# Patient Record
Sex: FEMALE | Race: WHITE | NOT HISPANIC OR LATINO | Employment: UNEMPLOYED | URBAN - NONMETROPOLITAN AREA
[De-identification: names, ages, dates, MRNs, and addresses within clinical notes are randomized per-mention and may not be internally consistent; named-entity substitution may affect disease eponyms.]

---

## 2018-03-19 ENCOUNTER — TRANSCRIBE ORDERS (OUTPATIENT)
Dept: ADMINISTRATIVE | Facility: HOSPITAL | Age: 73
End: 2018-03-19

## 2018-03-19 DIAGNOSIS — I50.9 SEVERE CONGESTIVE HEART FAILURE (HCC): ICD-10-CM

## 2018-03-19 DIAGNOSIS — M79.89 LEG SWELLING: Primary | ICD-10-CM

## 2018-03-19 DIAGNOSIS — R06.02 SOB (SHORTNESS OF BREATH): ICD-10-CM

## 2018-03-21 ENCOUNTER — HOSPITAL ENCOUNTER (OUTPATIENT)
Dept: GENERAL RADIOLOGY | Facility: HOSPITAL | Age: 73
Discharge: HOME | End: 2018-03-21

## 2018-03-21 ENCOUNTER — HOSPITAL ENCOUNTER (OUTPATIENT)
Dept: CARDIOLOGY | Facility: HOSPITAL | Age: 73
Discharge: HOME | End: 2018-03-21

## 2018-03-21 ENCOUNTER — TRANSCRIBE ORDERS (OUTPATIENT)
Dept: ADMINISTRATIVE | Facility: HOSPITAL | Age: 73
End: 2018-03-21

## 2018-03-21 ENCOUNTER — HOSPITAL ENCOUNTER (OUTPATIENT)
Dept: CARDIOLOGY | Facility: HOSPITAL | Age: 73
Discharge: HOME | End: 2018-03-21
Admitting: INTERNAL MEDICINE

## 2018-03-21 DIAGNOSIS — M79.89 LEG SWELLING: ICD-10-CM

## 2018-03-21 DIAGNOSIS — R06.02 SOB (SHORTNESS OF BREATH): ICD-10-CM

## 2018-03-21 DIAGNOSIS — R06.02 SHORTNESS OF BREATH: Primary | ICD-10-CM

## 2018-03-21 DIAGNOSIS — I50.9 SEVERE CONGESTIVE HEART FAILURE (HCC): ICD-10-CM

## 2018-03-21 DIAGNOSIS — R06.02 SHORTNESS OF BREATH: ICD-10-CM

## 2018-03-21 PROCEDURE — 93306 TTE W/DOPPLER COMPLETE: CPT

## 2018-03-21 PROCEDURE — 93970 EXTREMITY STUDY: CPT | Performed by: RADIOLOGY

## 2018-03-21 PROCEDURE — 93306 TTE W/DOPPLER COMPLETE: CPT | Performed by: INTERNAL MEDICINE

## 2018-03-21 PROCEDURE — 93970 EXTREMITY STUDY: CPT

## 2018-03-21 PROCEDURE — 71046 X-RAY EXAM CHEST 2 VIEWS: CPT | Performed by: RADIOLOGY

## 2018-03-21 PROCEDURE — 71046 X-RAY EXAM CHEST 2 VIEWS: CPT

## 2018-03-22 LAB
BH CV ECHO MEAS - % IVS THICK: 28.4 %
BH CV ECHO MEAS - % LVPW THICK: 40.7 %
BH CV ECHO MEAS - ACS: 1.9 CM
BH CV ECHO MEAS - AO MAX PG: 6.6 MMHG
BH CV ECHO MEAS - AO MEAN PG: 3.9 MMHG
BH CV ECHO MEAS - AO ROOT AREA (BSA CORRECTED): 1.8
BH CV ECHO MEAS - AO ROOT AREA: 6.6 CM^2
BH CV ECHO MEAS - AO ROOT DIAM: 2.9 CM
BH CV ECHO MEAS - AO V2 MAX: 128.2 CM/SEC
BH CV ECHO MEAS - AO V2 MEAN: 93.8 CM/SEC
BH CV ECHO MEAS - AO V2 VTI: 27.7 CM
BH CV ECHO MEAS - BSA(HAYCOCK): 1.7 M^2
BH CV ECHO MEAS - BSA: 1.6 M^2
BH CV ECHO MEAS - BZI_BMI: 28.7 KILOGRAMS/M^2
BH CV ECHO MEAS - BZI_METRIC_HEIGHT: 152.4 CM
BH CV ECHO MEAS - BZI_METRIC_WEIGHT: 66.7 KG
BH CV ECHO MEAS - CONTRAST EF 4CH: 71.1 ML/M^2
BH CV ECHO MEAS - EDV(CUBED): 109.6 ML
BH CV ECHO MEAS - EDV(MOD-SP4): 38 ML
BH CV ECHO MEAS - EDV(TEICH): 106.8 ML
BH CV ECHO MEAS - EF(CUBED): 79.8 %
BH CV ECHO MEAS - EF(MOD-SP4): 71.1 %
BH CV ECHO MEAS - EF(TEICH): 72.1 %
BH CV ECHO MEAS - ESV(CUBED): 22.2 ML
BH CV ECHO MEAS - ESV(MOD-SP4): 11 ML
BH CV ECHO MEAS - ESV(TEICH): 29.8 ML
BH CV ECHO MEAS - FS: 41.3 %
BH CV ECHO MEAS - IVS/LVPW: 0.95
BH CV ECHO MEAS - IVSD: 0.92 CM
BH CV ECHO MEAS - IVSS: 1.2 CM
BH CV ECHO MEAS - LA DIMENSION: 4.5 CM
BH CV ECHO MEAS - LA/AO: 1.4
BH CV ECHO MEAS - LV DIASTOLIC VOL/BSA (35-75): 23.2 ML/M^2
BH CV ECHO MEAS - LV MASS(C)D: 156.6 GRAMS
BH CV ECHO MEAS - LV MASS(C)DI: 95.6 GRAMS/M^2
BH CV ECHO MEAS - LV MASS(C)S: 109.7 GRAMS
BH CV ECHO MEAS - LV MASS(C)SI: 67 GRAMS/M^2
BH CV ECHO MEAS - LV SYSTOLIC VOL/BSA (12-30): 6.7 ML/M^2
BH CV ECHO MEAS - LVIDD: 4.8 CM
BH CV ECHO MEAS - LVIDS: 2.8 CM
BH CV ECHO MEAS - LVLD AP4: 6.1 CM
BH CV ECHO MEAS - LVLS AP4: 4.8 CM
BH CV ECHO MEAS - LVOT AREA (M): 2.5 CM^2
BH CV ECHO MEAS - LVOT AREA: 2.4 CM^2
BH CV ECHO MEAS - LVOT DIAM: 1.8 CM
BH CV ECHO MEAS - LVPWD: 0.97 CM
BH CV ECHO MEAS - LVPWS: 1.4 CM
BH CV ECHO MEAS - MV A MAX VEL: 96.7 CM/SEC
BH CV ECHO MEAS - MV E MAX VEL: 111.6 CM/SEC
BH CV ECHO MEAS - MV E/A: 1.2
BH CV ECHO MEAS - PA ACC SLOPE: 1682 CM/SEC^2
BH CV ECHO MEAS - PA ACC TIME: 0.08 SEC
BH CV ECHO MEAS - PA PR(ACCEL): 44.1 MMHG
BH CV ECHO MEAS - RAP SYSTOLE: 10 MMHG
BH CV ECHO MEAS - RVDD: 1.4 CM
BH CV ECHO MEAS - RVSP: 42.5 MMHG
BH CV ECHO MEAS - SI(AO): 111.5 ML/M^2
BH CV ECHO MEAS - SI(CUBED): 53.4 ML/M^2
BH CV ECHO MEAS - SI(MOD-SP4): 16.5 ML/M^2
BH CV ECHO MEAS - SI(TEICH): 47 ML/M^2
BH CV ECHO MEAS - SV(AO): 182.6 ML
BH CV ECHO MEAS - SV(CUBED): 87.4 ML
BH CV ECHO MEAS - SV(MOD-SP4): 27 ML
BH CV ECHO MEAS - SV(TEICH): 77 ML
BH CV ECHO MEAS - TR MAX VEL: 285 CM/SEC
MAXIMAL PREDICTED HEART RATE: 147 BPM
STRESS TARGET HR: 125 BPM

## 2018-04-06 ENCOUNTER — TRANSCRIBE ORDERS (OUTPATIENT)
Dept: LAB | Facility: HOSPITAL | Age: 73
End: 2018-04-06

## 2018-04-12 ENCOUNTER — LAB (OUTPATIENT)
Dept: LAB | Facility: HOSPITAL | Age: 73
End: 2018-04-12

## 2018-04-12 ENCOUNTER — TRANSCRIBE ORDERS (OUTPATIENT)
Dept: ADMINISTRATIVE | Facility: HOSPITAL | Age: 73
End: 2018-04-12

## 2018-04-12 DIAGNOSIS — R53.83 TIREDNESS: ICD-10-CM

## 2018-04-12 DIAGNOSIS — E11.9 DIABETES MELLITUS WITHOUT COMPLICATION (HCC): Primary | ICD-10-CM

## 2018-04-12 DIAGNOSIS — E11.9 DIABETES MELLITUS WITHOUT COMPLICATION (HCC): ICD-10-CM

## 2018-04-12 LAB
ALBUMIN SERPL-MCNC: 4.5 G/DL (ref 3.4–4.8)
ALBUMIN UR-MCNC: 87.2 MG/L
ALBUMIN/GLOB SERPL: 1.6 G/DL (ref 1.5–2.5)
ALP SERPL-CCNC: 55 U/L (ref 35–104)
ALT SERPL W P-5'-P-CCNC: 20 U/L (ref 10–36)
ANION GAP SERPL CALCULATED.3IONS-SCNC: 9.3 MMOL/L (ref 3.6–11.2)
AST SERPL-CCNC: 16 U/L (ref 10–30)
BASOPHILS # BLD AUTO: 0.02 10*3/MM3 (ref 0–0.3)
BASOPHILS NFR BLD AUTO: 0.4 % (ref 0–2)
BILIRUB SERPL-MCNC: 0.4 MG/DL (ref 0.2–1.8)
BUN BLD-MCNC: 41 MG/DL (ref 7–21)
BUN/CREAT SERPL: 31.8 (ref 7–25)
CALCIUM SPEC-SCNC: 9.2 MG/DL (ref 7.7–10)
CHLORIDE SERPL-SCNC: 101 MMOL/L (ref 99–112)
CO2 SERPL-SCNC: 28.7 MMOL/L (ref 24.3–31.9)
CREAT BLD-MCNC: 1.29 MG/DL (ref 0.43–1.29)
CREAT UR-MCNC: 96.6 MG/DL
DEPRECATED RDW RBC AUTO: 49.4 FL (ref 37–54)
EOSINOPHIL # BLD AUTO: 0.05 10*3/MM3 (ref 0–0.7)
EOSINOPHIL NFR BLD AUTO: 1 % (ref 0–7)
ERYTHROCYTE [DISTWIDTH] IN BLOOD BY AUTOMATED COUNT: 15.3 % (ref 11.5–14.5)
GFR SERPL CREATININE-BSD FRML MDRD: 41 ML/MIN/1.73
GLOBULIN UR ELPH-MCNC: 2.9 GM/DL
GLUCOSE BLD-MCNC: 161 MG/DL (ref 70–110)
HBA1C MFR BLD: 9.2 % (ref 4.5–5.7)
HCT VFR BLD AUTO: 44.8 % (ref 37–47)
HGB BLD-MCNC: 14.8 G/DL (ref 12–16)
IMM GRANULOCYTES # BLD: 0.01 10*3/MM3 (ref 0–0.03)
IMM GRANULOCYTES NFR BLD: 0.2 % (ref 0–0.5)
LYMPHOCYTES # BLD AUTO: 1.38 10*3/MM3 (ref 1–3)
LYMPHOCYTES NFR BLD AUTO: 26.5 % (ref 16–46)
MCH RBC QN AUTO: 29.7 PG (ref 27–33)
MCHC RBC AUTO-ENTMCNC: 33 G/DL (ref 33–37)
MCV RBC AUTO: 90 FL (ref 80–94)
MONOCYTES # BLD AUTO: 0.45 10*3/MM3 (ref 0.1–0.9)
MONOCYTES NFR BLD AUTO: 8.7 % (ref 0–12)
NEUTROPHILS # BLD AUTO: 3.29 10*3/MM3 (ref 1.4–6.5)
NEUTROPHILS NFR BLD AUTO: 63.2 % (ref 40–75)
OSMOLALITY SERPL CALC.SUM OF ELEC: 291.1 MOSM/KG (ref 273–305)
PLATELET # BLD AUTO: 291 10*3/MM3 (ref 130–400)
PMV BLD AUTO: 10.4 FL (ref 6–10)
POTASSIUM BLD-SCNC: 4.3 MMOL/L (ref 3.5–5.3)
PROT SERPL-MCNC: 7.4 G/DL (ref 6–8)
RBC # BLD AUTO: 4.98 10*6/MM3 (ref 4.2–5.4)
SODIUM BLD-SCNC: 139 MMOL/L (ref 135–153)
TSH SERPL DL<=0.05 MIU/L-ACNC: 2.74 MIU/ML (ref 0.55–4.78)
WBC NRBC COR # BLD: 5.2 10*3/MM3 (ref 4.5–12.5)

## 2018-04-12 PROCEDURE — 82570 ASSAY OF URINE CREATININE: CPT

## 2018-04-12 PROCEDURE — 83036 HEMOGLOBIN GLYCOSYLATED A1C: CPT

## 2018-04-12 PROCEDURE — 80053 COMPREHEN METABOLIC PANEL: CPT

## 2018-04-12 PROCEDURE — 36415 COLL VENOUS BLD VENIPUNCTURE: CPT

## 2018-04-12 PROCEDURE — 82043 UR ALBUMIN QUANTITATIVE: CPT

## 2018-04-12 PROCEDURE — 84443 ASSAY THYROID STIM HORMONE: CPT

## 2018-04-12 PROCEDURE — 85025 COMPLETE CBC W/AUTO DIFF WBC: CPT

## 2019-01-16 ENCOUNTER — APPOINTMENT (OUTPATIENT)
Dept: CT IMAGING | Facility: HOSPITAL | Age: 74
End: 2019-01-16

## 2019-01-16 ENCOUNTER — APPOINTMENT (OUTPATIENT)
Dept: GENERAL RADIOLOGY | Facility: HOSPITAL | Age: 74
End: 2019-01-16

## 2019-01-16 ENCOUNTER — HOSPITAL ENCOUNTER (INPATIENT)
Facility: HOSPITAL | Age: 74
LOS: 8 days | Discharge: HOME HEALTH | End: 2019-01-24
Attending: EMERGENCY MEDICINE | Admitting: HOSPITALIST

## 2019-01-16 DIAGNOSIS — I10 ESSENTIAL HYPERTENSION: ICD-10-CM

## 2019-01-16 DIAGNOSIS — J44.9 COPD WITH HYPOXIA (HCC): ICD-10-CM

## 2019-01-16 DIAGNOSIS — J18.9 PNEUMONIA OF BOTH LUNGS DUE TO INFECTIOUS ORGANISM, UNSPECIFIED PART OF LUNG: Primary | ICD-10-CM

## 2019-01-16 DIAGNOSIS — I48.91 ATRIAL FIBRILLATION WITH RVR (HCC): ICD-10-CM

## 2019-01-16 DIAGNOSIS — R09.02 COPD WITH HYPOXIA (HCC): ICD-10-CM

## 2019-01-16 DIAGNOSIS — J10.00 PNEUMONIA DUE TO INFLUENZA A VIRUS, UNSPECIFIED LATERALITY, UNSPECIFIED PART OF LUNG: ICD-10-CM

## 2019-01-16 LAB
6-ACETYL MORPHINE: NEGATIVE
A-A DO2: 145.1 MMHG (ref 0–300)
A-A DO2: 189.1 MMHG (ref 0–300)
ACETONE BLD QL: NEGATIVE
ALBUMIN SERPL-MCNC: 4.8 G/DL (ref 3.4–4.8)
ALBUMIN/GLOB SERPL: 1.5 G/DL (ref 1.5–2.5)
ALP SERPL-CCNC: 97 U/L (ref 35–104)
ALT SERPL W P-5'-P-CCNC: 13 U/L (ref 10–36)
AMMONIA BLD-SCNC: 46 UMOL/L (ref 11–51)
AMPHET+METHAMPHET UR QL: NEGATIVE
ANION GAP SERPL CALCULATED.3IONS-SCNC: 11.3 MMOL/L (ref 3.6–11.2)
ANION GAP SERPL CALCULATED.3IONS-SCNC: 15.7 MMOL/L (ref 3.6–11.2)
ANION GAP SERPL CALCULATED.3IONS-SCNC: 16.6 MMOL/L (ref 3.6–11.2)
APTT PPP: 23.2 SECONDS (ref 23.8–36.1)
APTT PPP: 25.6 SECONDS (ref 23.8–36.1)
ARTERIAL PATENCY WRIST A: ABNORMAL
ARTERIAL PATENCY WRIST A: POSITIVE
AST SERPL-CCNC: 25 U/L (ref 10–30)
ATMOSPHERIC PRESS: 733 MMHG
ATMOSPHERIC PRESS: 733 MMHG
BACTERIA UR QL AUTO: ABNORMAL /HPF
BARBITURATES UR QL SCN: NEGATIVE
BASE EXCESS BLDA CALC-SCNC: -2.4 MMOL/L
BASE EXCESS BLDA CALC-SCNC: -5.6 MMOL/L
BASOPHILS # BLD AUTO: 0.02 10*3/MM3 (ref 0–0.3)
BASOPHILS # BLD AUTO: 0.03 10*3/MM3 (ref 0–0.3)
BASOPHILS NFR BLD AUTO: 0.1 % (ref 0–2)
BASOPHILS NFR BLD AUTO: 0.3 % (ref 0–2)
BDY SITE: ABNORMAL
BDY SITE: ABNORMAL
BENZODIAZ UR QL SCN: NEGATIVE
BILIRUB SERPL-MCNC: 0.6 MG/DL (ref 0.2–1.8)
BILIRUB UR QL STRIP: NEGATIVE
BNP SERPL-MCNC: 109 PG/ML (ref 0–100)
BODY TEMPERATURE: 98.6 C
BODY TEMPERATURE: 98.6 C
BUN BLD-MCNC: 26 MG/DL (ref 7–21)
BUN BLD-MCNC: 29 MG/DL (ref 7–21)
BUN BLD-MCNC: 32 MG/DL (ref 7–21)
BUN/CREAT SERPL: 22.1 (ref 7–25)
BUN/CREAT SERPL: 23.4 (ref 7–25)
BUN/CREAT SERPL: 24.2 (ref 7–25)
BUPRENORPHINE SERPL-MCNC: NEGATIVE NG/ML
CALCIUM SPEC-SCNC: 8.4 MG/DL (ref 7.7–10)
CALCIUM SPEC-SCNC: 8.5 MG/DL (ref 7.7–10)
CALCIUM SPEC-SCNC: 9.6 MG/DL (ref 7.7–10)
CANNABINOIDS SERPL QL: NEGATIVE
CHLORIDE SERPL-SCNC: 100 MMOL/L (ref 99–112)
CHLORIDE SERPL-SCNC: 106 MMOL/L (ref 99–112)
CHLORIDE SERPL-SCNC: 94 MMOL/L (ref 99–112)
CLARITY UR: CLEAR
CO2 SERPL-SCNC: 17.3 MMOL/L (ref 24.3–31.9)
CO2 SERPL-SCNC: 19.4 MMOL/L (ref 24.3–31.9)
CO2 SERPL-SCNC: 20.7 MMOL/L (ref 24.3–31.9)
COCAINE UR QL: NEGATIVE
COHGB MFR BLD: 1.5 % (ref 0–5)
COHGB MFR BLD: 2.9 % (ref 0–5)
COLOR UR: YELLOW
CREAT BLD-MCNC: 1.11 MG/DL (ref 0.43–1.29)
CREAT BLD-MCNC: 1.2 MG/DL (ref 0.43–1.29)
CREAT BLD-MCNC: 1.45 MG/DL (ref 0.43–1.29)
CRP SERPL-MCNC: 1.69 MG/DL (ref 0–0.99)
D-LACTATE SERPL-SCNC: 2.8 MMOL/L (ref 0.5–2)
D-LACTATE SERPL-SCNC: 5.9 MMOL/L (ref 0.5–2)
DEPRECATED RDW RBC AUTO: 41.1 FL (ref 37–54)
DEPRECATED RDW RBC AUTO: 41.9 FL (ref 37–54)
EOSINOPHIL # BLD AUTO: 0.01 10*3/MM3 (ref 0–0.7)
EOSINOPHIL # BLD AUTO: 0.08 10*3/MM3 (ref 0–0.7)
EOSINOPHIL NFR BLD AUTO: 0.1 % (ref 0–7)
EOSINOPHIL NFR BLD AUTO: 0.8 % (ref 0–7)
ERYTHROCYTE [DISTWIDTH] IN BLOOD BY AUTOMATED COUNT: 12.9 % (ref 11.5–14.5)
ERYTHROCYTE [DISTWIDTH] IN BLOOD BY AUTOMATED COUNT: 13 % (ref 11.5–14.5)
ETHANOL BLD-MCNC: <10 MG/DL
ETHANOL UR QL: <0.01 %
FLUAV AG NPH QL: NEGATIVE
FLUBV AG NPH QL IA: NEGATIVE
GFR SERPL CREATININE-BSD FRML MDRD: 35 ML/MIN/1.73
GFR SERPL CREATININE-BSD FRML MDRD: 44 ML/MIN/1.73
GFR SERPL CREATININE-BSD FRML MDRD: 48 ML/MIN/1.73
GLOBULIN UR ELPH-MCNC: 3.1 GM/DL
GLUCOSE BLD-MCNC: 209 MG/DL (ref 70–110)
GLUCOSE BLD-MCNC: 461 MG/DL (ref 70–110)
GLUCOSE BLD-MCNC: 474 MG/DL (ref 70–110)
GLUCOSE BLD-MCNC: 485 MG/DL (ref 70–110)
GLUCOSE BLDC GLUCOMTR-MCNC: 134 MG/DL (ref 70–130)
GLUCOSE BLDC GLUCOMTR-MCNC: 245 MG/DL (ref 70–130)
GLUCOSE BLDC GLUCOMTR-MCNC: 255 MG/DL (ref 70–130)
GLUCOSE BLDC GLUCOMTR-MCNC: 459 MG/DL (ref 70–130)
GLUCOSE BLDC GLUCOMTR-MCNC: 489 MG/DL (ref 70–130)
GLUCOSE BLDC GLUCOMTR-MCNC: 66 MG/DL (ref 70–130)
GLUCOSE BLDC GLUCOMTR-MCNC: 84 MG/DL (ref 70–130)
GLUCOSE UR STRIP-MCNC: ABNORMAL MG/DL
HBA1C MFR BLD: 11.5 % (ref 4.5–5.7)
HCO3 BLDA-SCNC: 20.1 MMOL/L (ref 22–26)
HCO3 BLDA-SCNC: 22.3 MMOL/L (ref 22–26)
HCT VFR BLD AUTO: 41.3 % (ref 37–47)
HCT VFR BLD AUTO: 45.1 % (ref 37–47)
HCT VFR BLD CALC: 39 % (ref 37–47)
HCT VFR BLD CALC: 41 % (ref 37–47)
HGB BLD-MCNC: 13.6 G/DL (ref 12–16)
HGB BLD-MCNC: 15.1 G/DL (ref 12–16)
HGB BLDA-MCNC: 13.1 G/DL (ref 12–16)
HGB BLDA-MCNC: 13.8 G/DL (ref 12–16)
HGB UR QL STRIP.AUTO: ABNORMAL
HOLD SPECIMEN: NORMAL
HOROWITZ INDEX BLD+IHG-RTO: 45 %
HOROWITZ INDEX BLD+IHG-RTO: 50 %
HYALINE CASTS UR QL AUTO: ABNORMAL /LPF
IMM GRANULOCYTES # BLD AUTO: 0.03 10*3/MM3 (ref 0–0.03)
IMM GRANULOCYTES # BLD AUTO: 0.04 10*3/MM3 (ref 0–0.03)
IMM GRANULOCYTES NFR BLD AUTO: 0.3 % (ref 0–0.5)
IMM GRANULOCYTES NFR BLD AUTO: 0.3 % (ref 0–0.5)
INR PPP: 0.92 (ref 0.9–1.1)
INR PPP: 0.93 (ref 0.9–1.1)
KETONES UR QL STRIP: ABNORMAL
L PNEUMO1 AG UR QL IA: NEGATIVE
LEUKOCYTE ESTERASE UR QL STRIP.AUTO: ABNORMAL
LYMPHOCYTES # BLD AUTO: 0.89 10*3/MM3 (ref 1–3)
LYMPHOCYTES # BLD AUTO: 3.21 10*3/MM3 (ref 1–3)
LYMPHOCYTES NFR BLD AUTO: 32.1 % (ref 16–46)
LYMPHOCYTES NFR BLD AUTO: 6.6 % (ref 16–46)
M PNEUMO IGM SER QL: NEGATIVE
MAGNESIUM SERPL-MCNC: 2.1 MG/DL (ref 1.7–2.6)
MCH RBC QN AUTO: 29.7 PG (ref 27–33)
MCH RBC QN AUTO: 29.7 PG (ref 27–33)
MCHC RBC AUTO-ENTMCNC: 32.9 G/DL (ref 33–37)
MCHC RBC AUTO-ENTMCNC: 33.5 G/DL (ref 33–37)
MCV RBC AUTO: 88.8 FL (ref 80–94)
MCV RBC AUTO: 90.2 FL (ref 80–94)
METHADONE UR QL SCN: NEGATIVE
METHGB BLD QL: 0.3 % (ref 0–3)
METHGB BLD QL: 0.3 % (ref 0–3)
MODALITY: ABNORMAL
MODALITY: ABNORMAL
MONOCYTES # BLD AUTO: 0.92 10*3/MM3 (ref 0.1–0.9)
MONOCYTES # BLD AUTO: 0.92 10*3/MM3 (ref 0.1–0.9)
MONOCYTES NFR BLD AUTO: 6.8 % (ref 0–12)
MONOCYTES NFR BLD AUTO: 9.2 % (ref 0–12)
NEUTROPHILS # BLD AUTO: 11.67 10*3/MM3 (ref 1.4–6.5)
NEUTROPHILS # BLD AUTO: 5.72 10*3/MM3 (ref 1.4–6.5)
NEUTROPHILS NFR BLD AUTO: 57.3 % (ref 40–75)
NEUTROPHILS NFR BLD AUTO: 86.1 % (ref 40–75)
NITRITE UR QL STRIP: NEGATIVE
OPIATES UR QL: NEGATIVE
OSMOLALITY SERPL CALC.SUM OF ELEC: 286.6 MOSM/KG (ref 273–305)
OSMOLALITY SERPL CALC.SUM OF ELEC: 289.2 MOSM/KG (ref 273–305)
OSMOLALITY SERPL CALC.SUM OF ELEC: 292.3 MOSM/KG (ref 273–305)
OXYCODONE UR QL SCN: NEGATIVE
OXYHGB MFR BLDV: 94.3 % (ref 85–100)
OXYHGB MFR BLDV: 96.4 % (ref 85–100)
PCO2 BLDA: 38.1 MM HG (ref 35–45)
PCO2 BLDA: 40 MM HG (ref 35–45)
PCP UR QL SCN: NEGATIVE
PEEP RESPIRATORY: 5 CM[H2O]
PEEP RESPIRATORY: 5 CM[H2O]
PH BLDA: 7.32 PH UNITS (ref 7.35–7.45)
PH BLDA: 7.38 PH UNITS (ref 7.35–7.45)
PH UR STRIP.AUTO: 6.5 [PH] (ref 5–8)
PHOSPHATE SERPL-MCNC: 4.1 MG/DL (ref 2.7–4.5)
PLATELET # BLD AUTO: 236 10*3/MM3 (ref 130–400)
PLATELET # BLD AUTO: 277 10*3/MM3 (ref 130–400)
PMV BLD AUTO: 11.3 FL (ref 6–10)
PMV BLD AUTO: 11.5 FL (ref 6–10)
PO2 BLDA: 108.9 MM HG (ref 80–100)
PO2 BLDA: 120.3 MM HG (ref 80–100)
POTASSIUM BLD-SCNC: 4.1 MMOL/L (ref 3.5–5.3)
POTASSIUM BLD-SCNC: 4.3 MMOL/L (ref 3.5–5.3)
POTASSIUM BLD-SCNC: 4.5 MMOL/L (ref 3.5–5.3)
PROT SERPL-MCNC: 7.9 G/DL (ref 6–8)
PROT UR QL STRIP: ABNORMAL
PROTHROMBIN TIME: 12.5 SECONDS (ref 11–15.4)
PROTHROMBIN TIME: 12.7 SECONDS (ref 11–15.4)
RBC # BLD AUTO: 4.58 10*6/MM3 (ref 4.2–5.4)
RBC # BLD AUTO: 5.08 10*6/MM3 (ref 4.2–5.4)
RBC # UR: ABNORMAL /HPF
REF LAB TEST METHOD: ABNORMAL
SAO2 % BLDCOA: 97.4 % (ref 90–100)
SAO2 % BLDCOA: 98.2 % (ref 90–100)
SET MECH RESP RATE: 14
SET MECH RESP RATE: 14
SODIUM BLD-SCNC: 130 MMOL/L (ref 135–153)
SODIUM BLD-SCNC: 133 MMOL/L (ref 135–153)
SODIUM BLD-SCNC: 138 MMOL/L (ref 135–153)
SP GR UR STRIP: 1.02 (ref 1–1.03)
SQUAMOUS #/AREA URNS HPF: ABNORMAL /HPF
TROPONIN I SERPL-MCNC: 0.03 NG/ML
TROPONIN I SERPL-MCNC: 0.04 NG/ML
TROPONIN I SERPL-MCNC: 0.08 NG/ML
TSH SERPL DL<=0.05 MIU/L-ACNC: 4.09 MIU/ML (ref 0.55–4.78)
UROBILINOGEN UR QL STRIP: ABNORMAL
VENTILATOR MODE: ABNORMAL
VENTILATOR MODE: ABNORMAL
VT ON VENT VENT: 400 ML
VT ON VENT VENT: 550 ML
WBC NRBC COR # BLD: 13.55 10*3/MM3 (ref 4.5–12.5)
WBC NRBC COR # BLD: 9.99 10*3/MM3 (ref 4.5–12.5)
WBC UR QL AUTO: ABNORMAL /HPF
WHOLE BLOOD HOLD SPECIMEN: NORMAL
WHOLE BLOOD HOLD SPECIMEN: NORMAL

## 2019-01-16 PROCEDURE — 83036 HEMOGLOBIN GLYCOSYLATED A1C: CPT | Performed by: HOSPITALIST

## 2019-01-16 PROCEDURE — 25010000002 CEFTRIAXONE: Performed by: EMERGENCY MEDICINE

## 2019-01-16 PROCEDURE — 94799 UNLISTED PULMONARY SVC/PX: CPT

## 2019-01-16 PROCEDURE — 93005 ELECTROCARDIOGRAM TRACING: CPT | Performed by: NURSE PRACTITIONER

## 2019-01-16 PROCEDURE — 84146 ASSAY OF PROLACTIN: CPT | Performed by: NURSE PRACTITIONER

## 2019-01-16 PROCEDURE — 87040 BLOOD CULTURE FOR BACTERIA: CPT | Performed by: EMERGENCY MEDICINE

## 2019-01-16 PROCEDURE — 93010 ELECTROCARDIOGRAM REPORT: CPT | Performed by: INTERNAL MEDICINE

## 2019-01-16 PROCEDURE — 82962 GLUCOSE BLOOD TEST: CPT

## 2019-01-16 PROCEDURE — 80307 DRUG TEST PRSMV CHEM ANLYZR: CPT | Performed by: EMERGENCY MEDICINE

## 2019-01-16 PROCEDURE — 82805 BLOOD GASES W/O2 SATURATION: CPT | Performed by: NURSE PRACTITIONER

## 2019-01-16 PROCEDURE — 5A1945Z RESPIRATORY VENTILATION, 24-96 CONSECUTIVE HOURS: ICD-10-PCS | Performed by: EMERGENCY MEDICINE

## 2019-01-16 PROCEDURE — 25010000002 AZITHROMYCIN: Performed by: EMERGENCY MEDICINE

## 2019-01-16 PROCEDURE — 25010000002 SUCCINYLCHOLINE PER 20 MG: Performed by: EMERGENCY MEDICINE

## 2019-01-16 PROCEDURE — 87086 URINE CULTURE/COLONY COUNT: CPT | Performed by: NURSE PRACTITIONER

## 2019-01-16 PROCEDURE — 82140 ASSAY OF AMMONIA: CPT | Performed by: NURSE PRACTITIONER

## 2019-01-16 PROCEDURE — 84443 ASSAY THYROID STIM HORMONE: CPT | Performed by: NURSE PRACTITIONER

## 2019-01-16 PROCEDURE — 84484 ASSAY OF TROPONIN QUANT: CPT | Performed by: EMERGENCY MEDICINE

## 2019-01-16 PROCEDURE — 82947 ASSAY GLUCOSE BLOOD QUANT: CPT | Performed by: NURSE PRACTITIONER

## 2019-01-16 PROCEDURE — 94640 AIRWAY INHALATION TREATMENT: CPT

## 2019-01-16 PROCEDURE — 99291 CRITICAL CARE FIRST HOUR: CPT | Performed by: HOSPITALIST

## 2019-01-16 PROCEDURE — 85730 THROMBOPLASTIN TIME PARTIAL: CPT | Performed by: NURSE PRACTITIONER

## 2019-01-16 PROCEDURE — 70450 CT HEAD/BRAIN W/O DYE: CPT | Performed by: RADIOLOGY

## 2019-01-16 PROCEDURE — 81001 URINALYSIS AUTO W/SCOPE: CPT | Performed by: EMERGENCY MEDICINE

## 2019-01-16 PROCEDURE — 25010000002 ENOXAPARIN PER 10 MG: Performed by: HOSPITALIST

## 2019-01-16 PROCEDURE — 83050 HGB METHEMOGLOBIN QUAN: CPT | Performed by: NURSE PRACTITIONER

## 2019-01-16 PROCEDURE — 85610 PROTHROMBIN TIME: CPT | Performed by: EMERGENCY MEDICINE

## 2019-01-16 PROCEDURE — 80053 COMPREHEN METABOLIC PANEL: CPT | Performed by: EMERGENCY MEDICINE

## 2019-01-16 PROCEDURE — 85025 COMPLETE CBC W/AUTO DIFF WBC: CPT | Performed by: EMERGENCY MEDICINE

## 2019-01-16 PROCEDURE — 25010000002 NALOXONE PER 1 MG: Performed by: EMERGENCY MEDICINE

## 2019-01-16 PROCEDURE — 85730 THROMBOPLASTIN TIME PARTIAL: CPT | Performed by: EMERGENCY MEDICINE

## 2019-01-16 PROCEDURE — 82009 KETONE BODYS QUAL: CPT | Performed by: NURSE PRACTITIONER

## 2019-01-16 PROCEDURE — 63710000001 INSULIN REGULAR HUMAN PER 5 UNITS: Performed by: EMERGENCY MEDICINE

## 2019-01-16 PROCEDURE — 71045 X-RAY EXAM CHEST 1 VIEW: CPT

## 2019-01-16 PROCEDURE — 0BH17EZ INSERTION OF ENDOTRACHEAL AIRWAY INTO TRACHEA, VIA NATURAL OR ARTIFICIAL OPENING: ICD-10-PCS | Performed by: EMERGENCY MEDICINE

## 2019-01-16 PROCEDURE — 87185 SC STD ENZYME DETCJ PER NZM: CPT | Performed by: NURSE PRACTITIONER

## 2019-01-16 PROCEDURE — 85610 PROTHROMBIN TIME: CPT | Performed by: NURSE PRACTITIONER

## 2019-01-16 PROCEDURE — 84484 ASSAY OF TROPONIN QUANT: CPT | Performed by: NURSE PRACTITIONER

## 2019-01-16 PROCEDURE — 83735 ASSAY OF MAGNESIUM: CPT | Performed by: EMERGENCY MEDICINE

## 2019-01-16 PROCEDURE — 94002 VENT MGMT INPAT INIT DAY: CPT

## 2019-01-16 PROCEDURE — 99285 EMERGENCY DEPT VISIT HI MDM: CPT

## 2019-01-16 PROCEDURE — 82375 ASSAY CARBOXYHB QUANT: CPT | Performed by: NURSE PRACTITIONER

## 2019-01-16 PROCEDURE — 87186 SC STD MICRODIL/AGAR DIL: CPT | Performed by: NURSE PRACTITIONER

## 2019-01-16 PROCEDURE — 83605 ASSAY OF LACTIC ACID: CPT | Performed by: EMERGENCY MEDICINE

## 2019-01-16 PROCEDURE — 25010000002 PROPOFOL 1000 MG/ML EMULSION: Performed by: EMERGENCY MEDICINE

## 2019-01-16 PROCEDURE — 25010000002 PROPOFOL 10 MG/ML EMULSION

## 2019-01-16 PROCEDURE — 83880 ASSAY OF NATRIURETIC PEPTIDE: CPT | Performed by: EMERGENCY MEDICINE

## 2019-01-16 PROCEDURE — 87205 SMEAR GRAM STAIN: CPT | Performed by: NURSE PRACTITIONER

## 2019-01-16 PROCEDURE — 93005 ELECTROCARDIOGRAM TRACING: CPT | Performed by: EMERGENCY MEDICINE

## 2019-01-16 PROCEDURE — 86140 C-REACTIVE PROTEIN: CPT | Performed by: NURSE PRACTITIONER

## 2019-01-16 PROCEDURE — 71250 CT THORAX DX C-: CPT

## 2019-01-16 PROCEDURE — 71250 CT THORAX DX C-: CPT | Performed by: RADIOLOGY

## 2019-01-16 PROCEDURE — 85025 COMPLETE CBC W/AUTO DIFF WBC: CPT | Performed by: NURSE PRACTITIONER

## 2019-01-16 PROCEDURE — 87077 CULTURE AEROBIC IDENTIFY: CPT | Performed by: NURSE PRACTITIONER

## 2019-01-16 PROCEDURE — 87804 INFLUENZA ASSAY W/OPTIC: CPT | Performed by: NURSE PRACTITIONER

## 2019-01-16 PROCEDURE — 86738 MYCOPLASMA ANTIBODY: CPT | Performed by: NURSE PRACTITIONER

## 2019-01-16 PROCEDURE — 36600 WITHDRAWAL OF ARTERIAL BLOOD: CPT | Performed by: NURSE PRACTITIONER

## 2019-01-16 PROCEDURE — 87899 AGENT NOS ASSAY W/OPTIC: CPT | Performed by: NURSE PRACTITIONER

## 2019-01-16 PROCEDURE — 87070 CULTURE OTHR SPECIMN AEROBIC: CPT | Performed by: NURSE PRACTITIONER

## 2019-01-16 PROCEDURE — 71045 X-RAY EXAM CHEST 1 VIEW: CPT | Performed by: RADIOLOGY

## 2019-01-16 PROCEDURE — 84100 ASSAY OF PHOSPHORUS: CPT | Performed by: HOSPITALIST

## 2019-01-16 PROCEDURE — 70450 CT HEAD/BRAIN W/O DYE: CPT

## 2019-01-16 RX ORDER — SODIUM CHLORIDE 0.9 % (FLUSH) 0.9 %
3 SYRINGE (ML) INJECTION EVERY 12 HOURS SCHEDULED
Status: DISCONTINUED | OUTPATIENT
Start: 2019-01-16 | End: 2019-01-24 | Stop reason: HOSPADM

## 2019-01-16 RX ORDER — POTASSIUM CHLORIDE 750 MG/1
10 CAPSULE, EXTENDED RELEASE ORAL DAILY
Status: CANCELLED | OUTPATIENT
Start: 2019-01-17

## 2019-01-16 RX ORDER — POTASSIUM CHLORIDE 600 MG/1
8 TABLET, FILM COATED, EXTENDED RELEASE ORAL DAILY
COMMUNITY
End: 2019-01-24 | Stop reason: HOSPADM

## 2019-01-16 RX ORDER — DEXTROSE MONOHYDRATE 25 G/50ML
25 INJECTION, SOLUTION INTRAVENOUS
Status: CANCELLED | OUTPATIENT
Start: 2019-01-16

## 2019-01-16 RX ORDER — IPRATROPIUM BROMIDE AND ALBUTEROL SULFATE 2.5; .5 MG/3ML; MG/3ML
3 SOLUTION RESPIRATORY (INHALATION)
Status: DISCONTINUED | OUTPATIENT
Start: 2019-01-16 | End: 2019-01-22

## 2019-01-16 RX ORDER — INDAPAMIDE 2.5 MG/1
5 TABLET, FILM COATED ORAL EVERY MORNING
Status: CANCELLED | OUTPATIENT
Start: 2019-01-17

## 2019-01-16 RX ORDER — ETOMIDATE 2 MG/ML
15 INJECTION INTRAVENOUS ONCE
Status: COMPLETED | OUTPATIENT
Start: 2019-01-16 | End: 2019-01-16

## 2019-01-16 RX ORDER — FUROSEMIDE 20 MG/1
20 TABLET ORAL 2 TIMES DAILY
COMMUNITY
End: 2019-01-16

## 2019-01-16 RX ORDER — PIOGLITAZONEHYDROCHLORIDE 15 MG/1
45 TABLET ORAL DAILY
Status: CANCELLED | OUTPATIENT
Start: 2019-01-17

## 2019-01-16 RX ORDER — NALOXONE HYDROCHLORIDE 1 MG/ML
INJECTION INTRAMUSCULAR; INTRAVENOUS; SUBCUTANEOUS
Status: COMPLETED
Start: 2019-01-16 | End: 2019-01-16

## 2019-01-16 RX ORDER — SODIUM CHLORIDE 9 MG/ML
100 INJECTION, SOLUTION INTRAVENOUS CONTINUOUS
Status: DISCONTINUED | OUTPATIENT
Start: 2019-01-16 | End: 2019-01-17

## 2019-01-16 RX ORDER — SODIUM CHLORIDE 0.9 % (FLUSH) 0.9 %
10 SYRINGE (ML) INJECTION AS NEEDED
Status: DISCONTINUED | OUTPATIENT
Start: 2019-01-16 | End: 2019-01-24 | Stop reason: HOSPADM

## 2019-01-16 RX ORDER — CHLORHEXIDINE GLUCONATE 0.12 MG/ML
15 RINSE ORAL EVERY 12 HOURS SCHEDULED
Status: DISCONTINUED | OUTPATIENT
Start: 2019-01-16 | End: 2019-01-19

## 2019-01-16 RX ORDER — ACETAMINOPHEN 325 MG/1
650 TABLET ORAL EVERY 6 HOURS PRN
Status: DISCONTINUED | OUTPATIENT
Start: 2019-01-16 | End: 2019-01-24 | Stop reason: HOSPADM

## 2019-01-16 RX ORDER — RAMIPRIL 10 MG/1
10 CAPSULE ORAL DAILY
Status: CANCELLED | OUTPATIENT
Start: 2019-01-17

## 2019-01-16 RX ORDER — NALOXONE HCL 0.4 MG/ML
1 VIAL (ML) INJECTION ONCE
Status: COMPLETED | OUTPATIENT
Start: 2019-01-16 | End: 2019-01-16

## 2019-01-16 RX ORDER — PROPOFOL 10 MG/ML
VIAL (ML) INTRAVENOUS
Status: COMPLETED
Start: 2019-01-16 | End: 2019-01-16

## 2019-01-16 RX ORDER — LORAZEPAM 2 MG/ML
1 INJECTION INTRAMUSCULAR EVERY 4 HOURS PRN
Status: DISCONTINUED | OUTPATIENT
Start: 2019-01-16 | End: 2019-01-19

## 2019-01-16 RX ORDER — NIFEDIPINE 90 MG/1
90 TABLET, EXTENDED RELEASE ORAL DAILY
COMMUNITY
End: 2019-01-24 | Stop reason: HOSPADM

## 2019-01-16 RX ORDER — PANTOPRAZOLE SODIUM 40 MG/10ML
40 INJECTION, POWDER, LYOPHILIZED, FOR SOLUTION INTRAVENOUS
Status: DISCONTINUED | OUTPATIENT
Start: 2019-01-16 | End: 2019-01-19

## 2019-01-16 RX ORDER — NIFEDIPINE 90 MG/1
90 TABLET, FILM COATED, EXTENDED RELEASE ORAL DAILY
Status: CANCELLED | OUTPATIENT
Start: 2019-01-17

## 2019-01-16 RX ORDER — MULTIPLE VITAMINS W/ MINERALS TAB 9MG-400MCG
1 TAB ORAL DAILY
COMMUNITY
End: 2019-01-16

## 2019-01-16 RX ORDER — RAMIPRIL 10 MG/1
10 CAPSULE ORAL DAILY
COMMUNITY
End: 2019-01-24 | Stop reason: HOSPADM

## 2019-01-16 RX ORDER — L.ACID,PARA/B.BIFIDUM/S.THERM 8B CELL
1 CAPSULE ORAL DAILY
Status: DISCONTINUED | OUTPATIENT
Start: 2019-01-16 | End: 2019-01-24 | Stop reason: HOSPADM

## 2019-01-16 RX ORDER — INDAPAMIDE 2.5 MG/1
5 TABLET, FILM COATED ORAL EVERY MORNING
COMMUNITY
End: 2019-01-24 | Stop reason: HOSPADM

## 2019-01-16 RX ORDER — HEPARIN SODIUM 5000 [USP'U]/ML
60 INJECTION, SOLUTION INTRAVENOUS; SUBCUTANEOUS ONCE
Status: DISCONTINUED | OUTPATIENT
Start: 2019-01-16 | End: 2019-01-16

## 2019-01-16 RX ORDER — HEPARIN SODIUM 10000 [USP'U]/100ML
12 INJECTION, SOLUTION INTRAVENOUS
Status: DISCONTINUED | OUTPATIENT
Start: 2019-01-16 | End: 2019-01-16

## 2019-01-16 RX ORDER — DEXTROSE MONOHYDRATE 25 G/50ML
25-50 INJECTION, SOLUTION INTRAVENOUS
Status: DISCONTINUED | OUTPATIENT
Start: 2019-01-16 | End: 2019-01-24 | Stop reason: HOSPADM

## 2019-01-16 RX ORDER — SUCCINYLCHOLINE CHLORIDE 20 MG/ML
100 INJECTION INTRAMUSCULAR; INTRAVENOUS ONCE
Status: COMPLETED | OUTPATIENT
Start: 2019-01-16 | End: 2019-01-16

## 2019-01-16 RX ORDER — MONTELUKAST SODIUM 10 MG/1
10 TABLET ORAL NIGHTLY
Status: ON HOLD | COMMUNITY
End: 2019-10-16 | Stop reason: SDUPTHER

## 2019-01-16 RX ORDER — NICOTINE POLACRILEX 4 MG
15 LOZENGE BUCCAL
Status: CANCELLED | OUTPATIENT
Start: 2019-01-16

## 2019-01-16 RX ORDER — SODIUM CHLORIDE 0.9 % (FLUSH) 0.9 %
3-10 SYRINGE (ML) INJECTION AS NEEDED
Status: DISCONTINUED | OUTPATIENT
Start: 2019-01-16 | End: 2019-01-24 | Stop reason: HOSPADM

## 2019-01-16 RX ORDER — SODIUM CHLORIDE 9 MG/ML
INJECTION, SOLUTION INTRAVENOUS
Status: COMPLETED
Start: 2019-01-16 | End: 2019-01-16

## 2019-01-16 RX ORDER — INDAPAMIDE 2.5 MG/1
2.5 TABLET, FILM COATED ORAL EVERY MORNING
COMMUNITY
End: 2019-01-16

## 2019-01-16 RX ORDER — HEPARIN SODIUM 5000 [USP'U]/ML
30 INJECTION, SOLUTION INTRAVENOUS; SUBCUTANEOUS AS NEEDED
Status: DISCONTINUED | OUTPATIENT
Start: 2019-01-16 | End: 2019-01-16

## 2019-01-16 RX ORDER — DILTIAZEM HYDROCHLORIDE 5 MG/ML
15 INJECTION INTRAVENOUS ONCE
Status: COMPLETED | OUTPATIENT
Start: 2019-01-16 | End: 2019-01-16

## 2019-01-16 RX ORDER — PIOGLITAZONEHYDROCHLORIDE 45 MG/1
45 TABLET ORAL DAILY
COMMUNITY
End: 2019-01-24 | Stop reason: HOSPADM

## 2019-01-16 RX ORDER — HEPARIN SODIUM 5000 [USP'U]/ML
60 INJECTION, SOLUTION INTRAVENOUS; SUBCUTANEOUS AS NEEDED
Status: DISCONTINUED | OUTPATIENT
Start: 2019-01-16 | End: 2019-01-16

## 2019-01-16 RX ORDER — METOPROLOL TARTRATE 5 MG/5ML
5 INJECTION INTRAVENOUS ONCE
Status: COMPLETED | OUTPATIENT
Start: 2019-01-16 | End: 2019-01-16

## 2019-01-16 RX ORDER — MONTELUKAST SODIUM 10 MG/1
10 TABLET ORAL NIGHTLY
Status: CANCELLED | OUTPATIENT
Start: 2019-01-16

## 2019-01-16 RX ADMIN — ENOXAPARIN SODIUM 50 MG: 60 INJECTION SUBCUTANEOUS at 21:32

## 2019-01-16 RX ADMIN — SODIUM CHLORIDE 1000 ML/HR: 9 INJECTION, SOLUTION INTRAVENOUS at 14:30

## 2019-01-16 RX ADMIN — AZITHROMYCIN MONOHYDRATE 500 MG: 500 INJECTION, POWDER, LYOPHILIZED, FOR SOLUTION INTRAVENOUS at 16:19

## 2019-01-16 RX ADMIN — SODIUM CHLORIDE 100 ML/HR: 9 INJECTION, SOLUTION INTRAVENOUS at 18:17

## 2019-01-16 RX ADMIN — HUMAN INSULIN 8 UNITS: 100 INJECTION, SOLUTION SUBCUTANEOUS at 17:25

## 2019-01-16 RX ADMIN — Medication 1 CAPSULE: at 21:32

## 2019-01-16 RX ADMIN — CEFTRIAXONE 1 G: 1 INJECTION, POWDER, FOR SOLUTION INTRAMUSCULAR; INTRAVENOUS at 16:54

## 2019-01-16 RX ADMIN — CHLORHEXIDINE GLUCONATE 15 ML: 1.2 RINSE ORAL at 20:26

## 2019-01-16 RX ADMIN — DEXTROSE MONOHYDRATE 25 ML: 25 INJECTION, SOLUTION INTRAVENOUS at 23:36

## 2019-01-16 RX ADMIN — IPRATROPIUM BROMIDE AND ALBUTEROL SULFATE 3 ML: .5; 3 SOLUTION RESPIRATORY (INHALATION) at 19:12

## 2019-01-16 RX ADMIN — PROPOFOL 5 MCG/KG/MIN: 10 INJECTION, EMULSION INTRAVENOUS at 15:48

## 2019-01-16 RX ADMIN — PROPOFOL 50 MCG/KG/MIN: 10 INJECTION, EMULSION INTRAVENOUS at 20:22

## 2019-01-16 RX ADMIN — SUCCINYLCHOLINE CHLORIDE 100 MG: 20 INJECTION, SOLUTION INTRAMUSCULAR; INTRAVENOUS at 15:38

## 2019-01-16 RX ADMIN — NALOXONE HYDROCHLORIDE 1 MG: 1 INJECTION PARENTERAL at 14:28

## 2019-01-16 RX ADMIN — NALOXONE HYDROCHLORIDE 1 MG: 0.4 INJECTION, SOLUTION INTRAMUSCULAR; INTRAVENOUS; SUBCUTANEOUS at 15:34

## 2019-01-16 RX ADMIN — SODIUM CHLORIDE, PRESERVATIVE FREE 3 ML: 5 INJECTION INTRAVENOUS at 20:27

## 2019-01-16 RX ADMIN — SODIUM CHLORIDE 1000 ML: 900 INJECTION INTRAVENOUS at 16:53

## 2019-01-16 RX ADMIN — PANTOPRAZOLE SODIUM 40 MG: 40 INJECTION, POWDER, FOR SOLUTION INTRAVENOUS at 20:29

## 2019-01-16 RX ADMIN — DILTIAZEM HYDROCHLORIDE 15 MG: 5 INJECTION INTRAVENOUS at 15:34

## 2019-01-16 RX ADMIN — ETOMIDATE 15 MG: 2 INJECTION, SOLUTION INTRAVENOUS at 15:37

## 2019-01-16 RX ADMIN — METOPROLOL TARTRATE 5 MG: 5 INJECTION, SOLUTION INTRAVENOUS at 16:50

## 2019-01-16 RX ADMIN — SODIUM CHLORIDE 2 UNITS/HR: 9 INJECTION, SOLUTION INTRAVENOUS at 20:26

## 2019-01-16 RX ADMIN — Medication: at 23:51

## 2019-01-16 NOTE — ED PROVIDER NOTES
Subjective   Patient presents to ER after passing out in car on the way home from doctors office.        Syncope   Episode history:  Single  Progression:  Unchanged  Chronicity:  Recurrent  Context: blood draw and sight of blood    Witnessed: yes    Worsened by:  Nothing  Ineffective treatments:  None tried  Associated symptoms: anxiety and shortness of breath        Review of Systems   Constitutional: Positive for activity change and fatigue.   HENT: Negative.    Eyes: Negative.    Respiratory: Positive for shortness of breath.    Cardiovascular: Positive for syncope.   Gastrointestinal: Negative.    Endocrine: Negative.    Genitourinary: Negative.    Musculoskeletal: Negative.    Allergic/Immunologic: Negative.    Hematological: Negative.    Psychiatric/Behavioral:        Obtunded       Past Medical History:   Diagnosis Date   • Diabetes mellitus (CMS/HCC)    • Hypertension        No Known Allergies    History reviewed. No pertinent surgical history.    History reviewed. No pertinent family history.    Social History     Socioeconomic History   • Marital status:      Spouse name: Not on file   • Number of children: Not on file   • Years of education: Not on file   • Highest education level: Not on file   Tobacco Use   • Smoking status: Never Smoker   Substance and Sexual Activity   • Alcohol use: No     Frequency: Never   • Drug use: No           Objective   Physical Exam   Constitutional: She appears well-developed.   HENT:   Head: Normocephalic and atraumatic.   Eyes:   Pinpoint pupils   Neck: Neck supple.   Cardiovascular:   Atrial fibrillation   Pulmonary/Chest: She has wheezes. She has rales.   Abdominal: Soft.   Musculoskeletal: Normal range of motion.   Neurological:   Obtunded, only responds to painful stimuli   Nursing note and vitals reviewed.      Intubation  Date/Time: 1/16/2019 5:43 PM  Performed by: Bandar Godoy MD  Authorized by: Bandar Godoy MD     Consent:     Consent obtained:   Emergent situation    Risks discussed:  Aspiration, hypoxia, death and bleeding    Alternatives discussed:  No treatment and delayed treatment  Pre-procedure details:     Patient status:  Unresponsive    Mallampati score:  III    Pretreatment meds: etomidate.    Paralytics:  Succinylcholine  Procedure details:     Preoxygenation:  Bag valve mask    CPR in progress: no      Intubation method:  Oral    Laryngoscope blade:  Reyes 3    Tube size (mm):  7.5    Number of attempts:  2    Cricoid pressure: yes      Tube visualized through cords: yes    Placement assessment:     ETT to lip:  21    Tube secured with:  ETT hannah    Breath sounds:  Equal    Placement verification: CXR verification and ETCO2 detector                 ED Course  ED Course as of Jan 16 1747 Wed Jan 16, 2019   1633 EKG 14:24 atrial fi  [MELLO]      ED Course User Index  [MELLO] Bandar Godoy MD                  [facility]  Number of Diagnoses or Management Options     Amount and/or Complexity of Data Reviewed  Clinical lab tests: ordered and reviewed  Tests in the radiology section of CPT®: ordered and reviewed  Tests in the medicine section of CPT®: ordered and reviewed  Decide to obtain previous medical records or to obtain history from someone other than the patient: yes    Risk of Complications, Morbidity, and/or Mortality  Presenting problems: high  Diagnostic procedures: high  Management options: high    Critical Care  Total time providing critical care:  minutes    Patient Progress  Patient progress: improved        Final diagnoses:   Pneumonia of both lungs due to infectious organism, unspecified part of lung   Atrial fibrillation with RVR (CMS/HCC)   Essential hypertension            Bandar Godoy MD  01/16/19 2063

## 2019-01-16 NOTE — H&P
[facility] Medicine Services  CCU History & Physical          Patient Identification:  Name:  Ashley Geronimo  Age:  73 y.o.  Sex:  female  :  1945  MRN:  [id number]   Visit Number:  [id number]  Primary Care Physician:  Silvano Parker MD    I have seen the patient in conjunction with ANDREEA Michel and I agree with the following statements:     Subjective     Chief complaint: unresponsive    History of presenting illness:  Mrs. Geronimo is a 73 year old female who presented to [facility] ED on 19. A friend of the family is who brought her in. No other family available and he doesn't know any numbers of her immediate family as her cell phone is dead. He states he took her for labs today, after the labs they stopped at a gas station and got a snack and was headed home, he states she was on the phone, she touched his shoulder and he thought she was laughing and he says she wasn't awake. He was worried so he brought her to the ER. The ED staff reports that she was unresponsive on arrival, minimal response to painful stimuli at which time she was intubated with succ's and etomidate.     Her work up in the ED showed a troponin of 0.044, BNP of 109, glucose of 485, sodium 130, potassium 4.5, HCO 19.4, Creatinine 1.45, BUN 32, lactic acid of 5.9, magnesium 2.1, INR 0.92, WBC 9.99, H/H 15.1/45.1, urine showed trace ketones, trace leuk esterase, 100 mg of protein, 21-50 WBC and 2 bacteria, negative UDS and ETOH. Blood cultures done in the ED. CT of the head was not concerning for acute issues. She does meet septic shock criteria on admission with a lactic acid of 5.9 and known UTI. We will admit her to CCU for further monitoring and treatment. Her only known medical history at this time is HTN and DM type 2, insulin dependent. We will place her as a full code as no family is here and the adult male who brought her in is not a blood relative.    Dr. Chavez:  Patient seen and examined  with ALDO Sanders at the bedside and family member including granddaughter.  Patient lives independently with 2 grandchildren after her daughter .  As per the another granddaughter, patient has been having worsening cough dry along with dizziness and nausea for last 2 weeks.  The patient was complaining of chills and feeling feverish.  There is no history of vomiting diarrhea abdominal pain.  As per the granddaughter, patient was noted to have urinary incontinence for last couple days.  History is limited since octane from the family member.  Patient is currently sedated, intubated and mechanically ventilated.  Patient does have history of breast cancer status post left mastectomy and lymph node removal in , DMT2, CKD stage 3, hypertension, tobacco abuse.  ---------------------------------------------------------------------------------------------------------------------   Review of Systems   Reason unable to perform ROS: unable to obtain due to intuabted and sedate.      ---------------------------------------------------------------------------------------------------------------------   Past Medical History:   Diagnosis Date   • Diabetes mellitus (CMS/HCC)    • Hypertension     H/O Breast cancer, in remission  CKD Stage 3  Past surgical history:  Left mastectomy  Shoulder replacement    Family History:  Unobtainable since intubated    Social History:  Tobacco abuse  ---------------------------------------------------------------------------------------------------------------------   Allergies:  Patient has no known allergies.  ---------------------------------------------------------------------------------------------------------------------     Medications below are reported home medications pulling from within the system; at this time, these medications have not been reconciled unless otherwise specified and are in the verification process for further verifcation as current home medications.    Prior  to Admission Medications     Prescriptions Last Dose Informant Patient Reported? Taking?    furosemide (LASIX) 20 MG tablet   Yes Yes    Take 20 mg by mouth 2 (Two) Times a Day.    indapamide (LOZOL) 2.5 MG tablet   Yes Yes    Take 2.5 mg by mouth Every Morning.    insulin NPH-insulin regular (humuLIN 70/30,novoLIN 70/30) (70-30) 100 UNIT/ML injection   Yes Yes    Inject  under the skin into the appropriate area as directed 2 (Two) Times a Day With Meals.    metFORMIN (GLUCOPHAGE) 500 MG tablet   Yes Yes    Take 500 mg by mouth 2 (Two) Times a Day With Meals.    montelukast (SINGULAIR) 10 MG tablet   Yes Yes    Take 10 mg by mouth Every Night.    Multiple Vitamins-Minerals (MULTIVITAMIN WITH MINERALS) tablet tablet   Yes Yes    Take 1 tablet by mouth Daily.    NIFEdipine XL (PROCARDIA XL) 90 MG 24 hr tablet   Yes Yes    Take 90 mg by mouth Daily.    pioglitazone (ACTOS) 45 MG tablet   Yes Yes    Take 45 mg by mouth Daily.    ramipril (ALTACE) 10 MG capsule   Yes Yes    Take 10 mg by mouth Daily.        Hospital Scheduled Meds:    [START ON 1/17/2019] ceftriaxone 1 g Intravenous Q24H   chlorhexidine 15 mL Mouth/Throat Q12H   [START ON 1/17/2019] doxycycline 100 mg Intravenous Q12H   enoxaparin 1 mg/kg Subcutaneous Q12H   ipratropium-albuterol 3 mL Nebulization Q6H - RT   lactobacillus acidophilus 1 capsule Oral Daily   pantoprazole 40 mg Intravenous Q AM   sodium chloride 3 mL Intravenous Q12H       insulin regular infusion 1 unit/mL 1-20 Units/hr Last Rate: 2 Units/hr (01/16/19 2026)   propofol 5-50 mcg/kg/min Last Rate: 50 mcg/kg/min (01/16/19 2022)   Sodium chloride 100 mL/hr Last Rate: 100 mL/hr (01/16/19 1817)     ---------------------------------------------------------------------------------------------------------------------   Objective       Vital Signs:  Temp:  [97.2 °F (36.2 °C)-100.4 °F (38 °C)] 100.4 °F (38 °C)  Heart Rate:  [] 63  Resp:  [14-26] 14  BP: ()/() 43/34  FiO2 (%):  [45  %-50 %] 45 %      01/16/19  1423 01/16/19  1800   Weight: 67.1 kg (148 lb) 54.1 kg (119 lb 3.2 oz)     Body mass index is 23.28 kg/m².  ---------------------------------------------------------------------------------------------------------------------       Physical Exam  Constitutional:  Elderly female lying on intubated and sedated on propofol   HENT:  Head: Normocephalic and atraumatic.  Mouth:  Moist mucous membranes.  ET +, OGT+  Eyes:   Pupils are equal, round, and reactive to light.  When I open her eyelids to evaluate her pupils both eyes noted to have horizontal nystagmus slowly.   Neck:  Neck supple.  No JVD present.  trachea midline.  Cardiovascular:  Normal rate, regular rhythm.  with no murmur.  Pulmonary/Chest:  On vent, breath sounds bilaterally, good air movement, few expiratory wheeze + b/l, some fine crackles in the bases   Abdominal:  Soft.  Bowel sounds are present.  No distension. No organomegaly.  Musculoskeletal:  1+ edema in bilateral lower ext.  No red or swollen joints anywhere.    Neurological:  Intubated and sedated on propofol, she does move without purpose to painful stimuli.    Skin:  Skin is warm and dry.  + erythematous rash noted in the both lower extremities, R>L.No discharge seen.  No pallor.   Psychiatric: unable to assess due to intubation.   Peripheral vascular:  1+ edema bilaterally and pulses on all 4 extremities.  ---------------------------------------------------------------------------------------------------------------------  EKG:         ---------------------------------------------------------------------------------------------------------------------   Results from last 7 days   Lab Units 01/16/19  1848 01/16/19  1446 01/16/19  1436   CRP mg/dL  --   --  1.69*   LACTATE mmol/L 2.8* 5.9*  --    WBC 10*3/mm3 13.55*  --  9.99   HEMOGLOBIN g/dL 13.6  --  15.1   HEMATOCRIT % 41.3  --  45.1   MCV fL 90.2  --  88.8   MCHC g/dL 32.9*  --  33.5   PLATELETS 10*3/mm3 236  --   277   INR  0.93  --  0.92     Results from last 7 days   Lab Units 01/16/19  1710   PH, ARTERIAL pH units 7.318*   PO2 ART mm Hg 108.9*   PCO2, ARTERIAL mm Hg 40.0   HCO3 ART mmol/L 20.1*     Results from last 7 days   Lab Units 01/16/19  1711 01/16/19  1436   SODIUM mmol/L 133* 130*   POTASSIUM mmol/L 4.3 4.5   MAGNESIUM mg/dL  --  2.1   CHLORIDE mmol/L 100 94*   CO2 mmol/L 17.3* 19.4*   BUN mg/dL 29* 32*   CREATININE mg/dL 1.20 1.45*   EGFR IF NONAFRICN AM mL/min/1.73 44* 35*   CALCIUM mg/dL 8.5 9.6   GLUCOSE mg/dL 461*  474* 485*   ALBUMIN g/dL  --  4.80   BILIRUBIN mg/dL  --  0.6   ALK PHOS U/L  --  97   AST (SGOT) U/L  --  25   ALT (SGPT) U/L  --  13   Estimated Creatinine Clearance: 35.1 mL/min (by C-G formula based on SCr of 1.2 mg/dL).  Ammonia   Date Value Ref Range Status   01/16/2019 46 11 - 51 umol/L Final     Results from last 7 days   Lab Units 01/16/19  1711 01/16/19  1436   TROPONIN I ng/mL 0.034 0.044*         Lab Results   Component Value Date    HGBA1C 9.20 (H) 04/12/2018     Lab Results   Component Value Date    TSH 4.095 01/16/2019     No results found for: PREGTESTUR, PREGSERUM, HCG, HCGQUANT  Pain Management Panel     Pain Management Panel Latest Ref Rng & Units 1/16/2019 4/12/2018    CREATININE UR mg/dL - 96.6    AMPHETAMINES SCREEN, URINE Negative Negative -    BARBITURATES SCREEN Negative Negative -    BENZODIAZEPINE SCREEN, URINE Negative Negative -    BUPRENORPHINE Negative Negative -    COCAINE SCREEN, URINE Negative Negative -    METHADONE SCREEN, URINE Negative Negative -                        ---------------------------------------------------------------------------------------------------------------------  Imaging Results (last 7 days)     Procedure Component Value Units Date/Time    CT Chest Without Contrast [[id number]] Updated:  01/16/19 1837    XR Chest 1 View [[id number]] Collected:  01/16/19 1616     Updated:  01/16/19 1618    Narrative:       XR CHEST 1 VW-     CLINICAL  INDICATION: syncope          COMPARISON: 3/21/2018      TECHNIQUE: Single frontal view of the chest.     FINDINGS:     Endotracheal tube overlies tracheal air column above the issa  Nasogastric tube is in the stomach  Mild fullness in the laura bilaterally.  There is no evidence of an acute osseous abnormality.   There are no suspicious-appearing parenchymal soft tissue nodules.            Impression:       Line placement as above  Mild fullness in the laura bilaterally         This report was finalized on 1/16/2019 4:16 PM by Dr. Garry Benton MD.       CT Head Without Contrast Stroke Protocol [[id number]] Collected:  01/16/19 1435     Updated:  01/16/19 1438    Narrative:       CT HEAD WO CONTRAST STROKE PROTOCOL-     CLINICAL INDICATION: Syncope/fainting          COMPARISON: None available      TECHNIQUE: Axial images of the brain were obtained with out intravenous  contrast.  Reformatted images were created in the sagittal and coronal  planes.     DOSE:         Radiation dose reduction techniques were utilized per ALARA protocol.  Automated exposure control was initiated through either or CareDose or  DoseRigNext Big Sound software packages by  protocol.           FINDINGS:   Today's study shows no mass, hemorrhage, or midline shift.   There is atrophy and ventriculomegaly. Cannot exclude NPH.  There is no evidence of acute ischemia.  I do not see epidural or subdural hematoma.  The gray-white differentiation is appropriate.   The bone window setting images show no destructive calvarial lesion or  acute calvarial fracture.   The posterior fossa is unremarkable.             Impression:       No evidence of acute ischemic event  Mild ventriculomegaly     The results were relayed to the emergency department at 2:37 PM     This report was finalized on 1/16/2019 2:36 PM by Dr. Garry Benton MD.             Cultures: blood cultures done in the ED       I have personally reviewed the radiology images and read the final  radiology report.  ---------------------------------------------------------------------------------------------------------------------  Assessment / Plan       Assessment and Plan:  Acute metabolic encephalopathy, questionable seizure activity   Sepsis r/t CAP and UTI  And b/l lower extremities cellulitis (lactic acid 5.9 on admission, tachycardia, AMS)   Lactic Acidosis   High Anion Gap Acute Metabolic Compensated Respiratory Acidosis   Acute Respiratory failure, S/P Intubation and mechanical ventilation   Possible RAMESH on CKD stage III  New Onset A-Fib with RVR  DM type 2, non insulin dependent with hyperglycemia   Pseudohyponatremia   Indeterminate troponin   Essential HTN  Probable COPD  Tobacco abuse    AMS with questionable seizure activity: seizure precautions ordered, EEG ordered for tomorrow. CT of the head negative. Neuro checks, monitor closley. Ammonia level is 46.  UDS and ETOH negative. Prolactin level ordered and pending. Continue propofol for sedation. Ativan as needed for seizures.    No abg was completed prior to intubation, patient was intubated due to her altered mental status, Vent protocol in place, repeat ABG at 2000 and call to provider. ABG ordered for the am.     Septic Shock r/t UTI and possible CAP and B/L cellulitis: rocephin and Zithromax started in the ED, Chest xray does not appreciate any pneumonia, CT of the chest ordered for further evaluation. Will continue rocephin 1g daily and doxycycline for now for pneumonia and UTI and cellulitis coverage. Atypicals ordered. Respiratory Culture ordered. F/U BCX. Urine cx ordered. Maintain MAP @ 65. Influenza ordered and pending.  Patient received 2 L of IV fluid boluses in ER.  Start on DuoNeb.    Acute Respiratory failure, S/P Intubation and mechanical ventilation for the airway protection.  Start on DuoNeb.  Currently minimal FiO2 requirement.  We will attempt SPT trial in a.m.    Lactic acidosis: initial lactic of 5.9, repeat ordered with  reflex.  Improving.  Continue with IVF.     New onset A-Fib with RVR: rate is controlled on my exam, CHADS2-VASc score is 2, moderate risk at this time based on her history available at this time.  Was started on Lovenox 40 dose twice a day.  Discontinue heparin gtt. Repeat EKG with sinus rhythm. TSH is 4.095.  Get 2-D echo.    Indeterminate troponin, Essential HTN: monitor blood pressure closely, will resume home meds if appropriate when available by pharmacy,may require PRN hydralazine. Initial troponin is 0.044, set to trend monitor on telemetry.     Possible RAMESH on CKD stage III: baseline unknown, last available creatinine is 1.29-1.30 today she is 1.45, will give NS @ 100ml/hr and repeat in the AM. Avoid nephrotoxic agents.     DM type 2, Non insulin dependent with mild DKA : A1c ordered, glucoses 400 range, insulin gtt ordered. Acetone is negative, anion gap is 16.6. Repeat BMP ordered for 2000 today.  Continue to monitor BMP every 4 hours.  Monitor electrolytes.    Pseudohyponatremia: sodium level is 130, likely related to hyperglycemia, corrected sodium is 139.       *DVT prophylaxis: full dose lovenox for A-Fib   *GI prophylaxis: Protonix 40mg IV daily   *Activity: turn q2hr  *Diet: Nutrition consulted for tube feedings recommendations, nothing by mouth  *Precautions: seizures   *Tubes/Lines/Drains: intubated 1/16/19, Singh 1/16/19, OG 1/16   *Code status: Full Code    ET tube and OG tube placement confirmed by X-Ray on 1/16/19 @ 1616    Total critical care time 60 minutes.    Patient is high risk for the following reasons: AMS, questionable seizure activity   Sepsis r/t CAP and UTI  And b/l lower extremities cellulitis (lactic acid 5.9 on admission, tachycardia, AMS)   Lactic Acidosis   High Anion Gap Acute Metabolic Compensated Respiratory Acidosis   Acute Respiratory failure, S/P Intubation and mechanical ventilation    Management plan discussed in detail with the granddaughter and ALDO Sanders at the  bedside.  All questions were answered.  They were agreeable.    Natty Chavez MD  01/16/19  8:30 PM  ---------------------------------------------------------------------------------------------------------------------  Patient seen and examined independently with family and RN at the bedside.  I agree with the assessment and plan by NATE Garcia.  The note has been edited to reflect my findings.      Natty Chavez M.D[facility]

## 2019-01-16 NOTE — ED NOTES
Patient remains unresponsive. ETT secured 23 at left lip, OG secure 55 at left lip. Respirations even and unlabored, equal rise and fall of chest. Skin PWD. Will continue to monitor and follow plan of care.      Dang Orozco RN  01/16/19 0698

## 2019-01-16 NOTE — PLAN OF CARE
Problem: Patient Care Overview  Goal: Plan of Care Review  Outcome: Ongoing (interventions implemented as appropriate)    Goal: Individualization and Mutuality  Outcome: Ongoing (interventions implemented as appropriate)    Goal: Discharge Needs Assessment  Outcome: Ongoing (interventions implemented as appropriate)      Problem: Skin Injury Risk (Adult)  Goal: Identify Related Risk Factors and Signs and Symptoms  Outcome: Ongoing (interventions implemented as appropriate)    Goal: Skin Health and Integrity  Outcome: Ongoing (interventions implemented as appropriate)      Problem: Pneumonia (Adult)  Goal: Signs and Symptoms of Listed Potential Problems Will be Absent, Minimized or Managed (Pneumonia)  Outcome: Ongoing (interventions implemented as appropriate)      Problem: Ventilation, Mechanical Invasive (Adult)  Goal: Signs and Symptoms of Listed Potential Problems Will be Absent, Minimized or Managed (Ventilation, Mechanical Invasive)  Outcome: Ongoing (interventions implemented as appropriate)      Problem: Diabetes, Type 2 (Adult)  Goal: Signs and Symptoms of Listed Potential Problems Will be Absent, Minimized or Managed (Diabetes, Type 2)  Outcome: Ongoing (interventions implemented as appropriate)

## 2019-01-16 NOTE — ED NOTES
Pt taken to ct on zolls/02 2 lnc  with a nurse and a liset Shetty, Elda TOLENTINO RN  01/16/19 1574

## 2019-01-16 NOTE — ED NOTES
Patient remains unresponsive, patient has light gag reflex. Patient also grunting with respirations. Patient eye movement side to side.  Dr. Godoy to bedside. Patient cleaned, brief placed, changed bed linens. Singh cath placed. New V/O by Dr. Godyo. Will continue to monitor and follow plan of care.                  Dang Orozco, ALDO  01/16/19 1510

## 2019-01-16 NOTE — ED NOTES
Patient belongings placed in a bad. Patient dentures placed in a patient cup, and patients earrings and necklaces placed in a belongings bag along with patients purse.      Dang Orozco RN  01/16/19 7947

## 2019-01-17 ENCOUNTER — APPOINTMENT (OUTPATIENT)
Dept: GENERAL RADIOLOGY | Facility: HOSPITAL | Age: 74
End: 2019-01-17

## 2019-01-17 ENCOUNTER — APPOINTMENT (OUTPATIENT)
Dept: CARDIOLOGY | Facility: HOSPITAL | Age: 74
End: 2019-01-17
Attending: HOSPITALIST

## 2019-01-17 ENCOUNTER — APPOINTMENT (OUTPATIENT)
Dept: INFUSION THERAPY | Facility: HOSPITAL | Age: 74
End: 2019-01-17

## 2019-01-17 LAB
A-A DO2: 73.1 MMHG (ref 0–300)
ALBUMIN SERPL-MCNC: 3.6 G/DL (ref 3.4–4.8)
ALBUMIN/GLOB SERPL: 1.6 G/DL (ref 1.5–2.5)
ALP SERPL-CCNC: 68 U/L (ref 35–104)
ALT SERPL W P-5'-P-CCNC: 12 U/L (ref 10–36)
ANION GAP SERPL CALCULATED.3IONS-SCNC: 11.7 MMOL/L (ref 3.6–11.2)
ANION GAP SERPL CALCULATED.3IONS-SCNC: 5.6 MMOL/L (ref 3.6–11.2)
ANION GAP SERPL CALCULATED.3IONS-SCNC: 7.2 MMOL/L (ref 3.6–11.2)
ANION GAP SERPL CALCULATED.3IONS-SCNC: 8.8 MMOL/L (ref 3.6–11.2)
ANION GAP SERPL CALCULATED.3IONS-SCNC: 9 MMOL/L (ref 3.6–11.2)
APAP SERPL-MCNC: <10 MCG/ML (ref 0–200)
ARTERIAL PATENCY WRIST A: POSITIVE
AST SERPL-CCNC: 22 U/L (ref 10–30)
ATMOSPHERIC PRESS: 733 MMHG
BASE EXCESS BLDA CALC-SCNC: -4 MMOL/L
BASOPHILS # BLD AUTO: 0.01 10*3/MM3 (ref 0–0.3)
BASOPHILS NFR BLD AUTO: 0.1 % (ref 0–2)
BDY SITE: ABNORMAL
BH CV ECHO MEAS - ACS: 1.4 CM
BH CV ECHO MEAS - AO MAX PG (FULL): 3.3 MMHG
BH CV ECHO MEAS - AO MAX PG: 7.8 MMHG
BH CV ECHO MEAS - AO MEAN PG (FULL): 1.3 MMHG
BH CV ECHO MEAS - AO MEAN PG: 4.2 MMHG
BH CV ECHO MEAS - AO ROOT AREA (BSA CORRECTED): 1.9
BH CV ECHO MEAS - AO ROOT AREA: 7.2 CM^2
BH CV ECHO MEAS - AO ROOT DIAM: 3 CM
BH CV ECHO MEAS - AO V2 MAX: 140 CM/SEC
BH CV ECHO MEAS - AO V2 MEAN: 97.9 CM/SEC
BH CV ECHO MEAS - AO V2 VTI: 25.5 CM
BH CV ECHO MEAS - BSA(HAYCOCK): 1.6 M^2
BH CV ECHO MEAS - BSA: 1.6 M^2
BH CV ECHO MEAS - BZI_BMI: 26 KILOGRAMS/M^2
BH CV ECHO MEAS - BZI_METRIC_HEIGHT: 152 CM
BH CV ECHO MEAS - BZI_METRIC_WEIGHT: 60 KG
BH CV ECHO MEAS - EDV(CUBED): 100.1 ML
BH CV ECHO MEAS - EDV(MOD-SP2): 43 ML
BH CV ECHO MEAS - EDV(MOD-SP4): 36.7 ML
BH CV ECHO MEAS - EDV(TEICH): 99.5 ML
BH CV ECHO MEAS - EF(CUBED): 78.1 %
BH CV ECHO MEAS - EF(TEICH): 70.3 %
BH CV ECHO MEAS - ESV(CUBED): 22 ML
BH CV ECHO MEAS - ESV(TEICH): 29.6 ML
BH CV ECHO MEAS - FS: 39.7 %
BH CV ECHO MEAS - IVS/LVPW: 1
BH CV ECHO MEAS - IVSD: 0.87 CM
BH CV ECHO MEAS - LA DIMENSION: 3.9 CM
BH CV ECHO MEAS - LA/AO: 1.3
BH CV ECHO MEAS - LV DIASTOLIC VOL/BSA (35-75): 23.5 ML/M^2
BH CV ECHO MEAS - LV MASS(C)D: 132.7 GRAMS
BH CV ECHO MEAS - LV MASS(C)DI: 84.9 GRAMS/M^2
BH CV ECHO MEAS - LV MAX PG: 4.5 MMHG
BH CV ECHO MEAS - LV MEAN PG: 2.9 MMHG
BH CV ECHO MEAS - LV V1 MAX: 106 CM/SEC
BH CV ECHO MEAS - LV V1 MEAN: 82.3 CM/SEC
BH CV ECHO MEAS - LV V1 VTI: 18.9 CM
BH CV ECHO MEAS - LVIDD: 4.6 CM
BH CV ECHO MEAS - LVIDS: 2.8 CM
BH CV ECHO MEAS - LVPWD: 0.86 CM
BH CV ECHO MEAS - MV A MAX VEL: 100.8 CM/SEC
BH CV ECHO MEAS - MV E MAX VEL: 87.3 CM/SEC
BH CV ECHO MEAS - MV E/A: 0.87
BH CV ECHO MEAS - PA ACC TIME: 0.09 SEC
BH CV ECHO MEAS - PA PR(ACCEL): 38.5 MMHG
BH CV ECHO MEAS - RAP SYSTOLE: 10 MMHG
BH CV ECHO MEAS - RVDD: 2.7 CM
BH CV ECHO MEAS - RVSP: 31.5 MMHG
BH CV ECHO MEAS - SI(AO): 118.4 ML/M^2
BH CV ECHO MEAS - SI(CUBED): 50 ML/M^2
BH CV ECHO MEAS - SI(TEICH): 44.7 ML/M^2
BH CV ECHO MEAS - SV(AO): 185 ML
BH CV ECHO MEAS - SV(CUBED): 78.1 ML
BH CV ECHO MEAS - SV(TEICH): 69.9 ML
BH CV ECHO MEAS - TR MAX VEL: 231.6 CM/SEC
BILIRUB SERPL-MCNC: 0.3 MG/DL (ref 0.2–1.8)
BODY TEMPERATURE: 98.6 C
BUN BLD-MCNC: 21 MG/DL (ref 7–21)
BUN BLD-MCNC: 26 MG/DL (ref 7–21)
BUN BLD-MCNC: 28 MG/DL (ref 7–21)
BUN BLD-MCNC: 30 MG/DL (ref 7–21)
BUN BLD-MCNC: 32 MG/DL (ref 7–21)
BUN/CREAT SERPL: 19.1 (ref 7–25)
BUN/CREAT SERPL: 20.4 (ref 7–25)
BUN/CREAT SERPL: 22.1 (ref 7–25)
BUN/CREAT SERPL: 23.9 (ref 7–25)
BUN/CREAT SERPL: 25.7 (ref 7–25)
CALCIUM SPEC-SCNC: 7.5 MG/DL (ref 7.7–10)
CALCIUM SPEC-SCNC: 7.8 MG/DL (ref 7.7–10)
CALCIUM SPEC-SCNC: 8.1 MG/DL (ref 7.7–10)
CALCIUM SPEC-SCNC: 8.1 MG/DL (ref 7.7–10)
CALCIUM SPEC-SCNC: 8.2 MG/DL (ref 7.7–10)
CHLORIDE SERPL-SCNC: 102 MMOL/L (ref 99–112)
CHLORIDE SERPL-SCNC: 109 MMOL/L (ref 99–112)
CHLORIDE SERPL-SCNC: 110 MMOL/L (ref 99–112)
CHLORIDE SERPL-SCNC: 111 MMOL/L (ref 99–112)
CHLORIDE SERPL-SCNC: 96 MMOL/L (ref 99–112)
CO2 SERPL-SCNC: 18.3 MMOL/L (ref 24.3–31.9)
CO2 SERPL-SCNC: 21.8 MMOL/L (ref 24.3–31.9)
CO2 SERPL-SCNC: 22.4 MMOL/L (ref 24.3–31.9)
CO2 SERPL-SCNC: 24 MMOL/L (ref 24.3–31.9)
CO2 SERPL-SCNC: 24.2 MMOL/L (ref 24.3–31.9)
COHGB MFR BLD: 0.8 % (ref 0–5)
CREAT BLD-MCNC: 1.01 MG/DL (ref 0.43–1.29)
CREAT BLD-MCNC: 1.1 MG/DL (ref 0.43–1.29)
CREAT BLD-MCNC: 1.34 MG/DL (ref 0.43–1.29)
CREAT BLD-MCNC: 1.36 MG/DL (ref 0.43–1.29)
CREAT BLD-MCNC: 1.37 MG/DL (ref 0.43–1.29)
CRP SERPL-MCNC: 4.03 MG/DL (ref 0–0.99)
D-LACTATE SERPL-SCNC: 1.6 MMOL/L (ref 0.5–2)
D-LACTATE SERPL-SCNC: 1.9 MMOL/L (ref 0.5–2)
DEPRECATED RDW RBC AUTO: 40.8 FL (ref 37–54)
EOSINOPHIL # BLD AUTO: 0.02 10*3/MM3 (ref 0–0.7)
EOSINOPHIL NFR BLD AUTO: 0.2 % (ref 0–7)
ERYTHROCYTE [DISTWIDTH] IN BLOOD BY AUTOMATED COUNT: 12.9 % (ref 11.5–14.5)
GFR SERPL CREATININE-BSD FRML MDRD: 38 ML/MIN/1.73
GFR SERPL CREATININE-BSD FRML MDRD: 38 ML/MIN/1.73
GFR SERPL CREATININE-BSD FRML MDRD: 39 ML/MIN/1.73
GFR SERPL CREATININE-BSD FRML MDRD: 49 ML/MIN/1.73
GFR SERPL CREATININE-BSD FRML MDRD: 54 ML/MIN/1.73
GLOBULIN UR ELPH-MCNC: 2.2 GM/DL
GLUCOSE BLD-MCNC: 125 MG/DL (ref 70–110)
GLUCOSE BLD-MCNC: 129 MG/DL (ref 70–110)
GLUCOSE BLD-MCNC: 152 MG/DL (ref 70–110)
GLUCOSE BLD-MCNC: 158 MG/DL (ref 70–110)
GLUCOSE BLD-MCNC: 161 MG/DL (ref 70–110)
GLUCOSE BLDC GLUCOMTR-MCNC: 120 MG/DL (ref 70–130)
GLUCOSE BLDC GLUCOMTR-MCNC: 136 MG/DL (ref 70–130)
GLUCOSE BLDC GLUCOMTR-MCNC: 163 MG/DL (ref 70–130)
GLUCOSE BLDC GLUCOMTR-MCNC: 180 MG/DL (ref 70–130)
GLUCOSE BLDC GLUCOMTR-MCNC: 194 MG/DL (ref 70–130)
GLUCOSE BLDC GLUCOMTR-MCNC: 233 MG/DL (ref 70–130)
HCO3 BLDA-SCNC: 20.8 MMOL/L (ref 22–26)
HCT VFR BLD AUTO: 36.5 % (ref 37–47)
HCT VFR BLD CALC: 37 % (ref 37–47)
HGB BLD-MCNC: 12.8 G/DL (ref 12–16)
HGB BLDA-MCNC: 12.5 G/DL (ref 12–16)
HOROWITZ INDEX BLD+IHG-RTO: 30 %
IMM GRANULOCYTES # BLD AUTO: 0.03 10*3/MM3 (ref 0–0.03)
IMM GRANULOCYTES NFR BLD AUTO: 0.3 % (ref 0–0.5)
LV EF 2D ECHO EST: 66 %
LYMPHOCYTES # BLD AUTO: 0.81 10*3/MM3 (ref 1–3)
LYMPHOCYTES NFR BLD AUTO: 7.9 % (ref 16–46)
MAGNESIUM SERPL-MCNC: 1.6 MG/DL (ref 1.7–2.6)
MAXIMAL PREDICTED HEART RATE: 146 BPM
MCH RBC QN AUTO: 30.3 PG (ref 27–33)
MCHC RBC AUTO-ENTMCNC: 35.1 G/DL (ref 33–37)
MCV RBC AUTO: 86.3 FL (ref 80–94)
METHGB BLD QL: 0.4 % (ref 0–3)
MODALITY: ABNORMAL
MONOCYTES # BLD AUTO: 1.39 10*3/MM3 (ref 0.1–0.9)
MONOCYTES NFR BLD AUTO: 13.6 % (ref 0–12)
NEUTROPHILS # BLD AUTO: 7.99 10*3/MM3 (ref 1.4–6.5)
NEUTROPHILS NFR BLD AUTO: 77.9 % (ref 40–75)
OSMOLALITY SERPL CALC.SUM OF ELEC: 259.8 MOSM/KG (ref 273–305)
OSMOLALITY SERPL CALC.SUM OF ELEC: 276.3 MOSM/KG (ref 273–305)
OSMOLALITY SERPL CALC.SUM OF ELEC: 284.3 MOSM/KG (ref 273–305)
OSMOLALITY SERPL CALC.SUM OF ELEC: 287.8 MOSM/KG (ref 273–305)
OSMOLALITY SERPL CALC.SUM OF ELEC: 292.6 MOSM/KG (ref 273–305)
OXYHGB MFR BLDV: 95.1 % (ref 85–100)
PCO2 BLDA: 37.3 MM HG (ref 35–45)
PEEP RESPIRATORY: 5 CM[H2O]
PH BLDA: 7.37 PH UNITS (ref 7.35–7.45)
PHOSPHATE SERPL-MCNC: 2.9 MG/DL (ref 2.7–4.5)
PLATELET # BLD AUTO: 227 10*3/MM3 (ref 130–400)
PMV BLD AUTO: 11.8 FL (ref 6–10)
PO2 BLDA: 88.9 MM HG (ref 80–100)
POTASSIUM BLD-SCNC: 3.9 MMOL/L (ref 3.5–5.3)
POTASSIUM BLD-SCNC: 3.9 MMOL/L (ref 3.5–5.3)
POTASSIUM BLD-SCNC: 4 MMOL/L (ref 3.5–5.3)
POTASSIUM BLD-SCNC: 4.4 MMOL/L (ref 3.5–5.3)
POTASSIUM BLD-SCNC: 4.5 MMOL/L (ref 3.5–5.3)
PROT SERPL-MCNC: 5.8 G/DL (ref 6–8)
RBC # BLD AUTO: 4.23 10*6/MM3 (ref 4.2–5.4)
SALICYLATES SERPL-MCNC: <1 MG/DL (ref 0–30)
SAO2 % BLDCOA: 96.3 % (ref 90–100)
SET MECH RESP RATE: 14
SODIUM BLD-SCNC: 126 MMOL/L (ref 135–153)
SODIUM BLD-SCNC: 135 MMOL/L (ref 135–153)
SODIUM BLD-SCNC: 138 MMOL/L (ref 135–153)
SODIUM BLD-SCNC: 140 MMOL/L (ref 135–153)
SODIUM BLD-SCNC: 142 MMOL/L (ref 135–153)
STRESS TARGET HR: 124 BPM
TROPONIN I SERPL-MCNC: 0.17 NG/ML
TROPONIN I SERPL-MCNC: 0.18 NG/ML
VENTILATOR MODE: ABNORMAL
VT ON VENT VENT: 400 ML
WBC NRBC COR # BLD: 10.25 10*3/MM3 (ref 4.5–12.5)

## 2019-01-17 PROCEDURE — 99232 SBSQ HOSP IP/OBS MODERATE 35: CPT | Performed by: NURSE PRACTITIONER

## 2019-01-17 PROCEDURE — 83605 ASSAY OF LACTIC ACID: CPT | Performed by: HOSPITALIST

## 2019-01-17 PROCEDURE — 71045 X-RAY EXAM CHEST 1 VIEW: CPT | Performed by: RADIOLOGY

## 2019-01-17 PROCEDURE — 86140 C-REACTIVE PROTEIN: CPT | Performed by: HOSPITALIST

## 2019-01-17 PROCEDURE — 99291 CRITICAL CARE FIRST HOUR: CPT | Performed by: INTERNAL MEDICINE

## 2019-01-17 PROCEDURE — 84484 ASSAY OF TROPONIN QUANT: CPT | Performed by: HOSPITALIST

## 2019-01-17 PROCEDURE — 94799 UNLISTED PULMONARY SVC/PX: CPT

## 2019-01-17 PROCEDURE — 83735 ASSAY OF MAGNESIUM: CPT | Performed by: NURSE PRACTITIONER

## 2019-01-17 PROCEDURE — 25010000002 PROPOFOL 1000 MG/ML EMULSION: Performed by: EMERGENCY MEDICINE

## 2019-01-17 PROCEDURE — 02HV33Z INSERTION OF INFUSION DEVICE INTO SUPERIOR VENA CAVA, PERCUTANEOUS APPROACH: ICD-10-PCS | Performed by: INTERNAL MEDICINE

## 2019-01-17 PROCEDURE — 83050 HGB METHEMOGLOBIN QUAN: CPT | Performed by: NURSE PRACTITIONER

## 2019-01-17 PROCEDURE — 25010000002 PROPOFOL 1000 MG/ML EMULSION: Performed by: HOSPITALIST

## 2019-01-17 PROCEDURE — 84100 ASSAY OF PHOSPHORUS: CPT | Performed by: NURSE PRACTITIONER

## 2019-01-17 PROCEDURE — 82962 GLUCOSE BLOOD TEST: CPT

## 2019-01-17 PROCEDURE — 82375 ASSAY CARBOXYHB QUANT: CPT | Performed by: NURSE PRACTITIONER

## 2019-01-17 PROCEDURE — 25010000002 ENOXAPARIN PER 10 MG: Performed by: HOSPITALIST

## 2019-01-17 PROCEDURE — 93306 TTE W/DOPPLER COMPLETE: CPT

## 2019-01-17 PROCEDURE — 82805 BLOOD GASES W/O2 SATURATION: CPT | Performed by: NURSE PRACTITIONER

## 2019-01-17 PROCEDURE — 94003 VENT MGMT INPAT SUBQ DAY: CPT

## 2019-01-17 PROCEDURE — 71045 X-RAY EXAM CHEST 1 VIEW: CPT

## 2019-01-17 PROCEDURE — 80307 DRUG TEST PRSMV CHEM ANLYZR: CPT | Performed by: HOSPITALIST

## 2019-01-17 PROCEDURE — 36600 WITHDRAWAL OF ARTERIAL BLOOD: CPT | Performed by: NURSE PRACTITIONER

## 2019-01-17 PROCEDURE — 80053 COMPREHEN METABOLIC PANEL: CPT | Performed by: NURSE PRACTITIONER

## 2019-01-17 PROCEDURE — 85025 COMPLETE CBC W/AUTO DIFF WBC: CPT | Performed by: NURSE PRACTITIONER

## 2019-01-17 PROCEDURE — 63710000001 INSULIN ASPART PER 5 UNITS: Performed by: INTERNAL MEDICINE

## 2019-01-17 PROCEDURE — 25010000002 VANCOMYCIN 5 G RECONSTITUTED SOLUTION 5,000 MG VIAL: Performed by: INTERNAL MEDICINE

## 2019-01-17 PROCEDURE — 93306 TTE W/DOPPLER COMPLETE: CPT | Performed by: INTERNAL MEDICINE

## 2019-01-17 PROCEDURE — 84484 ASSAY OF TROPONIN QUANT: CPT | Performed by: NURSE PRACTITIONER

## 2019-01-17 PROCEDURE — 25010000002 CEFEPIME 2 G/NS 100 ML SOLUTION: Performed by: INTERNAL MEDICINE

## 2019-01-17 PROCEDURE — 95816 EEG AWAKE AND DROWSY: CPT

## 2019-01-17 PROCEDURE — 99233 SBSQ HOSP IP/OBS HIGH 50: CPT | Performed by: HOSPITALIST

## 2019-01-17 RX ORDER — ASPIRIN 81 MG/1
81 TABLET ORAL DAILY
Status: DISCONTINUED | OUTPATIENT
Start: 2019-01-18 | End: 2019-01-24 | Stop reason: HOSPADM

## 2019-01-17 RX ORDER — SODIUM CHLORIDE 450 MG/100ML
50 INJECTION, SOLUTION INTRAVENOUS CONTINUOUS
Status: DISCONTINUED | OUTPATIENT
Start: 2019-01-17 | End: 2019-01-19

## 2019-01-17 RX ORDER — IPRATROPIUM BROMIDE AND ALBUTEROL SULFATE 2.5; .5 MG/3ML; MG/3ML
3 SOLUTION RESPIRATORY (INHALATION)
Status: DISCONTINUED | OUTPATIENT
Start: 2019-01-17 | End: 2019-01-17

## 2019-01-17 RX ORDER — NICOTINE POLACRILEX 4 MG
15 LOZENGE BUCCAL
Status: DISCONTINUED | OUTPATIENT
Start: 2019-01-17 | End: 2019-01-20

## 2019-01-17 RX ORDER — SODIUM CHLORIDE 0.9 % (FLUSH) 0.9 %
10 SYRINGE (ML) INJECTION EVERY 12 HOURS SCHEDULED
Status: DISCONTINUED | OUTPATIENT
Start: 2019-01-17 | End: 2019-01-24 | Stop reason: HOSPADM

## 2019-01-17 RX ORDER — BUDESONIDE 0.5 MG/2ML
0.5 INHALANT ORAL
Status: DISCONTINUED | OUTPATIENT
Start: 2019-01-17 | End: 2019-01-19

## 2019-01-17 RX ORDER — SODIUM CHLORIDE 0.9 % (FLUSH) 0.9 %
20 SYRINGE (ML) INJECTION AS NEEDED
Status: DISCONTINUED | OUTPATIENT
Start: 2019-01-17 | End: 2019-01-24 | Stop reason: HOSPADM

## 2019-01-17 RX ORDER — MAGNESIUM SULFATE HEPTAHYDRATE 40 MG/ML
4 INJECTION, SOLUTION INTRAVENOUS ONCE
Status: COMPLETED | OUTPATIENT
Start: 2019-01-17 | End: 2019-01-17

## 2019-01-17 RX ORDER — ATORVASTATIN CALCIUM 40 MG/1
40 TABLET, FILM COATED ORAL NIGHTLY
Status: DISCONTINUED | OUTPATIENT
Start: 2019-01-17 | End: 2019-01-24 | Stop reason: HOSPADM

## 2019-01-17 RX ORDER — DEXTROSE MONOHYDRATE 25 G/50ML
25 INJECTION, SOLUTION INTRAVENOUS
Status: DISCONTINUED | OUTPATIENT
Start: 2019-01-17 | End: 2019-01-20

## 2019-01-17 RX ORDER — SODIUM CHLORIDE 0.9 % (FLUSH) 0.9 %
10 SYRINGE (ML) INJECTION AS NEEDED
Status: DISCONTINUED | OUTPATIENT
Start: 2019-01-17 | End: 2019-01-24 | Stop reason: HOSPADM

## 2019-01-17 RX ADMIN — SODIUM CHLORIDE 100 ML/HR: 4.5 INJECTION, SOLUTION INTRAVENOUS at 19:22

## 2019-01-17 RX ADMIN — PANTOPRAZOLE SODIUM 40 MG: 40 INJECTION, POWDER, FOR SOLUTION INTRAVENOUS at 05:31

## 2019-01-17 RX ADMIN — DOXYCYCLINE 100 MG: 100 INJECTION, POWDER, LYOPHILIZED, FOR SOLUTION INTRAVENOUS at 20:48

## 2019-01-17 RX ADMIN — ENOXAPARIN SODIUM 50 MG: 60 INJECTION SUBCUTANEOUS at 20:47

## 2019-01-17 RX ADMIN — MAGNESIUM SULFATE HEPTAHYDRATE 4 G: 40 INJECTION, SOLUTION INTRAVENOUS at 11:36

## 2019-01-17 RX ADMIN — SODIUM CHLORIDE, PRESERVATIVE FREE 3 ML: 5 INJECTION INTRAVENOUS at 20:49

## 2019-01-17 RX ADMIN — INSULIN ASPART 2 UNITS: 100 INJECTION, SOLUTION INTRAVENOUS; SUBCUTANEOUS at 16:43

## 2019-01-17 RX ADMIN — INSULIN ASPART 2 UNITS: 100 INJECTION, SOLUTION INTRAVENOUS; SUBCUTANEOUS at 05:59

## 2019-01-17 RX ADMIN — VANCOMYCIN HYDROCHLORIDE 1000 MG: 5 INJECTION, POWDER, LYOPHILIZED, FOR SOLUTION INTRAVENOUS at 16:39

## 2019-01-17 RX ADMIN — ATORVASTATIN CALCIUM 40 MG: 40 TABLET, FILM COATED ORAL at 20:48

## 2019-01-17 RX ADMIN — IPRATROPIUM BROMIDE AND ALBUTEROL SULFATE 3 ML: .5; 3 SOLUTION RESPIRATORY (INHALATION) at 00:25

## 2019-01-17 RX ADMIN — IPRATROPIUM BROMIDE AND ALBUTEROL SULFATE 3 ML: .5; 3 SOLUTION RESPIRATORY (INHALATION) at 06:56

## 2019-01-17 RX ADMIN — IPRATROPIUM BROMIDE AND ALBUTEROL SULFATE 3 ML: .5; 3 SOLUTION RESPIRATORY (INHALATION) at 12:57

## 2019-01-17 RX ADMIN — DOXYCYCLINE 100 MG: 100 INJECTION, POWDER, LYOPHILIZED, FOR SOLUTION INTRAVENOUS at 09:04

## 2019-01-17 RX ADMIN — PROPOFOL 25 MCG/KG/MIN: 10 INJECTION, EMULSION INTRAVENOUS at 06:37

## 2019-01-17 RX ADMIN — IPRATROPIUM BROMIDE AND ALBUTEROL SULFATE 3 ML: .5; 3 SOLUTION RESPIRATORY (INHALATION) at 19:09

## 2019-01-17 RX ADMIN — SODIUM CHLORIDE, PRESERVATIVE FREE 3 ML: 5 INJECTION INTRAVENOUS at 09:05

## 2019-01-17 RX ADMIN — CHLORHEXIDINE GLUCONATE 15 ML: 1.2 RINSE ORAL at 09:04

## 2019-01-17 RX ADMIN — SODIUM CHLORIDE 100 ML/HR: 9 INJECTION, SOLUTION INTRAVENOUS at 04:35

## 2019-01-17 RX ADMIN — CHLORHEXIDINE GLUCONATE 15 ML: 1.2 RINSE ORAL at 20:47

## 2019-01-17 RX ADMIN — PROPOFOL 50 MCG/KG/MIN: 10 INJECTION, EMULSION INTRAVENOUS at 01:42

## 2019-01-17 RX ADMIN — CEFEPIME 2 G: 2 INJECTION, POWDER, FOR SOLUTION INTRAVENOUS at 11:37

## 2019-01-17 RX ADMIN — PROPOFOL 50 MCG/KG/MIN: 10 INJECTION, EMULSION INTRAVENOUS at 18:53

## 2019-01-17 RX ADMIN — Medication: at 20:45

## 2019-01-17 RX ADMIN — INSULIN ASPART 3 UNITS: 100 INJECTION, SOLUTION INTRAVENOUS; SUBCUTANEOUS at 20:48

## 2019-01-17 RX ADMIN — BUDESONIDE 0.5 MG: 0.5 SUSPENSION RESPIRATORY (INHALATION) at 19:09

## 2019-01-17 RX ADMIN — INSULIN ASPART 2 UNITS: 100 INJECTION, SOLUTION INTRAVENOUS; SUBCUTANEOUS at 11:39

## 2019-01-17 RX ADMIN — SODIUM CHLORIDE, POTASSIUM CHLORIDE, SODIUM LACTATE AND CALCIUM CHLORIDE 1000 ML: 600; 310; 30; 20 INJECTION, SOLUTION INTRAVENOUS at 15:45

## 2019-01-17 RX ADMIN — ENOXAPARIN SODIUM 50 MG: 60 INJECTION SUBCUTANEOUS at 09:04

## 2019-01-17 NOTE — PLAN OF CARE
Problem: Patient Care Overview  Goal: Plan of Care Review  Outcome: Ongoing (interventions implemented as appropriate)   01/16/19 2308   Coping/Psychosocial   Plan of Care Reviewed With patient;grandchild(lyly)   Plan of Care Review   Progress no change       Problem: Skin Injury Risk (Adult)  Goal: Identify Related Risk Factors and Signs and Symptoms  Outcome: Ongoing (interventions implemented as appropriate)   01/16/19 2308   Skin Injury Risk (Adult)   Related Risk Factors (Skin Injury Risk) advanced age;critical care admission;mechanical forces       Problem: Ventilation, Mechanical Invasive (Adult)  Goal: Signs and Symptoms of Listed Potential Problems Will be Absent, Minimized or Managed (Ventilation, Mechanical Invasive)  Outcome: Ongoing (interventions implemented as appropriate)   01/16/19 2308   Goal/Outcome Evaluation   Problems Assessed (Mechanical Ventilation, Invasive) all   Problems Present (Mech Vent, Invasive) situational response

## 2019-01-17 NOTE — NURSING NOTE
Central line placed in the superior atriocaval juction. Central line placement confirmed with . Ok to use

## 2019-01-17 NOTE — CONSULTS
Inpatient Cardiology Consult  Consult performed by: Alisha Jc APRN  Consult ordered by: Natty Chavez MD        Date of Admit: 1/16/2019  Date of Consult: 01/17/19  No ref. provider found  Ashley Geronimo  1945    Consulting Physician: Marcos Lindsay MD    Cardiology consultation    Reason for consultation:  NSTEMI    Assessment:  1. NSTEMI, likely to be Type II, due to demand ischemia.  2. Normal LV systolic function by echo  3. Episode of unconsciousness without evidence of arrhythmia, although goldy episode can not be excluded.       Recommendations:  1. Ischemic evaluation when patient is extubated  2. Continue monitoring for bradycardia  3. Antibiotics per pulmonary for pulmonary consolidation  4. Thank you for the consult, we will follow      History of Present Illness    Subjective     Chief Complaint   Patient presents with   • Loss of Consciousness       Ashley Geronimo is a 73 y.o. female with past medical history significant for hypertension, non-insulin requiring type 2 diabetes, chronic kidney disease stage III, breast cancer status post left mastectomy and lymph node removal in 2008, and tobacco use.  She was found by a family friend to be unresponsive and brought to the ED on 1/16/2019.    Pt is sedated and intubated.  There has not been any arrhythmias or bradycardia.      Cardiac risk factors:diabetes mellitus, hypertension and age    Last Echo: 1/17/2019  Interpretation Summary     · Estimated EF = 66%.  · Left ventricular systolic function is normal.  · No significant valvular disease  · No change since 3/21/18           Past Medical History:   Diagnosis Date   • Diabetes mellitus (CMS/HCC)    • Hypertension      History reviewed. No pertinent surgical history.  History reviewed. No pertinent family history.  Social History     Tobacco Use   • Smoking status: Never Smoker   Substance Use Topics   • Alcohol use: No     Frequency: Never   • Drug use: No     Medications Prior to  Admission   Medication Sig Dispense Refill Last Dose   • indapamide (LOZOL) 2.5 MG tablet Take 5 mg by mouth Every Morning.   Unknown at Unknown time   • metFORMIN (GLUCOPHAGE) 500 MG tablet Take 1,000 mg by mouth 2 (Two) Times a Day With Meals.   Unknown at Unknown time   • montelukast (SINGULAIR) 10 MG tablet Take 10 mg by mouth Every Night.   Unknown at Unknown time   • NIFEdipine XL (PROCARDIA XL) 90 MG 24 hr tablet Take 90 mg by mouth Daily.   Unknown at Unknown time   • pioglitazone (ACTOS) 45 MG tablet Take 45 mg by mouth Daily.   Unknown at Unknown time   • potassium chloride (KLOR-CON) 8 MEQ CR tablet Take 8 mEq by mouth Daily.   Unknown at Unknown time   • ramipril (ALTACE) 10 MG capsule Take 10 mg by mouth Daily.   Unknown at Unknown time     Allergies:  Patient has no known allergies.    Review of Systems   Unable to perform ROS: Patient unresponsive         Objective      Vital Signs  Temp:  [97.2 °F (36.2 °C)-100.9 °F (38.3 °C)] 99.4 °F (37.4 °C)  Heart Rate:  [] 55  Resp:  [14-26] 14  BP: ()/() 127/46  FiO2 (%):  [30 %-50 %] 30 %  Body mass index is 25.78 kg/m².    Intake/Output Summary (Last 24 hours) at 1/17/2019 1232  Last data filed at 1/17/2019 1136  Gross per 24 hour   Intake 6496.72 ml   Output 1950 ml   Net 4546.72 ml       Physical Exam   Constitutional: She appears well-developed and well-nourished. She is sedated and intubated.   HENT:   Head: Normocephalic and atraumatic.   Eyes: Pupils are equal, round, and reactive to light.   Neck: No JVD present.   Cardiovascular: Normal rate, regular rhythm and intact distal pulses. Exam reveals no gallop and no friction rub.   No murmur heard.  Pulmonary/Chest: Effort normal. She is intubated. No respiratory distress. She has no wheezes. She has rhonchi. She has no rales.   Intubated and sedated   Abdominal: Soft. She exhibits no mass. There is no tenderness. No hernia.   Skin: Skin is warm and dry.   Vitals reviewed.      Results  Review:   I reviewed the patient's new clinical results.  Results from last 7 days   Lab Units 01/17/19  0853 01/17/19  0406 01/16/19 2053 01/16/19 1711 01/16/19  1436   TROPONIN I ng/mL 0.173* 0.176* 0.076* 0.034 0.044*     Results from last 7 days   Lab Units 01/17/19  0028 01/16/19  1848 01/16/19  1436   WBC 10*3/mm3 10.25 13.55* 9.99   HEMOGLOBIN g/dL 12.8 13.6 15.1   PLATELETS 10*3/mm3 227 236 277     Results from last 7 days   Lab Units 01/17/19  0853 01/17/19  0406 01/17/19 0028 01/16/19 2053 01/16/19 1711 01/16/19  1436   SODIUM mmol/L 140 126* 135 138 133* 130*   POTASSIUM mmol/L 4.0 4.5 4.4 4.1 4.3 4.5   CHLORIDE mmol/L 111 96* 102 106 100 94*   CO2 mmol/L 21.8* 18.3* 24.0* 20.7* 17.3* 19.4*   BUN mg/dL 28* 21 26* 26* 29* 32*   CREATININE mg/dL 1.37* 1.10 1.01 1.11 1.20 1.45*   CALCIUM mg/dL 7.8 7.5* 8.1 8.4 8.5 9.6   GLUCOSE mg/dL 152* 161* 125* 209* 461*  474* 485*   ALT (SGPT) U/L  --   --  12  --   --  13   AST (SGOT) U/L  --   --  22  --   --  25     Lab Results   Component Value Date    INR 0.93 01/16/2019    INR 0.92 01/16/2019     Lab Results   Component Value Date    MG 1.6 (L) 01/17/2019    MG 2.1 01/16/2019     Lab Results   Component Value Date    TSH 4.095 01/16/2019      Lab Results   Component Value Date    .0 (H) 01/16/2019        EKG:         Imaging Results (last 72 hours)     Procedure Component Value Units Date/Time    CT Chest Without Contrast [[id number]] Collected:  01/17/19 0839     Updated:  01/17/19 1116    Narrative:       CT CHEST WITHOUT CONTRAST-      CLINICAL INDICATION: Pneumonia, COPD; J18.9-Pneumonia, unspecified  organism; I48.91-Unspecified atrial fibrillation; I10-Essential  (primary) hypertension.          DOSE: 380.320856 mGy.cm  COMPARISON: None available.     Radiation dose reduction techniques were utilized per ALARA protocol.  Automated exposure control was initiated through either or [employer] or  DoseRight software packages by  protocol.         PROCEDURE: Axial images were acquired from the thoracic inlet through  the upper abdomen without any IV contrast. Reformatted images were  created.     FINDINGS: Today's study shows low-attenuation nodule in the left lobe of  the thyroid.     Pleural-based consolidation IN the left upper lobe anteriorly on image  37 of the axial series. This may represent scarring, pneumonia, or less  likely neoplasm.     Coronary artery calcifications are present.     No pericardial or pleural effusions.     Left shoulder hemiarthroplasty with a resulting streak artifact.       Impression:       1. Pleural-based consolidation in the left upper lobe probably scarring  with other possibilities as mentioned above.  2. Coronary artery calcifications.      This report was finalized on 1/17/2019 11:14 AM by Dr. Garry Benton MD.       XR Chest 1 View [[id number]] Collected:  01/16/19 1616     Updated:  01/16/19 1618    Narrative:       XR CHEST 1 VW-     CLINICAL INDICATION: syncope          COMPARISON: 3/21/2018      TECHNIQUE: Single frontal view of the chest.     FINDINGS:     Endotracheal tube overlies tracheal air column above the issa  Nasogastric tube is in the stomach  Mild fullness in the laura bilaterally.  There is no evidence of an acute osseous abnormality.   There are no suspicious-appearing parenchymal soft tissue nodules.            Impression:       Line placement as above  Mild fullness in the laura bilaterally         This report was finalized on 1/16/2019 4:16 PM by Dr. Garry Benton MD.       CT Head Without Contrast Stroke Protocol [[id number]] Collected:  01/16/19 1435     Updated:  01/16/19 1438    Narrative:       CT HEAD WO CONTRAST STROKE PROTOCOL-     CLINICAL INDICATION: Syncope/fainting          COMPARISON: None available      TECHNIQUE: Axial images of the brain were obtained with out intravenous  contrast.  Reformatted images were created in the sagittal and coronal  planes.     DOSE:         Radiation  dose reduction techniques were utilized per ALARA protocol.  Automated exposure control was initiated through either or [employer] or  DoseRight software packages by  protocol.           FINDINGS:   Today's study shows no mass, hemorrhage, or midline shift.   There is atrophy and ventriculomegaly. Cannot exclude NPH.  There is no evidence of acute ischemia.  I do not see epidural or subdural hematoma.  The gray-white differentiation is appropriate.   The bone window setting images show no destructive calvarial lesion or  acute calvarial fracture.   The posterior fossa is unremarkable.             Impression:       No evidence of acute ischemic event  Mild ventriculomegaly     The results were relayed to the emergency department at 2:37 PM     This report was finalized on 1/16/2019 2:36 PM by Dr. Garry Benton MD.                Thank you very much for asking us to be involved in this patient's care.  We will follow along with you.    Alisha Jc, APRN   01/17/19  12:32 PM

## 2019-01-17 NOTE — PROGRESS NOTES
[facility] Physician - Brief Progress Note  PERMANENT  01/16/2019 19:57    [facility]  [facility] - U - 10 - [place], [place] ([facility])    NIRAJ JHAVERIN S.    Date of Service 01/16/2019 19:57    HPI/Events of Note AICU Provider Assessment Note:  73 yr woman with hx of HTN, DM brought in by friend for unresponsiveness, in ER head CT negative, initial lactate 5, given 2L fluid bolus; in ER required intubation for airway protection; in ER noted to   have afib/; now in ICU /90, P 69 sinus, RR 22, 96%, sedated on propofol , on vent setting /14/5/50; cr 1.2, bicarb 17, glu 461, hgb 13, WBC 13K, [id number], INR 0.93; UA: 30-50 WBC; chest xray: bilateral hilar fullness; ABG 7.31/40/108;   influenza screen: negative; repeat lactate 2.8    Assessment and Plan:  (1)mental status change: unclear, once propofol d/lissett, if still present, obtain MRI  (2) airway protection/on vent support  (3) DM: mild DKA, insulin drip per protocol  (4)paroxysmal afib: to start IV heparin  (5)UTI: cultures pending, on ceftriaxone    ___x__   Video Assessment performed  __x___   Most recent labs reviewed  __x___   Vital Signs reviewed  __x___   Best Practices addressed:                 VTE prophylaxis:IV heparin                 SUP (when indicated):protonix                 Glycemic control:insulin drip                      Please notify bedside physician when present or [facility] if glc > 180 X 2                 Sepsis guidelines:lactate, fluid bolus, cultures, antibiotics                 Lung protective strategy: <8cc/kg                 Targeted Temperature Management:N/A    __x___     Spoke with bedside RN  _____     Orders written      Contact [facility] for any needs if bedside physician is not present.      Interventions Major-Change in mental status - evaluation and management, Respiratory failure - evaluation and management, Sepsis - evaluation and management, Other: UTI        Electronically Signed  by: Emely Kelley) on 01/16/2019 20:07

## 2019-01-17 NOTE — PLAN OF CARE
Problem: Fall Risk (Adult)  Goal: Identify Related Risk Factors and Signs and Symptoms  Outcome: Ongoing (interventions implemented as appropriate)    Goal: Absence of Fall  Outcome: Ongoing (interventions implemented as appropriate)      Problem: Patient Care Overview  Goal: Plan of Care Review  Outcome: Ongoing (interventions implemented as appropriate)    Goal: Discharge Needs Assessment  Outcome: Ongoing (interventions implemented as appropriate)      Problem: Skin Injury Risk (Adult)  Goal: Identify Related Risk Factors and Signs and Symptoms  Outcome: Ongoing (interventions implemented as appropriate)      Problem: Pneumonia (Adult)  Goal: Signs and Symptoms of Listed Potential Problems Will be Absent, Minimized or Managed (Pneumonia)  Outcome: Ongoing (interventions implemented as appropriate)      Problem: Ventilation, Mechanical Invasive (Adult)  Goal: Signs and Symptoms of Listed Potential Problems Will be Absent, Minimized or Managed (Ventilation, Mechanical Invasive)  Outcome: Outcome(s) achieved Date Met: 01/17/19      Problem: Diabetes, Type 2 (Adult)  Goal: Signs and Symptoms of Listed Potential Problems Will be Absent, Minimized or Managed (Diabetes, Type 2)  Outcome: Ongoing (interventions implemented as appropriate)

## 2019-01-17 NOTE — PROGRESS NOTES
Nutrition Services    Patient Name:  Ashley Geronimo  YOB: 1945  MRN: [id number]  Admit Date:  1/16/2019    Recommend Nutren 1.5 at a goal rate of 30 ml/hr. No flush until IV fluids are D/C'd. Thank You.     Electronically signed by:  Beti Guzman RD  01/17/19 11:43 AM

## 2019-01-17 NOTE — PROGRESS NOTES
Kinetics :   Vancomycin  Day 1    The patient has been evaluated for vancomycin therapy for sepsis.  Will load with vancomycin 1gm x 1 and follow with 500mg q24hrs in anticipation of therapeutic levels and monitor with you.

## 2019-01-17 NOTE — PROGRESS NOTES
Discharge Planning Assessment   [facility]     Patient Name: Ashley Geronimo  MRN: [id number]  Today's Date: 1/17/2019    Admit Date: 1/16/2019    Discharge Needs Assessment     Row Name 01/17/19 1130       Living Environment    Lives With  child(lyly), adult    Current Living Arrangements  home/apartment/condo    Primary Care Provided by  self    Family Caregiver if Needed  child(lyly), adult    Quality of Family Relationships  helpful;involved;supportive    Able to Return to Prior Arrangements  yes       Resource/Environmental Concerns    Resource/Environmental Concerns  none       Transition Planning    Patient/Family Anticipates Transition to  home with family    Transportation Anticipated  car, drives self       Discharge Needs Assessment    Equipment Currently Used at Home  none    Equipment Needed After Discharge  none        Discharge Plan     Row Name 01/17/19 1131       Plan    Plan  SS spoke with pt's son Juan Geronimo on this date. Pt lives at home with her two grandchilden. Pt's family plans for pt to return home at discharge. Pt has a living will. Copy is not on file. Pt does not have DME or HH. Pt utilizes [facility] and PCP is Silvano Parker. SS will follow and assist as needed.     Patient/Family in Agreement with Plan  yes        Expected Discharge Date and Time     Expected Discharge Date Expected Discharge Time    Jan 21, 2019         Demographic Summary     Row Name 01/17/19 1130       General Information    Admission Type  inpatient    Reason for Consult  discharge planning    Preferred Language  English     Used During This Interaction  no            Cecelia Alvarez

## 2019-01-17 NOTE — PROGRESS NOTES
[facility] HOSPITALIST PROGRESS NOTE     Patient Identification:  Name:  Ashley Geronimo  Age:  73 y.o.  Sex:  female  :  1945  MRN:  [id number]  Visit Number:  [id number]  ROOM: [address]     Primary Care Provider:  Silvano Parker MD    Length of stay:  1    Subjective        Chief Compliant follow-up for AMS/sepsis    Patient seen and examined this morning with ALOD Tilley at the bedside.  No family at bedside.  Currently patient is intubated and mechanically ventilated and sedated. Sedation vacation done this morning but patient did not wake up.  FiO2 stable at 30%.  Tolerating tube feeding.  Noted to have an episode of hypotension in the afternoon and IV fluid bolus given.  Afebrile overnight but noted to have temp of 100.9 this afternoon.      Objective     Current Hospital Meds:  budesonide 0.5 mg Nebulization BID - RT   [START ON 2019] cefepime 2 g Intravenous Q24H   chlorhexidine 15 mL Mouth/Throat Q12H   doxycycline 100 mg Intravenous Q12H   enoxaparin 1 mg/kg Subcutaneous Q12H   insulin aspart 0-7 Units Subcutaneous 4x Daily AC & at Bedtime   ipratropium-albuterol 3 mL Nebulization Q6H - RT   lactobacillus acidophilus 1 capsule Oral Daily   pantoprazole 40 mg Intravenous Q AM   sodium chloride 3 mL Intravenous Q12H   [START ON 2019] vancomycin 500 mg Intravenous Q24H     fentaNYL (SUBLIMAZE) PCA 1500 mcg/30 mL syringe     Pharmacy to dose vancomycin     propofol 5-50 mcg/kg/min Last Rate: 50 mcg/kg/min (19 7414)   sodium chloride 100 mL/hr Last Rate: 100 mL/hr (19 1225)     ----------------------------------------------------------------------------------------------------------------------  Vital Signs:  Temp:  [99 °F (37.2 °C)-100.9 °F (38.3 °C)] 99.4 °F (37.4 °C)  Heart Rate:  [52-88] 88  Resp:  [14-22] 22  BP: ()/() 144/115  FiO2 (%):  [30 %-45 %] 30 %  SpO2:  [94 %-100 %] 94 %  on   ;   Device (Oxygen Therapy): ventilator  Body mass index is  25.78 kg/m².    Wt Readings from Last 3 Encounters:   01/17/19 59.9 kg (132 lb)   08/07/13 66.7 kg (147 lb 0 oz)   08/05/13 66.7 kg (147 lb 0 oz)       Intake/Output Summary (Last 24 hours) at 1/17/2019 1858  Last data filed at 1/17/2019 1639  Gross per 24 hour   Intake 2446.72 ml   Output 450 ml   Net 1996.72 ml     NPO Diet  ----------------------------------------------------------------------------------------------------------------------  Physical exam:  Constitutional:  Elderly female lying on intubated and sedated.  HENT:  Head: Normocephalic and atraumatic.  Mouth:  Moist mucous membranes.  ET +, OGT+  Eyes:   Pupils are equal, round, and reactive to light.  no nystagmus noted.   Neck:  Neck supple.  No JVD present.  trachea midline.  Cardiovascular:  Normal rate, regular rhythm.  with no murmur.  Pulmonary/Chest:  On vent, coarse breath sounds bilaterally, good air movement, no wheeze.   Abdominal:  Soft.  Bowel sounds are present.  No distension. No organomegaly.  Musculoskeletal:  1+ edema in bilateral lower ext.  No red or swollen joints anywhere.    Neurological:  Intubated and sedated    Skin:  Skin is warm and dry. + erythematous rash noted in the both lower extremities, R>L.No discharge seen.  No pallor.   Peripheral vascular:  1+ edema bilaterally and pulses on all 4 extremities.    ----------------------------------------------------------------------------------------------------------------------  ----------------------------------------------------------------------------------------------------------------------Results from last 7 days   Lab Units 01/17/19  1405 01/17/19  0853 01/17/19  0028 01/16/19  1848  01/16/19  1436   CRP mg/dL 4.03*  --   --   --   --  1.69*   LACTATE mmol/L 1.9 1.6  --  2.8*   < >  --    WBC 10*3/mm3  --   --  10.25 13.55*  --  9.99   HEMOGLOBIN g/dL  --   --  12.8 13.6  --  15.1   HEMATOCRIT %  --   --  36.5* 41.3  --  45.1   MCV fL  --   --  86.3 90.2  --  88.8    MCHC g/dL  --   --  35.1 32.9*  --  33.5   PLATELETS 10*3/mm3  --   --  227 236  --  277   INR   --   --   --  0.93  --  0.92    < > = values in this interval not displayed.     Results from last 7 days   Lab Units 01/17/19  1540 01/17/19  1213 01/17/19  0853  01/17/19  0028  01/16/19  1711 01/16/19  1436   SODIUM mmol/L 138 142 140   < > 135   < > 133* 130*   POTASSIUM mmol/L 3.9 3.9 4.0   < > 4.4   < > 4.3 4.5   MAGNESIUM mg/dL  --   --   --   --  1.6*  --   --  2.1   CHLORIDE mmol/L 110 109 111   < > 102   < > 100 94*   CO2 mmol/L 22.4* 24.2* 21.8*   < > 24.0*   < > 17.3* 19.4*   BUN mg/dL 32* 30* 28*   < > 26*   < > 29* 32*   CREATININE mg/dL 1.34* 1.36* 1.37*   < > 1.01   < > 1.20 1.45*   PHOSPHORUS mg/dL  --   --   --   --  2.9  --  4.1  --    EGFR IF NONAFRICN AM mL/min/1.73 39* 38* 38*   < > 54*   < > 44* 35*   CALCIUM mg/dL 8.1 8.2 7.8   < > 8.1   < > 8.5 9.6   GLUCOSE mg/dL 129* 158* 152*   < > 125*   < > 461*  474* 485*   ALBUMIN g/dL  --   --   --   --  3.60  --   --  4.80   BILIRUBIN mg/dL  --   --   --   --  0.3  --   --  0.6   ALK PHOS U/L  --   --   --   --  68  --   --  97   AST (SGOT) U/L  --   --   --   --  22  --   --  25   ALT (SGPT) U/L  --   --   --   --  12  --   --  13    < > = values in this interval not displayed.   Estimated Creatinine Clearance: 29.8 mL/min (A) (by C-G formula based on SCr of 1.34 mg/dL (H)).  Results from last 7 days   Lab Units 01/17/19  0853 01/17/19  0406 01/16/19  2053   TROPONIN I ng/mL 0.173* 0.176* 0.076*     Hemoglobin A1C   Date/Time Value Ref Range Status   01/16/2019 1848 11.50 (H) 4.50 - 5.70 % Final     Glucose   Date/Time Value Ref Range Status   01/17/2019 1642 163 (H) 70 - 130 mg/dL Final   01/17/2019 1139 180 (H) 70 - 130 mg/dL Final   01/17/2019 0556 194 (H) 70 - 130 mg/dL Final   01/17/2019 0101 136 (H) 70 - 130 mg/dL Final   01/16/2019 2356 120 70 - 130 mg/dL Final   01/16/2019 2328 66 (L) 70 - 130 mg/dL Final   01/16/2019 2237 84 70 - 130 mg/dL  Final   01/16/2019 2128 134 (H) 70 - 130 mg/dL Final     Ammonia   Date Value Ref Range Status   01/16/2019 46 11 - 51 umol/L Final       Results from last 7 days   Lab Units 01/16/19  1604   NITRITE UA  Negative   WBC UA /HPF 31-50*   BACTERIA UA /HPF 2+*   SQUAM EPITHEL UA /HPF 0-2     Blood Culture   Date Value Ref Range Status   01/16/2019 No growth at 24 hours  Preliminary   01/16/2019 No growth at 24 hours  Preliminary          I have personally looked at the labs and they are summarized above.  ----------------------------------------------------------------------------------------------------------------------  Imaging Results (last 24 hours)     Procedure Component Value Units Date/Time    XR Chest 1 View [[id number]] Updated:  01/17/19 1732    CT Chest Without Contrast [[id number]] Collected:  01/17/19 0839     Updated:  01/17/19 1116    Narrative:       CT CHEST WITHOUT CONTRAST-      CLINICAL INDICATION: Pneumonia, COPD; J18.9-Pneumonia, unspecified  organism; I48.91-Unspecified atrial fibrillation; I10-Essential  (primary) hypertension.          DOSE: 380.301862 mGy.cm  COMPARISON: None available.     Radiation dose reduction techniques were utilized per ALARA protocol.  Automated exposure control was initiated through either or [employer] or  [employer] software packages by  protocol.        PROCEDURE: Axial images were acquired from the thoracic inlet through  the upper abdomen without any IV contrast. Reformatted images were  created.     FINDINGS: Today's study shows low-attenuation nodule in the left lobe of  the thyroid.     Pleural-based consolidation IN the left upper lobe anteriorly on image  37 of the axial series. This may represent scarring, pneumonia, or less  likely neoplasm.     Coronary artery calcifications are present.     No pericardial or pleural effusions.     Left shoulder hemiarthroplasty with a resulting streak artifact.       Impression:       1. Pleural-based  consolidation in the left upper lobe probably scarring  with other possibilities as mentioned above.  2. Coronary artery calcifications.      This report was finalized on 1/17/2019 11:14 AM by Dr. Garry Benton MD.           I have personally reviewed the radiology images and read the final radiology report.    Assessment & Plan      Assessment:  Sepsis r/t CAP and UTI  And b/l lower extremities cellulitis (lactic acid 5.9 on admission, tachycardia, AMS)   Lactic Acidosis   High Anion Gap Acute Metabolic Compensated Respiratory Acidosis   Acute Respiratory failure, S/P Intubation and mechanical ventilation   Acute metabolic encephalopathy  Possible RAMESH on CKD stage III  NSTEMI  New Onset A-Fib with RVR  Hypomagnesemia  DM type 2, non insulin dependent with hyperglycemia   Pseudohyponatremia   Indeterminate troponin   Essential HTN  Probable COPD  Tobacco abuse    Plan:  Septic secondary to UTI and possible CAP and B/L lower extremities cellulitis: IV abx changed to cefepime and vancomycin by Pulmonology.Continue with doxycycline. rocephin dced. Chest xray does not appreciate any pneumonia, CT of the chest showed Pleural-based consolidation IN the left upper lobe anteriorly, This may represent scarring, pneumonia, or less likely neoplasm.  Mycoplasma antibody, Legionella antigen and flu negative.  Follow-up Respiratory Culture. F/U BCX. And Urine cx.  continue with IV fluids.  CRP worsening and lactate normal.    Acute metabolic encephalopathy: EEG with Moderate generalized slow. No epileptiform activity or electrographic. seizure precautions ordered. CT of the head negative. Neuro checks, monitor closley. Ammonia level is 46.  UDS and ETOH negative. Prolactin level ordered and pending. Ativan as needed for seizures.      Acute Respiratory failure, S/P Intubation and mechanical ventilation for the airway protection. on DuoNeb.  Currently minimal FiO2 requirement.  Per pulmonology and critical care.     Lactic  acidosis: Resolved.      New onset A-Fib with RVR: converted to SR. CHADS2-VASc score is 4, moderate-high risk. Continue with Lovenox 50 mg twice a day. Repeat EKG with sinus rhythm. TSH is 4.095. 2-D echo with EF of 65%.     NSTEMI: On Lovenox subcutaneous twice a day full dose.  Start on aspirin and Lipitor and check lipid profile in a.m.  Cardiology consulted.  Plan to do ischemic evaluation once stable.  Patient will not tolerate currently beta blocker because of sepsis and labile blood pressure.    Replace magnesium and monitor.    Essential HTN: monitor blood pressure closely, will resume home meds once stable.     RAMESH on CKD stage III: will do 1/2 NS @ 100ml/hr and repeat in the AM. Avoid nephrotoxic agents.      DM type 2, Non insulin dependent with mild DKA : A1c 11.5,  hyperglycemia resolved.  On subcutaneous insulin.  Acetone is negative. Monitor electrolytes.  Metabolic acidosis improving.     Pseudohyponatremia:  resolved.      *DVT prophylaxis: full dose lovenox for A-Fib   *GI prophylaxis: Protonix 40mg IV daily   *Activity: turn q2hr  *Diet: Nutrition consulted for tube feedings recommendations, started on TF.         Patient is high risk for the following reasons: AMS, Sepsis r/t CAP and UTI  And b/l lower extremities cellulitis, Lactic Acidosis, High Anion Gap Acute Metabolic Compensated Respiratory Acidosis   Acute Respiratory failure, S/P Intubation and mechanical ventilation     Management plan discussed in detail with the RN Treva at the bedside.  All questions were answered.      Natty Chavez MD  01/17/19  6:58 PM

## 2019-01-17 NOTE — CONSULTS
Referring Provider: ANDREEA Michel  Reason for Consultation: ventilator management, acute hypoxic respiratory failure, severe sepsis      Chief complaint  Shortness of breath          History of present illness:        Ms. Geronimo is a 73 year old female with history of diabetes, breast cancer with status post left mastectomy and lymph node removal, chronic kidney disease stage III, tobacco abuse, hypertension who presented to the ED On 01/16/2019. History was limited as no family members were available or present by phone. She was brought to the ED by a friend, who had taken the patient to have labs drawn. On their way home after stopping at a gas station, he reported that Grazyna was  Non-responsive and brought her to the ED.  They were unable to contact any family members as the patient's phone was dead. CT of the head was negative for any acute findings.  This patient was nonresponsive and minimal gag reflex noted, she is intubated for airway protection.  She was admitted to CCU for further care.  Upon later contacting the family, the patient had reportedly been having worsening cough, dizziness, and nausea for the last 2 weeks.  She recently reported chills and feeling feverish. The patient was also reported to have urinary incontinence the last few days.    Today, she was intubated and sedated with propofol. On ACVC mode with settings of 14, 400, 30%, 5.  Saturation 97%.  Mean arterial pressure at 82, and not requiring vasopressors.   Fever at this morning of 100.9.  No family members at bedside.        Review Of Systems:    Unable to obtain review of systems due to intubation and sedation.       History  Past Medical History:   Diagnosis Date   • Diabetes mellitus (CMS/HCC)    • Hypertension    , History reviewed. No pertinent surgical history., History reviewed. No pertinent family history., Social History     Tobacco Use   • Smoking status: Never Smoker   Substance Use Topics   • Alcohol use: No      Frequency: Never   • Drug use: No   , Medications Prior to Admission   Medication Sig Dispense Refill Last Dose   • indapamide (LOZOL) 2.5 MG tablet Take 5 mg by mouth Every Morning.   Unknown at Unknown time   • metFORMIN (GLUCOPHAGE) 500 MG tablet Take 1,000 mg by mouth 2 (Two) Times a Day With Meals.   Unknown at Unknown time   • montelukast (SINGULAIR) 10 MG tablet Take 10 mg by mouth Every Night.   Unknown at Unknown time   • NIFEdipine XL (PROCARDIA XL) 90 MG 24 hr tablet Take 90 mg by mouth Daily.   Unknown at Unknown time   • pioglitazone (ACTOS) 45 MG tablet Take 45 mg by mouth Daily.   Unknown at Unknown time   • potassium chloride (KLOR-CON) 8 MEQ CR tablet Take 8 mEq by mouth Daily.   Unknown at Unknown time   • ramipril (ALTACE) 10 MG capsule Take 10 mg by mouth Daily.   Unknown at Unknown time   , Scheduled Meds:    ceftriaxone 1 g Intravenous Q24H   chlorhexidine 15 mL Mouth/Throat Q12H   doxycycline 100 mg Intravenous Q12H   enoxaparin 1 mg/kg Subcutaneous Q12H   insulin aspart 0-7 Units Subcutaneous 4x Daily AC & at Bedtime   ipratropium-albuterol 3 mL Nebulization Q6H - RT   lactobacillus acidophilus 1 capsule Oral Daily   magnesium sulfate 4 g Intravenous Once   pantoprazole 40 mg Intravenous Q AM   sodium chloride 3 mL Intravenous Q12H   , Continuous Infusions:    fentaNYL (SUBLIMAZE) PCA 1500 mcg/30 mL syringe     propofol 5-50 mcg/kg/min Last Rate: 25 mcg/kg/min (01/17/19 0674)   sodium chloride 100 mL/hr Last Rate: 100 mL/hr (01/17/19 0096)    and Allergies:  Patient has no known allergies.    Objective     Vital Signs   Temp:  [97.2 °F (36.2 °C)-100.9 °F (38.3 °C)] 100.9 °F (38.3 °C)  Heart Rate:  [] 77  Resp:  [14-26] 14  BP: ()/() 110/47  FiO2 (%):  [30 %-50 %] 30 %    Physical Exam:               GENERAL APPEARANCE: Intubated and sedated.  Appears to be resting comfortably in bed.     HEAD: normocephalic. Atraumatic.     EYES: PERRLA. No scleral icterus.    THROAT: ET  tube in place. Oral cavity and pharynx normal. No inflammation, swelling, exudate, or lesions.     NECK: Neck supple. No thyromegaly     CARDIAC: Normal S1 and S2. No S3, S4 or murmurs. Rhythm is regular. There is no peripheral edema, cyanosis or pallor. Extremities are warm and well perfused. Capillary refill is less than 2 seconds.     RESPIRATORY: Bilateral air entry positive. Diminished breath sounds with faint bibasilar crackles noted at the lung bases. No wheezing or rhonchi upon auscultation.     GI: Positive bowel sounds. Soft, nondistended, nontender.     MUSCULOSKELETAL: No significant deformity or joint abnormality. No edema. Peripheral pulses intact.    NEUROLOGICAL: Unable to assess due to sedation status.     PSYCHIATRIC: Unable to assess due to sedation status.                       Results Review:    LABS:    Lab Results   Component Value Date    GLUCOSE 161 (H) 01/17/2019    BUN 21 01/17/2019    CREATININE 1.10 01/17/2019    EGFRIFNONA 49 (L) 01/17/2019    BCR 19.1 01/17/2019    CO2 18.3 (L) 01/17/2019    CALCIUM 7.5 (L) 01/17/2019    ALBUMIN 3.60 01/17/2019    AST 22 01/17/2019    ALT 12 01/17/2019    WBC 10.25 01/17/2019    HGB 12.8 01/17/2019    HCT 36.5 (L) 01/17/2019    MCV 86.3 01/17/2019     01/17/2019     (L) 01/17/2019    K 4.5 01/17/2019    CL 96 (L) 01/17/2019    ANIONGAP 11.7 (H) 01/17/2019       Lab Results   Component Value Date    INR 0.93 01/16/2019    INR 0.92 01/16/2019    PROTIME 12.7 01/16/2019    PROTIME 12.5 01/16/2019       Results from last 7 days   Lab Units 01/16/19  1848 01/16/19  1436   INR  0.93 0.92   APTT seconds 23.2* 25.6                     I reviewed the patient's new clinical results.  I reviewed the patient's new imaging results and agree with the interpretation.      Assessment/Plan        Active Problems:    Pneumonia      Assessment:  - Acute hypoxic respiratory failure, requiring mechanical ventilation  - Ventilator dependence  - Severe sepsis  due to UTI  - Mild pulmonary hypertension  - History of Atrial fibrillation      Central nervous system:  Intubated and sedated.   Preparing for EEG during assessment.   Plan   - Sedation: propofol gtt   - Awaiting sedation holiday with recent intubation   - Avoid nighttime disturbances and light exposure during night to maintain normal circadian rhythms to avoid hypoactive or hyperactive delirium  - EEG showed no epileptiform activity or electrographic seizures.       Cardiac/aorta:  Maintaining MAP of greater than 65 without pressors.  Lactate: 1.9   Echo: EF of 66%, no significant valvular disease, LV normal systolic function.  Troponin trending.  Slightly downward to 0.173.  Plan:  - Maintain mean arterial pressure greater than 65 mmHg.      Respiratory:  On ACVC mode with settings of 14, 400, 30%, 5.  Secretions: Minimal   Ventilator graphics: reviewed.  Patient is synchronous with the mechanical ventilator.  There is no auto PEEP or leak noted.  Previous ABG showed showed normal values with decreased bicarbonate of 20.8.  Serum bicarbonate increased to 24.2.  Chest x-ray: reviewed.  Shows hilar congestion  ET tube location: Within 5 cm from the issa.  CT of the chest yesterday showed pleural-based consolidation in the left upper lobe, possible scarring, pneumonia, or less likely neoplasm.  coronary artery calcifications.  Low-attenuation nodule in the left lobe of thyroid.  Plan   - Keep tidal volume equal to or less than 6 mL per KG ideal body weight.  - Mechanical ventilator settings were changed to keep plateau pressure equal to or less than 30 cm H2O.  - Keep high PEEP.  - Titrate FiO2 and PEEP as per ARDS net protocol  - Keep SaO2 greater than 90%.  - Multiple ABGs were reviewed over the shift.  Ventilator settings were changed according to ABG result.  - Ordered cefepime, vancomycin, DuoNeb, Pulmicort.  Discontinue ceftriaxone.      Gastrointestinal/Liver:  Route of feeding: OG tube.   Plan   -  Continue with PPI for ulcer prophylaxis.  - Continue with lactobacillus   - Nutrition team consulted.      Genitorenal:  Creatinine stable at 1.36. Sodium and potassium are within normal limits.   Input/Output was net +3.75 L over the past 24 hours, with 1.95 L output.   Plan   - Keep urine output near 0.5 ML per KG per hour.  - avoid nephrotoxic agent.  - Replete serum electrolytes as per ICU electrolyte replacement protocol.      Endocrine:   Glucose remains below 180.  Calcium is within normal limits.  Plan   - Keep serum glucose level less than 180 mg/dL while in ICU.  Recommend starting insulin drip if 2 consecutive serum glucose levels are greater than 200 mg/dL.   - Replete serum calcium and phosphorous as per ICU electrolyte replacement protocol.      Infectious disease:  Leukocytosis resolved to 10.5. Absolute neutrophils trended downward.   Influenza antigens were negative.  Mycoplasma IgM was negative.  Respiratory culture is pending but currently shows no growth.  Legionella antigen was negative.  Urine culture is pending  Previous urinalysis was significant for UTI, with 2+ bacteria.   Plan   - Ordered cefepime, vancomycin.  Discontinued ceftriaxone.  - Continue doxycyline.   - Pharmacy consult for monitoring antibiotic vancomycin serum levels.  - Follow pending cultures and sensitivities      Heme/Onc:  Hemoglobin stable at 12.8.  Platelets are stable at 227.  Plan   - Goal hemoglobin is 7 g/dL unless active cardiac ischemia.  - Continue with Lovenox for DVT prophylaxis.  - Hold anticoagulation if platelet count is less than 50,000.      Musculoskeletal:  Plan   - Turn the patient every 2 hours to prevent pressure ulcers.      Activity:   - Aggressive PT/OT while in hospital to avoid critical care neuropathy/myopathy.       Lines:   - Peripheral IV      Quality measure:  1. Elevation of the head of the bed to 30-45 degrees- yes  2. Daily sedation vacation and daily assessment of readiness to  extubate-no  3. Peptic ulcer disease prophylaxis- yes  4. Deep venous thrombosis prophylaxis- yes  5. Indwelling urinary catheter- required for accurate urine output.      Code status: Full code       Contacts listed as Juan Geronimo (son) and Katelyn Castaneda (grandchild).       Findings and my recommendations were discussed with nursing staff and consulting provider    Kaley Cutler PA-C  01/17/19  9:15 AM      Scribed for Dr. Noonan by Kaley Cutler PA-C    The clinical plan was discussed extensively with the nurse, and respiratory therapist.     Critical Care time spent in direct patient care: 37 minutes (excluding procedure time) including high complexity decision making to assess, and support vital organ system failure in this individual who has impairment of one or more vital organ systems such that there is a high probability of imminent or life threatening deterioration in the patient’s condition.     Patient is critically ill and is at higher risk for further mortality/morbidity. Continue ICU care.        I, Quang Noonan M.D. attest that the above note accurately reflects the work and decisions made by me. Patient was seen and evaluated by me, including history of present illness, physical exam, assessment, and treatment plan.  The above note was reviewed and edited by me.        Quang Noonan M.D  Pulmonary and Critical Care Medicine

## 2019-01-18 ENCOUNTER — HOSPITAL ENCOUNTER (OUTPATIENT)
Dept: MAMMOGRAPHY | Facility: HOSPITAL | Age: 74
End: 2019-01-18

## 2019-01-18 ENCOUNTER — APPOINTMENT (OUTPATIENT)
Dept: BONE DENSITY | Facility: HOSPITAL | Age: 74
End: 2019-01-18

## 2019-01-18 LAB
A-A DO2: 71.8 MMHG (ref 0–300)
A-A DO2: 73.4 MMHG (ref 0–300)
ANION GAP SERPL CALCULATED.3IONS-SCNC: 10.4 MMOL/L (ref 3.6–11.2)
ARTERIAL PATENCY WRIST A: ABNORMAL
ARTERIAL PATENCY WRIST A: POSITIVE
ATMOSPHERIC PRESS: 729 MMHG
ATMOSPHERIC PRESS: 731 MMHG
BASE EXCESS BLDA CALC-SCNC: -4.8 MMOL/L
BASE EXCESS BLDA CALC-SCNC: -6.1 MMOL/L
BASOPHILS # BLD AUTO: 0.02 10*3/MM3 (ref 0–0.3)
BASOPHILS NFR BLD AUTO: 0.3 % (ref 0–2)
BDY SITE: ABNORMAL
BDY SITE: ABNORMAL
BODY TEMPERATURE: 98.5 C
BODY TEMPERATURE: 98.6 C
BUN BLD-MCNC: 37 MG/DL (ref 7–21)
BUN/CREAT SERPL: 28.7 (ref 7–25)
CALCIUM SPEC-SCNC: 7.9 MG/DL (ref 7.7–10)
CHLORIDE SERPL-SCNC: 111 MMOL/L (ref 99–112)
CHOLEST SERPL-MCNC: 116 MG/DL (ref 0–200)
CO2 SERPL-SCNC: 19.6 MMOL/L (ref 24.3–31.9)
COHGB MFR BLD: 1.1 % (ref 0–5)
COHGB MFR BLD: 1.5 % (ref 0–5)
CREAT BLD-MCNC: 1.29 MG/DL (ref 0.43–1.29)
CRP SERPL-MCNC: 5.58 MG/DL (ref 0–0.99)
DEPRECATED RDW RBC AUTO: 43.3 FL (ref 37–54)
EOSINOPHIL # BLD AUTO: 0.03 10*3/MM3 (ref 0–0.7)
EOSINOPHIL NFR BLD AUTO: 0.4 % (ref 0–7)
ERYTHROCYTE [DISTWIDTH] IN BLOOD BY AUTOMATED COUNT: 13.2 % (ref 11.5–14.5)
GFR SERPL CREATININE-BSD FRML MDRD: 40 ML/MIN/1.73
GLUCOSE BLD-MCNC: 138 MG/DL (ref 70–110)
GLUCOSE BLDC GLUCOMTR-MCNC: 163 MG/DL (ref 70–130)
GLUCOSE BLDC GLUCOMTR-MCNC: 216 MG/DL (ref 70–130)
GLUCOSE BLDC GLUCOMTR-MCNC: 242 MG/DL (ref 70–130)
GLUCOSE BLDC GLUCOMTR-MCNC: 250 MG/DL (ref 70–130)
HCO3 BLDA-SCNC: 19.6 MMOL/L (ref 22–26)
HCO3 BLDA-SCNC: 21.3 MMOL/L (ref 22–26)
HCT VFR BLD AUTO: 36.2 % (ref 37–47)
HCT VFR BLD CALC: 39 % (ref 37–47)
HCT VFR BLD CALC: 46 % (ref 37–47)
HDLC SERPL-MCNC: 43 MG/DL (ref 60–100)
HGB BLD-MCNC: 11.8 G/DL (ref 12–16)
HGB BLDA-MCNC: 13.1 G/DL (ref 12–16)
HGB BLDA-MCNC: 15.5 G/DL (ref 12–16)
HOROWITZ INDEX BLD+IHG-RTO: 30 %
HOROWITZ INDEX BLD+IHG-RTO: 30 %
IMM GRANULOCYTES # BLD AUTO: 0.02 10*3/MM3 (ref 0–0.03)
IMM GRANULOCYTES NFR BLD AUTO: 0.3 % (ref 0–0.5)
LDLC SERPL CALC-MCNC: 53 MG/DL (ref 0–100)
LDLC/HDLC SERPL: 1.23 {RATIO}
LYMPHOCYTES # BLD AUTO: 1.36 10*3/MM3 (ref 1–3)
LYMPHOCYTES NFR BLD AUTO: 19.2 % (ref 16–46)
MAGNESIUM SERPL-MCNC: 2.6 MG/DL (ref 1.7–2.6)
MCH RBC QN AUTO: 29.6 PG (ref 27–33)
MCHC RBC AUTO-ENTMCNC: 32.6 G/DL (ref 33–37)
MCV RBC AUTO: 91 FL (ref 80–94)
METHGB BLD QL: 0.2 % (ref 0–3)
METHGB BLD QL: 0.4 % (ref 0–3)
MODALITY: ABNORMAL
MODALITY: ABNORMAL
MONOCYTES # BLD AUTO: 1.07 10*3/MM3 (ref 0.1–0.9)
MONOCYTES NFR BLD AUTO: 15.1 % (ref 0–12)
NEUTROPHILS # BLD AUTO: 4.58 10*3/MM3 (ref 1.4–6.5)
NEUTROPHILS NFR BLD AUTO: 64.7 % (ref 40–75)
OSMOLALITY SERPL CALC.SUM OF ELEC: 292.1 MOSM/KG (ref 273–305)
OXYHGB MFR BLDV: 94 % (ref 85–100)
OXYHGB MFR BLDV: 94.2 % (ref 85–100)
PCO2 BLDA: 39.6 MM HG (ref 35–45)
PCO2 BLDA: 43.2 MM HG (ref 35–45)
PEEP RESPIRATORY: 5 CM[H2O]
PEEP RESPIRATORY: 5 CM[H2O]
PH BLDA: 7.31 PH UNITS (ref 7.35–7.45)
PH BLDA: 7.31 PH UNITS (ref 7.35–7.45)
PLATELET # BLD AUTO: 185 10*3/MM3 (ref 130–400)
PMV BLD AUTO: 11.9 FL (ref 6–10)
PO2 BLDA: 82 MM HG (ref 80–100)
PO2 BLDA: 86.1 MM HG (ref 80–100)
POTASSIUM BLD-SCNC: 3.9 MMOL/L (ref 3.5–5.3)
PROLACTIN SERPL-MCNC: 59.9 NG/ML (ref 4.8–23.3)
PSV: 10 CMH2O
RBC # BLD AUTO: 3.98 10*6/MM3 (ref 4.2–5.4)
SAO2 % BLDCOA: 95.4 % (ref 90–100)
SAO2 % BLDCOA: 95.8 % (ref 90–100)
SET MECH RESP RATE: 14
SODIUM BLD-SCNC: 141 MMOL/L (ref 135–153)
TRIGL SERPL-MCNC: 101 MG/DL (ref 0–150)
VENTILATOR MODE: ABNORMAL
VENTILATOR MODE: ABNORMAL
VLDLC SERPL-MCNC: 20.2 MG/DL
VT ON VENT VENT: 400 ML
WBC NRBC COR # BLD: 7.08 10*3/MM3 (ref 4.5–12.5)

## 2019-01-18 PROCEDURE — 83735 ASSAY OF MAGNESIUM: CPT | Performed by: HOSPITALIST

## 2019-01-18 PROCEDURE — 25010000002 ENOXAPARIN PER 10 MG: Performed by: HOSPITALIST

## 2019-01-18 PROCEDURE — 94799 UNLISTED PULMONARY SVC/PX: CPT

## 2019-01-18 PROCEDURE — 80048 BASIC METABOLIC PNL TOTAL CA: CPT | Performed by: HOSPITALIST

## 2019-01-18 PROCEDURE — 36600 WITHDRAWAL OF ARTERIAL BLOOD: CPT | Performed by: INTERNAL MEDICINE

## 2019-01-18 PROCEDURE — 94003 VENT MGMT INPAT SUBQ DAY: CPT

## 2019-01-18 PROCEDURE — 99232 SBSQ HOSP IP/OBS MODERATE 35: CPT | Performed by: NURSE PRACTITIONER

## 2019-01-18 PROCEDURE — 80061 LIPID PANEL: CPT | Performed by: HOSPITALIST

## 2019-01-18 PROCEDURE — 25010000002 PROPOFOL 1000 MG/ML EMULSION: Performed by: HOSPITALIST

## 2019-01-18 PROCEDURE — 82962 GLUCOSE BLOOD TEST: CPT

## 2019-01-18 PROCEDURE — 25010000002 HYDRALAZINE PER 20 MG

## 2019-01-18 PROCEDURE — 99233 SBSQ HOSP IP/OBS HIGH 50: CPT | Performed by: HOSPITALIST

## 2019-01-18 PROCEDURE — 82805 BLOOD GASES W/O2 SATURATION: CPT | Performed by: INTERNAL MEDICINE

## 2019-01-18 PROCEDURE — 83050 HGB METHEMOGLOBIN QUAN: CPT | Performed by: INTERNAL MEDICINE

## 2019-01-18 PROCEDURE — 82375 ASSAY CARBOXYHB QUANT: CPT | Performed by: INTERNAL MEDICINE

## 2019-01-18 PROCEDURE — 99291 CRITICAL CARE FIRST HOUR: CPT | Performed by: INTERNAL MEDICINE

## 2019-01-18 PROCEDURE — 25010000002 VANCOMYCIN 5 G RECONSTITUTED SOLUTION 5,000 MG VIAL: Performed by: INTERNAL MEDICINE

## 2019-01-18 PROCEDURE — 86140 C-REACTIVE PROTEIN: CPT | Performed by: HOSPITALIST

## 2019-01-18 PROCEDURE — 63710000001 INSULIN ASPART PER 5 UNITS: Performed by: INTERNAL MEDICINE

## 2019-01-18 PROCEDURE — 85025 COMPLETE CBC W/AUTO DIFF WBC: CPT | Performed by: HOSPITALIST

## 2019-01-18 PROCEDURE — 25010000002 CEFEPIME 2 G/NS 100 ML SOLUTION: Performed by: INTERNAL MEDICINE

## 2019-01-18 RX ORDER — HYDRALAZINE HYDROCHLORIDE 20 MG/ML
INJECTION INTRAMUSCULAR; INTRAVENOUS
Status: COMPLETED
Start: 2019-01-18 | End: 2019-01-18

## 2019-01-18 RX ORDER — HYDRALAZINE HYDROCHLORIDE 20 MG/ML
10 INJECTION INTRAMUSCULAR; INTRAVENOUS EVERY 4 HOURS PRN
Status: DISCONTINUED | OUTPATIENT
Start: 2019-01-18 | End: 2019-01-24 | Stop reason: HOSPADM

## 2019-01-18 RX ADMIN — SODIUM CHLORIDE, PRESERVATIVE FREE 3 ML: 5 INJECTION INTRAVENOUS at 08:42

## 2019-01-18 RX ADMIN — PANTOPRAZOLE SODIUM 40 MG: 40 INJECTION, POWDER, FOR SOLUTION INTRAVENOUS at 05:40

## 2019-01-18 RX ADMIN — SODIUM CHLORIDE, PRESERVATIVE FREE 10 ML: 5 INJECTION INTRAVENOUS at 20:44

## 2019-01-18 RX ADMIN — SODIUM CHLORIDE, PRESERVATIVE FREE 10 ML: 5 INJECTION INTRAVENOUS at 08:39

## 2019-01-18 RX ADMIN — DOXYCYCLINE 100 MG: 100 INJECTION, POWDER, LYOPHILIZED, FOR SOLUTION INTRAVENOUS at 08:42

## 2019-01-18 RX ADMIN — DOXYCYCLINE 100 MG: 100 INJECTION, POWDER, LYOPHILIZED, FOR SOLUTION INTRAVENOUS at 20:43

## 2019-01-18 RX ADMIN — IPRATROPIUM BROMIDE AND ALBUTEROL SULFATE 3 ML: .5; 3 SOLUTION RESPIRATORY (INHALATION) at 06:39

## 2019-01-18 RX ADMIN — CHLORHEXIDINE GLUCONATE 15 ML: 1.2 RINSE ORAL at 08:37

## 2019-01-18 RX ADMIN — HYDRALAZINE HYDROCHLORIDE 10 MG: 20 INJECTION INTRAMUSCULAR; INTRAVENOUS at 16:48

## 2019-01-18 RX ADMIN — ENOXAPARIN SODIUM 50 MG: 60 INJECTION SUBCUTANEOUS at 20:43

## 2019-01-18 RX ADMIN — Medication 1 CAPSULE: at 08:37

## 2019-01-18 RX ADMIN — IPRATROPIUM BROMIDE AND ALBUTEROL SULFATE 3 ML: .5; 3 SOLUTION RESPIRATORY (INHALATION) at 19:12

## 2019-01-18 RX ADMIN — VANCOMYCIN HYDROCHLORIDE 500 MG: 5 INJECTION, POWDER, LYOPHILIZED, FOR SOLUTION INTRAVENOUS at 13:28

## 2019-01-18 RX ADMIN — INSULIN ASPART 2 UNITS: 100 INJECTION, SOLUTION INTRAVENOUS; SUBCUTANEOUS at 06:10

## 2019-01-18 RX ADMIN — SODIUM CHLORIDE 100 ML/HR: 4.5 INJECTION, SOLUTION INTRAVENOUS at 05:40

## 2019-01-18 RX ADMIN — INSULIN ASPART 3 UNITS: 100 INJECTION, SOLUTION INTRAVENOUS; SUBCUTANEOUS at 10:48

## 2019-01-18 RX ADMIN — PROPOFOL 20 MCG/KG/MIN: 10 INJECTION, EMULSION INTRAVENOUS at 05:40

## 2019-01-18 RX ADMIN — ENOXAPARIN SODIUM 50 MG: 60 INJECTION SUBCUTANEOUS at 08:37

## 2019-01-18 RX ADMIN — SODIUM CHLORIDE, PRESERVATIVE FREE 3 ML: 5 INJECTION INTRAVENOUS at 20:44

## 2019-01-18 RX ADMIN — IPRATROPIUM BROMIDE AND ALBUTEROL SULFATE 3 ML: .5; 3 SOLUTION RESPIRATORY (INHALATION) at 13:50

## 2019-01-18 RX ADMIN — IPRATROPIUM BROMIDE AND ALBUTEROL SULFATE 3 ML: .5; 3 SOLUTION RESPIRATORY (INHALATION) at 00:33

## 2019-01-18 RX ADMIN — INSULIN ASPART 3 UNITS: 100 INJECTION, SOLUTION INTRAVENOUS; SUBCUTANEOUS at 20:43

## 2019-01-18 RX ADMIN — ASPIRIN 81 MG: 81 TABLET ORAL at 08:37

## 2019-01-18 RX ADMIN — SODIUM CHLORIDE, PRESERVATIVE FREE 10 ML: 5 INJECTION INTRAVENOUS at 08:40

## 2019-01-18 RX ADMIN — INSULIN ASPART 4 UNITS: 100 INJECTION, SOLUTION INTRAVENOUS; SUBCUTANEOUS at 16:30

## 2019-01-18 RX ADMIN — BUDESONIDE 0.5 MG: 0.5 SUSPENSION RESPIRATORY (INHALATION) at 19:13

## 2019-01-18 RX ADMIN — BUDESONIDE 0.5 MG: 0.5 SUSPENSION RESPIRATORY (INHALATION) at 06:40

## 2019-01-18 RX ADMIN — CEFEPIME 2 G: 2 INJECTION, POWDER, FOR SOLUTION INTRAVENOUS at 10:47

## 2019-01-18 RX ADMIN — SODIUM CHLORIDE 50 ML/HR: 4.5 INJECTION, SOLUTION INTRAVENOUS at 17:37

## 2019-01-18 NOTE — PROGRESS NOTES
Discharge Planning Assessment   [facility]     Patient Name: Ashley Geronimo  MRN: [id number]  Today's Date: 1/18/2019    Admit Date: 1/16/2019    Discharge Plan     Row Name 01/18/19 1343       Plan    Plan  Pt remains intubated. Pt lives at home with her two grandchildren and family plan for her to return at discharge. Pt does not currently utilize home health services or DME. SS will continue to follow and assist as needed with discharge planning.      Kristen Villagarn

## 2019-01-18 NOTE — PLAN OF CARE
Problem: Fall Risk (Adult)  Goal: Identify Related Risk Factors and Signs and Symptoms  Outcome: Ongoing (interventions implemented as appropriate)   01/17/19 0818   Fall Risk (Adult)   Related Risk Factors (Fall Risk) age-related changes;confusion/agitation;environment unfamiliar   Signs and Symptoms (Fall Risk) presence of risk factors     Goal: Absence of Fall  Outcome: Ongoing (interventions implemented as appropriate)   01/17/19 0818   Fall Risk (Adult)   Absence of Fall making progress toward outcome       Problem: Patient Care Overview  Goal: Plan of Care Review  Outcome: Ongoing (interventions implemented as appropriate)   01/18/19 0200 01/18/19 1320   Coping/Psychosocial   Plan of Care Reviewed With patient --    Plan of Care Review   Progress --  improving   OTHER   Outcome Summary --  sedation vacation completed; tolerating SBT; possibel extubation today        Problem: Skin Injury Risk (Adult)  Goal: Identify Related Risk Factors and Signs and Symptoms  Outcome: Ongoing (interventions implemented as appropriate)   01/17/19 2106   Skin Injury Risk (Adult)   Related Risk Factors (Skin Injury Risk) advanced age;critical care admission;mechanical forces       Problem: Pneumonia (Adult)  Goal: Signs and Symptoms of Listed Potential Problems Will be Absent, Minimized or Managed (Pneumonia)  Outcome: Ongoing (interventions implemented as appropriate)   01/17/19 2106   Goal/Outcome Evaluation   Problems Assessed (Pneumonia) all   Problems Present (Pneumonia) none       Problem: Ventilation, Mechanical Invasive (Adult)  Goal: Signs and Symptoms of Listed Potential Problems Will be Absent, Minimized or Managed (Ventilation, Mechanical Invasive)  Outcome: Ongoing (interventions implemented as appropriate)   01/17/19 0818   Goal/Outcome Evaluation   Problems Assessed (Mechanical Ventilation, Invasive) all   Problems Present (Mech Vent, Invasive) immobility;situational response       Problem: Diabetes, Type 2  (Adult)  Goal: Signs and Symptoms of Listed Potential Problems Will be Absent, Minimized or Managed (Diabetes, Type 2)  Outcome: Ongoing (interventions implemented as appropriate)   01/17/19 9944   Goal/Outcome Evaluation   Problems Assessed (Type 2 Diabetes) all   Problems Present (Type 2 Diabetes) hyperglycemia;situational response

## 2019-01-18 NOTE — PLAN OF CARE
Problem: Patient Care Overview  Goal: Plan of Care Review  Outcome: Ongoing (interventions implemented as appropriate)   01/17/19 2106   Coping/Psychosocial   Plan of Care Reviewed With patient   Plan of Care Review   Progress no change       Problem: Skin Injury Risk (Adult)  Goal: Identify Related Risk Factors and Signs and Symptoms  Outcome: Ongoing (interventions implemented as appropriate)   01/17/19 2106   Skin Injury Risk (Adult)   Related Risk Factors (Skin Injury Risk) advanced age;critical care admission;mechanical forces       Problem: Pneumonia (Adult)  Goal: Signs and Symptoms of Listed Potential Problems Will be Absent, Minimized or Managed (Pneumonia)   01/17/19 2106   Goal/Outcome Evaluation   Problems Assessed (Pneumonia) all   Problems Present (Pneumonia) none

## 2019-01-18 NOTE — PROGRESS NOTES
LOS: 2 days     Chief Complaint:  Pulmonology is following for ventilator management, acute hypoxic respiratory failure, severe sepsis    Subjective     Interval History:     Mrs. Geronimo remained intubated and sedated.  Appears to be resting comfortably.  On ACVC with settings of 14, 400, 30%, 5.  Saturation 90%.  Mean arterial pressure 76 without pressors.  Family members present at bedside.  Fever noted yesterday morning of 100.9.    History taken from: chart family RN    Review of Systems:     Unable to obtain review of systems due to intubation and sedation.                     Objective     Vital Signs  Temp:  [99 °F (37.2 °C)-100.2 °F (37.9 °C)] 99.1 °F (37.3 °C)  Heart Rate:  [50-88] 88  Resp:  [14-22] 14  BP: ()/() 119/44  FiO2 (%):  [30 %] 30 %  Body mass index is 25.19 kg/m².    Intake/Output Summary (Last 24 hours) at 1/18/2019 1624  Last data filed at 1/18/2019 1556  Gross per 24 hour   Intake 3586.66 ml   Output 900 ml   Net 2686.66 ml     I/O this shift:  In: 1266.1 [I.V.:803.1; Other:60; NG/GT:103; IV Piggyback:300]  Out: 450 [Urine:450]    Physical Exam:  GENERAL APPEARANCE: Intubated and sedated.  Appears to be resting comfortably in bed.     HEAD: normocephalic. Atraumatic.     EYES: PERRLA. No scleral icterus.    THROAT: ET tube in place. Oral cavity and pharynx normal. No inflammation, swelling, exudate, or lesions.     NECK: Neck supple. No thyromegaly     CARDIAC: Normal S1 and S2. No S3, S4 or murmurs. Rhythm is regular. There is no peripheral edema, cyanosis or pallor. Extremities are warm and well perfused. Capillary refill is less than 2 seconds.    RESPIRATORY: Bilateral air entry positive. Diminished breath sounds at the lung bases. No wheezing, crackles, or rhonchi upon auscultation.     GI: Positive bowel sounds. Soft, nondistended, nontender.     MUSCULOSKELETAL: No significant deformity or joint abnormality. No edema. Peripheral pulses intact.    NEUROLOGICAL: Unable to  assess due to sedation status.     PSYCHIATRIC: Unable to assess due to sedation status.                             Results Review:                I reviewed the patient's new clinical results.  I reviewed the patient's new imaging results and agree with the interpretation.  Results from last 7 days   Lab Units 01/18/19  0317 01/17/19  0028 01/16/19  1848   WBC 10*3/mm3 7.08 10.25 13.55*   HEMOGLOBIN g/dL 11.8* 12.8 13.6   PLATELETS 10*3/mm3 185 227 236     Results from last 7 days   Lab Units 01/18/19  0317 01/17/19  1540 01/17/19  1213  01/17/19  0028  01/16/19  1436   SODIUM mmol/L 141 138 142   < > 135   < > 130*   POTASSIUM mmol/L 3.9 3.9 3.9   < > 4.4   < > 4.5   CHLORIDE mmol/L 111 110 109   < > 102   < > 94*   CO2 mmol/L 19.6* 22.4* 24.2*   < > 24.0*   < > 19.4*   BUN mg/dL 37* 32* 30*   < > 26*   < > 32*   CREATININE mg/dL 1.29 1.34* 1.36*   < > 1.01   < > 1.45*   CALCIUM mg/dL 7.9 8.1 8.2   < > 8.1   < > 9.6   GLUCOSE mg/dL 138* 129* 158*   < > 125*   < > 485*   MAGNESIUM mg/dL 2.6  --   --   --  1.6*  --  2.1    < > = values in this interval not displayed.     Lab Results   Component Value Date    INR 0.93 01/16/2019    INR 0.92 01/16/2019    PROTIME 12.7 01/16/2019    PROTIME 12.5 01/16/2019     Results from last 7 days   Lab Units 01/17/19  0028 01/16/19  1436   ALK PHOS U/L 68 97   BILIRUBIN mg/dL 0.3 0.6   ALT (SGPT) U/L 12 13   AST (SGOT) U/L 22 25     Results from last 7 days   Lab Units 01/18/19  1320   PH, ARTERIAL pH units 7.310*   PO2 ART mm Hg 82.0   PCO2, ARTERIAL mm Hg 43.2   HCO3 ART mmol/L 21.3*     Imaging Results (last 24 hours)     Procedure Component Value Units Date/Time    XR Chest 1 View [[id number]] Collected:  01/18/19 0846     Updated:  01/18/19 1030    Narrative:       XR CHEST 1 VW-     HISTORY:   Central line placement; J18.9-Pneumonia, unspecified  organism; I48.91-Unspecified atrial fibrillation; I10-Essential  (primary) hypertension          COMPARISON: 01/16/2019.       TECHNIQUE: Single frontal view of the chest.     FINDINGS:     Left internal jugular central line in the superior vena cava. No  pneumothorax.  The cardiac silhouette is normal. The pulmonary vasculature is  unremarkable.  There is no evidence of an acute osseous abnormality.   There are no suspicious-appearing parenchymal soft tissue nodules.            Impression:       Central line tip in the superior vena cava. No pneumothorax.         This report was finalized on 1/18/2019 10:28 AM by Dr. Garry Benton MD.                Medication Review:   Scheduled Medications:    aspirin 81 mg Oral Daily   atorvastatin 40 mg Oral Nightly   budesonide 0.5 mg Nebulization BID - RT   cefepime 2 g Intravenous Q24H   chlorhexidine 15 mL Mouth/Throat Q12H   doxycycline 100 mg Intravenous Q12H   enoxaparin 1 mg/kg Subcutaneous Q12H   insulin aspart 0-7 Units Subcutaneous 4x Daily AC & at Bedtime   ipratropium-albuterol 3 mL Nebulization Q6H - RT   lactobacillus acidophilus 1 capsule Oral Daily   pantoprazole 40 mg Intravenous Q AM   sodium chloride 10 mL Intravenous Q12H   sodium chloride 10 mL Intravenous Q12H   sodium chloride 10 mL Intravenous Q12H   sodium chloride 3 mL Intravenous Q12H   vancomycin 500 mg Intravenous Q24H     Continuous infusions:    fentaNYL (SUBLIMAZE) PCA 1500 mcg/30 mL syringe  Last Rate: Stopped (01/18/19 0934)   Pharmacy to dose vancomycin     propofol 5-50 mcg/kg/min Last Rate: Stopped (01/18/19 1132)   sodium chloride 50 mL/hr Last Rate: 50 mL/hr (01/18/19 1048)       Assessment/Plan     Patient Active Problem List   Diagnosis Code   • Pneumonia J18.9     Assessment:  - Acute hypoxic respiratory failure, requiring mechanical ventilation  - Ventilator dependence  - Severe sepsis due to UTI  - Mild pulmonary hypertension  - History of Atrial fibrillation      Central nervous system:  Intubated and sedated.   EEG showed no epileptiform activity or electrographic seizures.  Plan   - Pain management:  fentanyl drip.  We will watch for Biot's type of breathing pattern while on high dose of opioids.  - Sedation: propofol gtt   - Started sedation holiday, with spontaneous breathing trial to follow up on awakening.  - Avoid nighttime disturbances and light exposure during night to maintain normal circadian rhythms to avoid hypoactive or hyperactive delirium      Cardiac/aorta:  Maintaining MAP of greater than 65 without pressors.  Lactate: 1.9 yesterday  Troponin trended downward to 0.173  Echo: EF of 66%, no significant valvular disease, LV normal systolic function  Plan:  - Maintain mean arterial pressure greater than 65 mmHg  - Continue aspirin, Lipitor      Respiratory:  On ACVC mode with settings of 14, 400, 30%, 5.  Ventilator graphics: reviewed.  Patient is synchronous with the mechanical ventilator.  There is no auto PEEP or leak noted.  Previous ABG showed metabolic acidosis of 7.31, 43.2, 82.0, 21.3.  Serum bicarbonate decreased at 19.6.  Plan   - Keep SaO2 greater than 90%.  - Multiple ABGs were reviewed over the shift.  Ventilator settings were changed according to ABG result.  - Started sedation vacation, with spontaneous breathing trial to follow up on awakening.  - Continue Pulmicort, cefepime, doxycycline, DuoNeb, vancomycin.      Gastrointestinal/Liver:  Route of feeding: OG tube  Plan   - Continue with PPI for ulcer prophylaxis  - Nutrition team on board.  - Advance tube feeds as tolerated to reach the goal,until extubation       Genitorenal:  Creatinine improved to 1.29.  Sodium and potassium are within normal limits.   Input/Output was net +2.66 L over the past 24 hours, with 816 mL output.   Plan   - Keep urine output near 0.5 ML per KG per hour.  - avoid nephrotoxic agent.  - Replete serum electrolytes as per ICU electrolyte replacement protocol.      Endocrine:   Glucose below 180 this morning. Phosphorus, calcium and magnesium are within normal limits.  Plan   - Keep serum glucose level less  than 180 mg/dL while in ICU.  Recommend starting insulin drip if 2 consecutive serum glucose levels are greater than 200 mg/dL.   - Replete serum calcium and phosphorous as per ICU electrolyte replacement protocol.  - Continue with insulin injections for glucose control.      Infectious disease:  WBC trending downward.  Absolute trended downward.  CRP trended upward to 5.58.  Respiratory culture showed scant growth of gram-negative bacilli.  Urine culture showed gram-negative bacilli consistent with Escherichia/Citrobacter  The cultures are pending but currently negative for growth.  Legionella and influenza antigen was negative.  Plan   - Continue cefepime, doxycycline, vancomycin.  - Pharmacy consult for monitoring antibiotic vancomycin serum levels.  - Follow pending cultures and sensitivities      Heme/Onc:  Hemoglobin decreased to 11.8.  Platelets are stable but decreased to 185.  Plan   - Goal hemoglobin is 7 g/dL unless active cardiac ischemia.  - Continue with Lovenox and SCDs for DVT prophylaxis.  - Hold anticoagulation if platelet count is less than 50,000.      Musculoskeletal:  Plan   - Turn the patient every 2 hours to prevent pressure ulcers.      Activity:   - Aggressive PT/OT while in hospital to avoid critical care neuropathy/myopathy.       Lines:   - Peripheral IVs  - Central line placed on 01/17/2019      Quality measure:  1. Elevation of the head of the bed to 30-45 degrees- yes  2. Daily sedation vacation and daily assessment of readiness to extubate-yes  3. Peptic ulcer disease prophylaxis- yes  4. Deep venous thrombosis prophylaxis- yes  5. Indwelling urinary catheter- required for accurate urine output.  6. Central venous access- central line required for venous access.      Code status: Full code       Contacts listed as Juan Geronimo (son), and Katelyn Castaneda (granddaughter).           Kaley Cutler PA-C  01/18/19  4:24 PM      Scribed for Dr. Noonan by Kaley Cutler PA-C  The  clinical plan was discussed extensively with the nurse, and respiratory therapist.     Critical Care time spent in direct patient care: 34 minutes (excluding procedure time) including high complexity decision making to assess, and support vital organ system failure in this individual who has impairment of one or more vital organ systems such that there is a high probability of imminent or life threatening deterioration in the patient’s condition.     Patient is critically ill and is at higher risk for further mortality/morbidity. Continue ICU care.        I, Quang Noonan M.D. attest that the above note accurately reflects the work and decisions made by me. Patient was seen and evaluated by me, including history of present illness, physical exam, assessment, and treatment plan.  The above note was reviewed and edited by me.        Quang Noonan M.D  Pulmonary and Critical Care Medicine

## 2019-01-18 NOTE — PROGRESS NOTES
[facility] HOSPITALIST PROGRESS NOTE     Patient Identification:  Name:  Ashley Geronimo  Age:  73 y.o.  Sex:  female  :  1945  MRN:  [id number]  Visit Number:  [id number]  ROOM: [address]     Primary Care Provider:  Silvano Parker MD    Length of stay:  2    Subjective        Chief Compliant follow-up for AMS/sepsis    Patient seen and examined this morning with ALDO Mederos at the bedside.  Grand daughter at bedside.  Currently patient is intubated and mechanically ventilated and on sedation vacation. Extubated later in the afternoon to 2LNC.  Temp of 100.2 last night but afebrile after that. no events overnight.    Objective     Current Hospital Meds:    aspirin 81 mg Oral Daily   atorvastatin 40 mg Oral Nightly   budesonide 0.5 mg Nebulization BID - RT   cefepime 2 g Intravenous Q24H   chlorhexidine 15 mL Mouth/Throat Q12H   doxycycline 100 mg Intravenous Q12H   enoxaparin 1 mg/kg Subcutaneous Q12H   insulin aspart 0-7 Units Subcutaneous 4x Daily AC & at Bedtime   ipratropium-albuterol 3 mL Nebulization Q6H - RT   lactobacillus acidophilus 1 capsule Oral Daily   pantoprazole 40 mg Intravenous Q AM   sodium chloride 10 mL Intravenous Q12H   sodium chloride 10 mL Intravenous Q12H   sodium chloride 10 mL Intravenous Q12H   sodium chloride 3 mL Intravenous Q12H   vancomycin 500 mg Intravenous Q24H       fentaNYL (SUBLIMAZE) PCA 1500 mcg/30 mL syringe  Last Rate: Stopped (19 0957)   Pharmacy to dose vancomycin     propofol 5-50 mcg/kg/min Last Rate: Stopped (19 1132)   sodium chloride 50 mL/hr Last Rate: 50 mL/hr (19 1737)     ----------------------------------------------------------------------------------------------------------------------  Vital Signs:  Temp:  [98.7 °F (37.1 °C)-100.2 °F (37.9 °C)] 98.9 °F (37.2 °C)  Heart Rate:  [48-90] 90  Resp:  [14-21] 16  BP: ()/(41-81) 157/51  FiO2 (%):  [30 %] 30 %  SpO2:  [96 %-100 %] 98 %  on  Flow (L/min):  [2] 2;    Device (Oxygen Therapy): nasal cannula  Body mass index is 25.19 kg/m².    Wt Readings from Last 3 Encounters:   01/18/19 58.5 kg (129 lb)   08/07/13 66.7 kg (147 lb 0 oz)   08/05/13 66.7 kg (147 lb 0 oz)       Intake/Output Summary (Last 24 hours) at 1/18/2019 1822  Last data filed at 1/18/2019 1556  Gross per 24 hour   Intake 3336.66 ml   Output 900 ml   Net 2436.66 ml     NPO Diet  ----------------------------------------------------------------------------------------------------------------------  Physical exam:  Constitutional:  Elderly female lying on intubated and sedated.  HENT:  Head: Normocephalic and atraumatic.  Mouth:  Moist mucous membranes.  ET +, OGT+  Eyes:   Pupils are equal, round, and reactive to light.  no nystagmus noted.   Neck:  Neck supple.  No JVD present.  trachea midline.  Cardiovascular:  Normal rate, regular rhythm.  with no murmur.  Pulmonary/Chest:  On vent, coarse breath sounds bilaterally, good air movement, no wheeze.   Abdominal:  Soft.  Bowel sounds are present.  No distension. No organomegaly.  Musculoskeletal:  1+ edema in bilateral lower ext.  No red or swollen joints anywhere.    Neurological: opening eyes but not following the commands. Intubated   Skin:  Skin is warm and dry. + erythematous rash noted in the both lower extremities, improving.No discharge seen.  No pallor.   Peripheral vascular:  1+ edema bilaterally and pulses on all 4 extremities.    ----------------------------------------------------------------------------------------------------------------------  ----------------------------------------------------------------------------------------------------------------------  Results from last 7 days   Lab Units 01/18/19  0317 01/17/19  1405 01/17/19  0853 01/17/19  0028 01/16/19  1848  01/16/19  1436   CRP mg/dL 5.58* 4.03*  --   --   --   --  1.69*   LACTATE mmol/L  --  1.9 1.6  --  2.8*   < >  --    WBC 10*3/mm3 7.08  --   --  10.25 13.55*  --  9.99    HEMOGLOBIN g/dL 11.8*  --   --  12.8 13.6  --  15.1   HEMATOCRIT % 36.2*  --   --  36.5* 41.3  --  45.1   MCV fL 91.0  --   --  86.3 90.2  --  88.8   MCHC g/dL 32.6*  --   --  35.1 32.9*  --  33.5   PLATELETS 10*3/mm3 185  --   --  227 236  --  277   INR   --   --   --   --  0.93  --  0.92    < > = values in this interval not displayed.     Results from last 7 days   Lab Units 01/18/19  0317 01/17/19  1540 01/17/19  1213  01/17/19  0028  01/16/19  1711 01/16/19  1436   SODIUM mmol/L 141 138 142   < > 135   < > 133* 130*   POTASSIUM mmol/L 3.9 3.9 3.9   < > 4.4   < > 4.3 4.5   MAGNESIUM mg/dL 2.6  --   --   --  1.6*  --   --  2.1   CHLORIDE mmol/L 111 110 109   < > 102   < > 100 94*   CO2 mmol/L 19.6* 22.4* 24.2*   < > 24.0*   < > 17.3* 19.4*   BUN mg/dL 37* 32* 30*   < > 26*   < > 29* 32*   CREATININE mg/dL 1.29 1.34* 1.36*   < > 1.01   < > 1.20 1.45*   PHOSPHORUS mg/dL  --   --   --   --  2.9  --  4.1  --    EGFR IF NONAFRICN AM mL/min/1.73 40* 39* 38*   < > 54*   < > 44* 35*   CALCIUM mg/dL 7.9 8.1 8.2   < > 8.1   < > 8.5 9.6   GLUCOSE mg/dL 138* 129* 158*   < > 125*   < > 461*  474* 485*   ALBUMIN g/dL  --   --   --   --  3.60  --   --  4.80   BILIRUBIN mg/dL  --   --   --   --  0.3  --   --  0.6   ALK PHOS U/L  --   --   --   --  68  --   --  97   AST (SGOT) U/L  --   --   --   --  22  --   --  25   ALT (SGPT) U/L  --   --   --   --  12  --   --  13    < > = values in this interval not displayed.   Estimated Creatinine Clearance: 30.6 mL/min (by C-G formula based on SCr of 1.29 mg/dL).  Results from last 7 days   Lab Units 01/17/19  0853 01/17/19  0406 01/16/19 2053   TROPONIN I ng/mL 0.173* 0.176* 0.076*     Hemoglobin A1C   Date/Time Value Ref Range Status   01/16/2019 1848 11.50 (H) 4.50 - 5.70 % Final     Glucose   Date/Time Value Ref Range Status   01/18/2019 1622 250 (H) 70 - 130 mg/dL Final   01/18/2019 1044 242 (H) 70 - 130 mg/dL Final   01/18/2019 0536 163 (H) 70 - 130 mg/dL Final   01/17/2019 1944  233 (H) 70 - 130 mg/dL Final   01/17/2019 1642 163 (H) 70 - 130 mg/dL Final   01/17/2019 1139 180 (H) 70 - 130 mg/dL Final   01/17/2019 0556 194 (H) 70 - 130 mg/dL Final   01/17/2019 0101 136 (H) 70 - 130 mg/dL Final     Ammonia   Date Value Ref Range Status   01/16/2019 46 11 - 51 umol/L Final     Results from last 7 days   Lab Units 01/18/19  0317   CHOLESTEROL mg/dL 116   TRIGLYCERIDES mg/dL 101   HDL CHOL mg/dL 43*   LDL CHOL mg/dL 53     Results from last 7 days   Lab Units 01/16/19  1604   NITRITE UA  Negative   WBC UA /HPF 31-50*   BACTERIA UA /HPF 2+*   SQUAM EPITHEL UA /HPF 0-2   URINECX  >100,000 CFU/mL Gram Negative Bacilli, Morphology consistent with Escherichia / Citrobacter*     Blood Culture   Date Value Ref Range Status   01/16/2019 No growth at 24 hours  Preliminary   01/16/2019 No growth at 24 hours  Preliminary          I have personally looked at the labs and they are summarized above.  ----------------------------------------------------------------------------------------------------------------------  Imaging Results (last 24 hours)     Procedure Component Value Units Date/Time    XR Chest 1 View [[id number]] Collected:  01/18/19 0846     Updated:  01/18/19 1030    Narrative:       XR CHEST 1 VW-     HISTORY:   Central line placement; J18.9-Pneumonia, unspecified  organism; I48.91-Unspecified atrial fibrillation; I10-Essential  (primary) hypertension          COMPARISON: 01/16/2019.      TECHNIQUE: Single frontal view of the chest.     FINDINGS:     Left internal jugular central line in the superior vena cava. No  pneumothorax.  The cardiac silhouette is normal. The pulmonary vasculature is  unremarkable.  There is no evidence of an acute osseous abnormality.   There are no suspicious-appearing parenchymal soft tissue nodules.            Impression:       Central line tip in the superior vena cava. No pneumothorax.         This report was finalized on 1/18/2019 10:28 AM by Dr. Garyr Benton,  MD. KOCH have personally reviewed the radiology images and read the final radiology report.    Assessment & Plan      Assessment:  Sepsis r/t CAP and UTI  And b/l lower extremities cellulitis   Lactic Acidosis   High Anion Gap Acute Metabolic Compensated Respiratory Acidosis   Acute Respiratory failure, S/P Intubation and mechanical ventilation   Acute metabolic encephalopathy  Possible RAMESH on CKD stage III  NSTEMI  New Onset A-Fib with RVR  Hypomagnesemia  DM type 2, non insulin dependent with hyperglycemia   Pseudohyponatremia   Indeterminate troponin   Essential HTN  Probable COPD  Tobacco abuse    Plan:  Septic secondary to UTI and possible CAP and B/L lower extremities cellulitis:on cefepime and vancomycin and doxycycline. Chest xray does not appreciate any pneumonia, CT of the chest showed Pleural-based consolidation IN the left upper lobe anteriorly, This may represent scarring, pneumonia, or less likely neoplasm.  Mycoplasma antibody, Legionella antigen and flu negative.   Respiratory Culture with gram negative bacilli. F/U BCX. And Urine cx with gram negative bacilli.  continue with IV fluids.  CRP worsening and lactate normal.    Acute metabolic encephalopathy: EEG with Moderate generalized slow. No epileptiform activity or electrographic. seizure precautions ordered. CT of the head negative. Neuro checks, monitor closley. Ammonia level is 46.  UDS and ETOH negative. Prolactin level is elevated. Ativan as needed for seizures. Will discuss with on call Neurology [facility] in am.      Acute Respiratory failure, extubated. On 2L NC. on DuoNeb.       Lactic acidosis: Resolved.      New onset A-Fib with RVR: converted to SR. CHADS2-VASc score is 4, moderate-high risk. Continue with Lovenox 50 mg twice a day. Repeat EKG with sinus rhythm. TSH is 4.095. 2-D echo with EF of 65%.     NSTEMI: On Lovenox subcutaneous twice a day full dose.  on aspirin and Lipitor.  Cardiology on board.  Plan to do ischemic  evaluation once stable. Will start on lopressor since BP is high.    Replace magnesium and monitor.    Essential HTN: monitor blood pressure closely, will resume home meds once stable.     RAMESH on CKD stage III: will do 1/2 NS @ 100ml/hr and repeat in the AM. Avoid nephrotoxic agents.      DM type 2, Non insulin dependent with mild DKA : A1c 11.5,  hyperglycemia resolved.  On subcutaneous insulin.  Acetone is negative. Monitor electrolytes.  Metabolic acidosis improving.     Pseudohyponatremia:  resolved.      *DVT prophylaxis: full dose lovenox for A-Fib   *GI prophylaxis: Protonix 40mg IV daily   *Activity: turn q2hr  *Diet: NPO, SLP to evaluate in am        Patient is high risk for the following reasons: AMS, Sepsis r/t CAP and UTI  And b/l lower extremities cellulitis, Lactic Acidosis, High Anion Gap Acute Metabolic Compensated Respiratory Acidosis   Acute Respiratory failure, S/P Intubation and mechanical ventilation     Management plan discussed in detail with the RN genesis and the grand-daughter at the bedside.  All questions were answered.      Natty Chavez MD  01/18/19  6:22 PM

## 2019-01-19 ENCOUNTER — ANCILLARY PROCEDURE (OUTPATIENT)
Dept: SPEECH THERAPY | Facility: HOSPITAL | Age: 74
End: 2019-01-19
Attending: HOSPITALIST

## 2019-01-19 LAB
ANION GAP SERPL CALCULATED.3IONS-SCNC: 13 MMOL/L (ref 3.6–11.2)
BACTERIA SPEC AEROBE CULT: ABNORMAL
BASOPHILS # BLD AUTO: 0.01 10*3/MM3 (ref 0–0.3)
BASOPHILS NFR BLD AUTO: 0.1 % (ref 0–2)
BUN BLD-MCNC: 36 MG/DL (ref 7–21)
BUN/CREAT SERPL: 34.3 (ref 7–25)
CALCIUM SPEC-SCNC: 8.3 MG/DL (ref 7.7–10)
CHLORIDE SERPL-SCNC: 105 MMOL/L (ref 99–112)
CO2 SERPL-SCNC: 20 MMOL/L (ref 24.3–31.9)
CREAT BLD-MCNC: 1.05 MG/DL (ref 0.43–1.29)
CRP SERPL-MCNC: 8.68 MG/DL (ref 0–0.99)
DEPRECATED RDW RBC AUTO: 43.5 FL (ref 37–54)
EOSINOPHIL # BLD AUTO: 0.01 10*3/MM3 (ref 0–0.7)
EOSINOPHIL NFR BLD AUTO: 0.1 % (ref 0–7)
ERYTHROCYTE [DISTWIDTH] IN BLOOD BY AUTOMATED COUNT: 13.2 % (ref 11.5–14.5)
GFR SERPL CREATININE-BSD FRML MDRD: 51 ML/MIN/1.73
GLUCOSE BLD-MCNC: 190 MG/DL (ref 70–110)
GLUCOSE BLDC GLUCOMTR-MCNC: 229 MG/DL (ref 70–130)
GLUCOSE BLDC GLUCOMTR-MCNC: 275 MG/DL (ref 70–130)
GLUCOSE BLDC GLUCOMTR-MCNC: 349 MG/DL (ref 70–130)
GLUCOSE BLDC GLUCOMTR-MCNC: 357 MG/DL (ref 70–130)
HCT VFR BLD AUTO: 37.5 % (ref 37–47)
HGB BLD-MCNC: 12 G/DL (ref 12–16)
IMM GRANULOCYTES # BLD AUTO: 0.02 10*3/MM3 (ref 0–0.03)
IMM GRANULOCYTES NFR BLD AUTO: 0.3 % (ref 0–0.5)
LYMPHOCYTES # BLD AUTO: 0.67 10*3/MM3 (ref 1–3)
LYMPHOCYTES NFR BLD AUTO: 8.5 % (ref 16–46)
MCH RBC QN AUTO: 29.5 PG (ref 27–33)
MCHC RBC AUTO-ENTMCNC: 32 G/DL (ref 33–37)
MCV RBC AUTO: 92.1 FL (ref 80–94)
MONOCYTES # BLD AUTO: 0.93 10*3/MM3 (ref 0.1–0.9)
MONOCYTES NFR BLD AUTO: 11.8 % (ref 0–12)
NEUTROPHILS # BLD AUTO: 6.24 10*3/MM3 (ref 1.4–6.5)
NEUTROPHILS NFR BLD AUTO: 79.2 % (ref 40–75)
OSMOLALITY SERPL CALC.SUM OF ELEC: 289.1 MOSM/KG (ref 273–305)
PLATELET # BLD AUTO: 182 10*3/MM3 (ref 130–400)
PMV BLD AUTO: 12.5 FL (ref 6–10)
POTASSIUM BLD-SCNC: 3.6 MMOL/L (ref 3.5–5.3)
RBC # BLD AUTO: 4.07 10*6/MM3 (ref 4.2–5.4)
SODIUM BLD-SCNC: 138 MMOL/L (ref 135–153)
WBC NRBC COR # BLD: 7.88 10*3/MM3 (ref 4.5–12.5)

## 2019-01-19 PROCEDURE — 86140 C-REACTIVE PROTEIN: CPT | Performed by: HOSPITALIST

## 2019-01-19 PROCEDURE — 82962 GLUCOSE BLOOD TEST: CPT

## 2019-01-19 PROCEDURE — 99232 SBSQ HOSP IP/OBS MODERATE 35: CPT | Performed by: INTERNAL MEDICINE

## 2019-01-19 PROCEDURE — 94799 UNLISTED PULMONARY SVC/PX: CPT

## 2019-01-19 PROCEDURE — 85025 COMPLETE CBC W/AUTO DIFF WBC: CPT | Performed by: INTERNAL MEDICINE

## 2019-01-19 PROCEDURE — 92612 ENDOSCOPY SWALLOW (FEES) VID: CPT | Performed by: SPEECH-LANGUAGE PATHOLOGIST

## 2019-01-19 PROCEDURE — 25010000002 VANCOMYCIN 5 G RECONSTITUTED SOLUTION 5,000 MG VIAL: Performed by: INTERNAL MEDICINE

## 2019-01-19 PROCEDURE — 25010000002 CEFEPIME 2 G/NS 100 ML SOLUTION: Performed by: INTERNAL MEDICINE

## 2019-01-19 PROCEDURE — 25010000002 CEFEPIME 2 G/NS 100 ML SOLUTION: Performed by: HOSPITALIST

## 2019-01-19 PROCEDURE — 99233 SBSQ HOSP IP/OBS HIGH 50: CPT | Performed by: HOSPITALIST

## 2019-01-19 PROCEDURE — 63710000001 INSULIN ASPART PER 5 UNITS: Performed by: INTERNAL MEDICINE

## 2019-01-19 PROCEDURE — 25010000002 ENOXAPARIN PER 10 MG: Performed by: HOSPITALIST

## 2019-01-19 PROCEDURE — 80048 BASIC METABOLIC PNL TOTAL CA: CPT | Performed by: HOSPITALIST

## 2019-01-19 RX ORDER — METRONIDAZOLE 250 MG/1
500 TABLET ORAL EVERY 8 HOURS SCHEDULED
Status: DISCONTINUED | OUTPATIENT
Start: 2019-01-19 | End: 2019-01-21

## 2019-01-19 RX ORDER — BUDESONIDE AND FORMOTEROL FUMARATE DIHYDRATE 160; 4.5 UG/1; UG/1
2 AEROSOL RESPIRATORY (INHALATION)
Status: DISCONTINUED | OUTPATIENT
Start: 2019-01-19 | End: 2019-01-24 | Stop reason: HOSPADM

## 2019-01-19 RX ORDER — PANTOPRAZOLE SODIUM 40 MG/1
40 TABLET, DELAYED RELEASE ORAL
Status: DISCONTINUED | OUTPATIENT
Start: 2019-01-20 | End: 2019-01-24 | Stop reason: HOSPADM

## 2019-01-19 RX ADMIN — SODIUM CHLORIDE, PRESERVATIVE FREE 3 ML: 5 INJECTION INTRAVENOUS at 10:23

## 2019-01-19 RX ADMIN — SODIUM CHLORIDE, PRESERVATIVE FREE 10 ML: 5 INJECTION INTRAVENOUS at 10:25

## 2019-01-19 RX ADMIN — VANCOMYCIN HYDROCHLORIDE 500 MG: 5 INJECTION, POWDER, LYOPHILIZED, FOR SOLUTION INTRAVENOUS at 11:45

## 2019-01-19 RX ADMIN — Medication 1 CAPSULE: at 10:23

## 2019-01-19 RX ADMIN — ENOXAPARIN SODIUM 50 MG: 60 INJECTION SUBCUTANEOUS at 20:52

## 2019-01-19 RX ADMIN — INSULIN ASPART 6 UNITS: 100 INJECTION, SOLUTION INTRAVENOUS; SUBCUTANEOUS at 20:53

## 2019-01-19 RX ADMIN — ACETAMINOPHEN 650 MG: 325 TABLET ORAL at 20:52

## 2019-01-19 RX ADMIN — IPRATROPIUM BROMIDE AND ALBUTEROL SULFATE 3 ML: .5; 3 SOLUTION RESPIRATORY (INHALATION) at 12:53

## 2019-01-19 RX ADMIN — ENOXAPARIN SODIUM 50 MG: 60 INJECTION SUBCUTANEOUS at 10:24

## 2019-01-19 RX ADMIN — BUDESONIDE 0.5 MG: 0.5 SUSPENSION RESPIRATORY (INHALATION) at 07:17

## 2019-01-19 RX ADMIN — SODIUM CHLORIDE 50 ML/HR: 4.5 INJECTION, SOLUTION INTRAVENOUS at 16:25

## 2019-01-19 RX ADMIN — METOPROLOL TARTRATE 25 MG: 50 TABLET, FILM COATED ORAL at 20:53

## 2019-01-19 RX ADMIN — IPRATROPIUM BROMIDE AND ALBUTEROL SULFATE 3 ML: .5; 3 SOLUTION RESPIRATORY (INHALATION) at 07:16

## 2019-01-19 RX ADMIN — ASPIRIN 81 MG: 81 TABLET ORAL at 10:23

## 2019-01-19 RX ADMIN — CEFEPIME 2 G: 2 INJECTION, POWDER, FOR SOLUTION INTRAVENOUS at 11:45

## 2019-01-19 RX ADMIN — IPRATROPIUM BROMIDE AND ALBUTEROL SULFATE 3 ML: .5; 3 SOLUTION RESPIRATORY (INHALATION) at 19:19

## 2019-01-19 RX ADMIN — INSULIN ASPART 4 UNITS: 100 INJECTION, SOLUTION INTRAVENOUS; SUBCUTANEOUS at 11:45

## 2019-01-19 RX ADMIN — CEFEPIME 2 G: 2 INJECTION, POWDER, FOR SOLUTION INTRAVENOUS at 23:48

## 2019-01-19 RX ADMIN — ATORVASTATIN CALCIUM 40 MG: 40 TABLET, FILM COATED ORAL at 20:52

## 2019-01-19 RX ADMIN — SODIUM CHLORIDE, PRESERVATIVE FREE 3 ML: 5 INJECTION INTRAVENOUS at 20:53

## 2019-01-19 RX ADMIN — METRONIDAZOLE 500 MG: 250 TABLET ORAL at 20:52

## 2019-01-19 RX ADMIN — SODIUM CHLORIDE, PRESERVATIVE FREE 10 ML: 5 INJECTION INTRAVENOUS at 10:26

## 2019-01-19 RX ADMIN — CHLORHEXIDINE GLUCONATE 15 ML: 1.2 RINSE ORAL at 10:25

## 2019-01-19 RX ADMIN — PANTOPRAZOLE SODIUM 40 MG: 40 INJECTION, POWDER, FOR SOLUTION INTRAVENOUS at 05:05

## 2019-01-19 RX ADMIN — METOPROLOL TARTRATE 12.5 MG: 25 TABLET, FILM COATED ORAL at 10:22

## 2019-01-19 RX ADMIN — IPRATROPIUM BROMIDE AND ALBUTEROL SULFATE 3 ML: .5; 3 SOLUTION RESPIRATORY (INHALATION) at 00:42

## 2019-01-19 RX ADMIN — INSULIN ASPART 5 UNITS: 100 INJECTION, SOLUTION INTRAVENOUS; SUBCUTANEOUS at 16:25

## 2019-01-19 NOTE — PLAN OF CARE
Problem: Fall Risk (Adult)  Goal: Identify Related Risk Factors and Signs and Symptoms  Outcome: Ongoing (interventions implemented as appropriate)    Goal: Absence of Fall  Outcome: Ongoing (interventions implemented as appropriate)      Problem: Patient Care Overview  Goal: Plan of Care Review  Outcome: Ongoing (interventions implemented as appropriate)      Problem: Skin Injury Risk (Adult)  Goal: Identify Related Risk Factors and Signs and Symptoms  Outcome: Ongoing (interventions implemented as appropriate)      Problem: Pneumonia (Adult)  Goal: Signs and Symptoms of Listed Potential Problems Will be Absent, Minimized or Managed (Pneumonia)  Outcome: Ongoing (interventions implemented as appropriate)      Problem: Diabetes, Type 2 (Adult)  Goal: Signs and Symptoms of Listed Potential Problems Will be Absent, Minimized or Managed (Diabetes, Type 2)  Outcome: Ongoing (interventions implemented as appropriate)

## 2019-01-19 NOTE — MBS/VFSS/FEES
Acute Care - Speech Language Pathology   Swallow Initial Evaluation [facility]   FLEXIBLE ENDOSCOPIC EVALUATION OF SWALLOWING     Patient Name: Ashley Geronimo  : 1945  MRN: [id number]  Today's Date: 2019      Admit Date: 2019   Ashley Geronimo is seen on CC08/1C to participate in an instrumental FEES to assess safety/efficacy of swallowing fnx, determine safest/least restrictive diet tolerance. Treva CARLSON is present to assist across this evaluation. Dr. Chavez is also present across this evaluation.      Social History     Socioeconomic History   • Marital status:      Spouse name: Not on file   • Number of children: Not on file   • Years of education: Not on file   • Highest education level: Not on file   Social Needs   • Financial resource strain: Not on file   • Food insecurity - worry: Not on file   • Food insecurity - inability: Not on file   • Transportation needs - medical: Not on file   • Transportation needs - non-medical: Not on file   Occupational History   • Not on file   Tobacco Use   • Smoking status: Never Smoker   Substance and Sexual Activity   • Alcohol use: No     Frequency: Never   • Drug use: No   • Sexual activity: Not on file   Other Topics Concern   • Not on file   Social History Narrative   • Not on file        Chest CT on 19 revealed pleural based consolidation in L upper lobe, per radiology report.     Diet Orders (active) (From admission, onward)    Start     Ordered    19 1800  Dietary Nutrition Supplements Magic Cup  Daily With Lunch & Dinner      19 1216    19 1035  Diet Pureed; Nectar / Syrup Thick; Consistent Carbohydrate  Diet Effective Now     Comments:  Meds whole in puree  Fully a/a for all po intake  1:1 assist w/ all po intake    19 1035          Currently observed on O2 via NC tolerating well w/o complications.     Risks and benefits of this procedure are explained w/ pt agreeing to participate.    Chart review, d/w RN,  Treva reveal no contraindications for this procedure, proceed per protocol    Pt is positioned upright and centered in bed to accept presentations of puree, honey thick, nectar thick, and thin liquids via cup, and straw. Pt is unable to self feed 2/2 ams. Pt declines to accept solid cracker despite max cues and prompts from SLP.    Facial/oral structures are symmetrical upon observation. Oral mucosa are moist, pink and clean. Secretions are clear, thin, and well controlled. OROM/JULIA is severely delayed/weak to imitate oral postures. Gag is not assessed. Volitional cough is intact, mildly congestive in quality, non-productive. Vocal quality is adequate in intensity, clear in quality w/ intelligible speech.     Endoscope is entered via the R nare w/o complications. Nasal mucosa are moist, pink, and clean. Secretions are clear, thin, controlled. Endoscope passes easily into the hypopharynx.     Upon entry into the hypopharynx, epiglottic resting posture is midline. Pharyngeal and laryngeal mucosa/structures are moist, pink, and clean. No pooling. Moderate diffuse edema of laryngeal structures. No significant erythema. Cued sustained breath hold and vowel prolongation reveal bilateral VF adduction. No other obvious mucosal abnormalities.     Upon po presentations, adequate bolus anticipation w/ good labial seal for bolus clearance. Bolus formation, manipulation, and mastication are moderate-severely weak w/ increased oral prep time w/ all consistencies. Transit is delayed w/o significant oral residue. Tongue base retraction and velopharyngeal seal are incomplete w/ thin liquids via cup. Saad silent aspiration occurs before the swallow w/ thin liquids via cup. Cued cough effective to diminish aspirated material. Further presentations of thin liquids deferred per observed dysphagia. No laryngeal penetration or aspiration evidenced before the swallow.     Pharyngeal swallow is delayed w/ milldy weak hyolaryngeal elevation  and delayed epilgottic inversion. Pharyngeal contraction is mildly weak w/ diffuse pharyngea residue. Re-evaluation of nectar thick liquids reveals trace penetration during the swallow, however clears w/o significant residue. No other laryngeal penetration or aspiration evidenced during or after the swallow.     Endoscope is removed w/o complications. Bed is returned to lowest floor position w/ call bell within reach. RNTreva is aware of pt status.         Visit Dx:     ICD-10-CM ICD-9-CM   1. Pneumonia of both lungs due to infectious organism, unspecified part of lung J18.9 483.8   2. Atrial fibrillation with RVR (CMS/[facility]) I48.91 427.31   3. Essential hypertension I10 401.9     Patient Active Problem List   Diagnosis   • Pneumonia     Past Medical History:   Diagnosis Date   • Diabetes mellitus (CMS/[facility])    • Hypertension      History reviewed. No pertinent surgical history.      EDUCATION  The patient has been educated in the following areas:   Dysphagia (Swallowing Impairment) Oral Care/Hydration Modified Diet Instruction.    SLP Recommendation and Plan    Impression: Per these evaluation results, Ms. Geronimo presents w/ a moderate-severe oral phase and mild-moderately impaired pharyngeal dysphagia.  Moderate diffuse edema of laryngeal structures. Bolus formation, manipulation, and mastication are moderate-severely weak w/ increased oral prep time w/ all consistencies. Saad silent aspiration occurs before the swallow w/ thin liquids via cup. Cued cough effective to diminish aspirated material. Further presentations of thin liquids deferred per observed dysphagia. However, pt's observed dysphagia felt to be likely combination of laryngeal edema as well as AMS. Pt is felt to most benefit from initiation of modified po diet of puree consistencies w/ NECTAR thick liquids only. Meds whole in puree. Crushed PRN. Upright and centered for po intake. 1:1 assist w/ all po intake.     Recommmendations:  1. Puree  consistency w/ NECTAR thick liquids only  2. Meds whole in puree. Crushed PRN  3. Upright and centered for all po intake.  4. 1:1 assist w/ all po intake  5. Universal aspiration precautions  6. JOEL precautions  7. Fully a/a for all po intake  8. No ice cream, jello, milk shakes. NO thin water or ice chips     SLP to f/u for re-evaluation.     D/w pt results and recommendations w/ verbal agreement.     D/w RNTreva, results and recommendations w/ verbal agreement.     D/w Dr. Chavez results and recommendations w/ verbal agreement.    Thank you for allowing me to participate in the care of your patient-  Estrellita Adler M.S., CCC-SLP  Plan of Care Reviewed With: patient  Plan of Care Review  Plan of Care Reviewed With: patient  Progress: improving  Outcome Summary: FEES completed this am. Saad silent aspiration occurs before the swallow w/ thin liquids. initiate modified po diet of puree consistency w/ NECTAR thick liquids. Moderate diffuse edema and AMS felt to be contribution to dysphagia. Will re-evaluate pending pt status.           Time Calculation:   Time Calculation- SLP     Row Name 01/19/19 1419             Time Calculation- SLP    SLP - Next Appointment  01/21/19  -SHARON        User Key  (r) = Recorded By, (t) = Taken By, (c) = Cosigned By    Initials Name Provider Type    Estrellita Jones MS CCC-SLP Speech Therapist          Therapy Charges for Today     Code Description Service Date Service Provider Modifiers Qty    [id number] HC ST FIBEROPTIC ENDO EVAL SWALL 8 1/19/2019 Estrellita Adler MS CCC-SLP GN 1               Estrellita Adler MS CCC-SLP  1/19/2019

## 2019-01-19 NOTE — PROGRESS NOTES
[facility] HOSPITALIST PROGRESS NOTE     Patient Identification:  Name:  Ashley Geronimo  Age:  73 y.o.  Sex:  female  :  1945  MRN:  [id number]  Visit Number:  [id number]  ROOM: [address] [place] [address]     Primary Care Provider:  Silvano Parker MD    Length of stay:  3    Subjective        Chief Compliant follow-up for AMS/sepsis    Patient seen and examined this morning with ALDO Tilley at the bedside.  No family at the bedside.        Patient was undergoing Video swallow study which she completed, noted to have aspiration and started on dysphagia diet.  Patient is alert awake and oriented to self, place limited to person and limited to time.  Answering basic questions appropriately.  Denies any chest pain shortness of breath or cough.  Denies any nausea vomiting abdominal pain.  Afebrile and no events overnight. Fio2 at 2L NC.     Objective     Current Hospital Meds:    aspirin 81 mg Oral Daily   atorvastatin 40 mg Oral Nightly   budesonide-formoterol 2 puff Inhalation BID - RT   cefepime 2 g Intravenous Q24H   chlorhexidine 15 mL Mouth/Throat Q12H   enoxaparin 1 mg/kg Subcutaneous Q12H   insulin aspart 0-7 Units Subcutaneous 4x Daily AC & at Bedtime   ipratropium-albuterol 3 mL Nebulization Q6H - RT   lactobacillus acidophilus 1 capsule Oral Daily   metoprolol tartrate 25 mg Oral Q12H   [START ON 2019] pantoprazole 40 mg Oral QAM AC   sodium chloride 10 mL Intravenous Q12H   sodium chloride 10 mL Intravenous Q12H   sodium chloride 10 mL Intravenous Q12H   sodium chloride 3 mL Intravenous Q12H       Pharmacy to dose vancomycin      ----------------------------------------------------------------------------------------------------------------------  Vital Signs:  Temp:  [97.4 °F (36.3 °C)-98.4 °F (36.9 °C)] 97.9 °F (36.6 °C)  Heart Rate:  [] 74  Resp:  [12-18] 18  BP: (116-166)/(41-66) 151/66  SpO2:  [94 %-100 %] 94 %  on  Flow (L/min):  [2] 2;   Device (Oxygen Therapy): nasal cannula  Body  mass index is 25.39 kg/m².    Wt Readings from Last 3 Encounters:   01/19/19 59 kg (130 lb)   08/07/13 66.7 kg (147 lb 0 oz)   08/05/13 66.7 kg (147 lb 0 oz)       Intake/Output Summary (Last 24 hours) at 1/19/2019 1132  Last data filed at 1/19/2019 1600  Gross per 24 hour   Intake 965 ml   Output 1200 ml   Net -235 ml     Diet Pureed; Nectar / Syrup Thick; Consistent Carbohydrate  ----------------------------------------------------------------------------------------------------------------------  Physical exam:  Constitutional:  comfortable, not in distress.   HENT:  Head: Normocephalic and atraumatic.  Mouth:  Moist mucous membranes.   Eyes:   Pupils are equal, round, and reactive to light.  no nystagmus noted.   Neck:  Neck supple.  No JVD present.  trachea midline.  Cardiovascular:  Normal rate, regular rhythm.  with no murmur.  Pulmonary/Chest:  No wheeze, coarse breath sounds in the bases.   Abdominal:  Soft.  Bowel sounds are present.  No distension. No organomegaly.  Musculoskeletal:  trace edema in bilateral lower ext.  No red or swollen joints anywhere.    Neurological:  Patient is alert awake and oriented to self, place limited to person and limited to time.  Answering basic questions appropriately. NFND.  Skin:  Skin is warm and dry. + erythematous rash noted in the both lower extremities, improving.No discharge seen.  No pallor.   Peripheral vascular:  trace edema bilaterally and pulses on all 4 extremities.    ----------------------------------------------------------------------------------------------------------------------  ----------------------------------------------------------------------------------------------------------------------  Results from last 7 days   Lab Units 01/19/19  0339 01/19/19  0315 01/18/19  0317 01/17/19  1405 01/17/19  0853 01/17/19  0028 01/16/19  1848  01/16/19  1436   CRP mg/dL  --  8.68* 5.58* 4.03*  --   --   --   --  1.69*   LACTATE mmol/L  --   --   --  1.9 1.6   --  2.8*   < >  --    WBC 10*3/mm3 7.88  --  7.08  --   --  10.25 13.55*  --  9.99   HEMOGLOBIN g/dL 12.0  --  11.8*  --   --  12.8 13.6  --  15.1   HEMATOCRIT % 37.5  --  36.2*  --   --  36.5* 41.3  --  45.1   MCV fL 92.1  --  91.0  --   --  86.3 90.2  --  88.8   MCHC g/dL 32.0*  --  32.6*  --   --  35.1 32.9*  --  33.5   PLATELETS 10*3/mm3 182  --  185  --   --  227 236  --  277   INR   --   --   --   --   --   --  0.93  --  0.92    < > = values in this interval not displayed.     Results from last 7 days   Lab Units 01/19/19  0315 01/18/19  0317 01/17/19  1540  01/17/19  0028  01/16/19  1711 01/16/19  1436   SODIUM mmol/L 138 141 138   < > 135   < > 133* 130*   POTASSIUM mmol/L 3.6 3.9 3.9   < > 4.4   < > 4.3 4.5   MAGNESIUM mg/dL  --  2.6  --   --  1.6*  --   --  2.1   CHLORIDE mmol/L 105 111 110   < > 102   < > 100 94*   CO2 mmol/L 20.0* 19.6* 22.4*   < > 24.0*   < > 17.3* 19.4*   BUN mg/dL 36* 37* 32*   < > 26*   < > 29* 32*   CREATININE mg/dL 1.05 1.29 1.34*   < > 1.01   < > 1.20 1.45*   PHOSPHORUS mg/dL  --   --   --   --  2.9  --  4.1  --    EGFR IF NONAFRICN AM mL/min/1.73 51* 40* 39*   < > 54*   < > 44* 35*   CALCIUM mg/dL 8.3 7.9 8.1   < > 8.1   < > 8.5 9.6   GLUCOSE mg/dL 190* 138* 129*   < > 125*   < > 461*  474* 485*   ALBUMIN g/dL  --   --   --   --  3.60  --   --  4.80   BILIRUBIN mg/dL  --   --   --   --  0.3  --   --  0.6   ALK PHOS U/L  --   --   --   --  68  --   --  97   AST (SGOT) U/L  --   --   --   --  22  --   --  25   ALT (SGPT) U/L  --   --   --   --  12  --   --  13    < > = values in this interval not displayed.   Estimated Creatinine Clearance: 37.8 mL/min (by C-G formula based on SCr of 1.05 mg/dL).  Results from last 7 days   Lab Units 01/17/19  0853 01/17/19  0406 01/16/19 2053   TROPONIN I ng/mL 0.173* 0.176* 0.076*     Hemoglobin A1C   Date/Time Value Ref Range Status   01/16/2019 1848 11.50 (H) 4.50 - 5.70 % Final     Glucose   Date/Time Value Ref Range Status   01/19/2019 1615  349 (H) 70 - 130 mg/dL Final   01/19/2019 1144 275 (H) 70 - 130 mg/dL Final   01/19/2019 0647 229 (H) 70 - 130 mg/dL Final   01/18/2019 1900 216 (H) 70 - 130 mg/dL Final   01/18/2019 1622 250 (H) 70 - 130 mg/dL Final   01/18/2019 1044 242 (H) 70 - 130 mg/dL Final   01/18/2019 0536 163 (H) 70 - 130 mg/dL Final   01/17/2019 1944 233 (H) 70 - 130 mg/dL Final     No results found for: AMMONIA  Results from last 7 days   Lab Units 01/18/19  0317   CHOLESTEROL mg/dL 116   TRIGLYCERIDES mg/dL 101   HDL CHOL mg/dL 43*   LDL CHOL mg/dL 53     Results from last 7 days   Lab Units 01/16/19  1604   NITRITE UA  Negative   WBC UA /HPF 31-50*   BACTERIA UA /HPF 2+*   SQUAM EPITHEL UA /HPF 0-2   URINECX  >100,000 CFU/mL Escherichia coli*     Blood Culture   Date Value Ref Range Status   01/16/2019 No growth at 24 hours  Preliminary   01/16/2019 No growth at 24 hours  Preliminary          I have personally looked at the labs and they are summarized above.  ----------------------------------------------------------------------------------------------------------------------  Imaging Results (last 24 hours)     Procedure Component Value Units Date/Time    Fiberoptic Endo (fees) [[id number]] Resulted:  01/19/19 1036     Updated:  01/19/19 1036    Narrative:       This procedure was auto-finalized with no dictation required.        I have personally reviewed the radiology images and read the final radiology report.    Assessment & Plan      Assessment:  Sepsis   E.coli and pseudomonas CAP    E.coli UTI   B/L lower extremities cellulitis  Lactic Acidosis   High Anion Gap Acute Metabolic Compensated Respiratory Acidosis   Acute Respiratory failure, S/P Intubation and mechanical ventilation   Acute metabolic encephalopathy  Possible RAMESH on CKD stage III  NSTEMI  New Onset A-Fib with RVR  Hypomagnesemia  DM type 2, non insulin dependent with hyperglycemia   Pseudohyponatremia   Indeterminate troponin   Essential HTN  Probable COPD  Tobacco  abuse    Plan:  Septic secondary to UTI and CAP and B/L lower extremities cellulitis:on cefepime, increase dose per renal function. Add flagyl for the anaerobic coverage and lactobacillus. DC vancomycin and doxycycline. Chest xray does not appreciate any pneumonia, CT of the chest showed Pleural-based consolidation IN the left upper lobe anteriorly, This may represent scarring, pneumonia, or less likely neoplasm.  Mycoplasma antibody, Legionella antigen and flu negative.   Respiratory Culture with Klebseilla and pseudomonas. BCX no growth. And Urine cx with E.coli. DC IV fluids.  CRP trending up and lactate normal. Monitor in am.    Acute metabolic encephalopathy: EEG with Moderate generalized slow. No epileptiform activity or electrographic. seizure precautions ordered. CT of the head negative. Neuro checks, monitor closley. Ammonia level is 46.  UDS and ETOH negative. Prolactin level is elevated. Ativan as needed for seizures. Will repeat prolactin level and decide accordingly.      Acute Respiratory failure, extubated. On 2L NC. on DuoNeb and symbicort.       Lactic acidosis: Resolved.      New onset A-Fib with RVR: converted to SR. CHADS2-VASc score is 4, moderate-high risk. Continue with Lovenox 50 mg twice a day. Repeat EKG with sinus rhythm. TSH is 4.095. 2-D echo with EF of 65%.     NSTEMI: On Lovenox subcutaneous twice a day full dose.  on aspirin and Lipitor.  Cardiology on board.  Plan to do stress test on monday. lopressor dose increased.    Replace magnesium and monitor.    Essential HTN: monitor blood pressure closely, will resume home meds once stable.     RAMESH on CKD stage III: will do 1/2 NS @ 100ml/hr and repeat in the AM. Avoid nephrotoxic agents.      DM type 2, Non insulin dependent with mild DKA : A1c 11.5,  hyperglycemia resolved.  On subcutaneous insulin.  Acetone is negative. Monitor electrolytes.  Metabolic acidosis improving.     Pseudohyponatremia:  resolved.      *DVT prophylaxis: full  dose lovenox for A-Fib   *GI prophylaxis: Protonix 40mg po  *Activity: as tolerated. PT/OT consult  *Diet: dysphagia diet  Transfer to telemetry        Patient is high risk for the following reasons: AMS, Sepsis r/t CAP and UTI  And b/l lower extremities cellulitis, Lactic Acidosis, High Anion Gap Acute Metabolic Compensated Respiratory Acidosis   Acute Respiratory failure, S/P Intubation and mechanical ventilation     Management plan discussed in detail with the patient and RN kelvin at the bedside.  All questions were answered.      Natty Chavez MD  01/19/19  5:52 PM

## 2019-01-19 NOTE — PLAN OF CARE
Problem: Patient Care Overview  Goal: Plan of Care Review  Outcome: Ongoing (interventions implemented as appropriate)   01/19/19 9807   Coping/Psychosocial   Plan of Care Reviewed With patient   Plan of Care Review   Progress improving   OTHER   Outcome Summary FEES completed this am. Saad silent aspiration occurs before the swallow w/ thin liquids. initiate modified po diet of puree consistency w/ NECTAR thick liquids. Moderate diffuse edema and AMS felt to be contribution to dysphagia. Will re-evaluate pending pt status.

## 2019-01-19 NOTE — PLAN OF CARE
Problem: Ventilation, Mechanical Invasive (Adult)  Goal: Signs and Symptoms of Listed Potential Problems Will be Absent, Minimized or Managed (Ventilation, Mechanical Invasive)  Outcome: Outcome(s) achieved Date Met: 01/19/19

## 2019-01-19 NOTE — PROGRESS NOTES
Patient Identification:  Name:  Ashley Geronimo  Age:  73 y.o.  Sex:  female  :  1945  MRN:  [id number]  Visit Number:  [id number]        Subjective:    Pt seen and examined.   Extubated yesterday  She denies chest pain  No SOB, no palpitations.   Vitals stable.   ----------------------------------------------------------------------------------------------------------------------  Current Hospital Meds:    aspirin 81 mg Oral Daily   atorvastatin 40 mg Oral Nightly   budesonide 0.5 mg Nebulization BID - RT   cefepime 2 g Intravenous Q24H   chlorhexidine 15 mL Mouth/Throat Q12H   enoxaparin 1 mg/kg Subcutaneous Q12H   insulin aspart 0-7 Units Subcutaneous 4x Daily AC & at Bedtime   ipratropium-albuterol 3 mL Nebulization Q6H - RT   lactobacillus acidophilus 1 capsule Oral Daily   metoprolol tartrate 12.5 mg Oral Q12H   pantoprazole 40 mg Intravenous Q AM   sodium chloride 10 mL Intravenous Q12H   sodium chloride 10 mL Intravenous Q12H   sodium chloride 10 mL Intravenous Q12H   sodium chloride 3 mL Intravenous Q12H   vancomycin 500 mg Intravenous Q24H       Pharmacy to dose vancomycin     sodium chloride 50 mL/hr Last Rate: 50 mL/hr (19 1737)     ----------------------------------------------------------------------------------------------------------------------  Vital Signs:  Temp:  [97.9 °F (36.6 °C)-98.9 °F (37.2 °C)] 97.9 °F (36.6 °C)  Heart Rate:  [] 84  Resp:  [12-21] 12  BP: (116-206)/(41-81) 159/56      19  0400 19  0400 19  0510   Weight: 59.9 kg (132 lb) 58.5 kg (129 lb) 59 kg (130 lb)     Body mass index is 25.39 kg/m².    Intake/Output Summary (Last 24 hours) at 2019 1241  Last data filed at 2019 1145  Gross per 24 hour   Intake 2031.13 ml   Output 1200 ml   Net 831.13 ml     Diet Pureed; Nectar / Syrup Thick; Consistent  Carbohydrate  ----------------------------------------------------------------------------------------------------------------------  Physical exam:  Constitutional:    HENT:  Head:  Normocephalic and atraumatic.    Eyes:  Conjunctivae and EOM are normal.  Pupils are equal, round, and reactive to light.  No scleral icterus.    Neck:  Neck supple.  No JVD present.    Cardiovascular: Normal rate, regular rhythm, S1 S2+, NO S3 / S4  Pulmonary/Chest:  Vesicular breath sounds B/L  Abdominal:  Soft.  Bowel sounds are normal.  No distension and no tenderness.      Neurological:  Alert and oriented to person, place, and time. No focal defecits  Skin:  Skin is warm and dry. No rash noted. No pallor.   Musculoskeletal:  No edema, no tenderness, and no deformity.  No red or swollen joints anywhere.   Peripheral vascular:  2+ Pulses B/L DP  ----------------------------------------------------------------------------------------------------------------------    ----------------------------------------------------------------------------------------------------------------------  Results from last 7 days   Lab Units 01/17/19  0853 01/17/19  0406 01/16/19  2053   TROPONIN I ng/mL 0.173* 0.176* 0.076*     Results from last 7 days   Lab Units 01/19/19  0339 01/19/19  0315 01/18/19  0317 01/17/19  1405 01/17/19  0853 01/17/19  0028 01/16/19  1848  01/16/19  1436   CRP mg/dL  --  8.68* 5.58* 4.03*  --   --   --   --  1.69*   LACTATE mmol/L  --   --   --  1.9 1.6  --  2.8*   < >  --    WBC 10*3/mm3 7.88  --  7.08  --   --  10.25 13.55*  --  9.99   HEMOGLOBIN g/dL 12.0  --  11.8*  --   --  12.8 13.6  --  15.1   HEMATOCRIT % 37.5  --  36.2*  --   --  36.5* 41.3  --  45.1   MCV fL 92.1  --  91.0  --   --  86.3 90.2  --  88.8   MCHC g/dL 32.0*  --  32.6*  --   --  35.1 32.9*  --  33.5   PLATELETS 10*3/mm3 182  --  185  --   --  227 236  --  277   INR   --   --   --   --   --   --  0.93  --  0.92    < > = values in this interval not  displayed.     Results from last 7 days   Lab Units 01/18/19  1320   PH, ARTERIAL pH units 7.310*   PO2 ART mm Hg 82.0   PCO2, ARTERIAL mm Hg 43.2   HCO3 ART mmol/L 21.3*     Results from last 7 days   Lab Units 01/19/19  0315 01/18/19  0317 01/17/19  1540  01/17/19  0028  01/16/19  1436   SODIUM mmol/L 138 141 138   < > 135   < > 130*   POTASSIUM mmol/L 3.6 3.9 3.9   < > 4.4   < > 4.5   MAGNESIUM mg/dL  --  2.6  --   --  1.6*  --  2.1   CHLORIDE mmol/L 105 111 110   < > 102   < > 94*   CO2 mmol/L 20.0* 19.6* 22.4*   < > 24.0*   < > 19.4*   BUN mg/dL 36* 37* 32*   < > 26*   < > 32*   CREATININE mg/dL 1.05 1.29 1.34*   < > 1.01   < > 1.45*   EGFR IF NONAFRICN AM mL/min/1.73 51* 40* 39*   < > 54*   < > 35*   CALCIUM mg/dL 8.3 7.9 8.1   < > 8.1   < > 9.6   GLUCOSE mg/dL 190* 138* 129*   < > 125*   < > 485*   ALBUMIN g/dL  --   --   --   --  3.60  --  4.80   BILIRUBIN mg/dL  --   --   --   --  0.3  --  0.6   ALK PHOS U/L  --   --   --   --  68  --  97   AST (SGOT) U/L  --   --   --   --  22  --  25   ALT (SGPT) U/L  --   --   --   --  12  --  13    < > = values in this interval not displayed.   Estimated Creatinine Clearance: 37.8 mL/min (by C-G formula based on SCr of 1.05 mg/dL).    Ammonia   Date Value Ref Range Status   01/16/2019 46 11 - 51 umol/L Final     Results from last 7 days   Lab Units 01/18/19 0317   CHOLESTEROL mg/dL 116   TRIGLYCERIDES mg/dL 101   HDL CHOL mg/dL 43*   LDL CHOL mg/dL 53     Blood Culture   Date Value Ref Range Status   01/16/2019 No growth at 2 days  Preliminary   01/16/2019 No growth at 2 days  Preliminary     Urine Culture   Date Value Ref Range Status   01/16/2019 >100,000 CFU/mL Escherichia coli (A)  Final             I have personally looked at the labs and they are summarized above.    CHADS-VASc Risk Assessment            4       Total Score        1 Hypertension    1 DM    1 Age 65-74    1 Sex: Female            Assessment:  Sepsis r/t CAP and UTI  And b/l lower extremities  cellulitis   Acute Respiratory failure, S/P Extubation  Acute metabolic encephalopathy, improving  RAMESH on CKD stage III  NSTEMI, Likely type II from stress and Hypoxemia  New Onset A-Fib with RVR on presentation, converted back to NSR and has been in sinus.   DM type 2, non insulin dependent with hyperglycemia   Essential HTN  COPD  Tobacco abuse        Plan:  In regards to her NSTEMI, her troponin were only marginally elevated in the setting of hypoxic resp failure, will get a cardiolite stress test when she her clinical condition improves, likely on Monday.   In regards to Afib, she does have high chadsvasc score of 4. Will switch her OAC after stress test if she doesn't need cardiac cath.  Increase Lopressor to 25 bid   Cont with Asa and Statins.         Serafin MD [place], [facility]  Interventional Cardiology        01/19/19  12:41 PM

## 2019-01-19 NOTE — SIGNIFICANT NOTE
Consult received. Chart reviewed. Ms. Geronimo has significant h/o diabetes, HTN, breast cancer, and CKD stage 3. Pt was intubated on 1/16/19 w/ extubation yesterday at 1342. Per chart review, hospital extubation protocol, Ms. Geronimo is felt to most benefit from instrumental FEES to effectively assess swallowing fnx, determine candidacy for po intake. D/w pt risks and benefits of instrumental FEES w/ pt verbalizing agreement to participate. D/w RN, Treva w/ no contraindications for instrumental FEES. Plan for FEES this am w/ further recs pending.     Thank you-  Estrellita Adler M.S., CCC-SLP

## 2019-01-19 NOTE — PROGRESS NOTES
Chief Complaint:  Shortness of breath    Subjective     Interval History:   She voices no major concern.  She is currently on 2 L nasal cannula saturating 94%.  No fevers overnight hemodynamically stable.      Review of Systems:   Review of Systems - History obtained from chart review and the patient  General ROS: negative for - chills, fatigue, fever, malaise, night sweats or sleep disturbance  Psychological ROS: negative for - anxiety, behavioral disorder, depression or memory difficulties  Ophthalmic ROS: negative for - blurry vision, decreased vision, double vision or dry eyes  ENT ROS: negative for - epistaxis, hearing change or sneezing  Allergy and Immunology ROS: negative for - hives, itchy/watery eyes or nasal congestion  Hematological and Lymphatic ROS: negative for - bleeding problems, blood clots, blood transfusions, jaundice or night sweats  Endocrine ROS: negative for - malaise/lethargy, mood swings, polydipsia/polyuria or temperature intolerance  Respiratory ROS: Negative for - cough, shortness of breath and wheezing  negative for - orthopnea, pleuritic pain or sputum changes  Cardiovascular ROS: no chest pain or dyspnea on exertion  Gastrointestinal ROS: no abdominal pain, change in bowel habits, or black or bloody stools  Musculoskeletal ROS: negative for - joint pain, joint stiffness or joint swelling  Neurological ROS: no TIA or stroke symptoms  Dermatological ROS: negative for acne, dry skin and eczema      Vital Signs  Temp:  [97.4 °F (36.3 °C)-98.4 °F (36.9 °C)] 97.9 °F (36.6 °C)  Heart Rate:  [] 74  Resp:  [12-18] 18  BP: (116-166)/(41-66) 151/66    Intake/Output Summary (Last 24 hours) at 1/19/2019 1707  Last data filed at 1/19/2019 1600  Gross per 24 hour   Intake 965 ml   Output 1200 ml   Net -235 ml       Physical Exam:  Constitutional:  oriented to person, place, and time. appears well-developed and well-nourished. No distress.   HENT:   Head: Normocephalic and atraumatic.   Right  Ear: External ear normal.   Left Ear: External ear normal.   Nose: Nose normal.   Eyes: Pupils are equal, round, and reactive to light. Conjunctivae and EOM are normal. Right eye exhibits no discharge. Left eye exhibits no discharge. No scleral icterus.   Neck: Normal range of motion. Neck supple. No thyromegaly present.   Cardiovascular: Normal rate, regular rhythm, normal heart sounds and intact distal pulses.  Exam reveals no friction rub.    No murmur heard.  Pulmonary/Chest: No stridor.   Bilateral air entry equal.  Decreased breath sounds in left and right posterior lower lung zones.  No rhonchi heard.  Wheezing absent.  No crackles appreciated.    Abdominal: Soft. Bowel sounds are normal.exhibits no distension. There is no tenderness.   Musculoskeletal: Normal range of motion. exhibits no deformity.   No lower extremity edema bilateral.   Neurological: alert and oriented to person, place, and time. No cranial nerve deficit. Coordination normal.   Skin: Skin is warm and dry. Capillary refill takes less than 2 seconds. not diaphoretic. No erythema.   Psychiatric:   normal mood and affect.   behavior is normal.        Results Review:   I reviewed the patient's new clinical results.  I reviewed the patient's new imaging results and agree with the interpretation.  I reviewed the patient's other test results and agree with the interpretation  Results from last 7 days   Lab Units 01/19/19  0339 01/18/19  0317 01/17/19  0028   WBC 10*3/mm3 7.88 7.08 10.25   HEMOGLOBIN g/dL 12.0 11.8* 12.8   PLATELETS 10*3/mm3 182 185 227     Results from last 7 days   Lab Units 01/19/19  0315 01/18/19  0317 01/17/19  1540  01/17/19  0028  01/16/19  1436   SODIUM mmol/L 138 141 138   < > 135   < > 130*   POTASSIUM mmol/L 3.6 3.9 3.9   < > 4.4   < > 4.5   CHLORIDE mmol/L 105 111 110   < > 102   < > 94*   CO2 mmol/L 20.0* 19.6* 22.4*   < > 24.0*   < > 19.4*   BUN mg/dL 36* 37* 32*   < > 26*   < > 32*   CREATININE mg/dL 1.05 1.29 1.34*    < > 1.01   < > 1.45*   CALCIUM mg/dL 8.3 7.9 8.1   < > 8.1   < > 9.6   GLUCOSE mg/dL 190* 138* 129*   < > 125*   < > 485*   MAGNESIUM mg/dL  --  2.6  --   --  1.6*  --  2.1    < > = values in this interval not displayed.     Lab Results   Component Value Date    INR 0.93 01/16/2019    INR 0.92 01/16/2019    PROTIME 12.7 01/16/2019    PROTIME 12.5 01/16/2019     Results from last 7 days   Lab Units 01/17/19  0028 01/16/19  1436   ALK PHOS U/L 68 97   BILIRUBIN mg/dL 0.3 0.6   ALT (SGPT) U/L 12 13   AST (SGOT) U/L 22 25     Results from last 7 days   Lab Units 01/18/19  1320   PH, ARTERIAL pH units 7.310*   PO2 ART mm Hg 82.0   PCO2, ARTERIAL mm Hg 43.2   HCO3 ART mmol/L 21.3*     Imaging Results (last 24 hours)     Procedure Component Value Units Date/Time    Fiberoptic Endo (fees) [[id number]] Resulted:  01/19/19 1036     Updated:  01/19/19 1036    Narrative:       This procedure was auto-finalized with no dictation required.                 aspirin 81 mg Oral Daily   atorvastatin 40 mg Oral Nightly   budesonide 0.5 mg Nebulization BID - RT   cefepime 2 g Intravenous Q24H   chlorhexidine 15 mL Mouth/Throat Q12H   enoxaparin 1 mg/kg Subcutaneous Q12H   insulin aspart 0-7 Units Subcutaneous 4x Daily AC & at Bedtime   ipratropium-albuterol 3 mL Nebulization Q6H - RT   lactobacillus acidophilus 1 capsule Oral Daily   metoprolol tartrate 25 mg Oral Q12H   pantoprazole 40 mg Intravenous Q AM   sodium chloride 10 mL Intravenous Q12H   sodium chloride 10 mL Intravenous Q12H   sodium chloride 10 mL Intravenous Q12H   sodium chloride 3 mL Intravenous Q12H   vancomycin 500 mg Intravenous Q24H       Pharmacy to dose vancomycin     sodium chloride 50 mL/hr Last Rate: 50 mL/hr (01/19/19 1625)       I reviewed the medications.    Assessment/Plan   I have reviewed the labs.      Assessment:  COPD, centrilobular emphysema type  Mild pulmonary hypertension  Physical deconditioning  Bilateral basal atelectasis      Plan:  - Titrate  FiO2 to keep SPO2 around 90%  - Started on Symbicort 2 puff twice daily  - Discontinue Pulmicort  - Continue on dual nebs  - Currently on Lovenox twice a day for A. fib management  - Aggressive PTOT while in hospital  - Incentive spirometer to avoid basal atelectasis  - Antibiotics as per infectious diseases  - Recommend obtaining walk oximetry before discharge  - Recommend starting the patient on Spiriva 1 puff daily on discharge    Quang Noonan MD  01/19/19  5:07 PM

## 2019-01-19 NOTE — PLAN OF CARE
Problem: Fall Risk (Adult)  Goal: Identify Related Risk Factors and Signs and Symptoms  Outcome: Ongoing (interventions implemented as appropriate)      Problem: Patient Care Overview  Goal: Plan of Care Review  Outcome: Ongoing (interventions implemented as appropriate)      Problem: Skin Injury Risk (Adult)  Goal: Identify Related Risk Factors and Signs and Symptoms  Outcome: Ongoing (interventions implemented as appropriate)      Problem: Pneumonia (Adult)  Goal: Signs and Symptoms of Listed Potential Problems Will be Absent, Minimized or Managed (Pneumonia)  Outcome: Ongoing (interventions implemented as appropriate)      Problem: Ventilation, Mechanical Invasive (Adult)  Goal: Signs and Symptoms of Listed Potential Problems Will be Absent, Minimized or Managed (Ventilation, Mechanical Invasive)  Outcome: Ongoing (interventions implemented as appropriate)

## 2019-01-20 LAB
ANION GAP SERPL CALCULATED.3IONS-SCNC: 11.2 MMOL/L (ref 3.6–11.2)
B-LACTAMASE USUAL SUSC ISLT: POSITIVE
BACTERIA SPEC RESP CULT: ABNORMAL
BUN BLD-MCNC: 38 MG/DL (ref 7–21)
BUN/CREAT SERPL: 36.9 (ref 7–25)
CALCIUM SPEC-SCNC: 8.1 MG/DL (ref 7.7–10)
CHLORIDE SERPL-SCNC: 107 MMOL/L (ref 99–112)
CO2 SERPL-SCNC: 21.8 MMOL/L (ref 24.3–31.9)
CREAT BLD-MCNC: 1.03 MG/DL (ref 0.43–1.29)
CRP SERPL-MCNC: 7.15 MG/DL (ref 0–0.99)
GFR SERPL CREATININE-BSD FRML MDRD: 52 ML/MIN/1.73
GLUCOSE BLD-MCNC: 260 MG/DL (ref 70–110)
GLUCOSE BLDC GLUCOMTR-MCNC: 255 MG/DL (ref 70–130)
GLUCOSE BLDC GLUCOMTR-MCNC: 272 MG/DL (ref 70–130)
GLUCOSE BLDC GLUCOMTR-MCNC: 281 MG/DL (ref 70–130)
GLUCOSE BLDC GLUCOMTR-MCNC: 285 MG/DL (ref 70–130)
GRAM STN SPEC: ABNORMAL
MAGNESIUM SERPL-MCNC: 1.8 MG/DL (ref 1.7–2.6)
OSMOLALITY SERPL CALC.SUM OF ELEC: 297.4 MOSM/KG (ref 273–305)
POTASSIUM BLD-SCNC: 3.7 MMOL/L (ref 3.5–5.3)
SODIUM BLD-SCNC: 140 MMOL/L (ref 135–153)

## 2019-01-20 PROCEDURE — 25010000002 CEFEPIME 2 G/NS 100 ML SOLUTION: Performed by: HOSPITALIST

## 2019-01-20 PROCEDURE — 63710000001 INSULIN ASPART PER 5 UNITS: Performed by: HOSPITALIST

## 2019-01-20 PROCEDURE — 84146 ASSAY OF PROLACTIN: CPT | Performed by: HOSPITALIST

## 2019-01-20 PROCEDURE — 94799 UNLISTED PULMONARY SVC/PX: CPT

## 2019-01-20 PROCEDURE — 25010000002 ENOXAPARIN PER 10 MG: Performed by: HOSPITALIST

## 2019-01-20 PROCEDURE — 63710000001 INSULIN ASPART PER 5 UNITS: Performed by: INTERNAL MEDICINE

## 2019-01-20 PROCEDURE — 99233 SBSQ HOSP IP/OBS HIGH 50: CPT | Performed by: HOSPITALIST

## 2019-01-20 PROCEDURE — 25010000002 MAGNESIUM SULFATE 2 GM/50ML SOLUTION: Performed by: HOSPITALIST

## 2019-01-20 PROCEDURE — 86140 C-REACTIVE PROTEIN: CPT | Performed by: HOSPITALIST

## 2019-01-20 PROCEDURE — 63710000001 INSULIN DETEMIR PER 5 UNITS: Performed by: HOSPITALIST

## 2019-01-20 PROCEDURE — 80048 BASIC METABOLIC PNL TOTAL CA: CPT | Performed by: HOSPITALIST

## 2019-01-20 PROCEDURE — 83735 ASSAY OF MAGNESIUM: CPT | Performed by: HOSPITALIST

## 2019-01-20 PROCEDURE — 99232 SBSQ HOSP IP/OBS MODERATE 35: CPT | Performed by: INTERNAL MEDICINE

## 2019-01-20 PROCEDURE — 82962 GLUCOSE BLOOD TEST: CPT

## 2019-01-20 RX ORDER — NIFEDIPINE 30 MG/1
60 TABLET, FILM COATED, EXTENDED RELEASE ORAL
Status: DISCONTINUED | OUTPATIENT
Start: 2019-01-20 | End: 2019-01-24 | Stop reason: HOSPADM

## 2019-01-20 RX ORDER — DEXTROSE MONOHYDRATE 25 G/50ML
25 INJECTION, SOLUTION INTRAVENOUS
Status: DISCONTINUED | OUTPATIENT
Start: 2019-01-20 | End: 2019-01-22

## 2019-01-20 RX ORDER — MAGNESIUM SULFATE HEPTAHYDRATE 40 MG/ML
2 INJECTION, SOLUTION INTRAVENOUS ONCE
Status: COMPLETED | OUTPATIENT
Start: 2019-01-20 | End: 2019-01-20

## 2019-01-20 RX ORDER — SODIUM BICARBONATE 650 MG/1
650 TABLET ORAL 3 TIMES DAILY
Status: COMPLETED | OUTPATIENT
Start: 2019-01-20 | End: 2019-01-21

## 2019-01-20 RX ORDER — AMOXICILLIN AND CLAVULANATE POTASSIUM 875; 125 MG/1; MG/1
1 TABLET, FILM COATED ORAL EVERY 12 HOURS SCHEDULED
Status: DISCONTINUED | OUTPATIENT
Start: 2019-01-20 | End: 2019-01-21

## 2019-01-20 RX ORDER — NICOTINE POLACRILEX 4 MG
15 LOZENGE BUCCAL
Status: DISCONTINUED | OUTPATIENT
Start: 2019-01-20 | End: 2019-01-22

## 2019-01-20 RX ADMIN — METOPROLOL TARTRATE 25 MG: 50 TABLET, FILM COATED ORAL at 21:29

## 2019-01-20 RX ADMIN — MAGNESIUM SULFATE IN WATER 2 G: 40 INJECTION, SOLUTION INTRAVENOUS at 16:35

## 2019-01-20 RX ADMIN — SODIUM CHLORIDE, PRESERVATIVE FREE 3 ML: 5 INJECTION INTRAVENOUS at 08:08

## 2019-01-20 RX ADMIN — METOPROLOL TARTRATE 25 MG: 50 TABLET, FILM COATED ORAL at 08:06

## 2019-01-20 RX ADMIN — CEFEPIME 2 G: 2 INJECTION, POWDER, FOR SOLUTION INTRAVENOUS at 11:26

## 2019-01-20 RX ADMIN — ASPIRIN 81 MG: 81 TABLET ORAL at 08:06

## 2019-01-20 RX ADMIN — SODIUM CHLORIDE, PRESERVATIVE FREE 10 ML: 5 INJECTION INTRAVENOUS at 08:08

## 2019-01-20 RX ADMIN — BUDESONIDE AND FORMOTEROL FUMARATE DIHYDRATE 2 PUFF: 160; 4.5 AEROSOL RESPIRATORY (INHALATION) at 06:16

## 2019-01-20 RX ADMIN — SODIUM BICARBONATE TAB 650 MG 650 MG: 650 TAB at 21:29

## 2019-01-20 RX ADMIN — SODIUM CHLORIDE, PRESERVATIVE FREE 10 ML: 5 INJECTION INTRAVENOUS at 08:07

## 2019-01-20 RX ADMIN — PANTOPRAZOLE SODIUM 40 MG: 40 TABLET, DELAYED RELEASE ORAL at 05:11

## 2019-01-20 RX ADMIN — AMOXICILLIN AND CLAVULANATE POTASSIUM 1 TABLET: 875; 125 TABLET, FILM COATED ORAL at 21:29

## 2019-01-20 RX ADMIN — NIFEDIPINE 60 MG: 30 TABLET, EXTENDED RELEASE ORAL at 11:26

## 2019-01-20 RX ADMIN — INSULIN ASPART 6 UNITS: 100 INJECTION, SOLUTION INTRAVENOUS; SUBCUTANEOUS at 16:35

## 2019-01-20 RX ADMIN — IPRATROPIUM BROMIDE AND ALBUTEROL SULFATE 3 ML: .5; 3 SOLUTION RESPIRATORY (INHALATION) at 06:16

## 2019-01-20 RX ADMIN — INSULIN ASPART 6 UNITS: 100 INJECTION, SOLUTION INTRAVENOUS; SUBCUTANEOUS at 21:30

## 2019-01-20 RX ADMIN — ENOXAPARIN SODIUM 50 MG: 60 INJECTION SUBCUTANEOUS at 21:30

## 2019-01-20 RX ADMIN — SODIUM BICARBONATE TAB 650 MG 650 MG: 650 TAB at 11:26

## 2019-01-20 RX ADMIN — METRONIDAZOLE 500 MG: 250 TABLET ORAL at 21:29

## 2019-01-20 RX ADMIN — INSULIN ASPART 4 UNITS: 100 INJECTION, SOLUTION INTRAVENOUS; SUBCUTANEOUS at 08:07

## 2019-01-20 RX ADMIN — INSULIN ASPART 6 UNITS: 100 INJECTION, SOLUTION INTRAVENOUS; SUBCUTANEOUS at 11:31

## 2019-01-20 RX ADMIN — IPRATROPIUM BROMIDE AND ALBUTEROL SULFATE 3 ML: .5; 3 SOLUTION RESPIRATORY (INHALATION) at 00:24

## 2019-01-20 RX ADMIN — SODIUM CHLORIDE, PRESERVATIVE FREE 10 ML: 5 INJECTION INTRAVENOUS at 21:29

## 2019-01-20 RX ADMIN — ENOXAPARIN SODIUM 50 MG: 60 INJECTION SUBCUTANEOUS at 08:07

## 2019-01-20 RX ADMIN — BUDESONIDE AND FORMOTEROL FUMARATE DIHYDRATE 2 PUFF: 160; 4.5 AEROSOL RESPIRATORY (INHALATION) at 19:14

## 2019-01-20 RX ADMIN — ATORVASTATIN CALCIUM 40 MG: 40 TABLET, FILM COATED ORAL at 21:29

## 2019-01-20 RX ADMIN — IPRATROPIUM BROMIDE AND ALBUTEROL SULFATE 3 ML: .5; 3 SOLUTION RESPIRATORY (INHALATION) at 12:53

## 2019-01-20 RX ADMIN — Medication 1 CAPSULE: at 08:06

## 2019-01-20 RX ADMIN — METRONIDAZOLE 500 MG: 250 TABLET ORAL at 14:29

## 2019-01-20 RX ADMIN — METRONIDAZOLE 500 MG: 250 TABLET ORAL at 05:11

## 2019-01-20 RX ADMIN — IPRATROPIUM BROMIDE AND ALBUTEROL SULFATE 3 ML: .5; 3 SOLUTION RESPIRATORY (INHALATION) at 19:14

## 2019-01-20 RX ADMIN — INSULIN DETEMIR 10 UNITS: 100 INJECTION, SOLUTION SUBCUTANEOUS at 21:30

## 2019-01-20 RX ADMIN — SODIUM BICARBONATE TAB 650 MG 650 MG: 650 TAB at 16:35

## 2019-01-20 NOTE — PLAN OF CARE
Problem: Fall Risk (Adult)  Goal: Identify Related Risk Factors and Signs and Symptoms  Outcome: Ongoing (interventions implemented as appropriate)    Goal: Absence of Fall  Outcome: Ongoing (interventions implemented as appropriate)      Problem: Patient Care Overview  Goal: Plan of Care Review  Outcome: Ongoing (interventions implemented as appropriate)    Goal: Individualization and Mutuality  Outcome: Ongoing (interventions implemented as appropriate)    Goal: Discharge Needs Assessment  Outcome: Ongoing (interventions implemented as appropriate)    Goal: Interprofessional Rounds/Family Conf  Outcome: Ongoing (interventions implemented as appropriate)      Problem: Skin Injury Risk (Adult)  Goal: Identify Related Risk Factors and Signs and Symptoms  Outcome: Ongoing (interventions implemented as appropriate)    Goal: Skin Health and Integrity  Outcome: Ongoing (interventions implemented as appropriate)      Problem: Pneumonia (Adult)  Goal: Signs and Symptoms of Listed Potential Problems Will be Absent, Minimized or Managed (Pneumonia)  Outcome: Ongoing (interventions implemented as appropriate)      Problem: Diabetes, Type 2 (Adult)  Goal: Signs and Symptoms of Listed Potential Problems Will be Absent, Minimized or Managed (Diabetes, Type 2)  Outcome: Ongoing (interventions implemented as appropriate)

## 2019-01-20 NOTE — PROGRESS NOTES
Patient Identification:  Name:  Ashley Geronimo  Age:  73 y.o.  Sex:  female  :  1945  MRN:  [id number]  Visit Number:  [id number]        Subjective:    Patient seen and examined today.  She denies any chest pain.  She still appears to be pleasantly confused.  She was very unhappy that she is not getting regular diet and just want to go home.  ----------------------------------------------------------------------------------------------------------------------  Current Hospital Meds:    amoxicillin-clavulanate 1 tablet Oral Q12H   aspirin 81 mg Oral Daily   atorvastatin 40 mg Oral Nightly   budesonide-formoterol 2 puff Inhalation BID - RT   cefepime 2 g Intravenous Q12H   enoxaparin 1 mg/kg Subcutaneous Q12H   insulin aspart 0-9 Units Subcutaneous 4x Daily AC & at Bedtime   insulin detemir 10 Units Subcutaneous Nightly   ipratropium-albuterol 3 mL Nebulization Q6H - RT   lactobacillus acidophilus 1 capsule Oral Daily   magnesium sulfate 2 g Intravenous Once   metoprolol tartrate 25 mg Oral Q12H   metroNIDAZOLE 500 mg Oral Q8H   NIFEdipine CC 60 mg Oral Q24H   pantoprazole 40 mg Oral QAM AC   sodium bicarbonate 650 mg Oral TID   sodium chloride 10 mL Intravenous Q12H   sodium chloride 10 mL Intravenous Q12H   sodium chloride 10 mL Intravenous Q12H   sodium chloride 3 mL Intravenous Q12H        ----------------------------------------------------------------------------------------------------------------------  Vital Signs:  Temp:  [97.4 °F (36.3 °C)-98.4 °F (36.9 °C)] 98 °F (36.7 °C)  Heart Rate:  [55-84] 80  Resp:  [18-19] 18  BP: (127-163)/(51-68) 129/51      19  0400 19  0510 19  0336   Weight: 58.5 kg (129 lb) 59 kg (130 lb) 59.5 kg (131 lb 1.6 oz)     Body mass index is 25.6 kg/m².    Intake/Output Summary (Last 24 hours) at 2019 1704  Last data filed at 2019 1303  Gross per 24 hour   Intake 340 ml   Output 500 ml   Net -160 ml     Diet Pureed; Nectar / Syrup Thick;  Consistent Carbohydrate  ----------------------------------------------------------------------------------------------------------------------  Physical exam:  Constitutional:  Awake and appeared comfortable  HENT:  Head:  Normocephalic and atraumatic.    Eyes:  Conjunctivae and EOM are normal.  Pupils are equal, round, and reactive to light.  No scleral icterus.    Neck:  Neck supple.  No JVD present.    Cardiovascular: Normal rate, regular rhythm, S1 S2+, NO S3 / S4  Pulmonary/Chest:  Vesicular breath sounds B/L  Abdominal:  Soft.  Bowel sounds are normal.  No distension and no tenderness.      Neurological:  No focal defecits  Skin:  Skin is warm and dry. No rash noted. No pallor.   Musculoskeletal:  No edema, no tenderness, and no deformity.  No red or swollen joints anywhere.   Peripheral vascular:  2+ Pulses B/L DP  ----------------------------------------------------------------------------------------------------------------------    ----------------------------------------------------------------------------------------------------------------------  Results from last 7 days   Lab Units 01/17/19  0853 01/17/19  0406 01/16/19  2053   TROPONIN I ng/mL 0.173* 0.176* 0.076*     Results from last 7 days   Lab Units 01/20/19  0525 01/19/19  0339 01/19/19  0315 01/18/19  0317 01/17/19  1405 01/17/19  0853 01/17/19  0028 01/16/19  1848  01/16/19  1436   CRP mg/dL 7.15*  --  8.68* 5.58* 4.03*  --   --   --   --  1.69*   LACTATE mmol/L  --   --   --   --  1.9 1.6  --  2.8*   < >  --    WBC 10*3/mm3  --  7.88  --  7.08  --   --  10.25 13.55*  --  9.99   HEMOGLOBIN g/dL  --  12.0  --  11.8*  --   --  12.8 13.6  --  15.1   HEMATOCRIT %  --  37.5  --  36.2*  --   --  36.5* 41.3  --  45.1   MCV fL  --  92.1  --  91.0  --   --  86.3 90.2  --  88.8   MCHC g/dL  --  32.0*  --  32.6*  --   --  35.1 32.9*  --  33.5   PLATELETS 10*3/mm3  --  182  --  185  --   --  227 236  --  277   INR   --   --   --   --   --   --   --  0.93   --  0.92    < > = values in this interval not displayed.     Results from last 7 days   Lab Units 01/18/19  1320   PH, ARTERIAL pH units 7.310*   PO2 ART mm Hg 82.0   PCO2, ARTERIAL mm Hg 43.2   HCO3 ART mmol/L 21.3*     Results from last 7 days   Lab Units 01/20/19  0525 01/19/19  0315 01/18/19  0317  01/17/19  0028  01/16/19  1436   SODIUM mmol/L 140 138 141   < > 135   < > 130*   POTASSIUM mmol/L 3.7 3.6 3.9   < > 4.4   < > 4.5   MAGNESIUM mg/dL 1.8  --  2.6  --  1.6*  --  2.1   CHLORIDE mmol/L 107 105 111   < > 102   < > 94*   CO2 mmol/L 21.8* 20.0* 19.6*   < > 24.0*   < > 19.4*   BUN mg/dL 38* 36* 37*   < > 26*   < > 32*   CREATININE mg/dL 1.03 1.05 1.29   < > 1.01   < > 1.45*   EGFR IF NONAFRICN AM mL/min/1.73 52* 51* 40*   < > 54*   < > 35*   CALCIUM mg/dL 8.1 8.3 7.9   < > 8.1   < > 9.6   GLUCOSE mg/dL 260* 190* 138*   < > 125*   < > 485*   ALBUMIN g/dL  --   --   --   --  3.60  --  4.80   BILIRUBIN mg/dL  --   --   --   --  0.3  --  0.6   ALK PHOS U/L  --   --   --   --  68  --  97   AST (SGOT) U/L  --   --   --   --  22  --  25   ALT (SGPT) U/L  --   --   --   --  12  --  13    < > = values in this interval not displayed.   Estimated Creatinine Clearance: 38.7 mL/min (by C-G formula based on SCr of 1.03 mg/dL).    No results found for: AMMONIA  Results from last 7 days   Lab Units 01/18/19  0317   CHOLESTEROL mg/dL 116   TRIGLYCERIDES mg/dL 101   HDL CHOL mg/dL 43*   LDL CHOL mg/dL 53     Blood Culture   Date Value Ref Range Status   01/16/2019 No growth at 4 days  Preliminary   01/16/2019 No growth at 4 days  Preliminary     Urine Culture   Date Value Ref Range Status   01/16/2019 >100,000 CFU/mL Escherichia coli (A)  Final             I have personally looked at the labs and they are summarized above.  ----------------------------------------------------------------------------------------------------------------------  Imaging Results (last 24 hours)     ** No results found for the last 24 hours. **         ----------------------------------------------------------------------------------------------------------------------    Assessment and Plan:    Mild troponin elevation, no EKG changes, no clinical symptoms of chest pain, echo showed normal LVEF with no wall motion abnormalities.   She can be discharged home much she can follow-up with me as an outpatient and will schedule an outpatient stress test for ischemia evaluation.  Regarding her paroxysmal A. fib, she has maintained a sinus rhythm since that one episode, would continue with the current the AV fabian blocking agents including metoprolol and nifedipine, she needs a oral anticoagulation, would recommend eliquis 5 twice a day upon discharge.  Will sign off please call me for any questions.        Serafin Reyez MD, [facility]  Interventional Cardiology        01/20/19  5:04 PM

## 2019-01-20 NOTE — PLAN OF CARE
Problem: Fall Risk (Adult)  Goal: Identify Related Risk Factors and Signs and Symptoms  Outcome: Ongoing (interventions implemented as appropriate)    Goal: Absence of Fall  Outcome: Ongoing (interventions implemented as appropriate)      Problem: Patient Care Overview  Goal: Plan of Care Review  Outcome: Ongoing (interventions implemented as appropriate)    Goal: Individualization and Mutuality  Outcome: Ongoing (interventions implemented as appropriate)    Goal: Discharge Needs Assessment  Outcome: Ongoing (interventions implemented as appropriate)      Problem: Skin Injury Risk (Adult)  Goal: Identify Related Risk Factors and Signs and Symptoms  Outcome: Ongoing (interventions implemented as appropriate)

## 2019-01-20 NOTE — PROGRESS NOTES
[facility] HOSPITALIST PROGRESS NOTE     Patient Identification:  Name:  Ashley Geronimo  Age:  73 y.o.  Sex:  female  :  1945  MRN:  [id number]  Visit Number:  [id number]  ROOM: [address] [place] [address]     Primary Care Provider:  Silvano Parker MD    Length of stay:  4    Subjective        Chief Compliant follow-up for AMS/sepsis    Patient seen and examined this morning no family at the bedside. Patient is more alert awake and oriented to self, place, person and limited to time.  Her speech is slow but appropriate and comprehensible.  States that she is still not back to her baseline.  Answering basic questions appropriately and following.  Denies any chest pain shortness of breath or cough.  Denies any nausea vomiting abdominal pain.  Would like to get out of bed. Afebrile and no events overnight. Fio2 at 2L NC.     Objective     Current Hospital Meds:    aspirin 81 mg Oral Daily   atorvastatin 40 mg Oral Nightly   budesonide-formoterol 2 puff Inhalation BID - RT   cefepime 2 g Intravenous Q12H   enoxaparin 1 mg/kg Subcutaneous Q12H   insulin aspart 0-9 Units Subcutaneous 4x Daily AC & at Bedtime   insulin detemir 10 Units Subcutaneous Nightly   ipratropium-albuterol 3 mL Nebulization Q6H - RT   lactobacillus acidophilus 1 capsule Oral Daily   metoprolol tartrate 25 mg Oral Q12H   metroNIDAZOLE 500 mg Oral Q8H   NIFEdipine CC 60 mg Oral Q24H   pantoprazole 40 mg Oral QAM AC   sodium bicarbonate 650 mg Oral TID   sodium chloride 10 mL Intravenous Q12H   sodium chloride 10 mL Intravenous Q12H   sodium chloride 10 mL Intravenous Q12H   sodium chloride 3 mL Intravenous Q12H        ----------------------------------------------------------------------------------------------------------------------  Vital Signs:  Temp:  [97.4 °F (36.3 °C)-98.4 °F (36.9 °C)] 98 °F (36.7 °C)  Heart Rate:  [55-84] 73  Resp:  [18-19] 18  BP: (127-163)/(47-68) 127/56  SpO2:  [91 %-99 %] 91 %  on  Flow (L/min):  [2] 2;   Device  (Oxygen Therapy): nasal cannula  Body mass index is 25.6 kg/m².    Wt Readings from Last 3 Encounters:   01/20/19 59.5 kg (131 lb 1.6 oz)   08/07/13 66.7 kg (147 lb 0 oz)   08/05/13 66.7 kg (147 lb 0 oz)       Intake/Output Summary (Last 24 hours) at 1/20/2019 1453  Last data filed at 1/20/2019 0900  Gross per 24 hour   Intake 340 ml   Output 950 ml   Net -610 ml     Diet Pureed; Nectar / Syrup Thick; Consistent Carbohydrate  NPO Diet  ----------------------------------------------------------------------------------------------------------------------  Physical exam:  Constitutional:  comfortable, not in distress.   HENT:  Head: Normocephalic and atraumatic.  Mouth:  Moist mucous membranes.   Eyes:   Pupils are equal, round, and reactive to light.  no nystagmus noted.   Neck:  Neck supple.  No JVD present.  trachea midline.  Cardiovascular:  Normal rate, regular rhythm.  with no murmur.  Pulmonary/Chest:  No wheeze, coarse breath sounds in the bases.   Abdominal:  Soft.  Bowel sounds are present.  No distension. No organomegaly.  Musculoskeletal:  trace edema in bilateral lower ext.  No red or swollen joints anywhere.    Neurological:  Patient is more alert awake and oriented to self, place, person and limited to time.  Answering basic questions appropriately.  Her speech is slow but appropriate and comprehensible. NFND.  Skin:  Skin is warm and dry. + erythematous rash noted in the both lower extremities, improving.No discharge seen.  No pallor.   Peripheral vascular:  trace edema bilaterally and pulses on all 4 extremities.    ----------------------------------------------------------------------------------------------------------------------  ----------------------------------------------------------------------------------------------------------------------  Results from last 7 days   Lab Units 01/20/19  0525 01/19/19  0339 01/19/19  0315 01/18/19  0317 01/17/19  1405 01/17/19  0853 01/17/19  0028  01/16/19  1848  01/16/19  1436   CRP mg/dL 7.15*  --  8.68* 5.58* 4.03*  --   --   --   --  1.69*   LACTATE mmol/L  --   --   --   --  1.9 1.6  --  2.8*   < >  --    WBC 10*3/mm3  --  7.88  --  7.08  --   --  10.25 13.55*  --  9.99   HEMOGLOBIN g/dL  --  12.0  --  11.8*  --   --  12.8 13.6  --  15.1   HEMATOCRIT %  --  37.5  --  36.2*  --   --  36.5* 41.3  --  45.1   MCV fL  --  92.1  --  91.0  --   --  86.3 90.2  --  88.8   MCHC g/dL  --  32.0*  --  32.6*  --   --  35.1 32.9*  --  33.5   PLATELETS 10*3/mm3  --  182  --  185  --   --  227 236  --  277   INR   --   --   --   --   --   --   --  0.93  --  0.92    < > = values in this interval not displayed.     Results from last 7 days   Lab Units 01/20/19  0525 01/19/19  0315 01/18/19  0317  01/17/19  0028  01/16/19  1711 01/16/19  1436   SODIUM mmol/L 140 138 141   < > 135   < > 133* 130*   POTASSIUM mmol/L 3.7 3.6 3.9   < > 4.4   < > 4.3 4.5   MAGNESIUM mg/dL 1.8  --  2.6  --  1.6*  --   --  2.1   CHLORIDE mmol/L 107 105 111   < > 102   < > 100 94*   CO2 mmol/L 21.8* 20.0* 19.6*   < > 24.0*   < > 17.3* 19.4*   BUN mg/dL 38* 36* 37*   < > 26*   < > 29* 32*   CREATININE mg/dL 1.03 1.05 1.29   < > 1.01   < > 1.20 1.45*   PHOSPHORUS mg/dL  --   --   --   --  2.9  --  4.1  --    EGFR IF NONAFRICN AM mL/min/1.73 52* 51* 40*   < > 54*   < > 44* 35*   CALCIUM mg/dL 8.1 8.3 7.9   < > 8.1   < > 8.5 9.6   GLUCOSE mg/dL 260* 190* 138*   < > 125*   < > 461*  474* 485*   ALBUMIN g/dL  --   --   --   --  3.60  --   --  4.80   BILIRUBIN mg/dL  --   --   --   --  0.3  --   --  0.6   ALK PHOS U/L  --   --   --   --  68  --   --  97   AST (SGOT) U/L  --   --   --   --  22  --   --  25   ALT (SGPT) U/L  --   --   --   --  12  --   --  13    < > = values in this interval not displayed.   Estimated Creatinine Clearance: 38.7 mL/min (by C-G formula based on SCr of 1.03 mg/dL).  Results from last 7 days   Lab Units 01/17/19  0853 01/17/19  0406 01/16/19  4048   TROPONIN I ng/mL 0.173*  0.176* 0.076*     Glucose   Date/Time Value Ref Range Status   01/20/2019 1126 281 (H) 70 - 130 mg/dL Final   01/20/2019 0732 285 (H) 70 - 130 mg/dL Final   01/19/2019 1943 357 (H) 70 - 130 mg/dL Final   01/19/2019 1615 349 (H) 70 - 130 mg/dL Final   01/19/2019 1144 275 (H) 70 - 130 mg/dL Final   01/19/2019 0647 229 (H) 70 - 130 mg/dL Final   01/18/2019 1900 216 (H) 70 - 130 mg/dL Final   01/18/2019 1622 250 (H) 70 - 130 mg/dL Final     No results found for: AMMONIA  Results from last 7 days   Lab Units 01/18/19  0317   CHOLESTEROL mg/dL 116   TRIGLYCERIDES mg/dL 101   HDL CHOL mg/dL 43*   LDL CHOL mg/dL 53     Results from last 7 days   Lab Units 01/16/19  1604   NITRITE UA  Negative   WBC UA /HPF 31-50*   BACTERIA UA /HPF 2+*   SQUAM EPITHEL UA /HPF 0-2   URINECX  >100,000 CFU/mL Escherichia coli*     Blood Culture   Date Value Ref Range Status   01/16/2019 No growth at 24 hours  Preliminary   01/16/2019 No growth at 24 hours  Preliminary          I have personally looked at the labs and they are summarized above.  ----------------------------------------------------------------------------------------------------------------------  Imaging Results (last 24 hours)     ** No results found for the last 24 hours. **        I have personally reviewed the radiology images and read the final radiology report.    Assessment & Plan      Assessment:  Sepsis   [place], pseudomonas and Hinfluezae CAP    E.coli UTI   B/L lower extremities cellulitis  Lactic Acidosis   High Anion Gap Acute Metabolic Compensated Respiratory Acidosis   Acute Respiratory failure, S/P Intubation and mechanical ventilation   Acute metabolic encephalopathy  Possible RAMESH on CKD stage III  NSTEMI  New Onset A-Fib with RVR  Hypomagnesemia  DM type 2, non insulin dependent with hyperglycemia   Pseudohyponatremia   Indeterminate troponin   Essential HTN  Probable COPD  Tobacco abuse    Plan:  Septic secondary to UTI and CAP and B/L lower extremities  cellulitis:on cefepime and flagyl for the anaerobic coverage and lactobacillus. CT of the chest showed Pleural-based consolidation IN the left upper lobe anteriorly, This may represent scarring, pneumonia, or less likely neoplasm.  Mycoplasma antibody, Legionella antigen and flu negative.   Respiratory Culture final with Klebseilla and pseudomonas and H. Influenzae type 2. Start on Augmentin and consult ID since polymicrobial pneumonia. BCX no growth. And Urine cx with E.coli. CRP trending down and lactate normal. Monitor in am.    Acute metabolic encephalopathy: EEG with Moderate generalized slow. No epileptiform activity or electrographic. seizure precautions ordered. CT of the head negative. Neuro checks, monitor closley. Ammonia level is 46.  UDS and ETOH negative. Prolactin level is elevated. Ativan as needed for seizures. F/U repeat prolactin level and decide accordingly.       Acute Respiratory failure, extubated. On 2L NC. on DuoNeb and symbicort.       Lactic acidosis: Resolved.      New onset A-Fib with RVR: converted to SR. CHADS2-VASc score is 4, moderate-high risk. Continue with Lovenox 50 mg twice a day. Repeat EKG with sinus rhythm. TSH is 4.095. 2-D echo with EF of 65%.     NSTEMI: On Lovenox subcutaneous twice a day full dose.  on aspirin and Lipitor and BB.  Cardiology on board. Per cardiology, Dr. Ventura, patient can be seen as an OP and have the ischemic evalution done once more stable. Oral anticoagulation per cardiology.     Replace magnesium and monitor.    Essential HTN: on lopressor and resume nifedipe. monitor blood pressure closely.     RAMESH on CKD stage III: resolved. Avoid nephrotoxic agents.   Metabolic acidosis. Start on the po bicarbonate.      DM type 2, Non insulin dependent with mild DKA : A1c 11.5,  hyperglycemia. On moderate dose SS insulin. Start levemir.      Pseudohyponatremia:  resolved.   DC hernandez  Breast US to be done as an OP for evaluation of the left breast since h/o L  breast cancer.     *DVT prophylaxis: full dose lovenox for A-Fib   *GI prophylaxis: Protonix 40mg po  *Activity: as tolerated. PT/OT consult  *Diet: dysphagia diet     Patient is high risk for the following reasons: AMS, Sepsis r/t CAP and UTI  And b/l lower extremities cellulitis, Lactic Acidosis, High Anion Gap Acute Metabolic Compensated Respiratory Acidosis   Acute Respiratory failure, S/P Intubation and mechanical ventilation     Management plan discussed in detail with the patient and RN kelvin at the bedside.  All questions were answered.      Natty Chavez MD  01/20/19  2:53 PM

## 2019-01-21 ENCOUNTER — APPOINTMENT (OUTPATIENT)
Dept: GENERAL RADIOLOGY | Facility: HOSPITAL | Age: 74
End: 2019-01-21

## 2019-01-21 PROBLEM — J15.1 PSEUDOMONAS PNEUMONIA (HCC): Status: ACTIVE | Noted: 2019-01-21

## 2019-01-21 PROBLEM — R65.21 SEPTIC SHOCK (HCC): Status: ACTIVE | Noted: 2019-01-21

## 2019-01-21 PROBLEM — A41.9 SEPTIC SHOCK (HCC): Status: ACTIVE | Noted: 2019-01-21

## 2019-01-21 LAB
ANION GAP SERPL CALCULATED.3IONS-SCNC: 6.5 MMOL/L (ref 3.6–11.2)
BACTERIA SPEC AEROBE CULT: NORMAL
BACTERIA SPEC AEROBE CULT: NORMAL
BASOPHILS # BLD AUTO: 0.02 10*3/MM3 (ref 0–0.3)
BASOPHILS NFR BLD AUTO: 0.4 % (ref 0–2)
BUN BLD-MCNC: 29 MG/DL (ref 7–21)
BUN/CREAT SERPL: 35.8 (ref 7–25)
CALCIUM SPEC-SCNC: 8.3 MG/DL (ref 7.7–10)
CHLORIDE SERPL-SCNC: 108 MMOL/L (ref 99–112)
CO2 SERPL-SCNC: 26.5 MMOL/L (ref 24.3–31.9)
CREAT BLD-MCNC: 0.81 MG/DL (ref 0.43–1.29)
CRP SERPL-MCNC: 4.64 MG/DL (ref 0–0.99)
DEPRECATED RDW RBC AUTO: 41.7 FL (ref 37–54)
EOSINOPHIL # BLD AUTO: 0.14 10*3/MM3 (ref 0–0.7)
EOSINOPHIL NFR BLD AUTO: 2.5 % (ref 0–7)
ERYTHROCYTE [DISTWIDTH] IN BLOOD BY AUTOMATED COUNT: 13 % (ref 11.5–14.5)
GFR SERPL CREATININE-BSD FRML MDRD: 69 ML/MIN/1.73
GLUCOSE BLD-MCNC: 129 MG/DL (ref 70–110)
GLUCOSE BLDC GLUCOMTR-MCNC: 180 MG/DL (ref 70–130)
GLUCOSE BLDC GLUCOMTR-MCNC: 420 MG/DL (ref 70–130)
GLUCOSE BLDC GLUCOMTR-MCNC: 480 MG/DL (ref 70–130)
GLUCOSE BLDC GLUCOMTR-MCNC: 98 MG/DL (ref 70–130)
HCT VFR BLD AUTO: 38.6 % (ref 37–47)
HGB BLD-MCNC: 12.7 G/DL (ref 12–16)
IMM GRANULOCYTES # BLD AUTO: 0.03 10*3/MM3 (ref 0–0.03)
IMM GRANULOCYTES NFR BLD AUTO: 0.5 % (ref 0–0.5)
LYMPHOCYTES # BLD AUTO: 1.01 10*3/MM3 (ref 1–3)
LYMPHOCYTES NFR BLD AUTO: 18 % (ref 16–46)
MCH RBC QN AUTO: 29.3 PG (ref 27–33)
MCHC RBC AUTO-ENTMCNC: 32.9 G/DL (ref 33–37)
MCV RBC AUTO: 89.1 FL (ref 80–94)
MONOCYTES # BLD AUTO: 0.74 10*3/MM3 (ref 0.1–0.9)
MONOCYTES NFR BLD AUTO: 13.2 % (ref 0–12)
NEUTROPHILS # BLD AUTO: 3.68 10*3/MM3 (ref 1.4–6.5)
NEUTROPHILS NFR BLD AUTO: 65.4 % (ref 40–75)
OSMOLALITY SERPL CALC.SUM OF ELEC: 288.8 MOSM/KG (ref 273–305)
PLATELET # BLD AUTO: 219 10*3/MM3 (ref 130–400)
PMV BLD AUTO: 11.8 FL (ref 6–10)
POTASSIUM BLD-SCNC: 3.4 MMOL/L (ref 3.5–5.3)
PROLACTIN SERPL-MCNC: 16.7 NG/ML (ref 4.8–23.3)
RBC # BLD AUTO: 4.33 10*6/MM3 (ref 4.2–5.4)
SODIUM BLD-SCNC: 141 MMOL/L (ref 135–153)
WBC NRBC COR # BLD: 5.62 10*3/MM3 (ref 4.5–12.5)

## 2019-01-21 PROCEDURE — 94799 UNLISTED PULMONARY SVC/PX: CPT

## 2019-01-21 PROCEDURE — 85025 COMPLETE CBC W/AUTO DIFF WBC: CPT | Performed by: HOSPITALIST

## 2019-01-21 PROCEDURE — 74230 X-RAY XM SWLNG FUNCJ C+: CPT | Performed by: RADIOLOGY

## 2019-01-21 PROCEDURE — 82962 GLUCOSE BLOOD TEST: CPT

## 2019-01-21 PROCEDURE — 80048 BASIC METABOLIC PNL TOTAL CA: CPT | Performed by: HOSPITALIST

## 2019-01-21 PROCEDURE — 97162 PT EVAL MOD COMPLEX 30 MIN: CPT

## 2019-01-21 PROCEDURE — 63710000001 INSULIN ASPART PER 5 UNITS: Performed by: HOSPITALIST

## 2019-01-21 PROCEDURE — 94640 AIRWAY INHALATION TREATMENT: CPT

## 2019-01-21 PROCEDURE — 92526 ORAL FUNCTION THERAPY: CPT

## 2019-01-21 PROCEDURE — 86140 C-REACTIVE PROTEIN: CPT | Performed by: HOSPITALIST

## 2019-01-21 PROCEDURE — 25010000002 CEFEPIME 2 G/NS 100 ML SOLUTION: Performed by: HOSPITALIST

## 2019-01-21 PROCEDURE — 63710000001 INSULIN DETEMIR PER 5 UNITS: Performed by: HOSPITALIST

## 2019-01-21 PROCEDURE — 92611 MOTION FLUOROSCOPY/SWALLOW: CPT

## 2019-01-21 PROCEDURE — 99232 SBSQ HOSP IP/OBS MODERATE 35: CPT | Performed by: INTERNAL MEDICINE

## 2019-01-21 PROCEDURE — 97167 OT EVAL HIGH COMPLEX 60 MIN: CPT

## 2019-01-21 PROCEDURE — 74230 X-RAY XM SWLNG FUNCJ C+: CPT

## 2019-01-21 PROCEDURE — 97530 THERAPEUTIC ACTIVITIES: CPT

## 2019-01-21 PROCEDURE — 25010000002 ENOXAPARIN PER 10 MG: Performed by: HOSPITALIST

## 2019-01-21 RX ORDER — WARFARIN SODIUM 5 MG/1
5 TABLET ORAL
Status: COMPLETED | OUTPATIENT
Start: 2019-01-21 | End: 2019-01-21

## 2019-01-21 RX ADMIN — SODIUM BICARBONATE TAB 650 MG 650 MG: 650 TAB at 15:23

## 2019-01-21 RX ADMIN — INSULIN DETEMIR 10 UNITS: 100 INJECTION, SOLUTION SUBCUTANEOUS at 20:29

## 2019-01-21 RX ADMIN — Medication 1 CAPSULE: at 08:49

## 2019-01-21 RX ADMIN — SODIUM CHLORIDE, PRESERVATIVE FREE 10 ML: 5 INJECTION INTRAVENOUS at 08:47

## 2019-01-21 RX ADMIN — SODIUM CHLORIDE, PRESERVATIVE FREE 10 ML: 5 INJECTION INTRAVENOUS at 20:28

## 2019-01-21 RX ADMIN — PANTOPRAZOLE SODIUM 40 MG: 40 TABLET, DELAYED RELEASE ORAL at 08:49

## 2019-01-21 RX ADMIN — INSULIN ASPART 2 UNITS: 100 INJECTION, SOLUTION INTRAVENOUS; SUBCUTANEOUS at 11:33

## 2019-01-21 RX ADMIN — SODIUM BICARBONATE TAB 650 MG 650 MG: 650 TAB at 08:49

## 2019-01-21 RX ADMIN — IPRATROPIUM BROMIDE AND ALBUTEROL SULFATE 3 ML: .5; 3 SOLUTION RESPIRATORY (INHALATION) at 06:57

## 2019-01-21 RX ADMIN — PANTOPRAZOLE SODIUM 40 MG: 40 TABLET, DELAYED RELEASE ORAL at 05:38

## 2019-01-21 RX ADMIN — BUDESONIDE AND FORMOTEROL FUMARATE DIHYDRATE 2 PUFF: 160; 4.5 AEROSOL RESPIRATORY (INHALATION) at 06:57

## 2019-01-21 RX ADMIN — IPRATROPIUM BROMIDE AND ALBUTEROL SULFATE 3 ML: .5; 3 SOLUTION RESPIRATORY (INHALATION) at 13:23

## 2019-01-21 RX ADMIN — AMOXICILLIN AND CLAVULANATE POTASSIUM 1 TABLET: 875; 125 TABLET, FILM COATED ORAL at 08:49

## 2019-01-21 RX ADMIN — BUDESONIDE AND FORMOTEROL FUMARATE DIHYDRATE 2 PUFF: 160; 4.5 AEROSOL RESPIRATORY (INHALATION) at 18:34

## 2019-01-21 RX ADMIN — IPRATROPIUM BROMIDE AND ALBUTEROL SULFATE 3 ML: .5; 3 SOLUTION RESPIRATORY (INHALATION) at 18:34

## 2019-01-21 RX ADMIN — METOPROLOL TARTRATE 25 MG: 50 TABLET, FILM COATED ORAL at 20:27

## 2019-01-21 RX ADMIN — SODIUM CHLORIDE, PRESERVATIVE FREE 3 ML: 5 INJECTION INTRAVENOUS at 08:47

## 2019-01-21 RX ADMIN — WARFARIN SODIUM 5 MG: 5 TABLET ORAL at 15:23

## 2019-01-21 RX ADMIN — SODIUM CHLORIDE, PRESERVATIVE FREE 3 ML: 5 INJECTION INTRAVENOUS at 20:28

## 2019-01-21 RX ADMIN — METOPROLOL TARTRATE 25 MG: 50 TABLET, FILM COATED ORAL at 08:49

## 2019-01-21 RX ADMIN — METRONIDAZOLE 500 MG: 250 TABLET ORAL at 05:38

## 2019-01-21 RX ADMIN — INSULIN ASPART 9 UNITS: 100 INJECTION, SOLUTION INTRAVENOUS; SUBCUTANEOUS at 16:31

## 2019-01-21 RX ADMIN — ENOXAPARIN SODIUM 50 MG: 60 INJECTION SUBCUTANEOUS at 08:48

## 2019-01-21 RX ADMIN — INSULIN ASPART 9 UNITS: 100 INJECTION, SOLUTION INTRAVENOUS; SUBCUTANEOUS at 20:27

## 2019-01-21 RX ADMIN — ENOXAPARIN SODIUM 60 MG: 60 INJECTION SUBCUTANEOUS at 20:27

## 2019-01-21 RX ADMIN — ATORVASTATIN CALCIUM 40 MG: 40 TABLET, FILM COATED ORAL at 20:27

## 2019-01-21 RX ADMIN — SODIUM BICARBONATE TAB 650 MG 650 MG: 650 TAB at 20:27

## 2019-01-21 RX ADMIN — CEFEPIME 2 G: 2 INJECTION, POWDER, FOR SOLUTION INTRAVENOUS at 11:32

## 2019-01-21 RX ADMIN — NIFEDIPINE 60 MG: 30 TABLET, EXTENDED RELEASE ORAL at 08:49

## 2019-01-21 RX ADMIN — ASPIRIN 81 MG: 81 TABLET ORAL at 08:49

## 2019-01-21 RX ADMIN — METRONIDAZOLE 500 MG: 250 TABLET ORAL at 11:33

## 2019-01-21 RX ADMIN — IPRATROPIUM BROMIDE AND ALBUTEROL SULFATE 3 ML: .5; 3 SOLUTION RESPIRATORY (INHALATION) at 00:14

## 2019-01-21 NOTE — PROGRESS NOTES
Acute Care - Speech Language Pathology   Swallow Re-Assessment [facility]     Patient Name: Ashley Geronimo  : 1945  MRN: [id number]  Today's Date: 2019             Admit Date: 2019    Visit Dx:     ICD-10-CM ICD-9-CM   1. Pneumonia of both lungs due to infectious organism, unspecified part of lung J18.9 483.8   2. Atrial fibrillation with RVR (CMS/Formerly [facility]) I48.91 427.31   3. Essential hypertension I10 401.9     Patient Active Problem List   Diagnosis   • Pneumonia     Past Medical History:   Diagnosis Date   • Diabetes mellitus (CMS/Formerly [facility])    • Hypertension      History reviewed. No pertinent surgical history.     Ms. Geronimo is seen at bedside this am on 3S for diet tolerance/reassessment. She is s/p FEES 19 w/ silent aspiration of thin liquids, recommendation for puree consistency and nectar thick liquids w/ reassessment pending improvement in status.     Ms. Geronimo is resting upon SLP entry, awakens to verbal/tactile stimuli. She is oriented to person, place and time, follows simple commands and participates in simple conversational exchanges. She accepts trials of thin water via cup w/o overt s/s aspiration, no obvious s/s silent aspiration. Per this status, she is felt to most benefit from objective MBS to re-evaluate swallowing fnx, determine candidacy for diet upgrade.     D/w pt risks and benefits of instrumental MBS w/ verbal agreement to participate.     EDUCATION  The patient has been educated in the following areas:   Dysphagia (Swallowing Impairment) Modified Diet Instruction.    SLP Recommendation and Plan  MBS w/ further recs pending.     D/w RNGeovanny, pt status w/ verbal agreement, clearance for pt to transfer from floor via vess chair to participate in MBS today.     Thank you-  Rossi Oleary M.A., CCC-SLP     Time Calculation:     Therapy Charges for Today     Code Description Service Date Service Provider Modifiers Qty    [id number]  ST TREATMENT SWALLOW 1 2019  Mamta, Rossi Armando MA,CCC-SLP GN 1          Rossi Oleary MA,CCC-SLP  1/21/2019

## 2019-01-21 NOTE — CONSULTS
INFECTIOUS DISEASE CONSULTATION REPORT        Patient Identification:  Name:  Ashley Geronimo  Age:  73 y.o.  Sex:  female  :  1945  MRN:  [id number]   Visit Number:  [id number]  Primary Care Physician:  iSlvano Parker MD         Subjective       Subjective     History of present illness:      Thank you Dr. Mas for allowing us to participate in the care of your patient.  As you well know, Ms. Ashley Geronimo is a 73 y.o. female with past medical history significant for breast cancer, CKD stage 3, who presented to [facility] on 2019 for unresponsiveness. Lactic acid 5.9 on admission, now normalized. Mycoplasma negative. Legionella negative. WBC normal. CRP improving at 4.64. Urine culture finalized as banuelos susceptible E. Coli. Sputum culture finalized as Klebsiella, Pseudomonas, and  Haemophilus influenzae. Influenza negative. CT of chest reveals LIZETT pneumonia.Patient was intubated initially on admission, now is currently on nasal cannula with no apparent distress.      Infectious Disease consultation was requested for antimicrobial management.      ---------------------------------------------------------------------------------------------------------------------     Review Of Systems:    Unable to obtain. Confused.     ---------------------------------------------------------------------------------------------------------------------     Past Medical History    Past Medical History:   Diagnosis Date   • Diabetes mellitus (CMS/HCC)    • Hypertension        Past Surgical History    History reviewed. No pertinent surgical history.    Family History    History reviewed. No pertinent family history.      Social History    Social History     Tobacco Use   • Smoking status: Never Smoker   Substance Use Topics   • Alcohol use: No     Frequency: Never   • Drug use: No       Allergies    Patient has no known  allergies.  ---------------------------------------------------------------------------------------------------------------------     Home Medications:    Prior to Admission Medications     Prescriptions Last Dose Informant Patient Reported? Taking?    indapamide (LOZOL) 2.5 MG tablet Unknown Pharmacy Yes No    Take 5 mg by mouth Every Morning.    metFORMIN (GLUCOPHAGE) 500 MG tablet Unknown Pharmacy Yes No    Take 1,000 mg by mouth 2 (Two) Times a Day With Meals.    montelukast (SINGULAIR) 10 MG tablet Unknown Pharmacy Yes No    Take 10 mg by mouth Every Night.    NIFEdipine XL (PROCARDIA XL) 90 MG 24 hr tablet Unknown Pharmacy Yes No    Take 90 mg by mouth Daily.    pioglitazone (ACTOS) 45 MG tablet Unknown Pharmacy Yes No    Take 45 mg by mouth Daily.    potassium chloride (KLOR-CON) 8 MEQ CR tablet Unknown Pharmacy Yes No    Take 8 mEq by mouth Daily.    ramipril (ALTACE) 10 MG capsule Unknown Pharmacy Yes No    Take 10 mg by mouth Daily.        ---------------------------------------------------------------------------------------------------------------------    Objective       Objective     Hospital Scheduled Meds:    aspirin 81 mg Oral Daily   atorvastatin 40 mg Oral Nightly   budesonide-formoterol 2 puff Inhalation BID - RT   cefepime 2 g Intravenous Q12H   enoxaparin 1 mg/kg Subcutaneous Q12H   insulin aspart 0-9 Units Subcutaneous 4x Daily AC & at Bedtime   insulin detemir 10 Units Subcutaneous Nightly   ipratropium-albuterol 3 mL Nebulization Q6H - RT   lactobacillus acidophilus 1 capsule Oral Daily   metoprolol tartrate 25 mg Oral Q12H   NIFEdipine CC 60 mg Oral Q24H   pantoprazole 40 mg Oral QAM AC   sodium bicarbonate 650 mg Oral TID   sodium chloride 10 mL Intravenous Q12H   sodium chloride 10 mL Intravenous Q12H   sodium chloride 10 mL Intravenous Q12H   sodium chloride 3 mL Intravenous Q12H       Pharmacy to dose warfarin       ---------------------------------------------------------------------------------------------------------------------   Vital Signs:  Temp:  [98.1 °F (36.7 °C)-98.3 °F (36.8 °C)] 98.1 °F (36.7 °C)  Heart Rate:  [64-83] 70  Resp:  [18] 18  BP: (118-152)/(46-62) 118/51  Mean Arterial Pressure (Non-Invasive) for the past 24 hrs (Last 3 readings):   Noninvasive MAP (mmHg)   01/21/19 1439 80   01/21/19 0900 77   01/21/19 0554 94     SpO2 Percentage    01/21/19 0900 01/21/19 1323 01/21/19 1439   SpO2: 95% 95% 96%     SpO2:  [91 %-98 %] 96 %  on  Flow (L/min):  [2-3] 2;   Device (Oxygen Therapy): nasal cannula    Body mass index is 26.6 kg/m².  Wt Readings from Last 3 Encounters:   01/21/19 61.8 kg (136 lb 3 oz)   08/07/13 66.7 kg (147 lb 0 oz)   08/05/13 66.7 kg (147 lb 0 oz)     ---------------------------------------------------------------------------------------------------------------------     Physical Exam:    Constitutional:  Well-developed and well-nourished.  No respiratory distress.      HENT:  Head: Normocephalic and atraumatic.  Mouth:  Moist mucous membranes.    Eyes:  Conjunctivae and EOM are normal.  No scleral icterus.  Neck:  Neck supple.  No JVD present.    Cardiovascular:  Normal rate, regular rhythm and normal heart sounds with no murmur. No edema.  Pulmonary/Chest:  No respiratory distress, bilateral scattered rhonchi.  Abdominal:  Soft.  Bowel sounds are normal.  No distension and no tenderness.   Musculoskeletal:  No edema, no tenderness, and no deformity.  No swelling or redness of joints.  Neurological:  Alert and oriented to person.  No facial droop.  No slurred speech.   Skin:  Skin is warm and dry.  No rash noted.  No pallor.   Psychiatric:  Normal mood and affect.  Behavior is normal.    ---------------------------------------------------------------------------------------------------------------------    Results from last 7 days   Lab Units 01/17/19  0853 01/17/19  0406 01/16/19 2053    TROPONIN I ng/mL 0.173* 0.176* 0.076*       Results from last 7 days   Lab Units 01/18/19  0317   CHOLESTEROL mg/dL 116   TRIGLYCERIDES mg/dL 101   HDL CHOL mg/dL 43*   LDL CHOL mg/dL 53     Results from last 7 days   Lab Units 01/18/19  1320   PH, ARTERIAL pH units 7.310*   PO2 ART mm Hg 82.0   PCO2, ARTERIAL mm Hg 43.2   HCO3 ART mmol/L 21.3*     Results from last 7 days   Lab Units 01/21/19 0441 01/20/19 0525 01/19/19  0339 01/19/19 0315 01/18/19  0317 01/17/19  1405 01/17/19  0853  01/16/19  1848  01/16/19  1436   CRP mg/dL 4.64* 7.15*  --  8.68* 5.58* 4.03*  --   --   --   --  1.69*   LACTATE mmol/L  --   --   --   --   --  1.9 1.6  --  2.8*   < >  --    WBC 10*3/mm3 5.62  --  7.88  --  7.08  --   --    < > 13.55*  --  9.99   HEMOGLOBIN g/dL 12.7  --  12.0  --  11.8*  --   --    < > 13.6  --  15.1   HEMATOCRIT % 38.6  --  37.5  --  36.2*  --   --    < > 41.3  --  45.1   MCV fL 89.1  --  92.1  --  91.0  --   --    < > 90.2  --  88.8   MCHC g/dL 32.9*  --  32.0*  --  32.6*  --   --    < > 32.9*  --  33.5   PLATELETS 10*3/mm3 219  --  182  --  185  --   --    < > 236  --  277   INR   --   --   --   --   --   --   --   --  0.93  --  0.92    < > = values in this interval not displayed.     Results from last 7 days   Lab Units 01/21/19 0441 01/20/19  0525 01/19/19  0315 01/18/19 0317 01/17/19  0028  01/16/19  1436   SODIUM mmol/L 141 140 138 141   < > 135   < > 130*   POTASSIUM mmol/L 3.4* 3.7 3.6 3.9   < > 4.4   < > 4.5   MAGNESIUM mg/dL  --  1.8  --  2.6  --  1.6*  --  2.1   CHLORIDE mmol/L 108 107 105 111   < > 102   < > 94*   CO2 mmol/L 26.5 21.8* 20.0* 19.6*   < > 24.0*   < > 19.4*   BUN mg/dL 29* 38* 36* 37*   < > 26*   < > 32*   CREATININE mg/dL 0.81 1.03 1.05 1.29   < > 1.01   < > 1.45*   EGFR IF NONAFRICN AM mL/min/1.73 69 52* 51* 40*   < > 54*   < > 35*   CALCIUM mg/dL 8.3 8.1 8.3 7.9   < > 8.1   < > 9.6   GLUCOSE mg/dL 129* 260* 190* 138*   < > 125*   < > 485*   ALBUMIN g/dL  --   --   --   --   --   3.60  --  4.80   BILIRUBIN mg/dL  --   --   --   --   --  0.3  --  0.6   ALK PHOS U/L  --   --   --   --   --  68  --  97   AST (SGOT) U/L  --   --   --   --   --  22  --  25   ALT (SGPT) U/L  --   --   --   --   --  12  --  13    < > = values in this interval not displayed.   Estimated Creatinine Clearance: 50 mL/min (by C-G formula based on SCr of 0.81 mg/dL).  No results found for: AMMONIA    Glucose   Date/Time Value Ref Range Status   01/21/2019 1122 180 (H) 70 - 130 mg/dL Final   01/21/2019 0723 98 70 - 130 mg/dL Final   01/20/2019 2107 255 (H) 70 - 130 mg/dL Final   01/20/2019 1633 272 (H) 70 - 130 mg/dL Final   01/20/2019 1126 281 (H) 70 - 130 mg/dL Final   01/20/2019 0732 285 (H) 70 - 130 mg/dL Final   01/19/2019 1943 357 (H) 70 - 130 mg/dL Final   01/19/2019 1615 349 (H) 70 - 130 mg/dL Final     Lab Results   Component Value Date    HGBA1C 11.50 (H) 01/16/2019     Lab Results   Component Value Date    TSH 4.095 01/16/2019       Blood Culture   Date Value Ref Range Status   01/16/2019 No growth at 4 days  Preliminary   01/16/2019 No growth at 4 days  Preliminary     Urine Culture   Date Value Ref Range Status   01/16/2019 >100,000 CFU/mL Escherichia coli (A)  Final           Respiratory Culture   Date Value Ref Range Status   01/16/2019 Scant growth (1+) Klebsiella pneumoniae (A)  Final   01/16/2019 Scant growth (1+) Pseudomonas aeruginosa (A)  Final   01/16/2019 (A)  Final    Light growth (2+) Haemophilus influenzae Biotype II     Comment:       Resistance to ampicillin by production of beta lactamase may be an issue but resistance to cephalosporins (ceftriaxone and cefotaxime) is rare.  Macrolides (erythromycin) and fluoroquinolones (ciprofloxacin) are effective.  Therefore, routine susceptibility testing of clinical isolates as a guide to therapy is not necessary.     Pain Management Panel     Pain Management Panel Latest Ref Rng & Units 1/16/2019 4/12/2018    CREATININE UR mg/dL - 96.6    AMPHETAMINES  SCREEN, URINE Negative Negative -    BARBITURATES SCREEN Negative Negative -    BENZODIAZEPINE SCREEN, URINE Negative Negative -    BUPRENORPHINE Negative Negative -    COCAINE SCREEN, URINE Negative Negative -    METHADONE SCREEN, URINE Negative Negative -        I have personally reviewed the above laboratory results.   ---------------------------------------------------------------------------------------------------------------------  Imaging Results (last 7 days)     Procedure Component Value Units Date/Time    FL Video Swallow [[id number]] Collected:  01/21/19 1038     Updated:  01/21/19 1053    Narrative:       FL VIDEO SWALLOW-     HISTORY: Aspiration; J18.9-Pneumonia, unspecified organism;  I48.91-Unspecified atrial fibrillation; I10-Essential (primary)  hypertension          TECHNIQUE:   Speech therapy service was present. The patient was examined in the  sitting lateral position and was given several consistencies of barium.     FINDINGS: There was no penetration or aspiration during the study.  Patient protected her airway adequately.        FLUOROSCOPY TIME: 0.9 minutes.     Images: Cine loop was acquired       Impression:       No evidence of aspiration.     For additional information please see the report provided by the speech  therapy service     This report was finalized on 1/21/2019 10:51 AM by Dr. Garry Benton MD.       Fiberoptic Endo (fees) [[id number]] Resulted:  01/19/19 1036     Updated:  01/19/19 1036    Narrative:       This procedure was auto-finalized with no dictation required.    XR Chest 1 View [[id number]] Collected:  01/18/19 0846     Updated:  01/18/19 1030    Narrative:       XR CHEST 1 VW-     HISTORY:   Central line placement; J18.9-Pneumonia, unspecified  organism; I48.91-Unspecified atrial fibrillation; I10-Essential  (primary) hypertension          COMPARISON: 01/16/2019.      TECHNIQUE: Single frontal view of the chest.     FINDINGS:     Left internal jugular central line in  the superior vena cava. No  pneumothorax.  The cardiac silhouette is normal. The pulmonary vasculature is  unremarkable.  There is no evidence of an acute osseous abnormality.   There are no suspicious-appearing parenchymal soft tissue nodules.            Impression:       Central line tip in the superior vena cava. No pneumothorax.         This report was finalized on 1/18/2019 10:28 AM by Dr. Garry Benton MD.       CT Chest Without Contrast [[id number]] Collected:  01/17/19 0839     Updated:  01/17/19 1116    Narrative:       CT CHEST WITHOUT CONTRAST-      CLINICAL INDICATION: Pneumonia, COPD; J18.9-Pneumonia, unspecified  organism; I48.91-Unspecified atrial fibrillation; I10-Essential  (primary) hypertension.          DOSE: 380.383390 mGy.cm  COMPARISON: None available.     Radiation dose reduction techniques were utilized per ALARA protocol.  Automated exposure control was initiated through either or Rattle or  DoseRight software packages by  protocol.        PROCEDURE: Axial images were acquired from the thoracic inlet through  the upper abdomen without any IV contrast. Reformatted images were  created.     FINDINGS: Today's study shows low-attenuation nodule in the left lobe of  the thyroid.     Pleural-based consolidation IN the left upper lobe anteriorly on image  37 of the axial series. This may represent scarring, pneumonia, or less  likely neoplasm.     Coronary artery calcifications are present.     No pericardial or pleural effusions.     Left shoulder hemiarthroplasty with a resulting streak artifact.       Impression:       1. Pleural-based consolidation in the left upper lobe probably scarring  with other possibilities as mentioned above.  2. Coronary artery calcifications.      This report was finalized on 1/17/2019 11:14 AM by Dr. Garry Benton MD.       XR Chest 1 View [[id number]] Collected:  01/16/19 1616     Updated:  01/16/19 1618    Narrative:       XR CHEST 1 VW-     CLINICAL  INDICATION: syncope          COMPARISON: 3/21/2018      TECHNIQUE: Single frontal view of the chest.     FINDINGS:     Endotracheal tube overlies tracheal air column above the issa  Nasogastric tube is in the stomach  Mild fullness in the laura bilaterally.  There is no evidence of an acute osseous abnormality.   There are no suspicious-appearing parenchymal soft tissue nodules.            Impression:       Line placement as above  Mild fullness in the laura bilaterally         This report was finalized on 1/16/2019 4:16 PM by Dr. Garry Benton MD.       CT Head Without Contrast Stroke Protocol [[id number]] Collected:  01/16/19 1435     Updated:  01/16/19 1438    Narrative:       CT HEAD WO CONTRAST STROKE PROTOCOL-     CLINICAL INDICATION: Syncope/fainting          COMPARISON: None available      TECHNIQUE: Axial images of the brain were obtained with out intravenous  contrast.  Reformatted images were created in the sagittal and coronal  planes.     DOSE:         Radiation dose reduction techniques were utilized per ALARA protocol.  Automated exposure control was initiated through either or CareDose or  DoseRight software packages by  protocol.           FINDINGS:   Today's study shows no mass, hemorrhage, or midline shift.   There is atrophy and ventriculomegaly. Cannot exclude NPH.  There is no evidence of acute ischemia.  I do not see epidural or subdural hematoma.  The gray-white differentiation is appropriate.   The bone window setting images show no destructive calvarial lesion or  acute calvarial fracture.   The posterior fossa is unremarkable.             Impression:       No evidence of acute ischemic event  Mild ventriculomegaly     The results were relayed to the emergency department at 2:37 PM     This report was finalized on 1/16/2019 2:36 PM by Dr. Garry Benton MD.           I have personally reviewed the above radiology results.    ---------------------------------------------------------------------------------------------------------------------      Assessment & Plan        Assessment/Plan       ASSESSMENT:    1. Septic shock, with lactic acid greater than 4 on admission  2. Pseudomonas Pneumonia    PLAN:    Patient presents with unresponsiveness. Lactic acid 5.9 on admission, now normalized. Mycoplasma negative. Legionella negative. WBC normal. CRP improving at 4.64. Urine culture finalized as banuelos susceptible E. Coli. Sputum culture finalized as Klebsiella, Pseudomonas, and  Haemophilus influenzae. Influenza negative. CT of chest reveals LIZETT pneumonia.Patient was intubated initially on admission, now is currently on nasal cannula with no apparent distress.    In the setting of Pseudomonas pneumonia, agree with primary to discontinue Augmentin. We recommend to discontinue Flagyl as well, as patient has passed swallowing evaluation with no evidence of aspiration. In the setting of significant clinical and laboratory improvement we recommend to continue Cefepime x2 more days, has received 5 days of Cefepime thus far. Will continue to monitor closely and adjust antibiotic therapy as needed. CRP in AM.    Current Antimicrobials:  Cefepime 2gm IV Q12H      Code Status:   Code Status and Medical Interventions:   Ordered at: 01/16/19 1648     Code Status:    CPR     Medical Interventions (Level of Support Prior to Arrest):    Full           Mallory Gaytan, APRN  01/21/19  3:26 PM

## 2019-01-21 NOTE — THERAPY EVALUATION
Acute Care - Physical Therapy Initial Evaluation   [facility]     Patient Name: Ashley Geronimo  : 1945  MRN: [id number]  Today's Date: 2019   Onset of Illness/Injury or Date of Surgery: 19  Date of Referral to PT: 19  Referring Physician: Dr. Chavez      Admit Date: 2019    Visit Dx:     ICD-10-CM ICD-9-CM   1. Pneumonia of both lungs due to infectious organism, unspecified part of lung J18.9 483.8   2. Atrial fibrillation with RVR (CMS/[facility]) I48.91 427.31   3. Essential hypertension I10 401.9     Patient Active Problem List   Diagnosis   • Pneumonia     Past Medical History:   Diagnosis Date   • Diabetes mellitus (CMS/[facility])    • Hypertension      History reviewed. No pertinent surgical history.     PT ASSESSMENT (last 12 hours)      Physical Therapy Evaluation     Row Name 19 1408          PT Evaluation Time/Intention    Document Type  evaluation;therapy note (daily note)  -LB     Mode of Treatment  individual therapy;physical therapy  -LB     Comment  She was woozy after sitting up and standing. She was not able to walk today due to these symptoms. She stood at EOB with walker and assist to help her get used to upright activity.  -LB     Row Name 19 1408          General Information    Patient Profile Reviewed?  yes  -LB     Onset of Illness/Injury or Date of Surgery  19  -LB     Referring Physician  Dr. Chavez  -LB     Patient/Family Observations  cooperative but confused, no visitors, has IV, O2, seizure padding on rails, positioned with pillows.  -LB     Existing Precautions/Restrictions  fall;seizures  -LB     Risks Reviewed  patient:;dizziness;increased discomfort;change in vital signs  -LB     Benefits Reviewed  patient:;improve function;increase independence;increase strength  -LB     Row Name 19 1408          Cognitive Assessment/Interventions    Additional Documentation  Cognitive Assessment/Intervention (Group)  -LB     Row Name 19 1408           Cognitive Assessment/Intervention- PT/OT    Affect/Mental Status (Cognitive)  confused  -LB     Follows Commands (Cognition)  physical/tactile prompts required;repetition of directions required;verbal cues/prompting required  -LB     Personal Safety Interventions  gait belt;nonskid shoes/slippers when out of bed;supervised activity  -     Row Name 01/21/19 1408          Safety Issues, Functional Mobility    Impairments Affecting Function (Mobility)  balance;cognition;endurance/activity tolerance;strength  -LB     Comment, Safety Issues/Impairments (Mobility)  fall risk, O2, seizure precautions  -     Row Name 01/21/19 1408          Bed Mobility Assessment/Treatment    Bed Mobility Assessment/Treatment  supine-sit;sit-supine;supine-sit-supine  -LB     Supine-Sit-Supine [place] (Bed Mobility)  minimum assist (75% patient effort);moderate assist (50% patient effort);verbal cues;nonverbal cues (demo/gesture)  -     Assistive Device (Bed Mobility)  bed rails;draw sheet  -     Row Name 01/21/19 1408          Transfer Assessment/Treatment    Transfer Assessment/Treatment  bed-chair transfer;chair-bed transfer;sit-stand transfer;stand-sit transfer;stand pivot/stand step transfer  -     Sit-Stand [place] (Transfers)  minimum assist (75% patient effort);2 person assist;verbal cues;nonverbal cues (demo/gesture)  -     Stand-Sit [place] (Transfers)  minimum assist (75% patient effort);2 person assist;verbal cues;nonverbal cues (demo/gesture)  -     Row Name 01/21/19 1408          Sit-Stand Transfer    Assistive Device (Sit-Stand Transfers)  walker, front-wheeled  -     Row Name 01/21/19 1408          Stand-Sit Transfer    Assistive Device (Stand-Sit Transfers)  walker, front-wheeled  -     Row Name 01/21/19 1408          Gait/Stairs Assessment/Training    Comment (Gait/Stairs)  unable to walk today due to dizziness  -     Row Name 01/21/19 1408          Pain Assessment    Additional  Documentation  Pain Scale: FACES Pre/Post-Treatment (Group)  -LB     Row Name 01/21/19 1408          Pain Scale: FACES Pre/Post-Treatment    Pain: FACES Scale, Pretreatment  0-->no hurt  -LB     Pain: FACES Scale, Post-Treatment  0-->no hurt  -LB     Row Name 01/21/19 1408          Plan of Care Review    Plan of Care Reviewed With  patient  -LB     Row Name 01/21/19 1408          Physical Therapy Clinical Impression    Date of Referral to PT  01/19/19  -LB     PT Diagnosis (PT Clinical Impression)  pneumonia  -LB     Criteria for Skilled Interventions Met (PT Clinical Impression)  yes  -LB     Rehab Potential (PT Clinical Summary)  fair, will monitor progress closely  -LB     Predicted Duration of Therapy (PT)  until d/c or goals met  -LB     Care Plan Review (PT)  evaluation/treatment results reviewed  -LB     Row Name 01/21/19 1408          Vital Signs    Intra Systolic BP Rehab  122  -LB     Intra Treatment Diastolic BP  44  -LB     Row Name 01/21/19 1408          Physical Therapy Goals    Bed Mobility Goal Selection (PT)  bed mobility, PT goal 1  -LB     Transfer Goal Selection (PT)  transfer, PT goal 1  -LB     Gait Training Goal Selection (PT)  gait training, PT goal 1  -LB     Row Name 01/21/19 1408          Bed Mobility Goal 1 (PT)    Activity/Assistive Device (Bed Mobility Goal 1, PT)  sit to supine/supine to sit  -LB     [place] Level/Cues Needed (Bed Mobility Goal 1, PT)  standby assist  -LB     Time Frame (Bed Mobility Goal 1, PT)  by discharge  -LB     Row Name 01/21/19 1408          Transfer Goal 1 (PT)    Activity/Assistive Device (Transfer Goal 1, PT)  sit-to-stand/stand-to-sit;bed-to-chair/chair-to-bed  -LB     [place] Level/Cues Needed (Transfer Goal 1, PT)  standby assist  -LB     Time Frame (Transfer Goal 1, PT)  by discharge  -LB     Row Name 01/21/19 1408          Gait Training Goal 1 (PT)    Activity/Assistive Device (Gait Training Goal 1, PT)  walker, rolling  -LB     [place]  Level (Gait Training Goal 1, PT)  standby assist  -LB     Distance (Gait Goal 1, PT)  300'  -LB     Time Frame (Gait Training Goal 1, PT)  by discharge  -LB     Row Name 01/21/19 1406          Positioning and Restraints    Pre-Treatment Position  in bed  -LB     In Bed  call light within reach;encouraged to call for assist;exit alarm on rails up with seizure padding, wedge under hip/back  -LB       User Key  (r) = Recorded By, (t) = Taken By, (c) = Cosigned By    Initials Name Provider Type    LB Anjelica Bertrand, PT Physical Therapist        Physical Therapy Education     Title: PT OT SLP Therapies (In Progress)     Topic: Physical Therapy (In Progress)     Point: Mobility training (In Progress)     Learning Progress Summary           Patient Acceptance, E, NR by LB at 1/21/2019  2:24 PM                   Point: Body mechanics (In Progress)     Learning Progress Summary           Patient Acceptance, E, NR by LB at 1/21/2019  2:24 PM                   Point: Precautions (In Progress)     Learning Progress Summary           Patient Acceptance, E, NR by LB at 1/21/2019  2:24 PM                               User Key     Initials Effective Dates Name Provider Type Discipline     03/14/16 -  Anjelica Bertrand, PT Physical Therapist PT              PT Recommendation and Plan  Anticipated Discharge Disposition (PT): home with assist  Planned Therapy Interventions (PT Eval): balance training, bed mobility training, gait training, patient/family education, strengthening, transfer training  Therapy Frequency (PT Clinical Impression): 3 times/wk  Outcome Summary/Treatment Plan (PT)  Anticipated Discharge Disposition (PT): home with assist  Plan of Care Reviewed With: patient  Outcome Measures     Row Name 01/21/19 1424 01/21/19 1214          How much help from another person do you currently need...    Turning from your back to your side while in flat bed without using bedrails?  3  -LB  --     Moving from lying on  back to sitting on the side of a flat bed without bedrails?  3  -LB  --     Moving to and from a bed to a chair (including a wheelchair)?  3  -LB  --     Standing up from a chair using your arms (e.g., wheelchair, bedside chair)?  3  -LB  --     Climbing 3-5 steps with a railing?  2  -LB  --     To walk in hospital room?  2  -LB  --     AM-PAC 6 Clicks Score  16  -LB  --        How much help from another is currently needed...    Putting on and taking off regular lower body clothing?  --  2  -KH     Bathing (including washing, rinsing, and drying)  --  2  -KH     Toileting (which includes using toilet bed pan or urinal)  --  2  -KH     Putting on and taking off regular upper body clothing  --  3  -KH     Taking care of personal grooming (such as brushing teeth)  --  3  -KH     Eating meals  --  3  -KH     Score  --  15  -KH        Functional Assessment    Outcome Measure Options  AM-PAC 6 Clicks Basic Mobility (PT)  -LB  AM-PAC 6 Clicks Daily Activity (OT)  -       User Key  (r) = Recorded By, (t) = Taken By, (c) = Cosigned By    Initials Name Provider Type    Anjelica Gonsalez, PT Physical Therapist    Rocío Roth, OT Occupational Therapist         Time Calculation:   PT Charges     Row Name 01/21/19 1424             Time Calculation    PT Received On  01/21/19  -LB      PT Goal Re-Cert Due Date  02/04/19  -         Time Calculation- PT    Total Timed Code Minutes- PT  30 minute(s)  -        User Key  (r) = Recorded By, (t) = Taken By, (c) = Cosigned By    Initials Name Provider Type    Anjelica Gonsalez, PT Physical Therapist        Therapy Suggested Charges     Code   Minutes Charges    None           Therapy Charges for Today     Code Description Service Date Service Provider Modifiers Qty    52152184084 HC PT MOBILITY CURRENT 1/21/2019 Anjelica Bertrand, PT  1    92178965524 HC PT MOBILITY PROJECTED 1/21/2019 Anjelica Bertrand, PT  1    47093339963 HC PT EVAL MOD  COMPLEXITY 4 1/21/2019 Anjelica Bertrand, PT GP 1    31784265918 HC PT THERAPEUTIC ACT EA 15 MIN 1/21/2019 Anjelica Bertrand, PT GP 1    43030718607 HC PT THER SUPP EA 15 MIN 1/21/2019 Anjelica Bertrand, PT GP 2          PT G-Codes  Outcome Measure Options: AM-PAC 6 Clicks Basic Mobility (PT)  AM-PAC 6 Clicks Score: 16  Score: 15  Functional Limitation: Mobility: Walking and moving around  Mobility: Walking and Moving Around Current Status (): At least 40 percent but less than 60 percent impaired, limited or restricted  Mobility: Walking and Moving Around Goal Status (): At least 1 percent but less than 20 percent impaired, limited or restricted      Anjelica Bertrand, PT  1/21/2019

## 2019-01-21 NOTE — THERAPY EVALUATION
Acute Care - Occupational Therapy Initial Evaluation   [facility]     Patient Name: Ashley Geronimo  : 1945  MRN: [id number]  Today's Date: 2019  Onset of Illness/Injury or Date of Surgery: 19(Admit Date)     Referring Physician: Dr. Chavez    Admit Date: 2019       ICD-10-CM ICD-9-CM   1. Pneumonia of both lungs due to infectious organism, unspecified part of lung J18.9 483.8   2. Atrial fibrillation with RVR (CMS/[facility]) I48.91 427.31   3. Essential hypertension I10 401.9     Patient Active Problem List   Diagnosis   • Pneumonia     Past Medical History:   Diagnosis Date   • Diabetes mellitus (CMS/[facility])    • Hypertension      History reviewed. No pertinent surgical history.       OT ASSESSMENT FLOWSHEET (last 72 hours)      Occupational Therapy Evaluation     Row Name 19 1108                   OT Evaluation Time/Intention    Subjective Information  no complaints  -        Document Type  evaluation  -        Mode of Treatment  occupational therapy;individual therapy  -        Patient Effort  adequate  -        Comment  Nursing and pt agreeable to OT eval  -           General Information    Patient Profile Reviewed?  yes  -KH        Onset of Illness/Injury or Date of Surgery  19 Admit Date  -        Referring Physician  Dr. Chavez  -        Patient Observations  alert;cooperative;agree to therapy  -        General Observations of Patient  Pt supine and agreeable to OT eval  -        Prior Level of Function  independent:;ADL's per pt report  -        Equipment Currently Used at Home  none per pt report  -        Existing Precautions/Restrictions  seizures Reflux, Aspiration  -        Equipment Ordered for Patient  -- TBD  -        Risks Reviewed  patient:;LOB;nausea/vomiting;dizziness;increased discomfort;change in vital signs;increased drainage;lines disloged  -        Benefits Reviewed  patient:;improve function;increase strength;increase  independence;increase balance;decrease pain;decrease risk of DVT;improve skin integrity;increase knowledge  -           Relationship/Environment    Primary Source of Support/Comfort  child(lyly)  -        Lives With  child(lyly), adult  -        Name(s) of Who Lives With Patient  Lives w/ 2 grandchildren  -           Resource/Environmental Concerns    Current Living Arrangements  home/apartment/condo  -           Home Main Entrance    Number of Stairs, Main Entrance  four  -        Stair Railings, Main Entrance  railings safe and in good condition;railings on both sides of stairs  -        Stairs Comment, Main Entrance  per pt report  -           Cognitive Assessment/Intervention- PT/OT    Orientation Status (Cognition)  oriented to;person  -        Follows Commands (Cognition)  follows one step commands;verbal cues/prompting required;repetition of directions required;physical/tactile prompts required  -           ADL Assessment/Intervention    BADL Assessment/Intervention  bathing;upper body dressing;lower body dressing;grooming;feeding;toileting  -           Bathing Assessment/Intervention    Bathing [place] Level  bathing skills  -        Comment (Bathing)  Mod A  -           Upper Body Dressing Assessment/Training    Upper Body Dressing [place] Level  upper body dressing skills  -        Comment (Upper Body Dressing)  Set Up  -           Lower Body Dressing Assessment/Training    Lower Body Dressing [place] Level  lower body dressing skills  -        Comment (Lower Body Dressing)  Mod A  -           Grooming Assessment/Training    [place] Level (Grooming)  grooming skills  -        Comment (Grooming)  Min A  -           Self-Feeding Assessment/Training    [place] Level (Feeding)  feeding skills  -        Comment (Feeding)  Min A  -           Toileting Assessment/Training    [place] Level (Toileting)  toileting skills  -        Comment  (Toileting)  Max A  -KH           General ROM    GENERAL ROM COMMENTS  BUE WFL  -KH           MMT (Manual Muscle Testing)    General MMT Comments  BUE 3-/5  -KH           Positioning and Restraints    Pre-Treatment Position  in bed  -        Post Treatment Position  bed  -KH        In Bed  notified nsg;supine;call light within reach;encouraged to call for assist;side rails up x3  -           Plan of Care Review    Plan of Care Reviewed With  patient  -           Clinical Impression (OT)    OT Diagnosis  Debility  -        Criteria for Skilled Therapeutic Interventions Met (OT Eval)  yes;treatment indicated  -        Therapy Frequency (OT Eval)  3 times/wk 3-5 x per week  -        Anticipated Equipment Needs at Discharge (OT)  -- TBD  -           Planned OT Interventions    Planned Therapy Interventions (OT Eval)  activity tolerance training;adaptive equipment training;BADL retraining;functional balance retraining;occupation/activity based interventions;patient/caregiver education/training;ROM/therapeutic exercise;strengthening exercise;transfer/mobility retraining  -           OT Goals    Grooming Goal Selection (OT)  grooming, OT goal 1  -        Strength Goal Selection (OT)  strength, OT goal 1  -        Additional Documentation  Strength Goal Selection (OT) (Row);Grooming Goal Selection (OT) (Row)  -           Grooming Goal 1 (OT)    Activity/Device (Grooming Goal 1, OT)  grooming skills, all  -        [place] (Grooming Goal 1, OT)  supervision required;set-up required  -        Time Frame (Grooming Goal 1, OT)  by discharge  -           Strength Goal 1 (OT)    Strength Goal 1 (OT)  Pt will increase BUE strength x 1 to increase ability to safely participate in self care tasks and functional transfers w/ increased independence.   -        Time Frame (Strength Goal 1, OT)  by discharge  -           Living Environment    Home Accessibility  stairs to enter home;tub/shower is not  walk in  -ORQUIDEA          User Key  (r) = Recorded By, (t) = Taken By, (c) = Cosigned By    Initials Name Effective Dates    Rocío Roth, OT 04/17/18 -                OT Recommendation and Plan  Outcome Summary/Treatment Plan (OT)  Anticipated Equipment Needs at Discharge (OT): (TBD)  Planned Therapy Interventions (OT Eval): activity tolerance training, adaptive equipment training, BADL retraining, functional balance retraining, occupation/activity based interventions, patient/caregiver education/training, ROM/therapeutic exercise, strengthening exercise, transfer/mobility retraining  Therapy Frequency (OT Eval): 3 times/wk(3-5 x per week)  Plan of Care Review  Plan of Care Reviewed With: patient  Plan of Care Reviewed With: patient    Outcome Measures     Row Name 01/21/19 1214             How much help from another is currently needed...    Putting on and taking off regular lower body clothing?  2  -KH      Bathing (including washing, rinsing, and drying)  2  -KH      Toileting (which includes using toilet bed pan or urinal)  2  -KH      Putting on and taking off regular upper body clothing  3  -KH      Taking care of personal grooming (such as brushing teeth)  3  -KH      Eating meals  3  -KH      Score  15  -KH         Functional Assessment    Outcome Measure Options  AM-PAC 6 Clicks Daily Activity (OT)  -ORQUIDEA        User Key  (r) = Recorded By, (t) = Taken By, (c) = Cosigned By    Initials Name Provider Type    Rocío Roth, OT Occupational Therapist          Time Calculation:   Time Calculation- OT     Row Name 01/21/19 1214             Time Calculation- OT    Total Timed Code Minutes- OT  25 minute(s)  -ORQUIDEA        User Key  (r) = Recorded By, (t) = Taken By, (c) = Cosigned By    Initials Name Provider Type    Rocío Roth, OT Occupational Therapist        Therapy Suggested Charges     Code   Minutes Charges    None           Therapy Charges for Today     Code  Description Service Date Service Provider Modifiers Qty    80329761203 HC OT SELFCARE CURRENT 1/21/2019 Rocío Guzman, OT  1    98639958947 HC OT SELFCARE PROJECTED 1/21/2019 Rocío Guzman, OT  1    [id number]  OT EVAL HIGH COMPLEXITY 2 1/21/2019 Rocío Guzman, LESLEY GO 1          OT G-codes  OT Professional Judgement Used?: Yes  OT Functional Scales Options: AM-PAC 6 Clicks Daily Activity (OT)  Functional Assessment Tool Used: FIM  Functional Limitation: Self care  Self Care Current Status (): At least 40 percent but less than 60 percent impaired, limited or restricted  Self Care Goal Status (): At least 20 percent but less than 40 percent impaired, limited or restricted    Rocío Guzman OT  1/21/2019

## 2019-01-21 NOTE — PROGRESS NOTES
Discharge Planning Assessment   [facility]     Patient Name: Ashley Geronimo  MRN: [id number]  Today's Date: 1/21/2019    Admit Date: 1/16/2019      Discharge Plan     Row Name 01/21/19 1509       Plan    Plan  Pt admitted on 1/16/19.  Pt lives at home with family and plans to return home at discharge.  Pt currently does not utilize home health or DME.  SS will follow and assist with discharge needs.         Milka Reyes

## 2019-01-21 NOTE — MBS/VFSS/FEES
Acute Care - Speech Language Pathology   Swallow Re-Evaluation  [facility]   MODIFIED BARIUM SWALLOW STUDY     Patient Name: Ashley Geronimo  : 1945  MRN: [id number]  Today's Date: 2019             Admit Date: 2019    Visit Dx:     ICD-10-CM ICD-9-CM   1. Pneumonia of both lungs due to infectious organism, unspecified part of lung J18.9 483.8   2. Atrial fibrillation with RVR (CMS/[facility]) I48.91 427.31   3. Essential hypertension I10 401.9     Patient Active Problem List   Diagnosis   • Pneumonia     Past Medical History:   Diagnosis Date   • Diabetes mellitus (CMS/[facility])    • Hypertension      History reviewed. No pertinent surgical history.     Ashley Geronimo  presents to the radiology suite this am from 3S 3302/1S to participate in an instrumental MBS to evaluate safety/efficacy of swallowing fnx, determine safest/least restrictive diet, candidacy for diet upgrade. She is s/p FEES 19 w/ recommendation for puree consistency, nectar thick liquids. She appears overall improved in ams and medical status today. She is eager to participate in this evaluation.     Social History     Socioeconomic History   • Marital status:      Spouse name: Not on file   • Number of children: Not on file   • Years of education: Not on file   • Highest education level: Not on file   Social Needs   • Financial resource strain: Not on file   • Food insecurity - worry: Not on file   • Food insecurity - inability: Not on file   • Transportation needs - medical: Not on file   • Transportation needs - non-medical: Not on file   Occupational History   • Not on file   Tobacco Use   • Smoking status: Never Smoker   Substance and Sexual Activity   • Alcohol use: No     Frequency: Never   • Drug use: No   • Sexual activity: Not on file   Other Topics Concern   • Not on file   Social History Narrative   • Not on file      No recent chest xray available for review.     Diet Orders (active) (From admission, onward)    Start      Ordered    01/19/19 1800  Dietary Nutrition Supplements Magic Cup  Daily With Lunch & Dinner      01/19/19 1216    01/19/19 1035  Diet Pureed; Nectar / Syrup Thick; Consistent Carbohydrate  Diet Effective Now     Comments:  Meds whole in puree  Fully a/a for all po intake  1:1 assist w/ all po intake    01/19/19 1035        Pt is observed on 2L O2 via NC w/o complications.     Risks and benefits of the procedure are explained w/ pt verbalizing understanding/agreement to participate. Proceed per protocol.     Pt is positioned upright and centered in a soft strap supportive chair to accept multiple po presentations of solid cracker, puree, honey thick, nectar thick, and thin liquids via spoon, cup and straw, along w/ whole placebo pill in puree. Pt is able to self feed.     All views are from the lateral plane.     Facial/oral structures are symmetrical upon observation w/o lingual deviation upon protrusion. Oral mucosa are moist and pink, white lingual surface coating noted. Secretions are clear, thin and well controlled. OROM/JULIA is wfl to imitate oral postures. Gag is not assessed. Volitional cough is adequate in intensity, clear in quality, nonproductive. Vocal quality is adequate in intensity, clear in quality w/ intelligible speech. Pt is a/a and cooperative to particpate. She is oriented to person, place, and time, follows simple directives, and participates in simple conversational exchanges.     Upon po presentations, adequate bolus anticipation w/ good labial seal for bolus clearance via spoon bowl, cup rim stability and suction via straw. Bolus formation, manipulation, and control are wfl w/ rotary mastication pattern. A-p transit is timely w/o oral residue. Tongue base retraction and linguavelar seal are adequate w/o premature spillage. Piecemeal deglutition w/ large bolus of thin liquid via straw. No laryngeal penetration or aspiration is evidenced before the swallow.     Pharyngeal swallow is timely  w/ adequate hyolaryngeal elevation and epiglottic inversion. Pharyngeal contraction is adequate w/o significant residue. No laryngeal penetration or aspiration evidenced during or after the swallow.     Pt is able to manipulate and swallow whole placebo pill in puree w/o difficulty.     Partial esophageal sweep reveals no obvious mucosal abnormalities or retrograde flow of the upper 1/3.      Impression: Per this evaluation, Ms. Geronimo presents w/ wfl oropharyngeal swallow w/o laryngeal penetration or aspiration across this evaluation. She is felt to most benefit from upgrade to regular consistency, thin liquids.     EDUCATION  The patient has been educated in the following areas:   Dysphagia (Swallowing Impairment).    SLP Recommendation and Plan  1. Regular consistency, thin liquids.   2. Meds whole in puree/thins.   3. JOEL precautions.   4. Oral care protocol.  5. Upright and centered for all po intake.   6. Fully a/a for all po intake.    SLP to fu for diet tolerance.     D/w pt results and recommendations w/ verbal understanding and agreement.     D/w RN, Geovanny, via telephone results and recommendations w/ verbal understanding and agreement.     Thank you for allowing me to participate in the care of your patient-  Rossi Oleary M.A., CCC-SLP    Plan of Care Reviewed With: patient  Plan of Care Review  Plan of Care Reviewed With: patient  Progress: improving    Time Calculation:     Therapy Charges for Today     Code Description Service Date Service Provider Modifiers Qty    10106074103 HC ST TREATMENT SWALLOW 1 1/21/2019 Rossi Oleary MA,CCC-SLP GN 1    92022121010 HC ST SWALLOWING CURRENT STATUS 1/21/2019 Rossi Oleary MA,CCC-SLP  1    02038291467 HC ST SWALLOWING PROJECTED 1/21/2019 Rossi Oleary MA,CCC-SLP  1    51489266085 HC ST SWALLOWING DISCHARGE 1/21/2019 Rossi Oleary MA,CCC-SLP  1    31080224030 HC ST MOTION FLUORO EVAL SWALLOW 8 1/21/2019  Rossi Oleary MA,CCC-SLP GN 1        SLP G-Codes  SLP NOMS Used?: Yes  Functional Limitations: Swallowing  Swallow Current Status (): 0 percent impaired, limited or restricted  Swallow Goal Status (): 0 percent impaired, limited or restricted  Swallow Discharge Status (): 0 percent impaired, limited or restricted    Rossi Oleary MA,CCC-SLP  1/21/2019

## 2019-01-21 NOTE — PHARMACY PATIENT ASSISTANCE
Pharmacy was asked to check price for patient's new medication Eliquis. Per pharmacy, patient has a deductible of $350 that she has to meet.  Then she will have a $42.00 copay. Patient is not able to afford deductible or copay for Eliquis. We discussed low income subsidy and the assistance program.  Patient seemed uninterested in assistance program and patient does not want to pay for copay. Pharmacy can provide first month free trial and patient may get free samples from her PCP. At this point, patient can call us back if she is interested in assistance program later or we can talk to her daughter if needed.    Thanks,    Wen Rojas, PharmD candidate 2019    10:05 AM  1/21/2019

## 2019-01-21 NOTE — PROGRESS NOTES
"  Assisted By: Jammie CARLSON    CC: Follow-up respiratory failure    Interview History/HPI: Patient is lying in bed and appears in no distress, she states \"I want to go home\".  She denies any pain.  She states she is eating, she actually did not eat a lot of her lunch but did eat all of her Magic pudding cup.  No abdominal pain, she states she ambulates at home without assistive device.      Vitals:    01/21/19 0900   BP: 119/48   Pulse: 77   Resp: 18   Temp: 98.1 °F (36.7 °C)   SpO2: 95%       Intake/Output Summary (Last 24 hours) at 1/21/2019 1312  Last data filed at 1/21/2019 0848  Gross per 24 hour   Intake 240 ml   Output 1100 ml   Net -860 ml       EXAM: She appears in no distress, lungs have bilateral breath sounds that are overall clear anterior and posterior they diminished at the bases.  Heart regular rate and rhythm without murmur rub or gallop.  Abdomen is soft benign, extremities are without edema, she moves all extremities on command she is alert, she is oriented to city place year president.    Tele: Sinus, reviewed    Initial EKG reviewed, ABBEY fib RVR    LABS:   Lab Results (last 48 hours)     Procedure Component Value Units Date/Time    Prolactin [[id number]] Collected:  01/20/19 0525    Specimen:  Blood Updated:  01/21/19 1211     Prolactin 16.7 ng/mL     Narrative:       Performed at:  [address] [place] [address]  [id number]  : Rony Naik PhD, Phone:  [phone]    POC Glucose Once [[id number]]  (Abnormal) Collected:  01/21/19 1122    Specimen:  Blood Updated:  01/21/19 1130     Glucose 180 mg/dL     POC Glucose Once [[id number]]  (Normal) Collected:  01/21/19 0723    Specimen:  Blood Updated:  01/21/19 0729     Glucose 98 mg/dL     Osmolality, Calculated [[id number]]  (Normal) Collected:  01/21/19 0441    Specimen:  Blood Updated:  01/21/19 0553     Osmolality Calc 288.8 mOsm/kg     Basic Metabolic Panel [[id number]]  (Abnormal) Collected:  01/21/19 0441    Specimen: "  Blood Updated:  01/21/19 0553     Glucose 129 mg/dL      BUN 29 mg/dL      Creatinine 0.81 mg/dL      Sodium 141 mmol/L      Potassium 3.4 mmol/L      Chloride 108 mmol/L      CO2 26.5 mmol/L      Calcium 8.3 mg/dL      eGFR Non African Amer 69 mL/min/1.73      BUN/Creatinine Ratio 35.8     Anion Gap 6.5 mmol/L     Narrative:       The MDRD GFR formula is only valid for adults with stable renal function between ages 18 and 70.    C-reactive Protein [[id number]]  (Abnormal) Collected:  01/21/19 0441    Specimen:  Blood Updated:  01/21/19 0552     C-Reactive Protein 4.64 mg/dL     CBC & Differential [[id number]] Collected:  01/21/19 0441    Specimen:  Blood Updated:  01/21/19 0524    Narrative:       The following orders were created for panel order CBC & Differential.  Procedure                               Abnormality         Status                     ---------                               -----------         ------                     CBC Auto Differential[[id number]]        Abnormal            Final result                 Please view results for these tests on the individual orders.    CBC Auto Differential [[id number]]  (Abnormal) Collected:  01/21/19 0441    Specimen:  Blood Updated:  01/21/19 0524     WBC 5.62 10*3/mm3      RBC 4.33 10*6/mm3      Hemoglobin 12.7 g/dL      Hematocrit 38.6 %      MCV 89.1 fL      MCH 29.3 pg      MCHC 32.9 g/dL      RDW 13.0 %      RDW-SD 41.7 fl      MPV 11.8 fL      Platelets 219 10*3/mm3      Neutrophil % 65.4 %      Lymphocyte % 18.0 %      Monocyte % 13.2 %      Eosinophil % 2.5 %      Basophil % 0.4 %      Immature Grans % 0.5 %      Neutrophils, Absolute 3.68 10*3/mm3      Lymphocytes, Absolute 1.01 10*3/mm3      Monocytes, Absolute 0.74 10*3/mm3      Eosinophils, Absolute 0.14 10*3/mm3      Basophils, Absolute 0.02 10*3/mm3      Immature Grans, Absolute 0.03 10*3/mm3     POC Glucose Once [[id number]]  (Abnormal) Collected:  01/20/19 2107    Specimen:  Blood Updated:   01/20/19 2131     Glucose 255 mg/dL     POC Glucose Once [[id number]]  (Abnormal) Collected:  01/20/19 1633    Specimen:  Blood Updated:  01/20/19 1658     Glucose 272 mg/dL     Blood Culture - Blood, Arm, Left [[id number]] Collected:  01/16/19 1435    Specimen:  Blood from Arm, Left Updated:  01/20/19 1530     Blood Culture No growth at 4 days    Blood Culture - Blood, Arm, Right [[id number]] Collected:  01/16/19 1446    Specimen:  Blood from Arm, Right Updated:  01/20/19 1530     Blood Culture No growth at 4 days    Respiratory Culture - Sputum, Cough [[id number]]  (Abnormal)  (Susceptibility) Collected:  01/16/19 1932    Specimen:  Sputum from Cough Updated:  01/20/19 1508     Respiratory Culture Scant growth (1+) Klebsiella pneumoniae      Scant growth (1+) Pseudomonas aeruginosa      Light growth (2+) Haemophilus influenzae Biotype II     Comment:   Resistance to ampicillin by production of beta lactamase may be an issue but resistance to cephalosporins (ceftriaxone and cefotaxime) is rare.  Macrolides (erythromycin) and fluoroquinolones (ciprofloxacin) are effective.  Therefore, routine susceptibility testing of clinical isolates as a guide to therapy is not necessary.        BETA LACTAMASE Positive     Gram Stain Occasional Gram positive bacilli      Few (2+) Gram positive cocci in pairs and chains      Occasional Gram negative bacilli      Moderate (3+) WBCs seen    Susceptibility      Klebsiella pneumoniae     AILYN     Amikacin Susceptible     Amoxicillin + Clavulanate Susceptible     Ampicillin Resistant     Ampicillin + Sulbactam Susceptible     Aztreonam Susceptible     Cefazolin Susceptible     Cefepime Susceptible     Cefotaxime Susceptible     Ceftazidime Susceptible     Ceftriaxone Susceptible     Cefuroxime sodium Susceptible     Ciprofloxacin Susceptible     Doripenem Susceptible     Ertapenem Susceptible     Gentamicin Susceptible     Imipenem Susceptible     Levofloxacin Susceptible     Piperacillin +  Tazobactam Susceptible     Tetracycline Susceptible     Tobramycin Susceptible     Trimethoprim + Sulfamethoxazole Susceptible                Susceptibility      Pseudomonas aeruginosa     AILYN     Amikacin Susceptible     Aztreonam Susceptible     Cefepime Susceptible     Ceftazidime Susceptible     Ciprofloxacin Susceptible     Doripenem Susceptible     Gentamicin Susceptible     Imipenem Susceptible     Levofloxacin Susceptible     Piperacillin + Tazobactam Susceptible     Tobramycin Susceptible                    POC Glucose Once [[id number]]  (Abnormal) Collected:  01/20/19 1126    Specimen:  Blood Updated:  01/20/19 1203     Glucose 281 mg/dL     Magnesium [[id number]]  (Normal) Collected:  01/20/19 0525    Specimen:  Blood Updated:  01/20/19 0923     Magnesium 1.8 mg/dL     POC Glucose Once [[id number]]  (Abnormal) Collected:  01/20/19 0732    Specimen:  Blood Updated:  01/20/19 0739     Glucose 285 mg/dL     Basic Metabolic Panel [[id number]]  (Abnormal) Collected:  01/20/19 0525    Specimen:  Blood Updated:  01/20/19 0606     Glucose 260 mg/dL      BUN 38 mg/dL      Creatinine 1.03 mg/dL      Sodium 140 mmol/L      Potassium 3.7 mmol/L      Chloride 107 mmol/L      CO2 21.8 mmol/L      Calcium 8.1 mg/dL      eGFR Non African Amer 52 mL/min/1.73      BUN/Creatinine Ratio 36.9     Anion Gap 11.2 mmol/L     Narrative:       The MDRD GFR formula is only valid for adults with stable renal function between ages 18 and 70.    Osmolality, Calculated [[id number]]  (Normal) Collected:  01/20/19 0525    Specimen:  Blood Updated:  01/20/19 0606     Osmolality Calc 297.4 mOsm/kg     C-reactive Protein [[id number]]  (Abnormal) Collected:  01/20/19 0525    Specimen:  Blood Updated:  01/20/19 0606     C-Reactive Protein 7.15 mg/dL     POC Glucose Once [[id number]]  (Abnormal) Collected:  01/19/19 1943    Specimen:  Blood Updated:  01/19/19 1954     Glucose 357 mg/dL     POC Glucose Once [[id number]]  (Abnormal) Collected:  01/19/19  1615    Specimen:  Blood Updated:  01/19/19 1622     Glucose 349 mg/dL           Radiology:  Imaging Results (last 72 hours)     Procedure Component Value Units Date/Time    FL Video Swallow [[id number]] Collected:  01/21/19 1038     Updated:  01/21/19 1053    Narrative:       FL VIDEO SWALLOW-     HISTORY: Aspiration; J18.9-Pneumonia, unspecified organism;  I48.91-Unspecified atrial fibrillation; I10-Essential (primary)  hypertension          TECHNIQUE:   Speech therapy service was present. The patient was examined in the  sitting lateral position and was given several consistencies of barium.     FINDINGS: There was no penetration or aspiration during the study.  Patient protected her airway adequately.        FLUOROSCOPY TIME: 0.9 minutes.     Images: Cine loop was acquired       Impression:       No evidence of aspiration.     For additional information please see the report provided by the speech  therapy service     This report was finalized on 1/21/2019 10:51 AM by Dr. Garry Benton MD.       Fiberoptic Endo (fees) [[id number]] Resulted:  01/19/19 1036     Updated:  01/19/19 1036    Narrative:       This procedure was auto-finalized with no dictation required.            Results for orders placed during the hospital encounter of 01/16/19   Adult Transthoracic Echo Complete W/ Cont if Necessary Per Protocol    Narrative · Estimated EF = 66%.  · Left ventricular systolic function is normal.  · No significant valvular disease  · No change since 3/21/18            Assessment/Plan:   Septic shock related to UTI and community-acquired pneumonia.  There was a question of cellulitis this appears to be better.  Sputum is polymicrobial, urine is E. coli.  I have asked infectious disease to weigh in on best antibiotic choice and length of therapy.  CRP is coming down currently.  Patient is currently on Augmentin as well as Maxipime and Flagyl, I am stopping the Augmentin until further instruction by infectious disease.   Noted patient passed her swallowing evaluation    Functional decline, physical therapy has been consulted.    Paroxysmal atrial fibrillation, most likely due to the septic shock, noted recommendation for anticoagulation on discharge however, patient currently on Lovenox, will consider switching to Eliquis prior to discharge.  EF is normal.  Noted patient will need outpatient physiologic workup for ischemia.  Troponins were elevated however thought to be secondary to probably type II.    Acute metabolic encephalopathy, appears to be improving.  Patient did have an elevated prolactin level on admission of uncertain etiology.  No seizure activity noted per EEG and CT head negative.  Follow    RAMESH, improved    Acute resp failure,, improving, check room air ABG    Diabetes, improving control, continue current insulin therapy

## 2019-01-21 NOTE — PLAN OF CARE
Problem: Fall Risk (Adult)  Goal: Identify Related Risk Factors and Signs and Symptoms  Outcome: Ongoing (interventions implemented as appropriate)    Goal: Absence of Fall  Outcome: Ongoing (interventions implemented as appropriate)      Problem: Patient Care Overview  Goal: Plan of Care Review  Outcome: Ongoing (interventions implemented as appropriate)    Goal: Individualization and Mutuality  Outcome: Ongoing (interventions implemented as appropriate)      Problem: Skin Injury Risk (Adult)  Goal: Identify Related Risk Factors and Signs and Symptoms  Outcome: Ongoing (interventions implemented as appropriate)    Goal: Skin Health and Integrity  Outcome: Ongoing (interventions implemented as appropriate)      Problem: Pneumonia (Adult)  Goal: Signs and Symptoms of Listed Potential Problems Will be Absent, Minimized or Managed (Pneumonia)  Outcome: Ongoing (interventions implemented as appropriate)      Problem: Diabetes, Type 2 (Adult)  Goal: Signs and Symptoms of Listed Potential Problems Will be Absent, Minimized or Managed (Diabetes, Type 2)  Outcome: Ongoing (interventions implemented as appropriate)

## 2019-01-21 NOTE — PLAN OF CARE
Problem: Patient Care Overview  Goal: Plan of Care Review  Outcome: Outcome(s) achieved Date Met: 01/21/19 01/21/19 1030   Coping/Psychosocial   Plan of Care Reviewed With patient   Plan of Care Review   Progress Improving    Pt participated in instrumental MBS w/ improved oropharyngeal swallow, no evidence of aspiration. Upgrade to regular consistency, thin liquids.

## 2019-01-22 LAB
A-A DO2: 39.4 MMHG (ref 0–300)
ALBUMIN SERPL-MCNC: 3.1 G/DL (ref 3.4–4.8)
ALBUMIN/GLOB SERPL: 1.4 G/DL (ref 1.5–2.5)
ALP SERPL-CCNC: 62 U/L (ref 35–104)
ALT SERPL W P-5'-P-CCNC: 18 U/L (ref 10–36)
ANION GAP SERPL CALCULATED.3IONS-SCNC: 5.4 MMOL/L (ref 3.6–11.2)
ARTERIAL PATENCY WRIST A: POSITIVE
AST SERPL-CCNC: 21 U/L (ref 10–30)
ATMOSPHERIC PRESS: 737 MMHG
BASE EXCESS BLDA CALC-SCNC: 3.9 MMOL/L
BASOPHILS # BLD AUTO: 0.01 10*3/MM3 (ref 0–0.3)
BASOPHILS NFR BLD AUTO: 0.2 % (ref 0–2)
BDY SITE: ABNORMAL
BILIRUB SERPL-MCNC: 0.3 MG/DL (ref 0.2–1.8)
BODY TEMPERATURE: 98.6 C
BUN BLD-MCNC: 25 MG/DL (ref 7–21)
BUN/CREAT SERPL: 28.4 (ref 7–25)
CALCIUM SPEC-SCNC: 8.2 MG/DL (ref 7.7–10)
CHLORIDE SERPL-SCNC: 102 MMOL/L (ref 99–112)
CO2 SERPL-SCNC: 27.6 MMOL/L (ref 24.3–31.9)
COHGB MFR BLD: 1.4 % (ref 0–5)
CREAT BLD-MCNC: 0.88 MG/DL (ref 0.43–1.29)
CRP SERPL-MCNC: 3.55 MG/DL (ref 0–0.99)
DEPRECATED RDW RBC AUTO: 42.2 FL (ref 37–54)
EOSINOPHIL # BLD AUTO: 0.08 10*3/MM3 (ref 0–0.7)
EOSINOPHIL NFR BLD AUTO: 1.7 % (ref 0–7)
ERYTHROCYTE [DISTWIDTH] IN BLOOD BY AUTOMATED COUNT: 13.1 % (ref 11.5–14.5)
GFR SERPL CREATININE-BSD FRML MDRD: 63 ML/MIN/1.73
GLOBULIN UR ELPH-MCNC: 2.2 GM/DL
GLUCOSE BLD-MCNC: 223 MG/DL (ref 70–110)
GLUCOSE BLDC GLUCOMTR-MCNC: 194 MG/DL (ref 70–130)
GLUCOSE BLDC GLUCOMTR-MCNC: 230 MG/DL (ref 70–130)
GLUCOSE BLDC GLUCOMTR-MCNC: 293 MG/DL (ref 70–130)
GLUCOSE BLDC GLUCOMTR-MCNC: 300 MG/DL (ref 70–130)
HCO3 BLDA-SCNC: 26.8 MMOL/L (ref 22–26)
HCT VFR BLD AUTO: 38.2 % (ref 37–47)
HCT VFR BLD CALC: 37 % (ref 37–47)
HGB BLD-MCNC: 12.7 G/DL (ref 12–16)
HGB BLDA-MCNC: 12.7 G/DL (ref 12–16)
HOROWITZ INDEX BLD+IHG-RTO: 21 %
IMM GRANULOCYTES # BLD AUTO: 0.01 10*3/MM3 (ref 0–0.03)
IMM GRANULOCYTES NFR BLD AUTO: 0.2 % (ref 0–0.5)
INR PPP: 1.09 (ref 0.9–1.1)
LYMPHOCYTES # BLD AUTO: 1.1 10*3/MM3 (ref 1–3)
LYMPHOCYTES NFR BLD AUTO: 23.1 % (ref 16–46)
MCH RBC QN AUTO: 29.7 PG (ref 27–33)
MCHC RBC AUTO-ENTMCNC: 33.2 G/DL (ref 33–37)
MCV RBC AUTO: 89.3 FL (ref 80–94)
METHGB BLD QL: 0.3 % (ref 0–3)
MODALITY: ABNORMAL
MONOCYTES # BLD AUTO: 0.6 10*3/MM3 (ref 0.1–0.9)
MONOCYTES NFR BLD AUTO: 12.6 % (ref 0–12)
NEUTROPHILS # BLD AUTO: 2.97 10*3/MM3 (ref 1.4–6.5)
NEUTROPHILS NFR BLD AUTO: 62.2 % (ref 40–75)
OSMOLALITY SERPL CALC.SUM OF ELEC: 281.4 MOSM/KG (ref 273–305)
OXYHGB MFR BLDV: 91.9 % (ref 85–100)
PCO2 BLDA: 34.8 MM HG (ref 35–45)
PH BLDA: 7.5 PH UNITS (ref 7.35–7.45)
PLATELET # BLD AUTO: 206 10*3/MM3 (ref 130–400)
PMV BLD AUTO: 11.8 FL (ref 6–10)
PO2 BLDA: 63.8 MM HG (ref 80–100)
POTASSIUM BLD-SCNC: 3.5 MMOL/L (ref 3.5–5.3)
PROT SERPL-MCNC: 5.3 G/DL (ref 6–8)
PROTHROMBIN TIME: 14.3 SECONDS (ref 11–15.4)
RBC # BLD AUTO: 4.28 10*6/MM3 (ref 4.2–5.4)
SAO2 % BLDCOA: 93.5 % (ref 90–100)
SODIUM BLD-SCNC: 135 MMOL/L (ref 135–153)
WBC NRBC COR # BLD: 4.77 10*3/MM3 (ref 4.5–12.5)

## 2019-01-22 PROCEDURE — 36600 WITHDRAWAL OF ARTERIAL BLOOD: CPT | Performed by: INTERNAL MEDICINE

## 2019-01-22 PROCEDURE — 63710000001 INSULIN ASPART PER 5 UNITS: Performed by: INTERNAL MEDICINE

## 2019-01-22 PROCEDURE — 82962 GLUCOSE BLOOD TEST: CPT

## 2019-01-22 PROCEDURE — 94799 UNLISTED PULMONARY SVC/PX: CPT

## 2019-01-22 PROCEDURE — 85025 COMPLETE CBC W/AUTO DIFF WBC: CPT | Performed by: INTERNAL MEDICINE

## 2019-01-22 PROCEDURE — 86140 C-REACTIVE PROTEIN: CPT | Performed by: INTERNAL MEDICINE

## 2019-01-22 PROCEDURE — 85610 PROTHROMBIN TIME: CPT | Performed by: INTERNAL MEDICINE

## 2019-01-22 PROCEDURE — 82805 BLOOD GASES W/O2 SATURATION: CPT | Performed by: INTERNAL MEDICINE

## 2019-01-22 PROCEDURE — 63710000001 INSULIN DETEMIR PER 5 UNITS: Performed by: INTERNAL MEDICINE

## 2019-01-22 PROCEDURE — 92526 ORAL FUNCTION THERAPY: CPT

## 2019-01-22 PROCEDURE — 97530 THERAPEUTIC ACTIVITIES: CPT

## 2019-01-22 PROCEDURE — 99232 SBSQ HOSP IP/OBS MODERATE 35: CPT | Performed by: INTERNAL MEDICINE

## 2019-01-22 PROCEDURE — 83050 HGB METHEMOGLOBIN QUAN: CPT | Performed by: INTERNAL MEDICINE

## 2019-01-22 PROCEDURE — 25010000002 CEFEPIME 2 G/NS 100 ML SOLUTION: Performed by: HOSPITALIST

## 2019-01-22 PROCEDURE — 25010000002 ENOXAPARIN PER 10 MG: Performed by: HOSPITALIST

## 2019-01-22 PROCEDURE — 97116 GAIT TRAINING THERAPY: CPT

## 2019-01-22 PROCEDURE — 82375 ASSAY CARBOXYHB QUANT: CPT | Performed by: INTERNAL MEDICINE

## 2019-01-22 PROCEDURE — 80053 COMPREHEN METABOLIC PANEL: CPT | Performed by: INTERNAL MEDICINE

## 2019-01-22 PROCEDURE — 63710000001 INSULIN ASPART PER 5 UNITS: Performed by: HOSPITALIST

## 2019-01-22 PROCEDURE — 99233 SBSQ HOSP IP/OBS HIGH 50: CPT | Performed by: INTERNAL MEDICINE

## 2019-01-22 RX ORDER — DEXTROSE MONOHYDRATE 25 G/50ML
25 INJECTION, SOLUTION INTRAVENOUS
Status: DISCONTINUED | OUTPATIENT
Start: 2019-01-22 | End: 2019-01-24 | Stop reason: HOSPADM

## 2019-01-22 RX ORDER — IPRATROPIUM BROMIDE AND ALBUTEROL SULFATE 2.5; .5 MG/3ML; MG/3ML
3 SOLUTION RESPIRATORY (INHALATION) EVERY 6 HOURS PRN
Status: DISCONTINUED | OUTPATIENT
Start: 2019-01-22 | End: 2019-01-24 | Stop reason: HOSPADM

## 2019-01-22 RX ORDER — WARFARIN SODIUM 5 MG/1
5 TABLET ORAL
Status: COMPLETED | OUTPATIENT
Start: 2019-01-22 | End: 2019-01-22

## 2019-01-22 RX ORDER — NICOTINE POLACRILEX 4 MG
15 LOZENGE BUCCAL
Status: DISCONTINUED | OUTPATIENT
Start: 2019-01-22 | End: 2019-01-24 | Stop reason: HOSPADM

## 2019-01-22 RX ADMIN — SODIUM CHLORIDE, PRESERVATIVE FREE 3 ML: 5 INJECTION INTRAVENOUS at 08:42

## 2019-01-22 RX ADMIN — METOPROLOL TARTRATE 25 MG: 50 TABLET, FILM COATED ORAL at 08:43

## 2019-01-22 RX ADMIN — METOPROLOL TARTRATE 25 MG: 50 TABLET, FILM COATED ORAL at 21:05

## 2019-01-22 RX ADMIN — ENOXAPARIN SODIUM 60 MG: 60 INJECTION SUBCUTANEOUS at 21:05

## 2019-01-22 RX ADMIN — Medication 1 CAPSULE: at 08:43

## 2019-01-22 RX ADMIN — ENOXAPARIN SODIUM 60 MG: 60 INJECTION SUBCUTANEOUS at 08:43

## 2019-01-22 RX ADMIN — INSULIN ASPART 8 UNITS: 100 INJECTION, SOLUTION INTRAVENOUS; SUBCUTANEOUS at 16:49

## 2019-01-22 RX ADMIN — CEFEPIME 2 G: 2 INJECTION, POWDER, FOR SOLUTION INTRAVENOUS at 23:37

## 2019-01-22 RX ADMIN — BUDESONIDE AND FORMOTEROL FUMARATE DIHYDRATE 2 PUFF: 160; 4.5 AEROSOL RESPIRATORY (INHALATION) at 19:38

## 2019-01-22 RX ADMIN — CEFEPIME 2 G: 2 INJECTION, POWDER, FOR SOLUTION INTRAVENOUS at 12:21

## 2019-01-22 RX ADMIN — INSULIN DETEMIR 15 UNITS: 100 INJECTION, SOLUTION SUBCUTANEOUS at 21:07

## 2019-01-22 RX ADMIN — ATORVASTATIN CALCIUM 40 MG: 40 TABLET, FILM COATED ORAL at 21:05

## 2019-01-22 RX ADMIN — IPRATROPIUM BROMIDE AND ALBUTEROL SULFATE 3 ML: .5; 3 SOLUTION RESPIRATORY (INHALATION) at 07:22

## 2019-01-22 RX ADMIN — IPRATROPIUM BROMIDE AND ALBUTEROL SULFATE 3 ML: .5; 3 SOLUTION RESPIRATORY (INHALATION) at 01:18

## 2019-01-22 RX ADMIN — WARFARIN SODIUM 5 MG: 5 TABLET ORAL at 12:22

## 2019-01-22 RX ADMIN — SODIUM CHLORIDE, PRESERVATIVE FREE 10 ML: 5 INJECTION INTRAVENOUS at 08:41

## 2019-01-22 RX ADMIN — CEFEPIME 2 G: 2 INJECTION, POWDER, FOR SOLUTION INTRAVENOUS at 00:24

## 2019-01-22 RX ADMIN — SODIUM CHLORIDE, PRESERVATIVE FREE 10 ML: 5 INJECTION INTRAVENOUS at 08:42

## 2019-01-22 RX ADMIN — PANTOPRAZOLE SODIUM 40 MG: 40 TABLET, DELAYED RELEASE ORAL at 05:30

## 2019-01-22 RX ADMIN — ASPIRIN 81 MG: 81 TABLET ORAL at 08:43

## 2019-01-22 RX ADMIN — INSULIN ASPART 3 UNITS: 100 INJECTION, SOLUTION INTRAVENOUS; SUBCUTANEOUS at 12:22

## 2019-01-22 RX ADMIN — INSULIN ASPART 4 UNITS: 100 INJECTION, SOLUTION INTRAVENOUS; SUBCUTANEOUS at 08:42

## 2019-01-22 RX ADMIN — INSULIN ASPART 10 UNITS: 100 INJECTION, SOLUTION INTRAVENOUS; SUBCUTANEOUS at 21:05

## 2019-01-22 RX ADMIN — BUDESONIDE AND FORMOTEROL FUMARATE DIHYDRATE 2 PUFF: 160; 4.5 AEROSOL RESPIRATORY (INHALATION) at 07:22

## 2019-01-22 RX ADMIN — NIFEDIPINE 60 MG: 30 TABLET, EXTENDED RELEASE ORAL at 08:43

## 2019-01-22 NOTE — THERAPY TREATMENT NOTE
Acute Care - Physical Therapy Treatment Note  [facility]     Patient Name: Ashley Geronimo  : 1945  MRN: [id number]  Today's Date: 2019  Onset of Illness/Injury or Date of Surgery: 19  Date of Referral to PT: 19  Referring Physician: Dr. Chavez    Admit Date: 2019    Visit Dx:    ICD-10-CM ICD-9-CM   1. Pneumonia of both lungs due to infectious organism, unspecified part of lung J18.9 483.8   2. Atrial fibrillation with RVR (CMS/[facility]) I48.91 427.31   3. Essential hypertension I10 401.9     Patient Active Problem List   Diagnosis   • Pneumonia   • Septic shock (CMS/[facility])   • Pseudomonas pneumonia (CMS/[facility])       Therapy Treatment    Rehabilitation Treatment Summary     Row Name 19 1100             Treatment Time/Intention    Discipline  physical therapist  -BC      Document Type  therapy note (daily note)  -BC      Subjective Information  no complaints  -BC      Mode of Treatment  physical therapy;individual therapy  -BC      Therapy Frequency (PT Clinical Impression)  3 times/wk 3-5x/week  -BC      Patient Effort  adequate  -BC      Comment  Pt tolerated walking 250' with intermittent short breaks  -BC      Existing Precautions/Restrictions  fall;seizures  -BC      Recorded by [BC] Laura Cali, PT 19 1126      Row Name 19 1100             Cognitive Assessment/Intervention- PT/OT    Affect/Mental Status (Cognitive)  confused  -BC      Orientation Status (Cognition)  oriented to;person  -BC      Follows Commands (Cognition)  physical/tactile prompts required;increased processing time needed;repetition of directions required;verbal cues/prompting required  -BC      Recorded by [BC] Laura Cali, PT 19 1126      Row Name 19 1100             Bed Mobility Assessment/Treatment    Bed Mobility Assessment/Treatment  sit-supine;supine-sit-supine  -BC      Sit-Supine [place] (Bed Mobility)  minimum assist (75% patient effort);verbal cues;nonverbal cues  (demo/gesture)  -BC      Assistive Device (Bed Mobility)  bed rails;draw sheet  -BC      Recorded by [BC] Laura Cali, PT 01/22/19 1126      Row Name 01/22/19 1100             Transfer Assessment/Treatment    Transfer Assessment/Treatment  sit-stand transfer;stand-sit transfer  -BC      Maintains Weight-bearing Status (Transfers)  able to maintain  -BC      Recorded by [BC] Laura Cali, PT 01/22/19 1126      Row Name 01/22/19 1100             Sit-Stand Transfer    Sit-Stand [place] (Transfers)  minimum assist (75% patient effort);2 person assist;verbal cues;nonverbal cues (demo/gesture)  -BC      Assistive Device (Sit-Stand Transfers)  walker, front-wheeled  -BC      Recorded by [BC] Laura Cali, PT 01/22/19 1126      Row Name 01/22/19 1100             Stand-Sit Transfer    Stand-Sit [place] (Transfers)  minimum assist (75% patient effort);2 person assist;verbal cues;nonverbal cues (demo/gesture)  -BC      Assistive Device (Stand-Sit Transfers)  walker, front-wheeled  -BC      Recorded by [BC] Laura Cali, PT 01/22/19 1126      Row Name 01/22/19 1100             Gait/Stairs Assessment/Training    Gait/Stairs Assessment/Training  gait/ambulation independence  -BC      [place] Level (Gait)  minimum assist (75% patient effort);moderate assist (50% patient effort);2 person assist  -BC      Assistive Device (Gait)  walker, front-wheeled  -BC      Distance in Feet (Gait)  250  -BC      Recorded by [BC] Laura Cali, PT 01/22/19 1126      Row Name 01/22/19 1100             Positioning and Restraints    Pre-Treatment Position  in bed  -BC      Post Treatment Position  bed  -BC      In Bed  notified nsg;call light within reach;with family/caregiver;side rails up x3  -BC      Recorded by [BC] Laura Cali, PT 01/22/19 1126        User Key  (r) = Recorded By, (t) = Taken By, (c) = Cosigned By    Initials Name Effective Dates Discipline    BC Laura Cali, PT 03/14/16 -  PT                    Physical Therapy Education     Title: PT OT SLP Therapies (In Progress)     Topic: Physical Therapy (In Progress)     Point: Mobility training (In Progress)     Learning Progress Summary           Patient Acceptance, E, NR by BC at 1/22/2019 11:26 AM    Acceptance, E,TB, VU by MELLO at 1/22/2019  9:24 AM    Acceptance, E, NR by  at 1/21/2019  2:24 PM                   Point: Home exercise program (In Progress)     Learning Progress Summary           Patient Acceptance, E, NR by BC at 1/22/2019 11:26 AM    Acceptance, E,TB, VU by MELLO at 1/22/2019  9:24 AM                   Point: Body mechanics (In Progress)     Learning Progress Summary           Patient Acceptance, E, NR by BC at 1/22/2019 11:26 AM    Acceptance, E,TB, VU by MELLO at 1/22/2019  9:24 AM    Acceptance, E, NR by  at 1/21/2019  2:24 PM                   Point: Precautions (In Progress)     Learning Progress Summary           Patient Acceptance, E, NR by BC at 1/22/2019 11:26 AM    Acceptance, E,TB, VU by MELLO at 1/22/2019  9:24 AM    Acceptance, E, NR by  at 1/21/2019  2:24 PM                               User Key     Initials Effective Dates Name Provider Type Discipline     03/14/16 -  Anjelica Bertrand, PT Physical Therapist PT    BC 03/14/16 -  Laura Cali, PT Physical Therapist PT     10/18/18 -  Carlito Cali, RN Registered Nurse Nurse                PT Recommendation and Plan  Therapy Frequency (PT Clinical Impression): 3 times/wk(3-5x/week)  Plan of Care Reviewed With: patient  Progress: improving  Outcome Measures     Row Name 01/22/19 1100 01/21/19 1424 01/21/19 1214       How much help from another person do you currently need...    Turning from your back to your side while in flat bed without using bedrails?  3  -BC  3  -LB  --    Moving from lying on back to sitting on the side of a flat bed without bedrails?  3  -BC  3  -LB  --    Moving to and from a bed to a chair (including a wheelchair)?  3  -BC  3   -LB  --    Standing up from a chair using your arms (e.g., wheelchair, bedside chair)?  3  -BC  3  -LB  --    Climbing 3-5 steps with a railing?  2  -BC  2  -LB  --    To walk in hospital room?  3  -BC  2  -LB  --    AM-PAC 6 Clicks Score  17  -BC  16  -LB  --       How much help from another is currently needed...    Putting on and taking off regular lower body clothing?  --  --  2  -KH    Bathing (including washing, rinsing, and drying)  --  --  2  -KH    Toileting (which includes using toilet bed pan or urinal)  --  --  2  -KH    Putting on and taking off regular upper body clothing  --  --  3  -KH    Taking care of personal grooming (such as brushing teeth)  --  --  3  -KH    Eating meals  --  --  3  -KH    Score  --  --  15  -KH       Functional Assessment    Outcome Measure Options  AM-PAC 6 Clicks Basic Mobility (PT)  -BC  AM-PAC 6 Clicks Basic Mobility (PT)  -LB  AM-PAC 6 Clicks Daily Activity (OT)  -      User Key  (r) = Recorded By, (t) = Taken By, (c) = Cosigned By    Initials Name Provider Type    LB Anjelica Bertrand, PT Physical Therapist    Laura Jeong, PT Physical Therapist    Rocío Roth, OT Occupational Therapist         Time Calculation:   PT Charges     Row Name 01/22/19 1127             Time Calculation    PT Received On  01/22/19  -BC         Time Calculation- PT    Total Timed Code Minutes- PT  30 minute(s)  -BC         Timed Charges    45547 - Gait Training Minutes   15  -BC      96447 - PT Therapeutic Activity Minutes  15  -BC        User Key  (r) = Recorded By, (t) = Taken By, (c) = Cosigned By    Initials Name Provider Type    Laura Jeong, PT Physical Therapist        Therapy Suggested Charges     Code   Minutes Charges    30378 (CPT®) Hc Pt Neuromusc Re Education Ea 15 Min      51962 (CPT®) Hc Pt Ther Proc Ea 15 Min      73522 (CPT®) Hc Gait Training Ea 15 Min 15 1    31377 (CPT®) Hc Pt Therapeutic Act Ea 15 Min 15 1    62005 (CPT®) Hc Pt Manual  Therapy Ea 15 Min      05262 (CPT®) Hc Pt Iontophoresis Ea 15 Min      88585 (CPT®) Hc Pt Elec Stim Ea-Per 15 Min      68035 (CPT®) Hc Pt Ultrasound Ea 15 Min      27316 (CPT®) Hc Pt Self Care/Mgmt/Train Ea 15 Min      30024 (CPT®) Hc Pt Prosthetic (S) Train Initial Encounter, Each 15 Min      04825 (CPT®) Hc Pt Orthotic(S)/Prosthetic(S) Encounter, Each 15 Min      09594 (CPT®) Hc Orthotic(S) Mgmt/Train Initial Encounter, Each 15min      Total  30 2        Therapy Charges for Today     Code Description Service Date Service Provider Modifiers Qty    67816266342 HC GAIT TRAINING EA 15 MIN 1/22/2019 Laura Cali, PT GP 1    30247800288 HC PT THERAPEUTIC ACT EA 15 MIN 1/22/2019 Laura Cali, PT GP 1    70193824889 HC PT THER SUPP EA 15 MIN 1/22/2019 Laura Cali, PT GP 1          PT G-Codes  Outcome Measure Options: AM-PAC 6 Clicks Basic Mobility (PT)  AM-PAC 6 Clicks Score: 17  Score: 15  Functional Limitation: Mobility: Walking and moving around  Mobility: Walking and Moving Around Current Status (): At least 40 percent but less than 60 percent impaired, limited or restricted  Mobility: Walking and Moving Around Goal Status (): At least 1 percent but less than 20 percent impaired, limited or restricted    Laura Cali, PT  1/22/2019

## 2019-01-22 NOTE — PLAN OF CARE
Problem: Patient Care Overview  Goal: Plan of Care Review  Outcome: Ongoing (interventions implemented as appropriate)   01/22/19 1344   Coping/Psychosocial   Plan of Care Reviewed With patient   Plan of Care Review   Progress improving   OTHER   Outcome Summary Pt tolerated OT treatment fair this date w/ rest as needed. Completed 3 BUE x 15 reps each. Skilled OT to continue current POC as tolerated.

## 2019-01-22 NOTE — PROGRESS NOTES
Assisted By: Coral CARLSON    CC: F/U sepsis    Interview History/HPI: Patient states that she is feeling better.  She denies any nausea or vomiting, she did participate with therapy but I am uncertain how far she walks, will discuss with therapy.  She denies any abdominal pain states her breathing is much better, she states she wants to go home.      Vitals:    01/22/19 0722   BP:    Pulse: 77   Resp: 18   Temp:    SpO2: 92%       Intake/Output Summary (Last 24 hours) at 1/22/2019 1027  Last data filed at 1/22/2019 0300  Gross per 24 hour   Intake 720 ml   Output --   Net 720 ml       EXAM: Blood pressure 130/50, respiratory rate is 18 pulse is in the 60s, temperature 98.  Pupils are equal round face is symmetric hearing intact speech normal oral mucosa is moist conjunctiva unremarkable.  Trachea is midline lungs bilateral breath sounds are clear today without rhonchi rales or wheezing.  Heart regular rate and rhythm without murmur rub or gallop.  Abdomen is soft benign bowel sounds are active no organomegaly or mass appreciated nondistended nontender.  Strength is symmetric no edema is noted.  Skin warm and dry mood is good    Tele: sinus, reviewd    LABS:   Lab Results (last 48 hours)     Procedure Component Value Units Date/Time    Blood Gas, Arterial [[id number]]  (Abnormal) Collected:  01/22/19 0801    Specimen:  Arterial Blood Updated:  01/22/19 0813     Site Arterial: right radial     Jose Luis's Test Positive     pH, Arterial 7.505 pH units      pCO2, Arterial 34.8 mm Hg      pO2, Arterial 63.8 mm Hg      HCO3, Arterial 26.8 mmol/L      Base Excess, Arterial 3.9 mmol/L      O2 Saturation, Arterial 93.5 %      Hemoglobin, Blood Gas 12.7 g/dL      Hematocrit, Blood Gas 37.0 %      Oxyhemoglobin 91.9 %      Methemoglobin 0.30 %      Carboxyhemoglobin 1.4 %      A-a Gradiant 39.4 mmHg      Temperature 98.6 C      Barometric Pressure for Blood Gas 737 mmHg      Modality Room Air     FIO2 21 %     POC Glucose Once  [[id number]]  (Abnormal) Collected:  01/22/19 0658    Specimen:  Blood Updated:  01/22/19 0704     Glucose 230 mg/dL     Osmolality, Calculated [[id number]]  (Normal) Collected:  01/22/19 0423    Specimen:  Blood Updated:  01/22/19 0512     Osmolality Calc 281.4 mOsm/kg     Comprehensive Metabolic Panel [[id number]]  (Abnormal) Collected:  01/22/19 0423    Specimen:  Blood Updated:  01/22/19 0511     Glucose 223 mg/dL      BUN 25 mg/dL      Creatinine 0.88 mg/dL      Sodium 135 mmol/L      Potassium 3.5 mmol/L      Chloride 102 mmol/L      CO2 27.6 mmol/L      Calcium 8.2 mg/dL      Total Protein 5.3 g/dL      Albumin 3.10 g/dL      ALT (SGPT) 18 U/L      AST (SGOT) 21 U/L      Alkaline Phosphatase 62 U/L      Comment: Note New Reference Ranges        Total Bilirubin 0.3 mg/dL      eGFR Non African Amer 63 mL/min/1.73      Globulin 2.2 gm/dL      A/G Ratio 1.4 g/dL      BUN/Creatinine Ratio 28.4     Anion Gap 5.4 mmol/L     Narrative:       The MDRD GFR formula is only valid for adults with stable renal function between ages 18 and 70.    C-reactive Protein [[id number]]  (Abnormal) Collected:  01/22/19 0423    Specimen:  Blood Updated:  01/22/19 0508     C-Reactive Protein 3.55 mg/dL     Protime-INR [[id number]]  (Normal) Collected:  01/22/19 0423    Specimen:  Blood Updated:  01/22/19 0448     Protime 14.3 Seconds      INR 1.09    Narrative:       Suggested INR therapeutic range for stable oral anticoagulant therapy:    Low Intensity therapy:   1.5-2.0  Moderate Intensity therapy:   2.0-3.0  High Intensity therapy:   2.5-4.0    CBC & Differential [[id number]] Collected:  01/22/19 0423    Specimen:  Blood Updated:  01/22/19 0439    Narrative:       The following orders were created for panel order CBC & Differential.  Procedure                               Abnormality         Status                     ---------                               -----------         ------                     CBC Auto Differential[[id number]]         Abnormal            Final result                 Please view results for these tests on the individual orders.    CBC Auto Differential [[id number]]  (Abnormal) Collected:  01/22/19 0423    Specimen:  Blood Updated:  01/22/19 0439     WBC 4.77 10*3/mm3      RBC 4.28 10*6/mm3      Hemoglobin 12.7 g/dL      Hematocrit 38.2 %      MCV 89.3 fL      MCH 29.7 pg      MCHC 33.2 g/dL      RDW 13.1 %      RDW-SD 42.2 fl      MPV 11.8 fL      Platelets 206 10*3/mm3      Neutrophil % 62.2 %      Lymphocyte % 23.1 %      Monocyte % 12.6 %      Eosinophil % 1.7 %      Basophil % 0.2 %      Immature Grans % 0.2 %      Neutrophils, Absolute 2.97 10*3/mm3      Lymphocytes, Absolute 1.10 10*3/mm3      Monocytes, Absolute 0.60 10*3/mm3      Eosinophils, Absolute 0.08 10*3/mm3      Basophils, Absolute 0.01 10*3/mm3      Immature Grans, Absolute 0.01 10*3/mm3     POC Glucose Once [[id number]]  (Abnormal) Collected:  01/21/19 2005    Specimen:  Blood Updated:  01/21/19 2012     Glucose 420 mg/dL     POC Glucose Once [[id number]]  (Abnormal) Collected:  01/21/19 1611    Specimen:  Blood Updated:  01/21/19 1617     Glucose 480 mg/dL     Blood Culture - Blood, Arm, Left [[id number]] Collected:  01/16/19 1435    Specimen:  Blood from Arm, Left Updated:  01/21/19 1530     Blood Culture No growth at 5 days    Blood Culture - Blood, Arm, Right [[id number]] Collected:  01/16/19 1446    Specimen:  Blood from Arm, Right Updated:  01/21/19 1530     Blood Culture No growth at 5 days    Prolactin [[id number]] Collected:  01/20/19 0525    Specimen:  Blood Updated:  01/21/19 1211     Prolactin 16.7 ng/mL     Narrative:       Performed at:  [address] [place] [address]  [id number]  : Rony Naik PhD, Phone:  [phone]    POC Glucose Once [[id number]]  (Abnormal) Collected:  01/21/19 1122    Specimen:  Blood Updated:  01/21/19 1130     Glucose 180 mg/dL     POC Glucose Once [[id number]]  (Normal) Collected:  01/21/19  0723    Specimen:  Blood Updated:  01/21/19 0729     Glucose 98 mg/dL     Osmolality, Calculated [[id number]]  (Normal) Collected:  01/21/19 0441    Specimen:  Blood Updated:  01/21/19 0553     Osmolality Calc 288.8 mOsm/kg     Basic Metabolic Panel [[id number]]  (Abnormal) Collected:  01/21/19 0441    Specimen:  Blood Updated:  01/21/19 0553     Glucose 129 mg/dL      BUN 29 mg/dL      Creatinine 0.81 mg/dL      Sodium 141 mmol/L      Potassium 3.4 mmol/L      Chloride 108 mmol/L      CO2 26.5 mmol/L      Calcium 8.3 mg/dL      eGFR Non African Amer 69 mL/min/1.73      BUN/Creatinine Ratio 35.8     Anion Gap 6.5 mmol/L     Narrative:       The MDRD GFR formula is only valid for adults with stable renal function between ages 18 and 70.    C-reactive Protein [[id number]]  (Abnormal) Collected:  01/21/19 0441    Specimen:  Blood Updated:  01/21/19 0552     C-Reactive Protein 4.64 mg/dL     CBC & Differential [[id number]] Collected:  01/21/19 0441    Specimen:  Blood Updated:  01/21/19 0524    Narrative:       The following orders were created for panel order CBC & Differential.  Procedure                               Abnormality         Status                     ---------                               -----------         ------                     CBC Auto Differential[[id number]]        Abnormal            Final result                 Please view results for these tests on the individual orders.    CBC Auto Differential [[id number]]  (Abnormal) Collected:  01/21/19 0441    Specimen:  Blood Updated:  01/21/19 0524     WBC 5.62 10*3/mm3      RBC 4.33 10*6/mm3      Hemoglobin 12.7 g/dL      Hematocrit 38.6 %      MCV 89.1 fL      MCH 29.3 pg      MCHC 32.9 g/dL      RDW 13.0 %      RDW-SD 41.7 fl      MPV 11.8 fL      Platelets 219 10*3/mm3      Neutrophil % 65.4 %      Lymphocyte % 18.0 %      Monocyte % 13.2 %      Eosinophil % 2.5 %      Basophil % 0.4 %      Immature Grans % 0.5 %      Neutrophils, Absolute 3.68  10*3/mm3      Lymphocytes, Absolute 1.01 10*3/mm3      Monocytes, Absolute 0.74 10*3/mm3      Eosinophils, Absolute 0.14 10*3/mm3      Basophils, Absolute 0.02 10*3/mm3      Immature Grans, Absolute 0.03 10*3/mm3     POC Glucose Once [[id number]]  (Abnormal) Collected:  01/20/19 2107    Specimen:  Blood Updated:  01/20/19 2131     Glucose 255 mg/dL     POC Glucose Once [[id number]]  (Abnormal) Collected:  01/20/19 1633    Specimen:  Blood Updated:  01/20/19 1658     Glucose 272 mg/dL     Respiratory Culture - Sputum, Cough [[id number]]  (Abnormal)  (Susceptibility) Collected:  01/16/19 1932    Specimen:  Sputum from Cough Updated:  01/20/19 1508     Respiratory Culture Scant growth (1+) Klebsiella pneumoniae      Scant growth (1+) Pseudomonas aeruginosa      Light growth (2+) Haemophilus influenzae Biotype II     Comment:   Resistance to ampicillin by production of beta lactamase may be an issue but resistance to cephalosporins (ceftriaxone and cefotaxime) is rare.  Macrolides (erythromycin) and fluoroquinolones (ciprofloxacin) are effective.  Therefore, routine susceptibility testing of clinical isolates as a guide to therapy is not necessary.        BETA LACTAMASE Positive     Gram Stain Occasional Gram positive bacilli      Few (2+) Gram positive cocci in pairs and chains      Occasional Gram negative bacilli      Moderate (3+) WBCs seen    Susceptibility      Klebsiella pneumoniae     AILYN     Amikacin Susceptible     Amoxicillin + Clavulanate Susceptible     Ampicillin Resistant     Ampicillin + Sulbactam Susceptible     Aztreonam Susceptible     Cefazolin Susceptible     Cefepime Susceptible     Cefotaxime Susceptible     Ceftazidime Susceptible     Ceftriaxone Susceptible     Cefuroxime sodium Susceptible     Ciprofloxacin Susceptible     Doripenem Susceptible     Ertapenem Susceptible     Gentamicin Susceptible     Imipenem Susceptible     Levofloxacin Susceptible     Piperacillin + Tazobactam Susceptible      Tetracycline Susceptible     Tobramycin Susceptible     Trimethoprim + Sulfamethoxazole Susceptible                Susceptibility      Pseudomonas aeruginosa     AILYN     Amikacin Susceptible     Aztreonam Susceptible     Cefepime Susceptible     Ceftazidime Susceptible     Ciprofloxacin Susceptible     Doripenem Susceptible     Gentamicin Susceptible     Imipenem Susceptible     Levofloxacin Susceptible     Piperacillin + Tazobactam Susceptible     Tobramycin Susceptible                    POC Glucose Once [[id number]]  (Abnormal) Collected:  01/20/19 1126    Specimen:  Blood Updated:  01/20/19 1203     Glucose 281 mg/dL           Radiology:  Imaging Results (last 72 hours)     Procedure Component Value Units Date/Time    FL Video Swallow [[id number]] Collected:  01/21/19 1038     Updated:  01/21/19 1053    Narrative:       FL VIDEO SWALLOW-     HISTORY: Aspiration; J18.9-Pneumonia, unspecified organism;  I48.91-Unspecified atrial fibrillation; I10-Essential (primary)  hypertension          TECHNIQUE:   Speech therapy service was present. The patient was examined in the  sitting lateral position and was given several consistencies of barium.     FINDINGS: There was no penetration or aspiration during the study.  Patient protected her airway adequately.        FLUOROSCOPY TIME: 0.9 minutes.     Images: Cine loop was acquired       Impression:       No evidence of aspiration.     For additional information please see the report provided by the speech  therapy service     This report was finalized on 1/21/2019 10:51 AM by Dr. Garry Benton MD.       Fiberoptic Endo (fees) [[id number]] Resulted:  01/19/19 1036     Updated:  01/19/19 1036    Narrative:       This procedure was auto-finalized with no dictation required.            Results for orders placed during the hospital encounter of 01/16/19   Adult Transthoracic Echo Complete W/ Cont if Necessary Per Protocol    Narrative · Estimated EF = 66%.  · Left ventricular  systolic function is normal.  · No significant valvular disease  · No change since 3/21/18            Assessment/Plan:   Septic shock secondary to Pseudomonas pneumonia (with Haemophilus and Klebsiella also present).  Patient appears to be improving, appreciate infectious disease input, she will complete a course of cefepime through 1/23    UTI, completing a Maxipime course as above.    Functional decline, physical therapy has been consulted, continue to try to improve functional status.    Paroxysmal atrial fibrillation, probably from the shock however recommendation for full anticoagulation.  Apparently Eliquis was too expensive, pharmacy is started dosing Coumadin, will continue Lovenox and discontinue when INR therapeutic.  Maintaining sinus rhythm at this time.    Mixed respiratory metabolic acidosis, resolved    PO2 was adequate on room air    Diabetes, wide fluctuations, apparently the family had brought in a frosty and a Sole's meal prior to the glucose of 480.  Levemir adjusted, sliding scale increased.    Altered mental status, appears to be improving, workup negative, cannot totally rule out a seizure event with elevated lactic acid and prolactin but EEG negative.    DVT prophylaxis, full dose Lovenox will serve for this as well

## 2019-01-22 NOTE — PLAN OF CARE
Problem: Fall Risk (Adult)  Goal: Identify Related Risk Factors and Signs and Symptoms  Outcome: Ongoing (interventions implemented as appropriate)    Goal: Absence of Fall  Outcome: Ongoing (interventions implemented as appropriate)      Problem: Patient Care Overview  Goal: Individualization and Mutuality  Outcome: Ongoing (interventions implemented as appropriate)    Goal: Discharge Needs Assessment  Outcome: Ongoing (interventions implemented as appropriate)    Goal: Interprofessional Rounds/Family Conf  Outcome: Ongoing (interventions implemented as appropriate)      Problem: Skin Injury Risk (Adult)  Goal: Identify Related Risk Factors and Signs and Symptoms  Outcome: Ongoing (interventions implemented as appropriate)    Goal: Skin Health and Integrity  Outcome: Ongoing (interventions implemented as appropriate)      Problem: Pneumonia (Adult)  Goal: Signs and Symptoms of Listed Potential Problems Will be Absent, Minimized or Managed (Pneumonia)  Outcome: Ongoing (interventions implemented as appropriate)      Problem: Diabetes, Type 2 (Adult)  Goal: Signs and Symptoms of Listed Potential Problems Will be Absent, Minimized or Managed (Diabetes, Type 2)  Outcome: Ongoing (interventions implemented as appropriate)      Problem: Self-Care Deficit (Adult,Obstetrics,Pediatric)  Goal: Identify Related Risk Factors and Signs and Symptoms  Outcome: Ongoing (interventions implemented as appropriate)    Goal: Improved Ability to Perform BADL and IADL  Outcome: Ongoing (interventions implemented as appropriate)

## 2019-01-22 NOTE — PROGRESS NOTES
PROGRESS NOTE         Patient Identification:  Name:  Ashley Geronimo  Age:  73 y.o.  Sex:  female  :  1945  MRN:  [id number]  Visit Number:  [id number]  Primary Care Provider:  Silvano Parker MD      ----------------------------------------------------------------------------------------------------------------------  Subjective       Chief Complaints:    Loss of Consciousness        Interval History:      She is feeling better today, CRP level continues to improve down from 4.64 to 3.55 with normal white count. Respiratory culture from 19 finalized as Klebsi, pseudomonas and Haemophilus with susceptibility  to Cefepime. No fever reported overnight.    Review of Systems:    Unable to obtain. Confused.        ----------------------------------------------------------------------------------------------------------------------      Objective       Current Hospital Meds:    aspirin 81 mg Oral Daily   atorvastatin 40 mg Oral Nightly   budesonide-formoterol 2 puff Inhalation BID - RT   cefepime 2 g Intravenous Q12H   enoxaparin 1 mg/kg Subcutaneous Q12H   insulin aspart 0-9 Units Subcutaneous 4x Daily AC & at Bedtime   insulin detemir 10 Units Subcutaneous Nightly   ipratropium-albuterol 3 mL Nebulization Q6H - RT   lactobacillus acidophilus 1 capsule Oral Daily   metoprolol tartrate 25 mg Oral Q12H   NIFEdipine CC 60 mg Oral Q24H   pantoprazole 40 mg Oral QAM AC   sodium chloride 10 mL Intravenous Q12H   sodium chloride 10 mL Intravenous Q12H   sodium chloride 10 mL Intravenous Q12H   sodium chloride 3 mL Intravenous Q12H   warfarin 5 mg Oral Once       Pharmacy to dose warfarin      ----------------------------------------------------------------------------------------------------------------------    Vital Signs:  Temp:  [97.8 °F (36.6 °C)-98.8 °F (37.1 °C)] 98.8 °F (37.1 °C)  Heart Rate:  [] 77  Resp:  [18-20] 18  BP: (118-157)/(51-85) 157/67  Mean Arterial Pressure (Non-Invasive)  for the past 24 hrs (Last 3 readings):   Noninvasive MAP (mmHg)   01/22/19 0228 109   01/21/19 1807 83   01/21/19 1439 80     SpO2 Percentage    01/22/19 0228 01/22/19 0652 01/22/19 0722   SpO2: 94% 93% 92%     SpO2:  [89 %-98 %] 92 %  on  Flow (L/min):  [2] 2;   Device (Oxygen Therapy): room air    Body mass index is 27.42 kg/m².  Wt Readings from Last 3 Encounters:   01/22/19 63.7 kg (140 lb 6.4 oz)   08/07/13 66.7 kg (147 lb 0 oz)   08/05/13 66.7 kg (147 lb 0 oz)        Intake/Output Summary (Last 24 hours) at 1/22/2019 1004  Last data filed at 1/22/2019 0300  Gross per 24 hour   Intake 720 ml   Output --   Net 720 ml     Diet Regular; Thin; Consistent Carbohydrate  ----------------------------------------------------------------------------------------------------------------------    Physical exam:    Constitutional:  Well-developed and well-nourished.  No respiratory distress.      HENT:  Head: Normocephalic and atraumatic.  Mouth:  Moist mucous membranes.    Eyes:  Conjunctivae and EOM are normal.  No scleral icterus.  Neck:  Neck supple.  No JVD present.    Cardiovascular:  Normal rate, regular rhythm and normal heart sounds with no murmur. No edema.  Pulmonary/Chest:  No respiratory distress, bilateral scattered rhonchi.  Abdominal:  Soft.  Bowel sounds are normal.  No distension and no tenderness.   Musculoskeletal:  No edema, no tenderness, and no deformity.  No swelling or redness of joints.  Neurological:  Alert and oriented to person.  No facial droop.  No slurred speech.   Skin:  Skin is warm and dry.  No rash noted.  No pallor.   Psychiatric:  Normal mood and affect.  Behavior is normal.          ----------------------------------------------------------------------------------------------------------------------  I have personally reviewed the EKG/Telemetry strips   ----------------------------------------------------------------------------------------------------------------------  Results from  last 7 days   Lab Units 01/17/19  0853 01/17/19  0406 01/16/19  2053   TROPONIN I ng/mL 0.173* 0.176* 0.076*       Results from last 7 days   Lab Units 01/18/19  0317   CHOLESTEROL mg/dL 116   TRIGLYCERIDES mg/dL 101   HDL CHOL mg/dL 43*   LDL CHOL mg/dL 53     Results from last 7 days   Lab Units 01/22/19  0801   PH, ARTERIAL pH units 7.505*   PO2 ART mm Hg 63.8*   PCO2, ARTERIAL mm Hg 34.8*   HCO3 ART mmol/L 26.8*     Results from last 7 days   Lab Units 01/22/19 0423 01/21/19 0441 01/20/19  0525 01/19/19  0339  01/17/19  1405 01/17/19  0853  01/16/19  1848  01/16/19  1436   CRP mg/dL 3.55* 4.64* 7.15*  --    < > 4.03*  --   --   --   --  1.69*   LACTATE mmol/L  --   --   --   --   --  1.9 1.6  --  2.8*   < >  --    WBC 10*3/mm3 4.77 5.62  --  7.88   < >  --   --    < > 13.55*  --  9.99   HEMOGLOBIN g/dL 12.7 12.7  --  12.0   < >  --   --    < > 13.6  --  15.1   HEMATOCRIT % 38.2 38.6  --  37.5   < >  --   --    < > 41.3  --  45.1   MCV fL 89.3 89.1  --  92.1   < >  --   --    < > 90.2  --  88.8   MCHC g/dL 33.2 32.9*  --  32.0*   < >  --   --    < > 32.9*  --  33.5   PLATELETS 10*3/mm3 206 219  --  182   < >  --   --    < > 236  --  277   INR  1.09  --   --   --   --   --   --   --  0.93  --  0.92    < > = values in this interval not displayed.     Results from last 7 days   Lab Units 01/22/19 0423 01/21/19 0441 01/20/19  0525 01/18/19  0317  01/17/19  0028  01/16/19  1436   SODIUM mmol/L 135 141 140   < > 141   < > 135   < > 130*   POTASSIUM mmol/L 3.5 3.4* 3.7   < > 3.9   < > 4.4   < > 4.5   MAGNESIUM mg/dL  --   --  1.8  --  2.6  --  1.6*  --  2.1   CHLORIDE mmol/L 102 108 107   < > 111   < > 102   < > 94*   CO2 mmol/L 27.6 26.5 21.8*   < > 19.6*   < > 24.0*   < > 19.4*   BUN mg/dL 25* 29* 38*   < > 37*   < > 26*   < > 32*   CREATININE mg/dL 0.88 0.81 1.03   < > 1.29   < > 1.01   < > 1.45*   EGFR IF NONAFRICN AM mL/min/1.73 63 69 52*   < > 40*   < > 54*   < > 35*   CALCIUM mg/dL 8.2 8.3 8.1   < > 7.9   <  > 8.1   < > 9.6   GLUCOSE mg/dL 223* 129* 260*   < > 138*   < > 125*   < > 485*   ALBUMIN g/dL 3.10*  --   --   --   --   --  3.60  --  4.80   BILIRUBIN mg/dL 0.3  --   --   --   --   --  0.3  --  0.6   ALK PHOS U/L 62  --   --   --   --   --  68  --  97   AST (SGOT) U/L 21  --   --   --   --   --  22  --  25   ALT (SGPT) U/L 18  --   --   --   --   --  12  --  13    < > = values in this interval not displayed.   Estimated Creatinine Clearance: 46.8 mL/min (by C-G formula based on SCr of 0.88 mg/dL).  No results found for: AMMONIA    Glucose   Date/Time Value Ref Range Status   01/22/2019 0658 230 (H) 70 - 130 mg/dL Final   01/21/2019 2005 420 (H) 70 - 130 mg/dL Final   01/21/2019 1611 480 (C) 70 - 130 mg/dL Final   01/21/2019 1122 180 (H) 70 - 130 mg/dL Final   01/21/2019 0723 98 70 - 130 mg/dL Final   01/20/2019 2107 255 (H) 70 - 130 mg/dL Final   01/20/2019 1633 272 (H) 70 - 130 mg/dL Final   01/20/2019 1126 281 (H) 70 - 130 mg/dL Final     Lab Results   Component Value Date    HGBA1C 11.50 (H) 01/16/2019     Lab Results   Component Value Date    TSH 4.095 01/16/2019       Blood Culture   Date Value Ref Range Status   01/16/2019 No growth at 5 days  Final   01/16/2019 No growth at 5 days  Final     Urine Culture   Date Value Ref Range Status   01/16/2019 >100,000 CFU/mL Escherichia coli (A)  Final           Respiratory Culture   Date Value Ref Range Status   01/16/2019 Scant growth (1+) Klebsiella pneumoniae (A)  Final   01/16/2019 Scant growth (1+) Pseudomonas aeruginosa (A)  Final   01/16/2019 (A)  Final    Light growth (2+) Haemophilus influenzae Biotype II     Comment:       Resistance to ampicillin by production of beta lactamase may be an issue but resistance to cephalosporins (ceftriaxone and cefotaxime) is rare.  Macrolides (erythromycin) and fluoroquinolones (ciprofloxacin) are effective.  Therefore, routine susceptibility testing of clinical isolates as a guide to therapy is not necessary.     Pain  Management Panel     Pain Management Panel Latest Ref Rng & Units 1/16/2019 4/12/2018    CREATININE UR mg/dL - 96.6    AMPHETAMINES SCREEN, URINE Negative Negative -    BARBITURATES SCREEN Negative Negative -    BENZODIAZEPINE SCREEN, URINE Negative Negative -    BUPRENORPHINE Negative Negative -    COCAINE SCREEN, URINE Negative Negative -    METHADONE SCREEN, URINE Negative Negative -          I have personally reviewed the above laboratory results.   ----------------------------------------------------------------------------------------------------------------------  Imaging Results (last 24 hours)     Procedure Component Value Units Date/Time    FL Video Swallow [[id number]] Collected:  01/21/19 1038     Updated:  01/21/19 1053    Narrative:       FL VIDEO SWALLOW-     HISTORY: Aspiration; J18.9-Pneumonia, unspecified organism;  I48.91-Unspecified atrial fibrillation; I10-Essential (primary)  hypertension          TECHNIQUE:   Speech therapy service was present. The patient was examined in the  sitting lateral position and was given several consistencies of barium.     FINDINGS: There was no penetration or aspiration during the study.  Patient protected her airway adequately.        FLUOROSCOPY TIME: 0.9 minutes.     Images: Cine loop was acquired       Impression:       No evidence of aspiration.     For additional information please see the report provided by the speech  therapy service     This report was finalized on 1/21/2019 10:51 AM by Dr. Garry Benton MD.           I have personally reviewed the above radiology results.   ----------------------------------------------------------------------------------------------------------------------    Assessment/Plan       Assessment/Plan     ASSESSMENT:    1. Septic shock, with lactic acid greater than 4 on admission  2. Pseudomonas Pneumonia      PLAN:    She is feeling better today, CRP level continues to improve down from 4.64 to 3.55 with normal white count.  Respiratory culture from 1/16/19 finalized as Klebsi, pseudomonas and Haemophilus with susceptibility  to Cefepime. No fever reported overnight.    Recommend to continue Cefepime through 1/23/19.     CRP in the am.      Code Status:   Code Status and Medical Interventions:   Ordered at: 01/16/19 1648     Code Status:    CPR     Medical Interventions (Level of Support Prior to Arrest):    Full       ANDREEA Sylvester  01/22/19  10:04 AM

## 2019-01-22 NOTE — PLAN OF CARE
Problem: Patient Care Overview  Goal: Plan of Care Review  Outcome: Outcome(s) achieved Date Met: 01/22/19 01/22/19 9497   Coping/Psychosocial   Plan of Care Reviewed With patient   Plan of Care Review   Progress no change    Pt seen at bedside this am for diet tolerance. She is s/p MBS 1/21/19 w/ recommendation for upgrade to regular consistency, thin liquids. She accepts thin liquid via straw today, no overt s/s aspiration, tolerating current po diet. No further formal SLP f/u warranted at this time.

## 2019-01-22 NOTE — THERAPY TREATMENT NOTE
Acute Care - Occupational Therapy Treatment Note  [facility]     Patient Name: Ashley Geronimo  : 1945  MRN: [id number]  Today's Date: 2019  Onset of Illness/Injury or Date of Surgery: 19     Referring Physician: Dr. Chavez    Admit Date: 2019       ICD-10-CM ICD-9-CM   1. Pneumonia of both lungs due to infectious organism, unspecified part of lung J18.9 483.8   2. Atrial fibrillation with RVR (CMS/[facility]) I48.91 427.31   3. Essential hypertension I10 401.9     Patient Active Problem List   Diagnosis   • Pneumonia   • Septic shock (CMS/[facility])   • Pseudomonas pneumonia (CMS/[facility])     Past Medical History:   Diagnosis Date   • Diabetes mellitus (CMS/[facility])    • Hypertension      History reviewed. No pertinent surgical history.    Therapy Treatment    Rehabilitation Treatment Summary     Row Name 19 1534 19 1100          Treatment Time/Intention    Discipline  occupational therapist  -  physical therapist  -BC     Document Type  therapy note (daily note)  -  therapy note (daily note)  -BC     Subjective Information  no complaints  -  no complaints  -BC     Mode of Treatment  occupational therapy;individual therapy  -  physical therapy;individual therapy  -BC     Patient/Family Observations  Pt supine and agreeable to OT treatment  -  --     Therapy Frequency (PT Clinical Impression)  --  3 times/wk 3-5x/week  -BC     Therapy Frequency (OT Eval)  3 times/wk 3-5 x per week  -  --     Patient Effort  adequate  -  adequate  -BC     Comment  Pt and nursing agreeable to OT treatment  -  Pt tolerated walking 250' with intermittent short breaks  -BC     Existing Precautions/Restrictions  fall;seizures  -  fall;seizures  -BC     Patient Response to Treatment  Pt tolerated OT treatment fair this date w/ rest as needed  -  --     Recorded by [KH] Rocío Guzman, OT 19 1536 [BC] Laura Cali, PT 19 1126     Row Name 19 1534 19 1100           Cognitive Assessment/Intervention- PT/OT    Affect/Mental Status (Cognitive)  confused  -KH  confused  -BC     Orientation Status (Cognition)  oriented to;person  -KH  oriented to;person  -BC     Follows Commands (Cognition)  physical/tactile prompts required;increased processing time needed;repetition of directions required;verbal cues/prompting required  -KH  physical/tactile prompts required;increased processing time needed;repetition of directions required;verbal cues/prompting required  -BC     Recorded by [KH] Rocío Guzman, OT 01/22/19 1536 [BC] Laura Cali, PT 01/22/19 1126     Row Name 01/22/19 1100             Bed Mobility Assessment/Treatment    Bed Mobility Assessment/Treatment  sit-supine;supine-sit-supine  -BC      Sit-Supine Barnes (Bed Mobility)  minimum assist (75% patient effort);verbal cues;nonverbal cues (demo/gesture)  -BC      Assistive Device (Bed Mobility)  bed rails;draw sheet  -BC      Recorded by [BC] Laura Cali, PT 01/22/19 1126      Row Name 01/22/19 1100             Transfer Assessment/Treatment    Transfer Assessment/Treatment  sit-stand transfer;stand-sit transfer  -BC      Maintains Weight-bearing Status (Transfers)  able to maintain  -BC      Recorded by [BC] Laura Cali, PT 01/22/19 1126      Row Name 01/22/19 1100             Sit-Stand Transfer    Sit-Stand Barnes (Transfers)  minimum assist (75% patient effort);2 person assist;verbal cues;nonverbal cues (demo/gesture)  -BC      Assistive Device (Sit-Stand Transfers)  walker, front-wheeled  -BC      Recorded by [BC] Laura Cali, PT 01/22/19 1126      Row Name 01/22/19 1100             Stand-Sit Transfer    Stand-Sit Barnes (Transfers)  minimum assist (75% patient effort);2 person assist;verbal cues;nonverbal cues (demo/gesture)  -BC      Assistive Device (Stand-Sit Transfers)  walker, front-wheeled  -BC      Recorded by [BC] Laura Cali, PT 01/22/19 1126      Row Name  01/22/19 1100             Gait/Stairs Assessment/Training    Gait/Stairs Assessment/Training  gait/ambulation independence  -BC      Gaines Level (Gait)  minimum assist (75% patient effort);moderate assist (50% patient effort);2 person assist  -BC      Assistive Device (Gait)  walker, front-wheeled  -BC      Distance in Feet (Gait)  250  -BC      Recorded by [BC] Laura Cali, PT 01/22/19 1126      Row Name 01/22/19 1534             Motor Skills Assessment/Interventions    Additional Documentation  Therapeutic Exercise (Group)  -      Recorded by [] Rocío Guzman, OT 01/22/19 1536      Row Name 01/22/19 1534             Therapeutic Exercise    Therapeutic Exercise  supine, upper extremities  -      Additional Documentation  Therapeutic Exercise (Row)  -      Recorded by [] Rocío Guzman, OT 01/22/19 1536      Row Name 01/22/19 1534             Upper Extremity Supine Therapeutic Exercise    Performed, Supine Upper Extremity (Therapeutic Exercise)  shoulder flexion/extension;shoulder/scapular protraction/retraction;elbow flexion/extension  -      Device, Supine Upper Extremity (Therapeutic Exercise)  -- weighted dowel  -      Exercise Type, Supine Upper Extremity (Therapeutic Exercise)  AROM (active range of motion);AAROM (active assistive range of motion) BUE GMC, BUE TherEx/Act  -      Expected Outcomes, Supine Upper Extremity (Therapeutic Exercise)  improve functional tolerance, self-care activity;improve performance, BADLs  -      Sets/Reps Detail, Supine Upper Extremity (Therapeutic Exercise)  3 BUE strengthening/endurance exercises x 15 reps each  -      Recorded by [KH] Rocío Guzman, OT 01/22/19 1536      Row Name 01/22/19 1534 01/22/19 1100          Positioning and Restraints    Pre-Treatment Position  in bed  -KH  in bed  -BC     Post Treatment Position  bed  -  bed  -BC     In Bed  notified nsg;supine;call light within reach;encouraged to  call for assist;exit alarm on;side rails up x3 seizure pads in place  -  notified nsg;call light within reach;with family/caregiver;side rails up x3  -BC     Recorded by [] Rocío Guzman, OT 01/22/19 1536 [BC] Laura Cali, PT 01/22/19 1126       User Key  (r) = Recorded By, (t) = Taken By, (c) = Cosigned By    Initials Name Effective Dates Discipline    BC Laura Cali, PT 03/14/16 -  PT    Rocío Roth, OT 04/17/18 -  OT                 OT Recommendation and Plan  Outcome Summary/Treatment Plan (OT)  Anticipated Equipment Needs at Discharge (OT): (TBD)  Planned Therapy Interventions (OT Eval): activity tolerance training, adaptive equipment training, BADL retraining, functional balance retraining, occupation/activity based interventions, patient/caregiver education/training, ROM/therapeutic exercise, strengthening exercise, transfer/mobility retraining  Therapy Frequency (OT Eval): 3 times/wk(3-5 x per week)  Plan of Care Review  Plan of Care Reviewed With: patient  Plan of Care Reviewed With: patient  Outcome Summary: Pt tolerated OT treatment fair this date w/ rest as needed. Completed 3 BUE x 15 reps each. Skilled OT to continue current POC as tolerated.   Outcome Measures     Row Name 01/22/19 1100 01/21/19 1424 01/21/19 1214       How much help from another person do you currently need...    Turning from your back to your side while in flat bed without using bedrails?  3  -BC  3  -LB  --    Moving from lying on back to sitting on the side of a flat bed without bedrails?  3  -BC  3  -LB  --    Moving to and from a bed to a chair (including a wheelchair)?  3  -BC  3  -LB  --    Standing up from a chair using your arms (e.g., wheelchair, bedside chair)?  3  -BC  3  -LB  --    Climbing 3-5 steps with a railing?  2  -BC  2  -LB  --    To walk in hospital room?  3  -BC  2  -LB  --    AM-PAC 6 Clicks Score  17  -BC  16  -LB  --       How much help from another is currently  needed...    Putting on and taking off regular lower body clothing?  --  --  2  -KH    Bathing (including washing, rinsing, and drying)  --  --  2  -KH    Toileting (which includes using toilet bed pan or urinal)  --  --  2  -KH    Putting on and taking off regular upper body clothing  --  --  3  -KH    Taking care of personal grooming (such as brushing teeth)  --  --  3  -KH    Eating meals  --  --  3  -KH    Score  --  --  15  -KH       Functional Assessment    Outcome Measure Options  AM-PAC 6 Clicks Basic Mobility (PT)  -BC  AM-PAC 6 Clicks Basic Mobility (PT)  -  AM-PAC 6 Clicks Daily Activity (OT)  -      User Key  (r) = Recorded By, (t) = Taken By, (c) = Cosigned By    Initials Name Provider Type    Anjelica Gonsalez, PT Physical Therapist    Laura Jeong, PT Physical Therapist    Rocío Roth, OT Occupational Therapist           Time Calculation:   Time Calculation- OT     Row Name 01/22/19 1537 01/22/19 1127          Time Calculation- OT    Total Timed Code Minutes- OT  14 minute(s)  -  --        Timed Charges    50153 - Gait Training Minutes   --  15  -BC     26174 - OT Therapeutic Activity Minutes  14  -KH  --       User Key  (r) = Recorded By, (t) = Taken By, (c) = Cosigned By    Initials Name Provider Type    BC Laura Cali, PT Physical Therapist    Rocío Roth, OT Occupational Therapist           Therapy Suggested Charges     Code   Minutes Charges    08633 (CPT®) Hc Ot Neuromusc Re Education Ea 15 Min      13719 (CPT®) Hc Ot Ther Proc Ea 15 Min      15801 (CPT®) Hc Ot Therapeutic Act Ea 15 Min 14 1    40008 (CPT®) Hc Ot Manual Therapy Ea 15 Min      81807 (CPT®) Hc Ot Iontophoresis Ea 15 Min      15703 (CPT®) Hc Ot Elec Stim Ea-Per 15 Min      24534 (CPT®) Hc Ot Ultrasound Ea 15 Min      85178 (CPT®) Hc Ot Self Care/Mgmt/Train Ea 15 Min      Total  14 1        Therapy Charges for Today     Code Description Service Date Service Provider  Modifiers Qty    20623286697  OT EVAL HIGH COMPLEXITY 2 1/21/2019 Rocío Guzman, OT GO 1    [id number]  OT THERAPEUTIC ACT EA 15 MIN 1/22/2019 Rocío Guzman, OT GO 1          OT G-codes  OT Professional Judgement Used?: Yes  OT Functional Scales Options: AM-PAC 6 Clicks Daily Activity (OT)  Functional Assessment Tool Used: FIM  Functional Limitation: Self care  Self Care Current Status (): At least 40 percent but less than 60 percent impaired, limited or restricted  Self Care Goal Status (): At least 20 percent but less than 40 percent impaired, limited or restricted    Roíco Guzman, OT  1/22/2019

## 2019-01-22 NOTE — PROGRESS NOTES
Acute Care - Speech Language Pathology   Swallow Re-Assessment [facility]     Patient Name: Ashley Geronimo  : 1945  MRN: [id number]  Today's Date: 2019  Onset of Illness/Injury or Date of Surgery: 19     Referring Physician: Dr. Chavez      Admit Date: 2019    Visit Dx:     ICD-10-CM ICD-9-CM   1. Pneumonia of both lungs due to infectious organism, unspecified part of lung J18.9 483.8   2. Atrial fibrillation with RVR (CMS/[facility]) I48.91 427.31   3. Essential hypertension I10 401.9     Patient Active Problem List   Diagnosis   • Pneumonia   • Septic shock (CMS/[facility])   • Pseudomonas pneumonia (CMS/[facility])     Past Medical History:   Diagnosis Date   • Diabetes mellitus (CMS/[facility])    • Hypertension      History reviewed. No pertinent surgical history.     Ms. Geronimo is seen at bedside this am for diet tolerance. She is s/p MBS 19 w/ upgrade to regular consistency, thin liquids. She is a/a this am, accepts multiple po presentations of thin water via straw. No overt s/s aspiration, no s/s indicative of silent aspiration as she is w/o changes in vocal quality, respirations or secretions post po presentations. She reports she is tolerating current po diet well.     EDUCATION  The patient has been educated in the following areas:   Dysphagia (Swallowing Impairment).    SLP Recommendation and Plan  Continue regular consistency diet, thin liquids. No further formal SLP f/u warranted at this time.     D/w RN, Geovanny, pt status and recommendations w/ verbal agreement. He denies pt w/ any overt s/s aspiration.     Thank you-  Rossi Oleary M.A., CCC-SLP    Plan of Care Reviewed With: patient  Plan of Care Review  Plan of Care Reviewed With: patient  Progress: no change    Time Calculation:     Therapy Charges for Today     Code Description Service Date Service Provider Modifiers Qty    01570289676 HC ST TREATMENT SWALLOW 1 2019 Rossi Oleary MA,CCC-SLP GN 1    28191839456 HC ST MOTION  FLUORO EVAL SWALLOW 8 1/21/2019 Rossi Oleary MA,CCC-SLP GN 1    [id number] HC ST TREATMENT SWALLOW 1 1/22/2019 Rossi Oleary MA,CCC-SLP GN 1        SLP G-Codes  SLP NOMS Used?: Yes  Functional Limitations: Swallowing  Swallow Current Status (): 0 percent impaired, limited or restricted  Swallow Goal Status (): 0 percent impaired, limited or restricted  Swallow Discharge Status (): 0 percent impaired, limited or restricted    Rossi Oleary MA,CCC-SLP  1/22/2019

## 2019-01-22 NOTE — PLAN OF CARE
Problem: Fall Risk (Adult)  Goal: Identify Related Risk Factors and Signs and Symptoms   01/17/19 0818   Fall Risk (Adult)   Related Risk Factors (Fall Risk) age-related changes;confusion/agitation;environment unfamiliar   Signs and Symptoms (Fall Risk) presence of risk factors     Goal: Absence of Fall   01/21/19 1022   Fall Risk (Adult)   Absence of Fall making progress toward outcome       Problem: Patient Care Overview  Goal: Individualization and Mutuality  Outcome: Ongoing (interventions implemented as appropriate)    Goal: Discharge Needs Assessment   01/16/19 2038 01/17/19 1130 01/20/19 0950   Discharge Needs Assessment   Readmission Within the Last 30 Days --  --  no previous admission in last 30 days   Concerns to be Addressed --  --  denies needs/concerns at this time   Patient/Family Anticipates Transition to --  home with family --    Patient/Family Anticipated Services at Transition none --  --    Transportation Anticipated --  car, drives self --    Equipment Needed After Discharge --  none --    Disability   Equipment Currently Used at Home --  none --      Goal: Interprofessional Rounds/Family Conf   01/21/19 2348   Interdisciplinary Rounds/Family Conf   Participants patient       Problem: Skin Injury Risk (Adult)  Goal: Identify Related Risk Factors and Signs and Symptoms   01/17/19 2106   Skin Injury Risk (Adult)   Related Risk Factors (Skin Injury Risk) advanced age;critical care admission;mechanical forces     Goal: Skin Health and Integrity   01/21/19 1022   Skin Injury Risk (Adult)   Skin Health and Integrity making progress toward outcome       Problem: Pneumonia (Adult)  Goal: Signs and Symptoms of Listed Potential Problems Will be Absent, Minimized or Managed (Pneumonia)   01/21/19 0116   Goal/Outcome Evaluation   Problems Assessed (Pneumonia) all   Problems Present (Pneumonia) none       Problem: Self-Care Deficit (Adult,Obstetrics,Pediatric)  Goal: Identify Related Risk Factors and Signs  and Symptoms  Outcome: Ongoing (interventions implemented as appropriate)    Goal: Improved Ability to Perform BADL and IADL   01/21/19 4153   Self-Care Deficit (Adult,Obstetrics,Pediatric)   Improved Ability to Perform BADL and IADL making progress toward outcome

## 2019-01-22 NOTE — PLAN OF CARE
Problem: Patient Care Overview  Goal: Plan of Care Review  Outcome: Ongoing (interventions implemented as appropriate)   01/22/19 1127   Coping/Psychosocial   Plan of Care Reviewed With patient   Plan of Care Review   Progress improving      01/22/19 1127   Coping/Psychosocial   Plan of Care Reviewed With patient   Plan of Care Review   Progress improving

## 2019-01-23 LAB
ANION GAP SERPL CALCULATED.3IONS-SCNC: 5.6 MMOL/L (ref 3.6–11.2)
BASOPHILS # BLD AUTO: 0.01 10*3/MM3 (ref 0–0.3)
BASOPHILS NFR BLD AUTO: 0.2 % (ref 0–2)
BUN BLD-MCNC: 27 MG/DL (ref 7–21)
BUN/CREAT SERPL: 35.5 (ref 7–25)
CALCIUM SPEC-SCNC: 8.2 MG/DL (ref 7.7–10)
CHLORIDE SERPL-SCNC: 108 MMOL/L (ref 99–112)
CO2 SERPL-SCNC: 26.4 MMOL/L (ref 24.3–31.9)
CREAT BLD-MCNC: 0.76 MG/DL (ref 0.43–1.29)
CRP SERPL-MCNC: 2.35 MG/DL (ref 0–0.99)
DEPRECATED RDW RBC AUTO: 42.8 FL (ref 37–54)
EOSINOPHIL # BLD AUTO: 0.07 10*3/MM3 (ref 0–0.7)
EOSINOPHIL NFR BLD AUTO: 1.4 % (ref 0–7)
ERYTHROCYTE [DISTWIDTH] IN BLOOD BY AUTOMATED COUNT: 13.2 % (ref 11.5–14.5)
GFR SERPL CREATININE-BSD FRML MDRD: 74 ML/MIN/1.73
GLUCOSE BLD-MCNC: 42 MG/DL (ref 70–110)
GLUCOSE BLDC GLUCOMTR-MCNC: 145 MG/DL (ref 70–130)
GLUCOSE BLDC GLUCOMTR-MCNC: 156 MG/DL (ref 70–130)
GLUCOSE BLDC GLUCOMTR-MCNC: 171 MG/DL (ref 70–130)
GLUCOSE BLDC GLUCOMTR-MCNC: 397 MG/DL (ref 70–130)
GLUCOSE BLDC GLUCOMTR-MCNC: 440 MG/DL (ref 70–130)
GLUCOSE BLDC GLUCOMTR-MCNC: 46 MG/DL (ref 70–130)
HCT VFR BLD AUTO: 39.9 % (ref 37–47)
HGB BLD-MCNC: 12.9 G/DL (ref 12–16)
IMM GRANULOCYTES # BLD AUTO: 0.02 10*3/MM3 (ref 0–0.03)
IMM GRANULOCYTES NFR BLD AUTO: 0.4 % (ref 0–0.5)
INR PPP: 1.57 (ref 0.9–1.1)
LYMPHOCYTES # BLD AUTO: 1.05 10*3/MM3 (ref 1–3)
LYMPHOCYTES NFR BLD AUTO: 20.4 % (ref 16–46)
MAGNESIUM SERPL-MCNC: 1.6 MG/DL (ref 1.7–2.6)
MCH RBC QN AUTO: 29.2 PG (ref 27–33)
MCHC RBC AUTO-ENTMCNC: 32.3 G/DL (ref 33–37)
MCV RBC AUTO: 90.3 FL (ref 80–94)
MONOCYTES # BLD AUTO: 0.64 10*3/MM3 (ref 0.1–0.9)
MONOCYTES NFR BLD AUTO: 12.5 % (ref 0–12)
NEUTROPHILS # BLD AUTO: 3.35 10*3/MM3 (ref 1.4–6.5)
NEUTROPHILS NFR BLD AUTO: 65.1 % (ref 40–75)
OSMOLALITY SERPL CALC.SUM OF ELEC: 281.4 MOSM/KG (ref 273–305)
PHOSPHATE SERPL-MCNC: 3.4 MG/DL (ref 2.7–4.5)
PLATELET # BLD AUTO: 212 10*3/MM3 (ref 130–400)
PMV BLD AUTO: 11.5 FL (ref 6–10)
POTASSIUM BLD-SCNC: 3.9 MMOL/L (ref 3.5–5.3)
PROTHROMBIN TIME: 19 SECONDS (ref 11–15.4)
RBC # BLD AUTO: 4.42 10*6/MM3 (ref 4.2–5.4)
SODIUM BLD-SCNC: 140 MMOL/L (ref 135–153)
WBC NRBC COR # BLD: 5.14 10*3/MM3 (ref 4.5–12.5)

## 2019-01-23 PROCEDURE — 99232 SBSQ HOSP IP/OBS MODERATE 35: CPT | Performed by: PHYSICIAN ASSISTANT

## 2019-01-23 PROCEDURE — 94799 UNLISTED PULMONARY SVC/PX: CPT

## 2019-01-23 PROCEDURE — 85610 PROTHROMBIN TIME: CPT | Performed by: INTERNAL MEDICINE

## 2019-01-23 PROCEDURE — 25010000002 ENOXAPARIN PER 10 MG: Performed by: HOSPITALIST

## 2019-01-23 PROCEDURE — 86140 C-REACTIVE PROTEIN: CPT | Performed by: NURSE PRACTITIONER

## 2019-01-23 PROCEDURE — 84100 ASSAY OF PHOSPHORUS: CPT | Performed by: INTERNAL MEDICINE

## 2019-01-23 PROCEDURE — 80048 BASIC METABOLIC PNL TOTAL CA: CPT | Performed by: INTERNAL MEDICINE

## 2019-01-23 PROCEDURE — 97116 GAIT TRAINING THERAPY: CPT

## 2019-01-23 PROCEDURE — 99232 SBSQ HOSP IP/OBS MODERATE 35: CPT | Performed by: INTERNAL MEDICINE

## 2019-01-23 PROCEDURE — 97530 THERAPEUTIC ACTIVITIES: CPT

## 2019-01-23 PROCEDURE — 63710000001 INSULIN ASPART PER 5 UNITS: Performed by: PHYSICIAN ASSISTANT

## 2019-01-23 PROCEDURE — 97530 THERAPEUTIC ACTIVITIES: CPT | Performed by: OCCUPATIONAL THERAPIST

## 2019-01-23 PROCEDURE — 63710000001 INSULIN DETEMIR PER 5 UNITS: Performed by: PHYSICIAN ASSISTANT

## 2019-01-23 PROCEDURE — 82962 GLUCOSE BLOOD TEST: CPT

## 2019-01-23 PROCEDURE — 25010000002 CEFEPIME 2 G/NS 100 ML SOLUTION: Performed by: HOSPITALIST

## 2019-01-23 PROCEDURE — 83735 ASSAY OF MAGNESIUM: CPT | Performed by: INTERNAL MEDICINE

## 2019-01-23 PROCEDURE — 85025 COMPLETE CBC W/AUTO DIFF WBC: CPT | Performed by: INTERNAL MEDICINE

## 2019-01-23 RX ORDER — POTASSIUM CHLORIDE 20 MEQ/1
40 TABLET, EXTENDED RELEASE ORAL AS NEEDED
Status: DISCONTINUED | OUTPATIENT
Start: 2019-01-23 | End: 2019-01-24 | Stop reason: HOSPADM

## 2019-01-23 RX ORDER — WARFARIN SODIUM 2.5 MG/1
2.5 TABLET ORAL
Status: COMPLETED | OUTPATIENT
Start: 2019-01-23 | End: 2019-01-23

## 2019-01-23 RX ORDER — POTASSIUM CHLORIDE 1.5 G/1.77G
40 POWDER, FOR SOLUTION ORAL AS NEEDED
Status: DISCONTINUED | OUTPATIENT
Start: 2019-01-23 | End: 2019-01-24 | Stop reason: HOSPADM

## 2019-01-23 RX ORDER — MAGNESIUM SULFATE HEPTAHYDRATE 40 MG/ML
4 INJECTION, SOLUTION INTRAVENOUS AS NEEDED
Status: DISCONTINUED | OUTPATIENT
Start: 2019-01-23 | End: 2019-01-24 | Stop reason: HOSPADM

## 2019-01-23 RX ORDER — MAGNESIUM SULFATE HEPTAHYDRATE 40 MG/ML
4 INJECTION, SOLUTION INTRAVENOUS ONCE
Status: COMPLETED | OUTPATIENT
Start: 2019-01-23 | End: 2019-01-23

## 2019-01-23 RX ORDER — MAGNESIUM SULFATE HEPTAHYDRATE 40 MG/ML
2 INJECTION, SOLUTION INTRAVENOUS AS NEEDED
Status: DISCONTINUED | OUTPATIENT
Start: 2019-01-23 | End: 2019-01-24 | Stop reason: HOSPADM

## 2019-01-23 RX ORDER — WARFARIN SODIUM 3 MG/1
3 TABLET ORAL
Status: DISCONTINUED | OUTPATIENT
Start: 2019-01-23 | End: 2019-01-23

## 2019-01-23 RX ORDER — POTASSIUM CHLORIDE 7.45 MG/ML
10 INJECTION INTRAVENOUS
Status: DISCONTINUED | OUTPATIENT
Start: 2019-01-23 | End: 2019-01-24 | Stop reason: HOSPADM

## 2019-01-23 RX ADMIN — SODIUM CHLORIDE, PRESERVATIVE FREE 10 ML: 5 INJECTION INTRAVENOUS at 20:46

## 2019-01-23 RX ADMIN — SODIUM CHLORIDE, PRESERVATIVE FREE 3 ML: 5 INJECTION INTRAVENOUS at 07:51

## 2019-01-23 RX ADMIN — SODIUM CHLORIDE, PRESERVATIVE FREE 10 ML: 5 INJECTION INTRAVENOUS at 07:49

## 2019-01-23 RX ADMIN — ENOXAPARIN SODIUM 60 MG: 60 INJECTION SUBCUTANEOUS at 20:44

## 2019-01-23 RX ADMIN — ATORVASTATIN CALCIUM 40 MG: 40 TABLET, FILM COATED ORAL at 20:44

## 2019-01-23 RX ADMIN — METOPROLOL TARTRATE 25 MG: 50 TABLET, FILM COATED ORAL at 07:48

## 2019-01-23 RX ADMIN — INSULIN DETEMIR 12 UNITS: 100 INJECTION, SOLUTION SUBCUTANEOUS at 20:43

## 2019-01-23 RX ADMIN — INSULIN ASPART 9 UNITS: 100 INJECTION, SOLUTION INTRAVENOUS; SUBCUTANEOUS at 16:52

## 2019-01-23 RX ADMIN — METOPROLOL TARTRATE 25 MG: 50 TABLET, FILM COATED ORAL at 20:45

## 2019-01-23 RX ADMIN — ENOXAPARIN SODIUM 60 MG: 60 INJECTION SUBCUTANEOUS at 07:48

## 2019-01-23 RX ADMIN — CEFEPIME 2 G: 2 INJECTION, POWDER, FOR SOLUTION INTRAVENOUS at 23:47

## 2019-01-23 RX ADMIN — MAGNESIUM SULFATE HEPTAHYDRATE 4 G: 40 INJECTION, SOLUTION INTRAVENOUS at 16:52

## 2019-01-23 RX ADMIN — NIFEDIPINE 60 MG: 30 TABLET, EXTENDED RELEASE ORAL at 07:49

## 2019-01-23 RX ADMIN — SODIUM CHLORIDE, PRESERVATIVE FREE 10 ML: 5 INJECTION INTRAVENOUS at 07:50

## 2019-01-23 RX ADMIN — BUDESONIDE AND FORMOTEROL FUMARATE DIHYDRATE 2 PUFF: 160; 4.5 AEROSOL RESPIRATORY (INHALATION) at 18:30

## 2019-01-23 RX ADMIN — INSULIN ASPART 8 UNITS: 100 INJECTION, SOLUTION INTRAVENOUS; SUBCUTANEOUS at 20:45

## 2019-01-23 RX ADMIN — BUDESONIDE AND FORMOTEROL FUMARATE DIHYDRATE 2 PUFF: 160; 4.5 AEROSOL RESPIRATORY (INHALATION) at 06:20

## 2019-01-23 RX ADMIN — Medication 1 CAPSULE: at 07:48

## 2019-01-23 RX ADMIN — ASPIRIN 81 MG: 81 TABLET ORAL at 07:48

## 2019-01-23 RX ADMIN — WARFARIN 2.5 MG: 2.5 TABLET ORAL at 16:52

## 2019-01-23 RX ADMIN — DEXTROSE MONOHYDRATE 25 G: 25 INJECTION, SOLUTION INTRAVENOUS at 05:28

## 2019-01-23 RX ADMIN — SODIUM CHLORIDE, PRESERVATIVE FREE 3 ML: 5 INJECTION INTRAVENOUS at 20:47

## 2019-01-23 RX ADMIN — CEFEPIME 2 G: 2 INJECTION, POWDER, FOR SOLUTION INTRAVENOUS at 11:55

## 2019-01-23 RX ADMIN — PANTOPRAZOLE SODIUM 40 MG: 40 TABLET, DELAYED RELEASE ORAL at 07:49

## 2019-01-23 NOTE — PLAN OF CARE
Problem: Patient Care Overview  Goal: Plan of Care Review  Outcome: Ongoing (interventions implemented as appropriate)   01/23/19 1616   Coping/Psychosocial   Plan of Care Reviewed With patient   Plan of Care Review   Progress improving      01/23/19 1616   Coping/Psychosocial   Plan of Care Reviewed With patient   Plan of Care Review   Progress improving

## 2019-01-23 NOTE — THERAPY TREATMENT NOTE
Acute Care - Occupational Therapy Treatment Note  [facility]     Patient Name: Ashley Geronimo  : 1945  MRN: [id number]  Today's Date: 2019  Onset of Illness/Injury or Date of Surgery: 19     Referring Physician: Dr. Chavez    Admit Date: 2019       ICD-10-CM ICD-9-CM   1. Pneumonia of both lungs due to infectious organism, unspecified part of lung J18.9 483.8   2. Atrial fibrillation with RVR (CMS/HCC) I48.91 427.31   3. Essential hypertension I10 401.9     Patient Active Problem List   Diagnosis   • Pneumonia   • Septic shock (CMS/[facility])   • Pseudomonas pneumonia (CMS/[facility])     Past Medical History:   Diagnosis Date   • Diabetes mellitus (CMS/[facility])    • Hypertension      History reviewed. No pertinent surgical history.    Therapy Treatment    Rehabilitation Treatment Summary     Row Name 19 1358             Treatment Time/Intention    Discipline  occupational therapist  -BF      Document Type  therapy note (daily note)  -BF      Subjective Information  no complaints  -BF      Mode of Treatment  occupational therapy  -BF      Patient/Family Observations  Pt supine in bed, awake & alert; agreeable to OT  -BF      Patient Effort  adequate  -BF      Existing Precautions/Restrictions  fall;seizures  -BF      Patient Response to Treatment  Pt tolerated treatment well, required frequent verbal cues for attention to task  -BF      Recorded by [BF] Alyssa Cornejo OT 19 1401      Row Name 19 1358             Cognitive Assessment/Intervention- PT/OT    Orientation Status (Cognition)  oriented to;person;place  -BF      Follows Commands (Cognition)  verbal cues/prompting required;repetition of directions required  -BF      Recorded by [BF] Alyssa Cornejo OT 19 1401      Row Name 19 1358             Therapeutic Exercise    Therapeutic Exercise  supine, upper extremities  -BF      Recorded by [BF] Alyssa Cornejo OT 19 1401      Row Name 19  1358             Upper Extremity Supine Therapeutic Exercise    Performed, Supine Upper Extremity (Therapeutic Exercise)  shoulder flexion/extension;elbow flexion/extension  -BF      Exercise Type, Supine Upper Extremity (Therapeutic Exercise)  AROM (active range of motion);other (see comments) [facility] therex; light strengthening w/ flexbar; handgripper  -BF      Expected Outcomes, Supine Upper Extremity (Therapeutic Exercise)  improve functional tolerance, self-care activity;improve performance, BADLs;improve performance, transfer skills;strengthen, facilitate independent active range of motion  -BF      Recorded by [BF] Alyssa Cornejo, OT 01/23/19 1401      Row Name 01/23/19 1358             Positioning and Restraints    Post Treatment Position  bed  -BF      In Bed  supine;call light within reach;encouraged to call for assist  -BF      Recorded by [BF] Alyssa Cornejo, LESLEY 01/23/19 1401        User Key  (r) = Recorded By, (t) = Taken By, (c) = Cosigned By    Initials Name Effective Dates Discipline    BF Alyssa Cornejo, LESLEY 04/03/18 -  OT                 OT Recommendation and Plan        Outcome Measures     Row Name 01/22/19 1100 01/21/19 1424 01/21/19 1214       How much help from another person do you currently need...    Turning from your back to your side while in flat bed without using bedrails?  3  -BC  3  -LB  --    Moving from lying on back to sitting on the side of a flat bed without bedrails?  3  -BC  3  -LB  --    Moving to and from a bed to a chair (including a wheelchair)?  3  -BC  3  -LB  --    Standing up from a chair using your arms (e.g., wheelchair, bedside chair)?  3  -BC  3  -LB  --    Climbing 3-5 steps with a railing?  2  -BC  2  -LB  --    To walk in hospital room?  3  -BC  2  -LB  --    AM-PAC 6 Clicks Score  17  -BC  16  -LB  --       How much help from another is currently needed...    Putting on and taking off regular lower body clothing?  --  --  2  -KH    Bathing  (including washing, rinsing, and drying)  --  --  2  -KH    Toileting (which includes using toilet bed pan or urinal)  --  --  2  -KH    Putting on and taking off regular upper body clothing  --  --  3  -KH    Taking care of personal grooming (such as brushing teeth)  --  --  3  -KH    Eating meals  --  --  3  -KH    Score  --  --  15  -KH       Functional Assessment    Outcome Measure Options  AM-PAC 6 Clicks Basic Mobility (PT)  -BC  AM-PAC 6 Clicks Basic Mobility (PT)  -LB  AM-PAC 6 Clicks Daily Activity (OT)  -KH      User Key  (r) = Recorded By, (t) = Taken By, (c) = Cosigned By    Initials Name Provider Type    LB Anjelica Bertrand, PT Physical Therapist    BC Laura Cali, PT Physical Therapist    KH Rocío Guzman, OT Occupational Therapist           Time Calculation:   Time Calculation- OT     Row Name 01/23/19 1401             Time Calculation- OT    Total Timed Code Minutes- OT  30 minute(s)  -BF      OT Non-Billable Time (min)  10 min  -BF         Timed Charges    27067 - OT Therapeutic Activity Minutes  30  -BF        User Key  (r) = Recorded By, (t) = Taken By, (c) = Cosigned By    Initials Name Provider Type    Alyssa Valencia OT Occupational Therapist             Therapy Charges for Today     Code Description Service Date Service Provider Modifiers Qty    [id number]  OT THERAPEUTIC ACT EA 15 MIN 1/23/2019 Alyssa Cornejo OT GO 2          OT G-codes  OT Professional Judgement Used?: Yes  OT Functional Scales Options: AM-PAC 6 Clicks Daily Activity (OT)  Functional Assessment Tool Used: FIM  Functional Limitation: Self care  Self Care Current Status (): At least 40 percent but less than 60 percent impaired, limited or restricted  Self Care Goal Status (): At least 20 percent but less than 40 percent impaired, limited or restricted    Alyssa Cornejo OT  1/23/2019

## 2019-01-23 NOTE — PROGRESS NOTES
LOS: 7 days     Chief Complaint:  Pulmonology is following for acute hypoxic respiratory failure, severe sepsis.     Subjective     Interval History:     Ms. Geronimo was resting in bed, awake and alert. She was on 2 L nasal cannula and saturating at 98%. She voiced no complaints today, and is again eager to return home. Hemodynamically stable. No fever reported overnight    History taken from: patient chart RN    Review of Systems:     Review of Systems - History obtained from chart review and the patient  General ROS: negative for - chills, fatigue or fever  Psychological ROS: negative for - anxiety or depression  ENT ROS: negative for - headaches or vocal changes  Allergy and Immunology ROS: negative for - nasal congestion or postnasal drip  Endocrine ROS: negative for - polydipsia/polyuria  Respiratory ROS: negative for - cough, shortness of breath or wheezing  Cardiovascular ROS: negative for - chest pain or edema  Gastrointestinal ROS: negative for - abdominal pain or change in bowel habits  Musculoskeletal ROS: negative for - joint pain or joint swelling  Neurological ROS: no TIA or stroke symptoms                    Objective     Vital Signs  Temp:  [97.3 °F (36.3 °C)-98.5 °F (36.9 °C)] 97.9 °F (36.6 °C)  Heart Rate:  [65-87] 79  Resp:  [18-20] 18  BP: (124-160)/(58-70) 124/58  Body mass index is 27.58 kg/m².    Intake/Output Summary (Last 24 hours) at 1/23/2019 1702  Last data filed at 1/23/2019 1300  Gross per 24 hour   Intake 630 ml   Output --   Net 630 ml     I/O this shift:  In: 240 [P.O.:240]  Out: -     Physical Exam:  GENERAL APPEARANCE: Well developed, well nourished, alert and cooperative, and appears to be in no acute distress. Tolerating nasal cannula.     HEAD: normocephalic. Atraumatic.     EYES: PERRL. EOMI. Vision grossly intact.     NECK: Neck supple. No thyromegaly.     CARDIAC: Normal S1 and S2. No S3, S4 or murmurs. Rhythm is regular. There is no peripheral edema, cyanosis or pallor.  Extremities are warm and well perfused. Capillary refill is less than 2 seconds.     RESPIRATORY: Bilateral air entry positive, with sounds appreciated throughout.  No wheezing, rhonchi, or crackles upon auscultation.     GI: Positive bowel sounds. Soft, nondistended, nontender.     MUSCULOSKELTAL: No significant deformity or joint abnormality. No edema. Peripheral pulses intact.     NEUROLOGICAL: Strength and sensation symmetric and intact throughout.     PSYCHIATRIC: The mental examination revealed the patient was oriented to person, place, and time.                   Results Review:                I reviewed the patient's new clinical results.  I reviewed the patient's new imaging results and agree with the interpretation.  Results from last 7 days   Lab Units 01/23/19 0428 01/22/19 0423 01/21/19  0441   WBC 10*3/mm3 5.14 4.77 5.62   HEMOGLOBIN g/dL 12.9 12.7 12.7   PLATELETS 10*3/mm3 212 206 219     Results from last 7 days   Lab Units 01/23/19  0428 01/22/19  0423 01/21/19  0441 01/20/19  0525  01/18/19  0317   SODIUM mmol/L 140 135 141 140   < > 141   POTASSIUM mmol/L 3.9 3.5 3.4* 3.7   < > 3.9   CHLORIDE mmol/L 108 102 108 107   < > 111   CO2 mmol/L 26.4 27.6 26.5 21.8*   < > 19.6*   BUN mg/dL 27* 25* 29* 38*   < > 37*   CREATININE mg/dL 0.76 0.88 0.81 1.03   < > 1.29   CALCIUM mg/dL 8.2 8.2 8.3 8.1   < > 7.9   GLUCOSE mg/dL 42* 223* 129* 260*   < > 138*   MAGNESIUM mg/dL 1.6*  --   --  1.8  --  2.6    < > = values in this interval not displayed.     Lab Results   Component Value Date    INR 1.57 (H) 01/23/2019    INR 1.09 01/22/2019    INR 0.93 01/16/2019    PROTIME 19.0 (H) 01/23/2019    PROTIME 14.3 01/22/2019    PROTIME 12.7 01/16/2019     Results from last 7 days   Lab Units 01/22/19 0423 01/17/19  0028   ALK PHOS U/L 62 68   BILIRUBIN mg/dL 0.3 0.3   ALT (SGPT) U/L 18 12   AST (SGOT) U/L 21 22     Results from last 7 days   Lab Units 01/22/19  0801   PH, ARTERIAL pH units 7.505*   PO2 ART mm Hg 63.8*    PCO2, ARTERIAL mm Hg 34.8*   HCO3 ART mmol/L 26.8*     Imaging Results (last 24 hours)     ** No results found for the last 24 hours. **             Medication Review:   Scheduled Medications:    aspirin 81 mg Oral Daily   atorvastatin 40 mg Oral Nightly   budesonide-formoterol 2 puff Inhalation BID - RT   cefepime 2 g Intravenous Q12H   enoxaparin 1 mg/kg Subcutaneous Q12H   insulin aspart 0-9 Units Subcutaneous 4x Daily AC & at Bedtime   insulin detemir 12 Units Subcutaneous Nightly   lactobacillus acidophilus 1 capsule Oral Daily   magnesium sulfate 4 g Intravenous Once   metoprolol tartrate 25 mg Oral Q12H   NIFEdipine CC 60 mg Oral Q24H   pantoprazole 40 mg Oral QAM AC   sodium chloride 10 mL Intravenous Q12H   sodium chloride 10 mL Intravenous Q12H   sodium chloride 10 mL Intravenous Q12H   sodium chloride 3 mL Intravenous Q12H     Continuous infusions:    Pharmacy to dose warfarin        Assessment/Plan     Patient Active Problem List   Diagnosis Code   • Pneumonia J18.9   • Septic shock (CMS/[facility]) A41.9, R65.21   • Pseudomonas pneumonia (CMS/[facility]) J15.1         Kaley Cutler PA-C  01/23/19  5:02 PM      Scribed for Dr. Noonan by Kaley Cutler PA-C    I have reviewed the labs    Assessment:  LIZETT pna: Pseudomonas, H influenza and KP  COPD, centrilobular emphysema type  Mild pulmonary hypertension  Physical deconditioning  Bilateral basal atelectasis        Plan:  - Titrate FiO2 to keep SPO2 around 90%  - c/w on Symbicort 2 puff twice daily  - Continue on duo nebs  - Currently on Lovenox twice a day for A. fib management. Currently on Lovenox to coumadin bridging.   - Aggressive PTOT while in hospital  - Incentive spirometer to avoid basal atelectasis  - Antibiotics as per infectious diseases. Cefepime,   - Recommend obtaining walk oximetry before discharge  - Recommend starting the patient on Spiriva 1 puff daily on discharge    We will sign off.  Please call us in case of any other questions.      Nurses  and respiratory therapist were updated about the plan.   I, Quang Noonan M.D. attest that the above note accurately reflects the work and decisions made by me.  Patient was seen and evaluated by me, including history of present illness, physical exam, assessment, and treatment plan.  The above note was reviewed and edited by me.    Thank you for involving us in the care of the patient.    Quang Noonan M.D  Pulmonary and Critical Care Medicine

## 2019-01-23 NOTE — THERAPY TREATMENT NOTE
Acute Care - Physical Therapy Treatment Note  [facility]     Patient Name: Ashley Geronimo  : 1945  MRN: [id number]  Today's Date: 2019  Onset of Illness/Injury or Date of Surgery: 19  Date of Referral to PT: 19  Referring Physician: Dr. Chavez    Admit Date: 2019    Visit Dx:    ICD-10-CM ICD-9-CM   1. Pneumonia of both lungs due to infectious organism, unspecified part of lung J18.9 483.8   2. Atrial fibrillation with RVR (CMS/Formerly [facility]) I48.91 427.31   3. Essential hypertension I10 401.9     Patient Active Problem List   Diagnosis   • Pneumonia   • Septic shock (CMS/Formerly [facility])   • Pseudomonas pneumonia (CMS/Formerly [facility])       Therapy Treatment    Rehabilitation Treatment Summary     Row Name 19 1600 19 1358          Treatment Time/Intention    Discipline  physical therapist  -BC  occupational therapist  -BF     Document Type  therapy note (daily note)  -BC  therapy note (daily note)  -BF     Subjective Information  no complaints  -BC  no complaints  -BF     Mode of Treatment  physical therapy;individual therapy  -BC  occupational therapy  -BF     Patient/Family Observations  --  Pt supine in bed, awake & alert; agreeable to OT  -BF     Therapy Frequency (PT Clinical Impression)  3 times/wk 3-5x/week  -BC  --     Patient Effort  adequate  -BC  adequate  -BF     Existing Precautions/Restrictions  fall;seizures  -BC  fall;seizures  -BF     Treatment Considerations/Comments  pt ambulated with PT 2x this date.  -BC  --     Patient Response to Treatment  --  Pt tolerated treatment well, required frequent verbal cues for attention to task  -BF     Recorded by [BC] Laura Cali, PT 19 1615 [BF] Alyssa Cornejo, OT 19 1401     Row Name 19 1600 19 1358          Cognitive Assessment/Intervention- PT/OT    Affect/Mental Status (Cognitive)  WFL  -BC  --     Orientation Status (Cognition)  oriented to;person;place;situation  -BC  oriented to;person;place  -BF      Follows Commands (Cognition)  physical/tactile prompts required;increased processing time needed;repetition of directions required;verbal cues/prompting required  -BC  verbal cues/prompting required;repetition of directions required  -BF     Recorded by [BC] Laura Cali, PT 01/23/19 1615 [BF] Alyssa Cornejo, OT 01/23/19 1401     Row Name 01/23/19 1600             Bed Mobility Assessment/Treatment    Bed Mobility Assessment/Treatment  sit-supine;supine-sit-supine  -BC      Sit-Supine [place] (Bed Mobility)  minimum assist (75% patient effort);verbal cues;nonverbal cues (demo/gesture)  -BC      Assistive Device (Bed Mobility)  bed rails;draw sheet  -BC      Recorded by [BC] Laura Cali, PT 01/23/19 1615      Row Name 01/23/19 1600             Transfer Assessment/Treatment    Transfer Assessment/Treatment  sit-stand transfer;stand-sit transfer  -BC      Recorded by [BC] Laura Cali, PT 01/23/19 1615      Row Name 01/23/19 1600             Sit-Stand Transfer    Sit-Stand [place] (Transfers)  minimum assist (75% patient effort);2 person assist;verbal cues;nonverbal cues (demo/gesture)  -BC      Assistive Device (Sit-Stand Transfers)  walker, front-wheeled  -BC      Recorded by [BC] Laura Cali, PT 01/23/19 1615      Row Name 01/23/19 1600             Stand-Sit Transfer    Stand-Sit [place] (Transfers)  minimum assist (75% patient effort);2 person assist;verbal cues;nonverbal cues (demo/gesture)  -BC      Assistive Device (Stand-Sit Transfers)  walker, front-wheeled  -BC      Recorded by [BC] Laura Cali, PT 01/23/19 1615      Row Name 01/23/19 1600             Gait/Stairs Assessment/Training    Gait/Stairs Assessment/Training  gait/ambulation independence  -BC      [place] Level (Gait)  minimum assist (75% patient effort);moderate assist (50% patient effort);2 person assist  -BC      Assistive Device (Gait)  walker, front-wheeled  -BC      Distance in Feet (Gait)   250  -BC      Recorded by [BC] Laura Cali, PT 01/23/19 1615      Row Name 01/23/19 1358             Therapeutic Exercise    Therapeutic Exercise  supine, upper extremities  -BF      Recorded by [BF] Alyssa Cornejo, OT 01/23/19 1401      Row Name 01/23/19 1358             Upper Extremity Supine Therapeutic Exercise    Performed, Supine Upper Extremity (Therapeutic Exercise)  shoulder flexion/extension;elbow flexion/extension  -BF      Exercise Type, Supine Upper Extremity (Therapeutic Exercise)  AROM (active range of motion);other (see comments) [place] [facility] [place]; light strengthening w/ flexbar; handgripper  -BF      Expected Outcomes, Supine Upper Extremity (Therapeutic Exercise)  improve functional tolerance, self-care activity;improve performance, BADLs;improve performance, transfer skills;strengthen, facilitate independent active range of motion  -BF      Recorded by [BF] Alyssa Cornejo, OT 01/23/19 1401      Row Name 01/23/19 1600 01/23/19 1358          Positioning and Restraints    Pre-Treatment Position  in bed  -BC  --     Post Treatment Position  bed  -BC  bed  -BF     In Bed  notified nsg;supine;sitting EOB;call light within reach;encouraged to call for assist;side rails up x3  -BC  supine;call light within reach;encouraged to call for assist  -BF     Recorded by [BC] Laura Cali, PT 01/23/19 1615 [BF] Alyssa Cornejo, OT 01/23/19 1401       User Key  (r) = Recorded By, (t) = Taken By, (c) = Cosigned By    Initials Name Effective Dates Discipline    BC Laura Cali, PT 03/14/16 -  PT     Alyssa Cornejo, OT 04/03/18 -  OT                   Physical Therapy Education     Title: PT OT SLP Therapies (Done)     Topic: Physical Therapy (Done)     Point: Mobility training (Done)     Learning Progress Summary           Patient AcceptanceSUSAN VU by BC at 1/23/2019  4:15 PM    AcceptanceSUSAN TB VU by  at 1/23/2019  9:43 AM    AcceptanceSUSAN NR by BC at 1/22/2019 11:26  AM    Acceptance, E,TB, VU by MELLO at 1/22/2019  9:24 AM    Acceptance, E, NR by LB at 1/21/2019  2:24 PM                   Point: Home exercise program (Done)     Learning Progress Summary           Patient Acceptance, E, VU by BC at 1/23/2019  4:15 PM    Acceptance, E,TB, VU by MELLO at 1/23/2019  9:43 AM    Acceptance, E, NR by BC at 1/22/2019 11:26 AM    Acceptance, E,TB, VU by MELLO at 1/22/2019  9:24 AM                   Point: Body mechanics (Done)     Learning Progress Summary           Patient Acceptance, E, VU by BC at 1/23/2019  4:15 PM    Acceptance, E,TB, VU by MELLO at 1/23/2019  9:43 AM    Acceptance, E, NR by BC at 1/22/2019 11:26 AM    Acceptance, E,TB, VU by MELLO at 1/22/2019  9:24 AM    Acceptance, E, NR by LB at 1/21/2019  2:24 PM                   Point: Precautions (Done)     Learning Progress Summary           Patient Acceptance, E, VU by BC at 1/23/2019  4:15 PM    Acceptance, E,TB, VU by MELLO at 1/23/2019  9:43 AM    Acceptance, E, NR by BC at 1/22/2019 11:26 AM    Acceptance, E,TB, VU by MELLO at 1/22/2019  9:24 AM    Acceptance, E, NR by LB at 1/21/2019  2:24 PM                               User Key     Initials Effective Dates Name Provider Type Discipline     03/14/16 -  Anjelica Bertrand, PT Physical Therapist PT    BC 03/14/16 -  Laura Cali, PT Physical Therapist PT     10/18/18 -  Carlito Cali, RN Registered Nurse Nurse                PT Recommendation and Plan  Therapy Frequency (PT Clinical Impression): 3 times/wk(3-5x/week)  Plan of Care Reviewed With: patient  Progress: improving  Outcome Measures     Row Name 01/23/19 1600 01/22/19 1100 01/21/19 4848       How much help from another person do you currently need...    Turning from your back to your side while in flat bed without using bedrails?  3  -BC  3  -BC  3  -LB    Moving from lying on back to sitting on the side of a flat bed without bedrails?  3  -BC  3  -BC  3  -LB    Moving to and from a bed to a chair (including a  wheelchair)?  3  -BC  3  -BC  3  -LB    Standing up from a chair using your arms (e.g., wheelchair, bedside chair)?  3  -BC  3  -BC  3  -LB    Climbing 3-5 steps with a railing?  2  -BC  2  -BC  2  -LB    To walk in hospital room?  3  -BC  3  -BC  2  -LB    AM-PAC 6 Clicks Score  17  -BC  17  -BC  16  -LB       Functional Assessment    Outcome Measure Options  AM-PAC 6 Clicks Basic Mobility (PT)  -BC  AM-PAC 6 Clicks Basic Mobility (PT)  -BC  AM-PAC 6 Clicks Basic Mobility (PT)  -LB    Row Name 01/21/19 1214             How much help from another is currently needed...    Putting on and taking off regular lower body clothing?  2  -KH      Bathing (including washing, rinsing, and drying)  2  -KH      Toileting (which includes using toilet bed pan or urinal)  2  -KH      Putting on and taking off regular upper body clothing  3  -KH      Taking care of personal grooming (such as brushing teeth)  3  -KH      Eating meals  3  -KH      Score  15  -KH         Functional Assessment    Outcome Measure Options  AM-PAC 6 Clicks Daily Activity (OT)  -KH        User Key  (r) = Recorded By, (t) = Taken By, (c) = Cosigned By    Initials Name Provider Type    Anjelica Gonsalez, PT Physical Therapist    Laura Jeong, PT Physical Therapist    Rocío Roth, OT Occupational Therapist         Time Calculation:   PT Charges     Row Name 01/23/19 1617             Time Calculation    PT Received On  01/23/19  -BC         Time Calculation- PT    Total Timed Code Minutes- PT  60 minute(s)  -BC         Timed Charges    90631 - Gait Training Minutes   30  -BC      67458 - PT Therapeutic Activity Minutes  30  -BC        User Key  (r) = Recorded By, (t) = Taken By, (c) = Cosigned By    Initials Name Provider Type    Laura Jeong, PT Physical Therapist        Therapy Suggested Charges     Code   Minutes Charges    73803 (CPT®) Hc Pt Neuromusc Re Education Ea 15 Min      27986 (CPT®) Hc Pt Ther Proc Ea 15 Min       27320 (CPT®) Hc Gait Training Ea 15 Min 30 2    81085 (CPT®) Hc Pt Therapeutic Act Ea 15 Min 30 2    67928 (CPT®) Hc Pt Manual Therapy Ea 15 Min      92507 (CPT®) Hc Pt Iontophoresis Ea 15 Min      47792 (CPT®) Hc Pt Elec Stim Ea-Per 15 Min      37665 (CPT®) Hc Pt Ultrasound Ea 15 Min      80905 (CPT®) Hc Pt Self Care/Mgmt/Train Ea 15 Min      94000 (CPT®) Hc Pt Prosthetic (S) Train Initial Encounter, Each 15 Min      92756 (CPT®) Hc Pt Orthotic(S)/Prosthetic(S) Encounter, Each 15 Min      43855 (CPT®) Hc Orthotic(S) Mgmt/Train Initial Encounter, Each 15min      Total  60 4        Therapy Charges for Today     Code Description Service Date Service Provider Modifiers Qty    18191394641 HC GAIT TRAINING EA 15 MIN 1/22/2019 Laura Cali, PT GP 1    73599970230 HC PT THERAPEUTIC ACT EA 15 MIN 1/22/2019 Laura Cali, PT GP 1    33314496371 HC PT THER SUPP EA 15 MIN 1/22/2019 Laura Cali, PT GP 1    14000614256 HC GAIT TRAINING EA 15 MIN 1/23/2019 Laura Cali, PT GP 2    77905445810 HC PT THERAPEUTIC ACT EA 15 MIN 1/23/2019 Laura Cali, PT GP 2    19873960178 HC PT THER SUPP EA 15 MIN 1/23/2019 Laura Cali, PT GP 4          PT G-Codes  Outcome Measure Options: AM-PAC 6 Clicks Basic Mobility (PT)  AM-PAC 6 Clicks Score: 17  Score: 15  Functional Limitation: Mobility: Walking and moving around  Mobility: Walking and Moving Around Current Status (): At least 40 percent but less than 60 percent impaired, limited or restricted  Mobility: Walking and Moving Around Goal Status (): At least 1 percent but less than 20 percent impaired, limited or restricted    Laura Cali, PT  1/23/2019

## 2019-01-23 NOTE — PROGRESS NOTES
PROGRESS NOTE         Patient Identification:  Name:  Ashley Geronimo  Age:  73 y.o.  Sex:  female  :  1945  MRN:  [id number]  Visit Number:  [id number]  Primary Care Provider:  Silvano Parker MD      ----------------------------------------------------------------------------------------------------------------------  Subjective       Chief Complaints:    Loss of Consciousness        Interval History:      She was very sleepy this morning. CRP is improving down from 3.55 to 2.35, white count is normal. Video swallow from 19 reports negative for aspiration.     Review of Systems:    Unable to obtain. Confused.        ----------------------------------------------------------------------------------------------------------------------      Objective       Current Hospital Meds:    aspirin 81 mg Oral Daily   atorvastatin 40 mg Oral Nightly   budesonide-formoterol 2 puff Inhalation BID - RT   cefepime 2 g Intravenous Q12H   enoxaparin 1 mg/kg Subcutaneous Q12H   insulin aspart 0-14 Units Subcutaneous 4x Daily AC & at Bedtime   insulin detemir 15 Units Subcutaneous Nightly   lactobacillus acidophilus 1 capsule Oral Daily   metoprolol tartrate 25 mg Oral Q12H   NIFEdipine CC 60 mg Oral Q24H   pantoprazole 40 mg Oral QAM AC   sodium chloride 10 mL Intravenous Q12H   sodium chloride 10 mL Intravenous Q12H   sodium chloride 10 mL Intravenous Q12H   sodium chloride 3 mL Intravenous Q12H   warfarin 2.5 mg Oral Once       Pharmacy to dose warfarin      ----------------------------------------------------------------------------------------------------------------------    Vital Signs:  Temp:  [97.3 °F (36.3 °C)-98.5 °F (36.9 °C)] 97.3 °F (36.3 °C)  Heart Rate:  [65-87] 65  Resp:  [18-20] 18  BP: (117-160)/(50-67) 160/67  Mean Arterial Pressure (Non-Invasive) for the past 24 hrs (Last 3 readings):   Noninvasive MAP (mmHg)   19 0606 111   19 0256 103   19 1807 96     SpO2 Percentage     01/23/19 0256 01/23/19 0606 01/23/19 0620   SpO2: 96% 99% 99%     SpO2:  [91 %-99 %] 99 %  on  Flow (L/min):  [2] 2;   Device (Oxygen Therapy): room air    Body mass index is 27.58 kg/m².  Wt Readings from Last 3 Encounters:   01/23/19 64 kg (141 lb 3.2 oz)   08/07/13 66.7 kg (147 lb 0 oz)   08/05/13 66.7 kg (147 lb 0 oz)        Intake/Output Summary (Last 24 hours) at 1/23/2019 0957  Last data filed at 1/23/2019 0528  Gross per 24 hour   Intake 630 ml   Output --   Net 630 ml     Diet Regular; Thin; Consistent Carbohydrate  ----------------------------------------------------------------------------------------------------------------------    Physical exam:    Constitutional:  Well-developed and well-nourished.  No respiratory distress.      HENT:  Head: Normocephalic and atraumatic.  Mouth:  Moist mucous membranes.    Eyes:  Conjunctivae and EOM are normal.  No scleral icterus.  Neck:  Neck supple.  No JVD present.    Cardiovascular:  Normal rate, regular rhythm and normal heart sounds with no murmur. No edema.  Pulmonary/Chest:  No respiratory distress, decreased in the bases.  Abdominal:  Soft.  Bowel sounds are normal.  No distension and no tenderness.   Musculoskeletal:  No edema, no tenderness, and no deformity.  No swelling or redness of joints.  Neurological:  Alert and oriented to person.  No facial droop.  No slurred speech.   Skin:  Skin is warm and dry.  No rash noted.  No pallor.   Psychiatric:  Normal mood and affect.  Behavior is normal.          ----------------------------------------------------------------------------------------------------------------------  I have personally reviewed the EKG/Telemetry strips   ----------------------------------------------------------------------------------------------------------------------  Results from last 7 days   Lab Units 01/17/19  0853 01/17/19 0406 01/16/19 2053   TROPONIN I ng/mL 0.173* 0.176* 0.076*       Results from last 7 days   Lab Units  01/18/19 0317   CHOLESTEROL mg/dL 116   TRIGLYCERIDES mg/dL 101   HDL CHOL mg/dL 43*   LDL CHOL mg/dL 53     Results from last 7 days   Lab Units 01/22/19  0801   PH, ARTERIAL pH units 7.505*   PO2 ART mm Hg 63.8*   PCO2, ARTERIAL mm Hg 34.8*   HCO3 ART mmol/L 26.8*     Results from last 7 days   Lab Units 01/23/19 0428 01/22/19 0423 01/21/19 0441 01/17/19  1405 01/17/19  0853  01/16/19  1848  01/16/19  1436   CRP mg/dL 2.35* 3.55* 4.64*   < > 4.03*  --   --   --   --  1.69*   LACTATE mmol/L  --   --   --   --  1.9 1.6  --  2.8*   < >  --    WBC 10*3/mm3 5.14 4.77 5.62   < >  --   --    < > 13.55*  --  9.99   HEMOGLOBIN g/dL 12.9 12.7 12.7   < >  --   --    < > 13.6  --  15.1   HEMATOCRIT % 39.9 38.2 38.6   < >  --   --    < > 41.3  --  45.1   MCV fL 90.3 89.3 89.1   < >  --   --    < > 90.2  --  88.8   MCHC g/dL 32.3* 33.2 32.9*   < >  --   --    < > 32.9*  --  33.5   PLATELETS 10*3/mm3 212 206 219   < >  --   --    < > 236  --  277   INR  1.57* 1.09  --   --   --   --   --  0.93  --  0.92    < > = values in this interval not displayed.     Results from last 7 days   Lab Units 01/23/19 0428 01/22/19 0423 01/21/19 0441 01/20/19  0525  01/18/19 0317  01/17/19  0028  01/16/19  1436   SODIUM mmol/L 140 135 141 140   < > 141   < > 135   < > 130*   POTASSIUM mmol/L 3.9 3.5 3.4* 3.7   < > 3.9   < > 4.4   < > 4.5   MAGNESIUM mg/dL 1.6*  --   --  1.8  --  2.6  --  1.6*  --  2.1   CHLORIDE mmol/L 108 102 108 107   < > 111   < > 102   < > 94*   CO2 mmol/L 26.4 27.6 26.5 21.8*   < > 19.6*   < > 24.0*   < > 19.4*   BUN mg/dL 27* 25* 29* 38*   < > 37*   < > 26*   < > 32*   CREATININE mg/dL 0.76 0.88 0.81 1.03   < > 1.29   < > 1.01   < > 1.45*   EGFR IF NONAFRICN AM mL/min/1.73 74 63 69 52*   < > 40*   < > 54*   < > 35*   CALCIUM mg/dL 8.2 8.2 8.3 8.1   < > 7.9   < > 8.1   < > 9.6   GLUCOSE mg/dL 42* 223* 129* 260*   < > 138*   < > 125*   < > 485*   ALBUMIN g/dL  --  3.10*  --   --   --   --   --  3.60  --  4.80    BILIRUBIN mg/dL  --  0.3  --   --   --   --   --  0.3  --  0.6   ALK PHOS U/L  --  62  --   --   --   --   --  68  --  97   AST (SGOT) U/L  --  21  --   --   --   --   --  22  --  25   ALT (SGPT) U/L  --  18  --   --   --   --   --  12  --  13    < > = values in this interval not displayed.   Estimated Creatinine Clearance: 51.5 mL/min (by C-G formula based on SCr of 0.76 mg/dL).  No results found for: AMMONIA    Glucose   Date/Time Value Ref Range Status   01/23/2019 0703 171 (H) 70 - 130 mg/dL Final   01/23/2019 0534 156 (H) 70 - 130 mg/dL Final   01/23/2019 0525 46 (C) 70 - 130 mg/dL Final   01/22/2019 1949 300 (H) 70 - 130 mg/dL Final   01/22/2019 1628 293 (H) 70 - 130 mg/dL Final   01/22/2019 1140 194 (H) 70 - 130 mg/dL Final   01/22/2019 0658 230 (H) 70 - 130 mg/dL Final   01/21/2019 2005 420 (H) 70 - 130 mg/dL Final     Lab Results   Component Value Date    HGBA1C 11.50 (H) 01/16/2019     Lab Results   Component Value Date    TSH 4.095 01/16/2019       Blood Culture   Date Value Ref Range Status   01/16/2019 No growth at 5 days  Final   01/16/2019 No growth at 5 days  Final     Urine Culture   Date Value Ref Range Status   01/16/2019 >100,000 CFU/mL Escherichia coli (A)  Final           Respiratory Culture   Date Value Ref Range Status   01/16/2019 Scant growth (1+) Klebsiella pneumoniae (A)  Final   01/16/2019 Scant growth (1+) Pseudomonas aeruginosa (A)  Final   01/16/2019 (A)  Final    Light growth (2+) Haemophilus influenzae Biotype II     Comment:       Resistance to ampicillin by production of beta lactamase may be an issue but resistance to cephalosporins (ceftriaxone and cefotaxime) is rare.  Macrolides (erythromycin) and fluoroquinolones (ciprofloxacin) are effective.  Therefore, routine susceptibility testing of clinical isolates as a guide to therapy is not necessary.     Pain Management Panel     Pain Management Panel Latest Ref Rng & Units 1/16/2019 4/12/2018    CREATININE UR mg/dL - 96.6     AMPHETAMINES SCREEN, URINE Negative Negative -    BARBITURATES SCREEN Negative Negative -    BENZODIAZEPINE SCREEN, URINE Negative Negative -    BUPRENORPHINE Negative Negative -    COCAINE SCREEN, URINE Negative Negative -    METHADONE SCREEN, URINE Negative Negative -          I have personally reviewed the above laboratory results.   ----------------------------------------------------------------------------------------------------------------------  Imaging Results (last 24 hours)     ** No results found for the last 24 hours. **        I have personally reviewed the above radiology results.   ----------------------------------------------------------------------------------------------------------------------    Assessment/Plan       Assessment/Plan     ASSESSMENT:    1. Septic shock, with lactic acid greater than 4 on admission  2. Pseudomonas Pneumonia      PLAN:    She was very sleepy this morning. CRP is improving down from 3.55 to 2.35, white count is normal. Video swallow from 1/21/19 reports negative for aspiration    Respiratory culture from 1/16/19 finalized as Klebsi, pseudomonas and Haemophilus with susceptibility  to Cefepime.          Cefepime to complete today. In the setting of clinical and laboratory improvement recommend to follow off antibiotic coverage.    CRP in the am.      Code Status:   Code Status and Medical Interventions:   Ordered at: 01/16/19 3370     Code Status:    CPR     Medical Interventions (Level of Support Prior to Arrest):    Full       Fani Darby, APRN  01/23/19  9:57 AM

## 2019-01-23 NOTE — PLAN OF CARE
Problem: Fall Risk (Adult)  Goal: Identify Related Risk Factors and Signs and Symptoms  Outcome: Ongoing (interventions implemented as appropriate)    Goal: Absence of Fall  Outcome: Ongoing (interventions implemented as appropriate)      Problem: Patient Care Overview  Goal: Plan of Care Review  Outcome: Ongoing (interventions implemented as appropriate)    Goal: Individualization and Mutuality  Outcome: Ongoing (interventions implemented as appropriate)    Goal: Discharge Needs Assessment  Outcome: Ongoing (interventions implemented as appropriate)    Goal: Interprofessional Rounds/Family Conf  Outcome: Ongoing (interventions implemented as appropriate)      Problem: Skin Injury Risk (Adult)  Goal: Identify Related Risk Factors and Signs and Symptoms  Outcome: Ongoing (interventions implemented as appropriate)    Goal: Skin Health and Integrity  Outcome: Ongoing (interventions implemented as appropriate)      Problem: Pneumonia (Adult)  Goal: Signs and Symptoms of Listed Potential Problems Will be Absent, Minimized or Managed (Pneumonia)  Outcome: Ongoing (interventions implemented as appropriate)      Problem: Diabetes, Type 2 (Adult)  Goal: Signs and Symptoms of Listed Potential Problems Will be Absent, Minimized or Managed (Diabetes, Type 2)  Outcome: Ongoing (interventions implemented as appropriate)      Problem: Self-Care Deficit (Adult,Obstetrics,Pediatric)  Goal: Identify Related Risk Factors and Signs and Symptoms  Outcome: Ongoing (interventions implemented as appropriate)    Goal: Improved Ability to Perform BADL and IADL  Outcome: Ongoing (interventions implemented as appropriate)

## 2019-01-23 NOTE — PROGRESS NOTES
[facility]     Patient Identification:  Name:  Ashley Geronimo  Age:  73 y.o.  Sex:  female  :  1945  MRN:  [id number]  Visit Number:  [id number]  Primary Care Provider:  Silvano Parker MD    Date of admission: 2019  Length of stay:  7    ----------------------------------------------------------------------------------------------------------------------  Subjective     Chief Complaint:   Chief Complaint   Patient presents with   • Loss of Consciousness     Subjective/Interval History:    Patient is a 73 y.o. female who was admitted 2019 due to unresponsiveness. She required intubation and placement on a mechanical ventilator with admission to the CCU. She was diagnosed with septic shock, metabolic encephalopathy, UTI, and new onset atrial fibrillation. For complete admission information, please see history and physical.     Today, the patient is eager to go home. She reports that she is feeling better, but does still have some generalized weakness. She has been working well with PT/OT. Pharmacy is dosing her warfarin with INR 1.57 today. She reports that it would be easiest to have her PT/INR checks with her PCP in [place] after discharge. She has a portable O2 concentrator for O2 QHS available at home. She is now on 2L. Her O2 sat drops to the low 90s intermittently when she is on room air. She may require continuous O2 at discharge. Discussed with RNGeovanny who reports an episode of hypoglycemia last evening. She is receiving her last dose of cefepime and will have magnesium replacement this evening.     ----------------------------------------------------------------------------------------------------------------------  Objective   [facility] Meds:    aspirin 81 mg Oral Daily   atorvastatin 40 mg Oral Nightly   budesonide-formoterol 2 puff Inhalation BID - RT   cefepime 2 g Intravenous Q12H   enoxaparin 1 mg/kg Subcutaneous Q12H   insulin  aspart 0-14 Units Subcutaneous 4x Daily AC & at Bedtime   insulin detemir 15 Units Subcutaneous Nightly   lactobacillus acidophilus 1 capsule Oral Daily   magnesium sulfate 4 g Intravenous Once   metoprolol tartrate 25 mg Oral Q12H   NIFEdipine CC 60 mg Oral Q24H   pantoprazole 40 mg Oral QAM AC   sodium chloride 10 mL Intravenous Q12H   sodium chloride 10 mL Intravenous Q12H   sodium chloride 10 mL Intravenous Q12H   sodium chloride 3 mL Intravenous Q12H   warfarin 2.5 mg Oral Once       Pharmacy to dose warfarin      ----------------------------------------------------------------------------------------------------------------------  Vital Signs:  Temp:  [97.3 °F (36.3 °C)-98.5 °F (36.9 °C)] 97.9 °F (36.6 °C)  Heart Rate:  [65-87] 79  Resp:  [18-20] 18  BP: (117-160)/(52-70) 124/58  Mean Arterial Pressure (Non-Invasive) for the past 24 hrs (Last 3 readings):   Noninvasive MAP (mmHg)   01/23/19 1419 75   01/23/19 1113 104   01/23/19 0606 111     SpO2 Percentage    01/23/19 0620 01/23/19 1113 01/23/19 1419   SpO2: 99% 92% 90%     SpO2:  [90 %-99 %] 90 %  on  Flow (L/min):  [2] 2;   Device (Oxygen Therapy): room air    Body mass index is 27.58 kg/m².  Wt Readings from Last 3 Encounters:   01/23/19 64 kg (141 lb 3.2 oz)   08/07/13 66.7 kg (147 lb 0 oz)   08/05/13 66.7 kg (147 lb 0 oz)        Intake/Output Summary (Last 24 hours) at 1/23/2019 1428  Last data filed at 1/23/2019 0528  Gross per 24 hour   Intake 390 ml   Output --   Net 390 ml     Diet Regular; Thin; Consistent Carbohydrate  ----------------------------------------------------------------------------------------------------------------------  Physical exam:  Constitutional:  Well-developed and well-nourished.  No respiratory distress on 2L NC.     HENT:  Head:  Normocephalic and atraumatic.  Mouth:  Moist mucous membranes.    Eyes:  Conjunctivae and EOM are normal. No scleral icterus. No erythema or drainage.  Neck:  Neck supple.  No JVD present.     Cardiovascular:  Normal rate, regular rhythm and normal heart sounds with no murmur.  Pulmonary/Chest:  No respiratory distress, no wheezes, no crackles, with normal breath sounds and good air movement.  Abdominal:  Soft.  Bowel sounds are normal.  No distension and no tenderness.   Musculoskeletal:  No edema, no tenderness, and no deformity.  No red or swollen joints anywhere.    Neurological:  Alert and oriented to person, place, and time.  No cranial nerve deficit.  No tongue deviation.  No facial droop.  No slurred speech.   Skin:  Skin is warm and dry. No rash noted. No pallor.   Peripheral vascular: No clubbing, no cyanosis, no edema. 2+ pedal pulses bilaterally.   Genitourinary: No hernandez catheter in place.   ---------------------------------------------------------------------------------------------------------------------  Tele:  Sinus rhythm in the 70s-80s.    I have personally reviewed the EKG/Telemetry strips.  ----------------------------------------------------------------------------------------------------------------------  Results from last 7 days   Lab Units 01/17/19  0853 01/17/19  0406 01/16/19  2053   TROPONIN I ng/mL 0.173* 0.176* 0.076*       Results from last 7 days   Lab Units 01/18/19  0317   CHOLESTEROL mg/dL 116   TRIGLYCERIDES mg/dL 101   HDL CHOL mg/dL 43*   LDL CHOL mg/dL 53     Results from last 7 days   Lab Units 01/22/19  0801   PH, ARTERIAL pH units 7.505*   PO2 ART mm Hg 63.8*   PCO2, ARTERIAL mm Hg 34.8*   HCO3 ART mmol/L 26.8*     Results from last 7 days   Lab Units 01/23/19  0428 01/22/19  0423 01/21/19  0441  01/17/19  1405 01/17/19  0853  01/16/19  1848  01/16/19  1436   CRP mg/dL 2.35* 3.55* 4.64*   < > 4.03*  --   --   --   --  1.69*   LACTATE mmol/L  --   --   --   --  1.9 1.6  --  2.8*   < >  --    WBC 10*3/mm3 5.14 4.77 5.62   < >  --   --    < > 13.55*  --  9.99   HEMOGLOBIN g/dL 12.9 12.7 12.7   < >  --   --    < > 13.6  --  15.1   HEMATOCRIT % 39.9 38.2 38.6   <  >  --   --    < > 41.3  --  45.1   MCV fL 90.3 89.3 89.1   < >  --   --    < > 90.2  --  88.8   MCHC g/dL 32.3* 33.2 32.9*   < >  --   --    < > 32.9*  --  33.5   PLATELETS 10*3/mm3 212 206 219   < >  --   --    < > 236  --  277   INR  1.57* 1.09  --   --   --   --   --  0.93  --  0.92    < > = values in this interval not displayed.     Results from last 7 days   Lab Units 01/23/19  0428 01/22/19  0423 01/21/19  0441 01/20/19  0525  01/18/19  0317  01/17/19  0028  01/16/19  1436   SODIUM mmol/L 140 135 141 140   < > 141   < > 135   < > 130*   POTASSIUM mmol/L 3.9 3.5 3.4* 3.7   < > 3.9   < > 4.4   < > 4.5   MAGNESIUM mg/dL 1.6*  --   --  1.8  --  2.6  --  1.6*  --  2.1   CHLORIDE mmol/L 108 102 108 107   < > 111   < > 102   < > 94*   CO2 mmol/L 26.4 27.6 26.5 21.8*   < > 19.6*   < > 24.0*   < > 19.4*   BUN mg/dL 27* 25* 29* 38*   < > 37*   < > 26*   < > 32*   CREATININE mg/dL 0.76 0.88 0.81 1.03   < > 1.29   < > 1.01   < > 1.45*   EGFR IF NONAFRICN AM mL/min/1.73 74 63 69 52*   < > 40*   < > 54*   < > 35*   CALCIUM mg/dL 8.2 8.2 8.3 8.1   < > 7.9   < > 8.1   < > 9.6   GLUCOSE mg/dL 42* 223* 129* 260*   < > 138*   < > 125*   < > 485*   ALBUMIN g/dL  --  3.10*  --   --   --   --   --  3.60  --  4.80   BILIRUBIN mg/dL  --  0.3  --   --   --   --   --  0.3  --  0.6   ALK PHOS U/L  --  62  --   --   --   --   --  68  --  97   AST (SGOT) U/L  --  21  --   --   --   --   --  22  --  25   ALT (SGPT) U/L  --  18  --   --   --   --   --  12  --  13    < > = values in this interval not displayed.   Estimated Creatinine Clearance: 51.5 mL/min (by C-G formula based on SCr of 0.76 mg/dL).    Glucose   Date/Time Value Ref Range Status   01/23/2019 1123 145 (H) 70 - 130 mg/dL Final   01/23/2019 0703 171 (H) 70 - 130 mg/dL Final   01/23/2019 0534 156 (H) 70 - 130 mg/dL Final   01/23/2019 0525 46 (C) 70 - 130 mg/dL Final   01/22/2019 1949 300 (H) 70 - 130 mg/dL Final   01/22/2019 1628 293 (H) 70 - 130 mg/dL Final   01/22/2019 1140  194 (H) 70 - 130 mg/dL Final   01/22/2019 0658 230 (H) 70 - 130 mg/dL Final     Lab Results   Component Value Date    HGBA1C 11.50 (H) 01/16/2019     Lab Results   Component Value Date    TSH 4.095 01/16/2019     Blood Culture   Date Value Ref Range Status   01/16/2019 No growth at 5 days  Final   01/16/2019 No growth at 5 days  Final     Urine Culture   Date Value Ref Range Status   01/16/2019 >100,000 CFU/mL Escherichia coli (A)  Final     Respiratory Culture   Date Value Ref Range Status   01/16/2019 Scant growth (1+) Klebsiella pneumoniae (A)  Final   01/16/2019 Scant growth (1+) Pseudomonas aeruginosa (A)  Final   01/16/2019 (A)  Final    Light growth (2+) Haemophilus influenzae Biotype II     Comment:       Resistance to ampicillin by production of beta lactamase may be an issue but resistance to cephalosporins (ceftriaxone and cefotaxime) is rare.  Macrolides (erythromycin) and fluoroquinolones (ciprofloxacin) are effective.  Therefore, routine susceptibility testing of clinical isolates as a guide to therapy is not necessary.     Pain Management Panel     Pain Management Panel Latest Ref Rng & Units 1/16/2019 4/12/2018    CREATININE UR mg/dL - 96.6    AMPHETAMINES SCREEN, URINE Negative Negative -    BARBITURATES SCREEN Negative Negative -    BENZODIAZEPINE SCREEN, URINE Negative Negative -    BUPRENORPHINE Negative Negative -    COCAINE SCREEN, URINE Negative Negative -    METHADONE SCREEN, URINE Negative Negative -        I have personally reviewed the above laboratory results.   ----------------------------------------------------------------------------------------------------------------------  Imaging Results (last 24 hours)     ** No results found for the last 24 hours. **        I have personally reviewed the above radiology results.   ----------------------------------------------------------------------------------------------------------------------  Assessment/Plan     Septic shock secondary to  Pseudomonas pneumonia (with Haemophilus and Klebsiella also present).  Patient appears to be improving, CRP continues to decline, appreciate infectious disease input, she will complete a course of cefepime through today, 1/23. ID does not recommend further ABX therapy beyond this at this point. Repeat labs in AM.      UTI, completing a cefepime course as above.     Functional decline: Working with PT/OT and reported to be doing well.      Paroxysmal atrial fibrillation, probably from the shock however recommendation for full anticoagulation.  Apparently Eliquis was too expensive, pharmacy is started dosing Coumadin, will continue Lovenox and discontinue when INR therapeutic.  Maintaining sinus rhythm at this time. Repeat PT/INR in AM. She would like her PCP to monitor her PT/INR at discharge.      Mixed respiratory metabolic acidosis, resolved    Acute hypoxic respiratory failure: improving.      Diabetes, wide fluctuations: She developed hypoglycemia last evening into the 40s. Will reduce SSI to moderate dose with 0-9 units per protocol. Decrease levemir to 12 units nightly. Continue hypoglycemia protocol     Acute hypomagnesemia: Replacement per protocol.      Altered mental status, appears to be improving, workup negative, cannot totally rule out a seizure event with elevated lactic acid and prolactin but EEG negative.     DVT prophylaxis, full dose Lovenox will serve for this as well    The patient is high risk due to the following diagnoses/reasons: Septic shock 2/2 pneumonia, UTI, functional decline.    I have discussed the patient's assessment and plan with the patient, RN, Geovanny, and Dr. Velasco who is covering for Dr. Mas.     Disposition: Plans to return home on discharge, possibly in AM in INR therapeutic. Ambulatory O2sat monitoring prior to D/C.     NATE Smith  01/23/19  2:28 PM

## 2019-01-23 NOTE — PROGRESS NOTES
LOS: 6 days     Chief Complaint:  Pulmonology is following for acute hypoxic respiratory failure, severe sepsis.     Subjective     Interval History:     Ms. Geronimo was awake and resting in bed. On nasal cannula, 2 L and saturating 95%. No fever reported overnight, hemodynamically stable. She reports that she is feeling well, and is ready to be discharged home.     History taken from: patient chart RN    Review of Systems:     Review of Systems - History obtained from chart review, the patient  General ROS: negative for - chills, fatigue or fever  Psychological ROS: negative for - anxiety or depression  ENT ROS: negative for - headaches or vocal changes  Allergy and Immunology ROS: negative for - nasal congestion or postnasal drip  Endocrine ROS: negative for - polydipsia/polyuria  Respiratory ROS: negative for - cough, shortness of breath or wheezing  Cardiovascular ROS: negative for - chest pain or edema  Gastrointestinal ROS: negative for - abdominal pain or change in bowel habits  Musculoskeletal ROS: negative for - joint pain or joint swelling  Neurological ROS: no TIA or stroke symptoms                    Objective     Vital Signs  Temp:  [97.8 °F (36.6 °C)-98.8 °F (37.1 °C)] 98.5 °F (36.9 °C)  Heart Rate:  [67-87] 85  Resp:  [18-20] 18  BP: (117-157)/(50-85) 142/59  Body mass index is 27.42 kg/m².    Intake/Output Summary (Last 24 hours) at 1/22/2019 1943  Last data filed at 1/22/2019 1300  Gross per 24 hour   Intake 840 ml   Output --   Net 840 ml     No intake/output data recorded.    Physical Exam:  GENERAL APPEARANCE: Well developed, well nourished, alert and cooperative, and appears to be in no acute distress. On nasal cannula.     HEAD: normocephalic. Atraumatic.     EYES: PERRL. EOMI. Vision grossly intact.     NECK: Neck supple. No thyromegaly.     CARDIAC: Normal S1 and S2. No S3, S4 or murmurs. Rhythm is regular. There is no peripheral edema, cyanosis or pallor. Extremities are warm and well  perfused. Capillary refill is less than 2 seconds.    RESPIRATORY: Bilateral air entry positive. Diminished breath sounds noted at the lung bases. No wheezing, rhonchi, or crackles upon auscultation.     GI: Positive bowel sounds. Soft, nondistended, nontender.     MUSCULOSKELTAL: No significant deformity or joint abnormality. No edema. Peripheral pulses intact.     NEUROLOGICAL: Strength and sensation symmetric and intact throughout.     PSYCHIATRIC: The mental examination revealed the patient was oriented to person, place, and time.                   Results Review:                I reviewed the patient's new clinical results.  I reviewed the patient's new imaging results and agree with the interpretation.  Results from last 7 days   Lab Units 01/22/19  0423 01/21/19  0441 01/19/19  0339   WBC 10*3/mm3 4.77 5.62 7.88   HEMOGLOBIN g/dL 12.7 12.7 12.0   PLATELETS 10*3/mm3 206 219 182     Results from last 7 days   Lab Units 01/22/19  0423 01/21/19  0441 01/20/19  0525  01/18/19  0317  01/17/19  0028   SODIUM mmol/L 135 141 140   < > 141   < > 135   POTASSIUM mmol/L 3.5 3.4* 3.7   < > 3.9   < > 4.4   CHLORIDE mmol/L 102 108 107   < > 111   < > 102   CO2 mmol/L 27.6 26.5 21.8*   < > 19.6*   < > 24.0*   BUN mg/dL 25* 29* 38*   < > 37*   < > 26*   CREATININE mg/dL 0.88 0.81 1.03   < > 1.29   < > 1.01   CALCIUM mg/dL 8.2 8.3 8.1   < > 7.9   < > 8.1   GLUCOSE mg/dL 223* 129* 260*   < > 138*   < > 125*   MAGNESIUM mg/dL  --   --  1.8  --  2.6  --  1.6*    < > = values in this interval not displayed.     Lab Results   Component Value Date    INR 1.09 01/22/2019    INR 0.93 01/16/2019    INR 0.92 01/16/2019    PROTIME 14.3 01/22/2019    PROTIME 12.7 01/16/2019    PROTIME 12.5 01/16/2019     Results from last 7 days   Lab Units 01/22/19  0423 01/17/19  0028 01/16/19  1436   ALK PHOS U/L 62 68 97   BILIRUBIN mg/dL 0.3 0.3 0.6   ALT (SGPT) U/L 18 12 13   AST (SGOT) U/L 21 22 25     Results from last 7 days   Lab Units  01/22/19  0801   PH, ARTERIAL pH units 7.505*   PO2 ART mm Hg 63.8*   PCO2, ARTERIAL mm Hg 34.8*   HCO3 ART mmol/L 26.8*     Imaging Results (last 24 hours)     ** No results found for the last 24 hours. **             Medication Review:   Scheduled Medications:    aspirin 81 mg Oral Daily   atorvastatin 40 mg Oral Nightly   budesonide-formoterol 2 puff Inhalation BID - RT   cefepime 2 g Intravenous Q12H   enoxaparin 1 mg/kg Subcutaneous Q12H   insulin aspart 0-14 Units Subcutaneous 4x Daily AC & at Bedtime   insulin detemir 15 Units Subcutaneous Nightly   lactobacillus acidophilus 1 capsule Oral Daily   metoprolol tartrate 25 mg Oral Q12H   NIFEdipine CC 60 mg Oral Q24H   pantoprazole 40 mg Oral QAM AC   sodium chloride 10 mL Intravenous Q12H   sodium chloride 10 mL Intravenous Q12H   sodium chloride 10 mL Intravenous Q12H   sodium chloride 3 mL Intravenous Q12H     Continuous infusions:    Pharmacy to dose warfarin        Assessment/Plan     Patient Active Problem List   Diagnosis Code   • Pneumonia J18.9   • Septic shock (CMS/[facility]) A41.9, R65.21   • Pseudomonas pneumonia (CMS/[facility]) J15.1         Kaley Cutler PA-C  01/22/19  7:43 PM      Scribed for Dr. Noonan by Kaley Cutler PA-C      Lab Results   Component Value Date     PHART 7.505 (C) 01/22/2019     [id number] 34.8 (L) 01/22/2019     PO2ART 63.8 (L) 01/22/2019     [id number] 26.8 (H) 01/22/2019     BASEEXCESS 3.9 01/22/2019     [id number] 93.5 01/22/2019      FL Video Swallow  Narrative: FL VIDEO SWALLOW-     HISTORY: Aspiration; J18.9-Pneumonia, unspecified organism;  I48.91-Unspecified atrial fibrillation; I10-Essential (primary)  hypertension          TECHNIQUE:   Speech therapy service was present. The patient was examined in the  sitting lateral position and was given several consistencies of barium.     FINDINGS: There was no penetration or aspiration during the study.  Patient protected her airway adequately.        FLUOROSCOPY TIME: 0.9 minutes.      Images: Cine loop was acquired     Impression: No evidence of aspiration.     For additional information please see the report provided by the speech  therapy service     This report was finalized on 1/21/2019 10:51 AM by Dr. Garry Benton MD.              Lab Results   Component Value Date     WBC 4.77 01/22/2019     HGB 12.7 01/22/2019     HCT 38.2 01/22/2019     MCV 89.3 01/22/2019      01/22/2019            Lab Results   Component Value Date     GLUCOSE 223 (H) 01/22/2019     CALCIUM 8.2 01/22/2019      01/22/2019     K 3.5 01/22/2019     CO2 27.6 01/22/2019      01/22/2019     BUN 25 (H) 01/22/2019     CREATININE 0.88 01/22/2019     EGFRIFNONA 63 01/22/2019     BCR 28.4 (H) 01/22/2019     ANIONGAP 5.4 01/22/2019            I have reviewed the labs.           Assessment:  LIZETT pna: Pseudomonas, H influenza and KP  COPD, centrilobular emphysema type  Mild pulmonary hypertension  Physical deconditioning  Bilateral basal atelectasis        Plan:  - Titrate FiO2 to keep SPO2 around 90%  - c/w on Symbicort 2 puff twice daily  - Continue on duo nebs  - Currently on Lovenox twice a day for A. fib management. Currently on Lovenox to coumadin bridging.   - Aggressive PTOT while in hospital  - Incentive spirometer to avoid basal atelectasis  - Antibiotics as per infectious diseases. Cefepime,   - Recommend obtaining walk oximetry before discharge  - Recommend starting the patient on Spiriva 1 puff daily on discharge         Nurses and respiratory therapist were updated about the plan.   IQuang M.D. attest that the above note accurately reflects the work and decisions made by me.  Patient was seen and evaluated by me, including history of present illness, physical exam, assessment, and treatment plan.  The above note was reviewed and edited by me.    Thank you for involving us in the care of the patient.    Quang Noonan M.D  Pulmonary and Critical Care Medicine

## 2019-01-24 VITALS
WEIGHT: 139.19 LBS | BODY MASS INDEX: 27.33 KG/M2 | HEART RATE: 62 BPM | HEIGHT: 60 IN | OXYGEN SATURATION: 81 % | DIASTOLIC BLOOD PRESSURE: 50 MMHG | RESPIRATION RATE: 18 BRPM | TEMPERATURE: 98.2 F | SYSTOLIC BLOOD PRESSURE: 133 MMHG

## 2019-01-24 LAB
ANION GAP SERPL CALCULATED.3IONS-SCNC: 8.6 MMOL/L (ref 3.6–11.2)
BASOPHILS # BLD AUTO: 0.01 10*3/MM3 (ref 0–0.3)
BASOPHILS NFR BLD AUTO: 0.2 % (ref 0–2)
BUN BLD-MCNC: 30 MG/DL (ref 7–21)
BUN/CREAT SERPL: 33.7 (ref 7–25)
CALCIUM SPEC-SCNC: 7.8 MG/DL (ref 7.7–10)
CHLORIDE SERPL-SCNC: 104 MMOL/L (ref 99–112)
CO2 SERPL-SCNC: 24.4 MMOL/L (ref 24.3–31.9)
CREAT BLD-MCNC: 0.89 MG/DL (ref 0.43–1.29)
CRP SERPL-MCNC: 1.33 MG/DL (ref 0–0.99)
DEPRECATED RDW RBC AUTO: 42.8 FL (ref 37–54)
EOSINOPHIL # BLD AUTO: 0.12 10*3/MM3 (ref 0–0.7)
EOSINOPHIL NFR BLD AUTO: 2 % (ref 0–7)
ERYTHROCYTE [DISTWIDTH] IN BLOOD BY AUTOMATED COUNT: 13.3 % (ref 11.5–14.5)
GFR SERPL CREATININE-BSD FRML MDRD: 62 ML/MIN/1.73
GLUCOSE BLD-MCNC: 136 MG/DL (ref 70–110)
GLUCOSE BLDC GLUCOMTR-MCNC: 110 MG/DL (ref 70–130)
GLUCOSE BLDC GLUCOMTR-MCNC: 215 MG/DL (ref 70–130)
GLUCOSE BLDC GLUCOMTR-MCNC: 226 MG/DL (ref 70–130)
HCT VFR BLD AUTO: 38.7 % (ref 37–47)
HGB BLD-MCNC: 12.4 G/DL (ref 12–16)
IMM GRANULOCYTES # BLD AUTO: 0.03 10*3/MM3 (ref 0–0.03)
IMM GRANULOCYTES NFR BLD AUTO: 0.5 % (ref 0–0.5)
INR PPP: 2.01 (ref 0.9–1.1)
LYMPHOCYTES # BLD AUTO: 1.2 10*3/MM3 (ref 1–3)
LYMPHOCYTES NFR BLD AUTO: 20 % (ref 16–46)
MAGNESIUM SERPL-MCNC: 2.6 MG/DL (ref 1.7–2.6)
MCH RBC QN AUTO: 28.9 PG (ref 27–33)
MCHC RBC AUTO-ENTMCNC: 32 G/DL (ref 33–37)
MCV RBC AUTO: 90.2 FL (ref 80–94)
MONOCYTES # BLD AUTO: 0.74 10*3/MM3 (ref 0.1–0.9)
MONOCYTES NFR BLD AUTO: 12.3 % (ref 0–12)
NEUTROPHILS # BLD AUTO: 3.9 10*3/MM3 (ref 1.4–6.5)
NEUTROPHILS NFR BLD AUTO: 65 % (ref 40–75)
OSMOLALITY SERPL CALC.SUM OF ELEC: 282.1 MOSM/KG (ref 273–305)
PLATELET # BLD AUTO: 230 10*3/MM3 (ref 130–400)
PMV BLD AUTO: 12.6 FL (ref 6–10)
POTASSIUM BLD-SCNC: 4.6 MMOL/L (ref 3.5–5.3)
PROTHROMBIN TIME: 23 SECONDS (ref 11–15.4)
RBC # BLD AUTO: 4.29 10*6/MM3 (ref 4.2–5.4)
SODIUM BLD-SCNC: 137 MMOL/L (ref 135–153)
WBC NRBC COR # BLD: 6 10*3/MM3 (ref 4.5–12.5)

## 2019-01-24 PROCEDURE — 99239 HOSP IP/OBS DSCHRG MGMT >30: CPT | Performed by: INTERNAL MEDICINE

## 2019-01-24 PROCEDURE — 97116 GAIT TRAINING THERAPY: CPT

## 2019-01-24 PROCEDURE — 63710000001 INSULIN ASPART PER 5 UNITS: Performed by: PHYSICIAN ASSISTANT

## 2019-01-24 PROCEDURE — 97110 THERAPEUTIC EXERCISES: CPT

## 2019-01-24 PROCEDURE — 25010000002 ENOXAPARIN PER 10 MG: Performed by: HOSPITALIST

## 2019-01-24 PROCEDURE — 85025 COMPLETE CBC W/AUTO DIFF WBC: CPT | Performed by: PHYSICIAN ASSISTANT

## 2019-01-24 PROCEDURE — 82962 GLUCOSE BLOOD TEST: CPT

## 2019-01-24 PROCEDURE — 97530 THERAPEUTIC ACTIVITIES: CPT

## 2019-01-24 PROCEDURE — 94799 UNLISTED PULMONARY SVC/PX: CPT

## 2019-01-24 PROCEDURE — 80048 BASIC METABOLIC PNL TOTAL CA: CPT | Performed by: PHYSICIAN ASSISTANT

## 2019-01-24 PROCEDURE — 83735 ASSAY OF MAGNESIUM: CPT | Performed by: INTERNAL MEDICINE

## 2019-01-24 PROCEDURE — 86140 C-REACTIVE PROTEIN: CPT | Performed by: PHYSICIAN ASSISTANT

## 2019-01-24 PROCEDURE — 85610 PROTHROMBIN TIME: CPT | Performed by: INTERNAL MEDICINE

## 2019-01-24 RX ORDER — NIFEDIPINE 60 MG/1
60 TABLET, FILM COATED, EXTENDED RELEASE ORAL
Qty: 30 TABLET | Refills: 0 | Status: SHIPPED | OUTPATIENT
Start: 2019-01-25 | End: 2019-02-20 | Stop reason: HOSPADM

## 2019-01-24 RX ORDER — ATORVASTATIN CALCIUM 40 MG/1
40 TABLET, FILM COATED ORAL NIGHTLY
Qty: 30 TABLET | Refills: 0 | Status: ON HOLD | OUTPATIENT
Start: 2019-01-24 | End: 2019-10-16 | Stop reason: SDUPTHER

## 2019-01-24 RX ORDER — ASPIRIN 81 MG/1
81 TABLET ORAL DAILY
Status: ON HOLD
Start: 2019-01-25 | End: 2019-10-16 | Stop reason: SDUPTHER

## 2019-01-24 RX ORDER — BUDESONIDE AND FORMOTEROL FUMARATE DIHYDRATE 160; 4.5 UG/1; UG/1
2 AEROSOL RESPIRATORY (INHALATION)
Qty: 1 INHALER | Refills: 12 | Status: SHIPPED | OUTPATIENT
Start: 2019-01-24 | End: 2019-03-06 | Stop reason: ALTCHOICE

## 2019-01-24 RX ORDER — WARFARIN SODIUM 2 MG/1
TABLET ORAL
Qty: 30 TABLET | Refills: 0 | Status: SHIPPED | OUTPATIENT
Start: 2019-01-24 | End: 2019-02-06

## 2019-01-24 RX ADMIN — Medication 1 CAPSULE: at 08:21

## 2019-01-24 RX ADMIN — ASPIRIN 81 MG: 81 TABLET ORAL at 08:20

## 2019-01-24 RX ADMIN — NIFEDIPINE 60 MG: 30 TABLET, EXTENDED RELEASE ORAL at 08:21

## 2019-01-24 RX ADMIN — SODIUM CHLORIDE, PRESERVATIVE FREE 10 ML: 5 INJECTION INTRAVENOUS at 08:31

## 2019-01-24 RX ADMIN — BUDESONIDE AND FORMOTEROL FUMARATE DIHYDRATE 2 PUFF: 160; 4.5 AEROSOL RESPIRATORY (INHALATION) at 06:27

## 2019-01-24 RX ADMIN — PANTOPRAZOLE SODIUM 40 MG: 40 TABLET, DELAYED RELEASE ORAL at 08:26

## 2019-01-24 RX ADMIN — INSULIN ASPART 1 UNITS: 100 INJECTION, SOLUTION INTRAVENOUS; SUBCUTANEOUS at 13:07

## 2019-01-24 RX ADMIN — SODIUM CHLORIDE, PRESERVATIVE FREE 3 ML: 5 INJECTION INTRAVENOUS at 08:31

## 2019-01-24 RX ADMIN — SODIUM CHLORIDE, PRESERVATIVE FREE 10 ML: 5 INJECTION INTRAVENOUS at 08:22

## 2019-01-24 RX ADMIN — ENOXAPARIN SODIUM 60 MG: 60 INJECTION SUBCUTANEOUS at 08:21

## 2019-01-24 RX ADMIN — METOPROLOL TARTRATE 25 MG: 50 TABLET, FILM COATED ORAL at 08:21

## 2019-01-24 NOTE — PROGRESS NOTES
Discharge Planning Assessment  [facility]     Patient Name: Ashley Geronimo  MRN: [id number]  Today's Date: 1/24/2019    Admit Date: 1/16/2019        Discharge Plan     Row Name 01/24/19 1419       Plan    Final Discharge Disposition Code  06 - home with home health care    Final Note  SS noted Physician order for home health services.  Pt stated preference for [facility].  SS made referral to [facility] per Yamilet and faxed information including face to face to agency.  RN to call report.     Row Name 01/24/19 1228       Plan    Final Discharge Disposition Code  01 - home or self-care    Final Note  Pt to be discharged home on this date with continuous home 02.  Pt utilizes [facility] for 02 at night.  SS notified [facility] per Dang.  [facility] delivered home 02 portable tank to [facility].             Home Medical Care      Service Provider Request Status Selected Services Address Phone Number Fax Number    [facility] Accepted N/A [address] [facility] [address] [phone]          Milka Reyes

## 2019-01-24 NOTE — THERAPY TREATMENT NOTE
Acute Care - Occupational Therapy Treatment Note  [facility]     Patient Name: Ashley Geronimo  : 1945  MRN: [id number]  Today's Date: 2019  Onset of Illness/Injury or Date of Surgery: 19     Referring Physician: Dr. Chavez    Admit Date: 2019       ICD-10-CM ICD-9-CM   1. Pneumonia of both lungs due to infectious organism, unspecified part of lung J18.9 483.8   2. Atrial fibrillation with RVR (CMS/[facility]) I48.91 427.31   3. Essential hypertension I10 401.9   4. COPD with hypoxia (CMS/[facility]) J44.9 496    R09.02 799.02   5. Pneumonia due to influenza A virus, unspecified laterality, unspecified part of lung J10.00 487.0     Patient Active Problem List   Diagnosis   • Pneumonia   • Septic shock (CMS/[facility])   • Pseudomonas pneumonia (CMS/[facility])     Past Medical History:   Diagnosis Date   • Diabetes mellitus (CMS/[facility])    • Hypertension      History reviewed. No pertinent surgical history.    Therapy Treatment    Rehabilitation Treatment Summary     Row Name 19 1206             Treatment Time/Intention    Discipline  occupational therapist  -      Document Type  therapy note (daily note)  -      Subjective Information  no complaints  -      Mode of Treatment  occupational therapy;individual therapy  -      Patient/Family Observations  Pt sitting up in chair and agreeable to OT treatment  -      Therapy Frequency (OT Eval)  3 times/wk 3-5 x per week  -      Patient Effort  adequate  -      Comment  Pt and nursing agreeable to OT treatment  -      Existing Precautions/Restrictions  fall;seizures  -      Patient Response to Treatment  Pt tolerated OT treatment well this date w/ rest as needed  -      Recorded by [ORQUIDEA] Rocío Guzman, OT 19 1202      Row Name 19 1206             Cognitive Assessment/Intervention- PT/OT    Affect/Mental Status (Cognitive)  WFL  -      Orientation Status (Cognition)  oriented to;person;place;situation  -      Follows  Commands (Cognition)  physical/tactile prompts required;increased processing time needed;repetition of directions required;verbal cues/prompting required  -KH      Recorded by [KH] Rocío Guzman, OT 01/24/19 1209      Row Name 01/24/19 1206             Therapeutic Exercise    Therapeutic Exercise  seated, upper extremities  -KH      Recorded by [KH] Rocío Guzman, OT 01/24/19 1209      Row Name 01/24/19 1206             Upper Extremity Seated Therapeutic Exercise    Performed, Seated Upper Extremity (Therapeutic Exercise)  shoulder flexion/extension;scapular protraction/retraction;elbow flexion/extension;wrist flexion/extension;digit flexion/extension  -      Device, Seated Upper Extremity (Therapeutic Exercise)  elastic bands/tubing weighted dowel  -      Exercise Type, Seated Upper Extremity (Therapeutic Exercise)  AAROM (active assistive range of motion);AROM (active range of motion) BUE GMC, BUE TherEx/Act  -KH      Expected Outcomes, Seated Upper Extremity (Therapeutic Exercise)  improve functional tolerance, self-care activity;improve performance, BADLs  -KH      Sets/Reps Detail, Seated Upper Extremity (Therapeutic Exercise)  6 BUE strengthening/endurance exercises x 20-30 reps each  -KH      Recorded by [KH] Rocío Guzman, LESLEY 01/24/19 1209      Row Name 01/24/19 1206             Positioning and Restraints    Pre-Treatment Position  sitting in chair/recliner  -      Post Treatment Position  chair  -KH      In Chair  notified nsg;sitting;call light within reach;encouraged to call for assist bedside table placed in front of patient  -KH      Recorded by [KH] Rocío Guzman, OT 01/24/19 1209      Row Name 01/24/19 1206             Plan of Care Review    Plan of Care Reviewed With  patient  -KH      Recorded by [KH] Rocío Guzman, OT 01/24/19 1209        User Key  (r) = Recorded By, (t) = Taken By, (c) = Cosigned By    Initials Name Effective Dates  Discipline    Rocío Roth, OT 04/17/18 -  OT                 OT Recommendation and Plan  Outcome Summary/Treatment Plan (OT)  Anticipated Equipment Needs at Discharge (OT): (TBD)  Planned Therapy Interventions (OT Eval): activity tolerance training, adaptive equipment training, BADL retraining, functional balance retraining, occupation/activity based interventions, patient/caregiver education/training, ROM/therapeutic exercise, strengthening exercise, transfer/mobility retraining  Therapy Frequency (OT Eval): 3 times/wk(3-5 x per week)  Plan of Care Review  Plan of Care Reviewed With: patient  Plan of Care Reviewed With: patient  Outcome Summary: Pt tolerated OT treatment fair this date w/ rest as needed. Completed 3 BUE x 15 reps each. Skilled OT to continue current POC as tolerated.   Outcome Measures     Row Name 01/23/19 1600 01/22/19 1100 01/21/19 1424       How much help from another person do you currently need...    Turning from your back to your side while in flat bed without using bedrails?  3  -BC  3  -BC  3  -LB    Moving from lying on back to sitting on the side of a flat bed without bedrails?  3  -BC  3  -BC  3  -LB    Moving to and from a bed to a chair (including a wheelchair)?  3  -BC  3  -BC  3  -LB    Standing up from a chair using your arms (e.g., wheelchair, bedside chair)?  3  -BC  3  -BC  3  -LB    Climbing 3-5 steps with a railing?  2  -BC  2  -BC  2  -LB    To walk in hospital room?  3  -BC  3  -BC  2  -LB    AM-PAC 6 Clicks Score  17  -BC  17  -BC  16  -LB       Functional Assessment    Outcome Measure Options  AM-PAC 6 Clicks Basic Mobility (PT)  -BC  AM-PAC 6 Clicks Basic Mobility (PT)  -BC  AM-PAC 6 Clicks Basic Mobility (PT)  -LB    Row Name 01/21/19 1214             How much help from another is currently needed...    Putting on and taking off regular lower body clothing?  2  -KH      Bathing (including washing, rinsing, and drying)  2  -KH      Toileting (which  includes using toilet bed pan or urinal)  2  -KH      Putting on and taking off regular upper body clothing  3  -KH      Taking care of personal grooming (such as brushing teeth)  3  -KH      Eating meals  3  -KH      Score  15  -KH         Functional Assessment    Outcome Measure Options  AM-PAC 6 Clicks Daily Activity (OT)  -KH        User Key  (r) = Recorded By, (t) = Taken By, (c) = Cosigned By    Initials Name Provider Type    LB Anjelica Bertrand, PT Physical Therapist    Laura Jeong, PT Physical Therapist    Rocío Roth, OT Occupational Therapist           Time Calculation:   Time Calculation- OT     Row Name 01/24/19 1209             Time Calculation- OT    Total Timed Code Minutes- OT  25 minute(s)  -         Timed Charges    64789 - OT Therapeutic Activity Minutes  25  -KH        User Key  (r) = Recorded By, (t) = Taken By, (c) = Cosigned By    Initials Name Provider Type    Rocío Roth, OT Occupational Therapist           Therapy Suggested Charges     Code   Minutes Charges    48815 (CPT®) Hc Ot Neuromusc Re Education Ea 15 Min      60966 (CPT®) Hc Ot Ther Proc Ea 15 Min      17596 (CPT®) Hc Ot Therapeutic Act Ea 15 Min 25 2    38160 (CPT®) Hc Ot Manual Therapy Ea 15 Min      26884 (CPT®) Hc Ot Iontophoresis Ea 15 Min      64224 (CPT®) Hc Ot Elec Stim Ea-Per 15 Min      98470 (CPT®) Hc Ot Ultrasound Ea 15 Min      52340 (CPT®) Hc Ot Self Care/Mgmt/Train Ea 15 Min      Total  25 2        Therapy Charges for Today     Code Description Service Date Service Provider Modifiers Qty    33351324480 HC OT THERAPEUTIC ACT EA 15 MIN 1/24/2019 Rocío Guzman, OT GO 2          OT G-codes  OT Professional Judgement Used?: Yes  OT Functional Scales Options: AM-PAC 6 Clicks Daily Activity (OT)  Functional Assessment Tool Used: FIM  Functional Limitation: Self care  Self Care Current Status (): At least 40 percent but less than 60 percent impaired, limited  or restricted  Self Care Goal Status (): At least 20 percent but less than 40 percent impaired, limited or restricted    Rocío Guzman, OT  1/24/2019
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
## 2019-01-24 NOTE — PROGRESS NOTES
PROGRESS NOTE         Patient Identification:  Name:  Ashley Geronimo  Age:  73 y.o.  Sex:  female  :  1945  MRN:  [id number]  Visit Number:  [id number]  Primary Care Provider:  Silvano Parker MD      ----------------------------------------------------------------------------------------------------------------------  Subjective       Chief Complaints:    Loss of Consciousness        Interval History:      The patient was on the bedside commode and was no examined. No fever or diarrhea reported. CRP continues to show improvement from 2.35 down to 1.33 with a normal white count off of antibiotic coverage.     Review of Systems:    Unable to obtain. Confused.        ----------------------------------------------------------------------------------------------------------------------      Objective       Current Hospital Meds:    aspirin 81 mg Oral Daily   atorvastatin 40 mg Oral Nightly   budesonide-formoterol 2 puff Inhalation BID - RT   cefepime 2 g Intravenous Q12H   enoxaparin 1 mg/kg Subcutaneous Q12H   insulin aspart 0-9 Units Subcutaneous 4x Daily AC & at Bedtime   insulin detemir 12 Units Subcutaneous Nightly   lactobacillus acidophilus 1 capsule Oral Daily   metoprolol tartrate 25 mg Oral Q12H   NIFEdipine CC 60 mg Oral Q24H   pantoprazole 40 mg Oral QAM AC   sodium chloride 10 mL Intravenous Q12H   sodium chloride 10 mL Intravenous Q12H   sodium chloride 10 mL Intravenous Q12H   sodium chloride 3 mL Intravenous Q12H       Pharmacy to dose warfarin      ----------------------------------------------------------------------------------------------------------------------    Vital Signs:  Temp:  [97.8 °F (36.6 °C)-98.4 °F (36.9 °C)] 98.2 °F (36.8 °C)  Heart Rate:  [62-79] 62  Resp:  [18] 18  BP: (124-149)/(49-70) 133/50  Mean Arterial Pressure (Non-Invasive) for the past 24 hrs (Last 3 readings):   Noninvasive MAP (mmHg)   19 1011 70   19 0555 99   19 0232 83     SpO2  Percentage    01/24/19 0555 01/24/19 0627 01/24/19 1011   SpO2: 93% 95% 95%     SpO2:  [90 %-98 %] 95 %  on  Flow (L/min):  [2] 2;   Device (Oxygen Therapy): room air    Body mass index is 27.18 kg/m².  Wt Readings from Last 3 Encounters:   01/24/19 63.1 kg (139 lb 3 oz)   08/07/13 66.7 kg (147 lb 0 oz)   08/05/13 66.7 kg (147 lb 0 oz)        Intake/Output Summary (Last 24 hours) at 1/24/2019 1025  Last data filed at 1/24/2019 0808  Gross per 24 hour   Intake 600 ml   Output --   Net 600 ml     Diet Regular; Thin; Consistent Carbohydrate  ----------------------------------------------------------------------------------------------------------------------    Physical exam:    On bedside commode      ----------------------------------------------------------------------------------------------------------------------  I have personally reviewed the EKG/Telemetry strips   ----------------------------------------------------------------------------------------------------------------------        Results from last 7 days   Lab Units 01/18/19  0317   CHOLESTEROL mg/dL 116   TRIGLYCERIDES mg/dL 101   HDL CHOL mg/dL 43*   LDL CHOL mg/dL 53     Results from last 7 days   Lab Units 01/22/19  0801   PH, ARTERIAL pH units 7.505*   PO2 ART mm Hg 63.8*   PCO2, ARTERIAL mm Hg 34.8*   HCO3 ART mmol/L 26.8*     Results from last 7 days   Lab Units 01/24/19 0413 01/23/19 0428 01/22/19 0423 01/17/19  1405   CRP mg/dL 1.33* 2.35* 3.55*   < > 4.03*   LACTATE mmol/L  --   --   --   --  1.9   WBC 10*3/mm3 6.00 5.14 4.77   < >  --    HEMOGLOBIN g/dL 12.4 12.9 12.7   < >  --    HEMATOCRIT % 38.7 39.9 38.2   < >  --    MCV fL 90.2 90.3 89.3   < >  --    MCHC g/dL 32.0* 32.3* 33.2   < >  --    PLATELETS 10*3/mm3 230 212 206   < >  --    INR  2.01* 1.57* 1.09  --   --     < > = values in this interval not displayed.     Results from last 7 days   Lab Units 01/24/19 0413 01/23/19 0428 01/22/19 0423 01/20/19  0525   SODIUM mmol/L 137  140 135   < > 140   POTASSIUM mmol/L 4.6 3.9 3.5   < > 3.7   MAGNESIUM mg/dL 2.6 1.6*  --   --  1.8   CHLORIDE mmol/L 104 108 102   < > 107   CO2 mmol/L 24.4 26.4 27.6   < > 21.8*   BUN mg/dL 30* 27* 25*   < > 38*   CREATININE mg/dL 0.89 0.76 0.88   < > 1.03   EGFR IF NONAFRICN AM mL/min/1.73 62 74 63   < > 52*   CALCIUM mg/dL 7.8 8.2 8.2   < > 8.1   GLUCOSE mg/dL 136* 42* 223*   < > 260*   ALBUMIN g/dL  --   --  3.10*  --   --    BILIRUBIN mg/dL  --   --  0.3  --   --    ALK PHOS U/L  --   --  62  --   --    AST (SGOT) U/L  --   --  21  --   --    ALT (SGPT) U/L  --   --  18  --   --     < > = values in this interval not displayed.   Estimated Creatinine Clearance: 46 mL/min (by C-G formula based on SCr of 0.89 mg/dL).  No results found for: AMMONIA    Glucose   Date/Time Value Ref Range Status   01/24/2019 0725 110 70 - 130 mg/dL Final   01/24/2019 0015 226 (H) 70 - 130 mg/dL Final   01/23/2019 1916 397 (H) 70 - 130 mg/dL Final   01/23/2019 1637 440 (H) 70 - 130 mg/dL Final   01/23/2019 1123 145 (H) 70 - 130 mg/dL Final   01/23/2019 0703 171 (H) 70 - 130 mg/dL Final   01/23/2019 0534 156 (H) 70 - 130 mg/dL Final   01/23/2019 0525 46 (C) 70 - 130 mg/dL Final     Lab Results   Component Value Date    HGBA1C 11.50 (H) 01/16/2019     Lab Results   Component Value Date    TSH 4.095 01/16/2019       Blood Culture   Date Value Ref Range Status   01/16/2019 No growth at 5 days  Final   01/16/2019 No growth at 5 days  Final     Urine Culture   Date Value Ref Range Status   01/16/2019 >100,000 CFU/mL Escherichia coli (A)  Final           Respiratory Culture   Date Value Ref Range Status   01/16/2019 Scant growth (1+) Klebsiella pneumoniae (A)  Final   01/16/2019 Scant growth (1+) Pseudomonas aeruginosa (A)  Final   01/16/2019 (A)  Final    Light growth (2+) Haemophilus influenzae Biotype II     Comment:       Resistance to ampicillin by production of beta lactamase may be an issue but resistance to cephalosporins  (ceftriaxone and cefotaxime) is rare.  Macrolides (erythromycin) and fluoroquinolones (ciprofloxacin) are effective.  Therefore, routine susceptibility testing of clinical isolates as a guide to therapy is not necessary.     Pain Management Panel     Pain Management Panel Latest Ref Rng & Units 1/16/2019 4/12/2018    CREATININE UR mg/dL - 96.6    AMPHETAMINES SCREEN, URINE Negative Negative -    BARBITURATES SCREEN Negative Negative -    BENZODIAZEPINE SCREEN, URINE Negative Negative -    BUPRENORPHINE Negative Negative -    COCAINE SCREEN, URINE Negative Negative -    METHADONE SCREEN, URINE Negative Negative -          I have personally reviewed the above laboratory results.   ----------------------------------------------------------------------------------------------------------------------  Imaging Results (last 24 hours)     ** No results found for the last 24 hours. **        I have personally reviewed the above radiology results.   ----------------------------------------------------------------------------------------------------------------------    Assessment/Plan       Assessment/Plan     ASSESSMENT:    1. Septic shock, with lactic acid greater than 4 on admission  2. Pseudomonas Pneumonia      PLAN:    The patient was on the bedside commode and was no examined. No fever or diarrhea reported. CRP continues to show improvement from 2.35 down to 1.33 with a normal white count off of antibiotic coverage.     Video swallow from 1/21/19 reports negative for aspiration    Respiratory culture from 1/16/19 finalized as Klebsiella, pseudomonas and Haemophilus susceptible  to Cefepime.     Completed course of Cefepime on 1/23/19.     In the setting of clinical and laboratory improvement recommend to follow off antibiotic coverage.      Code Status:   Code Status and Medical Interventions:   Ordered at: 01/16/19 1648     Code Status:    CPR     Medical Interventions (Level of Support Prior to Arrest):    Full        Suzanne Harrison PA-C  01/24/19  10:25 AM

## 2019-01-24 NOTE — PLAN OF CARE
Problem: Fall Risk (Adult)  Intervention: Review Medications/Identify Contributors to Fall Risk   01/22/19 5605   Safety Management   Medication Review/Management medications reviewed

## 2019-01-24 NOTE — NURSING NOTE
Spoke to pharmacy Tessie and made aware Pt is being discharged and needs her medication that is being held in pharmacy.

## 2019-01-24 NOTE — NURSING NOTE
"Spoke to security concerning Pt having belongings that need to be brought up. Security stated they have been signed out. I questioned the Pt concerning belongings that were taken to security, Pt stated her granddaughter Nancy \"got them\".  "

## 2019-01-24 NOTE — THERAPY TREATMENT NOTE
Acute Care - Physical Therapy Treatment Note  [facility]     Patient Name: Ashley Geronimo  : 1945  MRN: [id number]  Today's Date: 2019  Onset of Illness/Injury or Date of Surgery: 19  Date of Referral to PT: 19  Referring Physician: Dr. Chavez    Admit Date: 2019    Visit Dx:    ICD-10-CM ICD-9-CM   1. Pneumonia of both lungs due to infectious organism, unspecified part of lung J18.9 483.8   2. Atrial fibrillation with RVR (CMS/[facility]) I48.91 427.31   3. Essential hypertension I10 401.9   4. COPD with hypoxia (CMS/[facility]) J44.9 496    R09.02 799.02   5. Pneumonia due to influenza A virus, unspecified laterality, unspecified part of lung J10.00 487.0     Patient Active Problem List   Diagnosis   • Pneumonia   • Septic shock (CMS/[facility])   • Pseudomonas pneumonia (CMS/[facility])       Therapy Treatment    Rehabilitation Treatment Summary     Row Name 19 1500 19 1206          Treatment Time/Intention    Discipline  physical therapy assistant  -RF  occupational therapist  -     Document Type  therapy note (daily note)  -RF  therapy note (daily note)  -     Subjective Information  no complaints  -RF  no complaints  -     Mode of Treatment  physical therapy;individual therapy  -RF  occupational therapy;individual therapy  -     Patient/Family Observations  Pt supine in bed in no apparent distress, agreeable to treatment session.   -RF  Pt sitting up in chair and agreeable to OT treatment  -     Therapy Frequency (PT Clinical Impression)  3 times/wk 3-5x/week  -RF  --     Therapy Frequency (OT Eval)  --  3 times/wk 3-5 x per week  -     Patient Effort  adequate  -RF  adequate  -     Comment  --  Pt and nursing agreeable to OT treatment  -     Existing Precautions/Restrictions  fall;seizures  -RF  fall;seizures  -     Patient Response to Treatment  Pt tolerant to treatment session with no significant complaints reported. Good activity tolerance and ambulation quality noted. Pt  required cuing for technique with TE as cognition seemed decreased this date.   -RF  Pt tolerated OT treatment well this date w/ rest as needed  -KH     Recorded by [RF] Kristen Carrera, PTA 01/24/19 1528 [KH] Rocío Guzman, OT 01/24/19 1209     Row Name 01/24/19 1500             Vital Signs    Pre SpO2 (%)  96  -RF      O2 Delivery Pre Treatment  supplemental O2  -RF      Recorded by [RF] Kristen Carrera, PTA 01/24/19 1528      Row Name 01/24/19 1500 01/24/19 1206          Cognitive Assessment/Intervention- PT/OT    Affect/Mental Status (Cognitive)  WFL  -RF  WFL  -KH     Orientation Status (Cognition)  oriented to;person;place;situation  -RF  oriented to;person;place;situation  -KH     Follows Commands (Cognition)  physical/tactile prompts required;increased processing time needed;repetition of directions required;verbal cues/prompting required  -RF  physical/tactile prompts required;increased processing time needed;repetition of directions required;verbal cues/prompting required  -KH     Recorded by [RF] Kristen Carrera, PTA 01/24/19 1528 [KH] Rocío Guzman, OT 01/24/19 1209     Row Name 01/24/19 1500             Bed Mobility Assessment/Treatment    Bed Mobility Assessment/Treatment  sit-supine;supine-sit-supine  -RF      Sit-Supine [place] (Bed Mobility)  verbal cues;nonverbal cues (demo/gesture);supervision  -RF      Assistive Device (Bed Mobility)  bed rails;draw sheet  -RF      Recorded by [RF] Kristen Carrera, PTA 01/24/19 1528      Row Name 01/24/19 1500             Transfer Assessment/Treatment    Transfer Assessment/Treatment  sit-stand transfer;stand-sit transfer  -RF      Recorded by [RF] Kristen Carrera, PTA 01/24/19 1528      Row Name 01/24/19 1500             Sit-Stand Transfer    Sit-Stand [place] (Transfers)  verbal cues;nonverbal cues (demo/gesture);contact guard  -RF      Assistive Device (Sit-Stand Transfers)  walker, front-wheeled  -RF      Recorded  by [RF] Kristen Carrera, PTA 01/24/19 1528      Row Name 01/24/19 1500             Stand-Sit Transfer    Stand-Sit [place] (Transfers)  verbal cues;nonverbal cues (demo/gesture);contact guard  -RF      Assistive Device (Stand-Sit Transfers)  walker, front-wheeled  -RF      Recorded by [RF] Kristen Carrera, PTA 01/24/19 1528      Row Name 01/24/19 1500             Gait/Stairs Assessment/Training    Gait/Stairs Assessment/Training  gait/ambulation independence  -RF      [place] Level (Gait)  contact guard;nonverbal cues (demo/gesture);verbal cues  -RF      Assistive Device (Gait)  walker, front-wheeled  -RF      Distance in Feet (Gait)  250  -RF      Recorded by [RF] Kristen Carrera, PTA 01/24/19 1528      Row Name 01/24/19 1500 01/24/19 1206          Therapeutic Exercise    Therapeutic Exercise  seated, lower extremities  -RF  seated, upper extremities  -     Recorded by [RF] Kristen Carrera, [facility] 01/24/19 1528 [KH] Rocío Guzman, OT 01/24/19 1209     Row Name 01/24/19 1206             Upper Extremity Seated Therapeutic Exercise    Performed, Seated Upper Extremity (Therapeutic Exercise)  shoulder flexion/extension;scapular protraction/retraction;elbow flexion/extension;wrist flexion/extension;digit flexion/extension  -      Device, Seated Upper Extremity (Therapeutic Exercise)  elastic bands/tubing weighted dowel  -      Exercise Type, Seated Upper Extremity (Therapeutic Exercise)  AAROM (active assistive range of motion);AROM (active range of motion) BUE GMC, BUE TherEx/Act  -KH      Expected Outcomes, Seated Upper Extremity (Therapeutic Exercise)  improve functional tolerance, self-care activity;improve performance, BADLs  -      Sets/Reps Detail, Seated Upper Extremity (Therapeutic Exercise)  6 BUE strengthening/endurance exercises x 20-30 reps each  -KH      Recorded by [KH] Rocío Guzman, OT 01/24/19 1209      Row Name 01/24/19 1500             Lower Extremity  Seated Therapeutic Exercise    Performed, Seated Lower Extremity (Therapeutic Exercise)  hip flexion/extension;hip abduction/adduction;knee flexion/extension;ankle dorsiflexion/plantarflexion;LAQ (long arc quad), knee extension;other (see comments) pillow sqz ADD  -RF      Exercise Type, Seated Lower Extremity (Therapeutic Exercise)  AROM (active range of motion);eccentric contraction;isotonic contraction, concentric  -RF      Expected Outcomes, Seated Lower Extremity (Therapeutic Exercise)  improve functional tolerance, community activity;improve functional tolerance, household activity;improve functional tolerance, self-care activity;improve performance, gait skills;strengthen normal movement patterns;strengthen, facilitate independent active range of motion  -RF      Sets/Reps Detail, Seated Lower Extremity (Therapeutic Exercise)  2X10  -RF      Comment, Seated Lower Extremity (Therapeutic Exercise)  Education provided on importance of complaince with HEP for continued LE strengthening.   -RF      Recorded by [RF] Kristen Carrera, PTA 01/24/19 1528      Row Name 01/24/19 1500 01/24/19 1206          Positioning and Restraints    Pre-Treatment Position  in bed  -RF  sitting in chair/recliner  -KH     Post Treatment Position  chair  -RF  chair  -KH     In Chair  sitting;call light within reach;encouraged to call for assist;exit alarm on  -RF  notified nsg;sitting;call light within reach;encouraged to call for assist bedside table placed in front of patient  -KH     Recorded by [RF] Kristen Carrera, PTA 01/24/19 1528 [KH] Rocío Guzman, OT 01/24/19 1209     Row Name 01/24/19 1206             Plan of Care Review    Plan of Care Reviewed With  patient  -KH      Recorded by [KH] Rocío Guzman, OT 01/24/19 1209        User Key  (r) = Recorded By, (t) = Taken By, (c) = Cosigned By    Initials Name Effective Dates Discipline    RF Kristen Carrera, PTA 03/07/18 -  PT    Rocío Roth  LESLEY Scales 04/17/18 -  OT                   Physical Therapy Education     Title: PT OT SLP Therapies (Resolved)     Topic: Physical Therapy (Resolved)     Point: Mobility training (Resolved)     Learning Progress Summary           Patient Acceptance, E, VU by BC at 1/23/2019  4:15 PM    Acceptance, E,TB, VU by MELLO at 1/23/2019  9:43 AM    Acceptance, E, NR by BC at 1/22/2019 11:26 AM    Acceptance, E,TB, VU by MELLO at 1/22/2019  9:24 AM    Acceptance, E, NR by  at 1/21/2019  2:24 PM                   Point: Home exercise program (Resolved)     Learning Progress Summary           Patient Acceptance, E, VU by BC at 1/23/2019  4:15 PM    Acceptance, E,TB, VU by MELLO at 1/23/2019  9:43 AM    Acceptance, E, NR by BC at 1/22/2019 11:26 AM    Acceptance, E,TB, VU by MELLO at 1/22/2019  9:24 AM                   Point: Body mechanics (Resolved)     Learning Progress Summary           Patient Acceptance, E, VU by BC at 1/23/2019  4:15 PM    Acceptance, E,TB, VU by MELLO at 1/23/2019  9:43 AM    Acceptance, E, NR by BC at 1/22/2019 11:26 AM    Acceptance, E,TB, VU by MELLO at 1/22/2019  9:24 AM    Acceptance, E, NR by  at 1/21/2019  2:24 PM                   Point: Precautions (Resolved)     Learning Progress Summary           Patient Acceptance, E, VU by BC at 1/23/2019  4:15 PM    Acceptance, E,TB, VU by MELLO at 1/23/2019  9:43 AM    Acceptance, E, NR by BC at 1/22/2019 11:26 AM    Acceptance, E,TB, VU by MELLO at 1/22/2019  9:24 AM    Acceptance, E, NR by  at 1/21/2019  2:24 PM                               User Key     Initials Effective Dates Name Provider Type Discipline     03/14/16 -  Anjelica Bertrand, PT Physical Therapist PT    BC 03/14/16 -  Laura Cali PT Physical Therapist PT     10/18/18 -  Carlito Cali RN Registered Nurse Nurse                PT Recommendation and Plan  Therapy Frequency (PT Clinical Impression): 3 times/wk(3-5x/week)     Outcome Measures     Row Name 01/24/19 1500 01/23/19 1600  01/22/19 1100       How much help from another person do you currently need...    Turning from your back to your side while in flat bed without using bedrails?  3  -RF  3  -BC  3  -BC    Moving from lying on back to sitting on the side of a flat bed without bedrails?  3  -RF  3  -BC  3  -BC    Moving to and from a bed to a chair (including a wheelchair)?  3  -RF  3  -BC  3  -BC    Standing up from a chair using your arms (e.g., wheelchair, bedside chair)?  3  -RF  3  -BC  3  -BC    Climbing 3-5 steps with a railing?  2  -RF  2  -BC  2  -BC    To walk in hospital room?  3  -RF  3  -BC  3  -BC    AM-PAC 6 Clicks Score  17  -RF  17  -BC  17  -BC       Functional Assessment    Outcome Measure Options  AM-PAC 6 Clicks Basic Mobility (PT)  -RF  AM-PAC 6 Clicks Basic Mobility (PT)  -BC  AM-PAC 6 Clicks Basic Mobility (PT)  -BC      User Key  (r) = Recorded By, (t) = Taken By, (c) = Cosigned By    Initials Name Provider Type    BC Laura Cali, PT Physical Therapist    RF Kristen Carrera, MARY ANN Physical Therapy Assistant         Time Calculation:   PT Charges     Row Name 01/24/19 1528             Time Calculation    PT Received On  01/24/19  -RF      PT - Next Appointment  01/25/19  -RF      PT Goal Re-Cert Due Date  02/04/19  -RF         Time Calculation- PT    Total Timed Code Minutes- PT  24 minute(s)  -RF         Timed Charges    00669 - PT Therapeutic Exercise Minutes  11  -RF      66699 - Gait Training Minutes   13  -RF        User Key  (r) = Recorded By, (t) = Taken By, (c) = Cosigned By    Initials Name Provider Type    RF Kristen Carrera, MARY ANN Physical Therapy Assistant        Therapy Suggested Charges     Code   Minutes Charges    15721 (CPT®) Hc Pt Neuromusc Re Education Ea 15 Min      13608 (CPT®) Hc Pt Ther Proc Ea 15 Min 11 1    25657 (CPT®) Hc Gait Training Ea 15 Min 13 1    93511 (CPT®) Hc Pt Therapeutic Act Ea 15 Min      07720 (CPT®) Hc Pt Manual Therapy Ea 15 Min      00191 (CPT®) Hc Pt  Iontophoresis Ea 15 Min      91586 (CPT®) Hc Pt Elec Stim Ea-Per 15 Min      87611 (CPT®) Hc Pt Ultrasound Ea 15 Min      34083 (CPT®) Hc Pt Self Care/Mgmt/Train Ea 15 Min      27226 (CPT®) Hc Pt Prosthetic (S) Train Initial Encounter, Each 15 Min      00843 (CPT®) Hc Pt Orthotic(S)/Prosthetic(S) Encounter, Each 15 Min      72349 (CPT®) Hc Orthotic(S) Mgmt/Train Initial Encounter, Each 15min      Total  24 2        Therapy Charges for Today     Code Description Service Date Service Provider Modifiers Qty    13093338503 HC PT THER PROC EA 15 MIN 1/24/2019 Kristen Carrera, PTA GP 1    08400400455 HC GAIT TRAINING EA 15 MIN 1/24/2019 Kristen Carrera, PTA GP 1          PT G-Codes  Outcome Measure Options: AM-PAC 6 Clicks Basic Mobility (PT)  AM-PAC 6 Clicks Score: 17  Score: 15  Functional Limitation: Mobility: Walking and moving around  Mobility: Walking and Moving Around Current Status (): At least 40 percent but less than 60 percent impaired, limited or restricted  Mobility: Walking and Moving Around Goal Status (): At least 1 percent but less than 20 percent impaired, limited or restricted    Kristen Carrera PTA  1/24/2019

## 2019-01-24 NOTE — PROGRESS NOTES
Discharge Planning Assessment   [place]     Patient Name: Ashley Geronimo  MRN: [id number]  Today's Date: 1/24/2019    Admit Date: 1/16/2019        Discharge Plan     Row Name 01/24/19 1228       Plan    Final Discharge Disposition Code     Final Note  Pt to be discharged home on this date with continuous home 02.  Pt utilizes [facility] for 02 at night.  SS notified [facility] per Dang.  Joselo [facility] delivered home 02 portable tank to [facility].              Milka Reyes

## 2019-01-24 NOTE — DISCHARGE PLACEMENT REQUEST
"eG Geronimo (73 y.o. Female)     Date of Birth Social Security Number Address Home Phone MRN    1945  [address] [facility] [address] [phone] [id number]    Adventism Marital Status          Catholic        Admission Date Admission Type Admitting Provider Attending Provider Department, Room/Bed    1/16/19 Emergency Natty Chavez MD Heinss, Karl F, MD [address], 3302/1S    Discharge Date Discharge Disposition Discharge Destination         Home or Self Care              Attending Provider:  Liban Mas MD    Allergies:  No Known Allergies    Isolation:  None   Infection:  None   Code Status:  CPR    Ht:  152.4 cm (60\")   Wt:  63.1 kg (139 lb 3 oz)    Admission Cmt:  None   Principal Problem:  None                Active Insurance as of 1/16/2019     Primary Coverage     Payor Plan Insurance Group Employer/Plan Group    MEDICARE RAILROAD MEDICARE      Payor Plan Address Payor Plan Phone Number Payor Plan Fax Number Effective Dates    [address] [phone]  8/1/2009 - None Entered    [place] [address]       Subscriber Name Subscriber Birth Date Member ID       GE GERONIMO 1945 [id number]                 Emergency Contacts      (Rel.) Home Phone Work Phone Mobile Phone    Juan Geronimo (Son) [phone] -- --    Katelyn Castaneda (Grandchild) [phone] -- --            Emergency Contact Information     Name Relation Home Work Mobile    Juan Geronimo Son [phone]      Katelyn Castaneda Grandchild [phone]            Insurance Information                MEDICARE/RAILROAD MEDICARE Phone: [phone]    Subscriber: Ge Geronimo Subscriber#: [id number]    Group#:  Precert#:           Treatment Team     Provider Relationship Specialty Contact    Liban Mas MD Attending --  [phone]    Marcos Lindsay MD Consulting Physician Interventional Cardiology  [phone]    Paola Macias Technician --      " Kristen Carrera, MARY ANN Physical Therapy Assistant Physical Therapy      Dang Anderson PA Physician Assistant Cardiology  [phone]    Jaswinder Velazquez MD Consulting Physician Infectious Diseases  [phone]    Sudha Pritchett, RRT Respiratory Therapist --  [phone]    Estrellita Dya RN Registered Nurse --      Taylor Llanos Patient Care Technician --      Natty Chavez MD Physician of Record Hospitalist  [phone]          Problem List           Codes Noted - [facility]       Hospital    Septic shock (CMS/[facility]) ICD-10-CM: A41.9, R65.21  ICD-9-CM: 038.9, 785.52, 995.92 2019 - Present    Pseudomonas pneumonia (CMS/[facility]) ICD-10-CM: J15.1  ICD-9-CM: 482.1 2019 - Present    Pneumonia ICD-10-CM: J18.9  ICD-9-CM: 486 2019 - Present             History & Physical      Natty Chavez MD at 2019  4:39 PM            [facility] Medicine Services  CCU History & Physical          Patient Identification:  Name:  Ashley Geronimo  Age:  73 y.o.  Sex:  female  :  1945  MRN:  [id number]   Visit Number:  [id number]  Primary Care Physician:  Silvano Parker MD    I have seen the patient in conjunction with ANDREEA Michel and I agree with the following statements:     Subjective     Chief complaint: unresponsive    History of presenting illness:  Mrs. Geronimo is a 73 year old female who presented to [facility] ED on 19. A friend of the family is who brought her in. No other family available and he doesn't know any numbers of her immediate family as her cell phone is dead. He states he took her for labs today, after the labs they stopped at a gas station and got a snack and was headed home, he states she was on the phone, she touched his shoulder and he thought she was laughing and he says she wasn't awake. He was worried so he brought her to the ER. The ED staff reports that she was unresponsive on arrival, minimal response to painful stimuli  at which time she was intubated with succ's and etomidate.     Her work up in the ED showed a troponin of 0.044, BNP of 109, glucose of 485, sodium 130, potassium 4.5, HCO 19.4, Creatinine 1.45, BUN 32, lactic acid of 5.9, magnesium 2.1, INR 0.92, WBC 9.99, H/H 15.1/45.1, urine showed trace ketones, trace leuk esterase, 100 mg of protein, 21-50 WBC and 2 bacteria, negative UDS and ETOH. Blood cultures done in the ED. CT of the head was not concerning for acute issues. She does meet septic shock criteria on admission with a lactic acid of 5.9 and known UTI. We will admit her to CCU for further monitoring and treatment. Her only known medical history at this time is HTN and DM type 2, insulin dependent. We will place her as a full code as no family is here and the adult male who brought her in is not a blood relative.    Dr. Chavez:  Patient seen and examined with ALDO Sanders at the bedside and family member including granddaughter.  Patient lives independently with 2 grandchildren after her daughter .  As per the another granddaughter, patient has been having worsening cough dry along with dizziness and nausea for last 2 weeks.  The patient was complaining of chills and feeling feverish.  There is no history of vomiting diarrhea abdominal pain.  As per the granddaughter, patient was noted to have urinary incontinence for last couple days.  History is limited since octane from the family member.  Patient is currently sedated, intubated and mechanically ventilated.  Patient does have history of breast cancer status post left mastectomy and lymph node removal in , DMT2, CKD stage 3, hypertension, tobacco abuse.  ---------------------------------------------------------------------------------------------------------------------   Review of Systems   Reason unable to perform ROS: unable to obtain due to intuabted and sedate.       ---------------------------------------------------------------------------------------------------------------------   Past Medical History:   Diagnosis Date   • Diabetes mellitus (CMS/[facility])    • Hypertension     H/O Breast cancer, in remission  CKD Stage 3  Past surgical history:  Left mastectomy  Shoulder replacement    Family History:  Unobtainable since intubated    Social History:  Tobacco abuse  ---------------------------------------------------------------------------------------------------------------------   Allergies:  Patient has no known allergies.  ---------------------------------------------------------------------------------------------------------------------     Medications below are reported home medications pulling from within the system; at this time, these medications have not been reconciled unless otherwise specified and are in the verification process for further verifcation as current home medications.    Prior to Admission Medications     Prescriptions Last Dose Informant Patient Reported? Taking?    furosemide (LASIX) 20 MG tablet   Yes Yes    Take 20 mg by mouth 2 (Two) Times a Day.    indapamide (LOZOL) 2.5 MG tablet   Yes Yes    Take 2.5 mg by mouth Every Morning.    insulin NPH-insulin regular (humuLIN 70/30,novoLIN 70/30) (70-30) 100 UNIT/ML injection   Yes Yes    Inject  under the skin into the appropriate area as directed 2 (Two) Times a Day With Meals.    metFORMIN (GLUCOPHAGE) 500 MG tablet   Yes Yes    Take 500 mg by mouth 2 (Two) Times a Day With Meals.    montelukast (SINGULAIR) 10 MG tablet   Yes Yes    Take 10 mg by mouth Every Night.    Multiple Vitamins-Minerals (MULTIVITAMIN WITH MINERALS) tablet tablet   Yes Yes    Take 1 tablet by mouth Daily.    NIFEdipine XL (PROCARDIA XL) 90 MG 24 hr tablet   Yes Yes    Take 90 mg by mouth Daily.    pioglitazone (ACTOS) 45 MG tablet   Yes Yes    Take 45 mg by mouth Daily.    ramipril (ALTACE) 10 MG capsule   Yes Yes    Take 10  mg by mouth Daily.        Hospital Scheduled Meds:    [START ON 1/17/2019] ceftriaxone 1 g Intravenous Q24H   chlorhexidine 15 mL Mouth/Throat Q12H   [START ON 1/17/2019] doxycycline 100 mg Intravenous Q12H   enoxaparin 1 mg/kg Subcutaneous Q12H   ipratropium-albuterol 3 mL Nebulization Q6H - RT   lactobacillus acidophilus 1 capsule Oral Daily   pantoprazole 40 mg Intravenous Q AM   sodium chloride 3 mL Intravenous Q12H       insulin regular infusion 1 unit/mL 1-20 Units/hr Last Rate: 2 Units/hr (01/16/19 2026)   propofol 5-50 mcg/kg/min Last Rate: 50 mcg/kg/min (01/16/19 2022)   Sodium chloride 100 mL/hr Last Rate: 100 mL/hr (01/16/19 1817)     ---------------------------------------------------------------------------------------------------------------------   Objective       Vital Signs:  Temp:  [97.2 °F (36.2 °C)-100.4 °F (38 °C)] 100.4 °F (38 °C)  Heart Rate:  [] 63  Resp:  [14-26] 14  BP: ()/() 43/34  FiO2 (%):  [45 %-50 %] 45 %      01/16/19  1423 01/16/19  1800   Weight: 67.1 kg (148 lb) 54.1 kg (119 lb 3.2 oz)     Body mass index is 23.28 kg/m².  ---------------------------------------------------------------------------------------------------------------------       Physical Exam  Constitutional:  Elderly female lying on intubated and sedated on propofol   HENT:  Head: Normocephalic and atraumatic.  Mouth:  Moist mucous membranes.  ET +, OGT+  Eyes:   Pupils are equal, round, and reactive to light.  When I open her eyelids to evaluate her pupils both eyes noted to have horizontal nystagmus slowly.   Neck:  Neck supple.  No JVD present.  trachea midline.  Cardiovascular:  Normal rate, regular rhythm.  with no murmur.  Pulmonary/Chest:  On vent, breath sounds bilaterally, good air movement, few expiratory wheeze + b/l, some fine crackles in the bases   Abdominal:  Soft.  Bowel sounds are present.  No distension. No organomegaly.  Musculoskeletal:  1+ edema in bilateral lower ext.  No red  or swollen joints anywhere.    Neurological:  Intubated and sedated on propofol, she does move without purpose to painful stimuli.    Skin:  Skin is warm and dry.  + erythematous rash noted in the both lower extremities, R>L.No discharge seen.  No pallor.   Psychiatric: unable to assess due to intubation.   Peripheral vascular:  1+ edema bilaterally and pulses on all 4 extremities.  ---------------------------------------------------------------------------------------------------------------------  EKG:         ---------------------------------------------------------------------------------------------------------------------   Results from last 7 days   Lab Units 01/16/19  1848 01/16/19  1446 01/16/19  1436   CRP mg/dL  --   --  1.69*   LACTATE mmol/L 2.8* 5.9*  --    WBC 10*3/mm3 13.55*  --  9.99   HEMOGLOBIN g/dL 13.6  --  15.1   HEMATOCRIT % 41.3  --  45.1   MCV fL 90.2  --  88.8   MCHC g/dL 32.9*  --  33.5   PLATELETS 10*3/mm3 236  --  277   INR  0.93  --  0.92     Results from last 7 days   Lab Units 01/16/19  1710   PH, ARTERIAL pH units 7.318*   PO2 ART mm Hg 108.9*   PCO2, ARTERIAL mm Hg 40.0   HCO3 ART mmol/L 20.1*     Results from last 7 days   Lab Units 01/16/19  1711 01/16/19  1436   SODIUM mmol/L 133* 130*   POTASSIUM mmol/L 4.3 4.5   MAGNESIUM mg/dL  --  2.1   CHLORIDE mmol/L 100 94*   CO2 mmol/L 17.3* 19.4*   BUN mg/dL 29* 32*   CREATININE mg/dL 1.20 1.45*   EGFR IF NONAFRICN AM mL/min/1.73 44* 35*   CALCIUM mg/dL 8.5 9.6   GLUCOSE mg/dL 461*  474* 485*   ALBUMIN g/dL  --  4.80   BILIRUBIN mg/dL  --  0.6   ALK PHOS U/L  --  97   AST (SGOT) U/L  --  25   ALT (SGPT) U/L  --  13   Estimated Creatinine Clearance: 35.1 mL/min (by C-G formula based on SCr of 1.2 mg/dL).  Ammonia   Date Value Ref Range Status   01/16/2019 46 11 - 51 umol/L Final     Results from last 7 days   Lab Units 01/16/19  1711 01/16/19  1436   TROPONIN I ng/mL 0.034 0.044*         Lab Results   Component Value Date    HGBA1C 9.20  (H) 04/12/2018     Lab Results   Component Value Date    TSH 4.095 01/16/2019     No results found for: PREGTESTUR, PREGSERUM, HCG, HCGQUANT  Pain Management Panel     Pain Management Panel Latest Ref Rng & Units 1/16/2019 4/12/2018    CREATININE UR mg/dL - 96.6    AMPHETAMINES SCREEN, URINE Negative Negative -    BARBITURATES SCREEN Negative Negative -    BENZODIAZEPINE SCREEN, URINE Negative Negative -    BUPRENORPHINE Negative Negative -    COCAINE SCREEN, URINE Negative Negative -    METHADONE SCREEN, URINE Negative Negative -                        ---------------------------------------------------------------------------------------------------------------------  Imaging Results (last 7 days)     Procedure Component Value Units Date/Time    CT Chest Without Contrast [[id number]] Updated:  01/16/19 1837    XR Chest 1 View [[id number]] Collected:  01/16/19 1616     Updated:  01/16/19 1618    Narrative:       XR CHEST 1 VW-     CLINICAL INDICATION: syncope          COMPARISON: 3/21/2018      TECHNIQUE: Single frontal view of the chest.     FINDINGS:     Endotracheal tube overlies tracheal air column above the issa  Nasogastric tube is in the stomach  Mild fullness in the laura bilaterally.  There is no evidence of an acute osseous abnormality.   There are no suspicious-appearing parenchymal soft tissue nodules.            Impression:       Line placement as above  Mild fullness in the laura bilaterally         This report was finalized on 1/16/2019 4:16 PM by Dr. Garry Benton MD.       CT Head Without Contrast Stroke Protocol [[id number]] Collected:  01/16/19 1435     Updated:  01/16/19 1438    Narrative:       CT HEAD WO CONTRAST STROKE PROTOCOL-     CLINICAL INDICATION: Syncope/fainting          COMPARISON: None available      TECHNIQUE: Axial images of the brain were obtained with out intravenous  contrast.  Reformatted images were created in the sagittal and coronal  planes.     DOSE:         Radiation dose  reduction techniques were utilized per ALARA protocol.  Automated exposure control was initiated through either or CareDose or  DoseRight software packages by  protocol.           FINDINGS:   Today's study shows no mass, hemorrhage, or midline shift.   There is atrophy and ventriculomegaly. Cannot exclude NPH.  There is no evidence of acute ischemia.  I do not see epidural or subdural hematoma.  The gray-white differentiation is appropriate.   The bone window setting images show no destructive calvarial lesion or  acute calvarial fracture.   The posterior fossa is unremarkable.             Impression:       No evidence of acute ischemic event  Mild ventriculomegaly     The results were relayed to the emergency department at 2:37 PM     This report was finalized on 1/16/2019 2:36 PM by Dr. Garry Benton MD.             Cultures: blood cultures done in the ED       I have personally reviewed the radiology images and read the final radiology report.  ---------------------------------------------------------------------------------------------------------------------  Assessment / Plan       Assessment and Plan:  Acute metabolic encephalopathy, questionable seizure activity   Sepsis r/t CAP and UTI  And b/l lower extremities cellulitis (lactic acid 5.9 on admission, tachycardia, AMS)   Lactic Acidosis   High Anion Gap Acute Metabolic Compensated Respiratory Acidosis   Acute Respiratory failure, S/P Intubation and mechanical ventilation   Possible RAMESH on CKD stage III  New Onset A-Fib with RVR  DM type 2, non insulin dependent with hyperglycemia   Pseudohyponatremia   Indeterminate troponin   Essential HTN  Probable COPD  Tobacco abuse    AMS with questionable seizure activity: seizure precautions ordered, EEG ordered for tomorrow. CT of the head negative. Neuro checks, monitor closley. Ammonia level is 46.  UDS and ETOH negative. Prolactin level ordered and pending. Continue propofol for sedation. Ativan as  needed for seizures.    No abg was completed prior to intubation, patient was intubated due to her altered mental status, Vent protocol in place, repeat ABG at 2000 and call to provider. ABG ordered for the am.     Septic Shock r/t UTI and possible CAP and B/L cellulitis: rocephin and Zithromax started in the ED, Chest xray does not appreciate any pneumonia, CT of the chest ordered for further evaluation. Will continue rocephin 1g daily and doxycycline for now for pneumonia and UTI and cellulitis coverage. Atypicals ordered. Respiratory Culture ordered. F/U BCX. Urine cx ordered. Maintain MAP @ 65. Influenza ordered and pending.  Patient received 2 L of IV fluid boluses in ER.  Start on DuoNeb.    Acute Respiratory failure, S/P Intubation and mechanical ventilation for the airway protection.  Start on DuoNeb.  Currently minimal FiO2 requirement.  We will attempt SPT trial in a.m.    Lactic acidosis: initial lactic of 5.9, repeat ordered with reflex.  Improving.  Continue with IVF.     New onset A-Fib with RVR: rate is controlled on my exam, CHADS2-VASc score is 2, moderate risk at this time based on her history available at this time.  Was started on Lovenox 40 dose twice a day.  Discontinue heparin gtt. Repeat EKG with sinus rhythm. TSH is 4.095.  Get 2-D echo.    Indeterminate troponin, Essential HTN: monitor blood pressure closely, will resume home meds if appropriate when available by pharmacy,may require PRN hydralazine. Initial troponin is 0.044, set to trend monitor on telemetry.     Possible RAMESH on CKD stage III: baseline unknown, last available creatinine is 1.29-1.30 today she is 1.45, will give NS @ 100ml/hr and repeat in the AM. Avoid nephrotoxic agents.     DM type 2, Non insulin dependent with mild DKA : A1c ordered, glucoses 400 range, insulin gtt ordered. Acetone is negative, anion gap is 16.6. Repeat BMP ordered for 2000 today.  Continue to monitor BMP every 4 hours.  Monitor  electrolytes.    Pseudohyponatremia: sodium level is 130, likely related to hyperglycemia, corrected sodium is 139.       *DVT prophylaxis: full dose lovenox for A-Fib   *GI prophylaxis: Protonix 40mg IV daily   *Activity: turn q2hr  *Diet: Nutrition consulted for tube feedings recommendations, nothing by mouth  *Precautions: seizures   *Tubes/Lines/Drains: intubated 1/16/19, Singh 1/16/19, OG 1/16   *Code status: Full Code    ET tube and OG tube placement confirmed by X-Ray on 1/16/19 @ 1616    Total critical care time 60 minutes.    Patient is high risk for the following reasons: AMS, questionable seizure activity   Sepsis r/t CAP and UTI  And b/l lower extremities cellulitis (lactic acid 5.9 on admission, tachycardia, AMS)   Lactic Acidosis   High Anion Gap Acute Metabolic Compensated Respiratory Acidosis   Acute Respiratory failure, S/P Intubation and mechanical ventilation    Management plan discussed in detail with the granddaughter and RN Tommy at the bedside.  All questions were answered.  They were agreeable.    Natty Chavez MD  01/16/19  8:30 PM  ---------------------------------------------------------------------------------------------------------------------  Patient seen and examined independently with family and RN at the bedside.  I agree with the assessment and plan by NATE Garcia.  The note has been edited to reflect my findings.      [facility]       Electronically signed by Natty Chavez MD at 1/16/2019  8:45 PM       Lines, Drains & Airways    Active LDAs     None                Hospital Medications (active)       Dose Frequency Start End    acetaminophen (TYLENOL) tablet 650 mg 650 mg Every 6 Hours PRN 1/16/2019     Sig - Route: Take 2 tablets by mouth Every 6 (Six) Hours As Needed for Mild Pain . - Oral    aspirin EC tablet 81 mg 81 mg Daily 1/18/2019     Sig - Route: Take 1 tablet by mouth Daily. - Oral    atorvastatin (LIPITOR) tablet 40 mg 40 mg Nightly  1/17/2019     Sig - Route: Take 1 tablet by mouth Every Night. - Oral    budesonide-formoterol (SYMBICORT) 160-4.5 MCG/ACT inhaler 2 puff 2 puff 2 Times Daily - RT 1/19/2019     Sig - Route: Inhale 2 puffs 2 (Two) Times a Day. - Inhalation    dextrose (D50W) 25 g/ 50mL Intravenous Solution 25 g 25 g Every 15 Minutes PRN 1/22/2019     Sig - Route: Infuse 50 mL into a venous catheter Every 15 (Fifteen) Minutes As Needed for Low Blood Sugar (Blood Sugar Less Than 70). - Intravenous    dextrose (D50W) 25 g/ 50mL Intravenous Solution 25-50 mL 25-50 mL Every 30 Minutes PRN 1/16/2019     Sig - Route: Infuse 25-50 mL into a venous catheter Every 30 (Thirty) Minutes As Needed for Low Blood Sugar (Blood Glucose <70 mg/dL; Per Insulin Infusion Protocol). - Intravenous    dextrose (GLUTOSE) oral gel 15 g 15 g Every 15 Minutes PRN 1/22/2019     Sig - Route: Take 15 g by mouth Every 15 (Fifteen) Minutes As Needed for Low Blood Sugar (Blood sugar less than 70). - Oral    enoxaparin (LOVENOX) syringe 60 mg 1 mg/kg × 61.8 kg Every 12 Hours 1/21/2019     Sig - Route: Inject 0.6 mL under the skin into the appropriate area as directed Every 12 (Twelve) Hours. - Subcutaneous    glucagon (human recombinant) (GLUCAGEN DIAGNOSTIC) injection 1 mg 1 mg As Needed 1/22/2019     Sig - Route: Inject 1 mg under the skin into the appropriate area as directed As Needed (Blood Glucose Less Than 70). - Subcutaneous    hydrALAZINE (APRESOLINE) injection 10 mg 10 mg Every 4 Hours PRN 1/18/2019     Sig - Route: Infuse 0.5 mL into a venous catheter Every 4 (Four) Hours As Needed for High Blood Pressure. - Intravenous    insulin aspart (novoLOG) injection 0-9 Units 0-9 Units 4 Times Daily Before Meals & Nightly 1/23/2019     Sig - Route: Inject 0-9 Units under the skin into the appropriate area as directed 4 (Four) Times a Day Before Meals & at Bedtime. - Subcutaneous    Cosign for Ordering: Accepted by Liban Mas MD on 1/24/2019  8:45 AM    insulin  "detemir (LEVEMIR) injection 12 Units 12 Units Nightly 1/23/2019     Sig - Route: Inject 12 Units under the skin into the appropriate area as directed Every Night. - Subcutaneous    Cosign for Ordering: Accepted by Martin Velasco DO on 1/23/2019  4:02 PM    ipratropium-albuterol (DUO-NEB) nebulizer solution 3 mL 3 mL Every 6 Hours PRN 1/22/2019     Sig - Route: Take 3 mL by nebulization Every 6 (Six) Hours As Needed for Shortness of Air. - Nebulization    lactobacillus acidophilus (RISAQUAD) capsule 1 capsule 1 capsule Daily 1/16/2019     Sig - Route: Take 1 capsule by mouth Daily. - Oral    Magnesium Sulfate 2 gram / 50mL Infusion (GIVE X 3 BAGS TO EQUAL 6GM TOTAL DOSE) - Mg 1.1 - 1.5 mg/dl 2 g As Needed 1/23/2019     Sig - Route: Infuse 50 mL into a venous catheter As Needed (See Administration Instructions). - Intravenous    Cosign for Ordering: Accepted by Liban Mas MD on 1/24/2019  8:45 AM    Linked Group 1:  \"Or\" Linked Group Details        Magnesium Sulfate 2 gram Bolus, followed by 8 gram infusion (total Mg dose 10 grams)- Mg less than or equal to 1mg/dL 2 g As Needed 1/23/2019     Sig - Route: Infuse 50 mL into a venous catheter As Needed (See Administration Instructions). - Intravenous    Cosign for Ordering: Accepted by Liban Mas MD on 1/24/2019  8:45 AM    Linked Group 1:  \"Or\" Linked Group Details        Magnesium Sulfate 4 gram infusion- Mg 1.6-1.9 mg/dL 4 g As Needed 1/23/2019     Sig - Route: Infuse 100 mL into a venous catheter As Needed (See Administration Instructions). - Intravenous    Cosign for Ordering: Accepted by Liban Mas MD on 1/24/2019  8:45 AM    Linked Group 1:  \"Or\" Linked Group Details        Magnesium Sulfate 4 gram infusion- Mg 1.6-1.9 mg/dL 4 g Once 1/23/2019 1/23/2019    Sig - Route: Infuse 100 mL into a venous catheter 1 (One) Time. - Intravenous    metoprolol tartrate (LOPRESSOR) tablet 25 mg 25 mg Every 12 Hours Scheduled 1/19/2019     Sig - Route: " "Take 1 tablet by mouth Every 12 (Twelve) Hours. - Oral    NIFEdipine CC (ADALAT CC) 24 hr tablet 60 mg 60 mg Every 24 Hours Scheduled 1/20/2019     Sig - Route: Take 2 tablets by mouth Daily. - Oral    pantoprazole (PROTONIX) EC tablet 40 mg 40 mg Every Morning Before Breakfast 1/20/2019     Sig - Route: Take 1 tablet by mouth Every Morning Before Breakfast. - Oral    Pharmacy to dose warfarin  Continuous PRN 1/21/2019     Sig - Route: Continuous As Needed for Consult. - Does not apply    potassium chloride (K-DUR,KLOR-CON) CR tablet 40 mEq 40 mEq As Needed 1/23/2019     Sig - Route: Take 2 tablets by mouth As Needed (potassium replacement.  see admin instructions). - Oral    Cosign for Ordering: Accepted by Liban Mas MD on 1/24/2019  8:45 AM    Linked Group 2:  \"Or\" Linked Group Details        potassium chloride (KLOR-CON) packet 40 mEq 40 mEq As Needed 1/23/2019     Sig - Route: Take 40 mEq by mouth As Needed (potassium replacement, see admin instructions). - Oral    Cosign for Ordering: Accepted by Liban Mas MD on 1/24/2019  8:45 AM    Linked Group 2:  \"Or\" Linked Group Details        potassium chloride 10 mEq in 100 mL IVPB 10 mEq Every 1 Hour PRN 1/23/2019     Sig - Route: Infuse 100 mL into a venous catheter Every 1 (One) Hour As Needed (potassium protocol PERIPHERAL - see admin instructions). - Intravenous    Cosign for Ordering: Accepted by Liban Mas MD on 1/24/2019  8:45 AM    Linked Group 2:  \"Or\" Linked Group Details        sodium chloride 0.9 % flush 10 mL 10 mL As Needed 1/16/2019     Sig - Route: Infuse 10 mL into a venous catheter As Needed for Line Care. - Intravenous    Cosign for Ordering: Accepted by Bandar Godoy MD on 1/16/2019  2:30 PM    sodium chloride 0.9 % flush 10 mL 10 mL Every 12 Hours Scheduled 1/17/2019     Sig - Route: Infuse 10 mL into a venous catheter Every 12 (Twelve) Hours. - Intravenous    sodium chloride 0.9 % flush 10 mL 10 mL Every 12 Hours Scheduled " 1/17/2019     Sig - Route: Infuse 10 mL into a venous catheter Every 12 (Twelve) Hours. - Intravenous    sodium chloride 0.9 % flush 10 mL 10 mL Every 12 Hours Scheduled 1/17/2019     Sig - Route: Infuse 10 mL into a venous catheter Every 12 (Twelve) Hours. - Intravenous    sodium chloride 0.9 % flush 10 mL 10 mL As Needed 1/17/2019     Sig - Route: Infuse 10 mL into a venous catheter As Needed for Line Care (After Medication Administration). - Intravenous    sodium chloride 0.9 % flush 20 mL 20 mL As Needed 1/17/2019     Sig - Route: Infuse 20 mL into a venous catheter As Needed for Line Care (After Blood Draws or Blood Product Administration). - Intravenous    sodium chloride 0.9 % flush 3 mL 3 mL Every 12 Hours Scheduled 1/16/2019     Sig - Route: Infuse 3 mL into a venous catheter Every 12 (Twelve) Hours. - Intravenous    sodium chloride 0.9 % flush 3-10 mL 3-10 mL As Needed 1/16/2019     Sig - Route: Infuse 3-10 mL into a venous catheter As Needed for Line Care. - Intravenous    warfarin (COUMADIN) tablet 2.5 mg 2.5 mg Once 1/23/2019 1/23/2019    Sig - Route: Take 1 tablet by mouth 1 (One) Time. - Oral    cefepime (MAXIPIME) 2 g/100 mL 0.9% NS (mbp) (Discontinued) 2 g Every 12 Hours 1/19/2019 1/24/2019    Sig - Route: Infuse 100 mL into a venous catheter Every 12 (Twelve) Hours. - Intravenous    insulin aspart (novoLOG) injection 0-14 Units (Discontinued) 0-14 Units 4 Times Daily Before Meals & Nightly 1/22/2019 1/23/2019    Sig - Route: Inject 0-14 Units under the skin into the appropriate area as directed 4 (Four) Times a Day Before Meals & at Bedtime. - Subcutaneous    insulin detemir (LEVEMIR) injection 15 Units (Discontinued) 15 Units Nightly 1/22/2019 1/23/2019    Sig - Route: Inject 15 Units under the skin into the appropriate area as directed Every Night. - Subcutaneous            Lab Results (last 24 hours)     Procedure Component Value Units Date/Time    POC Glucose Once [[id number]]  (Abnormal)  Collected:  01/24/19 1132    Specimen:  Blood Updated:  01/24/19 1138     Glucose 215 mg/dL     POC Glucose Once [[id number]]  (Normal) Collected:  01/24/19 0725    Specimen:  Blood Updated:  01/24/19 0733     Glucose 110 mg/dL     Osmolality, Calculated [[id number]]  (Normal) Collected:  01/24/19 0413    Specimen:  Blood Updated:  01/24/19 0620     Osmolality Calc 282.1 mOsm/kg     Magnesium [[id number]]  (Normal) Collected:  01/24/19 0413    Specimen:  Blood Updated:  01/24/19 0620     Magnesium 2.6 mg/dL     Basic Metabolic Panel [[id number]]  (Abnormal) Collected:  01/24/19 0413    Specimen:  Blood Updated:  01/24/19 0620     Glucose 136 mg/dL      BUN 30 mg/dL      Creatinine 0.89 mg/dL      Sodium 137 mmol/L      Potassium 4.6 mmol/L      Chloride 104 mmol/L      CO2 24.4 mmol/L      Calcium 7.8 mg/dL      eGFR Non African Amer 62 mL/min/1.73      BUN/Creatinine Ratio 33.7     Anion Gap 8.6 mmol/L     Narrative:       The MDRD GFR formula is only valid for adults with stable renal function between ages 18 and 70.    C-reactive Protein [[id number]]  (Abnormal) Collected:  01/24/19 0413    Specimen:  Blood Updated:  01/24/19 0559     C-Reactive Protein 1.33 mg/dL     Protime-INR [[id number]]  (Abnormal) Collected:  01/24/19 0413    Specimen:  Blood Updated:  01/24/19 0549     Protime 23.0 Seconds      INR 2.01    Narrative:       Suggested INR therapeutic range for stable oral anticoagulant therapy:    Low Intensity therapy:   1.5-2.0  Moderate Intensity therapy:   2.0-3.0  High Intensity therapy:   2.5-4.0    CBC & Differential [[id number]] Collected:  01/24/19 0413    Specimen:  Blood Updated:  01/24/19 0541    Narrative:       The following orders were created for panel order CBC & Differential.  Procedure                               Abnormality         Status                     ---------                               -----------         ------                     CBC Auto Differential[[id number]]        Abnormal             Final result                 Please view results for these tests on the individual orders.    CBC Auto Differential [[id number]]  (Abnormal) Collected:  01/24/19 0413    Specimen:  Blood Updated:  01/24/19 0541     WBC 6.00 10*3/mm3      RBC 4.29 10*6/mm3      Hemoglobin 12.4 g/dL      Hematocrit 38.7 %      MCV 90.2 fL      MCH 28.9 pg      MCHC 32.0 g/dL      RDW 13.3 %      RDW-SD 42.8 fl      MPV 12.6 fL      Platelets 230 10*3/mm3      Neutrophil % 65.0 %      Lymphocyte % 20.0 %      Monocyte % 12.3 %      Eosinophil % 2.0 %      Basophil % 0.2 %      Immature Grans % 0.5 %      Neutrophils, Absolute 3.90 10*3/mm3      Lymphocytes, Absolute 1.20 10*3/mm3      Monocytes, Absolute 0.74 10*3/mm3      Eosinophils, Absolute 0.12 10*3/mm3      Basophils, Absolute 0.01 10*3/mm3      Immature Grans, Absolute 0.03 10*3/mm3     POC Glucose Once [[id number]]  (Abnormal) Collected:  01/24/19 0015    Specimen:  Blood Updated:  01/24/19 0021     Glucose 226 mg/dL     POC Glucose Once [[id number]]  (Abnormal) Collected:  01/23/19 1916    Specimen:  Blood Updated:  01/23/19 1922     Glucose 397 mg/dL     POC Glucose Once [[id number]]  (Abnormal) Collected:  01/23/19 1637    Specimen:  Blood Updated:  01/23/19 1644     Glucose 440 mg/dL         Orders (last 24 hrs)     Start     Ordered    01/25/19 0600  C-reactive Protein  Morning Draw      01/24/19 1028    01/25/19 0000  aspirin 81 MG EC tablet  Daily      01/24/19 1121    01/25/19 0000  NIFEdipine CC (ADALAT CC) 60 MG 24 hr tablet  Every 24 Hours Scheduled      01/24/19 1121    01/24/19 1145  Discontinue IV  Once      01/24/19 1144    01/24/19 1139  POC Glucose Once  Once      01/24/19 1132    01/24/19 1116  Discontinue IV  Once      01/24/19 1121    01/24/19 1100  Discharge patient  Once      01/24/19 1121    01/24/19 0734  POC Glucose Once  Once      01/24/19 0725    01/24/19 0600  CBC & Differential  Morning Draw      01/23/19 1533    01/24/19 0600  C-reactive  Protein  Morning Draw      01/23/19 1533    01/24/19 0600  Basic Metabolic Panel  Morning Draw      01/23/19 1533    01/24/19 0600  Magnesium  Morning Draw      01/23/19 1831    01/24/19 0600  CBC Auto Differential  PROCEDURE ONCE      01/24/19 0002    01/24/19 0600  Osmolality, Calculated  Once      01/24/19 0559    01/24/19 0022  POC Glucose Once  Once      01/24/19 0015    01/24/19 0000  budesonide-formoterol (SYMBICORT) 160-4.5 MCG/ACT inhaler  2 Times Daily - RT      01/24/19 1121    01/24/19 0000  insulin detemir (LEVEMIR) 100 UNIT/ML injection  Nightly      01/24/19 1121    01/24/19 0000  atorvastatin (LIPITOR) 40 MG tablet  Nightly      01/24/19 1121    01/24/19 0000  metoprolol tartrate (LOPRESSOR) 25 MG tablet  Every 12 Hours Scheduled      01/24/19 1121    01/24/19 0000  warfarin (COUMADIN) 2 MG tablet      01/24/19 1121    01/24/19 0000  Discharge Follow-up with PCP      01/24/19 1121    01/24/19 0000  Discharge Follow-up with Specified Provider: Lorenzo; 2 Weeks      01/24/19 1121    01/24/19 0000  Referral to Home Health      01/24/19 1121    01/24/19 0000  Diet: Regular, Consistent Carbohydrate; Thin      01/24/19 1121    01/24/19 0000  Home Oxygen Therapy      01/24/19 1121    01/24/19 0000  Discharge Follow-up with Specified Provider: Shaista 3 weeks      01/24/19 1144    01/23/19 2100  insulin detemir (LEVEMIR) injection 12 Units  Nightly      01/23/19 1512    01/23/19 1923  POC Glucose Once  Once      01/23/19 1916    01/23/19 1800  warfarin (COUMADIN) tablet 3 mg  Once,   Status:  Discontinued      01/23/19 0953    01/23/19 1800  warfarin (COUMADIN) tablet 2.5 mg  Once      01/23/19 0954    01/23/19 1730  insulin aspart (novoLOG) injection 0-9 Units  4 Times Daily Before Meals & Nightly      01/23/19 1430    01/23/19 1645  POC Glucose Once  Once      01/23/19 1637    01/23/19 1300  Magnesium Sulfate 4 gram infusion- Mg 1.6-1.9 mg/dL  Once      01/23/19 1104    01/23/19 1100  Magnesium Sulfate 2  gram Bolus, followed by 8 gram infusion (total Mg dose 10 grams)- Mg less than or equal to 1mg/dL  As Needed      01/23/19 1101    01/23/19 1100  Magnesium Sulfate 2 gram / 50mL Infusion (GIVE X 3 BAGS TO EQUAL 6GM TOTAL DOSE) - Mg 1.1 - 1.5 mg/dl  As Needed      01/23/19 1101    01/23/19 1100  Magnesium Sulfate 4 gram infusion- Mg 1.6-1.9 mg/dL  As Needed      01/23/19 1101    01/23/19 1100  potassium chloride (K-DUR,KLOR-CON) CR tablet 40 mEq  As Needed      01/23/19 1101    01/23/19 1100  potassium chloride (KLOR-CON) packet 40 mEq  As Needed      01/23/19 1101    01/23/19 1100  potassium chloride 10 mEq in 100 mL IVPB  Every 1 Hour PRN      01/23/19 1101    01/22/19 2100  insulin detemir (LEVEMIR) injection 15 Units  Nightly,   Status:  Discontinued      01/22/19 1033    01/22/19 1215  ipratropium-albuterol (DUO-NEB) nebulizer solution 3 mL  Every 6 Hours PRN      01/22/19 1205    01/22/19 1130  insulin aspart (novoLOG) injection 0-14 Units  4 Times Daily Before Meals & Nightly,   Status:  Discontinued      01/22/19 1033    01/22/19 1100  POC Glucose 4x Daily AC & at Bedtime  4 Times Daily Before Meals & at Bedtime      01/22/19 1033    01/22/19 1032  dextrose (GLUTOSE) oral gel 15 g  Every 15 Minutes PRN      01/22/19 1033    01/22/19 1032  dextrose (D50W) 25 g/ 50mL Intravenous Solution 25 g  Every 15 Minutes PRN      01/22/19 1033    01/22/19 1032  glucagon (human recombinant) (GLUCAGEN DIAGNOSTIC) injection 1 mg  As Needed      01/22/19 1033    01/22/19 0600  Protime-INR  Daily      01/21/19 1353    01/21/19 2100  enoxaparin (LOVENOX) syringe 60 mg  Every 12 Hours      01/21/19 1346    01/21/19 1340  Pharmacy to dose warfarin  Continuous PRN      01/21/19 1342    01/20/19 0945  NIFEdipine CC (ADALAT CC) 24 hr tablet 60 mg  Every 24 Hours Scheduled      01/20/19 0855    01/20/19 0730  pantoprazole (PROTONIX) EC tablet 40 mg  Every Morning Before Breakfast      01/19/19 1751    01/19/19 7081  cefepime  (MAXIPIME) 2 g/100 mL 0.9% NS (mbp)  Every 12 Hours,   Status:  Discontinued      01/19/19 1804    01/19/19 2130  budesonide-formoterol (SYMBICORT) 160-4.5 MCG/ACT inhaler 2 puff  2 Times Daily - RT      01/19/19 1713    01/19/19 2100  metoprolol tartrate (LOPRESSOR) tablet 25 mg  Every 12 Hours Scheduled      01/19/19 1250    01/19/19 1800  Dietary Nutrition Supplements Magic Cup  Daily With Lunch & Dinner      01/19/19 1216    01/18/19 1639  hydrALAZINE (APRESOLINE) injection 10 mg  Every 4 Hours PRN      01/18/19 1639    01/18/19 0900  aspirin EC tablet 81 mg  Daily      01/17/19 1918 01/17/19 2345  sodium chloride 0.9 % flush 10 mL  Every 12 Hours Scheduled      01/17/19 2255    01/17/19 2345  sodium chloride 0.9 % flush 10 mL  Every 12 Hours Scheduled      01/17/19 2255    01/17/19 2345  sodium chloride 0.9 % flush 10 mL  Every 12 Hours Scheduled      01/17/19 2255    01/17/19 2254  sodium chloride 0.9 % flush 10 mL  As Needed      01/17/19 2255    01/17/19 2254  sodium chloride 0.9 % flush 20 mL  As Needed      01/17/19 2255    01/17/19 2100  atorvastatin (LIPITOR) tablet 40 mg  Nightly      01/17/19 1918 01/16/19 2100  sodium chloride 0.9 % flush 3 mL  Every 12 Hours Scheduled      01/16/19 1821 01/16/19 2100  lactobacillus acidophilus (RISAQUAD) capsule 1 capsule  Daily      01/16/19 1957 01/16/19 1955  acetaminophen (TYLENOL) tablet 650 mg  Every 6 Hours PRN      01/16/19 1957 01/16/19 1820  sodium chloride 0.9 % flush 3-10 mL  As Needed      01/16/19 1821 01/16/19 1820  dextrose (D50W) 25 g/ 50mL Intravenous Solution 25-50 mL  Every 30 Minutes PRN      01/16/19 1821 01/16/19 1426  sodium chloride 0.9 % flush 10 mL  As Needed      01/16/19 1426    Unscheduled  Oxygen Therapy- Nasal Cannula; 2 LPM; Titrate for SPO2: 92%, Greater Than or Equal To  Continuous PRN      01/16/19 142    Unscheduled  Change Dressing to IV Site As Needed When Damp, Loose or Soiled  As Needed      01/17/19 0579     Unscheduled  Change Needleless Connectors  As Needed     Comments:  Change Needleless Connectors When:  - Removed For Any Reason  - Residual Blood or Debris Within Connector  - Prior to Drawing Blood Cultures  - Contamination of Connector  - After Administration of Blood or Blood Components    01/17/19 0534    Unscheduled  Change Dressing to IV Site As Needed When Damp, Loose or Soiled  As Needed      01/17/19 2255    Unscheduled  Change Needleless Connectors  As Needed     Comments:  Change Needleless Connectors When:  - Removed For Any Reason  - Residual Blood or Debris Within Connector  - Prior to Drawing Blood Cultures  - Contamination of Connector  - After Administration of Blood or Blood Components    01/17/19 2255    Unscheduled  CENTRAL LINE CARE - Change Needleless Connectors  As Needed     Comments:  Per [facility] Policy    01/17/19 2255    Unscheduled  Assist With Feeding Patient  As Needed      01/19/19 1035    Unscheduled  Bladder Scan if Patient Unable to Void 4-6 Hours After Catheter Removal  As Needed      01/20/19 1419    Unscheduled  If Bladder Scan Volume is Less Than 350-500mL & Patient is Without Symptoms of Bladder Discomfort / Distention Monitor Every 1-2 Hours for Spontaneous Void  As Needed      01/20/19 1419    Unscheduled  Straight Cath Every 4-6 Hours As Needed If Patient is Unable to Void After 4-6 Hours, Bladder Scan Volume is Greater Than 350-500mL & Patient Has Symptoms of Bladder Discomfort / Distention  As Needed      01/20/19 1419    Unscheduled  Schedule / Prompt Voiding For Patients With Urinary Incontinence  As Needed      01/20/19 1419    Unscheduled  Magnesium  As Needed      01/23/19 1101    Unscheduled  Potassium  As Needed      01/23/19 1101    --  NIFEdipine XL (PROCARDIA XL) 90 MG 24 hr tablet  Daily      01/16/19 1614    --  pioglitazone (ACTOS) 45 MG tablet  Daily      01/16/19 1614    --  ramipril (ALTACE) 10 MG capsule  Daily      01/16/19 1614    --  montelukast  (SINGULAIR) 10 MG tablet  Nightly      19 1614    Signed and Held  dextrose (GLUTOSE) oral gel 15 g  Every 15 Minutes PRN,   Status:  Canceled      Signed and Held    Signed and Held  dextrose (D50W) 25 g/ 50mL Intravenous Solution 25 g  Every 15 Minutes PRN,   Status:  Canceled      Signed and Held    Signed and Held  glucagon (human recombinant) (GLUCAGEN DIAGNOSTIC) injection 1 mg  As Needed,   Status:  Canceled      Signed and Held    --  indapamide (LOZOL) 2.5 MG tablet  Every Morning      19    --  metFORMIN (GLUCOPHAGE) 500 MG tablet  2 Times Daily With Meals      19    --  potassium chloride (KLOR-CON) 8 MEQ CR tablet  Daily      19    --  SCANNED - TELEMETRY        19 0000    --  SCANNED - TELEMETRY        19 0000    --  SCANNED - TELEMETRY        19 0000    --  SCANNED - TELEMETRY        19 0000    --  SCANNED - TELEMETRY        19 0000    --  SCANNED - TELEMETRY        19 0000    --  SCANNED - TELEMETRY        19 0000    --  SCANNED - TELEMETRY        19 0000    --  SCANNED - TELEMETRY        19 0000             Physician Progress Notes (last 24 hours) (Notes from 2019  2:02 PM through 2019  2:02 PM)      Suzanne Harrison PA-C at 2019 10:25 AM                     PROGRESS NOTE         Patient Identification:  Name:  Ashley Geronimo  Age:  73 y.o.  Sex:  female  :  1945  MRN:  [id number]  Visit Number:  [id number]  Primary Care Provider:  Silvano Parker MD      ----------------------------------------------------------------------------------------------------------------------  Subjective       Chief Complaints:    Loss of Consciousness        Interval History:      The patient was on the bedside commode and was no examined. No fever or diarrhea reported. CRP continues to show improvement from 2.35 down to 1.33 with a normal white count off of antibiotic coverage.     Review of  Systems:    Unable to obtain. Confused.        ----------------------------------------------------------------------------------------------------------------------      Objective       Current [facility] Meds:    aspirin 81 mg Oral Daily   atorvastatin 40 mg Oral Nightly   budesonide-formoterol 2 puff Inhalation BID - RT   cefepime 2 g Intravenous Q12H   enoxaparin 1 mg/kg Subcutaneous Q12H   insulin aspart 0-9 Units Subcutaneous 4x Daily AC & at Bedtime   insulin detemir 12 Units Subcutaneous Nightly   lactobacillus acidophilus 1 capsule Oral Daily   metoprolol tartrate 25 mg Oral Q12H   NIFEdipine CC 60 mg Oral Q24H   pantoprazole 40 mg Oral QAM AC   sodium chloride 10 mL Intravenous Q12H   sodium chloride 10 mL Intravenous Q12H   sodium chloride 10 mL Intravenous Q12H   sodium chloride 3 mL Intravenous Q12H       Pharmacy to dose warfarin      ----------------------------------------------------------------------------------------------------------------------    Vital Signs:  Temp:  [97.8 °F (36.6 °C)-98.4 °F (36.9 °C)] 98.2 °F (36.8 °C)  Heart Rate:  [62-79] 62  Resp:  [18] 18  BP: (124-149)/(49-70) 133/50  Mean Arterial Pressure (Non-Invasive) for the past 24 hrs (Last 3 readings):   Noninvasive MAP (mmHg)   01/24/19 1011 70   01/24/19 0555 99   01/24/19 0232 83     SpO2 Percentage    01/24/19 0555 01/24/19 0627 01/24/19 1011   SpO2: 93% 95% 95%     SpO2:  [90 %-98 %] 95 %  on  Flow (L/min):  [2] 2;   Device (Oxygen Therapy): room air    Body mass index is 27.18 kg/m².  Wt Readings from Last 3 Encounters:   01/24/19 63.1 kg (139 lb 3 oz)   08/07/13 66.7 kg (147 lb 0 oz)   08/05/13 66.7 kg (147 lb 0 oz)        Intake/Output Summary (Last 24 hours) at 1/24/2019 1025  Last data filed at 1/24/2019 0808  Gross per 24 hour   Intake 600 ml   Output --   Net 600 ml     Diet Regular; Thin; Consistent  Carbohydrate  ----------------------------------------------------------------------------------------------------------------------    Physical exam:    On bedside commode      ----------------------------------------------------------------------------------------------------------------------  I have personally reviewed the EKG/Telemetry strips   ----------------------------------------------------------------------------------------------------------------------        Results from last 7 days   Lab Units 01/18/19  0317   CHOLESTEROL mg/dL 116   TRIGLYCERIDES mg/dL 101   HDL CHOL mg/dL 43*   LDL CHOL mg/dL 53     Results from last 7 days   Lab Units 01/22/19  0801   PH, ARTERIAL pH units 7.505*   PO2 ART mm Hg 63.8*   PCO2, ARTERIAL mm Hg 34.8*   HCO3 ART mmol/L 26.8*     Results from last 7 days   Lab Units 01/24/19  0413 01/23/19  0428 01/22/19 0423 01/17/19  1405   CRP mg/dL 1.33* 2.35* 3.55*   < > 4.03*   LACTATE mmol/L  --   --   --   --  1.9   WBC 10*3/mm3 6.00 5.14 4.77   < >  --    HEMOGLOBIN g/dL 12.4 12.9 12.7   < >  --    HEMATOCRIT % 38.7 39.9 38.2   < >  --    MCV fL 90.2 90.3 89.3   < >  --    MCHC g/dL 32.0* 32.3* 33.2   < >  --    PLATELETS 10*3/mm3 230 212 206   < >  --    INR  2.01* 1.57* 1.09  --   --     < > = values in this interval not displayed.     Results from last 7 days   Lab Units 01/24/19 0413 01/23/19 0428 01/22/19 0423 01/20/19  0525   SODIUM mmol/L 137 140 135   < > 140   POTASSIUM mmol/L 4.6 3.9 3.5   < > 3.7   MAGNESIUM mg/dL 2.6 1.6*  --   --  1.8   CHLORIDE mmol/L 104 108 102   < > 107   CO2 mmol/L 24.4 26.4 27.6   < > 21.8*   BUN mg/dL 30* 27* 25*   < > 38*   CREATININE mg/dL 0.89 0.76 0.88   < > 1.03   EGFR IF NONAFRICN AM mL/min/1.73 62 74 63   < > 52*   CALCIUM mg/dL 7.8 8.2 8.2   < > 8.1   GLUCOSE mg/dL 136* 42* 223*   < > 260*   ALBUMIN g/dL  --   --  3.10*  --   --    BILIRUBIN mg/dL  --   --  0.3  --   --    ALK PHOS U/L  --   --  62  --   --    AST (SGOT) U/L   --   --  21  --   --    ALT (SGPT) U/L  --   --  18  --   --     < > = values in this interval not displayed.   Estimated Creatinine Clearance: 46 mL/min (by C-G formula based on SCr of 0.89 mg/dL).  No results found for: AMMONIA    Glucose   Date/Time Value Ref Range Status   01/24/2019 0725 110 70 - 130 mg/dL Final   01/24/2019 0015 226 (H) 70 - 130 mg/dL Final   01/23/2019 1916 397 (H) 70 - 130 mg/dL Final   01/23/2019 1637 440 (H) 70 - 130 mg/dL Final   01/23/2019 1123 145 (H) 70 - 130 mg/dL Final   01/23/2019 0703 171 (H) 70 - 130 mg/dL Final   01/23/2019 0534 156 (H) 70 - 130 mg/dL Final   01/23/2019 0525 46 (C) 70 - 130 mg/dL Final     Lab Results   Component Value Date    HGBA1C 11.50 (H) 01/16/2019     Lab Results   Component Value Date    TSH 4.095 01/16/2019       Blood Culture   Date Value Ref Range Status   01/16/2019 No growth at 5 days  Final   01/16/2019 No growth at 5 days  Final     Urine Culture   Date Value Ref Range Status   01/16/2019 >100,000 CFU/mL Escherichia coli (A)  Final           Respiratory Culture   Date Value Ref Range Status   01/16/2019 Scant growth (1+) Klebsiella pneumoniae (A)  Final   01/16/2019 Scant growth (1+) Pseudomonas aeruginosa (A)  Final   01/16/2019 (A)  Final    Light growth (2+) Haemophilus influenzae Biotype II     Comment:       Resistance to ampicillin by production of beta lactamase may be an issue but resistance to cephalosporins (ceftriaxone and cefotaxime) is rare.  Macrolides (erythromycin) and fluoroquinolones (ciprofloxacin) are effective.  Therefore, routine susceptibility testing of clinical isolates as a guide to therapy is not necessary.     Pain Management Panel     Pain Management Panel Latest Ref Rng & Units 1/16/2019 4/12/2018    CREATININE UR mg/dL - 96.6    AMPHETAMINES SCREEN, URINE Negative Negative -    BARBITURATES SCREEN Negative Negative -    BENZODIAZEPINE SCREEN, URINE Negative Negative -    BUPRENORPHINE Negative Negative -    COCAINE  SCREEN, URINE Negative Negative -    METHADONE SCREEN, URINE Negative Negative -          I have personally reviewed the above laboratory results.   ----------------------------------------------------------------------------------------------------------------------  Imaging Results (last 24 hours)     ** No results found for the last 24 hours. **        I have personally reviewed the above radiology results.   ----------------------------------------------------------------------------------------------------------------------    Assessment/Plan       Assessment/Plan     ASSESSMENT:    1. Septic shock, with lactic acid greater than 4 on admission  2. Pseudomonas Pneumonia      PLAN:    The patient was on the bedside commode and was no examined. No fever or diarrhea reported. CRP continues to show improvement from 2.35 down to 1.33 with a normal white count off of antibiotic coverage.     Video swallow from 1/21/19 reports negative for aspiration    Respiratory culture from 1/16/19 finalized as Klebsiella, pseudomonas and Haemophilus susceptible  to Cefepime.     Completed course of Cefepime on 1/23/19.     In the setting of clinical and laboratory improvement recommend to follow off antibiotic coverage.      Code Status:   Code Status and Medical Interventions:   Ordered at: 01/16/19 1648     Code Status:    CPR     Medical Interventions (Level of Support Prior to Arrest):    Full       Suzanne Harrison PA-C  01/24/19  10:25 AM    Electronically signed by Suzanne Harrison PA-C at 1/24/2019 10:28 AM     Kaley Cutler PA-C at 1/23/2019  5:01 PM           LOS: 7 days     Chief Complaint:  Pulmonology is following for acute hypoxic respiratory failure, severe sepsis.     Subjective     Interval History:     Ms. Geronimo was resting in bed, awake and alert. She was on 2 L nasal cannula and saturating at 98%. She voiced no complaints today, and is again eager to return home. Hemodynamically stable. No fever  reported overnight    History taken from: patient chart RN    Review of Systems:     Review of Systems - History obtained from chart review and the patient  General ROS: negative for - chills, fatigue or fever  Psychological ROS: negative for - anxiety or depression  ENT ROS: negative for - headaches or vocal changes  Allergy and Immunology ROS: negative for - nasal congestion or postnasal drip  Endocrine ROS: negative for - polydipsia/polyuria  Respiratory ROS: negative for - cough, shortness of breath or wheezing  Cardiovascular ROS: negative for - chest pain or edema  Gastrointestinal ROS: negative for - abdominal pain or change in bowel habits  Musculoskeletal ROS: negative for - joint pain or joint swelling  Neurological ROS: no TIA or stroke symptoms                    Objective     Vital Signs  Temp:  [97.3 °F (36.3 °C)-98.5 °F (36.9 °C)] 97.9 °F (36.6 °C)  Heart Rate:  [65-87] 79  Resp:  [18-20] 18  BP: (124-160)/(58-70) 124/58  Body mass index is 27.58 kg/m².    Intake/Output Summary (Last 24 hours) at 1/23/2019 1702  Last data filed at 1/23/2019 1300  Gross per 24 hour   Intake 630 ml   Output --   Net 630 ml     I/O this shift:  In: 240 [P.O.:240]  Out: -     Physical Exam:  GENERAL APPEARANCE: Well developed, well nourished, alert and cooperative, and appears to be in no acute distress. Tolerating nasal cannula.     HEAD: normocephalic. Atraumatic.     EYES: PERRL. EOMI. Vision grossly intact.     NECK: Neck supple. No thyromegaly.     CARDIAC: Normal S1 and S2. No S3, S4 or murmurs. Rhythm is regular. There is no peripheral edema, cyanosis or pallor. Extremities are warm and well perfused. Capillary refill is less than 2 seconds.     RESPIRATORY: Bilateral air entry positive, with sounds appreciated throughout.  No wheezing, rhonchi, or crackles upon auscultation.     GI: Positive bowel sounds. Soft, nondistended, nontender.     MUSCULOSKELTAL: No significant deformity or joint abnormality. No edema.  Peripheral pulses intact.     NEUROLOGICAL: Strength and sensation symmetric and intact throughout.     PSYCHIATRIC: The mental examination revealed the patient was oriented to person, place, and time.                   Results Review:                I reviewed the patient's new clinical results.  I reviewed the patient's new imaging results and agree with the interpretation.  Results from last 7 days   Lab Units 01/23/19 0428 01/22/19 0423 01/21/19  0441   WBC 10*3/mm3 5.14 4.77 5.62   HEMOGLOBIN g/dL 12.9 12.7 12.7   PLATELETS 10*3/mm3 212 206 219     Results from last 7 days   Lab Units 01/23/19  0428 01/22/19  0423 01/21/19  0441 01/20/19  0525  01/18/19  0317   SODIUM mmol/L 140 135 141 140   < > 141   POTASSIUM mmol/L 3.9 3.5 3.4* 3.7   < > 3.9   CHLORIDE mmol/L 108 102 108 107   < > 111   CO2 mmol/L 26.4 27.6 26.5 21.8*   < > 19.6*   BUN mg/dL 27* 25* 29* 38*   < > 37*   CREATININE mg/dL 0.76 0.88 0.81 1.03   < > 1.29   CALCIUM mg/dL 8.2 8.2 8.3 8.1   < > 7.9   GLUCOSE mg/dL 42* 223* 129* 260*   < > 138*   MAGNESIUM mg/dL 1.6*  --   --  1.8  --  2.6    < > = values in this interval not displayed.     Lab Results   Component Value Date    INR 1.57 (H) 01/23/2019    INR 1.09 01/22/2019    INR 0.93 01/16/2019    PROTIME 19.0 (H) 01/23/2019    PROTIME 14.3 01/22/2019    PROTIME 12.7 01/16/2019     Results from last 7 days   Lab Units 01/22/19  0423 01/17/19  0028   ALK PHOS U/L 62 68   BILIRUBIN mg/dL 0.3 0.3   ALT (SGPT) U/L 18 12   AST (SGOT) U/L 21 22     Results from last 7 days   Lab Units 01/22/19  0801   PH, ARTERIAL pH units 7.505*   PO2 ART mm Hg 63.8*   PCO2, ARTERIAL mm Hg 34.8*   HCO3 ART mmol/L 26.8*     Imaging Results (last 24 hours)     ** No results found for the last 24 hours. **             Medication Review:   Scheduled Medications:    aspirin 81 mg Oral Daily   atorvastatin 40 mg Oral Nightly   budesonide-formoterol 2 puff Inhalation BID - RT   cefepime 2 g Intravenous Q12H   enoxaparin 1  mg/kg Subcutaneous Q12H   insulin aspart 0-9 Units Subcutaneous 4x Daily AC & at Bedtime   insulin detemir 12 Units Subcutaneous Nightly   lactobacillus acidophilus 1 capsule Oral Daily   magnesium sulfate 4 g Intravenous Once   metoprolol tartrate 25 mg Oral Q12H   NIFEdipine CC 60 mg Oral Q24H   pantoprazole 40 mg Oral QAM AC   sodium chloride 10 mL Intravenous Q12H   sodium chloride 10 mL Intravenous Q12H   sodium chloride 10 mL Intravenous Q12H   sodium chloride 3 mL Intravenous Q12H     Continuous infusions:    Pharmacy to dose warfarin        Assessment/Plan     Patient Active Problem List   Diagnosis Code   • Pneumonia J18.9   • Septic shock (CMS/[facility]) A41.9, R65.21   • Pseudomonas pneumonia (CMS/[facility]) J15.1         Kaley Cutler PA-C  19  5:02 PM      Scribed for Dr. Noonan by Kaley Cutler PA-C      Electronically signed by Kaley Cutler PA-C at 2019 10:38 PM     Dang Anderson PA at 2019  2:24 PM     Attestation signed by Martin Velasco DO at 2019  6:34 PM    I have reviewed the documentation above and agree. PT seen and examined with ALDO Small. Pt denies any current complaints. Eager to go home. Cefepime to be completed today per ID recs. BG levels continue to fluctuate with hypoglycemia this AM. Basal and sliding scale insulin reduced. INR remains subtherapeutic. Cont therapeutic Lovenox. Plan for D/C once INR therapeutic if she remains clinically stable. Input from consultants appreciated.                            [facility]     Patient Identification:  Name:  Ashley Geronimo  Age:  73 y.o.  Sex:  female  :  1945  MRN:  [id number]  Visit Number:  [id number]  Primary Care Provider:  Silvano Parker MD    Date of admission: 2019  Length of stay:  7    ----------------------------------------------------------------------------------------------------------------------  Subjective     Chief Complaint:    Chief Complaint   Patient presents with   • Loss of Consciousness     Subjective/Interval History:    Patient is a 73 y.o. female who was admitted 1/16/2019 due to unresponsiveness. She required intubation and placement on a mechanical ventilator with admission to the CCU. She was diagnosed with septic shock, metabolic encephalopathy, UTI, and new onset atrial fibrillation. For complete admission information, please see history and physical.     Today, the patient is eager to go home. She reports that she is feeling better, but does still have some generalized weakness. She has been working well with PT/OT. Pharmacy is dosing her warfarin with INR 1.57 today. She reports that it would be easiest to have her PT/INR checks with her PCP in [place] after discharge. She has a portable O2 concentrator for O2 QHS available at home. She is now on 2L. Her O2 sat drops to the low 90s intermittently when she is on room air. She may require continuous O2 at discharge. Discussed with RNGeovanny who reports an episode of hypoglycemia last evening. She is receiving her last dose of cefepime and will have magnesium replacement this evening.     ----------------------------------------------------------------------------------------------------------------------  Objective   Current Hospital Meds:    aspirin 81 mg Oral Daily   atorvastatin 40 mg Oral Nightly   budesonide-formoterol 2 puff Inhalation BID - RT   cefepime 2 g Intravenous Q12H   enoxaparin 1 mg/kg Subcutaneous Q12H   insulin aspart 0-14 Units Subcutaneous 4x Daily AC & at Bedtime   insulin detemir 15 Units Subcutaneous Nightly   lactobacillus acidophilus 1 capsule Oral Daily   magnesium sulfate 4 g Intravenous Once   metoprolol tartrate 25 mg Oral Q12H   NIFEdipine CC 60 mg Oral Q24H   pantoprazole 40 mg Oral QAM AC   sodium chloride 10 mL Intravenous Q12H   sodium chloride 10 mL Intravenous Q12H   sodium chloride 10 mL Intravenous Q12H   sodium chloride 3 mL Intravenous  Q12H   warfarin 2.5 mg Oral Once       Pharmacy to dose warfarin      ----------------------------------------------------------------------------------------------------------------------  Vital Signs:  Temp:  [97.3 °F (36.3 °C)-98.5 °F (36.9 °C)] 97.9 °F (36.6 °C)  Heart Rate:  [65-87] 79  Resp:  [18-20] 18  BP: (117-160)/(52-70) 124/58  Mean Arterial Pressure (Non-Invasive) for the past 24 hrs (Last 3 readings):   Noninvasive MAP (mmHg)   01/23/19 1419 75   01/23/19 1113 104   01/23/19 0606 111     SpO2 Percentage    01/23/19 0620 01/23/19 1113 01/23/19 1419   SpO2: 99% 92% 90%     SpO2:  [90 %-99 %] 90 %  on  Flow (L/min):  [2] 2;   Device (Oxygen Therapy): room air    Body mass index is 27.58 kg/m².  Wt Readings from Last 3 Encounters:   01/23/19 64 kg (141 lb 3.2 oz)   08/07/13 66.7 kg (147 lb 0 oz)   08/05/13 66.7 kg (147 lb 0 oz)        Intake/Output Summary (Last 24 hours) at 1/23/2019 1398  Last data filed at 1/23/2019 0334  Gross per 24 hour   Intake 390 ml   Output --   Net 390 ml     Diet Regular; Thin; Consistent Carbohydrate  ----------------------------------------------------------------------------------------------------------------------  Physical exam:  Constitutional:  Well-developed and well-nourished.  No respiratory distress on 2L NC.     HENT:  Head:  Normocephalic and atraumatic.  Mouth:  Moist mucous membranes.    Eyes:  Conjunctivae and EOM are normal. No scleral icterus. No erythema or drainage.  Neck:  Neck supple.  No JVD present.    Cardiovascular:  Normal rate, regular rhythm and normal heart sounds with no murmur.  Pulmonary/Chest:  No respiratory distress, no wheezes, no crackles, with normal breath sounds and good air movement.  Abdominal:  Soft.  Bowel sounds are normal.  No distension and no tenderness.   Musculoskeletal:  No edema, no tenderness, and no deformity.  No red or swollen joints anywhere.    Neurological:  Alert and oriented to person, place, and time.  No cranial  nerve deficit.  No tongue deviation.  No facial droop.  No slurred speech.   Skin:  Skin is warm and dry. No rash noted. No pallor.   Peripheral vascular: No clubbing, no cyanosis, no edema. 2+ pedal pulses bilaterally.   Genitourinary: No hernandez catheter in place.   ---------------------------------------------------------------------------------------------------------------------  Tele:  Sinus rhythm in the 70s-80s.    I have personally reviewed the EKG/Telemetry strips.  ----------------------------------------------------------------------------------------------------------------------  Results from last 7 days   Lab Units 01/17/19  0853 01/17/19  0406 01/16/19 2053   TROPONIN I ng/mL 0.173* 0.176* 0.076*       Results from last 7 days   Lab Units 01/18/19  0317   CHOLESTEROL mg/dL 116   TRIGLYCERIDES mg/dL 101   HDL CHOL mg/dL 43*   LDL CHOL mg/dL 53     Results from last 7 days   Lab Units 01/22/19  0801   PH, ARTERIAL pH units 7.505*   PO2 ART mm Hg 63.8*   PCO2, ARTERIAL mm Hg 34.8*   HCO3 ART mmol/L 26.8*     Results from last 7 days   Lab Units 01/23/19  0428 01/22/19  0423 01/21/19  0441  01/17/19  1405 01/17/19  0853  01/16/19  1848  01/16/19  1436   CRP mg/dL 2.35* 3.55* 4.64*   < > 4.03*  --   --   --   --  1.69*   LACTATE mmol/L  --   --   --   --  1.9 1.6  --  2.8*   < >  --    WBC 10*3/mm3 5.14 4.77 5.62   < >  --   --    < > 13.55*  --  9.99   HEMOGLOBIN g/dL 12.9 12.7 12.7   < >  --   --    < > 13.6  --  15.1   HEMATOCRIT % 39.9 38.2 38.6   < >  --   --    < > 41.3  --  45.1   MCV fL 90.3 89.3 89.1   < >  --   --    < > 90.2  --  88.8   MCHC g/dL 32.3* 33.2 32.9*   < >  --   --    < > 32.9*  --  33.5   PLATELETS 10*3/mm3 212 206 219   < >  --   --    < > 236  --  277   INR  1.57* 1.09  --   --   --   --   --  0.93  --  0.92    < > = values in this interval not displayed.     Results from last 7 days   Lab Units 01/23/19  0428 01/22/19  0423 01/21/19  0441 01/20/19  0525  01/18/19  0317   01/17/19  0028  01/16/19  1436   SODIUM mmol/L 140 135 141 140   < > 141   < > 135   < > 130*   POTASSIUM mmol/L 3.9 3.5 3.4* 3.7   < > 3.9   < > 4.4   < > 4.5   MAGNESIUM mg/dL 1.6*  --   --  1.8  --  2.6  --  1.6*  --  2.1   CHLORIDE mmol/L 108 102 108 107   < > 111   < > 102   < > 94*   CO2 mmol/L 26.4 27.6 26.5 21.8*   < > 19.6*   < > 24.0*   < > 19.4*   BUN mg/dL 27* 25* 29* 38*   < > 37*   < > 26*   < > 32*   CREATININE mg/dL 0.76 0.88 0.81 1.03   < > 1.29   < > 1.01   < > 1.45*   EGFR IF NONAFRICN AM mL/min/1.73 74 63 69 52*   < > 40*   < > 54*   < > 35*   CALCIUM mg/dL 8.2 8.2 8.3 8.1   < > 7.9   < > 8.1   < > 9.6   GLUCOSE mg/dL 42* 223* 129* 260*   < > 138*   < > 125*   < > 485*   ALBUMIN g/dL  --  3.10*  --   --   --   --   --  3.60  --  4.80   BILIRUBIN mg/dL  --  0.3  --   --   --   --   --  0.3  --  0.6   ALK PHOS U/L  --  62  --   --   --   --   --  68  --  97   AST (SGOT) U/L  --  21  --   --   --   --   --  22  --  25   ALT (SGPT) U/L  --  18  --   --   --   --   --  12  --  13    < > = values in this interval not displayed.   Estimated Creatinine Clearance: 51.5 mL/min (by C-G formula based on SCr of 0.76 mg/dL).    Glucose   Date/Time Value Ref Range Status   01/23/2019 1123 145 (H) 70 - 130 mg/dL Final   01/23/2019 0703 171 (H) 70 - 130 mg/dL Final   01/23/2019 0534 156 (H) 70 - 130 mg/dL Final   01/23/2019 0525 46 (C) 70 - 130 mg/dL Final   01/22/2019 1949 300 (H) 70 - 130 mg/dL Final   01/22/2019 1628 293 (H) 70 - 130 mg/dL Final   01/22/2019 1140 194 (H) 70 - 130 mg/dL Final   01/22/2019 0658 230 (H) 70 - 130 mg/dL Final     Lab Results   Component Value Date    HGBA1C 11.50 (H) 01/16/2019     Lab Results   Component Value Date    TSH 4.095 01/16/2019     Blood Culture   Date Value Ref Range Status   01/16/2019 No growth at 5 days  Final   01/16/2019 No growth at 5 days  Final     Urine Culture   Date Value Ref Range Status   01/16/2019 >100,000 CFU/mL Escherichia coli (A)  Final      Respiratory Culture   Date Value Ref Range Status   01/16/2019 Scant growth (1+) Klebsiella pneumoniae (A)  Final   01/16/2019 Scant growth (1+) Pseudomonas aeruginosa (A)  Final   01/16/2019 (A)  Final    Light growth (2+) Haemophilus influenzae Biotype II     Comment:       Resistance to ampicillin by production of beta lactamase may be an issue but resistance to cephalosporins (ceftriaxone and cefotaxime) is rare.  Macrolides (erythromycin) and fluoroquinolones (ciprofloxacin) are effective.  Therefore, routine susceptibility testing of clinical isolates as a guide to therapy is not necessary.     Pain Management Panel     Pain Management Panel Latest Ref Rng & Units 1/16/2019 4/12/2018    CREATININE UR mg/dL - 96.6    AMPHETAMINES SCREEN, URINE Negative Negative -    BARBITURATES SCREEN Negative Negative -    BENZODIAZEPINE SCREEN, URINE Negative Negative -    BUPRENORPHINE Negative Negative -    COCAINE SCREEN, URINE Negative Negative -    METHADONE SCREEN, URINE Negative Negative -        I have personally reviewed the above laboratory results.   ----------------------------------------------------------------------------------------------------------------------  Imaging Results (last 24 hours)     ** No results found for the last 24 hours. **        I have personally reviewed the above radiology results.   ----------------------------------------------------------------------------------------------------------------------  Assessment/Plan     Septic shock secondary to Pseudomonas pneumonia (with Haemophilus and Klebsiella also present).  Patient appears to be improving, CRP continues to decline, appreciate infectious disease input, she will complete a course of cefepime through today, 1/23. ID does not recommend further ABX therapy beyond this at this point. Repeat labs in AM.      UTI, completing a cefepime course as above.     Functional decline: Working with PT/OT and reported to be doing well.       Paroxysmal atrial fibrillation, probably from the shock however recommendation for full anticoagulation.  Apparently Eliquis was too expensive, pharmacy is started dosing Coumadin, will continue Lovenox and discontinue when INR therapeutic.  Maintaining sinus rhythm at this time. Repeat PT/INR in AM. She would like her PCP to monitor her PT/INR at discharge.      Mixed respiratory metabolic acidosis, resolved    Acute hypoxic respiratory failure: improving.      Diabetes, wide fluctuations: She developed hypoglycemia last evening into the 40s. Will reduce SSI to moderate dose with 0-9 units per protocol. Decrease levemir to 12 units nightly. Continue hypoglycemia protocol     Acute hypomagnesemia: Replacement per protocol.      Altered mental status, appears to be improving, workup negative, cannot totally rule out a seizure event with elevated lactic acid and prolactin but EEG negative.     DVT prophylaxis, full dose Lovenox will serve for this as well    The patient is high risk due to the following diagnoses/reasons: Septic shock 2/2 pneumonia, UTI, functional decline.    I have discussed the patient's assessment and plan with the patient, RN, Geovanny, and Dr. Velasco who is covering for Dr. Mas.     Disposition: Plans to return home on discharge, possibly in AM in INR therapeutic. Ambulatory O2sat monitoring prior to D/C.     NATE Smith  01/23/19  2:28 PM          Electronically signed by Martin Velasco DO at 1/23/2019  6:34 PM       Consult Notes (last 24 hours) (Notes from 1/23/2019  2:02 PM through 1/24/2019  2:02 PM)     No notes of this type exist for this encounter.           Physical Therapy Notes (last 24 hours) (Notes from 1/23/2019  2:02 PM through 1/24/2019  2:02 PM)      Laura Cali PT at 1/23/2019  4:16 PM  Version 1 of 1         Problem: Patient Care Overview  Goal: Plan of Care Review  Outcome: Ongoing (interventions implemented as appropriate)   01/23/19 3826    Coping/Psychosocial   Plan of Care Reviewed With patient   Plan of Care Review   Progress improving      19 1616   Coping/Psychosocial   Plan of Care Reviewed With patient   Plan of Care Review   Progress improving           Electronically signed by Laura Cali PT at 2019  4:16 PM     Laura Cali PT at 2019  4:18 PM  Version 1 of 1         Acute Care - Physical Therapy Treatment Note  [facility]     Patient Name: Ashley Geronimo  : 1945  MRN: [id number]  Today's Date: 2019  Onset of Illness/Injury or Date of Surgery: 19  Date of Referral to PT: 19  Referring Physician: Dr. Chavez    Admit Date: 2019    Visit Dx:    ICD-10-CM ICD-9-CM   1. Pneumonia of both lungs due to infectious organism, unspecified part of lung J18.9 483.8   2. Atrial fibrillation with RVR (CMS/[facility]) I48.91 427.31   3. Essential hypertension I10 401.9     Patient Active Problem List   Diagnosis   • Pneumonia   • Septic shock (CMS/[facility])   • Pseudomonas pneumonia (CMS/[facility])       Therapy Treatment    Rehabilitation Treatment Summary     Row Name 19 1600 19 1358          Treatment Time/Intention    Discipline  physical therapist  -BC  occupational therapist  -BF     Document Type  therapy note (daily note)  -BC  therapy note (daily note)  -BF     Subjective Information  no complaints  -BC  no complaints  -BF     Mode of Treatment  physical therapy;individual therapy  -BC  occupational therapy  -BF     Patient/Family Observations  --  Pt supine in bed, awake & alert; agreeable to OT  -BF     Therapy Frequency (PT Clinical Impression)  3 times/wk 3-5x/week  -BC  --     Patient Effort  adequate  -BC  adequate  -BF     Existing Precautions/Restrictions  fall;seizures  -BC  fall;seizures  -BF     Treatment Considerations/Comments  pt ambulated with PT 2x this date.  -BC  --     Patient Response to Treatment  --  Pt tolerated treatment well, required frequent verbal cues for  attention to task  -BF     Recorded by [BC] Laura Cali, PT 01/23/19 1615 [BF] Alyssa Cornejo, OT 01/23/19 1401     Row Name 01/23/19 1600 01/23/19 1358          Cognitive Assessment/Intervention- PT/OT    Affect/Mental Status (Cognitive)  WFL  -BC  --     Orientation Status (Cognition)  oriented to;person;place;situation  -BC  oriented to;person;place  -BF     Follows Commands (Cognition)  physical/tactile prompts required;increased processing time needed;repetition of directions required;verbal cues/prompting required  -BC  verbal cues/prompting required;repetition of directions required  -BF     Recorded by [BC] Laura Cali, PT 01/23/19 1615 [BF] Alyssa Cornejo, OT 01/23/19 1401     Row Name 01/23/19 1600             Bed Mobility Assessment/Treatment    Bed Mobility Assessment/Treatment  sit-supine;supine-sit-supine  -BC      Sit-Supine [place] (Bed Mobility)  minimum assist (75% patient effort);verbal cues;nonverbal cues (demo/gesture)  -BC      Assistive Device (Bed Mobility)  bed rails;draw sheet  -BC      Recorded by [BC] Laura Cali, PT 01/23/19 1615      Row Name 01/23/19 1600             Transfer Assessment/Treatment    Transfer Assessment/Treatment  sit-stand transfer;stand-sit transfer  -BC      Recorded by [BC] Laura Cali, PT 01/23/19 1615      Row Name 01/23/19 1600             Sit-Stand Transfer    Sit-Stand [place] (Transfers)  minimum assist (75% patient effort);2 person assist;verbal cues;nonverbal cues (demo/gesture)  -BC      Assistive Device (Sit-Stand Transfers)  walker, front-wheeled  -BC      Recorded by [BC] Laura Cali, PT 01/23/19 1615      Row Name 01/23/19 1600             Stand-Sit Transfer    Stand-Sit [place] (Transfers)  minimum assist (75% patient effort);2 person assist;verbal cues;nonverbal cues (demo/gesture)  -BC      Assistive Device (Stand-Sit Transfers)  walker, front-wheeled  -BC      Recorded by [BC] Viraj  Laura PEARCE, PT 01/23/19 1615      Row Name 01/23/19 1600             Gait/Stairs Assessment/Training    Gait/Stairs Assessment/Training  gait/ambulation independence  -BC      [place] Level (Gait)  minimum assist (75% patient effort);moderate assist (50% patient effort);2 person assist  -BC      Assistive Device (Gait)  walker, front-wheeled  -BC      Distance in Feet (Gait)  250  -BC      Recorded by [BC] Laura Cali, PT 01/23/19 1615      Row Name 01/23/19 1358             Therapeutic Exercise    Therapeutic Exercise  supine, upper extremities  -BF      Recorded by [BF] Alyssa Cornejo, OT 01/23/19 1401      Row Name 01/23/19 1358             Upper Extremity Supine Therapeutic Exercise    Performed, Supine Upper Extremity (Therapeutic Exercise)  shoulder flexion/extension;elbow flexion/extension  -BF      Exercise Type, Supine Upper Extremity (Therapeutic Exercise)  AROM (active range of motion);other (see comments) [facility] therex; light strengthening w/ flexbar; handgripper  -BF      Expected Outcomes, Supine Upper Extremity (Therapeutic Exercise)  improve functional tolerance, self-care activity;improve performance, BADLs;improve performance, transfer skills;strengthen, facilitate independent active range of motion  -BF      Recorded by [BF] Alyssa Cornejo, OT 01/23/19 1401      Row Name 01/23/19 1600 01/23/19 1358          Positioning and Restraints    Pre-Treatment Position  in bed  -BC  --     Post Treatment Position  bed  -BC  bed  -BF     In Bed  notified nsg;supine;sitting EOB;call light within reach;encouraged to call for assist;side rails up x3  -BC  supine;call light within reach;encouraged to call for assist  -BF     Recorded by [BC] Laura Cali, PT 01/23/19 1615 [BF] Alyssa Cornejo, OT 01/23/19 1401       User Key  (r) = Recorded By, (t) = Taken By, (c) = Cosigned By    Initials Name Effective Dates Discipline    BC Laura Cali, PT 03/14/16 -  PT      Alyssa Cornejo, OT 04/03/18 -  OT                   Physical Therapy Education     Title: PT OT SLP Therapies (Done)     Topic: Physical Therapy (Done)     Point: Mobility training (Done)     Learning Progress Summary           Patient Acceptance, E, VU by BC at 1/23/2019  4:15 PM    Acceptance, E,TB, VU by  at 1/23/2019  9:43 AM    Acceptance, E, NR by BC at 1/22/2019 11:26 AM    Acceptance, E,TB, VU by MELLO at 1/22/2019  9:24 AM    Acceptance, E, NR by  at 1/21/2019  2:24 PM                   Point: Home exercise program (Done)     Learning Progress Summary           Patient Acceptance, E, VU by BC at 1/23/2019  4:15 PM    Acceptance, E,TB, VU by MELLO at 1/23/2019  9:43 AM    Acceptance, E, NR by BC at 1/22/2019 11:26 AM    Acceptance, E,TB, VU by MELLO at 1/22/2019  9:24 AM                   Point: Body mechanics (Done)     Learning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

## 2019-01-24 NOTE — PLAN OF CARE
Problem: Fall Risk (Adult)  Goal: Identify Related Risk Factors and Signs and Symptoms  Outcome: Ongoing (interventions implemented as appropriate)      Problem: Patient Care Overview  Goal: Plan of Care Review  Outcome: Ongoing (interventions implemented as appropriate)

## 2019-01-25 ENCOUNTER — READMISSION MANAGEMENT (OUTPATIENT)
Dept: CALL CENTER | Facility: HOSPITAL | Age: 74
End: 2019-01-25

## 2019-01-25 NOTE — OUTREACH NOTE
Prep Survey      Responses   Facility patient discharged from?  [facility]   Is patient eligible?  Yes   Discharge diagnosis  sepsis,  pneumonia,  EColi UTI,  pseudomonas   Does the patient have one of the following disease processes/diagnoses(primary or secondary)?  Sepsis   Does the patient have Home health ordered?  Yes   What is the Home health agency?   [facility]   Is there a DME ordered?  Yes   What DME was ordered?  [facility]   Comments regarding appointments  see AVS   Prep survey completed?  Yes          Yamilex Darby RN

## 2019-01-28 ENCOUNTER — READMISSION MANAGEMENT (OUTPATIENT)
Dept: CALL CENTER | Facility: HOSPITAL | Age: 74
End: 2019-01-28

## 2019-01-28 NOTE — OUTREACH NOTE
Sepsis Week 1 Survey      Responses   Facility patient discharged from?  [facility]   Does the patient have one of the following disease processes/diagnoses(primary or secondary)?  Sepsis   Is there a successful TCM telephone encounter documented?  No   Week 1 attempt successful?  No   Unsuccessful attempts  Attempt 1          Karyn Henderson RN

## 2019-01-29 ENCOUNTER — READMISSION MANAGEMENT (OUTPATIENT)
Dept: CALL CENTER | Facility: HOSPITAL | Age: 74
End: 2019-01-29

## 2019-01-29 NOTE — OUTREACH NOTE
Sepsis Week 1 Survey      Responses   Facility patient discharged from?  [facility]   Does the patient have one of the following disease processes/diagnoses(primary or secondary)?  Sepsis   Is there a successful TCM telephone encounter documented?  No   Week 1 attempt successful?  No   Unsuccessful attempts  Attempt 2          Dionna Weinebrg RN

## 2019-01-31 ENCOUNTER — READMISSION MANAGEMENT (OUTPATIENT)
Dept: CALL CENTER | Facility: HOSPITAL | Age: 74
End: 2019-01-31

## 2019-01-31 NOTE — OUTREACH NOTE
Sepsis Week 2 Survey      Responses   Facility patient discharged from?  [facility]   Does the patient have one of the following disease processes/diagnoses(primary or secondary)?  Sepsis   Week 2 attempt successful?  No   Unsuccessful attempts  Attempt 1          Trent Leonard RN

## 2019-02-04 ENCOUNTER — READMISSION MANAGEMENT (OUTPATIENT)
Dept: CALL CENTER | Facility: HOSPITAL | Age: 74
End: 2019-02-04

## 2019-02-04 NOTE — OUTREACH NOTE
Sepsis Week 2 Survey      Responses   Facility patient discharged from?  [facility]   Does the patient have one of the following disease processes/diagnoses(primary or secondary)?  Sepsis   Week 2 attempt successful?  No   Unsuccessful attempts  Attempt 2          Trent Leonard RN

## 2019-02-05 ENCOUNTER — APPOINTMENT (OUTPATIENT)
Dept: GENERAL RADIOLOGY | Facility: HOSPITAL | Age: 74
End: 2019-02-05

## 2019-02-05 ENCOUNTER — HOSPITAL ENCOUNTER (INPATIENT)
Facility: HOSPITAL | Age: 74
LOS: 14 days | Discharge: HOME HEALTH | End: 2019-02-20
Attending: FAMILY MEDICINE | Admitting: INTERNAL MEDICINE

## 2019-02-05 ENCOUNTER — READMISSION MANAGEMENT (OUTPATIENT)
Dept: CALL CENTER | Facility: HOSPITAL | Age: 74
End: 2019-02-05

## 2019-02-05 ENCOUNTER — APPOINTMENT (OUTPATIENT)
Dept: CT IMAGING | Facility: HOSPITAL | Age: 74
End: 2019-02-05

## 2019-02-05 DIAGNOSIS — R53.81 DEBILITY: ICD-10-CM

## 2019-02-05 DIAGNOSIS — J15.1 PNEUMONIA DUE TO PSEUDOMONAS SPECIES, UNSPECIFIED LATERALITY, UNSPECIFIED PART OF LUNG (HCC): ICD-10-CM

## 2019-02-05 DIAGNOSIS — A41.9 SEPTIC SHOCK (HCC): Primary | ICD-10-CM

## 2019-02-05 DIAGNOSIS — N17.9 ACUTE RENAL FAILURE, UNSPECIFIED ACUTE RENAL FAILURE TYPE (HCC): ICD-10-CM

## 2019-02-05 DIAGNOSIS — I48.0 PAROXYSMAL ATRIAL FIBRILLATION (HCC): ICD-10-CM

## 2019-02-05 DIAGNOSIS — R65.21 SEPTIC SHOCK (HCC): Primary | ICD-10-CM

## 2019-02-05 DIAGNOSIS — I50.43 SYSTOLIC AND DIASTOLIC CHF, ACUTE ON CHRONIC (HCC): ICD-10-CM

## 2019-02-05 DIAGNOSIS — Z79.01 CHRONIC ANTICOAGULATION: ICD-10-CM

## 2019-02-05 LAB
ACETONE BLD QL: NEGATIVE
ALBUMIN SERPL-MCNC: 4.3 G/DL (ref 3.4–4.8)
ALBUMIN/GLOB SERPL: 1.6 G/DL (ref 1.5–2.5)
ALP SERPL-CCNC: 84 U/L (ref 35–104)
ALT SERPL W P-5'-P-CCNC: 14 U/L (ref 10–36)
ANION GAP SERPL CALCULATED.3IONS-SCNC: 15.2 MMOL/L (ref 3.6–11.2)
AST SERPL-CCNC: 18 U/L (ref 10–30)
BACTERIA UR QL AUTO: ABNORMAL /HPF
BASOPHILS # BLD AUTO: 0.02 10*3/MM3 (ref 0–0.3)
BASOPHILS NFR BLD AUTO: 0.2 % (ref 0–2)
BILIRUB SERPL-MCNC: 0.3 MG/DL (ref 0.2–1.8)
BILIRUB UR QL STRIP: NEGATIVE
BNP SERPL-MCNC: 319 PG/ML (ref 0–100)
BUN BLD-MCNC: 42 MG/DL (ref 7–21)
BUN/CREAT SERPL: 24.9 (ref 7–25)
CALCIUM SPEC-SCNC: 8.1 MG/DL (ref 7.7–10)
CHLORIDE SERPL-SCNC: 99 MMOL/L (ref 99–112)
CLARITY UR: CLEAR
CO2 SERPL-SCNC: 22.8 MMOL/L (ref 24.3–31.9)
COLOR UR: YELLOW
CREAT BLD-MCNC: 1.69 MG/DL (ref 0.43–1.29)
CRP SERPL-MCNC: 3.34 MG/DL (ref 0–0.99)
D-LACTATE SERPL-SCNC: 6 MMOL/L (ref 0.5–2)
DEPRECATED RDW RBC AUTO: 43.6 FL (ref 37–54)
EOSINOPHIL # BLD AUTO: 0.05 10*3/MM3 (ref 0–0.7)
EOSINOPHIL NFR BLD AUTO: 0.6 % (ref 0–7)
ERYTHROCYTE [DISTWIDTH] IN BLOOD BY AUTOMATED COUNT: 13.4 % (ref 11.5–14.5)
GFR SERPL CREATININE-BSD FRML MDRD: 30 ML/MIN/1.73
GLOBULIN UR ELPH-MCNC: 2.7 GM/DL
GLUCOSE BLD-MCNC: 238 MG/DL (ref 70–110)
GLUCOSE BLDC GLUCOMTR-MCNC: 283 MG/DL (ref 70–130)
GLUCOSE UR STRIP-MCNC: NEGATIVE MG/DL
HCT VFR BLD AUTO: 37.5 % (ref 37–47)
HGB BLD-MCNC: 12.2 G/DL (ref 12–16)
HGB UR QL STRIP.AUTO: NEGATIVE
HOLD SPECIMEN: NORMAL
HOLD SPECIMEN: NORMAL
HYALINE CASTS UR QL AUTO: ABNORMAL /LPF
IMM GRANULOCYTES # BLD AUTO: 0.03 10*3/MM3 (ref 0–0.03)
IMM GRANULOCYTES NFR BLD AUTO: 0.4 % (ref 0–0.5)
INR PPP: 1.14 (ref 0.9–1.1)
KETONES UR QL STRIP: ABNORMAL
LEUKOCYTE ESTERASE UR QL STRIP.AUTO: ABNORMAL
LYMPHOCYTES # BLD AUTO: 1.34 10*3/MM3 (ref 1–3)
LYMPHOCYTES NFR BLD AUTO: 16 % (ref 16–46)
MAGNESIUM SERPL-MCNC: 1 MG/DL (ref 1.7–2.6)
MCH RBC QN AUTO: 29.1 PG (ref 27–33)
MCHC RBC AUTO-ENTMCNC: 32.5 G/DL (ref 33–37)
MCV RBC AUTO: 89.5 FL (ref 80–94)
MONOCYTES # BLD AUTO: 0.8 10*3/MM3 (ref 0.1–0.9)
MONOCYTES NFR BLD AUTO: 9.5 % (ref 0–12)
NEUTROPHILS # BLD AUTO: 6.15 10*3/MM3 (ref 1.4–6.5)
NEUTROPHILS NFR BLD AUTO: 73.3 % (ref 40–75)
NITRITE UR QL STRIP: NEGATIVE
OSMOLALITY SERPL CALC.SUM OF ELEC: 292 MOSM/KG (ref 273–305)
PH UR STRIP.AUTO: <=5 [PH] (ref 5–8)
PLATELET # BLD AUTO: 348 10*3/MM3 (ref 130–400)
PMV BLD AUTO: 10.8 FL (ref 6–10)
POTASSIUM BLD-SCNC: 4.9 MMOL/L (ref 3.5–5.3)
PROT SERPL-MCNC: 7 G/DL (ref 6–8)
PROT UR QL STRIP: ABNORMAL
PROTHROMBIN TIME: 14.8 SECONDS (ref 11–15.4)
RBC # BLD AUTO: 4.19 10*6/MM3 (ref 4.2–5.4)
RBC # UR: ABNORMAL /HPF
REF LAB TEST METHOD: ABNORMAL
SODIUM BLD-SCNC: 137 MMOL/L (ref 135–153)
SP GR UR STRIP: 1.02 (ref 1–1.03)
SQUAMOUS #/AREA URNS HPF: ABNORMAL /HPF
TROPONIN I SERPL-MCNC: 0.03 NG/ML
UROBILINOGEN UR QL STRIP: ABNORMAL
WBC NRBC COR # BLD: 8.39 10*3/MM3 (ref 4.5–12.5)
WBC UR QL AUTO: ABNORMAL /HPF
WHOLE BLOOD HOLD SPECIMEN: NORMAL
WHOLE BLOOD HOLD SPECIMEN: NORMAL

## 2019-02-05 PROCEDURE — 80053 COMPREHEN METABOLIC PANEL: CPT | Performed by: EMERGENCY MEDICINE

## 2019-02-05 PROCEDURE — 93005 ELECTROCARDIOGRAM TRACING: CPT | Performed by: EMERGENCY MEDICINE

## 2019-02-05 PROCEDURE — 83605 ASSAY OF LACTIC ACID: CPT | Performed by: FAMILY MEDICINE

## 2019-02-05 PROCEDURE — 74176 CT ABD & PELVIS W/O CONTRAST: CPT

## 2019-02-05 PROCEDURE — 82962 GLUCOSE BLOOD TEST: CPT

## 2019-02-05 PROCEDURE — 84484 ASSAY OF TROPONIN QUANT: CPT | Performed by: EMERGENCY MEDICINE

## 2019-02-05 PROCEDURE — 71045 X-RAY EXAM CHEST 1 VIEW: CPT

## 2019-02-05 PROCEDURE — 87040 BLOOD CULTURE FOR BACTERIA: CPT | Performed by: FAMILY MEDICINE

## 2019-02-05 PROCEDURE — 71250 CT THORAX DX C-: CPT

## 2019-02-05 PROCEDURE — 86140 C-REACTIVE PROTEIN: CPT | Performed by: FAMILY MEDICINE

## 2019-02-05 PROCEDURE — 99285 EMERGENCY DEPT VISIT HI MDM: CPT

## 2019-02-05 PROCEDURE — 71250 CT THORAX DX C-: CPT | Performed by: RADIOLOGY

## 2019-02-05 PROCEDURE — 71045 X-RAY EXAM CHEST 1 VIEW: CPT | Performed by: RADIOLOGY

## 2019-02-05 PROCEDURE — 81001 URINALYSIS AUTO W/SCOPE: CPT | Performed by: FAMILY MEDICINE

## 2019-02-05 PROCEDURE — 85610 PROTHROMBIN TIME: CPT | Performed by: FAMILY MEDICINE

## 2019-02-05 PROCEDURE — 84484 ASSAY OF TROPONIN QUANT: CPT | Performed by: FAMILY MEDICINE

## 2019-02-05 PROCEDURE — 25010000002 VANCOMYCIN 5 G RECONSTITUTED SOLUTION 5,000 MG VIAL: Performed by: FAMILY MEDICINE

## 2019-02-05 PROCEDURE — 83735 ASSAY OF MAGNESIUM: CPT | Performed by: EMERGENCY MEDICINE

## 2019-02-05 PROCEDURE — 85025 COMPLETE CBC W/AUTO DIFF WBC: CPT | Performed by: EMERGENCY MEDICINE

## 2019-02-05 PROCEDURE — 25010000002 MAGNESIUM SULFATE 2 GM/50ML SOLUTION: Performed by: FAMILY MEDICINE

## 2019-02-05 PROCEDURE — 82009 KETONE BODYS QUAL: CPT | Performed by: FAMILY MEDICINE

## 2019-02-05 PROCEDURE — 93010 ELECTROCARDIOGRAM REPORT: CPT | Performed by: INTERNAL MEDICINE

## 2019-02-05 PROCEDURE — 74176 CT ABD & PELVIS W/O CONTRAST: CPT | Performed by: RADIOLOGY

## 2019-02-05 PROCEDURE — 93005 ELECTROCARDIOGRAM TRACING: CPT | Performed by: FAMILY MEDICINE

## 2019-02-05 PROCEDURE — 25010000002 PIPERACILLIN-TAZOBACTAM: Performed by: FAMILY MEDICINE

## 2019-02-05 PROCEDURE — 83880 ASSAY OF NATRIURETIC PEPTIDE: CPT | Performed by: FAMILY MEDICINE

## 2019-02-05 PROCEDURE — 36415 COLL VENOUS BLD VENIPUNCTURE: CPT

## 2019-02-05 RX ORDER — MAGNESIUM SULFATE HEPTAHYDRATE 40 MG/ML
2 INJECTION, SOLUTION INTRAVENOUS ONCE
Status: COMPLETED | OUTPATIENT
Start: 2019-02-05 | End: 2019-02-06

## 2019-02-05 RX ADMIN — SODIUM CHLORIDE 1716 ML: 9 INJECTION, SOLUTION INTRAVENOUS at 22:20

## 2019-02-05 RX ADMIN — MAGNESIUM SULFATE IN WATER 2 G: 40 INJECTION, SOLUTION INTRAVENOUS at 22:35

## 2019-02-05 RX ADMIN — PIPERACILLIN SODIUM,TAZOBACTAM SODIUM 3.38 G: 3; .375 INJECTION, POWDER, FOR SOLUTION INTRAVENOUS at 22:20

## 2019-02-05 RX ADMIN — VANCOMYCIN HYDROCHLORIDE 1250 MG: 5 INJECTION, POWDER, LYOPHILIZED, FOR SOLUTION INTRAVENOUS at 23:38

## 2019-02-05 NOTE — OUTREACH NOTE
"Sepsis Week 2 Survey      Responses   Facility patient discharged from?  [facility]   Does the patient have one of the following disease processes/diagnoses(primary or secondary)?  Sepsis   Week 2 attempt successful?  Yes   Call start time  1510   Call end time  1518   Discharge diagnosis  sepsis,  pneumonia,  EColi UTI,  pseudomonas   Meds reviewed with patient/caregiver?  Yes   Is the patient having any side effects they believe may be caused by any medication additions or changes?  No   Does the patient have all medications related to this admission filled (includes all antibiotics, inhalers, nebulizers,steroids,etc.)  Yes   Is the patient taking all medications as directed (includes completed medication regime)?  Yes   Comments regarding PCP  Dr Parker/ PCP- seen today 02/05/2019   Does the patient have an appointment with their PCP within 7 days of discharge?  Yes   Has the patient kept scheduled appointments due by today?  Yes   What is the Home health agency?   [facility]   Has home health visited the patient within 72 hours of discharge?  Yes   What DME was ordered?  [facility]   Has all DME been delivered?  Yes   Comments  Patient reports she is just leaving followup appt. She was advised to come to ED and she has a 100.4 temp. and \"rattles in her lungs\" . She is getting things ready and home then will return to the ED. She has a family member who is driving her.    Did the patient receive a copy of their discharge instructions?  Yes   Nursing interventions  Reviewed instructions with patient   What is the patient's perception of their health status since discharge?  Worsening   Nursing interventions  Nurse provided patient education   Is the patient/caregiver able to teach back Sepsis?  S - Shivering,fever or very cold, E - Extreme pain or generalized discomfort (worst ever,especially abdomen), P - Pale or discolored skin, S - Sleepy, difficult to arouse,confused, I -   I feel like I might die-a feeling of " hopelessness, S - Short of breath   Nursing interventions  Nurse provided reassurance to patient, Nurse provided patient education, Advised patient to seek immediate care   Is patient/caregiver able to teach back steps to recovery at home?  Set small, achievable goals for return to baseline health, Rest and regain strength, Make a list of questions for PCP appoinment   Is the patient/caregiver able to teach back signs and symptoms of worsening condition:  Fever, Hyperthermia, Rapid heart rate (>90), Shortness of breath/rapid respiratory rate, Altered mental status(confusion/coma), Edema   Is the patient/caregiver able to teach back the hierarchy of who to call/visit for symptoms/problems? PCP, Specialist, Home health nurse, Urgent Care, ED, 911  Yes   Week 2 call completed?  Yes          Trent Leonard RN

## 2019-02-06 ENCOUNTER — READMISSION MANAGEMENT (OUTPATIENT)
Dept: CALL CENTER | Facility: HOSPITAL | Age: 74
End: 2019-02-06

## 2019-02-06 LAB
A-A DO2: 108.9 MMHG (ref 0–300)
ALBUMIN SERPL-MCNC: 3.7 G/DL (ref 3.4–4.8)
ALBUMIN/GLOB SERPL: 1.6 G/DL (ref 1.5–2.5)
ALP SERPL-CCNC: 79 U/L (ref 35–104)
ALT SERPL W P-5'-P-CCNC: 13 U/L (ref 10–36)
ANION GAP SERPL CALCULATED.3IONS-SCNC: 12.5 MMOL/L (ref 3.6–11.2)
ARTERIAL PATENCY WRIST A: ABNORMAL
AST SERPL-CCNC: 16 U/L (ref 10–30)
ATMOSPHERIC PRESS: 726 MMHG
BACTERIA UR QL AUTO: NORMAL /HPF
BASE EXCESS BLDA CALC-SCNC: 0.1 MMOL/L
BASOPHILS # BLD AUTO: 0.02 10*3/MM3 (ref 0–0.3)
BASOPHILS NFR BLD AUTO: 0.3 % (ref 0–2)
BDY SITE: ABNORMAL
BILIRUB SERPL-MCNC: 0.3 MG/DL (ref 0.2–1.8)
BILIRUB UR QL STRIP: NEGATIVE
BNP SERPL-MCNC: 303 PG/ML (ref 0–100)
BODY TEMPERATURE: 98.6 C
BUN BLD-MCNC: 34 MG/DL (ref 7–21)
BUN/CREAT SERPL: 22.7 (ref 7–25)
CALCIUM SPEC-SCNC: 7.1 MG/DL (ref 7.7–10)
CHLORIDE SERPL-SCNC: 105 MMOL/L (ref 99–112)
CLARITY UR: CLEAR
CO2 SERPL-SCNC: 20.5 MMOL/L (ref 24.3–31.9)
COHGB MFR BLD: 1.1 % (ref 0–5)
COLOR UR: YELLOW
CREAT BLD-MCNC: 1.5 MG/DL (ref 0.43–1.29)
CRP SERPL-MCNC: 3.39 MG/DL (ref 0–0.99)
D-LACTATE SERPL-SCNC: 3.2 MMOL/L (ref 0.5–2)
DEPRECATED RDW RBC AUTO: 43.3 FL (ref 37–54)
EOSINOPHIL # BLD AUTO: 0.1 10*3/MM3 (ref 0–0.7)
EOSINOPHIL NFR BLD AUTO: 1.3 % (ref 0–7)
ERYTHROCYTE [DISTWIDTH] IN BLOOD BY AUTOMATED COUNT: 13.3 % (ref 11.5–14.5)
FLUAV AG NPH QL: NEGATIVE
FLUBV AG NPH QL IA: NEGATIVE
GFR SERPL CREATININE-BSD FRML MDRD: 34 ML/MIN/1.73
GLOBULIN UR ELPH-MCNC: 2.3 GM/DL
GLUCOSE BLD-MCNC: 213 MG/DL (ref 70–110)
GLUCOSE BLDC GLUCOMTR-MCNC: 268 MG/DL (ref 70–130)
GLUCOSE BLDC GLUCOMTR-MCNC: 287 MG/DL (ref 70–130)
GLUCOSE BLDC GLUCOMTR-MCNC: 308 MG/DL (ref 70–130)
GLUCOSE UR STRIP-MCNC: ABNORMAL MG/DL
HCO3 BLDA-SCNC: 23.5 MMOL/L (ref 22–26)
HCT VFR BLD AUTO: 34.8 % (ref 37–47)
HCT VFR BLD CALC: 36 % (ref 37–47)
HGB BLD-MCNC: 11.2 G/DL (ref 12–16)
HGB BLDA-MCNC: 12.4 G/DL (ref 12–16)
HGB UR QL STRIP.AUTO: NEGATIVE
HOLD SPECIMEN: NORMAL
HOROWITZ INDEX BLD+IHG-RTO: 32 %
HYALINE CASTS UR QL AUTO: NORMAL /LPF
IMM GRANULOCYTES # BLD AUTO: 0.03 10*3/MM3 (ref 0–0.03)
IMM GRANULOCYTES NFR BLD AUTO: 0.4 % (ref 0–0.5)
INR PPP: 1.15 (ref 0.9–1.1)
KETONES UR QL STRIP: NEGATIVE
LEUKOCYTE ESTERASE UR QL STRIP.AUTO: NEGATIVE
LYMPHOCYTES # BLD AUTO: 1.36 10*3/MM3 (ref 1–3)
LYMPHOCYTES NFR BLD AUTO: 18 % (ref 16–46)
MAGNESIUM SERPL-MCNC: 1.5 MG/DL (ref 1.7–2.6)
MCH RBC QN AUTO: 29.2 PG (ref 27–33)
MCHC RBC AUTO-ENTMCNC: 32.2 G/DL (ref 33–37)
MCV RBC AUTO: 90.9 FL (ref 80–94)
METHGB BLD QL: 0.2 % (ref 0–3)
MODALITY: ABNORMAL
MONOCYTES # BLD AUTO: 0.54 10*3/MM3 (ref 0.1–0.9)
MONOCYTES NFR BLD AUTO: 7.2 % (ref 0–12)
NEUTROPHILS # BLD AUTO: 5.5 10*3/MM3 (ref 1.4–6.5)
NEUTROPHILS NFR BLD AUTO: 72.8 % (ref 40–75)
NITRITE UR QL STRIP: NEGATIVE
OSMOLALITY SERPL CALC.SUM OF ELEC: 289.7 MOSM/KG (ref 273–305)
OXYHGB MFR BLDV: 91.9 % (ref 85–100)
PCO2 BLDA: 34.5 MM HG (ref 35–45)
PH BLDA: 7.45 PH UNITS (ref 7.35–7.45)
PH UR STRIP.AUTO: <=5 [PH] (ref 5–8)
PLATELET # BLD AUTO: 292 10*3/MM3 (ref 130–400)
PMV BLD AUTO: 10.5 FL (ref 6–10)
PO2 BLDA: 68 MM HG (ref 80–100)
POTASSIUM BLD-SCNC: 4.2 MMOL/L (ref 3.5–5.3)
PROT SERPL-MCNC: 6 G/DL (ref 6–8)
PROT UR QL STRIP: ABNORMAL
PROTHROMBIN TIME: 14.9 SECONDS (ref 11–15.4)
RBC # BLD AUTO: 3.83 10*6/MM3 (ref 4.2–5.4)
RBC # UR: NORMAL /HPF
REF LAB TEST METHOD: NORMAL
SAO2 % BLDCOA: 93.1 % (ref 90–100)
SODIUM BLD-SCNC: 138 MMOL/L (ref 135–153)
SP GR UR STRIP: 1.02 (ref 1–1.03)
SQUAMOUS #/AREA URNS HPF: NORMAL /HPF
TROPONIN I SERPL-MCNC: 0.02 NG/ML
UROBILINOGEN UR QL STRIP: ABNORMAL
VANCOMYCIN SERPL-MCNC: 13.2 MCG/ML
WBC NRBC COR # BLD: 7.55 10*3/MM3 (ref 4.5–12.5)
WBC UR QL AUTO: NORMAL /HPF

## 2019-02-06 PROCEDURE — 86140 C-REACTIVE PROTEIN: CPT | Performed by: HOSPITALIST

## 2019-02-06 PROCEDURE — 83605 ASSAY OF LACTIC ACID: CPT | Performed by: FAMILY MEDICINE

## 2019-02-06 PROCEDURE — 25010000002 VANCOMYCIN 5 G RECONSTITUTED SOLUTION 5,000 MG VIAL: Performed by: HOSPITALIST

## 2019-02-06 PROCEDURE — 80202 ASSAY OF VANCOMYCIN: CPT | Performed by: HOSPITALIST

## 2019-02-06 PROCEDURE — 94799 UNLISTED PULMONARY SVC/PX: CPT

## 2019-02-06 PROCEDURE — 81001 URINALYSIS AUTO W/SCOPE: CPT | Performed by: INTERNAL MEDICINE

## 2019-02-06 PROCEDURE — 80053 COMPREHEN METABOLIC PANEL: CPT | Performed by: PHYSICIAN ASSISTANT

## 2019-02-06 PROCEDURE — 82962 GLUCOSE BLOOD TEST: CPT

## 2019-02-06 PROCEDURE — 84484 ASSAY OF TROPONIN QUANT: CPT | Performed by: HOSPITALIST

## 2019-02-06 PROCEDURE — 83880 ASSAY OF NATRIURETIC PEPTIDE: CPT | Performed by: HOSPITALIST

## 2019-02-06 PROCEDURE — 80053 COMPREHEN METABOLIC PANEL: CPT | Performed by: HOSPITALIST

## 2019-02-06 PROCEDURE — 87804 INFLUENZA ASSAY W/OPTIC: CPT | Performed by: PHYSICIAN ASSISTANT

## 2019-02-06 PROCEDURE — 25010000002 CEFTRIAXONE: Performed by: INTERNAL MEDICINE

## 2019-02-06 PROCEDURE — 63710000001 INSULIN ASPART PER 5 UNITS: Performed by: PHYSICIAN ASSISTANT

## 2019-02-06 PROCEDURE — 94640 AIRWAY INHALATION TREATMENT: CPT

## 2019-02-06 PROCEDURE — 82375 ASSAY CARBOXYHB QUANT: CPT | Performed by: PHYSICIAN ASSISTANT

## 2019-02-06 PROCEDURE — 82805 BLOOD GASES W/O2 SATURATION: CPT | Performed by: PHYSICIAN ASSISTANT

## 2019-02-06 PROCEDURE — 36600 WITHDRAWAL OF ARTERIAL BLOOD: CPT | Performed by: PHYSICIAN ASSISTANT

## 2019-02-06 PROCEDURE — 83735 ASSAY OF MAGNESIUM: CPT | Performed by: HOSPITALIST

## 2019-02-06 PROCEDURE — 85025 COMPLETE CBC W/AUTO DIFF WBC: CPT | Performed by: HOSPITALIST

## 2019-02-06 PROCEDURE — 25010000002 PIPERACILLIN-TAZOBACTAM: Performed by: HOSPITALIST

## 2019-02-06 PROCEDURE — 83050 HGB METHEMOGLOBIN QUAN: CPT | Performed by: PHYSICIAN ASSISTANT

## 2019-02-06 PROCEDURE — 25010000002 MAGNESIUM SULFATE 2 GM/50ML SOLUTION: Performed by: INTERNAL MEDICINE

## 2019-02-06 PROCEDURE — 99223 1ST HOSP IP/OBS HIGH 75: CPT | Performed by: INTERNAL MEDICINE

## 2019-02-06 PROCEDURE — 85610 PROTHROMBIN TIME: CPT | Performed by: PHYSICIAN ASSISTANT

## 2019-02-06 RX ORDER — WARFARIN SODIUM 5 MG/1
5 TABLET ORAL
Status: DISCONTINUED | OUTPATIENT
Start: 2019-02-06 | End: 2019-02-06

## 2019-02-06 RX ORDER — NICOTINE POLACRILEX 4 MG
15 LOZENGE BUCCAL
Status: DISCONTINUED | OUTPATIENT
Start: 2019-02-06 | End: 2019-02-20 | Stop reason: HOSPADM

## 2019-02-06 RX ORDER — SODIUM CHLORIDE 0.9 % (FLUSH) 0.9 %
3 SYRINGE (ML) INJECTION EVERY 12 HOURS SCHEDULED
Status: DISCONTINUED | OUTPATIENT
Start: 2019-02-06 | End: 2019-02-20 | Stop reason: HOSPADM

## 2019-02-06 RX ORDER — SODIUM CHLORIDE 0.9 % (FLUSH) 0.9 %
3-10 SYRINGE (ML) INJECTION AS NEEDED
Status: DISCONTINUED | OUTPATIENT
Start: 2019-02-06 | End: 2019-02-20 | Stop reason: HOSPADM

## 2019-02-06 RX ORDER — MAGNESIUM SULFATE HEPTAHYDRATE 40 MG/ML
2 INJECTION, SOLUTION INTRAVENOUS
Status: COMPLETED | OUTPATIENT
Start: 2019-02-06 | End: 2019-02-06

## 2019-02-06 RX ORDER — MAGNESIUM SULFATE HEPTAHYDRATE 40 MG/ML
4 INJECTION, SOLUTION INTRAVENOUS AS NEEDED
Status: DISCONTINUED | OUTPATIENT
Start: 2019-02-06 | End: 2019-02-20 | Stop reason: HOSPADM

## 2019-02-06 RX ORDER — NITROGLYCERIN 0.4 MG/1
0.4 TABLET SUBLINGUAL
Status: DISCONTINUED | OUTPATIENT
Start: 2019-02-06 | End: 2019-02-20 | Stop reason: HOSPADM

## 2019-02-06 RX ORDER — MAGNESIUM SULFATE HEPTAHYDRATE 40 MG/ML
2 INJECTION, SOLUTION INTRAVENOUS AS NEEDED
Status: DISCONTINUED | OUTPATIENT
Start: 2019-02-06 | End: 2019-02-20 | Stop reason: HOSPADM

## 2019-02-06 RX ORDER — GUAIFENESIN 600 MG/1
1200 TABLET, EXTENDED RELEASE ORAL EVERY 12 HOURS SCHEDULED
Status: DISCONTINUED | OUTPATIENT
Start: 2019-02-06 | End: 2019-02-20 | Stop reason: HOSPADM

## 2019-02-06 RX ORDER — WARFARIN SODIUM 2 MG/1
2 TABLET ORAL NIGHTLY
Status: ON HOLD | COMMUNITY
End: 2019-02-20 | Stop reason: SDUPTHER

## 2019-02-06 RX ORDER — POTASSIUM CHLORIDE 1.5 G/1.77G
40 POWDER, FOR SOLUTION ORAL AS NEEDED
Status: DISCONTINUED | OUTPATIENT
Start: 2019-02-06 | End: 2019-02-20 | Stop reason: HOSPADM

## 2019-02-06 RX ORDER — SODIUM CHLORIDE 9 MG/ML
75 INJECTION, SOLUTION INTRAVENOUS CONTINUOUS
Status: DISCONTINUED | OUTPATIENT
Start: 2019-02-06 | End: 2019-02-11

## 2019-02-06 RX ORDER — DEXTROSE MONOHYDRATE 25 G/50ML
25 INJECTION, SOLUTION INTRAVENOUS
Status: DISCONTINUED | OUTPATIENT
Start: 2019-02-06 | End: 2019-02-20 | Stop reason: HOSPADM

## 2019-02-06 RX ORDER — IPRATROPIUM BROMIDE AND ALBUTEROL SULFATE 2.5; .5 MG/3ML; MG/3ML
3 SOLUTION RESPIRATORY (INHALATION)
Status: DISCONTINUED | OUTPATIENT
Start: 2019-02-06 | End: 2019-02-20 | Stop reason: HOSPADM

## 2019-02-06 RX ORDER — WARFARIN SODIUM 2 MG/1
2 TABLET ORAL NIGHTLY
Status: CANCELLED | OUTPATIENT
Start: 2019-02-06

## 2019-02-06 RX ORDER — ATORVASTATIN CALCIUM 40 MG/1
40 TABLET, FILM COATED ORAL NIGHTLY
Status: CANCELLED | OUTPATIENT
Start: 2019-02-06

## 2019-02-06 RX ORDER — POTASSIUM CHLORIDE 20 MEQ/1
40 TABLET, EXTENDED RELEASE ORAL AS NEEDED
Status: DISCONTINUED | OUTPATIENT
Start: 2019-02-06 | End: 2019-02-20 | Stop reason: HOSPADM

## 2019-02-06 RX ADMIN — MAGNESIUM SULFATE IN WATER 2 G: 40 INJECTION, SOLUTION INTRAVENOUS at 13:33

## 2019-02-06 RX ADMIN — APIXABAN 5 MG: 5 TABLET, FILM COATED ORAL at 20:59

## 2019-02-06 RX ADMIN — GUAIFENESIN 1200 MG: 600 TABLET, EXTENDED RELEASE ORAL at 20:58

## 2019-02-06 RX ADMIN — CEFTRIAXONE 1 G: 1 INJECTION, POWDER, FOR SOLUTION INTRAMUSCULAR; INTRAVENOUS at 13:40

## 2019-02-06 RX ADMIN — INSULIN ASPART 4 UNITS: 100 INJECTION, SOLUTION INTRAVENOUS; SUBCUTANEOUS at 17:37

## 2019-02-06 RX ADMIN — GUAIFENESIN 1200 MG: 600 TABLET, EXTENDED RELEASE ORAL at 10:17

## 2019-02-06 RX ADMIN — SODIUM CHLORIDE 75 ML/HR: 9 INJECTION, SOLUTION INTRAVENOUS at 02:59

## 2019-02-06 RX ADMIN — MAGNESIUM SULFATE IN WATER 2 G: 40 INJECTION, SOLUTION INTRAVENOUS at 11:48

## 2019-02-06 RX ADMIN — INSULIN ASPART 4 UNITS: 100 INJECTION, SOLUTION INTRAVENOUS; SUBCUTANEOUS at 11:49

## 2019-02-06 RX ADMIN — SODIUM CHLORIDE 75 ML/HR: 9 INJECTION, SOLUTION INTRAVENOUS at 18:37

## 2019-02-06 RX ADMIN — VANCOMYCIN HYDROCHLORIDE 1000 MG: 5 INJECTION, POWDER, LYOPHILIZED, FOR SOLUTION INTRAVENOUS at 16:17

## 2019-02-06 RX ADMIN — IPRATROPIUM BROMIDE AND ALBUTEROL SULFATE 3 ML: .5; 3 SOLUTION RESPIRATORY (INHALATION) at 18:58

## 2019-02-06 RX ADMIN — APIXABAN 5 MG: 5 TABLET, FILM COATED ORAL at 13:40

## 2019-02-06 RX ADMIN — MAGNESIUM SULFATE IN WATER 2 G: 40 INJECTION, SOLUTION INTRAVENOUS at 09:00

## 2019-02-06 RX ADMIN — INSULIN ASPART 5 UNITS: 100 INJECTION, SOLUTION INTRAVENOUS; SUBCUTANEOUS at 20:59

## 2019-02-06 RX ADMIN — IPRATROPIUM BROMIDE AND ALBUTEROL SULFATE 3 ML: .5; 3 SOLUTION RESPIRATORY (INHALATION) at 12:33

## 2019-02-06 RX ADMIN — SODIUM CHLORIDE, PRESERVATIVE FREE 3 ML: 5 INJECTION INTRAVENOUS at 02:59

## 2019-02-06 RX ADMIN — PIPERACILLIN SODIUM,TAZOBACTAM SODIUM 3.38 G: 3; .375 INJECTION, POWDER, FOR SOLUTION INTRAVENOUS at 03:49

## 2019-02-06 NOTE — ED PROVIDER NOTES
Subjective   73-year-old white female presents emergency department with her family at bedside, reports that she has a history of diabetes and hypertension was seen in this facility 2 weeks ago was in respiratory failure on the vent had bilateral pneumonia had kidney failure her and was very sick and she went to see her PCP today who said that she sounded congested and that her legs were swollen and they were concerned she might be trying to get septic and that she should just come back to the emergency department to be evaluated.  Family reports the patient has had problems with memory; m patient says she just feels foggy headed and just as it to regain her strength that she was discharged on the 24th.         History provided by:  Patient and relative  Illness   Location:  Fatigue , swelling in legs and is concerned she will get cellulitis in her legs again  Severity:  Mild  Onset quality:  Gradual  Timing:  Intermittent  Progression:  Worsening  Chronicity:  Recurrent  Context:  See hpi  Relieved by:  Nothing  Worsened by:  Exertion  Ineffective treatments:  None  Associated symptoms: congestion, fatigue, nausea and shortness of breath    Associated symptoms: no abdominal pain, no chest pain and no fever        Review of Systems   Constitutional: Positive for fatigue. Negative for fever.   HENT: Positive for congestion.    Respiratory: Positive for shortness of breath.    Cardiovascular: Negative.  Negative for chest pain.   Gastrointestinal: Positive for nausea. Negative for abdominal pain.   Endocrine: Negative.    Genitourinary: Negative.  Negative for dysuria.   Skin: Negative.    Neurological: Negative.    Psychiatric/Behavioral: Negative.    All other systems reviewed and are negative.      Past Medical History:   Diagnosis Date   • Diabetes mellitus (CMS/HCC)    • Hypertension    • Severe sepsis with septic shock (CMS/[facility])        Allergies   Allergen Reactions   • Sulfa Antibiotics GI Intolerance        History reviewed. No pertinent surgical history.    History reviewed. No pertinent family history.    Social History     Socioeconomic History   • Marital status:      Spouse name: Not on file   • Number of children: Not on file   • Years of education: Not on file   • Highest education level: Not on file   Tobacco Use   • Smoking status: Never Smoker   • Smokeless tobacco: Never Used   Substance and Sexual Activity   • Alcohol use: No     Frequency: Never   • Drug use: No           Objective   Physical Exam   Constitutional: She is oriented to person, place, and time. She appears ill.   HENT:   Head: Normocephalic and atraumatic.   Eyes: Pupils are equal, round, and reactive to light.   Neck: Neck supple.   Cardiovascular: Normal rate.   Pulmonary/Chest: Effort normal and breath sounds normal.   Abdominal: Soft. Bowel sounds are normal.   Musculoskeletal: Normal range of motion.   Neurological: She is alert and oriented to person, place, and time. She has normal strength. She displays a negative Romberg sign.   Skin: Skin is warm. Capillary refill takes less than 2 seconds.   Psychiatric: She has a normal mood and affect. Her behavior is normal.   Nursing note and vitals reviewed.      Procedures           ED Course      SEPTIC SHOCK FOCUSED EXAM ATTESTATION    I attest that I have reassessed tissue perfusion after the fluid bolus given.    Nadine Johnna Garcia DO  02/06/19  7:13 AM            MDM  Number of Diagnoses or Management Options  Acute renal failure, unspecified acute renal failure type (CMS/HCC): new and requires workup  Septic shock (CMS/HCC): new and requires workup     Amount and/or Complexity of Data Reviewed  Clinical lab tests: ordered and reviewed  Tests in the radiology section of CPT®: ordered and reviewed  Tests in the medicine section of CPT®: ordered and reviewed  Decide to obtain previous medical records or to obtain history from someone other than the patient: yes  Discuss  the patient with other providers: yes  Independent visualization of images, tracings, or specimens: yes    Risk of Complications, Morbidity, and/or Mortality  Presenting problems: high  Diagnostic procedures: moderate  Management options: moderate    Patient Progress  Patient progress: (guarded)        Final diagnoses:   Septic shock (CMS/HCC)   Acute renal failure, unspecified acute renal failure type (CMS/HCC)            Nadine Garcia DO  02/06/19 0713

## 2019-02-06 NOTE — ED NOTES
Dr. Anderson, as been paged for another provider. When he calls back I will let him know Dr. Garcia needs to speak with him as well.      Symes, Heather  02/05/19 4943

## 2019-02-06 NOTE — PLAN OF CARE
Problem: Sepsis/Septic Shock (Adult)  Goal: Signs and Symptoms of Listed Potential Problems Will be Absent, Minimized or Managed (Sepsis/Septic Shock)  Outcome: Ongoing (interventions implemented as appropriate)      Problem: Fall Risk (Adult)  Goal: Identify Related Risk Factors and Signs and Symptoms  Outcome: Ongoing (interventions implemented as appropriate)      Problem: Skin Injury Risk (Adult)  Goal: Identify Related Risk Factors and Signs and Symptoms  Outcome: Ongoing (interventions implemented as appropriate)

## 2019-02-06 NOTE — PROGRESS NOTES
Patient continues on day 2 vancomycin. Vancomycin trough level was reported as 13.2 mg/L today. Based on this level, will give vancomycin 1 gm x 1 dose today. Will continue to follow and recheck a random level tomorrow to assess for further dosing.    Thank you,    Dorothy Toure [facility]

## 2019-02-06 NOTE — H&P
"    [facility] Medicine Services  History & Physical    Patient Identification:  Name:  Ashley Geronimo  Age:  73 y.o.  Sex:  female  :  1945  MRN:  [id number]   Visit Number:  [id number]  Primary Care Physician:  Silvano Parker MD    I have seen the patient in conjunction with Dang Daley PA-C and I agree with the following statements:    Subjective     Chief complaint: Weakness, congestion, leg swelling    History of presenting illness:      Ashley Geronimo is a 73 y.o. female with past medical history significant for recent admission to this facility from  through 2019 with diagnosis of septic shock secondary to UTI and community-acquired pneumonia with possible cellulitis during which time she also experienced paroxysmal atrial fibrillation, acute kidney injury, non-ST elevation myocardial infarction felt to be secondary to most likely demand ischemia, and non-gap metabolic acidosis felt to be secondary to lactic acidosis, and diabetes mellitus type 2.  Of note, Olegario said hospitalization she did have sputum cultures that were polymicrobial including Pseudomonas with antibiotic therapies guided by infectious disease.  On discharge, in setting of atrial fibrillation, she was discharged home on Coumadin with subcutaneous Lovenox until her INR was therapeutic; as normal anticoagulants were too expensive at that time.    She presented to the office of her PCP yesterday, and was referred to the ED.  She tells me her PCP felt her lungs were \"still wet sounding\".  She reports ongoing weakness since hospitalization.  Though she tells me she is able to ambulate around her home.  She has been utilizing 2 L of oxygen via nasal cannula since discharge.  She tells me she has been taking Coumadin since discharge and home health of take her blood for PT/INR; however: She denies any further adjustments in her medication since discharge and is reports she is taking " the Coumadin pill daily of unknown dosing but thinks it is the dose from her discharge.  She reports she has felt short of breath over the past few days.  She reports she feels congested and does feel that she is sputum she is unable to clear.  She denies chest pain.  She reports she did have some redness and her ankles a few days ago.  Upon examination, there is no redness at present.    Upon presentation to the emergency department, temperature is 98.4, pulse 79, respiratory rate 20, and blood pressure was 118/44.  Lactic acid was found to be 6 with some improvement of 3.2.  CRP was 3.39.  Glucose levels were 213 with bowel and on gap of 12.5 and elevated creatinine of 1.50.  Magnesium levels found to be 1.5.  Please see pertinent laboratory data regarding urinalysis.  Chest x-ray did not reveal evidence of acute infiltrate; however, CT chest only showed small bibasilar effusions. UA showed trace leukocyte esterase  --------------------------------------------------------------------------------------------------------------------   Review of Systems   Constitutional: Positive for fatigue. Negative for chills and fever.   HENT: Positive for congestion. Negative for drooling.    Eyes: Negative for pain and discharge.   Respiratory: Positive for cough, shortness of breath and wheezing.    Cardiovascular: Negative for chest pain and leg swelling.   Gastrointestinal: Negative for abdominal distention, constipation, nausea and vomiting.   Endocrine: Negative for cold intolerance and heat intolerance.   Genitourinary: Negative for difficulty urinating and dysuria.   Musculoskeletal: Negative for arthralgias and gait problem.   Skin: Negative for color change and pallor.   Allergic/Immunologic: Negative for environmental allergies and food allergies.   Neurological: Negative for dizziness and headaches.   Hematological: Negative for adenopathy. Does not bruise/bleed easily.   Psychiatric/Behavioral: Negative for  agitation and confusion.      ---------------------------------------------------------------------------------------------------------------------   Past Medical History:   Diagnosis Date   • Diabetes mellitus (CMS/[facility])    • Hypertension    • NSTEMI (non-ST elevated myocardial infarction) (CMS/[facility]) 01/2019   • Paroxysmal atrial fibrillation (CMS/[facility])    • Severe sepsis with septic shock (CMS/[facility])      History reviewed. No pertinent surgical history.  History reviewed. No pertinent family history.  Social History     Socioeconomic History   • Marital status:      Spouse name: Not on file   • Number of children: Not on file   • Years of education: Not on file   • Highest education level: Not on file   Tobacco Use   • Smoking status: Never Smoker   • Smokeless tobacco: Never Used   Substance and Sexual Activity   • Alcohol use: No     Frequency: Never   • Drug use: No     ---------------------------------------------------------------------------------------------------------------------   Allergies:  Sulfa antibiotics  ---------------------------------------------------------------------------------------------------------------------   Home medications:    Medications below are reported home medications pulling from within the system; at this time, these medications have not been reconciled unless otherwise specified and are in the verification process for further verifcation as current home medications.  Medications Prior to Admission   Medication Sig Dispense Refill Last Dose   • aspirin 81 MG EC tablet Take 1 tablet by mouth Daily.   2/5/2019 at Unknown time   • budesonide-formoterol (SYMBICORT) 160-4.5 MCG/ACT inhaler Inhale 2 puffs 2 (Two) Times a Day. 1 inhaler 12 2/5/2019 at AM   • metFORMIN (GLUCOPHAGE) 1000 MG tablet Take 1,000 mg by mouth 2 (Two) Times a Day.   2/5/2019 at AM   • metoprolol tartrate (LOPRESSOR) 25 MG tablet Take 1 tablet by mouth Every 12 (Twelve) Hours. 60 tablet 0 2/5/2019 at AM    • NIFEdipine CC (ADALAT CC) 60 MG 24 hr tablet Take 1 tablet by mouth Daily. 30 tablet 0 2/5/2019 at AM   • warfarin (COUMADIN) 2 MG tablet Take 2 mg by mouth Every Night.   2/4/2019 at PM   • atorvastatin (LIPITOR) 40 MG tablet Take 1 tablet by mouth Every Night. 30 tablet 0 2/4/2019 at PM   • insulin detemir (LEVEMIR) 100 UNIT/ML injection Inject 10 Units under the skin into the appropriate area as directed Every Night.  12 2/4/2019 at PM   • montelukast (SINGULAIR) 10 MG tablet Take 10 mg by mouth Every Night.   2/4/2019 at PM       Hospital Scheduled Meds:    guaiFENesin 1,200 mg Oral Q12H   insulin aspart 0-7 Units Subcutaneous 4x Daily AC & at Bedtime   ipratropium-albuterol 3 mL Nebulization 4x Daily - RT   magnesium sulfate 2 g Intravenous Q2H   piperacillin-tazobactam 3.375 g Intravenous Q8H   sodium chloride 3 mL Intravenous Q12H       Pharmacy to dose vancomycin     Pharmacy to dose warfarin     Pharmacy to Dose Zosyn     sodium chloride 75 mL/hr Last Rate: 75 mL/hr (02/06/19 0259)       Current listed hospital scheduled medications may not yet reflect those currently placed in orders that are signed and held awaiting patient's arrival to floor.   ---------------------------------------------------------------------------------------------------------------------     Objective     Vital Signs:  Temp:  [98 °F (36.7 °C)-98.6 °F (37 °C)] 98.6 °F (37 °C)  Heart Rate:  [] 96  Resp:  [16-20] 16  BP: (118-179)/(44-86) 178/78      02/05/19  1820 02/06/19  0038   Weight: 57.2 kg (126 lb) 62.7 kg (138 lb 4.8 oz)     Body mass index is 27.01 kg/m².  ---------------------------------------------------------------------------------------------------------------------       Physical Exam    Physical Exam:    Constitutional: Awake, alert, well-nourished, well-developed, nontoxic  HEENT: Normocephalic, atraumatic. PERRLA, EOMI, sclerae anicteric, conjunctivae without injection, mucous membranes moist, no  oropharyngeal erythema appreciated.    Neck: Supple. No JVD appreciated.  Pulmonary: Diminished breath sounds bilaterally. No significantly wheezing, rales, crackles.   CV: Normal rate, regular rhythm. Normal s1/s2 with no murmur appreciated.   Abdominal: Soft, No distension or tenderness appreciated. Bowel sounds appreciated in all four quadrants, no guarding or rebound  Musculoskeletal: No erythema or swelling to joints of upper and lower extremities   Extremities: No clubbing, cyanosis, 1+ edema  Vascular: 2+ DP/PT/Radial pulses bilaterally, warm extremities  Skin: Skin is warm and dry. No truncal or extremity rash on limited exam  Neuro: Alert and oriented to person, place, and time. Strength symmetric in all extremities, Cranial Nerves grossly intact to confrontation, speech clear, sensation intact to fine touch throughout.  No slurred speech.  No facial droop.    Psych: Appropriate mood and affect.  Judgement and though content appropriate.       ---------------------------------------------------------------------------------------------------------------------  EKG:            Telemetry:  SR 80s-90s with frequent PACs  I have personally looked at both the EKG and the telemetry strips.  ---------------------------------------------------------------------------------------------------------------------   Results from last 7 days   Lab Units 02/06/19  0121 02/06/19  0100 02/05/19 2111 02/05/19  1838   CRP mg/dL  --  3.39*  --  3.34*   LACTATE mmol/L 3.2*  --  6.0*  --    WBC 10*3/mm3  --  7.55  --  8.39   HEMOGLOBIN g/dL  --  11.2*  --  12.2   HEMATOCRIT %  --  34.8*  --  37.5   MCV fL  --  90.9  --  89.5   MCHC g/dL  --  32.2*  --  32.5*   PLATELETS 10*3/mm3  --  292  --  348   INR   --   --   --  1.14*         Results from last 7 days   Lab Units 02/06/19  0710 02/06/19  0100 02/05/19  1838   SODIUM mmol/L  --  138 137   POTASSIUM mmol/L  --  4.2 4.9   MAGNESIUM mg/dL 1.5*  --  1.0*   CHLORIDE mmol/L  --   105 99   CO2 mmol/L  --  20.5* 22.8*   BUN mg/dL  --  34* 42*   CREATININE mg/dL  --  1.50* 1.69*   EGFR IF NONAFRICN AM mL/min/1.73  --  34* 30*   CALCIUM mg/dL  --  7.1* 8.1   GLUCOSE mg/dL  --  213* 238*   ALBUMIN g/dL  --  3.70 4.30   BILIRUBIN mg/dL  --  0.3 0.3   ALK PHOS U/L  --  79 84   AST (SGOT) U/L  --  16 18   ALT (SGPT) U/L  --  13 14   Estimated Creatinine Clearance: 27.2 mL/min (A) (by C-G formula based on SCr of 1.5 mg/dL (H)).  No results found for: AMMONIA  Results from last 7 days   Lab Units 02/06/19  0710 02/06/19  0100 02/05/19  2324   TROPONIN I ng/mL 0.024 0.021 0.024         Lab Results   Component Value Date    HGBA1C 11.50 (H) 01/16/2019     Lab Results   Component Value Date    TSH 4.095 01/16/2019     No results found for: PREGTESTUR, PREGSERUM, HCG, HCGQUANT  Pain Management Panel     Pain Management Panel Latest Ref Rng & Units 1/16/2019 4/12/2018    CREATININE UR mg/dL - 96.6    AMPHETAMINES SCREEN, URINE Negative Negative -    BARBITURATES SCREEN Negative Negative -    BENZODIAZEPINE SCREEN, URINE Negative Negative -    BUPRENORPHINE Negative Negative -    COCAINE SCREEN, URINE Negative Negative -    METHADONE SCREEN, URINE Negative Negative -                        ---------------------------------------------------------------------------------------------------------------------  Imaging Results (last 7 days)     Procedure Component Value Units Date/Time    XR Chest 1 View [[id number]] Collected:  02/06/19 0642     Updated:  02/06/19 0642    Narrative:       XR CHEST 1 VIEW-     CLINICAL INDICATION: Weak/Dizzy/AMS triage protocol          COMPARISON: 01/17/2019      TECHNIQUE: Single frontal view of the chest.     FINDINGS:     Probable atelectasis in both lungs.  Diffuse interstitial lung disease.  Trace bibasilar effusions.  The cardiac silhouette is normal. The pulmonary vasculature is  unremarkable.  There is no evidence of an acute osseous abnormality.   There are no  suspicious-appearing parenchymal soft tissue nodules.            Impression:       Trace bibasilar effusions.           CT Abdomen Pelvis Stone Protocol [[id number]] Updated:  02/05/19 2302    CT Chest Without Contrast [[id number]] Updated:  02/05/19 2149          Cultures:       Last echocardiogram:  Results for orders placed during the hospital encounter of 01/16/19   Adult Transthoracic Echo Complete W/ Cont if Necessary Per Protocol    Narrative · Estimated EF = 66%.  · Left ventricular systolic function is normal.  · No significant valvular disease  · No change since 3/21/18          CHADS-VASc Risk Assessment            4       Total Score        1 Hypertension    1 DM    1 Age 65-74    1 Sex: Female            I have personally reviewed the radiology images and read the final radiology report.  ---------------------------------------------------------------------------------------------------------------------  Assessment / Plan       Assessment and Plan:  UTI  qSOFA-0. Not sepsis  -Ceftriaxone started.   -Urine culture ordered    Lactic acidosis  Likely multifactorial due to dehydration +/- metformin  -Continue IV fluids  -Monitor lactic acidosi      · Acute kidney injury:  Will closely monitor renal function and avoid nephrotoxins when possible.  Will continue to hydrate and discontinue home metformin.  · Suspected underlying metabolic acidosis with elevated but improving anion gap likely secondary to underlying acute kidney injury: Anion gap is improving thus far.  Acetone negative emergency department.  We'll hold further metformin therapy.  Will treat possible underlying infection as previously outlined within this document. Will check one time ABG.   · Severe hypomagnesemia: Initial value level of 1.0 with improvement on 21.5 with supplementation.  We will continue supplementation via electrolyte replacement protocol.  · Paroxysmal atrial fibrillation, currently sinus rhythm: Monitor on telemetry.   Pharmacy consultation has been placed to dose Coumadin. [id number] at least 2. Will review her medication regimen.  Subtherapeutic chronic anticoagulation: Eliquis started. Will follow up with pharmacy  Hyperglycemia in the setting of diabetes mellitus type 2: Given diabetes mellitus, fingerstick blood glucose monitoring has been ordered AC&HS. Sliding scale insulin has been ordered.  Hemoglobin a1c 11.5% on January 16, 2019.  She does follow with Dr. Parker, Endocrinologist.  Will review home Levemir regimen upon availability.      ----------  -DVT prophylaxis: Coumadin to serve  -Activity: Ad naren  -Expected length of stay: INPATIENT status due to the need for care which can only be reasonably provided in an hospital setting such as aggressive/expedited ancillary services and/or consultation services, the necessity for IV medications, close physician monitoring and/or the possible need for procedures.  In such, I feel patient’s risk for adverse outcomes and need for care warrant INPATIENT evaluation and predict the patient’s care encounter to likely last beyond 2 midnights.      Dang Daley PA-C  02/06/19  8:41 AM  Pager # [phone]  ---------------------------------------------------------------------------------------------------------------------

## 2019-02-06 NOTE — PROGRESS NOTES
Pharmacy to dose Zosyn for septic shock x 5 days.  CrCl = 24.2  Dosed as follows:  Zosyn 3.75gm iv ext infusion q8h x 5 days.  Pharmacy will monitor and adjust dosing as needed.     Babar García RPH  1:48 AM  02/06/19

## 2019-02-06 NOTE — PROGRESS NOTES
Discharge Planning Assessment   [facility]     Patient Name: Ashley Geronimo  MRN: [id number]  Today's Date: 2/6/2019    Admit Date: 2/5/2019    Discharge Needs Assessment     Row Name 02/06/19 1341       Living Environment    Lives With  grandchild(lyly)    Current Living Arrangements  home/apartment/condo    Primary Care Provided by  self        Discharge Plan     Row Name 02/06/19 1342       Plan    Plan  Pt admitted on 2/5/19.  SS received consult per ns for discharge planning.  SS spoke with pt on this date.  Pt lives at home and plans to return home at discharge.  [facility].  [facility] will need new referral with [facility] at discharge.  [facility] will need notification at discharge at [phone].  Pt currently utilizes [facility] for home 02.  SS will follow and assist with discharge needs.             Milka Reyes

## 2019-02-06 NOTE — OUTREACH NOTE
Sepsis Week 3 Survey      Responses   Facility patient discharged from?  [facility]   Does the patient have one of the following disease processes/diagnoses(primary or secondary)?  Sepsis   Week 3 attempt successful?  No   Revoke  Readmitted          Jasmina Rodgers RN

## 2019-02-07 ENCOUNTER — APPOINTMENT (OUTPATIENT)
Dept: GENERAL RADIOLOGY | Facility: HOSPITAL | Age: 74
End: 2019-02-07

## 2019-02-07 ENCOUNTER — APPOINTMENT (OUTPATIENT)
Dept: NUCLEAR MEDICINE | Facility: HOSPITAL | Age: 74
End: 2019-02-07

## 2019-02-07 LAB
A-A DO2: >300 MMHG (ref 0–300)
ALBUMIN SERPL-MCNC: 3.4 G/DL (ref 3.4–4.8)
ALBUMIN/GLOB SERPL: 1.6 G/DL (ref 1.5–2.5)
ALP SERPL-CCNC: 73 U/L (ref 35–104)
ALT SERPL W P-5'-P-CCNC: 19 U/L (ref 10–36)
ANION GAP SERPL CALCULATED.3IONS-SCNC: 11 MMOL/L (ref 3.6–11.2)
ARTERIAL PATENCY WRIST A: POSITIVE
AST SERPL-CCNC: 22 U/L (ref 10–30)
ATMOSPHERIC PRESS: 725 MMHG
BASE EXCESS BLDA CALC-SCNC: -2.8 MMOL/L
BASOPHILS # BLD AUTO: 0.02 10*3/MM3 (ref 0–0.3)
BASOPHILS NFR BLD AUTO: 0.3 % (ref 0–2)
BDY SITE: ABNORMAL
BILIRUB SERPL-MCNC: 0.3 MG/DL (ref 0.2–1.8)
BODY TEMPERATURE: 98.6 C
BUN BLD-MCNC: 37 MG/DL (ref 7–21)
BUN/CREAT SERPL: 23.9 (ref 7–25)
CALCIUM SPEC-SCNC: 7.1 MG/DL (ref 7.7–10)
CHLORIDE SERPL-SCNC: 107 MMOL/L (ref 99–112)
CO2 SERPL-SCNC: 20 MMOL/L (ref 24.3–31.9)
COHGB MFR BLD: 1 % (ref 0–5)
CREAT BLD-MCNC: 1.55 MG/DL (ref 0.43–1.29)
D-LACTATE SERPL-SCNC: 1.9 MMOL/L (ref 0.5–2)
DEPRECATED RDW RBC AUTO: 44 FL (ref 37–54)
EOSINOPHIL # BLD AUTO: 0.11 10*3/MM3 (ref 0–0.7)
EOSINOPHIL NFR BLD AUTO: 1.6 % (ref 0–7)
ERYTHROCYTE [DISTWIDTH] IN BLOOD BY AUTOMATED COUNT: 13.5 % (ref 11.5–14.5)
GFR SERPL CREATININE-BSD FRML MDRD: 33 ML/MIN/1.73
GLOBULIN UR ELPH-MCNC: 2.1 GM/DL
GLUCOSE BLD-MCNC: 175 MG/DL (ref 70–110)
GLUCOSE BLDC GLUCOMTR-MCNC: 277 MG/DL (ref 70–130)
GLUCOSE BLDC GLUCOMTR-MCNC: 288 MG/DL (ref 70–130)
GLUCOSE BLDC GLUCOMTR-MCNC: 315 MG/DL (ref 70–130)
GLUCOSE BLDC GLUCOMTR-MCNC: 326 MG/DL (ref 70–130)
HCO3 BLDA-SCNC: 21.4 MMOL/L (ref 22–26)
HCT VFR BLD AUTO: 34.3 % (ref 37–47)
HCT VFR BLD CALC: 35 % (ref 37–47)
HGB BLD-MCNC: 10.7 G/DL (ref 12–16)
HGB BLDA-MCNC: 12 G/DL (ref 12–16)
HOROWITZ INDEX BLD+IHG-RTO: 60 %
IMM GRANULOCYTES # BLD AUTO: 0.03 10*3/MM3 (ref 0–0.03)
IMM GRANULOCYTES NFR BLD AUTO: 0.4 % (ref 0–0.5)
INR PPP: 1.3 (ref 0.9–1.1)
LYMPHOCYTES # BLD AUTO: 1.17 10*3/MM3 (ref 1–3)
LYMPHOCYTES NFR BLD AUTO: 16.6 % (ref 16–46)
MAGNESIUM SERPL-MCNC: 2.2 MG/DL (ref 1.7–2.6)
MAGNESIUM SERPL-MCNC: 2.2 MG/DL (ref 1.7–2.6)
MCH RBC QN AUTO: 28.9 PG (ref 27–33)
MCHC RBC AUTO-ENTMCNC: 31.2 G/DL (ref 33–37)
MCV RBC AUTO: 92.7 FL (ref 80–94)
METHGB BLD QL: 0.4 % (ref 0–3)
MODALITY: ABNORMAL
MONOCYTES # BLD AUTO: 0.65 10*3/MM3 (ref 0.1–0.9)
MONOCYTES NFR BLD AUTO: 9.2 % (ref 0–12)
NEUTROPHILS # BLD AUTO: 5.07 10*3/MM3 (ref 1.4–6.5)
NEUTROPHILS NFR BLD AUTO: 71.9 % (ref 40–75)
OSMOLALITY SERPL CALC.SUM OF ELEC: 288.6 MOSM/KG (ref 273–305)
OXYHGB MFR BLDV: 86.8 % (ref 85–100)
PCO2 BLDA: 35.2 MM HG (ref 35–45)
PH BLDA: 7.4 PH UNITS (ref 7.35–7.45)
PLATELET # BLD AUTO: 254 10*3/MM3 (ref 130–400)
PMV BLD AUTO: 10.7 FL (ref 6–10)
PO2 BLDA: 56.1 MM HG (ref 80–100)
POTASSIUM BLD-SCNC: 4.2 MMOL/L (ref 3.5–5.3)
PROT SERPL-MCNC: 5.5 G/DL (ref 6–8)
PROTHROMBIN TIME: 16.5 SECONDS (ref 11–15.4)
RBC # BLD AUTO: 3.7 10*6/MM3 (ref 4.2–5.4)
SAO2 % BLDCOA: 88 % (ref 90–100)
SODIUM BLD-SCNC: 138 MMOL/L (ref 135–153)
WBC NRBC COR # BLD: 7.05 10*3/MM3 (ref 4.5–12.5)

## 2019-02-07 PROCEDURE — G0108 DIAB MANAGE TRN  PER INDIV: HCPCS

## 2019-02-07 PROCEDURE — 71045 X-RAY EXAM CHEST 1 VIEW: CPT | Performed by: RADIOLOGY

## 2019-02-07 PROCEDURE — 85610 PROTHROMBIN TIME: CPT | Performed by: PHYSICIAN ASSISTANT

## 2019-02-07 PROCEDURE — 83605 ASSAY OF LACTIC ACID: CPT | Performed by: INTERNAL MEDICINE

## 2019-02-07 PROCEDURE — 36600 WITHDRAWAL OF ARTERIAL BLOOD: CPT | Performed by: INTERNAL MEDICINE

## 2019-02-07 PROCEDURE — 78582 LUNG VENTILAT&PERFUS IMAGING: CPT | Performed by: RADIOLOGY

## 2019-02-07 PROCEDURE — 97163 PT EVAL HIGH COMPLEX 45 MIN: CPT

## 2019-02-07 PROCEDURE — 85025 COMPLETE CBC W/AUTO DIFF WBC: CPT | Performed by: PHYSICIAN ASSISTANT

## 2019-02-07 PROCEDURE — 82962 GLUCOSE BLOOD TEST: CPT

## 2019-02-07 PROCEDURE — 97530 THERAPEUTIC ACTIVITIES: CPT

## 2019-02-07 PROCEDURE — 83735 ASSAY OF MAGNESIUM: CPT | Performed by: PHYSICIAN ASSISTANT

## 2019-02-07 PROCEDURE — 97116 GAIT TRAINING THERAPY: CPT

## 2019-02-07 PROCEDURE — 82375 ASSAY CARBOXYHB QUANT: CPT | Performed by: INTERNAL MEDICINE

## 2019-02-07 PROCEDURE — 63710000001 INSULIN ASPART PER 5 UNITS: Performed by: PHYSICIAN ASSISTANT

## 2019-02-07 PROCEDURE — 94799 UNLISTED PULMONARY SVC/PX: CPT

## 2019-02-07 PROCEDURE — 25010000002 CEFTRIAXONE: Performed by: INTERNAL MEDICINE

## 2019-02-07 PROCEDURE — A9567 TECHNETIUM TC-99M AEROSOL: HCPCS | Performed by: INTERNAL MEDICINE

## 2019-02-07 PROCEDURE — 82805 BLOOD GASES W/O2 SATURATION: CPT | Performed by: INTERNAL MEDICINE

## 2019-02-07 PROCEDURE — 99233 SBSQ HOSP IP/OBS HIGH 50: CPT | Performed by: INTERNAL MEDICINE

## 2019-02-07 PROCEDURE — A9540 TC99M MAA: HCPCS | Performed by: INTERNAL MEDICINE

## 2019-02-07 PROCEDURE — 0 TECHNETIUM TC 99M PENTETATE INHALER: Performed by: INTERNAL MEDICINE

## 2019-02-07 PROCEDURE — 83050 HGB METHEMOGLOBIN QUAN: CPT | Performed by: INTERNAL MEDICINE

## 2019-02-07 PROCEDURE — 71045 X-RAY EXAM CHEST 1 VIEW: CPT

## 2019-02-07 PROCEDURE — 78582 LUNG VENTILAT&PERFUS IMAGING: CPT

## 2019-02-07 PROCEDURE — 63710000001 INSULIN DETEMIR PER 5 UNITS: Performed by: HOSPITALIST

## 2019-02-07 PROCEDURE — 0 TECHNETIUM ALBUMIN AGGREGATED: Performed by: INTERNAL MEDICINE

## 2019-02-07 RX ORDER — ACETAMINOPHEN 325 MG/1
650 TABLET ORAL ONCE
Status: COMPLETED | OUTPATIENT
Start: 2019-02-07 | End: 2019-02-07

## 2019-02-07 RX ORDER — NIFEDIPINE 60 MG/1
60 TABLET, FILM COATED, EXTENDED RELEASE ORAL
Status: DISCONTINUED | OUTPATIENT
Start: 2019-02-07 | End: 2019-02-08

## 2019-02-07 RX ORDER — IPRATROPIUM BROMIDE AND ALBUTEROL SULFATE 2.5; .5 MG/3ML; MG/3ML
3 SOLUTION RESPIRATORY (INHALATION) EVERY 4 HOURS PRN
Status: DISCONTINUED | OUTPATIENT
Start: 2019-02-07 | End: 2019-02-20 | Stop reason: HOSPADM

## 2019-02-07 RX ORDER — BUDESONIDE AND FORMOTEROL FUMARATE DIHYDRATE 160; 4.5 UG/1; UG/1
2 AEROSOL RESPIRATORY (INHALATION)
Status: DISCONTINUED | OUTPATIENT
Start: 2019-02-07 | End: 2019-02-20 | Stop reason: HOSPADM

## 2019-02-07 RX ORDER — MONTELUKAST SODIUM 10 MG/1
10 TABLET ORAL NIGHTLY
Status: DISCONTINUED | OUTPATIENT
Start: 2019-02-07 | End: 2019-02-20 | Stop reason: HOSPADM

## 2019-02-07 RX ORDER — ASPIRIN 81 MG/1
81 TABLET ORAL DAILY
Status: DISCONTINUED | OUTPATIENT
Start: 2019-02-07 | End: 2019-02-20 | Stop reason: HOSPADM

## 2019-02-07 RX ADMIN — BUDESONIDE AND FORMOTEROL FUMARATE DIHYDRATE 2 PUFF: 160; 4.5 AEROSOL RESPIRATORY (INHALATION) at 18:46

## 2019-02-07 RX ADMIN — IPRATROPIUM BROMIDE AND ALBUTEROL SULFATE 3 ML: .5; 3 SOLUTION RESPIRATORY (INHALATION) at 12:52

## 2019-02-07 RX ADMIN — INSULIN ASPART 4 UNITS: 100 INJECTION, SOLUTION INTRAVENOUS; SUBCUTANEOUS at 08:47

## 2019-02-07 RX ADMIN — Medication 1 DOSE: at 13:40

## 2019-02-07 RX ADMIN — METOPROLOL TARTRATE 25 MG: 25 TABLET, FILM COATED ORAL at 20:23

## 2019-02-07 RX ADMIN — APIXABAN 5 MG: 5 TABLET, FILM COATED ORAL at 20:23

## 2019-02-07 RX ADMIN — MONTELUKAST SODIUM 10 MG: 10 TABLET, COATED ORAL at 20:23

## 2019-02-07 RX ADMIN — CEFTRIAXONE 1 G: 1 INJECTION, POWDER, FOR SOLUTION INTRAMUSCULAR; INTRAVENOUS at 15:11

## 2019-02-07 RX ADMIN — INSULIN ASPART 5 UNITS: 100 INJECTION, SOLUTION INTRAVENOUS; SUBCUTANEOUS at 20:23

## 2019-02-07 RX ADMIN — INSULIN ASPART 5 UNITS: 100 INJECTION, SOLUTION INTRAVENOUS; SUBCUTANEOUS at 11:49

## 2019-02-07 RX ADMIN — ASPIRIN 81 MG: 81 TABLET ORAL at 08:47

## 2019-02-07 RX ADMIN — IPRATROPIUM BROMIDE AND ALBUTEROL SULFATE 3 ML: .5; 3 SOLUTION RESPIRATORY (INHALATION) at 06:42

## 2019-02-07 RX ADMIN — NIFEDIPINE 60 MG: 60 TABLET, EXTENDED RELEASE ORAL at 05:20

## 2019-02-07 RX ADMIN — IPRATROPIUM BROMIDE AND ALBUTEROL SULFATE 3 ML: .5; 3 SOLUTION RESPIRATORY (INHALATION) at 18:46

## 2019-02-07 RX ADMIN — GUAIFENESIN 1200 MG: 600 TABLET, EXTENDED RELEASE ORAL at 08:47

## 2019-02-07 RX ADMIN — GUAIFENESIN 1200 MG: 600 TABLET, EXTENDED RELEASE ORAL at 20:23

## 2019-02-07 RX ADMIN — BUDESONIDE AND FORMOTEROL FUMARATE DIHYDRATE 2 PUFF: 160; 4.5 AEROSOL RESPIRATORY (INHALATION) at 06:43

## 2019-02-07 RX ADMIN — APIXABAN 5 MG: 5 TABLET, FILM COATED ORAL at 08:47

## 2019-02-07 RX ADMIN — METOPROLOL TARTRATE 25 MG: 25 TABLET, FILM COATED ORAL at 05:20

## 2019-02-07 RX ADMIN — ACETAMINOPHEN 650 MG: 325 TABLET ORAL at 20:52

## 2019-02-07 RX ADMIN — INSULIN DETEMIR 10 UNITS: 100 INJECTION, SOLUTION SUBCUTANEOUS at 20:23

## 2019-02-07 RX ADMIN — IPRATROPIUM BROMIDE AND ALBUTEROL SULFATE 3 ML: .5; 3 SOLUTION RESPIRATORY (INHALATION) at 08:32

## 2019-02-07 RX ADMIN — IPRATROPIUM BROMIDE AND ALBUTEROL SULFATE 3 ML: .5; 3 SOLUTION RESPIRATORY (INHALATION) at 00:43

## 2019-02-07 RX ADMIN — INSULIN ASPART 4 UNITS: 100 INJECTION, SOLUTION INTRAVENOUS; SUBCUTANEOUS at 17:03

## 2019-02-07 RX ADMIN — SODIUM CHLORIDE, PRESERVATIVE FREE 3 ML: 5 INJECTION INTRAVENOUS at 20:23

## 2019-02-07 RX ADMIN — Medication 1 DOSE: at 14:05

## 2019-02-07 NOTE — CONSULTS
Patient is lying in bed.  Made patient aware of current glucose level and explained to them what exactly that number means.  Counseled patient on diabetes basics and complications of uncontrolled diabetes.  Counseled patient on importance of checking blood glucoses frequently and keeping a log to take to each MD appointment.  Counseled patient to contact MD if blood glucose is above 300.  Counseled on signs and symptoms of hyperglycemia and signs and symptoms of hypoglycemia.  Counseled patient on importance of healthy diet with limiting carbohydrates to no more than 180mg per day.  Counseled patient on complying with medications as ordered by md.  Encouraged patient to attend a diabetes smart class in the future.  Left my name and contact information with patient along with diabetes handouts.  Patient verbalizes understanding of all given education.

## 2019-02-07 NOTE — PHARMACY PATIENT ASSISTANCE
Pharmacy checked on new medication Eliquis. Price check in [facility] gave copay of $392 due to deductible. After this fill deductible would be met and copay would be $42 per month. Discussed with patient, who requested a free trial card for 1 month. She stated she would then get samples from her doctor until she could gather the money to pay the deductible price. She stated the $42 monthly copay would be no issue once the deductible was met. Reviewed patient assistance program but patient would have to spend $1368 before eligible.     Free trial card profiled at [facility] and patient has no issue filling it here. No further issues at this time.    Coarl Reyes, Pharmacy Intern  02/07/19  3:42 PM

## 2019-02-07 NOTE — PROGRESS NOTES
[facility] HOSPITALIST PROGRESS NOTE     Patient Identification:  Name:  Ashley Geronimo  Age:  73 y.o.  Sex:  female  :  1945  MRN:  [id number]  Visit Number:  [id number]  ROOM: [address] [place] [address]     Primary Care Provider:  Silvano Parker MD    Length of stay:  1    Subjective     Chief Complaint   Patient presents with   • Fluid Retention   • Altered Mental Status   • Cellulitis     Patient is a 73-year-old female with past medical history significant for A. fib, hypertension, diabetes, chronic respiratory failure (on home oxygen) who presented due to generalized weakness.  Patient also reported having some associated shortness of breath.  In ER, patient was noted to have an elevated lactic acid of 6, which CRP of 3.39.  Creatinine was also noted 1.5.  UA was done which are trace leukocyte esterase.  CT chest showed small bibasilar effusions.  Patient started on antibiotics for UTI.    Patient is noted to have been recently discharged within the past 2 weeks after being treated for septic shock due to UTI, pneumonia, possible cellulitis.  Patient also went into paroxysmal atrial fibrillation at that time, was discharged on Coumadin.  2D Echo from generator  showed EF 66%.    Today (19): Lactic acid-1.9 (trending down). ABG showed PaO2 of 56. Chest  Xray ordered. VQ scan also ordered. Repeat UA negative but done after antibiotics given. No other acute changes reported.    Objective     Current Hospital Meds:  apixaban 5 mg Oral Q12H   aspirin 81 mg Oral Daily   budesonide-formoterol 2 puff Inhalation BID - RT   ceftriaxone 1 g Intravenous Q24H   guaiFENesin 1,200 mg Oral Q12H   insulin aspart 0-7 Units Subcutaneous 4x Daily AC & at Bedtime   insulin detemir 10 Units Subcutaneous Nightly   ipratropium-albuterol 3 mL Nebulization 4x Daily - RT   metoprolol tartrate 25 mg Oral Q12H   montelukast 10 mg Oral Nightly   NIFEdipine CC 60 mg Oral Q24H   sodium chloride 3 mL Intravenous Q12H      sodium chloride 75 mL/hr Last Rate: 75 mL/hr (02/06/19 1837)     ----------------------------------------------------------------------------------------------------------------------  Vital Signs:  Temp:  [98.4 °F (36.9 °C)-98.6 °F (37 °C)] 98.4 °F (36.9 °C)  Heart Rate:  [78-97] 87  Resp:  [18-20] 18  BP: (112-195)/(62-84) 112/62  SpO2:  [90 %-98 %] 94 %  on  Flow (L/min):  [3-15] 10;   Device (Oxygen Therapy): nasal cannula  Body mass index is 26.81 kg/m².    Wt Readings from Last 3 Encounters:   02/07/19 62.3 kg (137 lb 4.8 oz)   01/24/19 63.1 kg (139 lb 3 oz)   08/07/13 66.7 kg (147 lb 0 oz)       Intake/Output Summary (Last 24 hours) at 2/7/2019 1137  Last data filed at 2/7/2019 0831  Gross per 24 hour   Intake 2130 ml   Output --   Net 2130 ml     Diet Regular; Cardiac, Consistent Carbohydrate, Renal  ----------------------------------------------------------------------------------------------------------------------  Physical exam:  Constitutional:  Well-developed and well-nourished.  No respiratory distress.      HENT:  Head:  Normocephalic and atraumatic.  Mouth:  Moist mucous membranes.    Eyes:  Conjunctivae and EOM are normal.  Pupils are equal, round, and reactive to light.  No scleral icterus.    Neck:  Neck supple.  No JVD present.    Cardiovascular:  Normal rate, regular rhythm and normal heart sounds with no murmur.  Pulmonary/Chest:  No respiratory distress, no wheezes, no crackles, with normal breath sounds and good air movement.  Abdominal:  Soft.  Bowel sounds are normal.  No distension and no tenderness.   Musculoskeletal:  No edema, no tenderness, and no deformity.  No red or swollen joints anywhere.    Neurological:  Alert and oriented to person, place, and time.  No cranial nerve deficit.  No tongue deviation.  No facial droop.  No slurred speech.   Skin:  Skin is warm and dry. No rash noted. No pallor.   Peripheral vascular:  Strong pulses in all 4 extremities with no clubbing, no  cyanosis, trace edema.    Tele:    ----------------------------------------------------------------------------------------------------------------------Results from last 7 days   Lab Units 02/07/19 0449 02/07/19  0024 02/06/19  0926 02/06/19  0121 02/06/19 0100 02/05/19 2111 02/05/19  1838   CRP mg/dL  --   --   --   --  3.39*  --  3.34*   LACTATE mmol/L  --  1.9  --  3.2*  --  6.0*  --    WBC 10*3/mm3  --  7.05  --   --  7.55  --  8.39   HEMOGLOBIN g/dL  --  10.7*  --   --  11.2*  --  12.2   HEMATOCRIT %  --  34.3*  --   --  34.8*  --  37.5   MCV fL  --  92.7  --   --  90.9  --  89.5   MCHC g/dL  --  31.2*  --   --  32.2*  --  32.5*   PLATELETS 10*3/mm3  --  254  --   --  292  --  348   INR  1.30*  --  1.15*  --   --   --  1.14*     Results from last 7 days   Lab Units 02/07/19 0449 02/06/19  2347 02/06/19  0710 02/06/19  0100 02/05/19  1838   SODIUM mmol/L  --  138  --  138 137   POTASSIUM mmol/L  --  4.2  --  4.2 4.9   MAGNESIUM mg/dL 2.2 2.2 1.5*  --  1.0*   CHLORIDE mmol/L  --  107  --  105 99   CO2 mmol/L  --  20.0*  --  20.5* 22.8*   BUN mg/dL  --  37*  --  34* 42*   CREATININE mg/dL  --  1.55*  --  1.50* 1.69*   EGFR IF NONAFRICN AM mL/min/1.73  --  33*  --  34* 30*   CALCIUM mg/dL  --  7.1*  --  7.1* 8.1   GLUCOSE mg/dL  --  175*  --  213* 238*   ALBUMIN g/dL  --  3.40  --  3.70 4.30   BILIRUBIN mg/dL  --  0.3  --  0.3 0.3   ALK PHOS U/L  --  73  --  79 84   AST (SGOT) U/L  --  22  --  16 18   ALT (SGPT) U/L  --  19  --  13 14   Estimated Creatinine Clearance: 26.2 mL/min (A) (by C-G formula based on SCr of 1.55 mg/dL (H)).  Results from last 7 days   Lab Units 02/06/19  0710 02/06/19  0100 02/05/19  2324   TROPONIN I ng/mL 0.024 0.021 0.024     Glucose   Date/Time Value Ref Range Status   02/07/2019 0757 288 (H) 70 - 130 mg/dL Final   02/06/2019 1956 308 (H) 70 - 130 mg/dL Final   02/06/2019 1619 287 (H) 70 - 130 mg/dL Final   02/06/2019 1106 268 (H) 70 - 130 mg/dL Final   02/05/2019 1943 283 (H) 70 -  130 mg/dL Final     No results found for: AMMONIA    Results from last 7 days   Lab Units 02/06/19  2306   NITRITE UA  Negative   WBC UA /HPF 0-2   BACTERIA UA /HPF None Seen   JOVANNY DALEY UA /HPF 0-2     Blood Culture   Date Value Ref Range Status   02/05/2019 No growth at 24 hours  Preliminary   02/05/2019 No growth at 24 hours  Preliminary          I have personally looked at the labs and they are summarized above.  ----------------------------------------------------------------------------------------------------------------------  Imaging Results (last 24 hours)     ** No results found for the last 24 hours. **        I have personally reviewed the radiology images and read the final radiology report.    Assessment & Plan            Acute hypoxic respiratory failure  -Taper oxygen as tolerated  -Chest Xray ordered  -VQ scan ordered  -Monitor respiratory status    UTI  -Continue antibiotics    Lactic acidosis  Likely due to dehydration +/- metformin. Resolved    Acute renal failure  -Monitor creatinine    Atrial fibrillation  -Continue eliquis  -Monitor HR    Hypertension  -Continue BP meds  -Monitor blood pressure    Diabetes  -Continue diabetic meds  -Monitor blood glucose    Charles Dickinson MD  02/07/19  11:37 AM

## 2019-02-07 NOTE — PLAN OF CARE
Problem: Patient Care Overview  Goal: Plan of Care Review  Outcome: Ongoing (interventions implemented as appropriate)   02/07/19 7766   Coping/Psychosocial   Plan of Care Reviewed With patient   Plan of Care Review   Progress improving

## 2019-02-07 NOTE — PLAN OF CARE
Problem: Sepsis/Septic Shock (Adult)  Goal: Signs and Symptoms of Listed Potential Problems Will be Absent, Minimized or Managed (Sepsis/Septic Shock)  Outcome: Ongoing (interventions implemented as appropriate)      Problem: Fall Risk (Adult)  Goal: Identify Related Risk Factors and Signs and Symptoms  Outcome: Ongoing (interventions implemented as appropriate)    Goal: Absence of Fall  Outcome: Ongoing (interventions implemented as appropriate)      Problem: Skin Injury Risk (Adult)  Goal: Identify Related Risk Factors and Signs and Symptoms  Outcome: Ongoing (interventions implemented as appropriate)    Goal: Skin Health and Integrity  Outcome: Ongoing (interventions implemented as appropriate)      Problem: Patient Care Overview  Goal: Plan of Care Review  Outcome: Ongoing (interventions implemented as appropriate)    Goal: Individualization and Mutuality  Outcome: Ongoing (interventions implemented as appropriate)    Goal: Discharge Needs Assessment  Outcome: Ongoing (interventions implemented as appropriate)    Goal: Interprofessional Rounds/Family Conf  Outcome: Ongoing (interventions implemented as appropriate)

## 2019-02-07 NOTE — THERAPY EVALUATION
Acute Care - Physical Therapy Initial Evaluation   [facility]     Patient Name: Ashley Geronimo  : 1945  MRN: [id number]  Today's Date: 2019   Onset of Illness/Injury or Date of Surgery: 19  Date of Referral to PT: 19  Referring Physician: Dr. Jefferson      Admit Date: 2019    Visit Dx:     ICD-10-CM ICD-9-CM   1. Septic shock (CMS/[facility]) A41.9 038.9    R65.21 785.52     995.92   2. Acute renal failure, unspecified acute renal failure type (CMS/[facility]) N17.9 584.9     Patient Active Problem List   Diagnosis   • Pneumonia   • Septic shock (CMS/[facility])   • Pseudomonas pneumonia (CMS/[facility])     Past Medical History:   Diagnosis Date   • Diabetes mellitus (CMS/[facility])    • Hypertension    • NSTEMI (non-ST elevated myocardial infarction) (CMS/[facility]) 2019   • Paroxysmal atrial fibrillation (CMS/[facility])    • Severe sepsis with septic shock (CMS/[facility])      History reviewed. No pertinent surgical history.     PT ASSESSMENT (last 12 hours)      Physical Therapy Evaluation     Row Name 19 1500          PT Evaluation Time/Intention    Subjective Information  no complaints  -BC     Document Type  evaluation  -BC     Mode of Treatment  physical therapy;individual therapy  -BC     Patient Effort  good  -BC     Row Name 19 1500          General Information    Patient Profile Reviewed?  yes  -BC     Onset of Illness/Injury or Date of Surgery  19  -BC     Referring Physician  Dr. Jefferson  -BC     Patient Observations  alert;cooperative;agree to therapy  -BC     Prior Level of Function  independent:  -BC     Equipment Currently Used at Home  none  -BC     Risks Reviewed  patient:;LOB;nausea/vomiting;dizziness;increased discomfort;change in vital signs;increased drainage;lines disloged  -BC     Benefits Reviewed  patient:;improve function;increase independence;increase strength;increase balance;decrease pain;decrease risk of DVT;improve skin integrity;increase knowledge  -BC     Row Name 19 0143           Relationship/Environment    Primary Source of Support/Comfort  child(lyly)  -BC     Lives With  grandchild(lyly)  -BC     Row Name 02/07/19 1500          Resource/Environmental Concerns    Current Living Arrangements  home/apartment/condo  -BC     Resource/Environmental Concerns  none  -BC     Row Name 02/07/19 1500          Cognitive Assessment/Intervention- PT/OT    Orientation Status (Cognition)  oriented x 4  -BC     Follows Commands (Cognition)  follows one step commands  -BC     Row Name 02/07/19 1500          Mobility Assessment/Treatment    Extremity Weight-bearing Status  left lower extremity;right lower extremity  -BC     Left Lower Extremity (Weight-bearing Status)  full weight-bearing (FWB)  -BC     Right Lower Extremity (Weight-bearing Status)  full weight-bearing (FWB)  -BC     Row Name 02/07/19 1500          Bed Mobility Assessment/Treatment    Bed Mobility Assessment/Treatment  bed mobility (all) activities  -BC     [place] Level (Bed Mobility)  minimum assist (75% patient effort);1 person assist;1 person to manage equipment  -BC     Assistive Device (Bed Mobility)  bed rails  -BC     Row Name 02/07/19 1500          Transfer Assessment/Treatment    Transfer Assessment/Treatment  sit-stand transfer;stand-sit transfer  -BC     Maintains Weight-bearing Status (Transfers)  able to maintain  -BC     Sit-Stand [place] (Transfers)  contact guard  -BC     Stand-Sit [place] (Transfers)  contact guard  -BC     Row Name 02/07/19 1500          Sit-Stand Transfer    Assistive Device (Sit-Stand Transfers)  walker, front-wheeled  -BC     Row Name 02/07/19 1500          Stand-Sit Transfer    Assistive Device (Stand-Sit Transfers)  walker, front-wheeled  -BC     Row Name 02/07/19 1500          Gait/Stairs Assessment/Training    Gait/Stairs Assessment/Training  gait/ambulation independence  -BC     [place] Level (Gait)  minimum assist (75% patient effort);1 person assist;1 person to manage  equipment  -BC     Assistive Device (Gait)  walker, front-wheeled  -BC     Distance in Feet (Gait)  160  -BC     Row Name 02/07/19 1500          General ROM    GENERAL ROM COMMENTS  WFL  -BC     Row Name 02/07/19 1500          MMT (Manual Muscle Testing)    General MMT Comments  [facility]  -BC     Row Name 02/07/19 1500          Physical Therapy Clinical Impression    Date of Referral to PT  02/06/19  -BC     Functional Level at Time of Evaluation (PT Clinical Impression)  good  -BC     Criteria for Skilled Interventions Met (PT Clinical Impression)  yes;treatment indicated  -BC     Rehab Potential (PT Clinical Summary)  good, to achieve stated therapy goals  -BC     Predicted Duration of Therapy (PT)  LOS  -BC     Row Name 02/07/19 1500          Physical Therapy Goals    Bed Mobility Goal Selection (PT)  bed mobility, PT goal 1  -BC     Transfer Goal Selection (PT)  transfer, PT goal 1  -BC     Gait Training Goal Selection (PT)  gait training, PT goal 1  -BC     Row Name 02/07/19 1500          Bed Mobility Goal 1 (PT)    Activity/Assistive Device (Bed Mobility Goal 1, PT)  bed mobility activities, all  -BC     [place] Level/Cues Needed (Bed Mobility Goal 1, PT)  standby assist  -BC     Time Frame (Bed Mobility Goal 1, PT)  by discharge  -BC     Row Name 02/07/19 1500          Transfer Goal 1 (PT)    Activity/Assistive Device (Transfer Goal 1, PT)  transfers, all  -BC     [place] Level/Cues Needed (Transfer Goal 1, PT)  standby assist  -BC     Time Frame (Transfer Goal 1, PT)  by discharge  -BC     Row Name 02/07/19 1500          Gait Training Goal 1 (PT)    Activity/Assistive Device (Gait Training Goal 1, PT)  gait (walking locomotion)  -BC     [place] Level (Gait Training Goal 1, PT)  standby assist  -BC     Distance (Gait Goal 1, PT)  200  -BC     Time Frame (Gait Training Goal 1, PT)  by discharge  -BC     Row Name 02/07/19 1500          Positioning and Restraints    Pre-Treatment Position  in bed  -BC      Post Treatment Position  bed  -BC     In Bed  notified nsg;supine;call light within reach;encouraged to call for assist;side rails up x3  -BC     Row Name 02/07/19 1500          Living Environment    Home Accessibility  stairs to enter home  -BC       User Key  (r) = Recorded By, (t) = Taken By, (c) = Cosigned By    Initials Name Provider Type    BC Laura Cali PT Physical Therapist        Physical Therapy Education     Title: PT OT SLP Therapies (Done)     Topic: Physical Therapy (Done)     Point: Mobility training (Done)     Learning Progress Summary           Patient Acceptance, E, VU by BC at 2/7/2019  3:42 PM                   Point: Home exercise program (Done)     Learning Progress Summary           Patient Acceptance, E, VU by BC at 2/7/2019  3:42 PM                   Point: Body mechanics (Done)     Learning Progress Summary           Patient Acceptance, E, VU by BC at 2/7/2019  3:42 PM                   Point: Precautions (Done)     Learning Progress Summary           Patient Acceptance, E, VU by BC at 2/7/2019  3:42 PM                               User Key     Initials Effective Dates Name Provider Type Discipline    BC 03/14/16 -  Laura Cali PT Physical Therapist PT              PT Recommendation and Plan  Planned Therapy Interventions (PT Eval): balance training, gait training, bed mobility training, home exercise program, patient/family education, strengthening, transfer training  Plan of Care Reviewed With: patient  Progress: improving     Time Calculation:   PT Charges     Row Name 02/07/19 1545             Time Calculation    PT Received On  02/07/19  -BC      PT Goal Re-Cert Due Date  02/21/19  -BC         Time Calculation- PT    Total Timed Code Minutes- PT  60 minute(s)  -BC         Timed Charges    64155 - Gait Training Minutes   15  -BC      81748 - PT Therapeutic Activity Minutes  15  -BC        User Key  (r) = Recorded By, (t) = Taken By, (c) = Cosigned By    Initials Name  Provider Type    BC Laura Cali, PT Physical Therapist        Therapy Suggested Charges     Code   Minutes Charges    82419 (CPT®) Hc Pt Neuromusc Re Education Ea 15 Min      56228 (CPT®) Hc Pt Ther Proc Ea 15 Min      15465 (CPT®) Hc Gait Training Ea 15 Min 15 1    11409 (CPT®) Hc Pt Therapeutic Act Ea 15 Min 15 1    68425 (CPT®) Hc Pt Manual Therapy Ea 15 Min      66641 (CPT®) Hc Pt Iontophoresis Ea 15 Min      89332 (CPT®) Hc Pt Elec Stim Ea-Per 15 Min      05023 (CPT®) Hc Pt Ultrasound Ea 15 Min      46181 (CPT®) Hc Pt Self Care/Mgmt/Train Ea 15 Min      02093 (CPT®) Hc Pt Prosthetic (S) Train Initial Encounter, Each 15 Min      64746 (CPT®) Hc Pt Orthotic(S)/Prosthetic(S) Encounter, Each 15 Min      42184 (CPT®) Hc Orthotic(S) Mgmt/Train Initial Encounter, Each 15min      Total  30 2        Therapy Charges for Today     Code Description Service Date Service Provider Modifiers Qty    68831277565 HC GAIT TRAINING EA 15 MIN 2/7/2019 Laura Cali, PT GP 1    86540036931 HC PT THERAPEUTIC ACT EA 15 MIN 2/7/2019 Laura Cali, PT GP 1    26583355415 HC PT THER SUPP EA 15 MIN 2/7/2019 Laura Cali, PT GP 4    21079467065 HC PT EVAL HIGH COMPLEXITY 4 2/7/2019 Laura Cali, PT GP 1                 Laura Cali, PT  2/7/2019

## 2019-02-08 ENCOUNTER — APPOINTMENT (OUTPATIENT)
Dept: CT IMAGING | Facility: HOSPITAL | Age: 74
End: 2019-02-08

## 2019-02-08 LAB
A-A DO2: 213.4 MMHG (ref 0–300)
ALBUMIN SERPL-MCNC: 3.9 G/DL (ref 3.4–4.8)
ALBUMIN/GLOB SERPL: 1.5 G/DL (ref 1.5–2.5)
ALP SERPL-CCNC: 78 U/L (ref 35–104)
ALT SERPL W P-5'-P-CCNC: 9 U/L (ref 10–36)
ANION GAP SERPL CALCULATED.3IONS-SCNC: 9.7 MMOL/L (ref 3.6–11.2)
ARTERIAL PATENCY WRIST A: POSITIVE
AST SERPL-CCNC: 15 U/L (ref 10–30)
ATMOSPHERIC PRESS: 734 MMHG
BASE EXCESS BLDA CALC-SCNC: -2.6 MMOL/L
BASOPHILS # BLD AUTO: 0.02 10*3/MM3 (ref 0–0.3)
BASOPHILS NFR BLD AUTO: 0.2 % (ref 0–2)
BDY SITE: ABNORMAL
BILIRUB SERPL-MCNC: 0.5 MG/DL (ref 0.2–1.8)
BODY TEMPERATURE: 98.6 C
BUN BLD-MCNC: 26 MG/DL (ref 7–21)
BUN/CREAT SERPL: 20.6 (ref 7–25)
CALCIUM SPEC-SCNC: 8.3 MG/DL (ref 7.7–10)
CHLORIDE SERPL-SCNC: 108 MMOL/L (ref 99–112)
CO2 SERPL-SCNC: 22.3 MMOL/L (ref 24.3–31.9)
COHGB MFR BLD: 0.5 % (ref 0–5)
CREAT BLD-MCNC: 1.26 MG/DL (ref 0.43–1.29)
DEPRECATED RDW RBC AUTO: 42.7 FL (ref 37–54)
EOSINOPHIL # BLD AUTO: 0.15 10*3/MM3 (ref 0–0.7)
EOSINOPHIL NFR BLD AUTO: 1.6 % (ref 0–7)
ERYTHROCYTE [DISTWIDTH] IN BLOOD BY AUTOMATED COUNT: 13.3 % (ref 11.5–14.5)
GFR SERPL CREATININE-BSD FRML MDRD: 42 ML/MIN/1.73
GLOBULIN UR ELPH-MCNC: 2.6 GM/DL
GLUCOSE BLD-MCNC: 38 MG/DL (ref 70–110)
GLUCOSE BLDC GLUCOMTR-MCNC: 143 MG/DL (ref 70–130)
GLUCOSE BLDC GLUCOMTR-MCNC: 168 MG/DL (ref 70–130)
GLUCOSE BLDC GLUCOMTR-MCNC: 248 MG/DL (ref 70–130)
GLUCOSE BLDC GLUCOMTR-MCNC: 299 MG/DL (ref 70–130)
GLUCOSE BLDC GLUCOMTR-MCNC: 47 MG/DL (ref 70–130)
HCO3 BLDA-SCNC: 20.1 MMOL/L (ref 22–26)
HCT VFR BLD AUTO: 35.2 % (ref 37–47)
HCT VFR BLD CALC: 36 % (ref 37–47)
HGB BLD-MCNC: 11.6 G/DL (ref 12–16)
HGB BLDA-MCNC: 12.4 G/DL (ref 12–16)
HOROWITZ INDEX BLD+IHG-RTO: 44 %
IMM GRANULOCYTES # BLD AUTO: 0.03 10*3/MM3 (ref 0–0.03)
IMM GRANULOCYTES NFR BLD AUTO: 0.3 % (ref 0–0.5)
INR PPP: 1.35 (ref 0.9–1.1)
L PNEUMO1 AG UR QL IA: NEGATIVE
LYMPHOCYTES # BLD AUTO: 1.31 10*3/MM3 (ref 1–3)
LYMPHOCYTES NFR BLD AUTO: 13.7 % (ref 16–46)
MAGNESIUM SERPL-MCNC: 1.9 MG/DL (ref 1.7–2.6)
MCH RBC QN AUTO: 29.4 PG (ref 27–33)
MCHC RBC AUTO-ENTMCNC: 33 G/DL (ref 33–37)
MCV RBC AUTO: 89.1 FL (ref 80–94)
METHGB BLD QL: 0.2 % (ref 0–3)
MODALITY: ABNORMAL
MONOCYTES # BLD AUTO: 0.97 10*3/MM3 (ref 0.1–0.9)
MONOCYTES NFR BLD AUTO: 10.2 % (ref 0–12)
NEUTROPHILS # BLD AUTO: 7.07 10*3/MM3 (ref 1.4–6.5)
NEUTROPHILS NFR BLD AUTO: 74 % (ref 40–75)
OSMOLALITY SERPL CALC.SUM OF ELEC: 280.8 MOSM/KG (ref 273–305)
OXYHGB MFR BLDV: 89.6 % (ref 85–100)
PCO2 BLDA: 28.9 MM HG (ref 35–45)
PH BLDA: 7.46 PH UNITS (ref 7.35–7.45)
PHOSPHATE SERPL-MCNC: 4.5 MG/DL (ref 2.7–4.5)
PLATELET # BLD AUTO: 298 10*3/MM3 (ref 130–400)
PMV BLD AUTO: 11.1 FL (ref 6–10)
PO2 BLDA: 55.9 MM HG (ref 80–100)
POTASSIUM BLD-SCNC: 3.7 MMOL/L (ref 3.5–5.3)
PROT SERPL-MCNC: 6.5 G/DL (ref 6–8)
PROTHROMBIN TIME: 16.9 SECONDS (ref 11–15.4)
RBC # BLD AUTO: 3.95 10*6/MM3 (ref 4.2–5.4)
SAO2 % BLDCOA: 90.2 % (ref 90–100)
SODIUM BLD-SCNC: 140 MMOL/L (ref 135–153)
WBC NRBC COR # BLD: 9.55 10*3/MM3 (ref 4.5–12.5)

## 2019-02-08 PROCEDURE — 25010000002 VANCOMYCIN 5 G RECONSTITUTED SOLUTION 5,000 MG VIAL: Performed by: INTERNAL MEDICINE

## 2019-02-08 PROCEDURE — 84100 ASSAY OF PHOSPHORUS: CPT | Performed by: INTERNAL MEDICINE

## 2019-02-08 PROCEDURE — 87899 AGENT NOS ASSAY W/OPTIC: CPT | Performed by: INTERNAL MEDICINE

## 2019-02-08 PROCEDURE — 71250 CT THORAX DX C-: CPT

## 2019-02-08 PROCEDURE — 83735 ASSAY OF MAGNESIUM: CPT | Performed by: PHYSICIAN ASSISTANT

## 2019-02-08 PROCEDURE — 82962 GLUCOSE BLOOD TEST: CPT

## 2019-02-08 PROCEDURE — 94799 UNLISTED PULMONARY SVC/PX: CPT

## 2019-02-08 PROCEDURE — 85610 PROTHROMBIN TIME: CPT | Performed by: INTERNAL MEDICINE

## 2019-02-08 PROCEDURE — 83050 HGB METHEMOGLOBIN QUAN: CPT | Performed by: INTERNAL MEDICINE

## 2019-02-08 PROCEDURE — 82805 BLOOD GASES W/O2 SATURATION: CPT | Performed by: INTERNAL MEDICINE

## 2019-02-08 PROCEDURE — 97116 GAIT TRAINING THERAPY: CPT

## 2019-02-08 PROCEDURE — 36600 WITHDRAWAL OF ARTERIAL BLOOD: CPT | Performed by: INTERNAL MEDICINE

## 2019-02-08 PROCEDURE — 80053 COMPREHEN METABOLIC PANEL: CPT | Performed by: PHYSICIAN ASSISTANT

## 2019-02-08 PROCEDURE — 85025 COMPLETE CBC W/AUTO DIFF WBC: CPT | Performed by: PHYSICIAN ASSISTANT

## 2019-02-08 PROCEDURE — 25010000002 ENOXAPARIN PER 10 MG: Performed by: INTERNAL MEDICINE

## 2019-02-08 PROCEDURE — 63710000001 INSULIN ASPART PER 5 UNITS: Performed by: PHYSICIAN ASSISTANT

## 2019-02-08 PROCEDURE — 97530 THERAPEUTIC ACTIVITIES: CPT

## 2019-02-08 PROCEDURE — 71250 CT THORAX DX C-: CPT | Performed by: RADIOLOGY

## 2019-02-08 PROCEDURE — 63710000001 INSULIN DETEMIR PER 5 UNITS: Performed by: HOSPITALIST

## 2019-02-08 PROCEDURE — 99233 SBSQ HOSP IP/OBS HIGH 50: CPT | Performed by: INTERNAL MEDICINE

## 2019-02-08 PROCEDURE — 99223 1ST HOSP IP/OBS HIGH 75: CPT | Performed by: INTERNAL MEDICINE

## 2019-02-08 PROCEDURE — 82375 ASSAY CARBOXYHB QUANT: CPT | Performed by: INTERNAL MEDICINE

## 2019-02-08 PROCEDURE — 25010000002 HYDRALAZINE PER 20 MG

## 2019-02-08 PROCEDURE — 25010000002 CEFEPIME 2 G/NS 100 ML SOLUTION: Performed by: INTERNAL MEDICINE

## 2019-02-08 PROCEDURE — 86738 MYCOPLASMA ANTIBODY: CPT | Performed by: INTERNAL MEDICINE

## 2019-02-08 PROCEDURE — 25010000002 CEFTRIAXONE: Performed by: INTERNAL MEDICINE

## 2019-02-08 RX ORDER — HYDRALAZINE HYDROCHLORIDE 20 MG/ML
10 INJECTION INTRAMUSCULAR; INTRAVENOUS EVERY 6 HOURS PRN
Status: DISCONTINUED | OUTPATIENT
Start: 2019-02-08 | End: 2019-02-20 | Stop reason: HOSPADM

## 2019-02-08 RX ORDER — HYDRALAZINE HYDROCHLORIDE 20 MG/ML
INJECTION INTRAMUSCULAR; INTRAVENOUS
Status: COMPLETED
Start: 2019-02-08 | End: 2019-02-08

## 2019-02-08 RX ORDER — NIFEDIPINE 90 MG/1
90 TABLET, FILM COATED, EXTENDED RELEASE ORAL
Status: DISCONTINUED | OUTPATIENT
Start: 2019-02-09 | End: 2019-02-11

## 2019-02-08 RX ORDER — METOPROLOL TARTRATE 50 MG/1
50 TABLET, FILM COATED ORAL EVERY 12 HOURS SCHEDULED
Status: DISCONTINUED | OUTPATIENT
Start: 2019-02-09 | End: 2019-02-08

## 2019-02-08 RX ORDER — NIFEDIPINE 30 MG/1
30 TABLET, FILM COATED, EXTENDED RELEASE ORAL ONCE
Status: COMPLETED | OUTPATIENT
Start: 2019-02-08 | End: 2019-02-08

## 2019-02-08 RX ORDER — METOPROLOL TARTRATE 50 MG/1
50 TABLET, FILM COATED ORAL EVERY 12 HOURS SCHEDULED
Status: DISCONTINUED | OUTPATIENT
Start: 2019-02-08 | End: 2019-02-09

## 2019-02-08 RX ORDER — WARFARIN SODIUM 5 MG/1
5 TABLET ORAL
Status: DISCONTINUED | OUTPATIENT
Start: 2019-02-08 | End: 2019-02-11 | Stop reason: DRUGHIGH

## 2019-02-08 RX ORDER — MAGNESIUM SULFATE HEPTAHYDRATE 40 MG/ML
4 INJECTION, SOLUTION INTRAVENOUS ONCE
Status: COMPLETED | OUTPATIENT
Start: 2019-02-08 | End: 2019-02-08

## 2019-02-08 RX ADMIN — CEFEPIME 2 G: 2 INJECTION, POWDER, FOR SOLUTION INTRAVENOUS at 16:51

## 2019-02-08 RX ADMIN — GUAIFENESIN 1200 MG: 600 TABLET, EXTENDED RELEASE ORAL at 08:35

## 2019-02-08 RX ADMIN — HYDRALAZINE HYDROCHLORIDE 10 MG: 20 INJECTION INTRAMUSCULAR; INTRAVENOUS at 03:34

## 2019-02-08 RX ADMIN — APIXABAN 5 MG: 5 TABLET, FILM COATED ORAL at 08:35

## 2019-02-08 RX ADMIN — VANCOMYCIN HYDROCHLORIDE 1250 MG: 5 INJECTION, POWDER, LYOPHILIZED, FOR SOLUTION INTRAVENOUS at 16:56

## 2019-02-08 RX ADMIN — DOXYCYCLINE 100 MG: 100 INJECTION, POWDER, LYOPHILIZED, FOR SOLUTION INTRAVENOUS at 23:40

## 2019-02-08 RX ADMIN — WARFARIN SODIUM 5 MG: 5 TABLET ORAL at 17:21

## 2019-02-08 RX ADMIN — BUDESONIDE AND FORMOTEROL FUMARATE DIHYDRATE 2 PUFF: 160; 4.5 AEROSOL RESPIRATORY (INHALATION) at 07:20

## 2019-02-08 RX ADMIN — INSULIN ASPART 4 UNITS: 100 INJECTION, SOLUTION INTRAVENOUS; SUBCUTANEOUS at 12:24

## 2019-02-08 RX ADMIN — IPRATROPIUM BROMIDE AND ALBUTEROL SULFATE 3 ML: .5; 3 SOLUTION RESPIRATORY (INHALATION) at 18:57

## 2019-02-08 RX ADMIN — METOPROLOL TARTRATE 50 MG: 50 TABLET, FILM COATED ORAL at 20:09

## 2019-02-08 RX ADMIN — INSULIN DETEMIR 10 UNITS: 100 INJECTION, SOLUTION SUBCUTANEOUS at 20:10

## 2019-02-08 RX ADMIN — NIFEDIPINE 30 MG: 30 TABLET, EXTENDED RELEASE ORAL at 11:27

## 2019-02-08 RX ADMIN — NIFEDIPINE 60 MG: 60 TABLET, EXTENDED RELEASE ORAL at 06:31

## 2019-02-08 RX ADMIN — ENOXAPARIN SODIUM 60 MG: 60 INJECTION SUBCUTANEOUS at 20:08

## 2019-02-08 RX ADMIN — IPRATROPIUM BROMIDE AND ALBUTEROL SULFATE 3 ML: .5; 3 SOLUTION RESPIRATORY (INHALATION) at 01:34

## 2019-02-08 RX ADMIN — BUDESONIDE AND FORMOTEROL FUMARATE DIHYDRATE 2 PUFF: 160; 4.5 AEROSOL RESPIRATORY (INHALATION) at 18:57

## 2019-02-08 RX ADMIN — CEFTRIAXONE 1 G: 1 INJECTION, POWDER, FOR SOLUTION INTRAMUSCULAR; INTRAVENOUS at 13:05

## 2019-02-08 RX ADMIN — MAGNESIUM SULFATE HEPTAHYDRATE 4 G: 40 INJECTION, SOLUTION INTRAVENOUS at 08:35

## 2019-02-08 RX ADMIN — ASPIRIN 81 MG: 81 TABLET ORAL at 08:35

## 2019-02-08 RX ADMIN — METOPROLOL TARTRATE 25 MG: 25 TABLET, FILM COATED ORAL at 06:31

## 2019-02-08 RX ADMIN — IPRATROPIUM BROMIDE AND ALBUTEROL SULFATE 3 ML: .5; 3 SOLUTION RESPIRATORY (INHALATION) at 07:20

## 2019-02-08 RX ADMIN — DOXYCYCLINE 100 MG: 100 INJECTION, POWDER, LYOPHILIZED, FOR SOLUTION INTRAVENOUS at 13:05

## 2019-02-08 RX ADMIN — METOPROLOL TARTRATE 25 MG: 25 TABLET, FILM COATED ORAL at 11:27

## 2019-02-08 RX ADMIN — SODIUM CHLORIDE, PRESERVATIVE FREE 3 ML: 5 INJECTION INTRAVENOUS at 20:09

## 2019-02-08 RX ADMIN — MONTELUKAST SODIUM 10 MG: 10 TABLET, COATED ORAL at 20:09

## 2019-02-08 RX ADMIN — GUAIFENESIN 1200 MG: 600 TABLET, EXTENDED RELEASE ORAL at 20:09

## 2019-02-08 RX ADMIN — IPRATROPIUM BROMIDE AND ALBUTEROL SULFATE 3 ML: .5; 3 SOLUTION RESPIRATORY (INHALATION) at 12:51

## 2019-02-08 NOTE — PROGRESS NOTES
Discharge Planning Assessment   [facility]     Patient Name: Ashley Geronimo  MRN: [id number]  Today's Date: 2/8/2019    Admit Date: 2/5/2019        Discharge Plan     Row Name 02/08/19 1325       Plan    Plan  Pt admitted on 2/5/19.  Pt lives at home and plans to return home at discharge.  [facility] utilizes [facility].  [facility] will need new referral with [facility] at discharge.  Pt currently utilizes home 02 via [facility].  SS will follow.         Milka Reyes

## 2019-02-08 NOTE — CONSULTS
Referring Provider: Dr. Charles Dickinson    Reason for Consultation: Right perihilar consolidation shortness of breath acute exacerbation of chronic hypoxic respiratory failure      Chief complaint:  Shortness of breath      History of present illness:   73-year-old female with a past medical history significant for recent admission to our facility from January 16 through January 24, 2019 with a diagnosis of septic shock secondary to UTI and comminuted acquired pneumonia with possible cellulitis during that time she experienced paroxysmal atrial fibrillation, acute kidney injury, non-STEMI felt secondary to most likely demand ischemia non-gap metabolic acidosis and diabetes type 2.  Of note during the hospitalization she did have sputum culture that grew polymicrobial growth.  Including Pseudomonas and the therapies was guided by infectious disease on discharge patient was discharged on heparin to Coumadin bridging for her atrial fibrillation.    She presented to her PCP on  2/5/2019 with a complaint of generalized weakness.  He generally uses 2 L of supplemental oxygen via nasal cannula.  She was feeling apparently well before 2/5/2019.  Along with her generalized weakness she started feeling gradual worsening of shortness of breath that started 2 or 3 days before she presented to her PCP.  The symptoms off shortness of breath are always present.  She denies any chest pain, wheezing and coughing associated with that.  She denies any subjective fever or chills.  Her shortness of breath increases with activity and decreases on rest.  She denies discomfort on lying down flat.  She denies any night sweats or weight loss.  She denies any history of blood clot in the leg or in the lung.  She does complain of chest congestion.  But no chest pain.     On her presentation to the ER for lactate was elevated.  CRP was elevated.  Creatinine  is elevated.  She was admitted under internal medicine service where she was treated  for pneumonia, UTI, lactic acidosis, acute renal failure, atrial fibrillation diabetes and hypertension.  Currently on 6 L/m of supplemental oxygen via high flow nasal cannula saturating 96%.  Pulmonary was consulted for management of acute on chronic hypoxic  respiratory failure.      Review of Systems  Review of Systems - History obtained from chart review and the patient  General ROS: negative for - chills, fatigue or fever  Psychological ROS: negative for - anxiety or depression  ENT ROS: negative for - headaches, visual changes or vocal changes  Respiratory ROS: positive for -shortness of breath.  Negative for cough.  Cardiovascular ROS: no chest pain or dyspnea on exertion  Gastrointestinal ROS: no abdominal pain, change in bowel habits, or black or bloody stools  Musculoskeletal ROS: negative for - joint pain, joint stiffness or joint swelling  Neurological ROS: no TIA or stroke symptoms  Heme- no bleeding, no lumps and bumps in armpit  Skin- no bruises, no rash        History  Past Medical History:   Diagnosis Date   • Diabetes mellitus (CMS/Formerly [facility])    • Hypertension    • NSTEMI (non-ST elevated myocardial infarction) (CMS/Formerly [facility]) 01/2019   • Paroxysmal atrial fibrillation (CMS/Formerly [facility])    • Severe sepsis with septic shock (CMS/Formerly [facility])    , History reviewed. No pertinent surgical history., History reviewed. No pertinent family history., Social History     Tobacco Use   • Smoking status: Never Smoker   • Smokeless tobacco: Never Used   Substance Use Topics   • Alcohol use: No     Frequency: Never   • Drug use: No   , Medications Prior to Admission   Medication Sig Dispense Refill Last Dose   • aspirin 81 MG EC tablet Take 1 tablet by mouth Daily.   2/5/2019 at Unknown time   • budesonide-formoterol (SYMBICORT) 160-4.5 MCG/ACT inhaler Inhale 2 puffs 2 (Two) Times a Day. 1 inhaler 12 2/5/2019 at AM   • metFORMIN (GLUCOPHAGE) 1000 MG tablet Take 1,000 mg by mouth 2 (Two) Times a Day.   2/5/2019 at AM   • metoprolol tartrate  (LOPRESSOR) 25 MG tablet Take 1 tablet by mouth Every 12 (Twelve) Hours. 60 tablet 0 2/5/2019 at AM   • NIFEdipine CC (ADALAT CC) 60 MG 24 hr tablet Take 1 tablet by mouth Daily. 30 tablet 0 2/5/2019 at AM   • warfarin (COUMADIN) 2 MG tablet Take 2 mg by mouth Every Night.   2/4/2019 at PM   • atorvastatin (LIPITOR) 40 MG tablet Take 1 tablet by mouth Every Night. 30 tablet 0 2/4/2019 at PM   • insulin detemir (LEVEMIR) 100 UNIT/ML injection Inject 10 Units under the skin into the appropriate area as directed Every Night.  12 2/4/2019 at PM   • montelukast (SINGULAIR) 10 MG tablet Take 10 mg by mouth Every Night.   2/4/2019 at PM   , Scheduled Meds:    aspirin 81 mg Oral Daily   budesonide-formoterol 2 puff Inhalation BID - RT   ceftriaxone 1 g Intravenous Q24H   doxycycline 100 mg Intravenous Q12H   enoxaparin 1 mg/kg Subcutaneous Q12H   guaiFENesin 1,200 mg Oral Q12H   insulin aspart 0-7 Units Subcutaneous TID With Meals   insulin detemir 10 Units Subcutaneous Nightly   ipratropium-albuterol 3 mL Nebulization 4x Daily - RT   metoprolol tartrate 50 mg Oral Q12H   montelukast 10 mg Oral Nightly   [START ON 2/9/2019] NIFEdipine CC 90 mg Oral Q24H   sodium chloride 3 mL Intravenous Q12H   warfarin 5 mg Oral Daily   , Continuous Infusions:    sodium chloride 75 mL/hr Last Rate: 75 mL/hr (02/06/19 1837)    and Allergies:  Sulfa antibiotics    Objective     Vital Signs   Temp:  [98.1 °F (36.7 °C)-99.4 °F (37.4 °C)] 98.4 °F (36.9 °C)  Heart Rate:  [] 97  Resp:  [18] 18  BP: (155-184)/() 162/74    Physical Exam:  Physical Exam:  Constitutional:  oriented to person, place, and time. appears well-developed and well-nourished. No distress.   HENT:   Head: Normocephalic and atraumatic.   Right Ear: External ear normal.   Left Ear: External ear normal.   Nose: Nose normal.   Eyes: Pupils are equal, round, and reactive to light. Conjunctivae and EOM are normal. Right eye exhibits no discharge. Left eye exhibits no  discharge. No scleral icterus.   Neck: Normal range of motion. Neck supple. No thyromegaly present.   Cardiovascular: Normal rate, regular rhythm, normal heart sounds and intact distal pulses.  Exam reveals no friction rub.    No murmur heard.  Pulmonary/Chest:    no stridor.  Bilateral lower zones degrees breath sound.  Rhonchorous chest.  Bilateral basal crackles appreciated.  No wheezing heard.  Abdominal: Soft. Bowel sounds are normal.exhibits no distension. There is no tenderness.   Musculoskeletal: Normal range of motion. exhibits no deformity.   +1 lower extremity edema bilateral.   Neurological: alert and oriented to person, place, and time. No cranial nerve deficit. Coordination normal.   Skin: Skin is warm and dry. Capillary refill takes less than 2 seconds. not diaphoretic. No erythema.   Psychiatric:   normal mood and affect.   behavior is normal.         Results Review:  LABS:  Lab Results   Component Value Date    WBC 9.55 02/08/2019    HGB 11.6 (L) 02/08/2019    HCT 35.2 (L) 02/08/2019    MCV 89.1 02/08/2019     02/08/2019     Last Arterial Blood Gas  Lab Results   Component Value Date    PHART 7.460 (H) 02/08/2019    OOP7KTB 28.9 (C) 02/08/2019    PO2ART 55.9 (L) 02/08/2019    IPK9DFB 20.1 (L) 02/08/2019    BASEEXCESS -2.6 02/08/2019    R7PTAMWY 90.2 02/08/2019     Results for orders placed during the hospital encounter of 01/16/19   Adult Transthoracic Echo Complete W/ Cont if Necessary Per Protocol    Narrative · Estimated EF = 66%.  · Left ventricular systolic function is normal.  · No significant valvular disease  · No change since 3/21/18        XR Chest 1 View  Narrative: XR CHEST 1 VW-     HISTORY:  Hypoxia; A41.9-Sepsis, unspecified organism; R65.21-Severe  sepsis with septic shock; N17.9-Acute kidney failure, unspecified          COMPARISON: 02/05/2019.      TECHNIQUE: Single frontal view of the chest.     FINDINGS:     Left effusion and right perihilar consolidation.  Probable mild  CHF.  There is no evidence of an acute osseous abnormality.        Impression: 1. Trace left effusion.  2. Right perihilar consolidation.  3. CHF.     This report was finalized on 2/7/2019 2:39 PM by Dr. Garry Benton MD.     NM Lung Ventilation Perfusion  Narrative: NUCLEAR MEDICINE LUNG VENTILATION PERFUSION-     CLINICAL INDICATION: Hypoxia; A41.9-Sepsis, unspecified organism;  R65.21-Severe sepsis with septic shock; N17.9-Acute kidney failure,  unspecified.        COMPARISON: 02/07/2019     TECHNIQUE: 42.1 mCi technetium DTPA inhaled in aerosolized form for the  ventilatory portion of the exam.  4.4 mCi technetium was injected for perfusion. Standard imaging was  performed following the ventilatory portion and the perfusion portion of  the exam.     FINDINGS:   The ventilatory study shows central deposition of the radiotracer.     Perfusion is overall better than ventilation. There are no segmental or  subsegmental perfusion ventilation mismatches.     Impression: 1. Airway disease.  2. Low probability for pulmonary embolus.     This report was finalized on 2/7/2019 2:38 PM by Dr. Garry Benton MD.            I reviewed the patient's new clinical results.  I reviewed the patient's new imaging results and agree with the interpretation.       I have reviewed the past medical history, past surgical history, family history and social history the patient.     I have reviewed the old records.  In summary, respiratory cultures on 1/16/2019 showed scant growth of Pneumonia, Scant Growth of Pseudomonas Aeruginosa and Light Growth of Haemophilus Influenza Biotype 2      I have reviewed the labs.  ABG noted.  7.46/28/55 on 44% FiO2.    I have reviewed images of chest CT.  As per my interpretation, chest CT showed centrilobular emphysema, diffuse patchy bilateral groundglass opacity with bilateral consolidation with trace pleural effusion.  Patient has 4R lymph node which is 9 mm in size.     I reviewed the VQ lung scan  report.  Low probability of PE.    I have reviewed the echo report.  Ejection fraction 66%.  No significant valvular disease.    I have reviewed the EKG report.  Normal sinus rhythm.  Incomplete right bundle branch block.   ms.      Assessment:  Shortness of breath  Acute on chronic hypoxic respiratory failure  Chronic hypoxic respiratory failure on home oxygen  Right middle lobe pneumonia , unspecified organism   COPD, centrilobular emphysema type  Physical deconditioning  Bilateral basal atelectasis      Plan:  - Ordered respiratory cultures  - Repeat CT scan of the chest without contrast  - Continue doxycycline 100 mg twice a day IV  - Continue on Lovenox therapeutic dosing.  Patient is currently on Lovenox to Coumadin bridging.  - Continue with duo nebs  -Continue with Symbicort nebs  - Due to worsening ANC, I will start the patient on IV cefepime and 1 dose of vancomycin.  I have consulted pharmacy for further dosing of vancomycin as per GFR.  - Ordered Mucomyst nebs  - Aggressive PTOT while in hospital  - Incentive spirometer to her basal atelectasis  - I have started the patient on BiPAP 15/8 with FiO2 35% and rate of 14 when necessary.      I have discussed the clinical plan and the test results with Dr. Dickinson  Nurse and respiratory therapist were updated about the clinical plan.  Thank you for involving us in the care of the patient.    Quang Noonan M.D  Pulmonary and Critical Care Medicine

## 2019-02-08 NOTE — PLAN OF CARE
Problem: Sepsis/Septic Shock (Adult)  Goal: Signs and Symptoms of Listed Potential Problems Will be Absent, Minimized or Managed (Sepsis/Septic Shock)  Outcome: Ongoing (interventions implemented as appropriate)      Problem: Fall Risk (Adult)  Goal: Identify Related Risk Factors and Signs and Symptoms  Outcome: Ongoing (interventions implemented as appropriate)    Goal: Absence of Fall  Outcome: Ongoing (interventions implemented as appropriate)      Problem: Skin Injury Risk (Adult)  Goal: Identify Related Risk Factors and Signs and Symptoms  Outcome: Ongoing (interventions implemented as appropriate)    Goal: Skin Health and Integrity  Outcome: Ongoing (interventions implemented as appropriate)      Problem: Patient Care Overview  Goal: Individualization and Mutuality  Outcome: Ongoing (interventions implemented as appropriate)    Goal: Discharge Needs Assessment  Outcome: Ongoing (interventions implemented as appropriate)    Goal: Interprofessional Rounds/Family Conf  Outcome: Ongoing (interventions implemented as appropriate)      Problem: Mobility, Physical Impaired (Adult)  Goal: Identify Related Risk Factors and Signs and Symptoms  Outcome: Ongoing (interventions implemented as appropriate)    Goal: Enhanced Mobility Skills  Outcome: Ongoing (interventions implemented as appropriate)    Goal: Enhanced Functional Ability  Outcome: Ongoing (interventions implemented as appropriate)

## 2019-02-08 NOTE — PROGRESS NOTES
[facility] HOSPITALIST PROGRESS NOTE     Patient Identification:  Name:  Ashley Geronimo  Age:  73 y.o.  Sex:  female  :  1945  MRN:  [id number]  Visit Number:  [id number]  ROOM: [address] [place] [address]     Primary Care Provider:  Silvano Parker MD    Length of stay:  2    Subjective     Chief Complaint   Patient presents with   • Fluid Retention   • Altered Mental Status   • Cellulitis     Patient is a 73-year-old female with past medical history significant for A. fib, hypertension, diabetes, chronic respiratory failure (on home oxygen) who presented due to generalized weakness.  Patient also reported having some associated shortness of breath.  In ER, patient was noted to have an elevated lactic acid of 6, which CRP of 3.39.  Creatinine was also noted 1.5.  UA was done which are trace leukocyte esterase.  CT chest showed small bibasilar effusions.  Patient started on antibiotics for UTI. Lactic acid-1.9 (trending down). ABG showed PaO2 of 56. Repeat UA negative but done after antibiotics given.     Patient is noted to have been recently discharged within the past 2 weeks after being treated for septic shock due to UTI, pneumonia, possible cellulitis.  Patient also went into paroxysmal atrial fibrillation at that time, was discharged on Coumadin.  2D Echo from generator 2019 showed EF 66%.     Today (19): Creatinine-1.2 (trending down). ABG today showed PaO2  of 55. Chest Xray showed trace left effusion, right perihilar consolidation, probable mild CHF. Azitghromycin added. VQ scan was low probability for PE. Pulmonary consult requested. No other acute changes reported.    Objective     Current Hospital Meds:  apixaban 5 mg Oral Q12H   aspirin 81 mg Oral Daily   azithromycin 500 mg Intravenous Q24H   budesonide-formoterol 2 puff Inhalation BID - RT   ceftriaxone 1 g Intravenous Q24H   guaiFENesin 1,200 mg Oral Q12H   insulin aspart 0-7 Units Subcutaneous 4x Daily AC & at Bedtime   insulin  detemir 10 Units Subcutaneous Nightly   ipratropium-albuterol 3 mL Nebulization 4x Daily - RT   magnesium sulfate 4 g Intravenous Once   metoprolol tartrate 50 mg Oral Q12H   montelukast 10 mg Oral Nightly   [START ON 2/9/2019] NIFEdipine CC 90 mg Oral Q24H   sodium chloride 3 mL Intravenous Q12H     sodium chloride 75 mL/hr Last Rate: 75 mL/hr (02/06/19 1837)     ----------------------------------------------------------------------------------------------------------------------  Vital Signs:  Temp:  [98.1 °F (36.7 °C)-99.4 °F (37.4 °C)] 98.4 °F (36.9 °C)  Heart Rate:  [] 97  Resp:  [18] 18  BP: (155-184)/() 162/74  SpO2:  [90 %-96 %] 96 %  on  Flow (L/min):  [6] 6;   Device (Oxygen Therapy): high-flow nasal cannula  Body mass index is 26.91 kg/m².    Wt Readings from Last 3 Encounters:   02/08/19 62.5 kg (137 lb 12.8 oz)   01/24/19 63.1 kg (139 lb 3 oz)   08/07/13 66.7 kg (147 lb 0 oz)       Intake/Output Summary (Last 24 hours) at 2/8/2019 1146  Last data filed at 2/8/2019 1027  Gross per 24 hour   Intake 1060 ml   Output 650 ml   Net 410 ml     Diet Regular; Cardiac, Consistent Carbohydrate, Renal  ----------------------------------------------------------------------------------------------------------------------  Physical exam:  Constitutional:  Well-developed and well-nourished.  No respiratory distress.      HENT:  Head:  Normocephalic and atraumatic.  Mouth:  Moist mucous membranes.    Eyes:  Conjunctivae and EOM are normal.  Pupils are equal, round, and reactive to light.  No scleral icterus.    Neck:  Neck supple.  No JVD present.    Cardiovascular:  Normal rate, regular rhythm and normal heart sounds with no murmur.  Pulmonary/Chest:  No respiratory distress, no wheezes, no crackles, with normal breath sounds and good air movement.  Abdominal:  Soft.  Bowel sounds are normal.  No distension and no tenderness.   Musculoskeletal:  No edema, no tenderness, and no deformity.  No red or swollen  joints anywhere.    Neurological:  Alert and oriented to person, place, and time.  No cranial nerve deficit.  No tongue deviation.  No facial droop.  No slurred speech.   Skin:  Skin is warm and dry. No rash noted. No pallor.   Peripheral vascular:  Strong pulses in all 4 extremities with no clubbing, no cyanosis, trace edema.  Tele:    ----------------------------------------------------------------------------------------------------------------------Results from last 7 days   Lab Units 02/08/19 0426 02/07/19 0449 02/07/19  0024 02/06/19  0926 02/06/19  0121 02/06/19  0100 02/05/19  2111 02/05/19  1838   CRP mg/dL  --   --   --   --   --  3.39*  --  3.34*   LACTATE mmol/L  --   --  1.9  --  3.2*  --  6.0*  --    WBC 10*3/mm3 9.55  --  7.05  --   --  7.55  --  8.39   HEMOGLOBIN g/dL 11.6*  --  10.7*  --   --  11.2*  --  12.2   HEMATOCRIT % 35.2*  --  34.3*  --   --  34.8*  --  37.5   MCV fL 89.1  --  92.7  --   --  90.9  --  89.5   MCHC g/dL 33.0  --  31.2*  --   --  32.2*  --  32.5*   PLATELETS 10*3/mm3 298  --  254  --   --  292  --  348   INR   --  1.30*  --  1.15*  --   --   --  1.14*     Results from last 7 days   Lab Units 02/08/19 0426 02/07/19 0449 02/06/19  2347  02/06/19  0100   SODIUM mmol/L 140  --  138  --  138   POTASSIUM mmol/L 3.7  --  4.2  --  4.2   MAGNESIUM mg/dL 1.9 2.2 2.2   < >  --    CHLORIDE mmol/L 108  --  107  --  105   CO2 mmol/L 22.3*  --  20.0*  --  20.5*   BUN mg/dL 26*  --  37*  --  34*   CREATININE mg/dL 1.26  --  1.55*  --  1.50*   PHOSPHORUS mg/dL 4.5  --   --   --   --    EGFR IF NONAFRICN AM mL/min/1.73 42*  --  33*  --  34*   CALCIUM mg/dL 8.3  --  7.1*  --  7.1*   GLUCOSE mg/dL 38*  --  175*  --  213*   ALBUMIN g/dL 3.90  --  3.40  --  3.70   BILIRUBIN mg/dL 0.5  --  0.3  --  0.3   ALK PHOS U/L 78  --  73  --  79   AST (SGOT) U/L 15  --  22  --  16   ALT (SGPT) U/L 9*  --  19  --  13    < > = values in this interval not displayed.   Estimated Creatinine Clearance: 32.3  mL/min (by C-G formula based on SCr of 1.26 mg/dL).  Results from last 7 days   Lab Units 02/06/19  0710 02/06/19  0100 02/05/19  2324   TROPONIN I ng/mL 0.024 0.021 0.024     Glucose   Date/Time Value Ref Range Status   02/08/2019 1113 299 (H) 70 - 130 mg/dL Final   02/08/2019 0738 168 (H) 70 - 130 mg/dL Final   02/08/2019 0632 47 (C) 70 - 130 mg/dL Final   02/07/2019 1916 315 (H) 70 - 130 mg/dL Final   02/07/2019 1653 277 (H) 70 - 130 mg/dL Final   02/07/2019 1139 326 (H) 70 - 130 mg/dL Final   02/07/2019 0757 288 (H) 70 - 130 mg/dL Final   02/06/2019 1956 308 (H) 70 - 130 mg/dL Final     No results found for: AMMONIA    Results from last 7 days   Lab Units 02/06/19  2306   NITRITE UA  Negative   WBC UA /HPF 0-2   BACTERIA UA /HPF None Seen   SQUAM EPITHEL UA /HPF 0-2     Blood Culture   Date Value Ref Range Status   02/05/2019 No growth at 2 days  Preliminary   02/05/2019 No growth at 2 days  Preliminary          I have personally looked at the labs and they are summarized above.  ----------------------------------------------------------------------------------------------------------------------  Imaging Results (last 24 hours)     Procedure Component Value Units Date/Time    XR Chest 1 View [[id number]] Collected:  02/07/19 1347     Updated:  02/07/19 1441    Narrative:       XR CHEST 1 VW-     HISTORY:  Hypoxia; A41.9-Sepsis, unspecified organism; R65.21-Severe  sepsis with septic shock; N17.9-Acute kidney failure, unspecified          COMPARISON: 02/05/2019.      TECHNIQUE: Single frontal view of the chest.     FINDINGS:     Left effusion and right perihilar consolidation.  Probable mild CHF.  There is no evidence of an acute osseous abnormality.          Impression:       1. Trace left effusion.  2. Right perihilar consolidation.  3. CHF.     This report was finalized on 2/7/2019 2:39 PM by Dr. Garry Benton MD.       NM Lung Ventilation Perfusion [[id number]] Collected:  02/07/19 1435     Updated:  02/07/19  1441    Narrative:       NUCLEAR MEDICINE LUNG VENTILATION PERFUSION-     CLINICAL INDICATION: Hypoxia; A41.9-Sepsis, unspecified organism;  R65.21-Severe sepsis with septic shock; N17.9-Acute kidney failure,  unspecified.        COMPARISON: 02/07/2019     TECHNIQUE: 42.1 mCi technetium DTPA inhaled in aerosolized form for the  ventilatory portion of the exam.  4.4 mCi technetium was injected for perfusion. Standard imaging was  performed following the ventilatory portion and the perfusion portion of  the exam.     FINDINGS:   The ventilatory study shows central deposition of the radiotracer.     Perfusion is overall better than ventilation. There are no segmental or  subsegmental perfusion ventilation mismatches.       Impression:       1. Airway disease.  2. Low probability for pulmonary embolus.     This report was finalized on 2/7/2019 2:38 PM by Dr. Garry Benton MD.           I have personally reviewed the radiology images and read the final radiology report.    Assessment & Plan            Acute hypoxic respiratory failure  -Continue oxygen supplementation  -Monitor respiratory status  -Pulmonary consult requested    Pneumonia  -Mycoplasma, legionella antigen ordered  -Continue antibiotics     UTI  -Continue antibiotics     Lactic acidosis  Likely due to dehydration +/- metformin. Resolved     Acute renal failure  Improving  -Monitor creatinine     Atrial fibrillation  Unable to use eliquis due to cost per pharmacy  -Lovenox, Coumadin started  -Monitor HR     Hypertension  -Nifedipine increased to 90mg daily  -Metoprolol increased to 50mg BID  -Continue BP meds  -Monitor blood pressure     Diabetes  -Continue diabetic meds  -Monitor blood glucose    Charles Dickinson MD  02/08/19  11:46 AM

## 2019-02-08 NOTE — PLAN OF CARE
Problem: Patient Care Overview  Goal: Plan of Care Review  Outcome: Ongoing (interventions implemented as appropriate)   02/08/19 1240   Coping/Psychosocial   Plan of Care Reviewed With patient   Plan of Care Review   Progress improving

## 2019-02-08 NOTE — THERAPY TREATMENT NOTE
Acute Care - Physical Therapy Treatment Note  [facility]     Patient Name: Ashley Geronimo  : 1945  MRN: [id number]  Today's Date: 2019  Onset of Illness/Injury or Date of Surgery: 19  Date of Referral to PT: 19  Referring Physician: Dr. Jefferson    Admit Date: 2019    Visit Dx:    ICD-10-CM ICD-9-CM   1. Septic shock (CMS/[facility]) A41.9 038.9    R65.21 785.52     995.92   2. Acute renal failure, unspecified acute renal failure type (CMS/[facility]) N17.9 584.9     Patient Active Problem List   Diagnosis   • Pneumonia   • Septic shock (CMS/[facility])   • Pseudomonas pneumonia (CMS/[facility])       Therapy Treatment    Rehabilitation Treatment Summary     Row Name 19 1200             Treatment Time/Intention    Discipline  physical therapist  -BC      Document Type  evaluation  -BC      Subjective Information  no complaints  -BC      Mode of Treatment  physical therapy;individual therapy  -BC      Patient Effort  good  -BC      Recorded by [BC] Laura Cali, PT 19 1239      Row Name 19 1200             Cognitive Assessment/Intervention- PT/OT    Orientation Status (Cognition)  oriented x 4  -BC      Follows Commands (Cognition)  follows one step commands  -BC      Recorded by [BC] Laura Cali, PT 19 1239      Row Name 19 1200             Mobility Assessment/Intervention    Extremity Weight-bearing Status  left lower extremity;right lower extremity  -BC      Left Lower Extremity (Weight-bearing Status)  full weight-bearing (FWB)  -BC      Right Lower Extremity (Weight-bearing Status)  full weight-bearing (FWB)  -BC      Recorded by [BC] Laura Cali, PT 19 1239      Row Name 19 1200             Bed Mobility Assessment/Treatment    Bed Mobility Assessment/Treatment  bed mobility (all) activities  -BC      [place] Level (Bed Mobility)  minimum assist (75% patient effort)  -BC      Assistive Device (Bed Mobility)  bed rails  -BC      Recorded by [BC]  Laura Cali, PT 02/08/19 1239      Row Name 02/08/19 1200             Transfer Assessment/Treatment    Transfer Assessment/Treatment  sit-stand transfer  -BC      Maintains Weight-bearing Status (Transfers)  able to maintain  -BC      Recorded by [BC] Laura Cali, PT 02/08/19 1239      Row Name 02/08/19 1200             Sit-Stand Transfer    Sit-Stand [place] (Transfers)  contact guard  -BC      Assistive Device (Sit-Stand Transfers)  walker, front-wheeled  -BC      Recorded by [BC] Laura Cali, PT 02/08/19 1239      Row Name 02/08/19 1200             Stand-Sit Transfer    Stand-Sit [place] (Transfers)  contact guard  -BC      Assistive Device (Stand-Sit Transfers)  walker, front-wheeled  -BC      Recorded by [BC] Laura Cali, PT 02/08/19 1239      Row Name 02/08/19 1200             Gait/Stairs Assessment/Training    Gait/Stairs Assessment/Training  gait/ambulation independence  -BC      [place] Level (Gait)  minimum assist (75% patient effort)  -BC      Assistive Device (Gait)  walker, front-wheeled  -BC      Distance in Feet (Gait)  200  -BC      Recorded by [BC] Laura Cali, PT 02/08/19 1239      Row Name 02/08/19 1200             Positioning and Restraints    Pre-Treatment Position  in bed  -BC      Post Treatment Position  bed  -BC      In Bed  notified nsg;supine;call light within reach;encouraged to call for assist;side rails up x3  -BC      Recorded by [BC] Laura Cali, PT 02/08/19 1239      Row Name 02/08/19 1200             Breath Sounds    Breath Sounds  All Fields  -BC      All Lung Fields Breath Sounds  wheezes, expiratory;diminished  -BC      Recorded by [BC] Laura Cali, PT 02/08/19 1239        User Key  (r) = Recorded By, (t) = Taken By, (c) = Cosigned By    Initials Name Effective Dates Discipline    BC Laura Cali, PT 03/14/16 -  PT                   Physical Therapy Education     Title: PT OT SLP Therapies (Done)     Topic: Physical  Therapy (Done)     Point: Mobility training (Done)     Learning Progress Summary           Patient Acceptance, E, VU by BC at 2/8/2019 12:39 PM    Acceptance, E, VU by BC at 2/8/2019 12:39 PM    Acceptance, E, VU by  at 2/8/2019  8:04 AM    Acceptance, E,TB, VU by  at 2/7/2019  9:34 PM    Acceptance, E, VU by BC at 2/7/2019  3:42 PM                   Point: Home exercise program (Done)     Learning Progress Summary           Patient Acceptance, E, VU by BC at 2/8/2019 12:39 PM    Acceptance, E, VU by BC at 2/8/2019 12:39 PM    Acceptance, E, VU by  at 2/8/2019  8:04 AM    Acceptance, E,TB, VU by  at 2/7/2019  9:34 PM    Acceptance, E, VU by BC at 2/7/2019  3:42 PM                   Point: Body mechanics (Done)     Learning Progress Summary           Patient Acceptance, E, VU by BC at 2/8/2019 12:39 PM    Acceptance, E, VU by BC at 2/8/2019 12:39 PM    Acceptance, E, VU by  at 2/8/2019  8:04 AM    Acceptance, E,TB, VU by  at 2/7/2019  9:34 PM    Acceptance, E, VU by BC at 2/7/2019  3:42 PM                   Point: Precautions (Done)     Learning Progress Summary           Patient Acceptance, E, VU by BC at 2/8/2019 12:39 PM    Acceptance, E, VU by BC at 2/8/2019 12:39 PM    Acceptance, E, VU by  at 2/8/2019  8:04 AM    Acceptance, E,TB, VU by  at 2/7/2019  9:34 PM    Acceptance, E, VU by BC at 2/7/2019  3:42 PM                               User Key     Initials Effective Dates Name Provider Type Discipline     06/16/16 -  Bandar Beth, RN Registered Nurse Nurse    BC 03/14/16 -  Laura Cali PT Physical Therapist PT     07/19/18 -  Conrado Ramon RN Registered Nurse Nurse                PT Recommendation and Plan  Planned Therapy Interventions (PT Eval): balance training, gait training, bed mobility training, home exercise program, patient/family education, strengthening, transfer training  Plan of Care Reviewed With: patient  Progress: improving  Outcome Measures     Row Name  02/08/19 1200             How much help from another person do you currently need...    Turning from your back to your side while in flat bed without using bedrails?  4  -BC      Moving from lying on back to sitting on the side of a flat bed without bedrails?  4  -BC      Moving to and from a bed to a chair (including a wheelchair)?  3  -BC      Standing up from a chair using your arms (e.g., wheelchair, bedside chair)?  3  -BC      Climbing 3-5 steps with a railing?  3  -BC      To walk in hospital room?  3  -BC      AM-PAC 6 Clicks Score  20  -BC         Functional Assessment    Outcome Measure Options  AM-PAC 6 Clicks Basic Mobility (PT)  -BC        User Key  (r) = Recorded By, (t) = Taken By, (c) = Cosigned By    Initials Name Provider Type    Laura Jeong PT Physical Therapist         Time Calculation:   PT Charges     Row Name 02/08/19 1241             Time Calculation    PT Received On  02/08/19  -BC         Time Calculation- PT    Total Timed Code Minutes- PT  30 minute(s)  -BC         Timed Charges    61896 - Gait Training Minutes   15  -BC      46627 - PT Therapeutic Activity Minutes  14  -BC        User Key  (r) = Recorded By, (t) = Taken By, (c) = Cosigned By    Initials Name Provider Type    Laura Jeong PT Physical Therapist        Therapy Suggested Charges     Code   Minutes Charges    30943 (CPT®) Hc Pt Neuromusc Re Education Ea 15 Min      28885 (CPT®) Hc Pt Ther Proc Ea 15 Min      16803 (CPT®) Hc Gait Training Ea 15 Min 15 1    54006 (CPT®) Hc Pt Therapeutic Act Ea 15 Min 14 1    53851 (CPT®) Hc Pt Manual Therapy Ea 15 Min      31815 (CPT®) Hc Pt Iontophoresis Ea 15 Min      64831 (CPT®) Hc Pt Elec Stim Ea-Per 15 Min      44754 (CPT®) Hc Pt Ultrasound Ea 15 Min      30653 (CPT®) Hc Pt Self Care/Mgmt/Train Ea 15 Min      47530 (CPT®) Hc Pt Prosthetic (S) Train Initial Encounter, Each 15 Min      29797 (CPT®) Hc Pt Orthotic(S)/Prosthetic(S) Encounter, Each 15 Min      57193 (CPT®)  Hc Orthotic(S) Mgmt/Train Initial Encounter, Each 15min      Total  29 2        Therapy Charges for Today     Code Description Service Date Service Provider Modifiers Qty    49440257176 HC GAIT TRAINING EA 15 MIN 2/7/2019 Laura Cali, PT GP 1    89610766784 HC PT THERAPEUTIC ACT EA 15 MIN 2/7/2019 Laura Cali, PT GP 1    85917788527 HC PT THER SUPP EA 15 MIN 2/7/2019 Laura Cali, PT GP 4    14832869990 HC PT EVAL HIGH COMPLEXITY 4 2/7/2019 Laura Cali, PT GP 1    31272736841 HC GAIT TRAINING EA 15 MIN 2/8/2019 Laura Cali, PT GP 1    86571524533 HC PT THERAPEUTIC ACT EA 15 MIN 2/8/2019 Laura Cali, PT GP 1    86246070294 HC PT THER SUPP EA 15 MIN 2/8/2019 Laura Cali, PT GP 2          PT G-Codes  Outcome Measure Options: AM-PAC 6 Clicks Basic Mobility (PT)  AM-PAC 6 Clicks Score: 20    Laura Cali, PT  2/8/2019

## 2019-02-08 NOTE — PLAN OF CARE
Problem: Sepsis/Septic Shock (Adult)  Goal: Signs and Symptoms of Listed Potential Problems Will be Absent, Minimized or Managed (Sepsis/Septic Shock)  Outcome: Ongoing (interventions implemented as appropriate)      Problem: Fall Risk (Adult)  Goal: Identify Related Risk Factors and Signs and Symptoms  Outcome: Ongoing (interventions implemented as appropriate)    Goal: Absence of Fall  Outcome: Ongoing (interventions implemented as appropriate)      Problem: Skin Injury Risk (Adult)  Goal: Identify Related Risk Factors and Signs and Symptoms  Outcome: Ongoing (interventions implemented as appropriate)    Goal: Skin Health and Integrity  Outcome: Ongoing (interventions implemented as appropriate)      Problem: Patient Care Overview  Goal: Plan of Care Review  Outcome: Ongoing (interventions implemented as appropriate)    Goal: Individualization and Mutuality  Outcome: Ongoing (interventions implemented as appropriate)    Goal: Discharge Needs Assessment  Outcome: Ongoing (interventions implemented as appropriate)    Goal: Interprofessional Rounds/Family Conf  Outcome: Ongoing (interventions implemented as appropriate)      Problem: Mobility, Physical Impaired (Adult)  Goal: Identify Related Risk Factors and Signs and Symptoms  Outcome: Ongoing (interventions implemented as appropriate)    Goal: Enhanced Mobility Skills  Outcome: Ongoing (interventions implemented as appropriate)    Goal: Enhanced Functional Ability  Outcome: Ongoing (interventions implemented as appropriate)

## 2019-02-08 NOTE — PROGRESS NOTES
Pharmacy was consulted for vancomycin dosing for PNA.  Based on pt parameters will initiate vancomycin at 750 mg iv q24hrs after the 1250 mg x 1 loading dose to target trough levels of 15-20 mg/L.  Pharmacy will continue to follow and obtain trough level once steady state is achieved.  Thank you.

## 2019-02-08 NOTE — PHARMACY PATIENT ASSISTANCE
Update: Pharmacy checked with patient's PCP, who states they cannot provide samples but can refer to a cardiologist, as the patient does not currently have one. Pharmacy can still supply 1 month free with profiled trial card if needed, however, at this time we cannot confirm where the patient will get supply of Eliquis beyond this first 30 days. Informed Dr. Charles Dickinson of this information so he may determine the best course of action at this time.    Coral Reyes, Pharmacy Intern  02/08/19  11:18 AM

## 2019-02-09 LAB
ALBUMIN SERPL-MCNC: 4.3 G/DL (ref 3.4–4.8)
ALBUMIN/GLOB SERPL: 1.5 G/DL (ref 1.5–2.5)
ALP SERPL-CCNC: 90 U/L (ref 35–104)
ALT SERPL W P-5'-P-CCNC: 21 U/L (ref 10–36)
ANION GAP SERPL CALCULATED.3IONS-SCNC: 9.8 MMOL/L (ref 3.6–11.2)
AST SERPL-CCNC: 21 U/L (ref 10–30)
BASOPHILS # BLD AUTO: 0.02 10*3/MM3 (ref 0–0.3)
BASOPHILS NFR BLD AUTO: 0.2 % (ref 0–2)
BILIRUB SERPL-MCNC: 0.5 MG/DL (ref 0.2–1.8)
BUN BLD-MCNC: 29 MG/DL (ref 7–21)
BUN/CREAT SERPL: 24.6 (ref 7–25)
CALCIUM SPEC-SCNC: 8.7 MG/DL (ref 7.7–10)
CHLORIDE SERPL-SCNC: 104 MMOL/L (ref 99–112)
CO2 SERPL-SCNC: 24.2 MMOL/L (ref 24.3–31.9)
CREAT BLD-MCNC: 1.18 MG/DL (ref 0.43–1.29)
DEPRECATED RDW RBC AUTO: 43.6 FL (ref 37–54)
EOSINOPHIL # BLD AUTO: 0.04 10*3/MM3 (ref 0–0.7)
EOSINOPHIL NFR BLD AUTO: 0.4 % (ref 0–7)
ERYTHROCYTE [DISTWIDTH] IN BLOOD BY AUTOMATED COUNT: 13.5 % (ref 11.5–14.5)
GFR SERPL CREATININE-BSD FRML MDRD: 45 ML/MIN/1.73
GLOBULIN UR ELPH-MCNC: 2.8 GM/DL
GLUCOSE BLD-MCNC: 47 MG/DL (ref 70–110)
GLUCOSE BLDC GLUCOMTR-MCNC: 138 MG/DL (ref 70–130)
GLUCOSE BLDC GLUCOMTR-MCNC: 146 MG/DL (ref 70–130)
GLUCOSE BLDC GLUCOMTR-MCNC: 167 MG/DL (ref 70–130)
GLUCOSE BLDC GLUCOMTR-MCNC: 180 MG/DL (ref 70–130)
GLUCOSE BLDC GLUCOMTR-MCNC: 221 MG/DL (ref 70–130)
GLUCOSE BLDC GLUCOMTR-MCNC: 51 MG/DL (ref 70–130)
HCT VFR BLD AUTO: 35 % (ref 37–47)
HGB BLD-MCNC: 11.4 G/DL (ref 12–16)
IMM GRANULOCYTES # BLD AUTO: 0.03 10*3/MM3 (ref 0–0.03)
IMM GRANULOCYTES NFR BLD AUTO: 0.3 % (ref 0–0.5)
INR PPP: 1.31 (ref 0.9–1.1)
LYMPHOCYTES # BLD AUTO: 1.09 10*3/MM3 (ref 1–3)
LYMPHOCYTES NFR BLD AUTO: 9.9 % (ref 16–46)
MAGNESIUM SERPL-MCNC: 2.5 MG/DL (ref 1.7–2.6)
MCH RBC QN AUTO: 29.2 PG (ref 27–33)
MCHC RBC AUTO-ENTMCNC: 32.6 G/DL (ref 33–37)
MCV RBC AUTO: 89.7 FL (ref 80–94)
MONOCYTES # BLD AUTO: 0.99 10*3/MM3 (ref 0.1–0.9)
MONOCYTES NFR BLD AUTO: 9 % (ref 0–12)
NEUTROPHILS # BLD AUTO: 8.85 10*3/MM3 (ref 1.4–6.5)
NEUTROPHILS NFR BLD AUTO: 80.2 % (ref 40–75)
OSMOLALITY SERPL CALC.SUM OF ELEC: 278.6 MOSM/KG (ref 273–305)
PLATELET # BLD AUTO: 330 10*3/MM3 (ref 130–400)
PMV BLD AUTO: 10.9 FL (ref 6–10)
POTASSIUM BLD-SCNC: 4.2 MMOL/L (ref 3.5–5.3)
PROT SERPL-MCNC: 7.1 G/DL (ref 6–8)
PROTHROMBIN TIME: 16.5 SECONDS (ref 11–15.4)
RBC # BLD AUTO: 3.9 10*6/MM3 (ref 4.2–5.4)
SODIUM BLD-SCNC: 138 MMOL/L (ref 135–153)
WBC NRBC COR # BLD: 11.02 10*3/MM3 (ref 4.5–12.5)

## 2019-02-09 PROCEDURE — 85025 COMPLETE CBC W/AUTO DIFF WBC: CPT | Performed by: PHYSICIAN ASSISTANT

## 2019-02-09 PROCEDURE — 80053 COMPREHEN METABOLIC PANEL: CPT | Performed by: PHYSICIAN ASSISTANT

## 2019-02-09 PROCEDURE — 83735 ASSAY OF MAGNESIUM: CPT | Performed by: PHYSICIAN ASSISTANT

## 2019-02-09 PROCEDURE — 94799 UNLISTED PULMONARY SVC/PX: CPT

## 2019-02-09 PROCEDURE — 85610 PROTHROMBIN TIME: CPT | Performed by: INTERNAL MEDICINE

## 2019-02-09 PROCEDURE — 63710000001 INSULIN ASPART PER 5 UNITS: Performed by: INTERNAL MEDICINE

## 2019-02-09 PROCEDURE — 99233 SBSQ HOSP IP/OBS HIGH 50: CPT | Performed by: INTERNAL MEDICINE

## 2019-02-09 PROCEDURE — 25010000002 ENOXAPARIN PER 10 MG: Performed by: INTERNAL MEDICINE

## 2019-02-09 PROCEDURE — 25010000002 VANCOMYCIN 5 G RECONSTITUTED SOLUTION 5,000 MG VIAL

## 2019-02-09 PROCEDURE — 82962 GLUCOSE BLOOD TEST: CPT

## 2019-02-09 RX ORDER — METOPROLOL TARTRATE 100 MG/1
100 TABLET ORAL EVERY 12 HOURS SCHEDULED
Status: DISCONTINUED | OUTPATIENT
Start: 2019-02-09 | End: 2019-02-20 | Stop reason: HOSPADM

## 2019-02-09 RX ADMIN — METOPROLOL TARTRATE 100 MG: 100 TABLET, FILM COATED ORAL at 20:19

## 2019-02-09 RX ADMIN — CEFEPIME HYDROCHLORIDE 1 G: 1 INJECTION, POWDER, FOR SOLUTION INTRAMUSCULAR; INTRAVENOUS at 01:07

## 2019-02-09 RX ADMIN — BUDESONIDE AND FORMOTEROL FUMARATE DIHYDRATE 2 PUFF: 160; 4.5 AEROSOL RESPIRATORY (INHALATION) at 18:31

## 2019-02-09 RX ADMIN — ENOXAPARIN SODIUM 60 MG: 60 INJECTION SUBCUTANEOUS at 20:19

## 2019-02-09 RX ADMIN — ENOXAPARIN SODIUM 60 MG: 60 INJECTION SUBCUTANEOUS at 09:38

## 2019-02-09 RX ADMIN — DOXYCYCLINE 100 MG: 100 INJECTION, POWDER, LYOPHILIZED, FOR SOLUTION INTRAVENOUS at 11:55

## 2019-02-09 RX ADMIN — ASPIRIN 81 MG: 81 TABLET ORAL at 09:38

## 2019-02-09 RX ADMIN — MONTELUKAST SODIUM 10 MG: 10 TABLET, COATED ORAL at 20:19

## 2019-02-09 RX ADMIN — IPRATROPIUM BROMIDE AND ALBUTEROL SULFATE 3 ML: .5; 3 SOLUTION RESPIRATORY (INHALATION) at 07:10

## 2019-02-09 RX ADMIN — WARFARIN SODIUM 5 MG: 5 TABLET ORAL at 17:27

## 2019-02-09 RX ADMIN — CEFEPIME HYDROCHLORIDE 1 G: 1 INJECTION, POWDER, FOR SOLUTION INTRAMUSCULAR; INTRAVENOUS at 16:56

## 2019-02-09 RX ADMIN — GUAIFENESIN 1200 MG: 600 TABLET, EXTENDED RELEASE ORAL at 09:38

## 2019-02-09 RX ADMIN — IPRATROPIUM BROMIDE AND ALBUTEROL SULFATE 3 ML: .5; 3 SOLUTION RESPIRATORY (INHALATION) at 18:31

## 2019-02-09 RX ADMIN — METOPROLOL TARTRATE 50 MG: 50 TABLET, FILM COATED ORAL at 09:38

## 2019-02-09 RX ADMIN — NIFEDIPINE 90 MG: 90 TABLET, EXTENDED RELEASE ORAL at 09:38

## 2019-02-09 RX ADMIN — INSULIN ASPART 3 UNITS: 100 INJECTION, SOLUTION INTRAVENOUS; SUBCUTANEOUS at 11:55

## 2019-02-09 RX ADMIN — IPRATROPIUM BROMIDE AND ALBUTEROL SULFATE 3 ML: .5; 3 SOLUTION RESPIRATORY (INHALATION) at 00:24

## 2019-02-09 RX ADMIN — GUAIFENESIN 1200 MG: 600 TABLET, EXTENDED RELEASE ORAL at 20:19

## 2019-02-09 RX ADMIN — SODIUM CHLORIDE, PRESERVATIVE FREE 3 ML: 5 INJECTION INTRAVENOUS at 20:19

## 2019-02-09 RX ADMIN — INSULIN ASPART 2 UNITS: 100 INJECTION, SOLUTION INTRAVENOUS; SUBCUTANEOUS at 17:48

## 2019-02-09 RX ADMIN — VANCOMYCIN HYDROCHLORIDE 750 MG: 5 INJECTION, POWDER, LYOPHILIZED, FOR SOLUTION INTRAVENOUS at 15:40

## 2019-02-09 RX ADMIN — BUDESONIDE AND FORMOTEROL FUMARATE DIHYDRATE 2 PUFF: 160; 4.5 AEROSOL RESPIRATORY (INHALATION) at 07:10

## 2019-02-09 NOTE — PROGRESS NOTES
[facility] HOSPITALIST PROGRESS NOTE     Patient Identification:  Name:  Ashley Geronimo  Age:  73 y.o.  Sex:  female  :  1945  MRN:  [id number]  Visit Number:  [id number]  ROOM: [address] [place] [address]     Primary Care Provider:  Silvano Parker MD    Length of stay:  3    Subjective     Chief Complaint   Patient presents with   • Fluid Retention   • Altered Mental Status   • Cellulitis     Patient is a 73-year-old female with past medical history significant for A. fib, hypertension, diabetes, chronic respiratory failure (on home oxygen) who presented due to generalized weakness.  Patient also reported having some associated shortness of breath.  In ER, patient was noted to have an elevated lactic acid of 6, which CRP of 3.39.  Creatinine was also noted 1.5.  UA was done which are trace leukocyte esterase.  CT chest showed small bibasilar effusions.  Patient started on antibiotics for UTI. Lactic acid-1.9 (trending down). ABG showed PaO2 of 56. Repeat UA negative but done after antibiotics given. ABG showed PaO2 of 55 on 5L. Chest Xray showed trace left effusion, right perihilar consolidation, probable mild CHF. Pt put on high flow NC and pulmonary consult requested. VQ scan was low probability for PE. Eliquis switched to coumadin due to cost of eliquis which makes it unobtainable by pt per pharmacy.     Patient is noted to have been recently discharged within the past 2 weeks after being treated for septic shock due to UTI, pneumonia, possible cellulitis.  Patient also went into paroxysmal atrial fibrillation at that time, was discharged on Coumadin.  2D Echo from generator 2019 showed EF 66%.     Today (19): Creatinine-1.1 (trending down). INR-1.3. Antibiotics escalated by pulmonary. CT chest was repeated which showed bibasilar effusions and bilateral consolidation. Pt now admits she feels short of breath. No other acute changes reported.    Objective     Current Hospital Meds:  aspirin 81 mg  Oral Daily   budesonide-formoterol 2 puff Inhalation BID - RT   cefepime 1 g Intravenous Q12H   doxycycline 100 mg Intravenous Q12H   enoxaparin 1 mg/kg Subcutaneous Q12H   guaiFENesin 1,200 mg Oral Q12H   insulin aspart 0-7 Units Subcutaneous TID With Meals   insulin detemir 6 Units Subcutaneous Nightly   ipratropium-albuterol 3 mL Nebulization 4x Daily - RT   metoprolol tartrate 100 mg Oral Q12H   montelukast 10 mg Oral Nightly   NIFEdipine CC 90 mg Oral Q24H   sodium chloride 3 mL Intravenous Q12H   vancomycin 750 mg Intravenous Q24H   warfarin 5 mg Oral Daily     sodium chloride 75 mL/hr Last Rate: 75 mL/hr (02/06/19 1837)     ----------------------------------------------------------------------------------------------------------------------  Vital Signs:  Temp:  [97.8 °F (36.6 °C)-98.4 °F (36.9 °C)] 97.8 °F (36.6 °C)  Heart Rate:  [] 78  Resp:  [18-22] 20  BP: (143-165)/(68-90) 143/68  SpO2:  [90 %-95 %] 91 %  on  Flow (L/min):  [10-50] 50;   Device (Oxygen Therapy): high-flow nasal cannula  Body mass index is 27.38 kg/m².    Wt Readings from Last 3 Encounters:   02/09/19 63.6 kg (140 lb 3.2 oz)   01/24/19 63.1 kg (139 lb 3 oz)   08/07/13 66.7 kg (147 lb 0 oz)       Intake/Output Summary (Last 24 hours) at 2/9/2019 1116  Last data filed at 2/9/2019 1112  Gross per 24 hour   Intake 480 ml   Output 1050 ml   Net -570 ml     Diet Regular; Cardiac, Consistent Carbohydrate, Renal  ----------------------------------------------------------------------------------------------------------------------  Physical exam:  Constitutional:  Well-developed and well-nourished.  No respiratory distress.      HENT:  Head:  Normocephalic and atraumatic.  Mouth:  Moist mucous membranes.    Eyes:  Conjunctivae and EOM are normal.  Pupils are equal, round, and reactive to light.  No scleral icterus.    Neck:  Neck supple.  No JVD present.    Cardiovascular:  Normal rate, regular rhythm and normal heart sounds with no  murmur.  Pulmonary/Chest:  No respiratory distress, no wheezes, no crackles, with normal breath sounds and good air movement.  Abdominal:  Soft.  Bowel sounds are normal.  No distension and no tenderness.   Musculoskeletal:  No edema, no tenderness, and no deformity.  No red or swollen joints anywhere.    Neurological:  Alert and oriented to person, place, and time.  No cranial nerve deficit.  No tongue deviation.  No facial droop.  No slurred speech.   Skin:  Skin is warm and dry. No rash noted. No pallor.   Peripheral vascular:  Strong pulses in all 4 extremities with no clubbing, no cyanosis, trace edema.   ----------------------------------------------------------------------------------------------------------------------Results from last 7 days   Lab Units 02/09/19  0540 02/08/19  1155 02/08/19  0426 02/07/19  0449 02/07/19  0024 02/06/19  0926 02/06/19  0121 02/06/19  0100 02/05/19  2111 02/05/19  1838   CRP mg/dL  --   --   --   --   --   --   --  3.39*  --  3.34*   LACTATE mmol/L  --   --   --   --  1.9  --  3.2*  --  6.0*  --    WBC 10*3/mm3 11.02  --  9.55  --  7.05  --   --  7.55  --  8.39   HEMOGLOBIN g/dL 11.4*  --  11.6*  --  10.7*  --   --  11.2*  --  12.2   HEMATOCRIT % 35.0*  --  35.2*  --  34.3*  --   --  34.8*  --  37.5   MCV fL 89.7  --  89.1  --  92.7  --   --  90.9  --  89.5   MCHC g/dL 32.6*  --  33.0  --  31.2*  --   --  32.2*  --  32.5*   PLATELETS 10*3/mm3 330  --  298  --  254  --   --  292  --  348   INR  1.31* 1.35*  --  1.30*  --  1.15*  --   --   --  1.14*     Results from last 7 days   Lab Units 02/09/19  0540 02/08/19  0426 02/07/19  0449 02/06/19  2347   SODIUM mmol/L 138 140  --  138   POTASSIUM mmol/L 4.2 3.7  --  4.2   MAGNESIUM mg/dL 2.5 1.9 2.2 2.2   CHLORIDE mmol/L 104 108  --  107   CO2 mmol/L 24.2* 22.3*  --  20.0*   BUN mg/dL 29* 26*  --  37*   CREATININE mg/dL 1.18 1.26  --  1.55*   PHOSPHORUS mg/dL  --  4.5  --   --    EGFR IF NONAFRICN AM mL/min/1.73 45* 42*  --  33*    CALCIUM mg/dL 8.7 8.3  --  7.1*   GLUCOSE mg/dL 47* 38*  --  175*   ALBUMIN g/dL 4.30 3.90  --  3.40   BILIRUBIN mg/dL 0.5 0.5  --  0.3   ALK PHOS U/L 90 78  --  73   AST (SGOT) U/L 21 15  --  22   ALT (SGPT) U/L 21 9*  --  19   Estimated Creatinine Clearance: 34.8 mL/min (by C-G formula based on SCr of 1.18 mg/dL).  Results from last 7 days   Lab Units 02/06/19  0710 02/06/19  0100 02/05/19  2324   TROPONIN I ng/mL 0.024 0.021 0.024     Glucose   Date/Time Value Ref Range Status   02/09/2019 1057 221 (H) 70 - 130 mg/dL Final   02/09/2019 0800 167 (H) 70 - 130 mg/dL Final   02/09/2019 0655 51 (L) 70 - 130 mg/dL Final   02/09/2019 0021 138 (H) 70 - 130 mg/dL Final   02/08/2019 1912 248 (H) 70 - 130 mg/dL Final   02/08/2019 1720 143 (H) 70 - 130 mg/dL Final   02/08/2019 1113 299 (H) 70 - 130 mg/dL Final   02/08/2019 0738 168 (H) 70 - 130 mg/dL Final     No results found for: AMMONIA    Results from last 7 days   Lab Units 02/06/19  2306   NITRITE UA  Negative   WBC UA /HPF 0-2   BACTERIA UA /HPF None Seen   SQUAM EPITHEL UA /HPF 0-2     Blood Culture   Date Value Ref Range Status   02/05/2019 No growth at 3 days  Preliminary   02/05/2019 No growth at 3 days  Preliminary          I have personally looked at the labs and they are summarized above.  ----------------------------------------------------------------------------------------------------------------------  Imaging Results (last 24 hours)     Procedure Component Value Units Date/Time    CT Chest Without Contrast [[id number]] Collected:  02/08/19 1637     Updated:  02/08/19 1643    Narrative:       CT CHEST WITHOUT CONTRAST-      CLINICAL INDICATION: Shortness of breath; A41.9-Sepsis, unspecified  organism; R65.21-Severe sepsis with septic shock; N17.9-Acute kidney  failure, unspecified.     COMPARISON:  02/05/2019     Radiation dose reduction techniques were utilized per ALARA protocol.  Automated exposure control was initiated through either or [employer]  or  [employer] software packages by  protocol.        PROCEDURE: Axial images were acquired from the thoracic inlet.     DOSE: [id number] mGy.cm     FINDINGS:  There are bibasilar effusions and there is bibasilar  consolidation.     Minimal ground glass change, probably representing edema.       Impression:       1. Bibasilar effusions and bilateral consolidation.  2. Mild ground glass change.        This report was finalized on 2/8/2019 4:41 PM by Dr. Garry Benton MD.           I have personally reviewed the radiology images and read the final radiology report.    Assessment & Plan            Acute hypoxic respiratory failure  Likely due to pneumonia  -Continue oxygen supplementation  -Monitor respiratory status  -Continue antibiotics  -Follow pulmonary recommendations        UTI  -Continue antibiotics     Lactic acidosis  Likely due to dehydration +/- metformin. Resolved     Acute renal failure  Improving  -Monitor creatinine     Atrial fibrillation  Unable to use eliquis due to cost per pharmacy  -Continue Lovenox, Coumadin started  -Monitor INR     Hypertension  -Metoprolol increased to 100mg BID  -Continue BP meds  -Monitor blood pressure     Diabetes  -Continue diabetic meds  -Monitor blood glucose      Charles Dickinson MD  02/09/19  11:16 AM

## 2019-02-10 LAB
ANION GAP SERPL CALCULATED.3IONS-SCNC: 12.4 MMOL/L (ref 3.6–11.2)
BACTERIA SPEC AEROBE CULT: NORMAL
BACTERIA SPEC AEROBE CULT: NORMAL
BASOPHILS # BLD AUTO: 0.03 10*3/MM3 (ref 0–0.3)
BASOPHILS NFR BLD AUTO: 0.4 % (ref 0–2)
BUN BLD-MCNC: 33 MG/DL (ref 7–21)
BUN/CREAT SERPL: 25.4 (ref 7–25)
CALCIUM SPEC-SCNC: 8.6 MG/DL (ref 7.7–10)
CHLORIDE SERPL-SCNC: 102 MMOL/L (ref 99–112)
CO2 SERPL-SCNC: 18.6 MMOL/L (ref 24.3–31.9)
CREAT BLD-MCNC: 1.3 MG/DL (ref 0.43–1.29)
DEPRECATED RDW RBC AUTO: 43.2 FL (ref 37–54)
EOSINOPHIL # BLD AUTO: 0.03 10*3/MM3 (ref 0–0.7)
EOSINOPHIL NFR BLD AUTO: 0.4 % (ref 0–7)
ERYTHROCYTE [DISTWIDTH] IN BLOOD BY AUTOMATED COUNT: 13.3 % (ref 11.5–14.5)
GFR SERPL CREATININE-BSD FRML MDRD: 40 ML/MIN/1.73
GLUCOSE BLD-MCNC: 242 MG/DL (ref 70–110)
GLUCOSE BLDC GLUCOMTR-MCNC: 202 MG/DL (ref 70–130)
GLUCOSE BLDC GLUCOMTR-MCNC: 204 MG/DL (ref 70–130)
GLUCOSE BLDC GLUCOMTR-MCNC: 219 MG/DL (ref 70–130)
GLUCOSE BLDC GLUCOMTR-MCNC: 272 MG/DL (ref 70–130)
HCT VFR BLD AUTO: 33.9 % (ref 37–47)
HGB BLD-MCNC: 11 G/DL (ref 12–16)
IMM GRANULOCYTES # BLD AUTO: 0.02 10*3/MM3 (ref 0–0.03)
IMM GRANULOCYTES NFR BLD AUTO: 0.2 % (ref 0–0.5)
INR PPP: 1.49 (ref 0.9–1.1)
LYMPHOCYTES # BLD AUTO: 0.64 10*3/MM3 (ref 1–3)
LYMPHOCYTES NFR BLD AUTO: 7.8 % (ref 16–46)
MAGNESIUM SERPL-MCNC: 2.1 MG/DL (ref 1.7–2.6)
MCH RBC QN AUTO: 29.8 PG (ref 27–33)
MCHC RBC AUTO-ENTMCNC: 32.4 G/DL (ref 33–37)
MCV RBC AUTO: 91.9 FL (ref 80–94)
MONOCYTES # BLD AUTO: 0.58 10*3/MM3 (ref 0.1–0.9)
MONOCYTES NFR BLD AUTO: 7.1 % (ref 0–12)
NEUTROPHILS # BLD AUTO: 6.86 10*3/MM3 (ref 1.4–6.5)
NEUTROPHILS NFR BLD AUTO: 84.1 % (ref 40–75)
OSMOLALITY SERPL CALC.SUM OF ELEC: 281.6 MOSM/KG (ref 273–305)
PHOSPHATE SERPL-MCNC: 4.3 MG/DL (ref 2.7–4.5)
PLATELET # BLD AUTO: 320 10*3/MM3 (ref 130–400)
PMV BLD AUTO: 10.9 FL (ref 6–10)
POTASSIUM BLD-SCNC: 4.7 MMOL/L (ref 3.5–5.3)
PROTHROMBIN TIME: 18.3 SECONDS (ref 11–15.4)
RBC # BLD AUTO: 3.69 10*6/MM3 (ref 4.2–5.4)
SODIUM BLD-SCNC: 133 MMOL/L (ref 135–153)
WBC NRBC COR # BLD: 8.16 10*3/MM3 (ref 4.5–12.5)

## 2019-02-10 PROCEDURE — 63710000001 INSULIN DETEMIR PER 5 UNITS: Performed by: INTERNAL MEDICINE

## 2019-02-10 PROCEDURE — 85025 COMPLETE CBC W/AUTO DIFF WBC: CPT | Performed by: INTERNAL MEDICINE

## 2019-02-10 PROCEDURE — 83735 ASSAY OF MAGNESIUM: CPT | Performed by: INTERNAL MEDICINE

## 2019-02-10 PROCEDURE — 99233 SBSQ HOSP IP/OBS HIGH 50: CPT | Performed by: INTERNAL MEDICINE

## 2019-02-10 PROCEDURE — 84100 ASSAY OF PHOSPHORUS: CPT | Performed by: INTERNAL MEDICINE

## 2019-02-10 PROCEDURE — 94799 UNLISTED PULMONARY SVC/PX: CPT

## 2019-02-10 PROCEDURE — 63710000001 INSULIN ASPART PER 5 UNITS: Performed by: INTERNAL MEDICINE

## 2019-02-10 PROCEDURE — 85610 PROTHROMBIN TIME: CPT | Performed by: INTERNAL MEDICINE

## 2019-02-10 PROCEDURE — 82962 GLUCOSE BLOOD TEST: CPT

## 2019-02-10 PROCEDURE — 25010000002 ENOXAPARIN PER 10 MG: Performed by: INTERNAL MEDICINE

## 2019-02-10 PROCEDURE — 80048 BASIC METABOLIC PNL TOTAL CA: CPT | Performed by: INTERNAL MEDICINE

## 2019-02-10 PROCEDURE — 25010000002 VANCOMYCIN 5 G RECONSTITUTED SOLUTION 5,000 MG VIAL

## 2019-02-10 RX ORDER — ACETAMINOPHEN 325 MG/1
650 TABLET ORAL EVERY 6 HOURS PRN
Status: DISCONTINUED | OUTPATIENT
Start: 2019-02-10 | End: 2019-02-20 | Stop reason: HOSPADM

## 2019-02-10 RX ORDER — HYDRALAZINE HYDROCHLORIDE 50 MG/1
50 TABLET, FILM COATED ORAL 3 TIMES DAILY
Status: DISCONTINUED | OUTPATIENT
Start: 2019-02-10 | End: 2019-02-15

## 2019-02-10 RX ADMIN — SODIUM CHLORIDE 75 ML/HR: 9 INJECTION, SOLUTION INTRAVENOUS at 18:33

## 2019-02-10 RX ADMIN — MONTELUKAST SODIUM 10 MG: 10 TABLET, COATED ORAL at 20:44

## 2019-02-10 RX ADMIN — CEFEPIME HYDROCHLORIDE 1 G: 1 INJECTION, POWDER, FOR SOLUTION INTRAMUSCULAR; INTRAVENOUS at 03:07

## 2019-02-10 RX ADMIN — IPRATROPIUM BROMIDE AND ALBUTEROL SULFATE 3 ML: .5; 3 SOLUTION RESPIRATORY (INHALATION) at 18:40

## 2019-02-10 RX ADMIN — CEFEPIME HYDROCHLORIDE 1 G: 1 INJECTION, POWDER, FOR SOLUTION INTRAMUSCULAR; INTRAVENOUS at 14:21

## 2019-02-10 RX ADMIN — WARFARIN SODIUM 5 MG: 5 TABLET ORAL at 14:40

## 2019-02-10 RX ADMIN — VANCOMYCIN HYDROCHLORIDE 750 MG: 5 INJECTION, POWDER, LYOPHILIZED, FOR SOLUTION INTRAVENOUS at 14:41

## 2019-02-10 RX ADMIN — HYDRALAZINE HYDROCHLORIDE 50 MG: 50 TABLET ORAL at 14:40

## 2019-02-10 RX ADMIN — INSULIN ASPART 4 UNITS: 100 INJECTION, SOLUTION INTRAVENOUS; SUBCUTANEOUS at 08:10

## 2019-02-10 RX ADMIN — INSULIN ASPART 3 UNITS: 100 INJECTION, SOLUTION INTRAVENOUS; SUBCUTANEOUS at 11:22

## 2019-02-10 RX ADMIN — INSULIN DETEMIR 6 UNITS: 100 INJECTION, SOLUTION SUBCUTANEOUS at 20:44

## 2019-02-10 RX ADMIN — HYDRALAZINE HYDROCHLORIDE 50 MG: 50 TABLET ORAL at 20:44

## 2019-02-10 RX ADMIN — ASPIRIN 81 MG: 81 TABLET ORAL at 08:08

## 2019-02-10 RX ADMIN — NIFEDIPINE 90 MG: 90 TABLET, EXTENDED RELEASE ORAL at 08:08

## 2019-02-10 RX ADMIN — BUDESONIDE AND FORMOTEROL FUMARATE DIHYDRATE 2 PUFF: 160; 4.5 AEROSOL RESPIRATORY (INHALATION) at 07:17

## 2019-02-10 RX ADMIN — GUAIFENESIN 1200 MG: 600 TABLET, EXTENDED RELEASE ORAL at 08:08

## 2019-02-10 RX ADMIN — IPRATROPIUM BROMIDE AND ALBUTEROL SULFATE 3 ML: .5; 3 SOLUTION RESPIRATORY (INHALATION) at 07:17

## 2019-02-10 RX ADMIN — METOPROLOL TARTRATE 100 MG: 100 TABLET, FILM COATED ORAL at 08:08

## 2019-02-10 RX ADMIN — INSULIN ASPART 4 UNITS: 100 INJECTION, SOLUTION INTRAVENOUS; SUBCUTANEOUS at 16:29

## 2019-02-10 RX ADMIN — GUAIFENESIN 1200 MG: 600 TABLET, EXTENDED RELEASE ORAL at 20:44

## 2019-02-10 RX ADMIN — DOXYCYCLINE 100 MG: 100 INJECTION, POWDER, LYOPHILIZED, FOR SOLUTION INTRAVENOUS at 00:07

## 2019-02-10 RX ADMIN — ENOXAPARIN SODIUM 60 MG: 60 INJECTION SUBCUTANEOUS at 20:44

## 2019-02-10 RX ADMIN — DOXYCYCLINE 100 MG: 100 INJECTION, POWDER, LYOPHILIZED, FOR SOLUTION INTRAVENOUS at 11:33

## 2019-02-10 RX ADMIN — IPRATROPIUM BROMIDE AND ALBUTEROL SULFATE 3 ML: .5; 3 SOLUTION RESPIRATORY (INHALATION) at 00:23

## 2019-02-10 RX ADMIN — IPRATROPIUM BROMIDE AND ALBUTEROL SULFATE 3 ML: .5; 3 SOLUTION RESPIRATORY (INHALATION) at 13:28

## 2019-02-10 RX ADMIN — BUDESONIDE AND FORMOTEROL FUMARATE DIHYDRATE 2 PUFF: 160; 4.5 AEROSOL RESPIRATORY (INHALATION) at 18:40

## 2019-02-10 RX ADMIN — SODIUM CHLORIDE, PRESERVATIVE FREE 3 ML: 5 INJECTION INTRAVENOUS at 08:09

## 2019-02-10 RX ADMIN — HYDRALAZINE HYDROCHLORIDE 50 MG: 50 TABLET ORAL at 11:33

## 2019-02-10 RX ADMIN — METOPROLOL TARTRATE 100 MG: 100 TABLET, FILM COATED ORAL at 20:44

## 2019-02-10 RX ADMIN — ENOXAPARIN SODIUM 60 MG: 60 INJECTION SUBCUTANEOUS at 08:08

## 2019-02-10 NOTE — PROGRESS NOTES
[facility] HOSPITALIST PROGRESS NOTE     Patient Identification:  Name:  Ashley Geronimo  Age:  73 y.o.  Sex:  female  :  1945  MRN:  [id number]  Visit Number:  [id number]  ROOM: [address] [place] [address]     Primary Care Provider:  Silvano Parker MD    Length of stay:  4    Subjective     Chief Complaint   Patient presents with   • Fluid Retention   • Altered Mental Status   • Cellulitis     Patient is a 73-year-old female with past medical history significant for A. fib, hypertension, diabetes, chronic respiratory failure (on home oxygen) who presented due to generalized weakness.  Patient also reported having some associated shortness of breath.  In ER, patient was noted to have an elevated lactic acid of 6, which CRP of 3.39.  Creatinine was also noted 1.5.  UA was done which are trace leukocyte esterase.  CT chest showed small bibasilar effusions.  Patient started on antibiotics for UTI. Lactic acid-1.9 (trending down). ABG showed PaO2 of 56. Repeat UA negative but done after antibiotics given. ABG showed PaO2 of 55 on 5L. Chest Xray showed trace left effusion, right perihilar consolidation, probable mild CHF. Pt put on high flow NC and pulmonary consult requested. VQ scan was low probability for PE. Eliquis switched to coumadin due to cost of eliquis which makes it unobtainable by pt per pharmacy. Antibiotics escalated by pulmonary to vanco, cefepime. CT chest was repeated which showed bibasilar effusions and bilateral consolidation. Pt now admits she feels short of breath.     Patient is noted to have been recently discharged within the past 2 weeks after being treated for septic shock due to UTI, pneumonia, possible cellulitis.  Patient also went into paroxysmal atrial fibrillation at that time, was discharged on Coumadin.  2D Echo from generator  showed EF 66%.     Today (02/10/19): Creatinine-1.3. INR-1.49. Pt still on high flow NC. No other acute changes reported.    Objective     Current  [facility] Meds:  aspirin 81 mg Oral Daily   budesonide-formoterol 2 puff Inhalation BID - RT   cefepime 1 g Intravenous Q12H   doxycycline 100 mg Intravenous Q12H   enoxaparin 1 mg/kg Subcutaneous Q12H   guaiFENesin 1,200 mg Oral Q12H   insulin aspart 0-7 Units Subcutaneous TID With Meals   insulin detemir 6 Units Subcutaneous Nightly   ipratropium-albuterol 3 mL Nebulization 4x Daily - RT   metoprolol tartrate 100 mg Oral Q12H   montelukast 10 mg Oral Nightly   NIFEdipine CC 90 mg Oral Q24H   sodium chloride 3 mL Intravenous Q12H   vancomycin 750 mg Intravenous Q24H   warfarin 5 mg Oral Daily     sodium chloride 75 mL/hr Last Rate: 75 mL/hr (02/06/19 1837)     ----------------------------------------------------------------------------------------------------------------------  Vital Signs:  Temp:  [97.8 °F (36.6 °C)-98.4 °F (36.9 °C)] 98.2 °F (36.8 °C)  Heart Rate:  [78-93] 93  Resp:  [18-22] 22  BP: (139-185)/(59-89) 178/75  SpO2:  [91 %-96 %] 94 %  on  Flow (L/min):  [50] 50;   Device (Oxygen Therapy): high-flow nasal cannula  Body mass index is 27.38 kg/m².    Wt Readings from Last 3 Encounters:   02/10/19 63.6 kg (140 lb 3.2 oz)   01/24/19 63.1 kg (139 lb 3 oz)   08/07/13 66.7 kg (147 lb 0 oz)       Intake/Output Summary (Last 24 hours) at 2/10/2019 0953  Last data filed at 2/10/2019 0625  Gross per 24 hour   Intake 200 ml   Output 1050 ml   Net -850 ml     Diet Regular; Cardiac, Consistent Carbohydrate, Renal  ----------------------------------------------------------------------------------------------------------------------  Physical exam:  Constitutional:  Well-developed and well-nourished.  No respiratory distress.      HENT:  Head:  Normocephalic and atraumatic.  Mouth:  Moist mucous membranes.    Eyes:  Conjunctivae and EOM are normal.  Pupils are equal, round, and reactive to light.  No scleral icterus.    Neck:  Neck supple.  No JVD present.    Cardiovascular:  Normal rate, regular rhythm and normal  heart sounds with no murmur.  Pulmonary/Chest:  No respiratory distress, no wheezes, no crackles, with normal breath sounds and good air movement.  Abdominal:  Soft.  Bowel sounds are normal.  No distension and no tenderness.   Musculoskeletal:  No edema, no tenderness, and no deformity.  No red or swollen joints anywhere.    Neurological:  Alert and oriented to person, place, and time.  No cranial nerve deficit.  No tongue deviation.  No facial droop.  No slurred speech.   Skin:  Skin is warm and dry. No rash noted. No pallor.   Peripheral vascular:  Strong pulses in all 4 extremities with no clubbing, no cyanosis, trace edema.   ----------------------------------------------------------------------------------------------------------------------Results from last 7 days   Lab Units 02/10/19  0543 02/09/19  0540 02/08/19  1155 02/08/19  0426 02/07/19  0449 02/07/19  0024 02/06/19  0926 02/06/19  0121 02/06/19  0100 02/05/19  2111 02/05/19  1838   CRP mg/dL  --   --   --   --   --   --   --   --  3.39*  --  3.34*   LACTATE mmol/L  --   --   --   --   --  1.9  --  3.2*  --  6.0*  --    WBC 10*3/mm3 8.16 11.02  --  9.55  --  7.05  --   --  7.55  --  8.39   HEMOGLOBIN g/dL 11.0* 11.4*  --  11.6*  --  10.7*  --   --  11.2*  --  12.2   HEMATOCRIT % 33.9* 35.0*  --  35.2*  --  34.3*  --   --  34.8*  --  37.5   MCV fL 91.9 89.7  --  89.1  --  92.7  --   --  90.9  --  89.5   MCHC g/dL 32.4* 32.6*  --  33.0  --  31.2*  --   --  32.2*  --  32.5*   PLATELETS 10*3/mm3 320 330  --  298  --  254  --   --  292  --  348   INR  1.49* 1.31* 1.35*  --  1.30*  --  1.15*  --   --   --  1.14*     Results from last 7 days   Lab Units 02/10/19  0543 02/09/19  0540 02/08/19  0426  02/06/19  2347   SODIUM mmol/L 133* 138 140  --  138   POTASSIUM mmol/L 4.7 4.2 3.7  --  4.2   MAGNESIUM mg/dL 2.1 2.5 1.9   < > 2.2   CHLORIDE mmol/L 102 104 108  --  107   CO2 mmol/L 18.6* 24.2* 22.3*  --  20.0*   BUN mg/dL 33* 29* 26*  --  37*   CREATININE mg/dL  1.30* 1.18 1.26  --  1.55*   PHOSPHORUS mg/dL 4.3  --  4.5  --   --    EGFR IF NONAFRICN AM mL/min/1.73 40* 45* 42*  --  33*   CALCIUM mg/dL 8.6 8.7 8.3  --  7.1*   GLUCOSE mg/dL 242* 47* 38*  --  175*   ALBUMIN g/dL  --  4.30 3.90  --  3.40   BILIRUBIN mg/dL  --  0.5 0.5  --  0.3   ALK PHOS U/L  --  90 78  --  73   AST (SGOT) U/L  --  21 15  --  22   ALT (SGPT) U/L  --  21 9*  --  19    < > = values in this interval not displayed.   Estimated Creatinine Clearance: 31.6 mL/min (A) (by C-G formula based on SCr of 1.3 mg/dL (H)).  Results from last 7 days   Lab Units 02/06/19  0710 02/06/19  0100 02/05/19  2324   TROPONIN I ng/mL 0.024 0.021 0.024     Glucose   Date/Time Value Ref Range Status   02/10/2019 0007 202 (H) 70 - 130 mg/dL Final   02/09/2019 1941 146 (H) 70 - 130 mg/dL Final   02/09/2019 1700 180 (H) 70 - 130 mg/dL Final   02/09/2019 1057 221 (H) 70 - 130 mg/dL Final   02/09/2019 0800 167 (H) 70 - 130 mg/dL Final   02/09/2019 0655 51 (L) 70 - 130 mg/dL Final   02/09/2019 0021 138 (H) 70 - 130 mg/dL Final   02/08/2019 1912 248 (H) 70 - 130 mg/dL Final     No results found for: AMMONIA    Results from last 7 days   Lab Units 02/06/19  2306   NITRITE UA  Negative   WBC UA /HPF 0-2   BACTERIA UA /HPF None Seen   SQUAM EPITHEL UA /HPF 0-2     Blood Culture   Date Value Ref Range Status   02/05/2019 No growth at 4 days  Preliminary   02/05/2019 No growth at 4 days  Preliminary          I have personally looked at the labs and they are summarized above.  ----------------------------------------------------------------------------------------------------------------------  Imaging Results (last 24 hours)     ** No results found for the last 24 hours. **        I have personally reviewed the radiology images and read the final radiology report.    Assessment & Plan            Acute hypoxic respiratory failure  Likely due to pneumonia  -Continue oxygen supplementation  -Monitor respiratory status  -Continue  antibiotics  -Follow pulmonary recommendations        UTI  -Continue antibiotics     Lactic acidosis  Likely due to dehydration +/- metformin. Resolved     Acute renal failure  Improving  -Monitor creatinine     Atrial fibrillation  Unable to use eliquis due to cost per pharmacy  -Continue Lovenox, Coumadin  -Monitor INR     Hypertension  -Hydralazine added  -Continue BP meds  -Monitor blood pressure     Diabetes  -Continue diabetic meds  -Monitor blood glucose      Charles Dickinson MD  02/10/19  9:53 AM

## 2019-02-11 LAB
ANION GAP SERPL CALCULATED.3IONS-SCNC: 9.6 MMOL/L (ref 3.6–11.2)
BASOPHILS # BLD AUTO: 0.01 10*3/MM3 (ref 0–0.3)
BASOPHILS NFR BLD AUTO: 0.1 % (ref 0–2)
BUN BLD-MCNC: 40 MG/DL (ref 7–21)
BUN/CREAT SERPL: 30.5 (ref 7–25)
CALCIUM SPEC-SCNC: 8.5 MG/DL (ref 7.7–10)
CHLORIDE SERPL-SCNC: 106 MMOL/L (ref 99–112)
CO2 SERPL-SCNC: 20.4 MMOL/L (ref 24.3–31.9)
CREAT BLD-MCNC: 1.31 MG/DL (ref 0.43–1.29)
DEPRECATED RDW RBC AUTO: 42.9 FL (ref 37–54)
EOSINOPHIL # BLD AUTO: 0.04 10*3/MM3 (ref 0–0.7)
EOSINOPHIL NFR BLD AUTO: 0.6 % (ref 0–7)
ERYTHROCYTE [DISTWIDTH] IN BLOOD BY AUTOMATED COUNT: 13.4 % (ref 11.5–14.5)
GFR SERPL CREATININE-BSD FRML MDRD: 40 ML/MIN/1.73
GLUCOSE BLD-MCNC: 249 MG/DL (ref 70–110)
GLUCOSE BLDC GLUCOMTR-MCNC: 224 MG/DL (ref 70–130)
GLUCOSE BLDC GLUCOMTR-MCNC: 224 MG/DL (ref 70–130)
GLUCOSE BLDC GLUCOMTR-MCNC: 239 MG/DL (ref 70–130)
GLUCOSE BLDC GLUCOMTR-MCNC: 244 MG/DL (ref 70–130)
GLUCOSE BLDC GLUCOMTR-MCNC: 278 MG/DL (ref 70–130)
HCT VFR BLD AUTO: 33.7 % (ref 37–47)
HGB BLD-MCNC: 10.9 G/DL (ref 12–16)
IMM GRANULOCYTES # BLD AUTO: 0.02 10*3/MM3 (ref 0–0.03)
IMM GRANULOCYTES NFR BLD AUTO: 0.3 % (ref 0–0.5)
INR PPP: 2.31 (ref 0.9–1.1)
INR PPP: 2.48 (ref 0.9–1.1)
LYMPHOCYTES # BLD AUTO: 0.63 10*3/MM3 (ref 1–3)
LYMPHOCYTES NFR BLD AUTO: 8.8 % (ref 16–46)
M PNEUMONIAE IGG ABS: <100 U/ML (ref 0–99)
M PNEUMONIAE IGM ABS: <770 U/ML (ref 0–769)
MAGNESIUM SERPL-MCNC: 2 MG/DL (ref 1.7–2.6)
MCH RBC QN AUTO: 29.3 PG (ref 27–33)
MCHC RBC AUTO-ENTMCNC: 32.3 G/DL (ref 33–37)
MCV RBC AUTO: 90.6 FL (ref 80–94)
MONOCYTES # BLD AUTO: 0.71 10*3/MM3 (ref 0.1–0.9)
MONOCYTES NFR BLD AUTO: 9.9 % (ref 0–12)
NEUTROPHILS # BLD AUTO: 5.79 10*3/MM3 (ref 1.4–6.5)
NEUTROPHILS NFR BLD AUTO: 80.3 % (ref 40–75)
OSMOLALITY SERPL CALC.SUM OF ELEC: 290.1 MOSM/KG (ref 273–305)
PHOSPHATE SERPL-MCNC: 4.1 MG/DL (ref 2.7–4.5)
PLATELET # BLD AUTO: 357 10*3/MM3 (ref 130–400)
PMV BLD AUTO: 10.5 FL (ref 6–10)
POTASSIUM BLD-SCNC: 4.1 MMOL/L (ref 3.5–5.3)
PROTHROMBIN TIME: 25.6 SECONDS (ref 11–15.4)
PROTHROMBIN TIME: 27.1 SECONDS (ref 11–15.4)
RBC # BLD AUTO: 3.72 10*6/MM3 (ref 4.2–5.4)
SODIUM BLD-SCNC: 136 MMOL/L (ref 135–153)
VANCOMYCIN TROUGH SERPL-MCNC: 20.1 MCG/ML (ref 5–15)
WBC NRBC COR # BLD: 7.2 10*3/MM3 (ref 4.5–12.5)

## 2019-02-11 PROCEDURE — 83735 ASSAY OF MAGNESIUM: CPT | Performed by: INTERNAL MEDICINE

## 2019-02-11 PROCEDURE — 80048 BASIC METABOLIC PNL TOTAL CA: CPT | Performed by: INTERNAL MEDICINE

## 2019-02-11 PROCEDURE — 80202 ASSAY OF VANCOMYCIN: CPT | Performed by: INTERNAL MEDICINE

## 2019-02-11 PROCEDURE — 82962 GLUCOSE BLOOD TEST: CPT

## 2019-02-11 PROCEDURE — 84100 ASSAY OF PHOSPHORUS: CPT | Performed by: INTERNAL MEDICINE

## 2019-02-11 PROCEDURE — 63710000001 INSULIN ASPART PER 5 UNITS: Performed by: INTERNAL MEDICINE

## 2019-02-11 PROCEDURE — 94799 UNLISTED PULMONARY SVC/PX: CPT

## 2019-02-11 PROCEDURE — 99232 SBSQ HOSP IP/OBS MODERATE 35: CPT | Performed by: INTERNAL MEDICINE

## 2019-02-11 PROCEDURE — 85025 COMPLETE CBC W/AUTO DIFF WBC: CPT | Performed by: INTERNAL MEDICINE

## 2019-02-11 PROCEDURE — 63710000001 INSULIN DETEMIR PER 5 UNITS: Performed by: INTERNAL MEDICINE

## 2019-02-11 PROCEDURE — 25010000002 VANCOMYCIN 5 G RECONSTITUTED SOLUTION 5,000 MG VIAL

## 2019-02-11 PROCEDURE — 85610 PROTHROMBIN TIME: CPT | Performed by: INTERNAL MEDICINE

## 2019-02-11 PROCEDURE — 25010000002 ENOXAPARIN PER 10 MG: Performed by: INTERNAL MEDICINE

## 2019-02-11 PROCEDURE — 99232 SBSQ HOSP IP/OBS MODERATE 35: CPT | Performed by: HOSPITALIST

## 2019-02-11 PROCEDURE — 85610 PROTHROMBIN TIME: CPT | Performed by: HOSPITALIST

## 2019-02-11 PROCEDURE — 25010000002 FUROSEMIDE PER 20 MG: Performed by: HOSPITALIST

## 2019-02-11 RX ORDER — WARFARIN SODIUM 3 MG/1
3 TABLET ORAL
Status: COMPLETED | OUTPATIENT
Start: 2019-02-11 | End: 2019-02-11

## 2019-02-11 RX ORDER — METOLAZONE 2.5 MG/1
2.5 TABLET ORAL DAILY
Status: COMPLETED | OUTPATIENT
Start: 2019-02-11 | End: 2019-02-11

## 2019-02-11 RX ORDER — SENNA AND DOCUSATE SODIUM 50; 8.6 MG/1; MG/1
2 TABLET, FILM COATED ORAL NIGHTLY
Status: DISCONTINUED | OUTPATIENT
Start: 2019-02-11 | End: 2019-02-20 | Stop reason: HOSPADM

## 2019-02-11 RX ORDER — BISACODYL 10 MG
10 SUPPOSITORY, RECTAL RECTAL ONCE
Status: COMPLETED | OUTPATIENT
Start: 2019-02-11 | End: 2019-02-11

## 2019-02-11 RX ORDER — FUROSEMIDE 10 MG/ML
20 INJECTION INTRAMUSCULAR; INTRAVENOUS ONCE
Status: COMPLETED | OUTPATIENT
Start: 2019-02-11 | End: 2019-02-11

## 2019-02-11 RX ADMIN — INSULIN ASPART 3 UNITS: 100 INJECTION, SOLUTION INTRAVENOUS; SUBCUTANEOUS at 17:23

## 2019-02-11 RX ADMIN — INSULIN DETEMIR 6 UNITS: 100 INJECTION, SOLUTION SUBCUTANEOUS at 21:27

## 2019-02-11 RX ADMIN — HYDRALAZINE HYDROCHLORIDE 50 MG: 50 TABLET ORAL at 16:15

## 2019-02-11 RX ADMIN — GUAIFENESIN 1200 MG: 600 TABLET, EXTENDED RELEASE ORAL at 08:25

## 2019-02-11 RX ADMIN — DOXYCYCLINE 100 MG: 100 INJECTION, POWDER, LYOPHILIZED, FOR SOLUTION INTRAVENOUS at 11:45

## 2019-02-11 RX ADMIN — SODIUM CHLORIDE, PRESERVATIVE FREE 3 ML: 5 INJECTION INTRAVENOUS at 08:26

## 2019-02-11 RX ADMIN — METOPROLOL TARTRATE 100 MG: 100 TABLET, FILM COATED ORAL at 21:26

## 2019-02-11 RX ADMIN — IPRATROPIUM BROMIDE AND ALBUTEROL SULFATE 3 ML: .5; 3 SOLUTION RESPIRATORY (INHALATION) at 18:25

## 2019-02-11 RX ADMIN — MONTELUKAST SODIUM 10 MG: 10 TABLET, COATED ORAL at 21:26

## 2019-02-11 RX ADMIN — INSULIN ASPART 3 UNITS: 100 INJECTION, SOLUTION INTRAVENOUS; SUBCUTANEOUS at 08:25

## 2019-02-11 RX ADMIN — FUROSEMIDE 20 MG: 10 INJECTION, SOLUTION INTRAMUSCULAR; INTRAVENOUS at 13:39

## 2019-02-11 RX ADMIN — IPRATROPIUM BROMIDE AND ALBUTEROL SULFATE 3 ML: .5; 3 SOLUTION RESPIRATORY (INHALATION) at 06:34

## 2019-02-11 RX ADMIN — METOPROLOL TARTRATE 100 MG: 100 TABLET, FILM COATED ORAL at 08:25

## 2019-02-11 RX ADMIN — HYDRALAZINE HYDROCHLORIDE 50 MG: 50 TABLET ORAL at 08:25

## 2019-02-11 RX ADMIN — BUDESONIDE AND FORMOTEROL FUMARATE DIHYDRATE 2 PUFF: 160; 4.5 AEROSOL RESPIRATORY (INHALATION) at 06:35

## 2019-02-11 RX ADMIN — SENNOSIDES AND DOCUSATE SODIUM 2 TABLET: 8.6; 5 TABLET ORAL at 21:26

## 2019-02-11 RX ADMIN — CEFEPIME HYDROCHLORIDE 1 G: 1 INJECTION, POWDER, FOR SOLUTION INTRAMUSCULAR; INTRAVENOUS at 03:36

## 2019-02-11 RX ADMIN — BUDESONIDE AND FORMOTEROL FUMARATE DIHYDRATE 2 PUFF: 160; 4.5 AEROSOL RESPIRATORY (INHALATION) at 18:25

## 2019-02-11 RX ADMIN — VANCOMYCIN HYDROCHLORIDE 500 MG: 5 INJECTION, POWDER, LYOPHILIZED, FOR SOLUTION INTRAVENOUS at 17:41

## 2019-02-11 RX ADMIN — IPRATROPIUM BROMIDE AND ALBUTEROL SULFATE 3 ML: .5; 3 SOLUTION RESPIRATORY (INHALATION) at 00:46

## 2019-02-11 RX ADMIN — DOXYCYCLINE 100 MG: 100 INJECTION, POWDER, LYOPHILIZED, FOR SOLUTION INTRAVENOUS at 00:44

## 2019-02-11 RX ADMIN — ENOXAPARIN SODIUM 60 MG: 60 INJECTION SUBCUTANEOUS at 21:26

## 2019-02-11 RX ADMIN — Medication 10 MG: at 14:43

## 2019-02-11 RX ADMIN — HYDRALAZINE HYDROCHLORIDE 50 MG: 50 TABLET ORAL at 21:26

## 2019-02-11 RX ADMIN — IPRATROPIUM BROMIDE AND ALBUTEROL SULFATE 3 ML: .5; 3 SOLUTION RESPIRATORY (INHALATION) at 13:24

## 2019-02-11 RX ADMIN — SODIUM CHLORIDE 75 ML/HR: 9 INJECTION, SOLUTION INTRAVENOUS at 11:45

## 2019-02-11 RX ADMIN — INSULIN ASPART 3 UNITS: 100 INJECTION, SOLUTION INTRAVENOUS; SUBCUTANEOUS at 11:45

## 2019-02-11 RX ADMIN — NIFEDIPINE 90 MG: 90 TABLET, EXTENDED RELEASE ORAL at 08:25

## 2019-02-11 RX ADMIN — CEFEPIME HYDROCHLORIDE 1 G: 1 INJECTION, POWDER, FOR SOLUTION INTRAMUSCULAR; INTRAVENOUS at 16:14

## 2019-02-11 RX ADMIN — ENOXAPARIN SODIUM 60 MG: 60 INJECTION SUBCUTANEOUS at 08:25

## 2019-02-11 RX ADMIN — METOLAZONE 2.5 MG: 2.5 TABLET ORAL at 13:39

## 2019-02-11 RX ADMIN — WARFARIN SODIUM 3 MG: 3 TABLET ORAL at 17:23

## 2019-02-11 RX ADMIN — GUAIFENESIN 1200 MG: 600 TABLET, EXTENDED RELEASE ORAL at 21:26

## 2019-02-11 RX ADMIN — ASPIRIN 81 MG: 81 TABLET ORAL at 08:25

## 2019-02-11 NOTE — PROGRESS NOTES
Discharge Planning Assessment   [facility]     Patient Name: Ashley Geronimo  MRN: [id number]  Today's Date: 2/11/2019    Admit Date: 2/5/2019        Discharge Plan     Row Name 02/11/19 1229       Plan    Plan  Pt admitted on 2/5/19.  Pt lives at home with family and plans to return home at discharge.  [facility] utilizes [facility].  [facility] will need new referral with [facility] at discharge.  Pt currently utilizes home 02 via [facility].  SS will follow.         Milka Reyes

## 2019-02-11 NOTE — PROGRESS NOTES
Patient continues on day 4 vancomycin. Vancomycin trough level was reported as 20.1 mg/L today. Based on this level, will decrease vancomycin dose to 500 mg daily. Will continue to follow and recheck a trough level when appropriate.    Thank you,    Dorothy Toure [facility]

## 2019-02-11 NOTE — PROGRESS NOTES
[facility] HOSPITALIST PROGRESS NOTE     Patient Identification:  Name:  Ashley Geronimo  Age:  73 y.o.  Sex:  female  :  1945  MRN:  [id number]  Visit Number:  [id number]  Primary Care Provider:  Silvano Parker MD    Length of stay:  5    Subjective:  Patient is out of bed to chair he reports she is breathing better, she is still on high flow nasal cannula, she reports she is diuresing, she has significant bilateral lower extremity edema which she claims chronic and recurrent.  Reports constipation.    Chief Complaint: Short of breath  ----------------------------------------------------------------------------------------------------------------------  Current Hospital Meds:    aspirin 81 mg Oral Daily   bisacodyl 10 mg Rectal Once   budesonide-formoterol 2 puff Inhalation BID - RT   cefepime 1 g Intravenous Q12H   doxycycline 100 mg Intravenous Q12H   enoxaparin 1 mg/kg Subcutaneous Q12H   furosemide 20 mg Intravenous Once   guaiFENesin 1,200 mg Oral Q12H   hydrALAZINE 50 mg Oral TID   insulin aspart 0-7 Units Subcutaneous TID With Meals   insulin detemir 6 Units Subcutaneous Nightly   ipratropium-albuterol 3 mL Nebulization 4x Daily - RT   metOLazone 2.5 mg Oral Daily   metoprolol tartrate 100 mg Oral Q12H   montelukast 10 mg Oral Nightly   sennosides-docusate sodium 2 tablet Oral Nightly   sodium chloride 3 mL Intravenous Q12H   vancomycin 750 mg Intravenous Q24H   warfarin 5 mg Oral Daily       Pharmacy to dose warfarin     sodium chloride 75 mL/hr Last Rate: 75 mL/hr (19 1145)     ----------------------------------------------------------------------------------------------------------------------  Vital Signs:  Temp:  [97.9 °F (36.6 °C)-98.3 °F (36.8 °C)] 97.9 °F (36.6 °C)  Heart Rate:  [62-90] 62  Resp:  [18-24] 20  BP: (122-178)/(53-76) 142/63       Tele: Sinus rhythm rate of 68 bpm      19  0247 02/10/19  0321 19  0232   Weight: 63.6 kg (140 lb 3.2 oz) 63.6 kg  (140 lb 3.2 oz) 64 kg (141 lb)     Body mass index is 27.54 kg/m².    Intake/Output Summary (Last 24 hours) at 2/11/2019 1241  Last data filed at 2/11/2019 1145  Gross per 24 hour   Intake 1250 ml   Output 700 ml   Net 550 ml     Diet Regular; Cardiac, Consistent Carbohydrate, Renal  ----------------------------------------------------------------------------------------------------------------------  Physical exam:  General: Comfortable,awake, alert, oriented to self, place, and time, chronically ill-appearing,  No respiratory distress.    Skin:  Skin is warm and dry. No rash noted. No pallor.    HENT:  Head:  Normocephalic and atraumatic.  Mouth:  Moist mucous membranes.    Eyes:  Conjunctivae and EOM are normal.  Pupils are equal, round, and reactive to light.  No scleral icterus.    Neck:  Neck supple.  JVD present.    Pulmonary/Chest:  No respiratory distress, no wheezes, no crackles, with normal breath sounds and good air movement.  Decreased breath sounds both lower lung fields  Cardiovascular:  Normal rate, regular rhythm and normal heart sounds with no murmur.  Abdominal:  Soft.  Bowel sounds are normal.  No distension and no tenderness.   Extremities: +3 edema both lower extremity pitting type, good pedal pulse, no cyanosis or clubbing, no Homans signthe legs.  There is some mild redness on the right lower extremity leg.  Neurological:  Motor strength equal no obvious deficit, sensory grossly intact.   No cranial nerve deficit.  No tongue deviation.  No facial droop.  No slurred speech.      ----------------------------------------------------------------------------------------------------------------------  ----------------------------------------------------------------------------------------------------------------------  Results from last 7 days   Lab Units 02/06/19  0710 02/06/19  0100 02/05/19  2324   TROPONIN I ng/mL 0.024 0.021 0.024     Results from last 7 days   Lab Units 02/11/19  0425  02/10/19  0543 02/09/19  0540 02/08/19  1155  02/07/19  0449 02/07/19  0024 02/06/19  0926 02/06/19  0121 02/06/19  0100 02/05/19  2111 02/05/19  1838   CRP mg/dL  --   --   --   --   --   --   --   --   --  3.39*  --  3.34*   LACTATE mmol/L  --   --   --   --   --   --  1.9  --  3.2*  --  6.0*  --    WBC 10*3/mm3 7.20 8.16 11.02  --    < >  --  7.05  --   --  7.55  --  8.39   HEMOGLOBIN g/dL 10.9* 11.0* 11.4*  --    < >  --  10.7*  --   --  11.2*  --  12.2   HEMATOCRIT % 33.7* 33.9* 35.0*  --    < >  --  34.3*  --   --  34.8*  --  37.5   MCV fL 90.6 91.9 89.7  --    < >  --  92.7  --   --  90.9  --  89.5   MCHC g/dL 32.3* 32.4* 32.6*  --    < >  --  31.2*  --   --  32.2*  --  32.5*   PLATELETS 10*3/mm3 357 320 330  --    < >  --  254  --   --  292  --  348   INR  2.31* 1.49* 1.31* 1.35*  --  1.30*  --  1.15*  --   --   --  1.14*    < > = values in this interval not displayed.     Results from last 7 days   Lab Units 02/08/19  1120   PH, ARTERIAL pH units 7.460*   PO2 ART mm Hg 55.9*   PCO2, ARTERIAL mm Hg 28.9*   HCO3 ART mmol/L 20.1*     Results from last 7 days   Lab Units 02/11/19  0426 02/10/19  0543 02/09/19  0540 02/08/19  0426  02/06/19  2347   SODIUM mmol/L 136 133* 138 140  --  138   POTASSIUM mmol/L 4.1 4.7 4.2 3.7  --  4.2   MAGNESIUM mg/dL 2.0 2.1 2.5 1.9   < > 2.2   CHLORIDE mmol/L 106 102 104 108  --  107   CO2 mmol/L 20.4* 18.6* 24.2* 22.3*  --  20.0*   BUN mg/dL 40* 33* 29* 26*  --  37*   CREATININE mg/dL 1.31* 1.30* 1.18 1.26  --  1.55*   EGFR IF NONAFRICN AM mL/min/1.73 40* 40* 45* 42*  --  33*   CALCIUM mg/dL 8.5 8.6 8.7 8.3  --  7.1*   GLUCOSE mg/dL 249* 242* 47* 38*  --  175*   ALBUMIN g/dL  --   --  4.30 3.90  --  3.40   BILIRUBIN mg/dL  --   --  0.5 0.5  --  0.3   ALK PHOS U/L  --   --  90 78  --  73   AST (SGOT) U/L  --   --  21 15  --  22   ALT (SGPT) U/L  --   --  21 9*  --  19    < > = values in this interval not displayed.   Estimated Creatinine Clearance: 31.5 mL/min (A) (by C-G formula  based on SCr of 1.31 mg/dL (H)).    No results found for: AMMONIA      Blood Culture   Date Value Ref Range Status   02/05/2019 No growth at 5 days  Final   02/05/2019 No growth at 5 days  Final                I have personally looked at the labs and they are summarized above.  ----------------------------------------------------------------------------------------------------------------------  Imaging Results (last 24 hours)     ** No results found for the last 24 hours. **        ----------------------------------------------------------------------------------------------------------------------  Assessment and Plan:    - Acute on chronic hypoxic respiratory failure secondary to healthcare associated pneumonia right-sided  - Acute on chronic diastolic heart failure  - Atrial fibrillation now on chronic anticoagulation  - Chronic leg edema  - Acute kidney injury  - Diabetes type 2 insulin requiring  - Constipation    Continue antibiotic, diuretics when necessary, monitor renal function and electrolytes closely, strict I&O, we will also discontinue nifedipine due to significant edema of the legs and adjust her blood pressure medication as needed.  Continue Accu-Chek with sliding scale.  Address constipation.    Rosa Armenta MD  02/11/19  12:41 PM

## 2019-02-11 NOTE — PROGRESS NOTES
Chief Complaint:  Shortness of breath    Subjective     Interval History:   She reports mild improvement in her breathing.  She is still on high flow nasal cannula with the flow rate of 50 L/m of supplemental oxygen.  Hemodynamically stable.  No fever overnight      Review of Systems:   Review of Systems - History obtained from chart review and the patient  General ROS: negative for - chills, fatigue, fever, malaise, night sweats or sleep disturbance  Psychological ROS: negative for - anxiety, behavioral disorder, depression or memory difficulties  Ophthalmic ROS: negative for - blurry vision, decreased vision, double vision or dry eyes  ENT ROS: negative for - epistaxis, hearing change or sneezing  Allergy and Immunology ROS: negative for - hives, itchy/watery eyes or nasal congestion  Hematological and Lymphatic ROS: negative for - bleeding problems, blood clots, blood transfusions, jaundice or night sweats  Endocrine ROS: negative for - malaise/lethargy, mood swings, polydipsia/polyuria or temperature intolerance  Respiratory ROS: positive for - cough, shortness of breath and wheezing  negative for - orthopnea, pleuritic pain or sputum changes  Cardiovascular ROS: no chest pain or dyspnea on exertion  Gastrointestinal ROS: no abdominal pain, change in bowel habits, or black or bloody stools  Musculoskeletal ROS: negative for - joint pain, joint stiffness or joint swelling  Neurological ROS: no TIA or stroke symptoms  Dermatological ROS: negative for acne, dry skin and eczema      Vital Signs  Temp:  [97.9 °F (36.6 °C)-98.1 °F (36.7 °C)] 98.1 °F (36.7 °C)  Heart Rate:  [62-90] 78  Resp:  [18-24] 20  BP: (122-178)/(53-77) 158/77    Intake/Output Summary (Last 24 hours) at 2/11/2019 1712  Last data filed at 2/11/2019 1614  Gross per 24 hour   Intake 1350 ml   Output 1150 ml   Net 200 ml       Physical Exam:  Constitutional:  oriented to person, place, and time. appears well-developed and well-nourished. No distress.    HENT:   Head: Normocephalic and atraumatic.   Right Ear: External ear normal.   Left Ear: External ear normal.   Nose: Nose normal.   Eyes: Pupils are equal, round, and reactive to light. Conjunctivae and EOM are normal. Right eye exhibits no discharge. Left eye exhibits no discharge. No scleral icterus.   Neck: Normal range of motion. Neck supple. No thyromegaly present.   Cardiovascular: Normal rate, regular rhythm, normal heart sounds and intact distal pulses.  Exam reveals no friction rub.    No murmur heard.  Pulmonary/Chest:  No stridor.  Bilateral air entry equal.  Rhonchorous chest.  Wheezing absent.  Bilateral crackles present.  Abdominal: Soft. Bowel sounds are normal.exhibits no distension. There is no tenderness.   Musculoskeletal: Normal range of motion. exhibits no deformity.   +1 lower extremity edema bilateral.   Neurological: alert and oriented to person, place, and time. No cranial nerve deficit. Coordination normal.   Skin: Skin is warm and dry. Capillary refill takes less than 2 seconds. not diaphoretic. No erythema.   Psychiatric:   normal mood and affect.   behavior is normal.        Results Review:   I reviewed the patient's new clinical results.  Results from last 7 days   Lab Units 02/11/19  0426 02/10/19  0543 02/09/19  0540   WBC 10*3/mm3 7.20 8.16 11.02   HEMOGLOBIN g/dL 10.9* 11.0* 11.4*   PLATELETS 10*3/mm3 357 320 330     Results from last 7 days   Lab Units 02/11/19  0426 02/10/19  0543 02/09/19  0540   SODIUM mmol/L 136 133* 138   POTASSIUM mmol/L 4.1 4.7 4.2   CHLORIDE mmol/L 106 102 104   CO2 mmol/L 20.4* 18.6* 24.2*   BUN mg/dL 40* 33* 29*   CREATININE mg/dL 1.31* 1.30* 1.18   CALCIUM mg/dL 8.5 8.6 8.7   GLUCOSE mg/dL 249* 242* 47*   MAGNESIUM mg/dL 2.0 2.1 2.5     Lab Results   Component Value Date    INR 2.48 (H) 02/11/2019    INR 2.31 (H) 02/11/2019    INR 1.49 (H) 02/10/2019    PROTIME 27.1 (H) 02/11/2019    PROTIME 25.6 (H) 02/11/2019    PROTIME 18.3 (H) 02/10/2019      Results from last 7 days   Lab Units 02/09/19  0540 02/08/19  0426 02/06/19  2347   ALK PHOS U/L 90 78 73   BILIRUBIN mg/dL 0.5 0.5 0.3   ALT (SGPT) U/L 21 9* 19   AST (SGOT) U/L 21 15 22     Results from last 7 days   Lab Units 02/08/19  1120   PH, ARTERIAL pH units 7.460*   PO2 ART mm Hg 55.9*   PCO2, ARTERIAL mm Hg 28.9*   HCO3 ART mmol/L 20.1*     Imaging Results (last 24 hours)     ** No results found for the last 24 hours. **                 aspirin 81 mg Oral Daily   budesonide-formoterol 2 puff Inhalation BID - RT   cefepime 1 g Intravenous Q12H   doxycycline 100 mg Intravenous Q12H   enoxaparin 1 mg/kg Subcutaneous Q12H   guaiFENesin 1,200 mg Oral Q12H   hydrALAZINE 50 mg Oral TID   insulin aspart 0-7 Units Subcutaneous TID With Meals   insulin detemir 6 Units Subcutaneous Nightly   ipratropium-albuterol 3 mL Nebulization 4x Daily - RT   metoprolol tartrate 100 mg Oral Q12H   montelukast 10 mg Oral Nightly   sennosides-docusate sodium 2 tablet Oral Nightly   sodium chloride 3 mL Intravenous Q12H   vancomycin 500 mg Intravenous Q24H   warfarin 3 mg Oral Once       Pharmacy to dose warfarin     sodium chloride 75 mL/hr Last Rate: 75 mL/hr (02/11/19 1145)       I reviewed the medications.    Assessment/Plan     I have reviewed labs.    I have reviewed the images CT scan of the chest.  Assessment my interpretation chest x-ray showed bilateral pulmonary edema with bilateral pleural effusion.  Interstitial thickening.  Effusion ground glassing.  Bilateral basal atelectasis.      Assessment:  Shortness of breath  Acute on chronic hypoxic respiratory failure  Chronic hypoxic respiratory failure on home oxygen  Right middle lobe pneumonia , unspecified organism   COPD, centrilobular emphysema type  Bilateral pleural effusion  Physical deconditioning  Bilateral basal atelectasis        Plan:  - Intermittent diuresis as per primary team.  In case of worsening kidney function, I recommend ultrasound-guided  therapeutic thoracentesis.  Patient is currently on Lovenox to warfarin bridging.  -  follow respiratory cultures  - Continue doxycycline 100 mg twice a day IV  - I have discontinued IV fluids  - Continue on Lovenox therapeutic dosing.  Patient is currently on Lovenox to Coumadin bridging.  - Continue with duo nebs  -Continue with Symbicort nebs  -  continue on IV cefepime and IV vancomycin.  Vancomycin dosing as per pharmacy  -  continue Mucomyst nebs  - Aggressive PTOT while in hospital  - Incentive spirometer to her basal atelectasis  -Continue on BiPAP 15/8 with FiO2 35% and rate of 14 when necessary.      Nurse and respiratory therapist were updated about the plan.  I discuss the clinical plan with Dr.Oculam Quang Noonan MD  02/11/19  5:12 PM

## 2019-02-12 ENCOUNTER — APPOINTMENT (OUTPATIENT)
Dept: GENERAL RADIOLOGY | Facility: HOSPITAL | Age: 74
End: 2019-02-12

## 2019-02-12 LAB
A-A DO2: 245.3 MMHG (ref 0–300)
ANION GAP SERPL CALCULATED.3IONS-SCNC: 8.7 MMOL/L (ref 3.6–11.2)
ARTERIAL PATENCY WRIST A: POSITIVE
ATMOSPHERIC PRESS: 729 MMHG
BASE EXCESS BLDA CALC-SCNC: -4.3 MMOL/L
BDY SITE: ABNORMAL
BNP SERPL-MCNC: 990 PG/ML (ref 0–100)
BODY TEMPERATURE: 98.6 C
BUN BLD-MCNC: 36 MG/DL (ref 7–21)
BUN/CREAT SERPL: 28.8 (ref 7–25)
CALCIUM SPEC-SCNC: 8.5 MG/DL (ref 7.7–10)
CHLORIDE SERPL-SCNC: 107 MMOL/L (ref 99–112)
CO2 SERPL-SCNC: 20.3 MMOL/L (ref 24.3–31.9)
COHGB MFR BLD: 0.6 % (ref 0–5)
CREAT BLD-MCNC: 1.25 MG/DL (ref 0.43–1.29)
GFR SERPL CREATININE-BSD FRML MDRD: 42 ML/MIN/1.73
GLUCOSE BLD-MCNC: 151 MG/DL (ref 70–110)
GLUCOSE BLDC GLUCOMTR-MCNC: 110 MG/DL (ref 70–130)
GLUCOSE BLDC GLUCOMTR-MCNC: 161 MG/DL (ref 70–130)
GLUCOSE BLDC GLUCOMTR-MCNC: 176 MG/DL (ref 70–130)
GLUCOSE BLDC GLUCOMTR-MCNC: 179 MG/DL (ref 70–130)
GLUCOSE BLDC GLUCOMTR-MCNC: 219 MG/DL (ref 70–130)
HCO3 BLDA-SCNC: 19.4 MMOL/L (ref 22–26)
HCT VFR BLD CALC: 46 % (ref 37–47)
HGB BLDA-MCNC: 15.8 G/DL (ref 12–16)
HOROWITZ INDEX BLD+IHG-RTO: 51 %
INR PPP: 2.86 (ref 0.9–1.1)
METHGB BLD QL: 0.1 % (ref 0–3)
MODALITY: ABNORMAL
OSMOLALITY SERPL CALC.SUM OF ELEC: 283.2 MOSM/KG (ref 273–305)
OXYHGB MFR BLDV: 91.5 % (ref 85–100)
PCO2 BLDA: 32.4 MM HG (ref 35–45)
PH BLDA: 7.4 PH UNITS (ref 7.35–7.45)
PO2 BLDA: 66.2 MM HG (ref 80–100)
POTASSIUM BLD-SCNC: 3.7 MMOL/L (ref 3.5–5.3)
PROTHROMBIN TIME: 30.3 SECONDS (ref 11–15.4)
SAO2 % BLDCOA: 92.1 % (ref 90–100)
SODIUM BLD-SCNC: 136 MMOL/L (ref 135–153)

## 2019-02-12 PROCEDURE — 71045 X-RAY EXAM CHEST 1 VIEW: CPT

## 2019-02-12 PROCEDURE — 93005 ELECTROCARDIOGRAM TRACING: CPT | Performed by: INTERNAL MEDICINE

## 2019-02-12 PROCEDURE — 82805 BLOOD GASES W/O2 SATURATION: CPT | Performed by: INTERNAL MEDICINE

## 2019-02-12 PROCEDURE — 82962 GLUCOSE BLOOD TEST: CPT

## 2019-02-12 PROCEDURE — 94799 UNLISTED PULMONARY SVC/PX: CPT

## 2019-02-12 PROCEDURE — 71045 X-RAY EXAM CHEST 1 VIEW: CPT | Performed by: RADIOLOGY

## 2019-02-12 PROCEDURE — 83880 ASSAY OF NATRIURETIC PEPTIDE: CPT | Performed by: INTERNAL MEDICINE

## 2019-02-12 PROCEDURE — 93010 ELECTROCARDIOGRAM REPORT: CPT | Performed by: INTERNAL MEDICINE

## 2019-02-12 PROCEDURE — 82375 ASSAY CARBOXYHB QUANT: CPT | Performed by: INTERNAL MEDICINE

## 2019-02-12 PROCEDURE — 99232 SBSQ HOSP IP/OBS MODERATE 35: CPT | Performed by: INTERNAL MEDICINE

## 2019-02-12 PROCEDURE — 25010000002 ENOXAPARIN PER 10 MG: Performed by: INTERNAL MEDICINE

## 2019-02-12 PROCEDURE — 63710000001 INSULIN ASPART PER 5 UNITS: Performed by: INTERNAL MEDICINE

## 2019-02-12 PROCEDURE — 85610 PROTHROMBIN TIME: CPT | Performed by: HOSPITALIST

## 2019-02-12 PROCEDURE — 99232 SBSQ HOSP IP/OBS MODERATE 35: CPT | Performed by: HOSPITALIST

## 2019-02-12 PROCEDURE — 80048 BASIC METABOLIC PNL TOTAL CA: CPT | Performed by: HOSPITALIST

## 2019-02-12 PROCEDURE — 25010000002 FUROSEMIDE PER 20 MG: Performed by: HOSPITALIST

## 2019-02-12 PROCEDURE — 36600 WITHDRAWAL OF ARTERIAL BLOOD: CPT | Performed by: INTERNAL MEDICINE

## 2019-02-12 PROCEDURE — 83050 HGB METHEMOGLOBIN QUAN: CPT | Performed by: INTERNAL MEDICINE

## 2019-02-12 PROCEDURE — 25010000002 HYDRALAZINE PER 20 MG: Performed by: HOSPITALIST

## 2019-02-12 RX ORDER — WARFARIN SODIUM 2 MG/1
2 TABLET ORAL
Status: COMPLETED | OUTPATIENT
Start: 2019-02-12 | End: 2019-02-12

## 2019-02-12 RX ORDER — METOLAZONE 2.5 MG/1
2.5 TABLET ORAL DAILY
Status: COMPLETED | OUTPATIENT
Start: 2019-02-12 | End: 2019-02-12

## 2019-02-12 RX ORDER — FUROSEMIDE 10 MG/ML
20 INJECTION INTRAMUSCULAR; INTRAVENOUS ONCE
Status: COMPLETED | OUTPATIENT
Start: 2019-02-12 | End: 2019-02-12

## 2019-02-12 RX ADMIN — SENNOSIDES AND DOCUSATE SODIUM 2 TABLET: 8.6; 5 TABLET ORAL at 21:38

## 2019-02-12 RX ADMIN — IPRATROPIUM BROMIDE AND ALBUTEROL SULFATE 3 ML: .5; 3 SOLUTION RESPIRATORY (INHALATION) at 06:31

## 2019-02-12 RX ADMIN — MONTELUKAST SODIUM 10 MG: 10 TABLET, COATED ORAL at 21:38

## 2019-02-12 RX ADMIN — BUDESONIDE AND FORMOTEROL FUMARATE DIHYDRATE 2 PUFF: 160; 4.5 AEROSOL RESPIRATORY (INHALATION) at 18:27

## 2019-02-12 RX ADMIN — SODIUM CHLORIDE, PRESERVATIVE FREE 3 ML: 5 INJECTION INTRAVENOUS at 08:53

## 2019-02-12 RX ADMIN — IPRATROPIUM BROMIDE AND ALBUTEROL SULFATE 3 ML: .5; 3 SOLUTION RESPIRATORY (INHALATION) at 12:44

## 2019-02-12 RX ADMIN — GUAIFENESIN 1200 MG: 600 TABLET, EXTENDED RELEASE ORAL at 21:38

## 2019-02-12 RX ADMIN — HYDRALAZINE HYDROCHLORIDE 10 MG: 20 INJECTION INTRAMUSCULAR; INTRAVENOUS at 06:52

## 2019-02-12 RX ADMIN — CEFEPIME HYDROCHLORIDE 1 G: 1 INJECTION, POWDER, FOR SOLUTION INTRAMUSCULAR; INTRAVENOUS at 02:14

## 2019-02-12 RX ADMIN — HYDRALAZINE HYDROCHLORIDE 50 MG: 50 TABLET ORAL at 09:50

## 2019-02-12 RX ADMIN — ENOXAPARIN SODIUM 60 MG: 60 INJECTION SUBCUTANEOUS at 08:52

## 2019-02-12 RX ADMIN — DOXYCYCLINE 100 MG: 100 INJECTION, POWDER, LYOPHILIZED, FOR SOLUTION INTRAVENOUS at 13:28

## 2019-02-12 RX ADMIN — METOLAZONE 2.5 MG: 2.5 TABLET ORAL at 13:29

## 2019-02-12 RX ADMIN — CEFEPIME HYDROCHLORIDE 1 G: 1 INJECTION, POWDER, FOR SOLUTION INTRAMUSCULAR; INTRAVENOUS at 15:28

## 2019-02-12 RX ADMIN — ASPIRIN 81 MG: 81 TABLET ORAL at 08:52

## 2019-02-12 RX ADMIN — HYDRALAZINE HYDROCHLORIDE 50 MG: 50 TABLET ORAL at 14:04

## 2019-02-12 RX ADMIN — INSULIN ASPART 2 UNITS: 100 INJECTION, SOLUTION INTRAVENOUS; SUBCUTANEOUS at 09:48

## 2019-02-12 RX ADMIN — METOPROLOL TARTRATE 100 MG: 100 TABLET, FILM COATED ORAL at 08:51

## 2019-02-12 RX ADMIN — IPRATROPIUM BROMIDE AND ALBUTEROL SULFATE 3 ML: .5; 3 SOLUTION RESPIRATORY (INHALATION) at 18:27

## 2019-02-12 RX ADMIN — HYDRALAZINE HYDROCHLORIDE 50 MG: 50 TABLET ORAL at 21:38

## 2019-02-12 RX ADMIN — INSULIN ASPART 2 UNITS: 100 INJECTION, SOLUTION INTRAVENOUS; SUBCUTANEOUS at 17:33

## 2019-02-12 RX ADMIN — DOXYCYCLINE 100 MG: 100 INJECTION, POWDER, LYOPHILIZED, FOR SOLUTION INTRAVENOUS at 00:14

## 2019-02-12 RX ADMIN — FUROSEMIDE 20 MG: 10 INJECTION, SOLUTION INTRAMUSCULAR; INTRAVENOUS at 14:00

## 2019-02-12 RX ADMIN — METOPROLOL TARTRATE 100 MG: 100 TABLET, FILM COATED ORAL at 21:38

## 2019-02-12 RX ADMIN — WARFARIN SODIUM 2 MG: 2 TABLET ORAL at 17:27

## 2019-02-12 RX ADMIN — IPRATROPIUM BROMIDE AND ALBUTEROL SULFATE 3 ML: .5; 3 SOLUTION RESPIRATORY (INHALATION) at 00:28

## 2019-02-12 RX ADMIN — BUDESONIDE AND FORMOTEROL FUMARATE DIHYDRATE 2 PUFF: 160; 4.5 AEROSOL RESPIRATORY (INHALATION) at 06:31

## 2019-02-12 RX ADMIN — GUAIFENESIN 1200 MG: 600 TABLET, EXTENDED RELEASE ORAL at 08:52

## 2019-02-12 NOTE — PLAN OF CARE
Problem: Sepsis/Septic Shock (Adult)  Goal: Signs and Symptoms of Listed Potential Problems Will be Absent, Minimized or Managed (Sepsis/Septic Shock)  Outcome: Ongoing (interventions implemented as appropriate)      Problem: Fall Risk (Adult)  Goal: Identify Related Risk Factors and Signs and Symptoms  Outcome: Ongoing (interventions implemented as appropriate)    Goal: Absence of Fall  Outcome: Ongoing (interventions implemented as appropriate)      Problem: Patient Care Overview  Goal: Plan of Care Review  Outcome: Ongoing (interventions implemented as appropriate)    Goal: Individualization and Mutuality  Outcome: Ongoing (interventions implemented as appropriate)    Goal: Discharge Needs Assessment  Outcome: Ongoing (interventions implemented as appropriate)    Goal: Interprofessional Rounds/Family Conf  Outcome: Ongoing (interventions implemented as appropriate)      Problem: Mobility, Physical Impaired (Adult)  Goal: Identify Related Risk Factors and Signs and Symptoms  Outcome: Ongoing (interventions implemented as appropriate)    Goal: Enhanced Mobility Skills  Outcome: Ongoing (interventions implemented as appropriate)    Goal: Enhanced Functional Ability  Outcome: Ongoing (interventions implemented as appropriate)

## 2019-02-12 NOTE — PROGRESS NOTES
Patient continues on warfarin. INR is up to 2.86 today. Will give 2 mg warfarin today and discontinue lovenox as INR is therapeutic. Will continue to follow.    Antibiotic Length of Therapy:    cefepime day 5  doxycycline day 5  IV vancomycin day 5    Thank you,    Dorothy Toure [facility]

## 2019-02-12 NOTE — PROGRESS NOTES
Chief Complaint:  Shortness of breath.    Subjective     Interval History:   She reports mild improvement in her breathing.  She is still currently on high flow nasal cannula with flow rate of 50 L/m.  She is saturating 95%.  Hypertensive.  No fevers overnight.  No acute event overnight.    Intake/Output Summary (Last 24 hours) at 2/12/2019 1444  Last data filed at 2/12/2019 1328  Gross per 24 hour   Intake 620 ml   Output --   Net 620 ml           Review of Systems:   Review of Systems - History obtained from chart review and the patient  General ROS: negative for - chills, fatigue, fever, malaise, night sweats or sleep disturbance  Psychological ROS: negative for - anxiety, behavioral disorder, depression or memory difficulties  Ophthalmic ROS: negative for - blurry vision, decreased vision, double vision or dry eyes  ENT ROS: negative for - epistaxis, hearing change or sneezing  Allergy and Immunology ROS: negative for - hives, itchy/watery eyes or nasal congestion  Hematological and Lymphatic ROS: negative for - bleeding problems, blood clots, blood transfusions, jaundice or night sweats  Endocrine ROS: negative for - malaise/lethargy, mood swings, polydipsia/polyuria or temperature intolerance  Respiratory ROS: Positive for cough, shortness of breath .  negative for - orthopnea, pleuritic pain or sputum changes  Cardiovascular ROS: no chest pain or dyspnea on exertion  Gastrointestinal ROS: no abdominal pain, change in bowel habits, or black or bloody stools  Musculoskeletal ROS: negative for - joint pain, joint stiffness or joint swelling  Neurological ROS: no TIA or stroke symptoms  Dermatological ROS: negative for acne, dry skin and eczema      Vital Signs  Temp:  [98 °F (36.7 °C)-98.4 °F (36.9 °C)] 98 °F (36.7 °C)  Heart Rate:  [0-127] 69  Resp:  [18-24] 22  BP: (136-210)/(58-82) 185/68    Intake/Output Summary (Last 24 hours) at 2/12/2019 1442  Last data filed at 2/12/2019 1328  Gross per 24 hour   Intake  620 ml   Output --   Net 620 ml       Physical Exam:  Constitutional:  oriented to person, place, and time. appears well-developed and well-nourished. No distress.   HENT:   Head: Normocephalic and atraumatic.   Right Ear: External ear normal.   Left Ear: External ear normal.   Nose: Nose normal.   Eyes: Pupils are equal, round, and reactive to light. Conjunctivae and EOM are normal. Right eye exhibits no discharge. Left eye exhibits no discharge. No scleral icterus.   Neck: Normal range of motion. Neck supple. No thyromegaly present.   Cardiovascular: Normal rate, regular rhythm, normal heart sounds and intact distal pulses.  Exam reveals no friction rub.    No murmur heard.  Pulmonary/Chest: No stridor.  Bilateral air entry equal.  Rhonchorous chest.  Wheezing absent.  Bilateral crackles present.  Abdominal: Soft. Bowel sounds are normal.exhibits no distension. There is no tenderness.   Musculoskeletal: Normal range of motion. exhibits no deformity.   +1 lower extremity edema bilateral.   Neurological: alert and oriented to person, place, and time. No cranial nerve deficit. Coordination normal.   Skin: Skin is warm and dry. Capillary refill takes less than 2 seconds. not diaphoretic. No erythema.   Psychiatric:   normal mood and affect.   behavior is normal.        Results Review:   I reviewed the patient's new clinical results.  Results from last 7 days   Lab Units 02/11/19  0426 02/10/19  0543 02/09/19  0540   WBC 10*3/mm3 7.20 8.16 11.02   HEMOGLOBIN g/dL 10.9* 11.0* 11.4*   PLATELETS 10*3/mm3 357 320 330     Results from last 7 days   Lab Units 02/12/19  0339 02/11/19  0426 02/10/19  0543 02/09/19  0540   SODIUM mmol/L 136 136 133* 138   POTASSIUM mmol/L 3.7 4.1 4.7 4.2   CHLORIDE mmol/L 107 106 102 104   CO2 mmol/L 20.3* 20.4* 18.6* 24.2*   BUN mg/dL 36* 40* 33* 29*   CREATININE mg/dL 1.25 1.31* 1.30* 1.18   CALCIUM mg/dL 8.5 8.5 8.6 8.7   GLUCOSE mg/dL 151* 249* 242* 47*   MAGNESIUM mg/dL  --  2.0 2.1 2.5      Lab Results   Component Value Date    INR 2.86 (H) 02/12/2019    INR 2.48 (H) 02/11/2019    INR 2.31 (H) 02/11/2019    PROTIME 30.3 (H) 02/12/2019    PROTIME 27.1 (H) 02/11/2019    PROTIME 25.6 (H) 02/11/2019     Results from last 7 days   Lab Units 02/09/19  0540 02/08/19  0426 02/06/19  2347   ALK PHOS U/L 90 78 73   BILIRUBIN mg/dL 0.5 0.5 0.3   ALT (SGPT) U/L 21 9* 19   AST (SGOT) U/L 21 15 22     Results from last 7 days   Lab Units 02/12/19  0512   PH, ARTERIAL pH units 7.396   PO2 ART mm Hg 66.2*   PCO2, ARTERIAL mm Hg 32.4*   HCO3 ART mmol/L 19.4*     Imaging Results (last 24 hours)     ** No results found for the last 24 hours. **                 aspirin 81 mg Oral Daily   budesonide-formoterol 2 puff Inhalation BID - RT   cefepime 1 g Intravenous Q12H   doxycycline 100 mg Intravenous Q12H   guaiFENesin 1,200 mg Oral Q12H   hydrALAZINE 50 mg Oral TID   insulin aspart 0-7 Units Subcutaneous TID With Meals   insulin detemir 6 Units Subcutaneous Nightly   ipratropium-albuterol 3 mL Nebulization 4x Daily - RT   metoprolol tartrate 100 mg Oral Q12H   montelukast 10 mg Oral Nightly   sennosides-docusate sodium 2 tablet Oral Nightly   sodium chloride 3 mL Intravenous Q12H   vancomycin 500 mg Intravenous Q24H   warfarin 2 mg Oral Once       Pharmacy to dose warfarin        I reviewed the medications.    Assessment/Plan     I have reviewed the labs.        Assessment:  Shortness of breath  Acute on chronic hypoxic respiratory failure  Chronic hypoxic respiratory failure on home oxygen  Right middle lobe pneumonia , unspecified organism   COPD, centrilobular emphysema type  Bilateral pleural effusion  Physical deconditioning  Bilateral basal atelectasis        Plan:  - Ordered chest x-ray.  - Ordered BNP  - Intermittent diuresis as per primary team.  In case of worsening kidney function, I recommend ultrasound-guided therapeutic thoracentesis.  Currently on warfarin  -  follow respiratory cultures  -I have  discontinued IV doxycycline.  - I have discontinued IV vancomycin  -  currently on warfarin.  Last INR was therapeutic.  - Continue with duo nebs  - Continue with Symbicort nebs  - continue on IV cefepime  - continue Mucomyst nebs  - Aggressive PTOT while in hospital  - Incentive spirometer to her basal atelectasis  -Continue on BiPAP 15/8 with FiO2 35% and rate of 14 when necessary.      Nurse and respiratory therapist were updated about the plan.  I discuss the clinical plan with Dr.Oculam Quang Noonan MD  02/12/19  2:42 PM

## 2019-02-12 NOTE — PROGRESS NOTES
[facility] HOSPITALIST PROGRESS NOTE     Patient Identification:  Name:  Ashley Geronimo  Age:  73 y.o.  Sex:  female  :  1945  MRN:  [id number]  Visit Number:  [id number]  Primary Care Provider:  Silvano Parker MD    Length of stay:  6    Subjective:  Patient is awake and alert, nursing staff reported patient was somewhat confused at one point, she is not confused when I examine her but somewhat weak and sleepy.  Blood gas did not show hypercapnia or acidosis.  She is incontinent and has had problem with measuring her I's and O's.  It is important to measure her urine output in response to diuretics especially with giving her when necessary diuretics for bilateral pleural effusion and congestive heart failure.  Creatinine so far is back to normal.    Chief Complaint: Shortness of breath    ----------------------------------------------------------------------------------------------------------------------  Current [facility] Meds:    aspirin 81 mg Oral Daily   budesonide-formoterol 2 puff Inhalation BID - RT   cefepime 1 g Intravenous Q12H   doxycycline 100 mg Intravenous Q12H   guaiFENesin 1,200 mg Oral Q12H   hydrALAZINE 50 mg Oral TID   insulin aspart 0-7 Units Subcutaneous TID With Meals   insulin detemir 6 Units Subcutaneous Nightly   ipratropium-albuterol 3 mL Nebulization 4x Daily - RT   metoprolol tartrate 100 mg Oral Q12H   montelukast 10 mg Oral Nightly   sennosides-docusate sodium 2 tablet Oral Nightly   sodium chloride 3 mL Intravenous Q12H   vancomycin 500 mg Intravenous Q24H   warfarin 2 mg Oral Once       Pharmacy to dose warfarin      ----------------------------------------------------------------------------------------------------------------------  Vital Signs:  Temp:  [98 °F (36.7 °C)-98.4 °F (36.9 °C)] 98 °F (36.7 °C)  Heart Rate:  [0-127] 127  Resp:  [18-24] 22  BP: (136-210)/(58-82) 136/66       Tele:       02/10/19  0321 19  0232 19  0314   Weight:  63.6 kg (140 lb 3.2 oz) 64 kg (141 lb) 64.2 kg (141 lb 8 oz)     Body mass index is 27.63 kg/m².    Intake/Output Summary (Last 24 hours) at 2/12/2019 1158  Last data filed at 2/12/2019 0314  Gross per 24 hour   Intake 520 ml   Output --   Net 520 ml     Diet Regular; Cardiac, Consistent Carbohydrate, Renal  ----------------------------------------------------------------------------------------------------------------------  Physical exam:  General: Comfortable,awake, alert, oriented to self, place, and time, chronically ill-appearing,  No respiratory distress.    Skin:  Skin is warm and dry. No rash noted. No pallor.    HENT:  Head:  Normocephalic and atraumatic.  Mouth:  Moist mucous membranes.    Eyes:  Conjunctivae and EOM are normal.  Pupils are equal, round, and reactive to light.  No scleral icterus.    Neck:  Neck supple.  JVD present better than yesterday.    Pulmonary/Chest:  No respiratory distress, no wheezes, no crackles, with normal breath sounds and good air movement.  Decreased breath sounds both lower lung fields  Cardiovascular:  Normal rate, regular rhythm and normal heart sounds with no murmur.  Abdominal:  Soft.  Bowel sounds are normal.  No distension and no tenderness.   Extremities: edema is improved on both lower extremity.  both lower extremity pitting type, good pedal pulse, no cyanosis or clubbing, no Homans signthe legs.  There is some mild redness on the right lower extremity leg.  Neurological:  Motor strength equal no obvious deficit, sensory grossly intact.   No cranial nerve deficit.  No tongue deviation.  No facial droop.  No slurred speech.         ----------------------------------------------------------------------------------------------------------------------  ----------------------------------------------------------------------------------------------------------------------  Results from last 7 days   Lab Units 02/06/19  0710 02/06/19  0100 02/05/19  0254   TROPONIN I  ng/mL 0.024 0.021 0.024     Results from last 7 days   Lab Units 02/12/19  0339 02/11/19  1209 02/11/19  0426 02/10/19  0543 02/09/19  0540 02/08/19  1155  02/07/19  0449 02/07/19  0024  02/06/19  0121 02/06/19  0100 02/05/19  2111 02/05/19  1838   CRP mg/dL  --   --   --   --   --   --   --   --   --   --   --  3.39*  --  3.34*   LACTATE mmol/L  --   --   --   --   --   --   --   --  1.9  --  3.2*  --  6.0*  --    WBC 10*3/mm3  --   --  7.20 8.16 11.02  --    < >  --  7.05  --   --  7.55  --  8.39   HEMOGLOBIN g/dL  --   --  10.9* 11.0* 11.4*  --    < >  --  10.7*  --   --  11.2*  --  12.2   HEMATOCRIT %  --   --  33.7* 33.9* 35.0*  --    < >  --  34.3*  --   --  34.8*  --  37.5   MCV fL  --   --  90.6 91.9 89.7  --    < >  --  92.7  --   --  90.9  --  89.5   MCHC g/dL  --   --  32.3* 32.4* 32.6*  --    < >  --  31.2*  --   --  32.2*  --  32.5*   PLATELETS 10*3/mm3  --   --  357 320 330  --    < >  --  254  --   --  292  --  348   INR  2.86* 2.48* 2.31* 1.49* 1.31* 1.35*  --  1.30*  --    < >  --   --   --  1.14*    < > = values in this interval not displayed.     Results from last 7 days   Lab Units 02/12/19  0512   PH, ARTERIAL pH units 7.396   PO2 ART mm Hg 66.2*   PCO2, ARTERIAL mm Hg 32.4*   HCO3 ART mmol/L 19.4*     Results from last 7 days   Lab Units 02/12/19  0339 02/11/19  0426 02/10/19  0543 02/09/19  0540 02/08/19  0426 02/06/19  2347   SODIUM mmol/L 136 136 133* 138 140  --  138   POTASSIUM mmol/L 3.7 4.1 4.7 4.2 3.7  --  4.2   MAGNESIUM mg/dL  --  2.0 2.1 2.5 1.9   < > 2.2   CHLORIDE mmol/L 107 106 102 104 108  --  107   CO2 mmol/L 20.3* 20.4* 18.6* 24.2* 22.3*  --  20.0*   BUN mg/dL 36* 40* 33* 29* 26*  --  37*   CREATININE mg/dL 1.25 1.31* 1.30* 1.18 1.26  --  1.55*   EGFR IF NONAFRICN AM mL/min/1.73 42* 40* 40* 45* 42*  --  33*   CALCIUM mg/dL 8.5 8.5 8.6 8.7 8.3  --  7.1*   GLUCOSE mg/dL 151* 249* 242* 47* 38*  --  175*   ALBUMIN g/dL  --   --   --  4.30 3.90  --  3.40   BILIRUBIN mg/dL  --   --    --  0.5 0.5  --  0.3   ALK PHOS U/L  --   --   --  90 78  --  73   AST (SGOT) U/L  --   --   --  21 15  --  22   ALT (SGPT) U/L  --   --   --  21 9*  --  19    < > = values in this interval not displayed.   Estimated Creatinine Clearance: 33 mL/min (by C-G formula based on SCr of 1.25 mg/dL).    No results found for: AMMONIA      Blood Culture   Date Value Ref Range Status   02/05/2019 No growth at 5 days  Final   02/05/2019 No growth at 5 days  Final                I have personally looked at the labs and they are summarized above.  ----------------------------------------------------------------------------------------------------------------------  Imaging Results (last 24 hours)     ** No results found for the last 24 hours. **        ----------------------------------------------------------------------------------------------------------------------  Assessment and Plan:  - Acute on chronic hypoxic respiratory failure  - Acute on chronic diastolic heart failure  - Acute kidney injury  - Physical deconditioning  - Right middle lobe pneumonia  - Pleural effusion bilateral secondary to CHF  - Paroxysmal atrial fibrillation rate controlled now on anticoagulation.    Will continue with careful diuresis, temporarily will cautiously place a Singh catheter to measure I's and O's especially with lethargy and weakness with increased risk of falling.  Continue watch and monitor INR.  We'll give her dose of IV Lasix today and monitor response.  We will perform post voiding bladder scan before inserting Singh catheter.  Fall precaution.      Rosa Armenta MD  02/12/19  11:58 AM

## 2019-02-13 LAB
A-A DO2: 236.2 MMHG (ref 0–300)
ANION GAP SERPL CALCULATED.3IONS-SCNC: 16.4 MMOL/L (ref 3.6–11.2)
ARTERIAL PATENCY WRIST A: ABNORMAL
ATMOSPHERIC PRESS: 729 MMHG
BASE EXCESS BLDA CALC-SCNC: -5.8 MMOL/L
BDY SITE: ABNORMAL
BODY TEMPERATURE: 98.6 C
BUN BLD-MCNC: 41 MG/DL (ref 7–21)
BUN/CREAT SERPL: 30.8 (ref 7–25)
CALCIUM SPEC-SCNC: 8.4 MG/DL (ref 7.7–10)
CHLORIDE SERPL-SCNC: 105 MMOL/L (ref 99–112)
CO2 SERPL-SCNC: 15.6 MMOL/L (ref 24.3–31.9)
COHGB MFR BLD: 0.9 % (ref 0–5)
CREAT BLD-MCNC: 1.33 MG/DL (ref 0.43–1.29)
GAS FLOW AIRWAY: 50 LPM
GFR SERPL CREATININE-BSD FRML MDRD: 39 ML/MIN/1.73
GLUCOSE BLD-MCNC: 209 MG/DL (ref 70–110)
GLUCOSE BLDC GLUCOMTR-MCNC: 198 MG/DL (ref 70–130)
GLUCOSE BLDC GLUCOMTR-MCNC: 204 MG/DL (ref 70–130)
GLUCOSE BLDC GLUCOMTR-MCNC: 215 MG/DL (ref 70–130)
GLUCOSE BLDC GLUCOMTR-MCNC: 248 MG/DL (ref 70–130)
HCO3 BLDA-SCNC: 18.3 MMOL/L (ref 22–26)
HCT VFR BLD CALC: 33 % (ref 37–47)
HGB BLDA-MCNC: 11.2 G/DL (ref 12–16)
HOROWITZ INDEX BLD+IHG-RTO: 52 %
INR PPP: 2.84 (ref 0.9–1.1)
METHGB BLD QL: 0.1 % (ref 0–3)
MODALITY: ABNORMAL
OSMOLALITY SERPL CALC.SUM OF ELEC: 290.1 MOSM/KG (ref 273–305)
OXYHGB MFR BLDV: 93.6 % (ref 85–100)
PCO2 BLDA: 31.5 MM HG (ref 35–45)
PH BLDA: 7.38 PH UNITS (ref 7.35–7.45)
PO2 BLDA: 83.2 MM HG (ref 80–100)
POTASSIUM BLD-SCNC: 3.7 MMOL/L (ref 3.5–5.3)
PROTHROMBIN TIME: 30.1 SECONDS (ref 11–15.4)
SAO2 % BLDCOA: 94.5 % (ref 90–100)
SODIUM BLD-SCNC: 137 MMOL/L (ref 135–153)

## 2019-02-13 PROCEDURE — 80048 BASIC METABOLIC PNL TOTAL CA: CPT | Performed by: HOSPITALIST

## 2019-02-13 PROCEDURE — 82962 GLUCOSE BLOOD TEST: CPT

## 2019-02-13 PROCEDURE — 25010000002 HYDRALAZINE PER 20 MG: Performed by: HOSPITALIST

## 2019-02-13 PROCEDURE — 83050 HGB METHEMOGLOBIN QUAN: CPT | Performed by: HOSPITALIST

## 2019-02-13 PROCEDURE — 63710000001 INSULIN ASPART PER 5 UNITS: Performed by: INTERNAL MEDICINE

## 2019-02-13 PROCEDURE — 63710000001 INSULIN DETEMIR PER 5 UNITS: Performed by: INTERNAL MEDICINE

## 2019-02-13 PROCEDURE — 94799 UNLISTED PULMONARY SVC/PX: CPT

## 2019-02-13 PROCEDURE — 25010000002 FUROSEMIDE PER 20 MG: Performed by: HOSPITALIST

## 2019-02-13 PROCEDURE — 82375 ASSAY CARBOXYHB QUANT: CPT | Performed by: HOSPITALIST

## 2019-02-13 PROCEDURE — 99232 SBSQ HOSP IP/OBS MODERATE 35: CPT | Performed by: HOSPITALIST

## 2019-02-13 PROCEDURE — 85610 PROTHROMBIN TIME: CPT | Performed by: HOSPITALIST

## 2019-02-13 PROCEDURE — 99232 SBSQ HOSP IP/OBS MODERATE 35: CPT | Performed by: INTERNAL MEDICINE

## 2019-02-13 PROCEDURE — 36600 WITHDRAWAL OF ARTERIAL BLOOD: CPT | Performed by: HOSPITALIST

## 2019-02-13 PROCEDURE — 82805 BLOOD GASES W/O2 SATURATION: CPT | Performed by: HOSPITALIST

## 2019-02-13 RX ORDER — FUROSEMIDE 10 MG/ML
20 INJECTION INTRAMUSCULAR; INTRAVENOUS ONCE
Status: DISCONTINUED | OUTPATIENT
Start: 2019-02-13 | End: 2019-02-15

## 2019-02-13 RX ORDER — METOLAZONE 2.5 MG/1
2.5 TABLET ORAL DAILY
Status: COMPLETED | OUTPATIENT
Start: 2019-02-13 | End: 2019-02-13

## 2019-02-13 RX ADMIN — ASPIRIN 81 MG: 81 TABLET ORAL at 08:05

## 2019-02-13 RX ADMIN — HYDRALAZINE HYDROCHLORIDE 10 MG: 20 INJECTION INTRAMUSCULAR; INTRAVENOUS at 04:44

## 2019-02-13 RX ADMIN — CEFEPIME HYDROCHLORIDE 1 G: 1 INJECTION, POWDER, FOR SOLUTION INTRAMUSCULAR; INTRAVENOUS at 04:36

## 2019-02-13 RX ADMIN — IPRATROPIUM BROMIDE AND ALBUTEROL SULFATE 3 ML: .5; 3 SOLUTION RESPIRATORY (INHALATION) at 18:43

## 2019-02-13 RX ADMIN — GUAIFENESIN 1200 MG: 600 TABLET, EXTENDED RELEASE ORAL at 08:05

## 2019-02-13 RX ADMIN — BUDESONIDE AND FORMOTEROL FUMARATE DIHYDRATE 2 PUFF: 160; 4.5 AEROSOL RESPIRATORY (INHALATION) at 18:43

## 2019-02-13 RX ADMIN — HYDRALAZINE HYDROCHLORIDE 50 MG: 50 TABLET ORAL at 21:25

## 2019-02-13 RX ADMIN — IPRATROPIUM BROMIDE AND ALBUTEROL SULFATE 3 ML: .5; 3 SOLUTION RESPIRATORY (INHALATION) at 13:37

## 2019-02-13 RX ADMIN — BUDESONIDE AND FORMOTEROL FUMARATE DIHYDRATE 2 PUFF: 160; 4.5 AEROSOL RESPIRATORY (INHALATION) at 06:44

## 2019-02-13 RX ADMIN — SENNOSIDES AND DOCUSATE SODIUM 2 TABLET: 8.6; 5 TABLET ORAL at 21:25

## 2019-02-13 RX ADMIN — SODIUM CHLORIDE, PRESERVATIVE FREE 3 ML: 5 INJECTION INTRAVENOUS at 08:08

## 2019-02-13 RX ADMIN — HYDRALAZINE HYDROCHLORIDE 10 MG: 20 INJECTION INTRAMUSCULAR; INTRAVENOUS at 14:59

## 2019-02-13 RX ADMIN — INSULIN ASPART 3 UNITS: 100 INJECTION, SOLUTION INTRAVENOUS; SUBCUTANEOUS at 11:21

## 2019-02-13 RX ADMIN — HYDRALAZINE HYDROCHLORIDE 50 MG: 50 TABLET ORAL at 15:36

## 2019-02-13 RX ADMIN — IPRATROPIUM BROMIDE AND ALBUTEROL SULFATE 3 ML: .5; 3 SOLUTION RESPIRATORY (INHALATION) at 06:44

## 2019-02-13 RX ADMIN — METOLAZONE 2.5 MG: 2.5 TABLET ORAL at 11:21

## 2019-02-13 RX ADMIN — INSULIN DETEMIR 6 UNITS: 100 INJECTION, SOLUTION SUBCUTANEOUS at 21:26

## 2019-02-13 RX ADMIN — INSULIN ASPART 3 UNITS: 100 INJECTION, SOLUTION INTRAVENOUS; SUBCUTANEOUS at 08:06

## 2019-02-13 RX ADMIN — MONTELUKAST SODIUM 10 MG: 10 TABLET, COATED ORAL at 21:25

## 2019-02-13 RX ADMIN — HYDRALAZINE HYDROCHLORIDE 50 MG: 50 TABLET ORAL at 08:05

## 2019-02-13 RX ADMIN — GUAIFENESIN 1200 MG: 600 TABLET, EXTENDED RELEASE ORAL at 21:25

## 2019-02-13 RX ADMIN — IPRATROPIUM BROMIDE AND ALBUTEROL SULFATE 3 ML: .5; 3 SOLUTION RESPIRATORY (INHALATION) at 00:09

## 2019-02-13 RX ADMIN — METOPROLOL TARTRATE 100 MG: 100 TABLET, FILM COATED ORAL at 21:25

## 2019-02-13 RX ADMIN — METOPROLOL TARTRATE 100 MG: 100 TABLET, FILM COATED ORAL at 08:05

## 2019-02-13 RX ADMIN — SODIUM CHLORIDE, PRESERVATIVE FREE 3 ML: 5 INJECTION INTRAVENOUS at 21:25

## 2019-02-13 RX ADMIN — INSULIN ASPART 3 UNITS: 100 INJECTION, SOLUTION INTRAVENOUS; SUBCUTANEOUS at 17:10

## 2019-02-13 NOTE — PROGRESS NOTES
Discharge Planning Assessment   [facility]     Patient Name: Ashley Geronimo  MRN: [id number]  Today's Date: 2/13/2019    Admit Date: 2/5/2019        Discharge Plan     Row Name 02/13/19 1725       Plan    Plan  Pt admitted on 2/5/19.  Pt lives at home with grandchildren and plans to return home at discharge.  [facility].  [facility] will need new referral with [facility] at discharge.  Pt currently utilizes home 02 via [facility].  SS will follow.                 Milka Reyes

## 2019-02-13 NOTE — PROGRESS NOTES
[facility] HOSPITALIST PROGRESS NOTE     Patient Identification:  Name:  Ashley Geronimo  Age:  73 y.o.  Sex:  female  :  1945  MRN:  [id number]  Visit Number:  [id number]  Primary Care Provider:  Silvano Parker MD    Length of stay:  7    Subjective:  No today's exam is assisted in the presence of Loyda Bernardo RN.  As per staff patient was awake and alert earlier but when examining her she seems to be very lethargic and sleepy, she still requires high flow nasal cannula at 51% FiO2.  Her serum bicarbonate also has decreased, she responded very well with diuretics when necessary, we did have to anchor Singh catheter due to patient's difficulty using the bedpan and risk of fall and also the need for close monitoring of I's and O's and response to diuretics especially with her chronic kidney disease.    Chief Complaint: Short of breath  ----------------------------------------------------------------------------------------------------------------------  Current Hospital Meds:    aspirin 81 mg Oral Daily   budesonide-formoterol 2 puff Inhalation BID - RT   cefepime 1 g Intravenous Q12H   guaiFENesin 1,200 mg Oral Q12H   hydrALAZINE 50 mg Oral TID   insulin aspart 0-7 Units Subcutaneous TID With Meals   insulin detemir 6 Units Subcutaneous Nightly   ipratropium-albuterol 3 mL Nebulization 4x Daily - RT   metoprolol tartrate 100 mg Oral Q12H   montelukast 10 mg Oral Nightly   sennosides-docusate sodium 2 tablet Oral Nightly   sodium chloride 3 mL Intravenous Q12H       Pharmacy to dose warfarin      ----------------------------------------------------------------------------------------------------------------------  Vital Signs:  Temp:  [97 °F (36.1 °C)-98.7 °F (37.1 °C)] 97.9 °F (36.6 °C)  Heart Rate:  [56-84] 56  Resp:  [18-22] 18  BP: (149-200)/(62-98) 149/65       Tele: Sinus rhythm 60 bpm      19   Weight: 64 kg (141 lb) 64.2 kg (141 lb 8  oz) 65.2 kg (143 lb 11.2 oz)     Body mass index is 28.06 kg/m².    Intake/Output Summary (Last 24 hours) at 2/13/2019 1121  Last data filed at 2/13/2019 0815  Gross per 24 hour   Intake 660 ml   Output 1550 ml   Net -890 ml     Diet Regular; Cardiac, Consistent Carbohydrate, Renal  ----------------------------------------------------------------------------------------------------------------------  Physical exam:  General: Comfortable,awake, somewhat lethargic today, oriented to self, place, and time, chronically ill-appearing,  No respiratory distress.    Skin:  Skin is warm and dry. No rash noted. No pallor.    HENT:  Head:  Normocephalic and atraumatic.  Mouth:  Moist mucous membranes.    Eyes:  Conjunctivae and EOM are normal.  Pupils are equal, round, and reactive to light.  No scleral icterus.    Neck:  Neck supple.  JVD present better than yesterday.    Pulmonary/Chest:  No respiratory distress, no wheezes, no crackles, with normal breath sounds and good air movement.  Decreased breath sounds both lower lung fields  Cardiovascular:  Normal rate, regular rhythm and normal heart sounds with no murmur.  Abdominal:  Soft.  Bowel sounds are normal.  No distension and no tenderness.   Extremities: edema is improved on both lower extremity.  both lower extremity pitting type, good pedal pulse, no cyanosis or clubbing, no Homans signthe legs.  There is some mild redness on the right lower extremity leg.  Neurological:  Motor strength equal no obvious deficit, sensory grossly intact.   No cranial nerve deficit.  No tongue deviation.  No facial droop.  No slurred speech.    ----------------------------------------------------------------------------------------------------------------------  ----------------------------------------------------------------------------------------------------------------------      Results from last 7 days   Lab Units 02/13/19  0345 02/12/19  0339 02/11/19  1209 02/11/19  0426  02/10/19  0543 02/09/19  0540 02/08/19  1155  02/07/19  0024   LACTATE mmol/L  --   --   --   --   --   --   --   --  1.9   WBC 10*3/mm3  --   --   --  7.20 8.16 11.02  --    < > 7.05   HEMOGLOBIN g/dL  --   --   --  10.9* 11.0* 11.4*  --    < > 10.7*   HEMATOCRIT %  --   --   --  33.7* 33.9* 35.0*  --    < > 34.3*   MCV fL  --   --   --  90.6 91.9 89.7  --    < > 92.7   MCHC g/dL  --   --   --  32.3* 32.4* 32.6*  --    < > 31.2*   PLATELETS 10*3/mm3  --   --   --  357 320 330  --    < > 254   INR  2.84* 2.86* 2.48* 2.31* 1.49* 1.31* 1.35*   < >  --     < > = values in this interval not displayed.     Results from last 7 days   Lab Units 02/12/19  0512   PH, ARTERIAL pH units 7.396   PO2 ART mm Hg 66.2*   PCO2, ARTERIAL mm Hg 32.4*   HCO3 ART mmol/L 19.4*     Results from last 7 days   Lab Units 02/13/19  0345 02/12/19  0339 02/11/19  0426 02/10/19  0543 02/09/19  0540 02/08/19  0426  02/06/19  2347   SODIUM mmol/L 137 136 136 133* 138 140  --  138   POTASSIUM mmol/L 3.7 3.7 4.1 4.7 4.2 3.7  --  4.2   MAGNESIUM mg/dL  --   --  2.0 2.1 2.5 1.9   < > 2.2   CHLORIDE mmol/L 105 107 106 102 104 108  --  107   CO2 mmol/L 15.6* 20.3* 20.4* 18.6* 24.2* 22.3*  --  20.0*   BUN mg/dL 41* 36* 40* 33* 29* 26*  --  37*   CREATININE mg/dL 1.33* 1.25 1.31* 1.30* 1.18 1.26  --  1.55*   EGFR IF NONAFRICN AM mL/min/1.73 39* 42* 40* 40* 45* 42*  --  33*   CALCIUM mg/dL 8.4 8.5 8.5 8.6 8.7 8.3  --  7.1*   GLUCOSE mg/dL 209* 151* 249* 242* 47* 38*  --  175*   ALBUMIN g/dL  --   --   --   --  4.30 3.90  --  3.40   BILIRUBIN mg/dL  --   --   --   --  0.5 0.5  --  0.3   ALK PHOS U/L  --   --   --   --  90 78  --  73   AST (SGOT) U/L  --   --   --   --  21 15  --  22   ALT (SGPT) U/L  --   --   --   --  21 9*  --  19    < > = values in this interval not displayed.   Estimated Creatinine Clearance: 31.3 mL/min (A) (by C-G formula based on SCr of 1.33 mg/dL (H)).    No results found for: AMMONIA                    I have personally looked at  the labs and they are summarized above.  ----------------------------------------------------------------------------------------------------------------------  Imaging Results (last 24 hours)     Procedure Component Value Units Date/Time    XR Chest 1 View [[id number]] Collected:  02/12/19 1546     Updated:  02/12/19 1549    Narrative:       EXAMINATION: XR CHEST 1 VW-      CLINICAL INDICATION:     sob; A41.9-Sepsis, unspecified organism;  R65.21-Severe sepsis with septic shock; N17.9-Acute kidney failure,  unspecified     TECHNIQUE:  XR CHEST 1 VW-      COMPARISON: 2/7/2019      FINDINGS:   Radiographically, worsening CHF with perihilar airspace edema noted.  Slight increase in small pleural effusions with bibasilar atelectasis.  No pneumothorax. Chest is otherwise stable.       Impression:       Worsening CHF with airspace edema and pleural effusions.     This report was finalized on 2/12/2019 3:46 PM by Dr. Woo Jacques MD.           ----------------------------------------------------------------------------------------------------------------------  Assessment and Plan:  - Acute on chronic hypoxic respiratory failure  - Acute on chronic diastolic heart failure  - Acute kidney injury on chronic kidney disease stage III, creatinine seems to be stable  - Right lobe pneumonia  - Paroxysmal atrial fibrillation  - Physical deconditioning with functional decline    Continue with judicious use of diuretics, monitor renal function and I's and O's closely, will repeat BMP in the morning, we'll get blood gas today to assess acid base balance and her PCO2 especially with lethargy.    Patient's pleural effusion has worsened based on chest x-ray, based on recommendation by pulmonology to perform therapeutic thoracentesis should if patient fails to improve with diuretics.  But with Coumadin on board we have to hold her Coumadin and make sure her INR is acceptable prior to thoracentesis.  We will consult interventional  urology once INR is more acceptable for thoracentesis.          Rosa Armenta MD  02/13/19  11:21 AM

## 2019-02-13 NOTE — PROGRESS NOTES
Chief Complaint:  Shortness of breath.    Subjective     Interval History:   She is still reports significant shortness of breath.  Currently she is on high flow nasal cannula with flow rate of 50 L/m.  She is saturating 94%.  Blood pressure is better controlled today.  No fevers overnight.  No acute events overnight.  She denies any major concern.      Intake/Output Summary (Last 24 hours) at 2/13/2019 1258  Last data filed at 2/13/2019 1250  Gross per 24 hour   Intake 780 ml   Output 1550 ml   Net -770 ml           Review of Systems:   Review of Systems - History obtained from chart review and the patient  General ROS: negative for - chills, fatigue, fever, malaise, night sweats or sleep disturbance  Psychological ROS: negative for - anxiety, behavioral disorder, depression or memory difficulties  Ophthalmic ROS: negative for - blurry vision, decreased vision, double vision or dry eyes  ENT ROS: negative for - epistaxis, hearing change or sneezing  Allergy and Immunology ROS: negative for - hives, itchy/watery eyes or nasal congestion  Hematological and Lymphatic ROS: negative for - bleeding problems, blood clots, blood transfusions, jaundice or night sweats  Endocrine ROS: negative for - malaise/lethargy, mood swings, polydipsia/polyuria or temperature intolerance  Respiratory ROS: Positive for cough, shortness of breath.  Negative for orthopnea, pleuritic pain or sputum changes  Cardiovascular ROS: no chest pain or dyspnea on exertion  Gastrointestinal ROS: no abdominal pain, change in bowel habits, or black or bloody stools  Musculoskeletal ROS: negative for - joint pain, joint stiffness or joint swelling  Neurological ROS: no TIA or stroke symptoms  Dermatological ROS: negative for acne, dry skin and eczema      Vital Signs  Temp:  [97 °F (36.1 °C)-98.7 °F (37.1 °C)] 97.9 °F (36.6 °C)  Heart Rate:  [56-78] 58  Resp:  [18-22] 18  BP: (149-200)/(62-98) 149/65    Intake/Output Summary (Last 24 hours) at 2/13/2019  1258  Last data filed at 2/13/2019 1250  Gross per 24 hour   Intake 780 ml   Output 1550 ml   Net -770 ml       Physical Exam:  Constitutional:  oriented to person, place, and time. appears well-developed and well-nourished. No distress.   HENT:   Head: Normocephalic and atraumatic.   Right Ear: External ear normal.   Left Ear: External ear normal.   Nose: Nose normal.   Eyes: Pupils are equal, round, and reactive to light. Conjunctivae and EOM are normal. Right eye exhibits no discharge. Left eye exhibits no discharge. No scleral icterus.   Neck: Normal range of motion. Neck supple. No thyromegaly present.   Cardiovascular: Normal rate, regular rhythm, normal heart sounds and intact distal pulses.  Exam reveals no friction rub.    No murmur heard.  Pulmonary/Chest:   No stridor.  Bilateral air entry equal.  Rhonchorous chest.  Wheezing absent.  Bilateral crackles present.  Abdominal: Soft. Bowel sounds are normal.exhibits no distension. There is no tenderness.   Musculoskeletal: Normal range of motion. exhibits no deformity.   +1 lower extremity edema bilateral.   Neurological: alert and oriented to person, place, and time. No cranial nerve deficit. Coordination normal.   Skin: Skin is warm and dry. Capillary refill takes less than 2 seconds. not diaphoretic. No erythema.   Psychiatric:   normal mood and affect.   behavior is normal.        Results Review:   I reviewed the patient's new clinical results.  Results from last 7 days   Lab Units 02/11/19  0426 02/10/19  0543 02/09/19  0540   WBC 10*3/mm3 7.20 8.16 11.02   HEMOGLOBIN g/dL 10.9* 11.0* 11.4*   PLATELETS 10*3/mm3 357 320 330     Results from last 7 days   Lab Units 02/13/19  0345 02/12/19  0339 02/11/19  0426 02/10/19  0543 02/09/19  0540   SODIUM mmol/L 137 136 136 133* 138   POTASSIUM mmol/L 3.7 3.7 4.1 4.7 4.2   CHLORIDE mmol/L 105 107 106 102 104   CO2 mmol/L 15.6* 20.3* 20.4* 18.6* 24.2*   BUN mg/dL 41* 36* 40* 33* 29*   CREATININE mg/dL 1.33* 1.25  1.31* 1.30* 1.18   CALCIUM mg/dL 8.4 8.5 8.5 8.6 8.7   GLUCOSE mg/dL 209* 151* 249* 242* 47*   MAGNESIUM mg/dL  --   --  2.0 2.1 2.5     Lab Results   Component Value Date    INR 2.84 (H) 02/13/2019    INR 2.86 (H) 02/12/2019    INR 2.48 (H) 02/11/2019    PROTIME 30.1 (H) 02/13/2019    PROTIME 30.3 (H) 02/12/2019    PROTIME 27.1 (H) 02/11/2019     Results from last 7 days   Lab Units 02/09/19  0540 02/08/19  0426 02/06/19  2347   ALK PHOS U/L 90 78 73   BILIRUBIN mg/dL 0.5 0.5 0.3   ALT (SGPT) U/L 21 9* 19   AST (SGOT) U/L 21 15 22     Results from last 7 days   Lab Units 02/12/19  0512   PH, ARTERIAL pH units 7.396   PO2 ART mm Hg 66.2*   PCO2, ARTERIAL mm Hg 32.4*   HCO3 ART mmol/L 19.4*     Imaging Results (last 24 hours)     Procedure Component Value Units Date/Time    XR Chest 1 View [[id number]] Collected:  02/12/19 1546     Updated:  02/12/19 1549    Narrative:       EXAMINATION: XR CHEST 1 VW-      CLINICAL INDICATION:     sob; A41.9-Sepsis, unspecified organism;  R65.21-Severe sepsis with septic shock; N17.9-Acute kidney failure,  unspecified     TECHNIQUE:  XR CHEST 1 VW-      COMPARISON: 2/7/2019      FINDINGS:   Radiographically, worsening CHF with perihilar airspace edema noted.  Slight increase in small pleural effusions with bibasilar atelectasis.  No pneumothorax. Chest is otherwise stable.       Impression:       Worsening CHF with airspace edema and pleural effusions.     This report was finalized on 2/12/2019 3:46 PM by Dr. Woo Jacques MD.                    aspirin 81 mg Oral Daily   budesonide-formoterol 2 puff Inhalation BID - RT   cefepime 1 g Intravenous Q12H   furosemide 20 mg Intravenous Once   guaiFENesin 1,200 mg Oral Q12H   hydrALAZINE 50 mg Oral TID   insulin aspart 0-7 Units Subcutaneous TID With Meals   insulin detemir 6 Units Subcutaneous Nightly   ipratropium-albuterol 3 mL Nebulization 4x Daily - RT   metoprolol tartrate 100 mg Oral Q12H   montelukast 10 mg Oral Nightly    sennosides-docusate sodium 2 tablet Oral Nightly   sodium chloride 3 mL Intravenous Q12H       Pharmacy to dose warfarin        I reviewed the medications.    Assessment/Plan     I have reviewed the labs.  BNP elevated.  Worsening of creatinine.    I have reviewed the image of chest x-ray.  As per minute drip addition, chest x-ray showed worsening of bilateral infiltrates with batwing appearance.  Bilateral pleural effusion getting worse.    Assessment:  Shortness of breath  Acute on chronic hypoxic respiratory failure  Chronic hypoxic respiratory failure on home oxygen  Right middle lobe pneumonia , unspecified organism   COPD, centrilobular emphysema type  Bilateral pleural effusion  Physical deconditioning  Bilateral basal atelectasis        Plan:  - In case of worsening kidney function, I recommend ultrasound-guided therapeutic thoracentesis.  - Patient's kidney function is getting worse on a small touch of diuretics.  I recommend involvement of nephrology for possible initiation of dialysis for diuretic resistant volume overload.   - Agree with holding Coumadin for now. Last INR was therapeutic.  - Patient is on intermittent diuresis with Lasix and metolazone  - Continue with duo nebs  - Continue with Symbicort nebs  - I have discontinued IV cefepime.  - continue Mucomyst nebs  - Aggressive PTOT while in hospital  - Incentive spirometer to her basal atelectasis  - Continue on BiPAP 15/8 with FiO2 35% and rate of 14 when necessary.      Nurse and respiratory therapist were updated about the plan.  I discuss the clinical plan with Dr.Oculam Quang Noonan MD  02/13/19  12:58 PM

## 2019-02-14 ENCOUNTER — APPOINTMENT (OUTPATIENT)
Dept: ULTRASOUND IMAGING | Facility: HOSPITAL | Age: 74
End: 2019-02-14

## 2019-02-14 LAB
ABO GROUP BLD: NORMAL
ABO GROUP BLD: NORMAL
ANION GAP SERPL CALCULATED.3IONS-SCNC: 12.2 MMOL/L (ref 3.6–11.2)
BLD GP AB SCN SERPL QL: NEGATIVE
BUN BLD-MCNC: 44 MG/DL (ref 7–21)
BUN/CREAT SERPL: 33.8 (ref 7–25)
CALCIUM SPEC-SCNC: 8.1 MG/DL (ref 7.7–10)
CHLORIDE SERPL-SCNC: 106 MMOL/L (ref 99–112)
CO2 SERPL-SCNC: 16.8 MMOL/L (ref 24.3–31.9)
CREAT BLD-MCNC: 1.3 MG/DL (ref 0.43–1.29)
GFR SERPL CREATININE-BSD FRML MDRD: 40 ML/MIN/1.73
GLUCOSE BLD-MCNC: 184 MG/DL (ref 70–110)
GLUCOSE BLDC GLUCOMTR-MCNC: 176 MG/DL (ref 70–130)
GLUCOSE BLDC GLUCOMTR-MCNC: 177 MG/DL (ref 70–130)
GLUCOSE BLDC GLUCOMTR-MCNC: 183 MG/DL (ref 70–130)
GLUCOSE BLDC GLUCOMTR-MCNC: 208 MG/DL (ref 70–130)
INR PPP: 2.34 (ref 0.9–1.1)
OSMOLALITY SERPL CALC.SUM OF ELEC: 286 MOSM/KG (ref 273–305)
POTASSIUM BLD-SCNC: 3.2 MMOL/L (ref 3.5–5.3)
POTASSIUM BLD-SCNC: 4.1 MMOL/L (ref 3.5–5.3)
PROTHROMBIN TIME: 25.9 SECONDS (ref 11–15.4)
RH BLD: POSITIVE
RH BLD: POSITIVE
SODIUM BLD-SCNC: 135 MMOL/L (ref 135–153)
T&S EXPIRATION DATE: NORMAL

## 2019-02-14 PROCEDURE — 86900 BLOOD TYPING SEROLOGIC ABO: CPT

## 2019-02-14 PROCEDURE — 25010000002 HYDRALAZINE PER 20 MG: Performed by: HOSPITALIST

## 2019-02-14 PROCEDURE — 76604 US EXAM CHEST: CPT | Performed by: RADIOLOGY

## 2019-02-14 PROCEDURE — 85610 PROTHROMBIN TIME: CPT | Performed by: HOSPITALIST

## 2019-02-14 PROCEDURE — 76604 US EXAM CHEST: CPT

## 2019-02-14 PROCEDURE — 86901 BLOOD TYPING SEROLOGIC RH(D): CPT | Performed by: HOSPITALIST

## 2019-02-14 PROCEDURE — 86850 RBC ANTIBODY SCREEN: CPT | Performed by: HOSPITALIST

## 2019-02-14 PROCEDURE — 99233 SBSQ HOSP IP/OBS HIGH 50: CPT | Performed by: HOSPITALIST

## 2019-02-14 PROCEDURE — 80048 BASIC METABOLIC PNL TOTAL CA: CPT | Performed by: HOSPITALIST

## 2019-02-14 PROCEDURE — 99233 SBSQ HOSP IP/OBS HIGH 50: CPT | Performed by: INTERNAL MEDICINE

## 2019-02-14 PROCEDURE — 82962 GLUCOSE BLOOD TEST: CPT

## 2019-02-14 PROCEDURE — 63710000001 INSULIN ASPART PER 5 UNITS: Performed by: INTERNAL MEDICINE

## 2019-02-14 PROCEDURE — 94799 UNLISTED PULMONARY SVC/PX: CPT

## 2019-02-14 PROCEDURE — 86901 BLOOD TYPING SEROLOGIC RH(D): CPT

## 2019-02-14 PROCEDURE — 84132 ASSAY OF SERUM POTASSIUM: CPT | Performed by: HOSPITALIST

## 2019-02-14 PROCEDURE — 63710000001 INSULIN DETEMIR PER 5 UNITS: Performed by: INTERNAL MEDICINE

## 2019-02-14 PROCEDURE — 25010000002 VITAMIN K1 PER 1 MG: Performed by: HOSPITALIST

## 2019-02-14 PROCEDURE — 86900 BLOOD TYPING SEROLOGIC ABO: CPT | Performed by: HOSPITALIST

## 2019-02-14 RX ORDER — POTASSIUM CHLORIDE 20 MEQ/1
40 TABLET, EXTENDED RELEASE ORAL EVERY 4 HOURS
Status: COMPLETED | OUTPATIENT
Start: 2019-02-14 | End: 2019-02-14

## 2019-02-14 RX ORDER — PHYTONADIONE 10 MG/ML
5 INJECTION, EMULSION INTRAMUSCULAR; INTRAVENOUS; SUBCUTANEOUS ONCE
Status: COMPLETED | OUTPATIENT
Start: 2019-02-14 | End: 2019-02-14

## 2019-02-14 RX ADMIN — ASPIRIN 81 MG: 81 TABLET ORAL at 08:17

## 2019-02-14 RX ADMIN — INSULIN ASPART 2 UNITS: 100 INJECTION, SOLUTION INTRAVENOUS; SUBCUTANEOUS at 07:22

## 2019-02-14 RX ADMIN — METOPROLOL TARTRATE 100 MG: 100 TABLET, FILM COATED ORAL at 08:16

## 2019-02-14 RX ADMIN — PHYTONADIONE 5 MG: 10 INJECTION, EMULSION INTRAMUSCULAR; INTRAVENOUS; SUBCUTANEOUS at 12:26

## 2019-02-14 RX ADMIN — INSULIN ASPART 3 UNITS: 100 INJECTION, SOLUTION INTRAVENOUS; SUBCUTANEOUS at 11:20

## 2019-02-14 RX ADMIN — POTASSIUM CHLORIDE 40 MEQ: 1500 TABLET, EXTENDED RELEASE ORAL at 07:28

## 2019-02-14 RX ADMIN — HYDRALAZINE HYDROCHLORIDE 50 MG: 50 TABLET ORAL at 17:16

## 2019-02-14 RX ADMIN — MONTELUKAST SODIUM 10 MG: 10 TABLET, COATED ORAL at 20:28

## 2019-02-14 RX ADMIN — SODIUM CHLORIDE, PRESERVATIVE FREE 3 ML: 5 INJECTION INTRAVENOUS at 20:28

## 2019-02-14 RX ADMIN — IPRATROPIUM BROMIDE AND ALBUTEROL SULFATE 3 ML: .5; 3 SOLUTION RESPIRATORY (INHALATION) at 13:23

## 2019-02-14 RX ADMIN — POTASSIUM CHLORIDE 40 MEQ: 1500 TABLET, EXTENDED RELEASE ORAL at 12:25

## 2019-02-14 RX ADMIN — BUDESONIDE AND FORMOTEROL FUMARATE DIHYDRATE 2 PUFF: 160; 4.5 AEROSOL RESPIRATORY (INHALATION) at 06:56

## 2019-02-14 RX ADMIN — METOPROLOL TARTRATE 100 MG: 100 TABLET, FILM COATED ORAL at 20:28

## 2019-02-14 RX ADMIN — HYDRALAZINE HYDROCHLORIDE 50 MG: 50 TABLET ORAL at 20:28

## 2019-02-14 RX ADMIN — IPRATROPIUM BROMIDE AND ALBUTEROL SULFATE 3 ML: .5; 3 SOLUTION RESPIRATORY (INHALATION) at 18:58

## 2019-02-14 RX ADMIN — HYDRALAZINE HYDROCHLORIDE 50 MG: 50 TABLET ORAL at 08:18

## 2019-02-14 RX ADMIN — GUAIFENESIN 1200 MG: 600 TABLET, EXTENDED RELEASE ORAL at 20:28

## 2019-02-14 RX ADMIN — IPRATROPIUM BROMIDE AND ALBUTEROL SULFATE 3 ML: .5; 3 SOLUTION RESPIRATORY (INHALATION) at 00:28

## 2019-02-14 RX ADMIN — SENNOSIDES AND DOCUSATE SODIUM 2 TABLET: 8.6; 5 TABLET ORAL at 20:28

## 2019-02-14 RX ADMIN — HYDRALAZINE HYDROCHLORIDE 10 MG: 20 INJECTION INTRAMUSCULAR; INTRAVENOUS at 03:25

## 2019-02-14 RX ADMIN — INSULIN ASPART 2 UNITS: 100 INJECTION, SOLUTION INTRAVENOUS; SUBCUTANEOUS at 17:10

## 2019-02-14 RX ADMIN — IPRATROPIUM BROMIDE AND ALBUTEROL SULFATE 3 ML: .5; 3 SOLUTION RESPIRATORY (INHALATION) at 06:56

## 2019-02-14 RX ADMIN — SODIUM CHLORIDE, PRESERVATIVE FREE 3 ML: 5 INJECTION INTRAVENOUS at 09:22

## 2019-02-14 RX ADMIN — INSULIN DETEMIR 6 UNITS: 100 INJECTION, SOLUTION SUBCUTANEOUS at 20:28

## 2019-02-14 RX ADMIN — GUAIFENESIN 1200 MG: 600 TABLET, EXTENDED RELEASE ORAL at 08:17

## 2019-02-14 RX ADMIN — BUDESONIDE AND FORMOTEROL FUMARATE DIHYDRATE 2 PUFF: 160; 4.5 AEROSOL RESPIRATORY (INHALATION) at 18:58

## 2019-02-14 NOTE — PLAN OF CARE
Problem: Sepsis/Septic Shock (Adult)  Goal: Signs and Symptoms of Listed Potential Problems Will be Absent, Minimized or Managed (Sepsis/Septic Shock)   02/13/19 0936   Goal/Outcome Evaluation   Problems Assessed (Sepsis) all   Problems Present (Sepsis) none       Problem: Fall Risk (Adult)  Goal: Identify Related Risk Factors and Signs and Symptoms   02/11/19 0938   Fall Risk (Adult)   Related Risk Factors (Fall Risk) age-related changes;environment unfamiliar   Signs and Symptoms (Fall Risk) presence of risk factors     Goal: Absence of Fall   02/13/19 0936   Fall Risk (Adult)   Absence of Fall making progress toward outcome       Problem: Skin Injury Risk (Adult)  Goal: Identify Related Risk Factors and Signs and Symptoms   02/11/19 0938   Skin Injury Risk (Adult)   Related Risk Factors (Skin Injury Risk) advanced age;mobility impaired;infection     Goal: Skin Health and Integrity   02/13/19 0936   Skin Injury Risk (Adult)   Skin Health and Integrity making progress toward outcome       Problem: Patient Care Overview  Goal: Plan of Care Review   02/13/19 0936 02/13/19 2050   Coping/Psychosocial   Plan of Care Reviewed With --  patient   Plan of Care Review   Progress improving --      Goal: Individualization and Mutuality  Outcome: Ongoing (interventions implemented as appropriate)    Goal: Discharge Needs Assessment   02/06/19 0043 02/06/19 0046   Disability   Equipment Currently Used at Home --  none   Discharge Needs Assessment   Patient/Family Anticipates Transition to home --    Patient/Family Anticipated Services at Transition none --    Transportation Anticipated family or friend will provide;car, drives self --      Goal: Interprofessional Rounds/Family Conf   02/11/19 0938   Interdisciplinary Rounds/Family Conf   Participants nursing;patient       Problem: Mobility, Physical Impaired (Adult)  Goal: Identify Related Risk Factors and Signs and Symptoms   02/11/19 0938   Mobility, Physical Impaired (Adult)    Related Risk Factors (Physical Mobility, Impaired) fatigue   Signs and Symptoms (Physical Mobility Impaired) holding on to furniture     Goal: Enhanced Mobility Skills   02/13/19 0936   Mobility, Physical Impaired (Adult)   Enhanced Mobility Skills making progress toward outcome     Goal: Enhanced Functional Ability   02/13/19 0936   Mobility, Physical Impaired (Adult)   Enhanced Functional Ability making progress toward outcome

## 2019-02-14 NOTE — PROGRESS NOTES
[facility] HOSPITALIST PROGRESS NOTE     Patient Identification:  Name:  Ashley Geronimo  Age:  73 y.o.  Sex:  female  :  1945  MRN:  [id number]  Visit Number:  [id number]  Primary Care Provider:  Silvano Parker MD    Length of stay:  8    Subjective:  Patient is awake, alert she's not lethargic today, she responds appropriately, he looks better unfortunately her x-ray shows worsening of pleural effusion, her renal function is also slightly worsening.  Case was discussed at length with pulmonology service and recommended thoracentesis.  I was able to talk to Dr. Jacques and suggested to repeat the chest x-ray as well as chest ultrasound and correct INR prior to performing ultrasound-guided thoracentesis.  She is still on high flow nasal cannula 50% which I think can be tapered down today.    Chief Complaint: Short of  breath  ----------------------------------------------------------------------------------------------------------------------  Current Hospital Meds:    aspirin 81 mg Oral Daily   budesonide-formoterol 2 puff Inhalation BID - RT   furosemide 20 mg Intravenous Once   guaiFENesin 1,200 mg Oral Q12H   hydrALAZINE 50 mg Oral TID   insulin aspart 0-7 Units Subcutaneous TID With Meals   insulin detemir 6 Units Subcutaneous Nightly   ipratropium-albuterol 3 mL Nebulization 4x Daily - RT   metoprolol tartrate 100 mg Oral Q12H   montelukast 10 mg Oral Nightly   phytonadione 5 mg Subcutaneous Once   potassium chloride 40 mEq Oral Q4H   sennosides-docusate sodium 2 tablet Oral Nightly   sodium chloride 3 mL Intravenous Q12H        ----------------------------------------------------------------------------------------------------------------------  Vital Signs:  Temp:  [97.4 °F (36.3 °C)-98.3 °F (36.8 °C)] 97.4 °F (36.3 °C)  Heart Rate:  [55-92] 85  Resp:  [18-20] 18  BP: (151-189)/(58-82) 155/58       Tele: Sinus bradycardia rate of 50 bpm      19  0232 19  0314  02/13/19  0317   Weight: 64 kg (141 lb) 64.2 kg (141 lb 8 oz) 65.2 kg (143 lb 11.2 oz)     Body mass index is 28.06 kg/m².    Intake/Output Summary (Last 24 hours) at 2/14/2019 1218  Last data filed at 2/14/2019 0900  Gross per 24 hour   Intake 240 ml   Output 550 ml   Net -310 ml     Diet Regular; Cardiac, Consistent Carbohydrate, Renal  ----------------------------------------------------------------------------------------------------------------------  Physical exam:  General: Comfortable,awake, alert, oriented to self, place, and time, well-developed chronically ill-appearing, still wearing high flow nasal cannula at 50%.  No respiratory distress.   Skin:  Skin is warm and dry. No rash noted. No pallor.    HENT:  Head:  Normocephalic and atraumatic.  Mouth:  Moist mucous membranes.    Eyes:  Conjunctivae and EOM are normal.  Pupils are equal, round, and reactive to light.  No scleral icterus.    Neck:  Neck supple.  JVD present better than yesterday.    Pulmonary/Chest:  No respiratory distress, no wheezes, no crackles, with normal breath sounds and good air movement.  Decreased breath sounds both lower lung fields  Cardiovascular:  Normal rate, regular rhythm and normal heart sounds with no murmur.  Abdominal:  Soft.  Bowel sounds are normal.  No distension and no tenderness.   Extremities: edema is improved on both lower extremity.  both lower extremity pitting type, good pedal pulse, no cyanosis or clubbing, no Homans signthe legs.  There is some mild redness on the right lower extremity leg.  Neurological:  Motor strength equal no obvious deficit, sensory grossly intact.   No cranial nerve deficit.  No tongue deviation.  No facial droop.  No slurred speech.    Genitourinary: Pale jason urine noted on Singh  catheter    ----------------------------------------------------------------------------------------------------------------------  ----------------------------------------------------------------------------------------------------------------------      Results from last 7 days   Lab Units 02/14/19  0423 02/13/19  0345 02/12/19  0339 02/11/19  1209 02/11/19  0426 02/10/19  0543 02/09/19  0540   WBC 10*3/mm3  --   --   --   --  7.20 8.16 11.02   HEMOGLOBIN g/dL  --   --   --   --  10.9* 11.0* 11.4*   HEMATOCRIT %  --   --   --   --  33.7* 33.9* 35.0*   MCV fL  --   --   --   --  90.6 91.9 89.7   MCHC g/dL  --   --   --   --  32.3* 32.4* 32.6*   PLATELETS 10*3/mm3  --   --   --   --  357 320 330   INR  2.34* 2.84* 2.86* 2.48* 2.31* 1.49* 1.31*     Results from last 7 days   Lab Units 02/13/19  1353   PH, ARTERIAL pH units 7.383   PO2 ART mm Hg 83.2   PCO2, ARTERIAL mm Hg 31.5*   HCO3 ART mmol/L 18.3*     Results from last 7 days   Lab Units 02/14/19  0423 02/13/19  0345 02/12/19  0339 02/11/19  0426 02/10/19  0543 02/09/19  0540 02/08/19  0426   SODIUM mmol/L 135 137 136 136 133* 138 140   POTASSIUM mmol/L 3.2* 3.7 3.7 4.1 4.7 4.2 3.7   MAGNESIUM mg/dL  --   --   --  2.0 2.1 2.5 1.9   CHLORIDE mmol/L 106 105 107 106 102 104 108   CO2 mmol/L 16.8* 15.6* 20.3* 20.4* 18.6* 24.2* 22.3*   BUN mg/dL 44* 41* 36* 40* 33* 29* 26*   CREATININE mg/dL 1.30* 1.33* 1.25 1.31* 1.30* 1.18 1.26   EGFR IF NONAFRICN AM mL/min/1.73 40* 39* 42* 40* 40* 45* 42*   CALCIUM mg/dL 8.1 8.4 8.5 8.5 8.6 8.7 8.3   GLUCOSE mg/dL 184* 209* 151* 249* 242* 47* 38*   ALBUMIN g/dL  --   --   --   --   --  4.30 3.90   BILIRUBIN mg/dL  --   --   --   --   --  0.5 0.5   ALK PHOS U/L  --   --   --   --   --  90 78   AST (SGOT) U/L  --   --   --   --   --  21 15   ALT (SGPT) U/L  --   --   --   --   --  21 9*   Estimated Creatinine Clearance: 32 mL/min (A) (by C-G formula based on SCr of 1.3 mg/dL (H)).    No results found for: AMMONIA                    I  have personally looked at the labs and they are summarized above.  ----------------------------------------------------------------------------------------------------------------------  Imaging Results (last 24 hours)     Procedure Component Value Units Date/Time    US Chest [[id number]] Collected:  02/14/19 1057     Updated:  02/14/19 1100    Narrative:       EXAMINATION: US CHEST-      CLINICAL INDICATION:     pleural effusion; A41.9-Sepsis, unspecified  organism; R65.21-Severe sepsis with septic shock; N17.9-Acute kidney  failure, unspecified     COMPARISON: Chest x-ray from 2/12/2019     TECHNIQUE:  Sonographic imaging obtained of the chest for pleural fluid  assessment      FINDINGS:   There is a moderate left pleural effusion noted with atelectatic changes  of the left lower lobe. There is a small-medium size right pleural  effusion. Right lung atelectasis noted.        Impression:       Mild-moderate left greater than right pleural effusions.     This report was finalized on 2/14/2019 10:58 AM by Dr. Woo Jacques MD.           ----------------------------------------------------------------------------------------------------------------------  Assessment and Plan:  - Acute on chronic hypoxic respiratory failure secondary to CHF and pneumonia  - Right sided pneumonia  - Bilateral pleural effusion slow to respond with diuretics  - Paroxysmal atrial fibrillation  - Physical deconditioning with functional decline  - Acute kidney injury on chronic kidney disease stage III    Patient's Coumadin has been on hold, will give low-dose vitamin K, recheck INR, we'll consult nephrology to assist in management of renal failure, ultrasound of the chest has been ordered as well as repeat chest x-ray in preparation for possible thoracentesis once INR is acceptable.    Plan has been outlined to the patient and agreed.    Total time spent on this encounter including discussion with multiple specialties including radiology  pulmonology and nursing staff this morning 30 minutes.          Rosa Armenta MD  02/14/19  12:18 PM

## 2019-02-14 NOTE — SIGNIFICANT NOTE
02/14/19 1639   Rehab Treatment   Discipline physical therapist   Reason Treatment Not Performed unavailable for treatment  (Pt unable to get up or walk in am, deeply sleeping in pm. will attempt  in am)

## 2019-02-14 NOTE — PROGRESS NOTES
Chief Complaint:  Shortness of breath.    Subjective     Interval History:     She still reports significant shortness of breath.  She is currently on 50 L/m of supplemental oxygen and saturating 96%.  No fevers overnight.  No acute events overnight.  She denies any major concern.      Intake/Output Summary (Last 24 hours) at 2/14/2019 1537  Last data filed at 2/14/2019 1422  Gross per 24 hour   Intake 240 ml   Output 850 ml   Net -610 ml           Review of Systems:   Review of Systems - History obtained from chart review and the patient  General ROS: negative for - chills, fatigue, fever, malaise, night sweats or sleep disturbance  Psychological ROS: negative for - anxiety, behavioral disorder, depression or memory difficulties  Ophthalmic ROS: negative for - blurry vision, decreased vision, double vision or dry eyes  ENT ROS: negative for - epistaxis, hearing change or sneezing  Allergy and Immunology ROS: negative for - hives, itchy/watery eyes or nasal congestion  Hematological and Lymphatic ROS: negative for - bleeding problems, blood clots, blood transfusions, jaundice or night sweats  Endocrine ROS: negative for - malaise/lethargy, mood swings, polydipsia/polyuria or temperature intolerance  Respiratory ROS: Positive for cough and shortness of breath. Negative for orthopnea, pleuritic pain or sputum changes  Cardiovascular ROS: no chest pain or dyspnea on exertion  Gastrointestinal ROS: no abdominal pain, change in bowel habits, or black or bloody stools  Musculoskeletal ROS: negative for - joint pain, joint stiffness or joint swelling  Neurological ROS: no TIA or stroke symptoms  Dermatological ROS: negative for acne, dry skin and eczema      Vital Signs  Temp:  [97.4 °F (36.3 °C)-98.3 °F (36.8 °C)] 98.1 °F (36.7 °C)  Heart Rate:  [55-92] 90  Resp:  [18] 18  BP: (151-178)/(58-82) 152/70    Intake/Output Summary (Last 24 hours) at 2/14/2019 1537  Last data filed at 2/14/2019 1422  Gross per 24 hour   Intake  240 ml   Output 850 ml   Net -610 ml       Physical Exam:  Constitutional:  oriented to person, place, and time. appears well-developed and well-nourished. No distress.   HENT:   Head: Normocephalic and atraumatic.   Right Ear: External ear normal.   Left Ear: External ear normal.   Nose: Nose normal.   Eyes: Pupils are equal, round, and reactive to light. Conjunctivae and EOM are normal. Right eye exhibits no discharge. Left eye exhibits no discharge. No scleral icterus.   Neck: Normal range of motion. Neck supple. No thyromegaly present.   Cardiovascular: Normal rate, regular rhythm, normal heart sounds and intact distal pulses.  Exam reveals no friction rub.    No murmur heard.  Pulmonary/Chest:   No stridor.  Bilateral air entry equal.  Rhonchorous chest.  Wheezing absent.  Bilateral crackles appreciated.    Abdominal: Soft. Bowel sounds are normal.exhibits no distension. There is no tenderness.   Musculoskeletal: Normal range of motion. exhibits no deformity.   +1 lower extremity edema bilateral.   Neurological: alert and oriented to person, place, and time. No cranial nerve deficit. Coordination normal.   Skin: Skin is warm and dry. Capillary refill takes less than 2 seconds. not diaphoretic. No erythema.   Psychiatric:   normal mood and affect.   behavior is normal.        Results Review:   I reviewed the patient's new clinical results.  Results from last 7 days   Lab Units 02/11/19  0426 02/10/19  0543 02/09/19  0540   WBC 10*3/mm3 7.20 8.16 11.02   HEMOGLOBIN g/dL 10.9* 11.0* 11.4*   PLATELETS 10*3/mm3 357 320 330     Results from last 7 days   Lab Units 02/14/19  0423 02/13/19  0345 02/12/19  0339 02/11/19  0426 02/10/19  0543 02/09/19  0540   SODIUM mmol/L 135 137 136 136 133* 138   POTASSIUM mmol/L 3.2* 3.7 3.7 4.1 4.7 4.2   CHLORIDE mmol/L 106 105 107 106 102 104   CO2 mmol/L 16.8* 15.6* 20.3* 20.4* 18.6* 24.2*   BUN mg/dL 44* 41* 36* 40* 33* 29*   CREATININE mg/dL 1.30* 1.33* 1.25 1.31* 1.30* 1.18    CALCIUM mg/dL 8.1 8.4 8.5 8.5 8.6 8.7   GLUCOSE mg/dL 184* 209* 151* 249* 242* 47*   MAGNESIUM mg/dL  --   --   --  2.0 2.1 2.5     Lab Results   Component Value Date    INR 2.34 (H) 02/14/2019    INR 2.84 (H) 02/13/2019    INR 2.86 (H) 02/12/2019    PROTIME 25.9 (H) 02/14/2019    PROTIME 30.1 (H) 02/13/2019    PROTIME 30.3 (H) 02/12/2019     Results from last 7 days   Lab Units 02/09/19  0540 02/08/19  0426   ALK PHOS U/L 90 78   BILIRUBIN mg/dL 0.5 0.5   ALT (SGPT) U/L 21 9*   AST (SGOT) U/L 21 15     Results from last 7 days   Lab Units 02/13/19  1353   PH, ARTERIAL pH units 7.383   PO2 ART mm Hg 83.2   PCO2, ARTERIAL mm Hg 31.5*   HCO3 ART mmol/L 18.3*     Imaging Results (last 24 hours)     Procedure Component Value Units Date/Time    US Chest [[id number]] Collected:  02/14/19 1057     Updated:  02/14/19 1100    Narrative:       EXAMINATION: US CHEST-      CLINICAL INDICATION:     pleural effusion; A41.9-Sepsis, unspecified  organism; R65.21-Severe sepsis with septic shock; N17.9-Acute kidney  failure, unspecified     COMPARISON: Chest x-ray from 2/12/2019     TECHNIQUE:  Sonographic imaging obtained of the chest for pleural fluid  assessment      FINDINGS:   There is a moderate left pleural effusion noted with atelectatic changes  of the left lower lobe. There is a small-medium size right pleural  effusion. Right lung atelectasis noted.        Impression:       Mild-moderate left greater than right pleural effusions.     This report was finalized on 2/14/2019 10:58 AM by Dr. Woo Jacques MD.                    aspirin 81 mg Oral Daily   budesonide-formoterol 2 puff Inhalation BID - RT   furosemide 20 mg Intravenous Once   guaiFENesin 1,200 mg Oral Q12H   hydrALAZINE 50 mg Oral TID   insulin aspart 0-7 Units Subcutaneous TID With Meals   insulin detemir 6 Units Subcutaneous Nightly   ipratropium-albuterol 3 mL Nebulization 4x Daily - RT   metoprolol tartrate 100 mg Oral Q12H   montelukast 10 mg Oral Nightly    sennosides-docusate sodium 2 tablet Oral Nightly   sodium chloride 3 mL Intravenous Q12H          I reviewed the medications.    Assessment/Plan     I have reviewed labs.    I have reviewed the ultrasound of the chest report.    Assessment:  Shortness of breath  Acute on chronic hypoxic respiratory failure  Chronic hypoxic respiratory failure on home oxygen  Right middle lobe pneumonia , unspecified organism   COPD, centrilobular emphysema type  Bilateral pleural effusion  Physical deconditioning  Bilateral basal atelectasis        Plan:  - I have discussed the clinical plan with Dr. Armenta.  I'll proceed with bedside ultrasound-guided thoracentesis tomorrow morning.  Patient will receive FFP 3 units before the procedure.  We will recheck the INR before the procedure.  - Nephrology on board.  - Continue with duo nebs  - Continue with Symbicort nebs  - continue Mucomyst nebs  - Aggressive PTOT while in hospital  - Incentive spirometer to her basal atelectasis  - Continue on BiPAP 15/8 with FiO2 35% and rate of 14 when necessary.      Nurse and respiratory therapist were updated about the plan.  I have discussed the clinical plan with Dr. Armenta.       Quang Noonan MD  02/14/19  3:37 PM

## 2019-02-14 NOTE — PLAN OF CARE
Problem: Sepsis/Septic Shock (Adult)  Goal: Signs and Symptoms of Listed Potential Problems Will be Absent, Minimized or Managed (Sepsis/Septic Shock)  Outcome: Ongoing (interventions implemented as appropriate)      Problem: Fall Risk (Adult)  Goal: Identify Related Risk Factors and Signs and Symptoms  Outcome: Outcome(s) achieved Date Met: 02/14/19    Goal: Absence of Fall  Outcome: Ongoing (interventions implemented as appropriate)      Problem: Skin Injury Risk (Adult)  Goal: Identify Related Risk Factors and Signs and Symptoms  Outcome: Outcome(s) achieved Date Met: 02/14/19    Goal: Skin Health and Integrity  Outcome: Ongoing (interventions implemented as appropriate)      Problem: Patient Care Overview  Goal: Plan of Care Review  Outcome: Ongoing (interventions implemented as appropriate)    Goal: Individualization and Mutuality  Outcome: Ongoing (interventions implemented as appropriate)    Goal: Discharge Needs Assessment  Outcome: Ongoing (interventions implemented as appropriate)    Goal: Interprofessional Rounds/Family Conf  Outcome: Ongoing (interventions implemented as appropriate)      Problem: Mobility, Physical Impaired (Adult)  Goal: Identify Related Risk Factors and Signs and Symptoms  Outcome: Ongoing (interventions implemented as appropriate)    Goal: Enhanced Mobility Skills  Outcome: Ongoing (interventions implemented as appropriate)    Goal: Enhanced Functional Ability  Outcome: Ongoing (interventions implemented as appropriate)

## 2019-02-15 ENCOUNTER — APPOINTMENT (OUTPATIENT)
Dept: GENERAL RADIOLOGY | Facility: HOSPITAL | Age: 74
End: 2019-02-15

## 2019-02-15 LAB
ALBUMIN UR-MCNC: 255.2 MG/L
ANION GAP SERPL CALCULATED.3IONS-SCNC: 5.3 MMOL/L (ref 3.6–11.2)
APPEARANCE FLD: ABNORMAL
BASOPHILS # BLD AUTO: 0.02 10*3/MM3 (ref 0–0.3)
BASOPHILS NFR BLD AUTO: 0.2 % (ref 0–2)
BUN BLD-MCNC: 46 MG/DL (ref 7–21)
BUN/CREAT SERPL: 38.3 (ref 7–25)
CALCIUM SPEC-SCNC: 7.8 MG/DL (ref 7.7–10)
CHLORIDE SERPL-SCNC: 108 MMOL/L (ref 99–112)
CO2 SERPL-SCNC: 21.7 MMOL/L (ref 24.3–31.9)
COLOR FLD: ABNORMAL
CREAT BLD-MCNC: 1.2 MG/DL (ref 0.43–1.29)
CREAT UR-MCNC: 39.8 MG/DL
CREAT UR-MCNC: 39.8 MG/DL
CREAT UR-MCNC: 40.4 MG/DL
DEPRECATED RDW RBC AUTO: 42.4 FL (ref 37–54)
EOSINOPHIL # BLD AUTO: 0.18 10*3/MM3 (ref 0–0.7)
EOSINOPHIL NFR BLD AUTO: 1.9 % (ref 0–7)
EOSINOPHIL NFR FLD MANUAL: 1 %
ERYTHROCYTE [DISTWIDTH] IN BLOOD BY AUTOMATED COUNT: 13.6 % (ref 11.5–14.5)
GFR SERPL CREATININE-BSD FRML MDRD: 44 ML/MIN/1.73
GLUCOSE BLD-MCNC: 149 MG/DL (ref 70–110)
GLUCOSE BLDC GLUCOMTR-MCNC: 116 MG/DL (ref 70–130)
GLUCOSE BLDC GLUCOMTR-MCNC: 120 MG/DL (ref 70–130)
GLUCOSE BLDC GLUCOMTR-MCNC: 229 MG/DL (ref 70–130)
GLUCOSE BLDC GLUCOMTR-MCNC: 240 MG/DL (ref 70–130)
GLUCOSE BLDC GLUCOMTR-MCNC: 321 MG/DL (ref 70–130)
HCT VFR BLD AUTO: 31.4 % (ref 37–47)
HGB BLD-MCNC: 10.2 G/DL (ref 12–16)
IMM GRANULOCYTES # BLD AUTO: 0.06 10*3/MM3 (ref 0–0.03)
IMM GRANULOCYTES NFR BLD AUTO: 0.6 % (ref 0–0.5)
INR PPP: 1.17 (ref 0.9–1.1)
INR PPP: 1.45 (ref 0.9–1.1)
LYMPHOCYTES # BLD AUTO: 1.06 10*3/MM3 (ref 1–3)
LYMPHOCYTES NFR BLD AUTO: 11.1 % (ref 16–46)
LYMPHOCYTES NFR FLD MANUAL: 17 %
MCH RBC QN AUTO: 28.9 PG (ref 27–33)
MCHC RBC AUTO-ENTMCNC: 32.5 G/DL (ref 33–37)
MCV RBC AUTO: 89 FL (ref 80–94)
METHOD: ABNORMAL
MICROALBUMIN/CREAT UR: 631.7 MG/G
MONOCYTES # BLD AUTO: 0.92 10*3/MM3 (ref 0.1–0.9)
MONOCYTES NFR BLD AUTO: 9.6 % (ref 0–12)
MONOS+MACROS NFR FLD: 23 %
NEUTROPHILS # BLD AUTO: 7.33 10*3/MM3 (ref 1.4–6.5)
NEUTROPHILS NFR BLD AUTO: 76.6 % (ref 40–75)
NEUTROPHILS NFR FLD MANUAL: 59 %
NUC CELL # FLD: 153 /MM3
OSMOLALITY SERPL CALC.SUM OF ELEC: 284.8 MOSM/KG (ref 273–305)
PLATELET # BLD AUTO: 368 10*3/MM3 (ref 130–400)
PMV BLD AUTO: 10.7 FL (ref 6–10)
POTASSIUM BLD-SCNC: 4 MMOL/L (ref 3.5–5.3)
PROT UR-MCNC: 65.1 MG/DL
PROT/CREAT UR: 1635.7 MG/G CREA (ref 0–200)
PROTHROMBIN TIME: 15.1 SECONDS (ref 11–15.4)
PROTHROMBIN TIME: 17.8 SECONDS (ref 11–15.4)
RBC # BLD AUTO: 3.53 10*6/MM3 (ref 4.2–5.4)
RBC # FLD AUTO: ABNORMAL /MM3
SODIUM BLD-SCNC: 135 MMOL/L (ref 135–153)
SODIUM UR-SCNC: 74 MMOL/L
WBC NRBC COR # BLD: 9.57 10*3/MM3 (ref 4.5–12.5)

## 2019-02-15 PROCEDURE — 71045 X-RAY EXAM CHEST 1 VIEW: CPT

## 2019-02-15 PROCEDURE — 82042 OTHER SOURCE ALBUMIN QUAN EA: CPT | Performed by: INTERNAL MEDICINE

## 2019-02-15 PROCEDURE — 63710000001 INSULIN DETEMIR PER 5 UNITS: Performed by: INTERNAL MEDICINE

## 2019-02-15 PROCEDURE — 88112 CYTOPATH CELL ENHANCE TECH: CPT | Performed by: INTERNAL MEDICINE

## 2019-02-15 PROCEDURE — 71045 X-RAY EXAM CHEST 1 VIEW: CPT | Performed by: RADIOLOGY

## 2019-02-15 PROCEDURE — 84157 ASSAY OF PROTEIN OTHER: CPT | Performed by: INTERNAL MEDICINE

## 2019-02-15 PROCEDURE — 84478 ASSAY OF TRIGLYCERIDES: CPT | Performed by: INTERNAL MEDICINE

## 2019-02-15 PROCEDURE — 94799 UNLISTED PULMONARY SVC/PX: CPT

## 2019-02-15 PROCEDURE — 82945 GLUCOSE OTHER FLUID: CPT | Performed by: INTERNAL MEDICINE

## 2019-02-15 PROCEDURE — 84300 ASSAY OF URINE SODIUM: CPT | Performed by: INTERNAL MEDICINE

## 2019-02-15 PROCEDURE — 32555 ASPIRATE PLEURA W/ IMAGING: CPT | Performed by: INTERNAL MEDICINE

## 2019-02-15 PROCEDURE — 82570 ASSAY OF URINE CREATININE: CPT | Performed by: INTERNAL MEDICINE

## 2019-02-15 PROCEDURE — 87070 CULTURE OTHR SPECIMN AEROBIC: CPT | Performed by: INTERNAL MEDICINE

## 2019-02-15 PROCEDURE — 84156 ASSAY OF PROTEIN URINE: CPT | Performed by: INTERNAL MEDICINE

## 2019-02-15 PROCEDURE — 82962 GLUCOSE BLOOD TEST: CPT

## 2019-02-15 PROCEDURE — 63710000001 INSULIN ASPART PER 5 UNITS: Performed by: INTERNAL MEDICINE

## 2019-02-15 PROCEDURE — 85610 PROTHROMBIN TIME: CPT | Performed by: HOSPITALIST

## 2019-02-15 PROCEDURE — 83986 ASSAY PH BODY FLUID NOS: CPT | Performed by: INTERNAL MEDICINE

## 2019-02-15 PROCEDURE — 82043 UR ALBUMIN QUANTITATIVE: CPT | Performed by: INTERNAL MEDICINE

## 2019-02-15 PROCEDURE — 83615 LACTATE (LD) (LDH) ENZYME: CPT | Performed by: INTERNAL MEDICINE

## 2019-02-15 PROCEDURE — 0W9B3ZZ DRAINAGE OF LEFT PLEURAL CAVITY, PERCUTANEOUS APPROACH: ICD-10-PCS | Performed by: INTERNAL MEDICINE

## 2019-02-15 PROCEDURE — 99232 SBSQ HOSP IP/OBS MODERATE 35: CPT | Performed by: INTERNAL MEDICINE

## 2019-02-15 PROCEDURE — 86038 ANTINUCLEAR ANTIBODIES: CPT | Performed by: INTERNAL MEDICINE

## 2019-02-15 PROCEDURE — 99232 SBSQ HOSP IP/OBS MODERATE 35: CPT | Performed by: HOSPITALIST

## 2019-02-15 PROCEDURE — 85025 COMPLETE CBC W/AUTO DIFF WBC: CPT | Performed by: HOSPITALIST

## 2019-02-15 PROCEDURE — 84311 SPECTROPHOTOMETRY: CPT | Performed by: INTERNAL MEDICINE

## 2019-02-15 PROCEDURE — 89051 BODY FLUID CELL COUNT: CPT | Performed by: INTERNAL MEDICINE

## 2019-02-15 PROCEDURE — 80048 BASIC METABOLIC PNL TOTAL CA: CPT | Performed by: HOSPITALIST

## 2019-02-15 RX ORDER — LIDOCAINE HYDROCHLORIDE 10 MG/ML
INJECTION, SOLUTION INFILTRATION; PERINEURAL
Status: DISPENSED
Start: 2019-02-15 | End: 2019-02-16

## 2019-02-15 RX ORDER — BUMETANIDE 0.25 MG/ML
1 INJECTION INTRAMUSCULAR; INTRAVENOUS EVERY 12 HOURS SCHEDULED
Status: DISCONTINUED | OUTPATIENT
Start: 2019-02-15 | End: 2019-02-17

## 2019-02-15 RX ADMIN — METOPROLOL TARTRATE 100 MG: 100 TABLET, FILM COATED ORAL at 08:06

## 2019-02-15 RX ADMIN — SODIUM CHLORIDE, PRESERVATIVE FREE 3 ML: 5 INJECTION INTRAVENOUS at 08:11

## 2019-02-15 RX ADMIN — BUMETANIDE 1 MG: 0.25 INJECTION INTRAMUSCULAR; INTRAVENOUS at 13:37

## 2019-02-15 RX ADMIN — BUDESONIDE AND FORMOTEROL FUMARATE DIHYDRATE 2 PUFF: 160; 4.5 AEROSOL RESPIRATORY (INHALATION) at 06:54

## 2019-02-15 RX ADMIN — HYDRALAZINE HYDROCHLORIDE 75 MG: 50 TABLET ORAL at 16:18

## 2019-02-15 RX ADMIN — BUMETANIDE 1 MG: 0.25 INJECTION INTRAMUSCULAR; INTRAVENOUS at 21:11

## 2019-02-15 RX ADMIN — MONTELUKAST SODIUM 10 MG: 10 TABLET, COATED ORAL at 20:59

## 2019-02-15 RX ADMIN — ASPIRIN 81 MG: 81 TABLET ORAL at 08:06

## 2019-02-15 RX ADMIN — INSULIN ASPART 3 UNITS: 100 INJECTION, SOLUTION INTRAVENOUS; SUBCUTANEOUS at 17:18

## 2019-02-15 RX ADMIN — IPRATROPIUM BROMIDE AND ALBUTEROL SULFATE 3 ML: .5; 3 SOLUTION RESPIRATORY (INHALATION) at 06:54

## 2019-02-15 RX ADMIN — GUAIFENESIN 1200 MG: 600 TABLET, EXTENDED RELEASE ORAL at 08:06

## 2019-02-15 RX ADMIN — HYDRALAZINE HYDROCHLORIDE 75 MG: 50 TABLET ORAL at 20:58

## 2019-02-15 RX ADMIN — INSULIN DETEMIR 6 UNITS: 100 INJECTION, SOLUTION SUBCUTANEOUS at 20:59

## 2019-02-15 RX ADMIN — IPRATROPIUM BROMIDE AND ALBUTEROL SULFATE 3 ML: .5; 3 SOLUTION RESPIRATORY (INHALATION) at 19:13

## 2019-02-15 RX ADMIN — INSULIN ASPART 3 UNITS: 100 INJECTION, SOLUTION INTRAVENOUS; SUBCUTANEOUS at 12:03

## 2019-02-15 RX ADMIN — SENNOSIDES AND DOCUSATE SODIUM 2 TABLET: 8.6; 5 TABLET ORAL at 20:59

## 2019-02-15 RX ADMIN — IPRATROPIUM BROMIDE AND ALBUTEROL SULFATE 3 ML: .5; 3 SOLUTION RESPIRATORY (INHALATION) at 13:55

## 2019-02-15 RX ADMIN — BUDESONIDE AND FORMOTEROL FUMARATE DIHYDRATE 2 PUFF: 160; 4.5 AEROSOL RESPIRATORY (INHALATION) at 19:13

## 2019-02-15 RX ADMIN — IPRATROPIUM BROMIDE AND ALBUTEROL SULFATE 3 ML: .5; 3 SOLUTION RESPIRATORY (INHALATION) at 00:34

## 2019-02-15 RX ADMIN — METOPROLOL TARTRATE 100 MG: 100 TABLET, FILM COATED ORAL at 20:58

## 2019-02-15 RX ADMIN — GUAIFENESIN 1200 MG: 600 TABLET, EXTENDED RELEASE ORAL at 20:59

## 2019-02-15 RX ADMIN — HYDRALAZINE HYDROCHLORIDE 50 MG: 50 TABLET ORAL at 08:06

## 2019-02-15 NOTE — PROGRESS NOTES
Pharmacy previously dosing Coumadin for Afib but has been held in preparation for thoracentesis. Please advise us when Coumadin needs to be restarted post procedure should you wish for pharmacy to continue dosing.  Thanks  Josie HernandezD

## 2019-02-15 NOTE — PROGRESS NOTES
[facility] HOSPITALIST PROGRESS NOTE     Patient Identification:  Name:  Ashley Geronimo  Age:  73 y.o.  Sex:  female  :  1945  MRN:  [id number]  Visit Number:  [id number]  Primary Care Provider:  Silvano Parker MD    Length of stay:  9    Subjective:  Patient is awake and alert, daughters at bedside, she is breathing better, she's no longer lethargic, x-ray shows some improvement of congestive heart failure but ultrasounds of the chest reveals moderate pleural effusion in the left.  Pulmonology is planning to do therapeutic thoracentesis today.  Creatinine shows some improvement.    Chief Complaint: Short of breath  ----------------------------------------------------------------------------------------------------------------------  Current Hospital Meds:    aspirin 81 mg Oral Daily   budesonide-formoterol 2 puff Inhalation BID - RT   bumetanide 1 mg Intravenous Q12H   guaiFENesin 1,200 mg Oral Q12H   hydrALAZINE 75 mg Oral TID   insulin aspart 0-7 Units Subcutaneous TID With Meals   insulin detemir 6 Units Subcutaneous Nightly   ipratropium-albuterol 3 mL Nebulization 4x Daily - RT   metoprolol tartrate 100 mg Oral Q12H   montelukast 10 mg Oral Nightly   sennosides-docusate sodium 2 tablet Oral Nightly   sodium chloride 3 mL Intravenous Q12H        ----------------------------------------------------------------------------------------------------------------------  Vital Signs:  Temp:  [98 °F (36.7 °C)-98.7 °F (37.1 °C)] 98.6 °F (37 °C)  Heart Rate:  [18-90] 57  Resp:  [18] 18  BP: (147-186)/(55-70) 147/55       Tele: Sinus rhythm 57 bpm      19  0314 19  0317 02/15/19  0300   Weight: 64.2 kg (141 lb 8 oz) 65.2 kg (143 lb 11.2 oz) 65.2 kg (143 lb 12.8 oz)     Body mass index is 28.08 kg/m².    Intake/Output Summary (Last 24 hours) at 2/15/2019 1355  Last data filed at 2/15/2019 0900  Gross per 24 hour   Intake 480 ml   Output 1450 ml   Net -970 ml     Diet Regular;  Cardiac, Consistent Carbohydrate, Renal  ----------------------------------------------------------------------------------------------------------------------  Physical exam:  General: Comfortable,awake, alert, oriented to self, place, and time, well-developed chronically ill-appearing, still wearing high flow nasal cannula at 50%.  No respiratory distress.   Skin:  Skin is warm and dry. No rash noted. No pallor.    HENT:  Head:  Normocephalic and atraumatic.  Mouth:  Moist mucous membranes.    Eyes:  Conjunctivae and EOM are normal.  Pupils are equal, round, and reactive to light.  No scleral icterus.    Neck:  Neck supple.  JVD present better than yesterday.    Pulmonary/Chest:  No respiratory distress, no wheezes, no crackles, with normal breath sounds and good air movement.  Decreased breath sounds both lower lung fields  Cardiovascular:  Normal rate, regular rhythm and normal heart sounds with no murmur.  Abdominal:  Soft.  Bowel sounds are normal.  No distension and no tenderness.   Extremities: edema is improved on both lower extremity.  both lower extremity pitting type, good pedal pulse, no cyanosis or clubbing, no Homans signthe legs.  There is some mild redness on the right lower extremity leg.  Neurological:  Motor strength equal no obvious deficit, sensory grossly intact.   No cranial nerve deficit.  No tongue deviation.  No facial droop.  No slurred speech.    Genitourinary: jason urine noted on Singh catheter  ----------------------------------------------------------------------------------------------------------------------  ----------------------------------------------------------------------------------------------------------------------      Results from last 7 days   Lab Units 02/15/19  0826 02/15/19  0051 02/14/19  0423 02/13/19  0345 02/12/19  0339 02/11/19  1209 02/11/19  0426 02/10/19  0543   WBC 10*3/mm3  --  9.57  --   --   --   --  7.20 8.16   HEMOGLOBIN g/dL  --  10.2*  --   --   --    --  10.9* 11.0*   HEMATOCRIT %  --  31.4*  --   --   --   --  33.7* 33.9*   MCV fL  --  89.0  --   --   --   --  90.6 91.9   MCHC g/dL  --  32.5*  --   --   --   --  32.3* 32.4*   PLATELETS 10*3/mm3  --  368  --   --   --   --  357 320   INR  1.17* 1.45* 2.34* 2.84* 2.86* 2.48* 2.31* 1.49*     Results from last 7 days   Lab Units 02/13/19  1353   PH, ARTERIAL pH units 7.383   PO2 ART mm Hg 83.2   PCO2, ARTERIAL mm Hg 31.5*   HCO3 ART mmol/L 18.3*     Results from last 7 days   Lab Units 02/15/19  0051 02/14/19  1914 02/14/19  0423 02/13/19  0345  02/11/19  0426 02/10/19  0543 02/09/19  0540   SODIUM mmol/L 135  --  135 137   < > 136 133* 138   POTASSIUM mmol/L 4.0 4.1 3.2* 3.7   < > 4.1 4.7 4.2   MAGNESIUM mg/dL  --   --   --   --   --  2.0 2.1 2.5   CHLORIDE mmol/L 108  --  106 105   < > 106 102 104   CO2 mmol/L 21.7*  --  16.8* 15.6*   < > 20.4* 18.6* 24.2*   BUN mg/dL 46*  --  44* 41*   < > 40* 33* 29*   CREATININE mg/dL 1.20  --  1.30* 1.33*   < > 1.31* 1.30* 1.18   EGFR IF NONAFRICN AM mL/min/1.73 44*  --  40* 39*   < > 40* 40* 45*   CALCIUM mg/dL 7.8  --  8.1 8.4   < > 8.5 8.6 8.7   GLUCOSE mg/dL 149*  --  184* 209*   < > 249* 242* 47*   ALBUMIN g/dL  --   --   --   --   --   --   --  4.30   BILIRUBIN mg/dL  --   --   --   --   --   --   --  0.5   ALK PHOS U/L  --   --   --   --   --   --   --  90   AST (SGOT) U/L  --   --   --   --   --   --   --  21   ALT (SGPT) U/L  --   --   --   --   --   --   --  21    < > = values in this interval not displayed.   Estimated Creatinine Clearance: 34.7 mL/min (by C-G formula based on SCr of 1.2 mg/dL).    No results found for: AMMONIA                    I have personally looked at the labs and they are summarized above.  ----------------------------------------------------------------------------------------------------------------------  Imaging Results (last 24 hours)     Procedure Component Value Units Date/Time    XR Chest AP [[id number]] Collected:  02/15/19 0904      Updated:  02/15/19 0907    Narrative:       EXAMINATION: XR CHEST AP-      CLINICAL INDICATION:     chf; A41.9-Sepsis, unspecified organism;  R65.21-Severe sepsis with septic shock; N17.9-Acute kidney failure,  unspecified     TECHNIQUE:  XR CHEST AP-      COMPARISON: 02/12/2019      FINDINGS:   Radiographically, CHF appears to have improved with overall significant  decrease in pulmonary airspace edema. Decrease in left greater than  right pleural effusions when compared to the previous exam. Cardiomegaly  is also decreased. Interstitial thickening has improved. No  pneumothorax. No acute bony findings.       Impression:       Significant interval improvement radiographically of  CHF/edema. Decrease in left greater than right pleural effusions.     This report was finalized on 2/15/2019 9:05 AM by Dr. Woo Jacques MD.           ----------------------------------------------------------------------------------------------------------------------  Assessment and Plan:    - Acute on chronic hypoxic respiratory failure,  - Bilateral general pleural effusion and diastolic heart failure left more than right for thoracentesis today  - Paroxysmal atrial fibrillation  - Acute kidney injury and chronic kidney disease stage III    Case discussed with respiratory tach, will taper FiO2 today as tolerated currently her saturations are 95% on 50% on high flow nasal cannula.  Hopefully with therapeutic thoracentesis she will improve, fluid restriction as recommended by nephrology, patient will be placed on Bumex as per nephrology.    Rosa Armenta MD  02/15/19  1:55 PM

## 2019-02-15 NOTE — PROGRESS NOTES
Chief Complaint:  Shortness of breath.    Subjective     Interval History:   No acute event overnight.  Hemodynamically stable.  No fevers overnight.  Currently on 40 L/m of supplemental oxygen via      Intake/Output Summary (Last 24 hours) at 2/15/2019 1303  Last data filed at 2/15/2019 0900  Gross per 24 hour   Intake 600 ml   Output 1450 ml   Net -850 ml           Review of Systems:   Review of Systems - History obtained from chart review and the patient  General ROS: negative for - chills, fatigue, fever, malaise, night sweats or sleep disturbance  Psychological ROS: negative for - anxiety, behavioral disorder, depression or memory difficulties  Ophthalmic ROS: negative for - blurry vision, decreased vision, double vision or dry eyes  ENT ROS: negative for - epistaxis, hearing change or sneezing  Allergy and Immunology ROS: negative for - hives, itchy/watery eyes or nasal congestion  Hematological and Lymphatic ROS: negative for - bleeding problems, blood clots, blood transfusions, jaundice or night sweats  Endocrine ROS: negative for - malaise/lethargy, mood swings, polydipsia/polyuria or temperature intolerance  Respiratory ROS: positive for shortness of breath and cough.. Negative for orthopnea, pleuritic pain or sputum changes  Cardiovascular ROS: no chest pain or dyspnea on exertion  Gastrointestinal ROS: no abdominal pain, change in bowel habits, or black or bloody stools  Musculoskeletal ROS: negative for - joint pain, joint stiffness or joint swelling  Neurological ROS: no TIA or stroke symptoms  Dermatological ROS: negative for acne, dry skin and eczema      Vital Signs  Temp:  [98 °F (36.7 °C)-98.7 °F (37.1 °C)] 98.6 °F (37 °C)  Heart Rate:  [18-90] 57  Resp:  [18] 18  BP: (147-186)/(55-70) 147/55    Intake/Output Summary (Last 24 hours) at 2/15/2019 1303  Last data filed at 2/15/2019 0900  Gross per 24 hour   Intake 600 ml   Output 1450 ml   Net -850 ml       Physical Exam:  Constitutional:  oriented  to person, place, and time. appears well-developed and well-nourished. No distress.   HENT:   Head: Normocephalic and atraumatic.   Right Ear: External ear normal.   Left Ear: External ear normal.   Nose: Nose normal.   Eyes: Pupils are equal, round, and reactive to light. Conjunctivae and EOM are normal. Right eye exhibits no discharge. Left eye exhibits no discharge. No scleral icterus.   Neck: Normal range of motion. Neck supple. No thyromegaly present.   Cardiovascular: Normal rate, regular rhythm, normal heart sounds and intact distal pulses.  Exam reveals no friction rub.    No murmur heard.  Pulmonary/Chest:   Stridor.  Bilateral air entry equal.  No crutches.  We together.  Bilateral crackles appreciated.  Abdominal: Soft. Bowel sounds are normal.exhibits no distension. There is no tenderness.   Musculoskeletal: Normal range of motion. exhibits no deformity.   +1 lower extremity edema bilateral.   Neurological: alert and oriented to person, place, and time. No cranial nerve deficit. Coordination normal.   Skin: Skin is warm and dry. Capillary refill takes less than 2 seconds. not diaphoretic. No erythema.   Psychiatric:   normal mood and affect.   behavior is normal.        Results Review:   I reviewed the patient's new clinical results.  Results from last 7 days   Lab Units 02/15/19  0051 02/11/19  0426 02/10/19  0543   WBC 10*3/mm3 9.57 7.20 8.16   HEMOGLOBIN g/dL 10.2* 10.9* 11.0*   PLATELETS 10*3/mm3 368 357 320     Results from last 7 days   Lab Units 02/15/19  0051 02/14/19  1914 02/14/19  0423 02/13/19  0345  02/11/19  0426 02/10/19  0543 02/09/19  0540   SODIUM mmol/L 135  --  135 137   < > 136 133* 138   POTASSIUM mmol/L 4.0 4.1 3.2* 3.7   < > 4.1 4.7 4.2   CHLORIDE mmol/L 108  --  106 105   < > 106 102 104   CO2 mmol/L 21.7*  --  16.8* 15.6*   < > 20.4* 18.6* 24.2*   BUN mg/dL 46*  --  44* 41*   < > 40* 33* 29*   CREATININE mg/dL 1.20  --  1.30* 1.33*   < > 1.31* 1.30* 1.18   CALCIUM mg/dL 7.8  --   8.1 8.4   < > 8.5 8.6 8.7   GLUCOSE mg/dL 149*  --  184* 209*   < > 249* 242* 47*   MAGNESIUM mg/dL  --   --   --   --   --  2.0 2.1 2.5    < > = values in this interval not displayed.     Lab Results   Component Value Date    INR 1.17 (H) 02/15/2019    INR 1.45 (H) 02/15/2019    INR 2.34 (H) 02/14/2019    PROTIME 15.1 02/15/2019    PROTIME 17.8 (H) 02/15/2019    PROTIME 25.9 (H) 02/14/2019     Results from last 7 days   Lab Units 02/09/19  0540   ALK PHOS U/L 90   BILIRUBIN mg/dL 0.5   ALT (SGPT) U/L 21   AST (SGOT) U/L 21     Results from last 7 days   Lab Units 02/13/19  1353   PH, ARTERIAL pH units 7.383   PO2 ART mm Hg 83.2   PCO2, ARTERIAL mm Hg 31.5*   HCO3 ART mmol/L 18.3*     Imaging Results (last 24 hours)     Procedure Component Value Units Date/Time    XR Chest AP [[id number]] Collected:  02/15/19 0904     Updated:  02/15/19 0907    Narrative:       EXAMINATION: XR CHEST AP-      CLINICAL INDICATION:     chf; A41.9-Sepsis, unspecified organism;  R65.21-Severe sepsis with septic shock; N17.9-Acute kidney failure,  unspecified     TECHNIQUE:  XR CHEST AP-      COMPARISON: 02/12/2019      FINDINGS:   Radiographically, CHF appears to have improved with overall significant  decrease in pulmonary airspace edema. Decrease in left greater than  right pleural effusions when compared to the previous exam. Cardiomegaly  is also decreased. Interstitial thickening has improved. No  pneumothorax. No acute bony findings.       Impression:       Significant interval improvement radiographically of  CHF/edema. Decrease in left greater than right pleural effusions.     This report was finalized on 2/15/2019 9:05 AM by Dr. Woo Jacques MD.                    aspirin 81 mg Oral Daily   budesonide-formoterol 2 puff Inhalation BID - RT   bumetanide 1 mg Intravenous Q12H   guaiFENesin 1,200 mg Oral Q12H   hydrALAZINE 75 mg Oral TID   insulin aspart 0-7 Units Subcutaneous TID With Meals   insulin detemir 6 Units Subcutaneous  Nightly   ipratropium-albuterol 3 mL Nebulization 4x Daily - RT   metoprolol tartrate 100 mg Oral Q12H   montelukast 10 mg Oral Nightly   sennosides-docusate sodium 2 tablet Oral Nightly   sodium chloride 3 mL Intravenous Q12H          I reviewed the medications.    Assessment/Plan     Reviewed the labs.    Assessment:  Shortness of breath  Acute on chronic hypoxic respiratory failure  Chronic hypoxic respiratory failure on home oxygen  Right middle lobe pneumonia , unspecified organism   COPD, centrilobular emphysema type  Bilateral pleural effusion  Physical deconditioning  Bilateral basal atelectasis        Plan:  -To proceed with bedside ultrasound guided thoracentesis today.  - Nephrology on board.  - Continue with duo nebs  - Continue with Symbicort nebs  - continue Mucomyst nebs  - Aggressive PTOT while in hospital  - Incentive spirometer to her basal atelectasis  - Continue on BiPAP 15/8 with FiO2 35% and rate of 14 when necessary.      Nurse and respiratory therapist were updated about the plan.  I have discussed the clinical plan with Dr. Armenta.       Quang Noonan MD  02/15/19  1:03 PM

## 2019-02-15 NOTE — CONSULTS
Nephrology Consult Note    Referring Provider: Dr Armenta  Reason for Consultation: renal dysfunction    Subjective       History of present illness:  Ashley Geronimo is a 73 y.o. female who presented to [facility] emergency department with chief complaint of shortness of breath. She was recently admitted with pneumonia, UTI, respiratory failure and septic shock. She has CKD stage 3 with baseline creatinine 1.1-1.3. She is poorly controlled diabetic. She has bilateral pleural effusions and thoracentesis is planned today. She is on HFNC. She denies chest pain or SOB. She drinks lot of fluids and mainly ice. She has leg edema for over 1 year. She denies ksin rash, epistaxis or hematuria. Her creatinine was 1.6 on 2/5/19 which improved to 1.1 on 2/9/19 and is 1.2 today. She was given 2 doses of lasix and last was on 2/12/19. She denies nausea, vomiting or diarrhea. No fever or chills  no Chronic NSAIDS use. Patient denies hematuria, dysuria, difficulty passing urine. No prior history of renal stones . No family history of renal disease      History  Past Medical History:   Diagnosis Date   • Diabetes mellitus (CMS/[facility])    • Hypertension    • NSTEMI (non-ST elevated myocardial infarction) (CMS/[facility]) 01/2019   • Paroxysmal atrial fibrillation (CMS/[facility])    • Severe sepsis with septic shock (CMS/[facility])    , History reviewed. No pertinent surgical history., History reviewed. No pertinent family history., Social History     Tobacco Use   • Smoking status: Never Smoker   • Smokeless tobacco: Never Used   Substance Use Topics   • Alcohol use: No     Frequency: Never   • Drug use: No   , Medications Prior to Admission   Medication Sig Dispense Refill Last Dose   • aspirin 81 MG EC tablet Take 1 tablet by mouth Daily.   2/5/2019 at Unknown time   • budesonide-formoterol (SYMBICORT) 160-4.5 MCG/ACT inhaler Inhale 2 puffs 2 (Two) Times a Day. 1 inhaler 12 2/5/2019 at AM   • metFORMIN (GLUCOPHAGE) 1000 MG tablet Take 1,000  mg by mouth 2 (Two) Times a Day.   2/5/2019 at AM   • metoprolol tartrate (LOPRESSOR) 25 MG tablet Take 1 tablet by mouth Every 12 (Twelve) Hours. 60 tablet 0 2/5/2019 at AM   • NIFEdipine CC (ADALAT CC) 60 MG 24 hr tablet Take 1 tablet by mouth Daily. 30 tablet 0 2/5/2019 at AM   • warfarin (COUMADIN) 2 MG tablet Take 2 mg by mouth Every Night.   2/4/2019 at PM   • atorvastatin (LIPITOR) 40 MG tablet Take 1 tablet by mouth Every Night. 30 tablet 0 2/4/2019 at PM   • insulin detemir (LEVEMIR) 100 UNIT/ML injection Inject 10 Units under the skin into the appropriate area as directed Every Night.  12 2/4/2019 at PM   • montelukast (SINGULAIR) 10 MG tablet Take 10 mg by mouth Every Night.   2/4/2019 at PM   , Scheduled Meds:    aspirin 81 mg Oral Daily   budesonide-formoterol 2 puff Inhalation BID - RT   bumetanide 1 mg Intravenous Q12H   guaiFENesin 1,200 mg Oral Q12H   hydrALAZINE 75 mg Oral TID   insulin aspart 0-7 Units Subcutaneous TID With Meals   insulin detemir 6 Units Subcutaneous Nightly   ipratropium-albuterol 3 mL Nebulization 4x Daily - RT   metoprolol tartrate 100 mg Oral Q12H   montelukast 10 mg Oral Nightly   sennosides-docusate sodium 2 tablet Oral Nightly   sodium chloride 3 mL Intravenous Q12H   , Continuous Infusions:   , PRN Meds:  •  acetaminophen  •  dextrose  •  dextrose  •  glucagon (human recombinant)  •  hydrALAZINE  •  ipratropium-albuterol  •  magnesium sulfate **OR** magnesium sulfate **OR** magnesium sulfate  •  nitroglycerin  •  potassium chloride  •  potassium chloride  •  sodium chloride and Allergies:  Sulfa antibiotics    Review of Systems  More than 10 point review of systems was done. Pertinent items are noted in HPI, all other systems reviewed and negative    Objective     Vital Signs  Temp:  [98 °F (36.7 °C)-98.7 °F (37.1 °C)] 98.6 °F (37 °C)  Heart Rate:  [18-90] 57  Resp:  [18] 18  BP: (147-186)/(55-70) 147/55    I/O this shift:  In: 120 [P.O.:120]  Out: -   I/O last 3  completed shifts:  In: 600 [P.O.:600]  Out: 1450 [Urine:1450]    Physical Examination:    General Appearance : alert, appears stated age, cooperative and no distress  Head : normocephalic, without obvious abnormality and atraumatic  Eyes : conjunctivae and sclerae normal, no icterus, no pallor and PERRLA  Throat : oral mucosa moist  Neck: no adenopathy, suppple, no carotid bruit and no JVD  Lungs : reduced breath sounds at bases  Heart : regular rhythm & normal rate, normal S1, S2, no murmur, no liv, no rub   Abdomen :  normal bowel sounds, no masses and soft non-tender  Rectal : Deferred  Extremities : moves extremities well, no redness and 1+ edema  Pulses :  palpable and equal bilaterally  Skin : no bleeding, bruising or rash  Neurologic : orientated to person, place, time, grossly no focal deficitis    Laboratory Data :      WBC WBC   Date Value Ref Range Status   02/15/2019 9.57 4.50 - 12.50 10*3/mm3 Final      HGB Hemoglobin   Date Value Ref Range Status   02/15/2019 10.2 (L) 12.0 - 16.0 g/dL Final      HCT Hematocrit   Date Value Ref Range Status   02/15/2019 31.4 (L) 37.0 - 47.0 % Final      Platlets No results found for: LABPLAT   MCV MCV   Date Value Ref Range Status   02/15/2019 89.0 80.0 - 94.0 fL Final          Sodium Sodium   Date Value Ref Range Status   02/15/2019 135 135 - 153 mmol/L Final   02/14/2019 135 135 - 153 mmol/L Final   02/13/2019 137 135 - 153 mmol/L Final      Potassium Potassium   Date Value Ref Range Status   02/15/2019 4.0 3.5 - 5.3 mmol/L Final   02/14/2019 4.1 3.5 - 5.3 mmol/L Final   02/14/2019 3.2 (L) 3.5 - 5.3 mmol/L Final   02/13/2019 3.7 3.5 - 5.3 mmol/L Final      Chloride Chloride   Date Value Ref Range Status   02/15/2019 108 99 - 112 mmol/L Final   02/14/2019 106 99 - 112 mmol/L Final   02/13/2019 105 99 - 112 mmol/L Final      CO2 CO2   Date Value Ref Range Status   02/15/2019 21.7 (L) 24.3 - 31.9 mmol/L Final   02/14/2019 16.8 (L) 24.3 - 31.9 mmol/L Final   02/13/2019  15.6 (L) 24.3 - 31.9 mmol/L Final      BUN BUN   Date Value Ref Range Status   02/15/2019 46 (H) 7 - 21 mg/dL Final   02/14/2019 44 (H) 7 - 21 mg/dL Final   02/13/2019 41 (H) 7 - 21 mg/dL Final      Creatinine Creatinine   Date Value Ref Range Status   02/15/2019 1.20 0.43 - 1.29 mg/dL Final   02/14/2019 1.30 (H) 0.43 - 1.29 mg/dL Final   02/13/2019 1.33 (H) 0.43 - 1.29 mg/dL Final      Calcium Calcium   Date Value Ref Range Status   02/15/2019 7.8 7.7 - 10.0 mg/dL Final   02/14/2019 8.1 7.7 - 10.0 mg/dL Final   02/13/2019 8.4 7.7 - 10.0 mg/dL Final      PO4 No results found for: CAPO4   Albumin No results found for: ALBUMIN   Magnesium No results found for: MG   Uric Acid No results found for: URICACID     Radiology results :     Imaging Results (last 72 hours)     Procedure Component Value Units Date/Time    XR Chest AP [[id number]] Collected:  02/15/19 0904     Updated:  02/15/19 0907    Narrative:       EXAMINATION: XR CHEST AP-      CLINICAL INDICATION:     chf; A41.9-Sepsis, unspecified organism;  R65.21-Severe sepsis with septic shock; N17.9-Acute kidney failure,  unspecified     TECHNIQUE:  XR CHEST AP-      COMPARISON: 02/12/2019      FINDINGS:   Radiographically, CHF appears to have improved with overall significant  decrease in pulmonary airspace edema. Decrease in left greater than  right pleural effusions when compared to the previous exam. Cardiomegaly  is also decreased. Interstitial thickening has improved. No  pneumothorax. No acute bony findings.       Impression:       Significant interval improvement radiographically of  CHF/edema. Decrease in left greater than right pleural effusions.     This report was finalized on 2/15/2019 9:05 AM by Dr. Woo Jacques MD.       US Chest [[id number]] Collected:  02/14/19 1057     Updated:  02/14/19 1100    Narrative:       EXAMINATION: US CHEST-      CLINICAL INDICATION:     pleural effusion; A41.9-Sepsis, unspecified  organism; R65.21-Severe sepsis with  septic shock; N17.9-Acute kidney  failure, unspecified     COMPARISON: Chest x-ray from 2/12/2019     TECHNIQUE:  Sonographic imaging obtained of the chest for pleural fluid  assessment      FINDINGS:   There is a moderate left pleural effusion noted with atelectatic changes  of the left lower lobe. There is a small-medium size right pleural  effusion. Right lung atelectasis noted.        Impression:       Mild-moderate left greater than right pleural effusions.     This report was finalized on 2/14/2019 10:58 AM by Dr. Woo Jacques MD.       XR Chest 1 View [[id number]] Collected:  02/12/19 1546     Updated:  02/12/19 1549    Narrative:       EXAMINATION: XR CHEST 1 VW-      CLINICAL INDICATION:     sob; A41.9-Sepsis, unspecified organism;  R65.21-Severe sepsis with septic shock; N17.9-Acute kidney failure,  unspecified     TECHNIQUE:  XR CHEST 1 VW-      COMPARISON: 2/7/2019      FINDINGS:   Radiographically, worsening CHF with perihilar airspace edema noted.  Slight increase in small pleural effusions with bibasilar atelectasis.  No pneumothorax. Chest is otherwise stable.       Impression:       Worsening CHF with airspace edema and pleural effusions.     This report was finalized on 2/12/2019 3:46 PM by Dr. Woo Jacques MD.               Medications:        aspirin 81 mg Oral Daily   budesonide-formoterol 2 puff Inhalation BID - RT   bumetanide 1 mg Intravenous Q12H   guaiFENesin 1,200 mg Oral Q12H   hydrALAZINE 75 mg Oral TID   insulin aspart 0-7 Units Subcutaneous TID With Meals   insulin detemir 6 Units Subcutaneous Nightly   ipratropium-albuterol 3 mL Nebulization 4x Daily - RT   metoprolol tartrate 100 mg Oral Q12H   montelukast 10 mg Oral Nightly   sennosides-docusate sodium 2 tablet Oral Nightly   sodium chloride 3 mL Intravenous Q12H          Assessment/Plan       Septic shock (CMS/HCC)      1. CKD stage 3 with baseline creatinine 1.1-1.3 since 2015 . She has bilateral pleural effusion so will start  bumex 1 mg iv bid with holding parameters. Will have to accept higher baseline . No hydronephrosis on CT scan abdomen. Avoid nephrotoxins    2. Proteinuria : urine protein/creatinine is 1.6 gm/gm. Will check serologies. She has no hematuria    3. HTN : BP is elevated : increase hydralazine    4. Bilateral pleural effusions : thoracentesis today. Will start 1.2 L/day fluid restriction    Thanks Dr Armenta for the consult. We will follow with you.  I discussed the patient's findings and my recommendations with patient, nursing staff and Dr Geovany Shaikh MD  02/15/19  12:34 PM

## 2019-02-15 NOTE — PROGRESS NOTES
Discharge Planning Assessment   [facility]     Patient Name: Ashley Geronimo  MRN: [id number]  Today's Date: 2/15/2019    Admit Date: 2/5/2019    Discharge Plan     Row Name 02/15/19 1317       Plan    Plan  Pt lives at home with her grandchildren and plans to return home at discharge. Pt currently utilizes [facility] [phone]. [facility] will need a new face to face at discharge. Pt utilizes home oxygen via [facility]. SS will continue to follow and assist as needed with discharge planning.     Patient/Family in Agreement with Plan  yes     Kristen Villagran

## 2019-02-15 NOTE — PLAN OF CARE
Problem: Sepsis/Septic Shock (Adult)  Goal: Signs and Symptoms of Listed Potential Problems Will be Absent, Minimized or Managed (Sepsis/Septic Shock)  Outcome: Ongoing (interventions implemented as appropriate)      Problem: Fall Risk (Adult)  Goal: Absence of Fall  Outcome: Ongoing (interventions implemented as appropriate)      Problem: Skin Injury Risk (Adult)  Goal: Skin Health and Integrity  Outcome: Ongoing (interventions implemented as appropriate)      Problem: Patient Care Overview  Goal: Plan of Care Review  Outcome: Ongoing (interventions implemented as appropriate)    Goal: Discharge Needs Assessment  Outcome: Ongoing (interventions implemented as appropriate)    Goal: Interprofessional Rounds/Family Conf  Outcome: Ongoing (interventions implemented as appropriate)      Problem: Mobility, Physical Impaired (Adult)  Goal: Identify Related Risk Factors and Signs and Symptoms  Outcome: Ongoing (interventions implemented as appropriate)    Goal: Enhanced Mobility Skills  Outcome: Ongoing (interventions implemented as appropriate)    Goal: Enhanced Functional Ability  Outcome: Ongoing (interventions implemented as appropriate)

## 2019-02-15 NOTE — PROCEDURES
Ultrasound-guided thoracentesis.    Indication-pleural effusion, shortness of breath    A time-out was completed verifying correct patient, procedure, site, positioning, and special equipment if applicable.  InFormed consent was taken.  All the images labs and medication lists were reviewed before the procedure was done.    Ultrasound was used to identify the pleural effusion.  Site was marked.  Ultrasound was used throughout the procedure. The patient’s left side was prepped and draped in a sterile manner after the appropriate infiltration level was confirmed by ultrasound. 1% lidocaine was used anesthetize the surrounding skin. A finder needle was then used to locate fluid and  serosanguinous fluid was obtained. A 10-blade scalpel used to make the incision. The thoracentesis catheter was then threaded without difficulty. The patient had  60 of serosanguinous fluid was removed.    Ultrasound imaging was concerning of complex fluid with septation.    A post-procedure chest x-ray was ordered and the fluid will be sent for several studies.  Estimated Blood Loss none  The patient tolerated the procedure well and there were no complications.

## 2019-02-16 LAB
ANION GAP SERPL CALCULATED.3IONS-SCNC: 7.1 MMOL/L (ref 3.6–11.2)
BUN BLD-MCNC: 53 MG/DL (ref 7–21)
BUN/CREAT SERPL: 40.5 (ref 7–25)
C3 SERPL-MCNC: 96.5 MG/DL (ref 90–170)
C4 SERPL-MCNC: 30.8 MG/DL (ref 12–36)
CALCIUM SPEC-SCNC: 7.7 MG/DL (ref 7.7–10)
CHLORIDE SERPL-SCNC: 102 MMOL/L (ref 99–112)
CO2 SERPL-SCNC: 23.9 MMOL/L (ref 24.3–31.9)
CREAT BLD-MCNC: 1.31 MG/DL (ref 0.43–1.29)
GFR SERPL CREATININE-BSD FRML MDRD: 40 ML/MIN/1.73
GLUCOSE BLD-MCNC: 270 MG/DL (ref 70–110)
GLUCOSE BLDC GLUCOMTR-MCNC: 197 MG/DL (ref 70–130)
GLUCOSE BLDC GLUCOMTR-MCNC: 220 MG/DL (ref 70–130)
GLUCOSE BLDC GLUCOMTR-MCNC: 347 MG/DL (ref 70–130)
GLUCOSE BLDC GLUCOMTR-MCNC: 390 MG/DL (ref 70–130)
HAV IGM SERPL QL IA: NORMAL
HBV CORE IGM SERPL QL IA: NORMAL
HBV SURFACE AG SERPL QL IA: NORMAL
HCV AB SER DONR QL: NORMAL
INR PPP: 1.08 (ref 0.9–1.1)
OSMOLALITY SERPL CALC.SUM OF ELEC: 290.3 MOSM/KG (ref 273–305)
POTASSIUM BLD-SCNC: 4.2 MMOL/L (ref 3.5–5.3)
PROTHROMBIN TIME: 14.2 SECONDS (ref 11–15.4)
SODIUM BLD-SCNC: 133 MMOL/L (ref 135–153)

## 2019-02-16 PROCEDURE — 84165 PROTEIN E-PHORESIS SERUM: CPT | Performed by: INTERNAL MEDICINE

## 2019-02-16 PROCEDURE — 94799 UNLISTED PULMONARY SVC/PX: CPT

## 2019-02-16 PROCEDURE — 83520 IMMUNOASSAY QUANT NOS NONAB: CPT | Performed by: INTERNAL MEDICINE

## 2019-02-16 PROCEDURE — 86256 FLUORESCENT ANTIBODY TITER: CPT | Performed by: INTERNAL MEDICINE

## 2019-02-16 PROCEDURE — 99232 SBSQ HOSP IP/OBS MODERATE 35: CPT | Performed by: HOSPITALIST

## 2019-02-16 PROCEDURE — 85610 PROTHROMBIN TIME: CPT | Performed by: HOSPITALIST

## 2019-02-16 PROCEDURE — 82784 ASSAY IGA/IGD/IGG/IGM EACH: CPT | Performed by: INTERNAL MEDICINE

## 2019-02-16 PROCEDURE — 83883 ASSAY NEPHELOMETRY NOT SPEC: CPT | Performed by: INTERNAL MEDICINE

## 2019-02-16 PROCEDURE — 82962 GLUCOSE BLOOD TEST: CPT

## 2019-02-16 PROCEDURE — 83516 IMMUNOASSAY NONANTIBODY: CPT | Performed by: INTERNAL MEDICINE

## 2019-02-16 PROCEDURE — 86225 DNA ANTIBODY NATIVE: CPT | Performed by: INTERNAL MEDICINE

## 2019-02-16 PROCEDURE — 80074 ACUTE HEPATITIS PANEL: CPT | Performed by: INTERNAL MEDICINE

## 2019-02-16 PROCEDURE — 63710000001 INSULIN DETEMIR PER 5 UNITS: Performed by: INTERNAL MEDICINE

## 2019-02-16 PROCEDURE — 86334 IMMUNOFIX E-PHORESIS SERUM: CPT | Performed by: INTERNAL MEDICINE

## 2019-02-16 PROCEDURE — 86160 COMPLEMENT ANTIGEN: CPT | Performed by: INTERNAL MEDICINE

## 2019-02-16 PROCEDURE — 63710000001 INSULIN ASPART PER 5 UNITS: Performed by: INTERNAL MEDICINE

## 2019-02-16 PROCEDURE — 80048 BASIC METABOLIC PNL TOTAL CA: CPT | Performed by: INTERNAL MEDICINE

## 2019-02-16 PROCEDURE — 84155 ASSAY OF PROTEIN SERUM: CPT | Performed by: INTERNAL MEDICINE

## 2019-02-16 PROCEDURE — 25010000002 HEPARIN (PORCINE) PER 1000 UNITS: Performed by: HOSPITALIST

## 2019-02-16 RX ORDER — HEPARIN SODIUM 5000 [USP'U]/ML
5000 INJECTION, SOLUTION INTRAVENOUS; SUBCUTANEOUS 2 TIMES DAILY
Status: DISCONTINUED | OUTPATIENT
Start: 2019-02-16 | End: 2019-02-18

## 2019-02-16 RX ADMIN — HYDRALAZINE HYDROCHLORIDE 75 MG: 50 TABLET ORAL at 08:27

## 2019-02-16 RX ADMIN — GUAIFENESIN 1200 MG: 600 TABLET, EXTENDED RELEASE ORAL at 08:27

## 2019-02-16 RX ADMIN — BUDESONIDE AND FORMOTEROL FUMARATE DIHYDRATE 2 PUFF: 160; 4.5 AEROSOL RESPIRATORY (INHALATION) at 19:01

## 2019-02-16 RX ADMIN — IPRATROPIUM BROMIDE AND ALBUTEROL SULFATE 3 ML: .5; 3 SOLUTION RESPIRATORY (INHALATION) at 00:02

## 2019-02-16 RX ADMIN — ASPIRIN 81 MG: 81 TABLET ORAL at 08:27

## 2019-02-16 RX ADMIN — METOPROLOL TARTRATE 100 MG: 100 TABLET, FILM COATED ORAL at 21:44

## 2019-02-16 RX ADMIN — SENNOSIDES AND DOCUSATE SODIUM 2 TABLET: 8.6; 5 TABLET ORAL at 21:43

## 2019-02-16 RX ADMIN — INSULIN DETEMIR 6 UNITS: 100 INJECTION, SOLUTION SUBCUTANEOUS at 22:19

## 2019-02-16 RX ADMIN — SODIUM CHLORIDE, PRESERVATIVE FREE 3 ML: 5 INJECTION INTRAVENOUS at 21:47

## 2019-02-16 RX ADMIN — HYDRALAZINE HYDROCHLORIDE 75 MG: 50 TABLET ORAL at 21:43

## 2019-02-16 RX ADMIN — METOPROLOL TARTRATE 100 MG: 100 TABLET, FILM COATED ORAL at 08:27

## 2019-02-16 RX ADMIN — IPRATROPIUM BROMIDE AND ALBUTEROL SULFATE 3 ML: .5; 3 SOLUTION RESPIRATORY (INHALATION) at 19:01

## 2019-02-16 RX ADMIN — HEPARIN SODIUM 5000 UNITS: 5000 INJECTION INTRAVENOUS; SUBCUTANEOUS at 14:24

## 2019-02-16 RX ADMIN — GUAIFENESIN 1200 MG: 600 TABLET, EXTENDED RELEASE ORAL at 21:44

## 2019-02-16 RX ADMIN — BUMETANIDE 1 MG: 0.25 INJECTION INTRAMUSCULAR; INTRAVENOUS at 08:26

## 2019-02-16 RX ADMIN — MONTELUKAST SODIUM 10 MG: 10 TABLET, COATED ORAL at 21:43

## 2019-02-16 RX ADMIN — SODIUM CHLORIDE, PRESERVATIVE FREE 3 ML: 5 INJECTION INTRAVENOUS at 08:27

## 2019-02-16 RX ADMIN — BUDESONIDE AND FORMOTEROL FUMARATE DIHYDRATE 2 PUFF: 160; 4.5 AEROSOL RESPIRATORY (INHALATION) at 06:48

## 2019-02-16 RX ADMIN — BUMETANIDE 1 MG: 0.25 INJECTION INTRAMUSCULAR; INTRAVENOUS at 21:44

## 2019-02-16 RX ADMIN — HEPARIN SODIUM 5000 UNITS: 5000 INJECTION INTRAVENOUS; SUBCUTANEOUS at 21:44

## 2019-02-16 RX ADMIN — IPRATROPIUM BROMIDE AND ALBUTEROL SULFATE 3 ML: .5; 3 SOLUTION RESPIRATORY (INHALATION) at 06:48

## 2019-02-16 RX ADMIN — HYDRALAZINE HYDROCHLORIDE 75 MG: 50 TABLET ORAL at 16:14

## 2019-02-16 RX ADMIN — INSULIN ASPART 3 UNITS: 100 INJECTION, SOLUTION INTRAVENOUS; SUBCUTANEOUS at 08:26

## 2019-02-16 RX ADMIN — INSULIN ASPART 2 UNITS: 100 INJECTION, SOLUTION INTRAVENOUS; SUBCUTANEOUS at 11:46

## 2019-02-16 RX ADMIN — INSULIN ASPART 5 UNITS: 100 INJECTION, SOLUTION INTRAVENOUS; SUBCUTANEOUS at 16:14

## 2019-02-16 NOTE — PROGRESS NOTES
"    [facility] HOSPITALIST PROGRESS NOTE     Patient Identification:  Name:  Ashley Geronimo  Age:  73 y.o.  Sex:  female  :  1945  MRN:  [id number]  Visit Number:  [id number]  Primary Care Provider:  Silvano Parker MD    Length of stay:  10    Subjective:  Patient is no longer in high flow nasal cannula, she is on 4 L nasal cannula and saturating well ranging from 95-99% on pulse ox.  She is not lethargic, she is awake and alert, Dr. Noonan performed or centesis yesterday on the left hemothorax.  Ultrasound finding suggestive of loculated pleural effusion and has recommended large bore chest tube placement.  Plan and recommendation was discussed with patient today she is somewhat reluctant and wanted to discuss with her daughter, she would prefer current management since she feels better but she would \" not refuse \"the chest tube if necessary or nonimprovement.  Nephrology evaluation and recommendation noted and appreciated.    Chief Complaint: Short of breath  ----------------------------------------------------------------------------------------------------------------------  Current Hospital Meds:    aspirin 81 mg Oral Daily   budesonide-formoterol 2 puff Inhalation BID - RT   bumetanide 1 mg Intravenous Q12H   guaiFENesin 1,200 mg Oral Q12H   hydrALAZINE 75 mg Oral TID   insulin aspart 0-7 Units Subcutaneous TID With Meals   insulin detemir 6 Units Subcutaneous Nightly   ipratropium-albuterol 3 mL Nebulization 4x Daily - RT   metoprolol tartrate 100 mg Oral Q12H   montelukast 10 mg Oral Nightly   sennosides-docusate sodium 2 tablet Oral Nightly   sodium chloride 3 mL Intravenous Q12H       hold 1 each     ----------------------------------------------------------------------------------------------------------------------  Vital Signs:  Temp:  [97.4 °F (36.3 °C)-98.5 °F (36.9 °C)] 98.3 °F (36.8 °C)  Heart Rate:  [55-98] 70  Resp:  [18] 18  BP: (122-164)/(48-82) 140/53       Tele: " Atrial fibrillation rate of 67 bpm      02/13/19  0317 02/15/19  0300 02/16/19  0259   Weight: 65.2 kg (143 lb 11.2 oz) 65.2 kg (143 lb 12.8 oz) 60.6 kg (133 lb 9.6 oz)     Body mass index is 26.09 kg/m².    Intake/Output Summary (Last 24 hours) at 2/16/2019 1140  Last data filed at 2/16/2019 0900  Gross per 24 hour   Intake 840 ml   Output 1350 ml   Net -510 ml     Diet Regular; Cardiac, Consistent Carbohydrate, Renal  ----------------------------------------------------------------------------------------------------------------------  Physical exam:  General: Comfortable,awake, alert, oriented to self, place, and time, well-developed chronically ill-appearing, now on nasal cannula 4 L  No respiratory distress.   Skin:  Skin is warm and dry. No rash noted. No pallor.    HENT:  Head:  Normocephalic and atraumatic.  Mouth:  Moist mucous membranes.    Eyes:  Conjunctivae and EOM are normal.  Pupils are equal, round, and reactive to light.  No scleral icterus.    Neck:  Neck supple.  JVD present better than yesterday.    Pulmonary/Chest:  No respiratory distress, no wheezes, no crackles, with normal breath sounds and good air movement.  Decreased breath sounds both lower lung fields  Cardiovascular:  Normal rate, regular rhythm and normal heart sounds with no murmur.  Abdominal:  Soft.  Bowel sounds are normal.  No distension and no tenderness.   Extremities: edema is improved on both lower extremity.  both lower extremity pitting type, good pedal pulse, no cyanosis or clubbing, no Homans signthe legs.  There is some mild redness on the right lower extremity leg.  Neurological:  Motor strength equal no obvious deficit, sensory grossly intact.   No cranial nerve deficit.  No tongue deviation.  No facial droop.  No slurred speech.    Genitourinary: jason urine noted on Singh  catheter  ----------------------------------------------------------------------------------------------------------------------  ----------------------------------------------------------------------------------------------------------------------      Results from last 7 days   Lab Units 02/16/19  0110 02/15/19  0826 02/15/19  0051 02/14/19  0423 02/13/19  0345 02/12/19  0339 02/11/19  1209 02/11/19  0426 02/10/19  0543   WBC 10*3/mm3  --   --  9.57  --   --   --   --  7.20 8.16   HEMOGLOBIN g/dL  --   --  10.2*  --   --   --   --  10.9* 11.0*   HEMATOCRIT %  --   --  31.4*  --   --   --   --  33.7* 33.9*   MCV fL  --   --  89.0  --   --   --   --  90.6 91.9   MCHC g/dL  --   --  32.5*  --   --   --   --  32.3* 32.4*   PLATELETS 10*3/mm3  --   --  368  --   --   --   --  357 320   INR  1.08 1.17* 1.45* 2.34* 2.84* 2.86* 2.48* 2.31* 1.49*     Results from last 7 days   Lab Units 02/13/19  1353   PH, ARTERIAL pH units 7.383   PO2 ART mm Hg 83.2   PCO2, ARTERIAL mm Hg 31.5*   HCO3 ART mmol/L 18.3*     Results from last 7 days   Lab Units 02/16/19  0111 02/15/19  0051 02/14/19  1914 02/14/19  0423  02/11/19  0426 02/10/19  0543   SODIUM mmol/L 133* 135  --  135   < > 136 133*   POTASSIUM mmol/L 4.2 4.0 4.1 3.2*   < > 4.1 4.7   MAGNESIUM mg/dL  --   --   --   --   --  2.0 2.1   CHLORIDE mmol/L 102 108  --  106   < > 106 102   CO2 mmol/L 23.9* 21.7*  --  16.8*   < > 20.4* 18.6*   BUN mg/dL 53* 46*  --  44*   < > 40* 33*   CREATININE mg/dL 1.31* 1.20  --  1.30*   < > 1.31* 1.30*   EGFR IF NONAFRICN AM mL/min/1.73 40* 44*  --  40*   < > 40* 40*   CALCIUM mg/dL 7.7 7.8  --  8.1   < > 8.5 8.6   GLUCOSE mg/dL 270* 149*  --  184*   < > 249* 242*    < > = values in this interval not displayed.   Estimated Creatinine Clearance: 30.6 mL/min (A) (by C-G formula based on SCr of 1.31 mg/dL (H)).    No results found for: AMMONIA                    I have personally looked at the labs and they are summarized  above.  ----------------------------------------------------------------------------------------------------------------------  Imaging Results (last 24 hours)     Procedure Component Value Units Date/Time    XR Chest 1 View [[id number]] Collected:  02/15/19 1615     Updated:  02/15/19 1619    Narrative:       EXAMINATION: XR CHEST 1 VW-      CLINICAL INDICATION:     s/p thora ( left) r/oPTX; A41.9-Sepsis,  unspecified organism; R65.21-Severe sepsis with septic shock;  N17.9-Acute kidney failure, unspecified     TECHNIQUE:  XR CHEST 1 VW-      COMPARISON: 02/15/2019      FINDINGS:   Bilateral mid and lower lung airspace disease. Cardiomegaly and changes  of CHF with interstitial edema, overall slightly improved. Pulmonary  vascular congestion. Small pleural effusions, which appear decreased  from the previous exam. No pneumothorax.       Impression:       1. Decreased left pleural effusion and decreased right pleural effusion.  2. Improvement in CHF/edema noted.  3. No pneumothorax identified.     This report was finalized on 2/15/2019 4:16 PM by Dr. Woo Jacques MD.           ----------------------------------------------------------------------------------------------------------------------  Assessment and Plan:  - Acute on chronic hypoxic respiratory failure improving  - Bilateral pleural effusion left more than the right with improvement, status post thoracentesis.  Ultrasound performed during thoracentesis suggest loculated pleural effusion.  Patient would prefer with current medical treatment for now  - Paroxysmal atrial fibrillation off anticoagulation for recent procedure  - Chronic kidney disease stage III    Patient currently is on Bumex 1 mg twice a day as recommended by nephrology, will continue to monitor response, will discuss with patient together with her daughter if they want to pursue with a large bore chest tube placement as recommended by pulmonology.    Monitor renal function and  electrolytes.,  If patient and family would not want to pursue with chest tube will restart anticoagulation.  In the meantime start patient on DVT prophylaxis since her INR is now normal.      Rosa Armenta MD  02/16/19  11:40 AM

## 2019-02-16 NOTE — PROGRESS NOTES
Nephrology  Note    Subjective       She had thoracentesis yesterday but only 60 cc removed and there is concern for complex effusion. She is on 4L NC. No chest pain    Objective     Vital Signs  Temp:  [97.4 °F (36.3 °C)-98.5 °F (36.9 °C)] 97.4 °F (36.3 °C)  Heart Rate:  [55-98] 91  Resp:  [18] 18  BP: (122-164)/(48-82) 155/82    I/O this shift:  In: 240 [P.O.:240]  Out: -   I/O last 3 completed shifts:  In: 840 [P.O.:840]  Out: 1950 [Urine:1950]    Physical Examination:    General Appearance : alert, appears stated age, cooperative and no distress  Head : normocephalic  Eyes : no pallor   Throat : oral mucosa moist  Neck:no JVD  Lungs : reduced breath sounds at bases  Heart : regular rhythm & normal rate, normal S1, S2, no murmur  Abdomen :   soft non-tender  Extremities :  1+ edema  Pulses :  palpable and equal bilaterally  Skin : no bleeding, bruising or rash  Neurologic : orientated to person, place, time, grossly no focal deficitis    Laboratory Data :      WBC WBC   Date Value Ref Range Status   02/15/2019 9.57 4.50 - 12.50 10*3/mm3 Final      HGB Hemoglobin   Date Value Ref Range Status   02/15/2019 10.2 (L) 12.0 - 16.0 g/dL Final      HCT Hematocrit   Date Value Ref Range Status   02/15/2019 31.4 (L) 37.0 - 47.0 % Final      Platlets No results found for: LABPLAT   MCV MCV   Date Value Ref Range Status   02/15/2019 89.0 80.0 - 94.0 fL Final          Sodium Sodium   Date Value Ref Range Status   02/16/2019 133 (L) 135 - 153 mmol/L Final   02/15/2019 135 135 - 153 mmol/L Final   02/14/2019 135 135 - 153 mmol/L Final      Potassium Potassium   Date Value Ref Range Status   02/16/2019 4.2 3.5 - 5.3 mmol/L Final   02/15/2019 4.0 3.5 - 5.3 mmol/L Final   02/14/2019 4.1 3.5 - 5.3 mmol/L Final   02/14/2019 3.2 (L) 3.5 - 5.3 mmol/L Final      Chloride Chloride   Date Value Ref Range Status   02/16/2019 102 99 - 112 mmol/L Final   02/15/2019 108 99 - 112 mmol/L Final   02/14/2019 106 99 - 112 mmol/L Final      CO2  CO2   Date Value Ref Range Status   02/16/2019 23.9 (L) 24.3 - 31.9 mmol/L Final   02/15/2019 21.7 (L) 24.3 - 31.9 mmol/L Final   02/14/2019 16.8 (L) 24.3 - 31.9 mmol/L Final      BUN BUN   Date Value Ref Range Status   02/16/2019 53 (H) 7 - 21 mg/dL Final   02/15/2019 46 (H) 7 - 21 mg/dL Final   02/14/2019 44 (H) 7 - 21 mg/dL Final      Creatinine Creatinine   Date Value Ref Range Status   02/16/2019 1.31 (H) 0.43 - 1.29 mg/dL Final   02/15/2019 1.20 0.43 - 1.29 mg/dL Final   02/14/2019 1.30 (H) 0.43 - 1.29 mg/dL Final      Calcium Calcium   Date Value Ref Range Status   02/16/2019 7.7 7.7 - 10.0 mg/dL Final   02/15/2019 7.8 7.7 - 10.0 mg/dL Final   02/14/2019 8.1 7.7 - 10.0 mg/dL Final      PO4 No results found for: CAPO4   Albumin No results found for: ALBUMIN   Magnesium No results found for: MG   Uric Acid No results found for: URICACID     Radiology results :     Imaging Results (last 24 hours)     Procedure Component Value Units Date/Time    XR Chest 1 View [[id number]] Collected:  02/15/19 1615     Updated:  02/15/19 1619    Narrative:       EXAMINATION: XR CHEST 1 VW-      CLINICAL INDICATION:     s/p thora ( left) r/oPTX; A41.9-Sepsis,  unspecified organism; R65.21-Severe sepsis with septic shock;  N17.9-Acute kidney failure, unspecified     TECHNIQUE:  XR CHEST 1 VW-      COMPARISON: 02/15/2019      FINDINGS:   Bilateral mid and lower lung airspace disease. Cardiomegaly and changes  of CHF with interstitial edema, overall slightly improved. Pulmonary  vascular congestion. Small pleural effusions, which appear decreased  from the previous exam. No pneumothorax.       Impression:       1. Decreased left pleural effusion and decreased right pleural effusion.  2. Improvement in CHF/edema noted.  3. No pneumothorax identified.     This report was finalized on 2/15/2019 4:16 PM by Dr. Woo Jacques MD.                 Medications:        aspirin 81 mg Oral Daily   budesonide-formoterol 2 puff Inhalation BID -  RT   bumetanide 1 mg Intravenous Q12H   guaiFENesin 1,200 mg Oral Q12H   hydrALAZINE 75 mg Oral TID   insulin aspart 0-7 Units Subcutaneous TID With Meals   insulin detemir 6 Units Subcutaneous Nightly   ipratropium-albuterol 3 mL Nebulization 4x Daily - RT   metoprolol tartrate 100 mg Oral Q12H   montelukast 10 mg Oral Nightly   sennosides-docusate sodium 2 tablet Oral Nightly   sodium chloride 3 mL Intravenous Q12H       hold 1 each       Assessment/Plan       Septic shock (CMS/HCC)      1. CKD stage 3 with baseline creatinine 1.1-1.3 since 2015 . Creatinine is bit high at 1.3 today   No hydronephrosis on CT scan abdomen. Avoid nephrotoxins    2. Proteinuria : urine protein/creatinine is 1.6 gm/gm.serologies pending, complements normal. She has no hematuria    3. HTN : BP is better today    4. Bilateral pleural effusions : continue bumex 1 mg iv bid with holding parameters.   1.2 L/day fluid restriction    Thanks Dr Armenta for the consult. We will follow with you.  I discussed the patient's findings and my recommendations with patient    Andrés Shaikh MD  02/16/19  10:43 AM

## 2019-02-17 LAB
ALBUMIN FLD-MCNC: 1.6 G/DL
ANION GAP SERPL CALCULATED.3IONS-SCNC: 6.3 MMOL/L (ref 3.6–11.2)
BUN BLD-MCNC: 41 MG/DL (ref 7–21)
BUN/CREAT SERPL: 34.2 (ref 7–25)
CALCIUM SPEC-SCNC: 7.6 MG/DL (ref 7.7–10)
CHLORIDE SERPL-SCNC: 103 MMOL/L (ref 99–112)
CO2 SERPL-SCNC: 23.7 MMOL/L (ref 24.3–31.9)
CREAT BLD-MCNC: 1.2 MG/DL (ref 0.43–1.29)
GFR SERPL CREATININE-BSD FRML MDRD: 44 ML/MIN/1.73
GLUCOSE BLD-MCNC: 238 MG/DL (ref 70–110)
GLUCOSE BLDC GLUCOMTR-MCNC: 188 MG/DL (ref 70–130)
GLUCOSE BLDC GLUCOMTR-MCNC: 199 MG/DL (ref 70–130)
GLUCOSE BLDC GLUCOMTR-MCNC: 374 MG/DL (ref 70–130)
GLUCOSE BLDC GLUCOMTR-MCNC: 403 MG/DL (ref 70–130)
GLUCOSE FLD-MCNC: 232 MG/DL
INR PPP: 1.03 (ref 0.9–1.1)
LDH FLD-CCNC: 221 IU/L
OSMOLALITY SERPL CALC.SUM OF ELEC: 284.2 MOSM/KG (ref 273–305)
POTASSIUM BLD-SCNC: 3.5 MMOL/L (ref 3.5–5.3)
POTASSIUM BLD-SCNC: 5.1 MMOL/L (ref 3.5–5.3)
PROT FLD-MCNC: 2.9 G/DL
PROTHROMBIN TIME: 13.7 SECONDS (ref 11–15.4)
SODIUM BLD-SCNC: 133 MMOL/L (ref 135–153)
TRIGL FLD-MCNC: 27 MG/DL

## 2019-02-17 PROCEDURE — 84132 ASSAY OF SERUM POTASSIUM: CPT | Performed by: HOSPITALIST

## 2019-02-17 PROCEDURE — 94799 UNLISTED PULMONARY SVC/PX: CPT

## 2019-02-17 PROCEDURE — 63710000001 INSULIN ASPART PER 5 UNITS

## 2019-02-17 PROCEDURE — 80048 BASIC METABOLIC PNL TOTAL CA: CPT | Performed by: INTERNAL MEDICINE

## 2019-02-17 PROCEDURE — 25010000002 HEPARIN (PORCINE) PER 1000 UNITS: Performed by: HOSPITALIST

## 2019-02-17 PROCEDURE — 63710000001 INSULIN ASPART PER 5 UNITS: Performed by: INTERNAL MEDICINE

## 2019-02-17 PROCEDURE — 63710000001 INSULIN DETEMIR PER 5 UNITS: Performed by: HOSPITALIST

## 2019-02-17 PROCEDURE — 85610 PROTHROMBIN TIME: CPT | Performed by: HOSPITALIST

## 2019-02-17 PROCEDURE — 82962 GLUCOSE BLOOD TEST: CPT

## 2019-02-17 PROCEDURE — 99232 SBSQ HOSP IP/OBS MODERATE 35: CPT | Performed by: HOSPITALIST

## 2019-02-17 RX ORDER — BUMETANIDE 0.25 MG/ML
2 INJECTION INTRAMUSCULAR; INTRAVENOUS EVERY 12 HOURS SCHEDULED
Status: DISCONTINUED | OUTPATIENT
Start: 2019-02-17 | End: 2019-02-18

## 2019-02-17 RX ORDER — POTASSIUM CHLORIDE 1.5 G/1.77G
40 POWDER, FOR SOLUTION ORAL EVERY 4 HOURS
Status: COMPLETED | OUTPATIENT
Start: 2019-02-17 | End: 2019-02-17

## 2019-02-17 RX ADMIN — HYDRALAZINE HYDROCHLORIDE 75 MG: 50 TABLET ORAL at 17:50

## 2019-02-17 RX ADMIN — IPRATROPIUM BROMIDE AND ALBUTEROL SULFATE 3 ML: .5; 3 SOLUTION RESPIRATORY (INHALATION) at 06:42

## 2019-02-17 RX ADMIN — SENNOSIDES AND DOCUSATE SODIUM 2 TABLET: 8.6; 5 TABLET ORAL at 19:56

## 2019-02-17 RX ADMIN — INSULIN ASPART 2 UNITS: 100 INJECTION, SOLUTION INTRAVENOUS; SUBCUTANEOUS at 17:48

## 2019-02-17 RX ADMIN — GUAIFENESIN 1200 MG: 600 TABLET, EXTENDED RELEASE ORAL at 19:55

## 2019-02-17 RX ADMIN — HYDRALAZINE HYDROCHLORIDE 75 MG: 50 TABLET ORAL at 08:54

## 2019-02-17 RX ADMIN — INSULIN ASPART 7 UNITS: 100 INJECTION, SOLUTION INTRAVENOUS; SUBCUTANEOUS at 19:57

## 2019-02-17 RX ADMIN — BUDESONIDE AND FORMOTEROL FUMARATE DIHYDRATE 2 PUFF: 160; 4.5 AEROSOL RESPIRATORY (INHALATION) at 18:52

## 2019-02-17 RX ADMIN — SODIUM CHLORIDE, PRESERVATIVE FREE 3 ML: 5 INJECTION INTRAVENOUS at 09:00

## 2019-02-17 RX ADMIN — HEPARIN SODIUM 5000 UNITS: 5000 INJECTION INTRAVENOUS; SUBCUTANEOUS at 19:56

## 2019-02-17 RX ADMIN — BUMETANIDE 2 MG: 0.25 INJECTION INTRAMUSCULAR; INTRAVENOUS at 20:00

## 2019-02-17 RX ADMIN — METOPROLOL TARTRATE 100 MG: 100 TABLET, FILM COATED ORAL at 19:56

## 2019-02-17 RX ADMIN — BUMETANIDE 1 MG: 0.25 INJECTION INTRAMUSCULAR; INTRAVENOUS at 09:00

## 2019-02-17 RX ADMIN — INSULIN DETEMIR 10 UNITS: 100 INJECTION, SOLUTION SUBCUTANEOUS at 20:00

## 2019-02-17 RX ADMIN — IPRATROPIUM BROMIDE AND ALBUTEROL SULFATE 3 ML: .5; 3 SOLUTION RESPIRATORY (INHALATION) at 18:52

## 2019-02-17 RX ADMIN — IPRATROPIUM BROMIDE AND ALBUTEROL SULFATE 3 ML: .5; 3 SOLUTION RESPIRATORY (INHALATION) at 12:47

## 2019-02-17 RX ADMIN — ASPIRIN 81 MG: 81 TABLET ORAL at 08:54

## 2019-02-17 RX ADMIN — INSULIN ASPART 6 UNITS: 100 INJECTION, SOLUTION INTRAVENOUS; SUBCUTANEOUS at 11:28

## 2019-02-17 RX ADMIN — HEPARIN SODIUM 5000 UNITS: 5000 INJECTION INTRAVENOUS; SUBCUTANEOUS at 08:54

## 2019-02-17 RX ADMIN — MONTELUKAST SODIUM 10 MG: 10 TABLET, COATED ORAL at 19:56

## 2019-02-17 RX ADMIN — INSULIN ASPART 2 UNITS: 100 INJECTION, SOLUTION INTRAVENOUS; SUBCUTANEOUS at 08:55

## 2019-02-17 RX ADMIN — IPRATROPIUM BROMIDE AND ALBUTEROL SULFATE 3 ML: .5; 3 SOLUTION RESPIRATORY (INHALATION) at 00:14

## 2019-02-17 RX ADMIN — POTASSIUM CHLORIDE 40 MEQ: 1.5 POWDER, FOR SOLUTION ORAL at 08:55

## 2019-02-17 RX ADMIN — HYDRALAZINE HYDROCHLORIDE 75 MG: 50 TABLET ORAL at 19:56

## 2019-02-17 RX ADMIN — BUDESONIDE AND FORMOTEROL FUMARATE DIHYDRATE 2 PUFF: 160; 4.5 AEROSOL RESPIRATORY (INHALATION) at 06:41

## 2019-02-17 RX ADMIN — POTASSIUM CHLORIDE 40 MEQ: 1.5 POWDER, FOR SOLUTION ORAL at 11:37

## 2019-02-17 RX ADMIN — GUAIFENESIN 1200 MG: 600 TABLET, EXTENDED RELEASE ORAL at 08:54

## 2019-02-17 RX ADMIN — METOPROLOL TARTRATE 100 MG: 100 TABLET, FILM COATED ORAL at 08:54

## 2019-02-17 NOTE — PROGRESS NOTES
"    [facility] HOSPITALIST PROGRESS NOTE     Patient Identification:  Name:  Ashley Geronimo  Age:  73 y.o.  Sex:  female  :  1945  MRN:  [id number]  Visit Number:  [id number]  Primary Care Provider:  Silvano Parker MD    Length of stay:  11    Subjective:  Patient is awake and alert, she feels much better, no chest tube has been placed as patient wouldn't want to try \"current medicine \".  She seems to be improving requiring less oxygen supplementation.  She has good urine output, she reports her knees are stiff and she is weak, normally she does not use walker at home and uses oxygen 2 L nasal cannula only.     Chief Complaint: Weakness and short of breath  ----------------------------------------------------------------------------------------------------------------------  Current Hospital Meds:    aspirin 81 mg Oral Daily   budesonide-formoterol 2 puff Inhalation BID - RT   bumetanide 2 mg Intravenous Q12H   guaiFENesin 1,200 mg Oral Q12H   heparin (porcine) 5,000 Units Subcutaneous BID   hydrALAZINE 75 mg Oral TID   insulin aspart 0-7 Units Subcutaneous TID With Meals   insulin detemir 6 Units Subcutaneous Nightly   ipratropium-albuterol 3 mL Nebulization 4x Daily - RT   metoprolol tartrate 100 mg Oral Q12H   montelukast 10 mg Oral Nightly   sennosides-docusate sodium 2 tablet Oral Nightly   sodium chloride 3 mL Intravenous Q12H       hold 1 each     ----------------------------------------------------------------------------------------------------------------------  Vital Signs:  Temp:  [97.5 °F (36.4 °C)-98.1 °F (36.7 °C)] 98.1 °F (36.7 °C)  Heart Rate:  [59-98] 61  Resp:  [18] 18  BP: (123-152)/(49-60) 123/49       Tele: Sinus rhythm rate of 64 bpm      02/15/19  0300 19  0259 19  0300   Weight: 65.2 kg (143 lb 12.8 oz) 60.6 kg (133 lb 9.6 oz) 60.6 kg (133 lb 9.6 oz)     Body mass index is 26.09 kg/m².    Intake/Output Summary (Last 24 hours) at 2019 1155  Last data " filed at 2/17/2019 0300  Gross per 24 hour   Intake 240 ml   Output 1800 ml   Net -1560 ml     Diet Regular; Cardiac, Consistent Carbohydrate, Renal  ----------------------------------------------------------------------------------------------------------------------  Physical exam:  General: Comfortable,awake, alert, oriented to self, place, and time, well-developed chronically ill-appearing, now on nasal cannula 4 L  No respiratory distress.   Skin:  Skin is warm and dry. No rash noted. No pallor.    HENT:  Head:  Normocephalic and atraumatic.  Mouth:  Moist mucous membranes.    Eyes:  Conjunctivae and EOM are normal.  Pupils are equal, round, and reactive to light.  No scleral icterus.    Neck:  Neck supple.  JVD present better than yesterday.    Pulmonary/Chest:  No respiratory distress, no wheezes, no crackles, with normal breath sounds and good air movement.  Decreased breath sounds both lower lung fields  Cardiovascular:  Normal rate, regular rhythm and normal heart sounds with no murmur.  Abdominal:  Soft.  Bowel sounds are normal.  No distension and no tenderness.   Extremities: edema is improved on both lower extremity.  both lower extremity pitting type, good pedal pulse, no cyanosis or clubbing, no Homans signthe legs.  There is some mild redness on the right lower extremity leg.  Neurological:  Motor strength equal no obvious deficit, sensory grossly intact.   No cranial nerve deficit.  No tongue deviation.  No facial droop.  No slurred speech.    Genitourinary: jason urine noted on Singh catheter  ----------------------------------------------------------------------------------------------------------------------  ----------------------------------------------------------------------------------------------------------------------      Results from last 7 days   Lab Units 02/17/19  0451 02/16/19  0110 02/15/19  0826 02/15/19  0051 02/14/19  0423 02/13/19  0345 02/12/19  0339  02/11/19  0426   WBC  10*3/mm3  --   --   --  9.57  --   --   --   --  7.20   HEMOGLOBIN g/dL  --   --   --  10.2*  --   --   --   --  10.9*   HEMATOCRIT %  --   --   --  31.4*  --   --   --   --  33.7*   MCV fL  --   --   --  89.0  --   --   --   --  90.6   MCHC g/dL  --   --   --  32.5*  --   --   --   --  32.3*   PLATELETS 10*3/mm3  --   --   --  368  --   --   --   --  357   INR  1.03 1.08 1.17* 1.45* 2.34* 2.84* 2.86*   < > 2.31*    < > = values in this interval not displayed.     Results from last 7 days   Lab Units 02/13/19  1353   PH, ARTERIAL pH units 7.383   PO2 ART mm Hg 83.2   PCO2, ARTERIAL mm Hg 31.5*   HCO3 ART mmol/L 18.3*     Results from last 7 days   Lab Units 02/17/19  0451 02/16/19  0111 02/15/19  0051  02/11/19  0426   SODIUM mmol/L 133* 133* 135   < > 136   POTASSIUM mmol/L 3.5 4.2 4.0   < > 4.1   MAGNESIUM mg/dL  --   --   --   --  2.0   CHLORIDE mmol/L 103 102 108   < > 106   CO2 mmol/L 23.7* 23.9* 21.7*   < > 20.4*   BUN mg/dL 41* 53* 46*   < > 40*   CREATININE mg/dL 1.20 1.31* 1.20   < > 1.31*   EGFR IF NONAFRICN AM mL/min/1.73 44* 40* 44*   < > 40*   CALCIUM mg/dL 7.6* 7.7 7.8   < > 8.5   GLUCOSE mg/dL 238* 270* 149*   < > 249*    < > = values in this interval not displayed.   Estimated Creatinine Clearance: 33.4 mL/min (by C-G formula based on SCr of 1.2 mg/dL).    No results found for: AMMONIA                    I have personally looked at the labs and they are summarized above.  ----------------------------------------------------------------------------------------------------------------------  Imaging Results (last 24 hours)     ** No results found for the last 24 hours. **        ----------------------------------------------------------------------------------------------------------------------  Assessment and Plan:  - Acute on chronic hypoxic respiratory failure secondary to CHF and pneumonia, improving with less oxygen supplementation requirements  - Acute on chronic diastolic heart failure still  slightly hypervolemic state  - Bilateral pleural effusion left more than the right, status post ultrasound-guided thoracentesis on the left by pulmonary revealed possible loculation and recommended large-bore chest tube placement.  Patient would prefer to try medicine first since she thinks she feels better, will discuss with pulmonary service next week.  - Right lower lobe lobe pneumonia status post treatment  - Physical deconditioning and functional decline requires physical therapy  - Acute kidney injury on chronic kidney disease stage III, stable with current diuretic dose  -  Paroxysmal atrial fibrillation  -  Anticoagulation temporary on hold due to recent thoracentesis with slightly bloody fluid as per pulmonology.  - Diabetes type 2 with hyperglycemia    Will repeat chest x-ray in the morning, PT OT, will decrease FiO2, will discuss with pulmonology in the morning if wanted to pursue with chest tube placement.  Monitor renal function as Bumex has been increased by nephrology.  Increase Levemir dose.    Rosa Armenta MD  02/17/19  11:55 AM

## 2019-02-17 NOTE — PLAN OF CARE
Problem: Sepsis/Septic Shock (Adult)  Goal: Signs and Symptoms of Listed Potential Problems Will be Absent, Minimized or Managed (Sepsis/Septic Shock)  Outcome: Ongoing (interventions implemented as appropriate)      Problem: Fall Risk (Adult)  Goal: Absence of Fall  Outcome: Ongoing (interventions implemented as appropriate)      Problem: Skin Injury Risk (Adult)  Goal: Skin Health and Integrity  Outcome: Ongoing (interventions implemented as appropriate)      Problem: Patient Care Overview  Goal: Plan of Care Review  Outcome: Ongoing (interventions implemented as appropriate)    Goal: Individualization and Mutuality  Outcome: Ongoing (interventions implemented as appropriate)    Goal: Discharge Needs Assessment  Outcome: Ongoing (interventions implemented as appropriate)    Goal: Interprofessional Rounds/Family Conf  Outcome: Ongoing (interventions implemented as appropriate)      Problem: Mobility, Physical Impaired (Adult)  Goal: Enhanced Mobility Skills  Outcome: Ongoing (interventions implemented as appropriate)    Goal: Enhanced Functional Ability  Outcome: Ongoing (interventions implemented as appropriate)

## 2019-02-17 NOTE — PROGRESS NOTES
Nephrology  Note    Subjective       Reports breathing is better. Edema better but still present. She is on 4L NC.     Objective     Vital Signs  Temp:  [97.5 °F (36.4 °C)-98.1 °F (36.7 °C)] 98.1 °F (36.7 °C)  Heart Rate:  [59-98] 61  Resp:  [18] 18  BP: (123-152)/(49-60) 123/49    No intake/output data recorded.  I/O last 3 completed shifts:  In: 720 [P.O.:720]  Out: 3150 [Urine:3150]    Physical Examination:    General Appearance : alert, appears stated age, cooperative and no distress  Head : normocephalic  Eyes : no pallor   Throat : oral mucosa moist  Neck:no JVD  Lungs : reduced breath sounds at bases  Heart : regular rhythm & normal rate, normal S1, S2, no murmur  Abdomen :   soft non-tender  Extremities :  1+ edema  Pulses :  palpable and equal bilaterally  Neurologic : orientated to person, place, time, grossly no focal deficitis    Laboratory Data :      WBC WBC   Date Value Ref Range Status   02/15/2019 9.57 4.50 - 12.50 10*3/mm3 Final      HGB Hemoglobin   Date Value Ref Range Status   02/15/2019 10.2 (L) 12.0 - 16.0 g/dL Final      HCT Hematocrit   Date Value Ref Range Status   02/15/2019 31.4 (L) 37.0 - 47.0 % Final      Platlets No results found for: LABPLAT   MCV MCV   Date Value Ref Range Status   02/15/2019 89.0 80.0 - 94.0 fL Final          Sodium Sodium   Date Value Ref Range Status   02/17/2019 133 (L) 135 - 153 mmol/L Final   02/16/2019 133 (L) 135 - 153 mmol/L Final   02/15/2019 135 135 - 153 mmol/L Final      Potassium Potassium   Date Value Ref Range Status   02/17/2019 3.5 3.5 - 5.3 mmol/L Final   02/16/2019 4.2 3.5 - 5.3 mmol/L Final   02/15/2019 4.0 3.5 - 5.3 mmol/L Final   02/14/2019 4.1 3.5 - 5.3 mmol/L Final      Chloride Chloride   Date Value Ref Range Status   02/17/2019 103 99 - 112 mmol/L Final   02/16/2019 102 99 - 112 mmol/L Final   02/15/2019 108 99 - 112 mmol/L Final      CO2 CO2   Date Value Ref Range Status   02/17/2019 23.7 (L) 24.3 - 31.9 mmol/L Final   02/16/2019 23.9 (L)  24.3 - 31.9 mmol/L Final   02/15/2019 21.7 (L) 24.3 - 31.9 mmol/L Final      BUN BUN   Date Value Ref Range Status   02/17/2019 41 (H) 7 - 21 mg/dL Final   02/16/2019 53 (H) 7 - 21 mg/dL Final   02/15/2019 46 (H) 7 - 21 mg/dL Final      Creatinine Creatinine   Date Value Ref Range Status   02/17/2019 1.20 0.43 - 1.29 mg/dL Final   02/16/2019 1.31 (H) 0.43 - 1.29 mg/dL Final   02/15/2019 1.20 0.43 - 1.29 mg/dL Final      Calcium Calcium   Date Value Ref Range Status   02/17/2019 7.6 (L) 7.7 - 10.0 mg/dL Final   02/16/2019 7.7 7.7 - 10.0 mg/dL Final   02/15/2019 7.8 7.7 - 10.0 mg/dL Final      PO4 No results found for: CAPO4   Albumin No results found for: ALBUMIN   Magnesium No results found for: MG   Uric Acid No results found for: URICACID     Radiology results :     Imaging Results (last 24 hours)     ** No results found for the last 24 hours. **              Medications:        aspirin 81 mg Oral Daily   budesonide-formoterol 2 puff Inhalation BID - RT   bumetanide 1 mg Intravenous Q12H   guaiFENesin 1,200 mg Oral Q12H   heparin (porcine) 5,000 Units Subcutaneous BID   hydrALAZINE 75 mg Oral TID   insulin aspart 0-7 Units Subcutaneous TID With Meals   insulin detemir 6 Units Subcutaneous Nightly   ipratropium-albuterol 3 mL Nebulization 4x Daily - RT   metoprolol tartrate 100 mg Oral Q12H   montelukast 10 mg Oral Nightly   potassium chloride 40 mEq Oral Q4H   sennosides-docusate sodium 2 tablet Oral Nightly   sodium chloride 3 mL Intravenous Q12H       hold 1 each       Assessment/Plan       Septic shock (CMS/HCC)      1. CKD stage 3 with baseline creatinine 1.1-1.3 since 2015 . Creatinine is better at 1.2 today   No hydronephrosis on CT scan abdomen. Avoid nephrotoxins    2. Proteinuria : urine protein/creatinine is 1.6 gm/gm.serologies pending, complements normal. She has no hematuria    3. HTN : stable    4. Bilateral pleural effusions : increase bumex to 2 mg iv bid today as she is still edematous and on 4 L  NC. Possible chest tube if family agrees         I discussed the patient's findings and my recommendations with patient    Andrés Shaikh MD  02/17/19  11:08 AM

## 2019-02-18 ENCOUNTER — APPOINTMENT (OUTPATIENT)
Dept: GENERAL RADIOLOGY | Facility: HOSPITAL | Age: 74
End: 2019-02-18

## 2019-02-18 LAB
ANA SER QL IA: NEGATIVE
ANION GAP SERPL CALCULATED.3IONS-SCNC: 4.3 MMOL/L (ref 3.6–11.2)
BASOPHILS # BLD AUTO: 0.03 10*3/MM3 (ref 0–0.3)
BASOPHILS NFR BLD AUTO: 0.3 % (ref 0–2)
BUN BLD-MCNC: 45 MG/DL (ref 7–21)
BUN/CREAT SERPL: 35.7 (ref 7–25)
C-ANCA TITR SER IF: NORMAL TITER
CALCIUM SPEC-SCNC: 7.9 MG/DL (ref 7.7–10)
CHLORIDE SERPL-SCNC: 107 MMOL/L (ref 99–112)
CO2 SERPL-SCNC: 24.7 MMOL/L (ref 24.3–31.9)
CREAT BLD-MCNC: 1.26 MG/DL (ref 0.43–1.29)
DEPRECATED RDW RBC AUTO: 44.4 FL (ref 37–54)
DSDNA AB SER-ACNC: 1 IU/ML (ref 0–9)
EOSINOPHIL # BLD AUTO: 0.16 10*3/MM3 (ref 0–0.7)
EOSINOPHIL NFR BLD AUTO: 1.6 % (ref 0–7)
ERYTHROCYTE [DISTWIDTH] IN BLOOD BY AUTOMATED COUNT: 14.1 % (ref 11.5–14.5)
GFR SERPL CREATININE-BSD FRML MDRD: 42 ML/MIN/1.73
GLUCOSE BLD-MCNC: 56 MG/DL (ref 70–110)
GLUCOSE BLDC GLUCOMTR-MCNC: 120 MG/DL (ref 70–130)
GLUCOSE BLDC GLUCOMTR-MCNC: 165 MG/DL (ref 70–130)
GLUCOSE BLDC GLUCOMTR-MCNC: 224 MG/DL (ref 70–130)
GLUCOSE BLDC GLUCOMTR-MCNC: 226 MG/DL (ref 70–130)
GLUCOSE BLDC GLUCOMTR-MCNC: 335 MG/DL (ref 70–130)
HCT VFR BLD AUTO: 32.4 % (ref 37–47)
HGB BLD-MCNC: 10.2 G/DL (ref 12–16)
IMM GRANULOCYTES # BLD AUTO: 0.16 10*3/MM3 (ref 0–0.03)
IMM GRANULOCYTES NFR BLD AUTO: 1.6 % (ref 0–0.5)
INR PPP: 0.97 (ref 0.9–1.1)
LAB AP CASE REPORT: NORMAL
LYMPHOCYTES # BLD AUTO: 1.23 10*3/MM3 (ref 1–3)
LYMPHOCYTES NFR BLD AUTO: 12.3 % (ref 16–46)
Lab: NORMAL
MCH RBC QN AUTO: 28.3 PG (ref 27–33)
MCHC RBC AUTO-ENTMCNC: 31.5 G/DL (ref 33–37)
MCV RBC AUTO: 89.8 FL (ref 80–94)
MONOCYTES # BLD AUTO: 1.12 10*3/MM3 (ref 0.1–0.9)
MONOCYTES NFR BLD AUTO: 11.2 % (ref 0–12)
MYELOPEROXIDASE AB SER-ACNC: <9 U/ML (ref 0–9)
NEUTROPHILS # BLD AUTO: 7.3 10*3/MM3 (ref 1.4–6.5)
NEUTROPHILS NFR BLD AUTO: 73 % (ref 40–75)
OSMOLALITY SERPL CALC.SUM OF ELEC: 281.1 MOSM/KG (ref 273–305)
P-ANCA ATYPICAL TITR SER IF: NORMAL TITER
P-ANCA TITR SER IF: NORMAL TITER
PH FLD: 8.2 [PH]
PLATELET # BLD AUTO: 337 10*3/MM3 (ref 130–400)
PMV BLD AUTO: 11.7 FL (ref 6–10)
POTASSIUM BLD-SCNC: 4.1 MMOL/L (ref 3.5–5.3)
PROTEINASE3 AB SER IA-ACNC: <3.5 U/ML (ref 0–3.5)
PROTHROMBIN TIME: 13.1 SECONDS (ref 11–15.4)
RBC # BLD AUTO: 3.61 10*6/MM3 (ref 4.2–5.4)
SODIUM BLD-SCNC: 136 MMOL/L (ref 135–153)
WBC NRBC COR # BLD: 10 10*3/MM3 (ref 4.5–12.5)

## 2019-02-18 PROCEDURE — 71045 X-RAY EXAM CHEST 1 VIEW: CPT

## 2019-02-18 PROCEDURE — 25010000002 HEPARIN (PORCINE) PER 1000 UNITS: Performed by: HOSPITALIST

## 2019-02-18 PROCEDURE — 71045 X-RAY EXAM CHEST 1 VIEW: CPT | Performed by: RADIOLOGY

## 2019-02-18 PROCEDURE — 82962 GLUCOSE BLOOD TEST: CPT

## 2019-02-18 PROCEDURE — 85025 COMPLETE CBC W/AUTO DIFF WBC: CPT | Performed by: PHYSICIAN ASSISTANT

## 2019-02-18 PROCEDURE — 25010000002 HEPARIN (PORCINE) PER 1000 UNITS: Performed by: INTERNAL MEDICINE

## 2019-02-18 PROCEDURE — 97530 THERAPEUTIC ACTIVITIES: CPT

## 2019-02-18 PROCEDURE — 94799 UNLISTED PULMONARY SVC/PX: CPT

## 2019-02-18 PROCEDURE — 80048 BASIC METABOLIC PNL TOTAL CA: CPT | Performed by: INTERNAL MEDICINE

## 2019-02-18 PROCEDURE — 99233 SBSQ HOSP IP/OBS HIGH 50: CPT | Performed by: PHYSICIAN ASSISTANT

## 2019-02-18 PROCEDURE — 97110 THERAPEUTIC EXERCISES: CPT

## 2019-02-18 PROCEDURE — 63710000001 INSULIN ASPART PER 5 UNITS: Performed by: PHYSICIAN ASSISTANT

## 2019-02-18 PROCEDURE — 85610 PROTHROMBIN TIME: CPT | Performed by: HOSPITALIST

## 2019-02-18 PROCEDURE — 97116 GAIT TRAINING THERAPY: CPT

## 2019-02-18 PROCEDURE — 99232 SBSQ HOSP IP/OBS MODERATE 35: CPT | Performed by: INTERNAL MEDICINE

## 2019-02-18 RX ORDER — HEPARIN SODIUM 5000 [USP'U]/ML
5000 INJECTION, SOLUTION INTRAVENOUS; SUBCUTANEOUS EVERY 8 HOURS SCHEDULED
Status: DISCONTINUED | OUTPATIENT
Start: 2019-02-18 | End: 2019-02-20 | Stop reason: HOSPADM

## 2019-02-18 RX ORDER — BUMETANIDE 0.25 MG/ML
2 INJECTION INTRAMUSCULAR; INTRAVENOUS EVERY 12 HOURS SCHEDULED
Status: DISCONTINUED | OUTPATIENT
Start: 2019-02-19 | End: 2019-02-19

## 2019-02-18 RX ADMIN — INSULIN ASPART 5 UNITS: 100 INJECTION, SOLUTION INTRAVENOUS; SUBCUTANEOUS at 19:44

## 2019-02-18 RX ADMIN — METOPROLOL TARTRATE 100 MG: 100 TABLET, FILM COATED ORAL at 08:06

## 2019-02-18 RX ADMIN — INSULIN ASPART 3 UNITS: 100 INJECTION, SOLUTION INTRAVENOUS; SUBCUTANEOUS at 18:13

## 2019-02-18 RX ADMIN — INSULIN ASPART 2 UNITS: 100 INJECTION, SOLUTION INTRAVENOUS; SUBCUTANEOUS at 12:46

## 2019-02-18 RX ADMIN — BUDESONIDE AND FORMOTEROL FUMARATE DIHYDRATE 2 PUFF: 160; 4.5 AEROSOL RESPIRATORY (INHALATION) at 06:38

## 2019-02-18 RX ADMIN — IPRATROPIUM BROMIDE AND ALBUTEROL SULFATE 3 ML: .5; 3 SOLUTION RESPIRATORY (INHALATION) at 19:06

## 2019-02-18 RX ADMIN — SENNOSIDES AND DOCUSATE SODIUM 2 TABLET: 8.6; 5 TABLET ORAL at 19:43

## 2019-02-18 RX ADMIN — HYDRALAZINE HYDROCHLORIDE 75 MG: 50 TABLET ORAL at 08:06

## 2019-02-18 RX ADMIN — MONTELUKAST SODIUM 10 MG: 10 TABLET, COATED ORAL at 19:43

## 2019-02-18 RX ADMIN — HEPARIN SODIUM 5000 UNITS: 5000 INJECTION INTRAVENOUS; SUBCUTANEOUS at 19:42

## 2019-02-18 RX ADMIN — IPRATROPIUM BROMIDE AND ALBUTEROL SULFATE 3 ML: .5; 3 SOLUTION RESPIRATORY (INHALATION) at 12:19

## 2019-02-18 RX ADMIN — GUAIFENESIN 1200 MG: 600 TABLET, EXTENDED RELEASE ORAL at 08:06

## 2019-02-18 RX ADMIN — HYDRALAZINE HYDROCHLORIDE 75 MG: 50 TABLET ORAL at 16:10

## 2019-02-18 RX ADMIN — HEPARIN SODIUM 5000 UNITS: 5000 INJECTION INTRAVENOUS; SUBCUTANEOUS at 08:06

## 2019-02-18 RX ADMIN — HYDRALAZINE HYDROCHLORIDE 75 MG: 50 TABLET ORAL at 19:43

## 2019-02-18 RX ADMIN — BUMETANIDE 2 MG: 0.25 INJECTION INTRAMUSCULAR; INTRAVENOUS at 08:06

## 2019-02-18 RX ADMIN — METOPROLOL TARTRATE 100 MG: 100 TABLET, FILM COATED ORAL at 19:43

## 2019-02-18 RX ADMIN — IPRATROPIUM BROMIDE AND ALBUTEROL SULFATE 3 ML: .5; 3 SOLUTION RESPIRATORY (INHALATION) at 06:38

## 2019-02-18 RX ADMIN — BUDESONIDE AND FORMOTEROL FUMARATE DIHYDRATE 2 PUFF: 160; 4.5 AEROSOL RESPIRATORY (INHALATION) at 19:06

## 2019-02-18 RX ADMIN — IPRATROPIUM BROMIDE AND ALBUTEROL SULFATE 3 ML: .5; 3 SOLUTION RESPIRATORY (INHALATION) at 00:08

## 2019-02-18 RX ADMIN — SODIUM CHLORIDE, PRESERVATIVE FREE 3 ML: 5 INJECTION INTRAVENOUS at 08:43

## 2019-02-18 RX ADMIN — INSULIN ASPART 3 UNITS: 100 INJECTION, SOLUTION INTRAVENOUS; SUBCUTANEOUS at 08:06

## 2019-02-18 RX ADMIN — BUMETANIDE 2 MG: 0.25 INJECTION INTRAMUSCULAR; INTRAVENOUS at 19:43

## 2019-02-18 RX ADMIN — GUAIFENESIN 1200 MG: 600 TABLET, EXTENDED RELEASE ORAL at 19:43

## 2019-02-18 RX ADMIN — ASPIRIN 81 MG: 81 TABLET ORAL at 08:06

## 2019-02-18 NOTE — PROGRESS NOTES
Patient Identification:  Name:  Ashley Geronimo  Age:  73 y.o.  Sex:  female  :  1945  MRN:  [id number]  Visit Number:  [id number]  Primary Care Provider:  Silvano Parker MD    Length of stay:  12    Subjective     Chief complaint: Follow-up respiratory failure, CHF, pleural effusions, pneumonia s/p treatment    Consultants:  Consults     Date and Time Order Name Status Description    2019 0926 Inpatient Nephrology Consult Completed     2019 1138 Inpatient Pulmonology Consult Completed     2019 1203 Inpatient Infectious Diseases Consult Completed     2019 1703 Inpatient Infectious Diseases Consult Completed     2019 0757 Inpatient Cardiology Consult Completed     2019 1821 Inpatient Pulmonology Consult Completed           Procedures:  -US guided thoracentesis on 2/15/2019 by Dr. Noonan with removal of 60cc of serosanguinous fluid. Fluid culture without growth.     Subjective:      Mrs. Geronimo reports that she feels significant improved from the respiratory standpoint. She reports she has an oxygen concentrator at home and has previously used 2L PRN.  Currently she is on 3L/min via NC.  She reports she is eating and drinking well.  She lives at home with 2 grandchildren and other grandchildren next door that help her.      Discussed with AM ALDO Moreno with  no acute events overnight.   ----------------------------------------------------------------------------------------------------------------------  Current Hospital Meds:    aspirin 81 mg Oral Daily   budesonide-formoterol 2 puff Inhalation BID - RT   bumetanide 2 mg Intravenous Q12H   guaiFENesin 1,200 mg Oral Q12H   heparin (porcine) 5,000 Units Subcutaneous BID   hydrALAZINE 75 mg Oral TID   insulin aspart 0-7 Units Subcutaneous 4x Daily PC & at Bedtime   insulin detemir 10 Units Subcutaneous Nightly   ipratropium-albuterol 3 mL Nebulization 4x Daily - RT   metoprolol tartrate 100 mg Oral Q12H   montelukast 10 mg  Oral Nightly   sennosides-docusate sodium 2 tablet Oral Nightly   sodium chloride 3 mL Intravenous Q12H       hold 1 each     ----------------------------------------------------------------------------------------------------------------------      Objective     Vital Signs:  Temp:  [97.2 °F (36.2 °C)-98.4 °F (36.9 °C)] 98.2 °F (36.8 °C)  Heart Rate:  [63-74] 71  Resp:  [18] 18  BP: (112-145)/(42-57) 145/50      02/16/19  0259 02/17/19  0300 02/18/19  0324   Weight: 60.6 kg (133 lb 9.6 oz) 60.6 kg (133 lb 9.6 oz) 60.4 kg (133 lb 3.2 oz)     Body mass index is 26.01 kg/m².    Intake/Output Summary (Last 24 hours) at 2/18/2019 0919  Last data filed at 2/18/2019 0500  Gross per 24 hour   Intake --   Output 1850 ml   Net -1850 ml     No intake/output data recorded.  Diet Regular; Cardiac, Consistent Carbohydrate, Renal  ----------------------------------------------------------------------------------------------------------------------  Physical exam:  Constitutional:  Well-developed and well-nourished.  No respiratory distress.      HENT:  Head:  Normocephalic and atraumatic.  Mouth:  Moist mucous membranes.    Eyes:  Conjunctivae and EOM are normal.  Pupils are equal, round, and reactive to light.  No scleral icterus.    Neck:  Neck supple.  No JVD present.    Cardiovascular:  Regular rate, regular rhythm and normal heart sounds with no murmur.  Pulmonary/Chest:  Diminished breath sounds without significant wheezing, rhonchi, or crackles.    Abdominal:  Soft.  Bowel sounds are normal.  No distension and no tenderness.   Musculoskeletal:  No edema, no tenderness, and no deformity.  No red or swollen joints appreciated during examination.    Neurological:  Alert and oriented to person, place, and time.  No cranial nerve deficit.  No tongue deviation.  No facial droop.  No slurred speech.   Skin:  Skin is warm and dry. No rash noted. No pallor.    ----------------------------------------------------------------------------------------------------------------------  Tele:  SR in 60s-70s th----------------------------------------------------------------------------------------------------------------------      Results from last 7 days   Lab Units 02/18/19 0440 02/17/19  0451 02/16/19  0110 02/15/19  0826 02/15/19  0051 02/14/19 0423 02/13/19  0345   WBC 10*3/mm3  --   --   --   --  9.57  --   --    HEMOGLOBIN g/dL  --   --   --   --  10.2*  --   --    HEMATOCRIT %  --   --   --   --  31.4*  --   --    MCV fL  --   --   --   --  89.0  --   --    MCHC g/dL  --   --   --   --  32.5*  --   --    PLATELETS 10*3/mm3  --   --   --   --  368  --   --    INR  0.97 1.03 1.08 1.17* 1.45* 2.34* 2.84*     Results from last 7 days   Lab Units 02/13/19  1353   PH, ARTERIAL pH units 7.383   PO2 ART mm Hg 83.2   PCO2, ARTERIAL mm Hg 31.5*   HCO3 ART mmol/L 18.3*     Results from last 7 days   Lab Units 02/18/19 0440 02/17/19 1956 02/17/19 0451 02/16/19  0111   SODIUM mmol/L 136  --  133* 133*   POTASSIUM mmol/L 4.1 5.1 3.5 4.2   CHLORIDE mmol/L 107  --  103 102   CO2 mmol/L 24.7  --  23.7* 23.9*   BUN mg/dL 45*  --  41* 53*   CREATININE mg/dL 1.26  --  1.20 1.31*   EGFR IF NONAFRICN AM mL/min/1.73 42*  --  44* 40*   CALCIUM mg/dL 7.9  --  7.6* 7.7   GLUCOSE mg/dL 56*  --  238* 270*   Estimated Creatinine Clearance: 31.8 mL/min (by C-G formula based on SCr of 1.26 mg/dL).  No results found for: AMMONIA                  ----------------------------------------------------------------------------------------------------------------------  Imaging Results (last 24 hours)     Procedure Component Value Units Date/Time    XR Chest AP [[id number]] Collected:  02/18/19 0803     Updated:  02/18/19 0806    Narrative:       Technique: Frontal view the chest.     COMPARISON:  None     INDICATION:     resp failure; A41.9-Sepsis, unspecified organism;  R65.21-Severe sepsis with septic  shock; N17.9-Acute kidney failure,  unspecified      FINDINGS:    Bibasilar airspace disease noted. Cardiomegaly is noted.  Prominent interstitial markings are noted. Tiny left pleural effusion.       Impression:       As above.     This report was finalized on 2/18/2019 8:04 AM by Dr. Duke Brewer MD.               ----------------------------------------------------------------------------------------------------------------------      Assessment/Plan     Assessment and Plan:  · Acute on chronic respiratory failure 2/2 CHF and pneumonia:  Pneumonia s/p treatment.   Nephrology on board with assistance with diuretics.      · Acute on chronic diastolic CHF:  Continue IV bumex 2mg daily at present.  Daily weight stable at present decreased 7lbs since admission.  Negative fluid balance of 1600. Will attempt to remove hernandez catheter on this date.  Creatinine remains stable with diuresis.      · Bilateral pleural effusions L>R:  S/p US-guided thoracentesis by Pulm.  Fluid culture without growth.  Large bore chest tube placement previously recommended with patient refusing and wishing to try further diuresis. CXR with improvement upon review. CBC added to today's labs. Pulmonology following.     · Paroxysmal atrial fibrillation:  [id number] at least 2.   Coumadin stopped per Pulm after bloody appearing thoracentesis. She is on Coumadin rather than novel anticoagulant 2/2 costs.  Continue Metoprolol 100mg BID with hold parameters in place.      · RLL pneumonia, s/p treatment    · Diabetes mellitus type 2, NID: Continue FSBC AC&HS.  Levemir discontinued 2/2 hypoglycemia overnight.  Continue low dose SSI PRN. Metformin stopped on admission 2/2 lactic acid of 6.       -----------  -DVT prophylaxis: SQ Heparin  -Activity: Ad naren  -Disposition: remains hospitalized 2/2 IV diuresis with close monitoring. Hopefully home soon.   -Diet: Cardiac/consistent carb    The patient is high risk due to the following diagnoses/reasons:   Acute on chronic CHF, acute respiratory failure on admission    Dang Daley PA-C  02/18/19  9:19 AM  Pager # [phone]

## 2019-02-18 NOTE — PROGRESS NOTES
It was noted that the patient's glucose is elevated at 404. She is due to get levemir, but no SSI coverage. Review of previous glucose readings show that she had an episode of hypoglycemia of the early morning on 02/09/19. Her night time SSI was likely d/c'd at that time. Notes report that she is feeling better. She is likely eating better as well as her glucose readings as trending upward. Will add back the nighttime SSI. She is also due to get a dose of levemir. Repeat POC glucose in 1 hour.     NATE Smith  02/17/19  7:40 PM

## 2019-02-18 NOTE — THERAPY TREATMENT NOTE
Acute Care - Physical Therapy Treatment Note  [facility]     Patient Name: Ashley Geronimo  : 1945  MRN: [id number]  Today's Date: 2019  Onset of Illness/Injury or Date of Surgery: 19  Date of Referral to PT: 19  Referring Physician: Dr. Jefferson    Admit Date: 2019    Visit Dx:    ICD-10-CM ICD-9-CM   1. Septic shock (CMS/[facility]) A41.9 038.9    R65.21 785.52     995.92   2. Acute renal failure, unspecified acute renal failure type (CMS/[facility]) N17.9 584.9     Patient Active Problem List   Diagnosis   • Pneumonia   • Septic shock (CMS/[facility])   • Pseudomonas pneumonia (CMS/[facility])       Therapy Treatment    Rehabilitation Treatment Summary     Row Name 19             Treatment Time/Intention    Discipline  physical therapist  -AG      Document Type  therapy note (daily note)  -AG      Subjective Information  no complaints  -AG      Mode of Treatment  individual therapy;physical therapy  -AG      Patient/Family Observations  pt. supine, alert and cooperative; on 3L O2 nc.   -AG      Care Plan Review  care plan/treatment goals reviewed;risks/benefits reviewed  -AG      Therapy Frequency (PT Clinical Impression)  other (see comments) 3-5 times/ week per priority policy  -AG      Patient Effort  good  -AG      Existing Precautions/Restrictions  fall;oxygen therapy device and L/min  -AG      Recorded by [AG] Karyn Delgado, PT 19      Row Name 19             Cognitive Assessment/Intervention- PT/OT    Orientation Status (Cognition)  oriented x 3  -AG      Follows Commands (Cognition)  follows one step commands;repetition of directions required;verbal cues/prompting required  -AG      Recorded by [AG] Karyn Delgado, PT 19      Row Name 19 174             Safety Issues, Functional Mobility    Impairments Affecting Function (Mobility)  balance;endurance/activity tolerance  -AG      Recorded by [AG] Karyn Delgado, PT 19      Row Name 19  1747             Mobility Assessment/Intervention    Left Lower Extremity (Weight-bearing Status)  full weight-bearing (FWB)  -AG      Right Lower Extremity (Weight-bearing Status)  full weight-bearing (FWB)  -AG      Recorded by [AG] Karyn Delgado, PT 02/18/19 1753      Row Name 02/18/19 1747             Transfer Assessment/Treatment    Transfer Assessment/Treatment  sit-stand transfer;stand-sit transfer;stand pivot/stand step transfer;toilet transfer  -AG      Maintains Weight-bearing Status (Transfers)  able to maintain  -AG      Recorded by [AG] Karyn Delgado, PT 02/18/19 1753      Row Name 02/18/19 1747             Sit-Stand Transfer    Sit-Stand [place] (Transfers)  verbal cues;nonverbal cues (demo/gesture);contact guard  -AG      Assistive Device (Sit-Stand Transfers)  walker, front-wheeled  -AG      Recorded by [AG] Karyn Delgado, PT 02/18/19 1753      Row Name 02/18/19 1747             Stand-Sit Transfer    Stand-Sit [place] (Transfers)  verbal cues;nonverbal cues (demo/gesture);contact guard  -AG      Assistive Device (Stand-Sit Transfers)  walker, front-wheeled  -AG      Recorded by [AG] Karyn Delgado, PT 02/18/19 1753      Row Name 02/18/19 1747             Stand Pivot/Stand Step Transfer    Stand Pivot/Stand Step [place]  verbal cues;nonverbal cues (demo/gesture);contact guard  -AG      Assistive Device (Stand Pivot Stand Step Transfer)  walker, front-wheeled  -AG      Recorded by [AG] Karyn Delgado, PT 02/18/19 1753      Row Name 02/18/19 1747             Toilet Transfer    Type (Toilet Transfer)  stand pivot/stand step  -AG      [place] Level (Toilet Transfer)  verbal cues;nonverbal cues (demo/gesture);contact guard;1 person to manage equipment  -AG      Assistive Device (Toilet Transfer)  commode;grab bars/safety frame;raised toilet seat;walker, front-wheeled  -AG      Recorded by [AG] Karyn Delgado, PT 02/18/19 1753      Row Name 02/18/19 1747             Gait/Stairs  Assessment/Training    Gait/Stairs Assessment/Training  gait/ambulation independence;gait/ambulation assistive device;distance ambulated;gait pattern;gait deviations;maintains weight-bearing status  -AG      [place] Level (Gait)  verbal cues;nonverbal cues (demo/gesture);minimum assist (75% patient effort);1 person to manage equipment  -AG      Assistive Device (Gait)  walker, front-wheeled  -AG      Distance in Feet (Gait)  200  -AG      Pattern (Gait)  step-through  -AG      Deviations/Abnormal Patterns (Gait)  [place] decreased;gait speed decreased;stride length decreased  -AG      Bilateral Gait Deviations  forward flexed posture  -AG      Recorded by [AG] Karyn Delgado, PT 02/18/19 1753      Row Name 02/18/19 1747             Motor Skills Assessment/Interventions    Additional Documentation  Balance (Group);Therapeutic Exercise (Group);Therapeutic Exercise Interventions (Group)  -AG      Recorded by [AG] Karyn Delgado, PT 02/18/19 1753      Row Name 02/18/19 1747             Therapeutic Exercise    Therapeutic Exercise  seated, lower extremities  -AG      Additional Documentation  Therapeutic Exercise (Row)  -AG      Recorded by [AG] Karyn Delgado, PT 02/18/19 1753      Row Name 02/18/19 1747             Lower Extremity Seated Therapeutic Exercise    Performed, Seated Lower Extremity (Therapeutic Exercise)  hip flexion/extension;ankle dorsiflexion/plantarflexion;LAQ (long arc quad), knee extension  -AG      Exercise Type, Seated Lower Extremity (Therapeutic Exercise)  AROM (active range of motion)  -AG      Expected Outcomes, Seated Lower Extremity (Therapeutic Exercise)  improve functional tolerance, community activity;improve functional tolerance, household activity;improve functional tolerance, self-care activity;improve performance, gait skills;improve performance, transfer skills;strengthen normal movement patterns  -AG      Sets/Reps Detail, Seated Lower Extremity (Therapeutic Exercise)  1 x 20   -AG      Transfers Skills, Training to Functional Activity, Seated Lower Extremity (Therapeutic Exercise)  able to transfer skills from training to functional activity  -AG      Recorded by [AG] Karyn Delgado, PT 02/18/19 1753      Row Name 02/18/19 1747             Balance    Balance  static sitting balance;static standing balance;dynamic sitting balance;dynamic standing balance  -AG      Recorded by [AG] Karyn Delgado, PT 02/18/19 1753      Row Name 02/18/19 1747             Static Sitting Balance    Level of [place] (Unsupported Sitting, Static Balance)  standby assist  -AG      Sitting Position (Unsupported Sitting, Static Balance)  sitting in chair;sitting on edge of bed  -AG      Time Able to Maintain Position (Unsupported Sitting, Static Balance)  more than 5 minutes  -AG      Recorded by [AG] Karyn Delgado, PT 02/18/19 1753      Row Name 02/18/19 1747             Static Standing Balance    Level of [place] (Supported Standing, Static Balance)  contact guard assist;minimal assist, 75% patient effort  -AG      Assistive Device Utilized (Supported Standing, Static Balance)  walker, rolling  -AG      Recorded by [AG] Karyn Delgado, PT 02/18/19 1753      Row Name 02/18/19 1747             Positioning and Restraints    Pre-Treatment Position  in bed  -AG      Post Treatment Position  chair  -AG      In Chair  notified nsg;sitting;call light within reach;encouraged to call for assist  -AG      Recorded by [AG] Karyn Delgado, PT 02/18/19 1753      Row Name 02/18/19 1747             Pain Assessment    Additional Documentation  Pain Scale: Numbers Pre/Post-Treatment (Group)  -AG      Recorded by [AG] Karyn Delgado, PT 02/18/19 1753      Row Name 02/18/19 1747             Pain Scale: Numbers Pre/Post-Treatment    Pain Scale: Numbers, Pretreatment  0/10 - no pain  -AG      Pain Scale: Numbers, Post-Treatment  0/10 - no pain  -AG      Recorded by [AG] Karyn Delgado, PT 02/18/19 1753      Row Name  02/18/19 1747             Sensory Assessment/Intervention    Sensory General Assessment  no sensation deficits identified  -AG      Recorded by [AG] Danny Karyn Ismael, PT 02/18/19 1753      Row Name 02/18/19 1747             Coping    Observed Emotional State  accepting;cooperative  -AG      Verbalized Emotional State  acceptance  -AG      Recorded by [AG] Karyn Delgado, PT 02/18/19 1753      Row Name 02/18/19 1747             Plan of Care Review    Plan of Care Reviewed With  patient  -AG      Recorded by [AG] Karyn Delgado, PT 02/18/19 1753      Row Name 02/18/19 1747             Outcome Summary/Treatment Plan (PT)    Daily Summary of Progress (PT)  progress toward functional goals as expected  -AG      Recorded by [AG] Karyn Delgado, PT 02/18/19 1753        User Key  (r) = Recorded By, (t) = Taken By, (c) = Cosigned By    Initials Name Effective Dates Discipline    AG Karyn Delgado, PT 04/03/18 -  PT                   Physical Therapy Education     Title: PT OT SLP Therapies (Done)     Topic: Physical Therapy (Done)     Point: Mobility training (Done)     Learning Progress Summary           Patient Acceptance, E,D, NR,VU by AG at 2/18/2019  5:53 PM    Acceptance, E, VU by CRISTY at 2/17/2019 12:46 AM    Acceptance, E, NR by EA at 2/14/2019  9:27 PM    Acceptance, E,TB, VU by RT at 2/14/2019  9:53 AM    Acceptance, E, VU by KENROY at 2/11/2019  9:38 AM    Acceptance, E, VU by GHAZAL at 2/10/2019  8:15 AM    Acceptance, E,TB, VU by COLTON at 2/9/2019  9:21 PM    Acceptance, E, VU by BC at 2/8/2019 12:39 PM    Acceptance, E, VU by BC at 2/8/2019 12:39 PM    Acceptance, E, VU by RANDALL at 2/8/2019  8:04 AM    Acceptance, E,TB, VU by COLTON at 2/7/2019  9:34 PM    Acceptance, E, VU by BC at 2/7/2019  3:42 PM                   Point: Home exercise program (Done)     Learning Progress Summary           Patient Acceptance, E,D, NR,VU by FRANCK at 2/18/2019  5:53 PM    Acceptance, E, VU by MC at 2/17/2019 12:46 AM    Acceptance, E, NR by GRICELDA at  2/14/2019  9:27 PM    Acceptance, E,TB, VU by RT at 2/14/2019  9:53 AM    Acceptance, E, VU by EH at 2/11/2019  9:38 AM    Acceptance, E, VU by GHAZAL at 2/10/2019  8:15 AM    Acceptance, E,TB, VU by KM at 2/9/2019  9:21 PM    Acceptance, E, VU by BC at 2/8/2019 12:39 PM    Acceptance, E, VU by BC at 2/8/2019 12:39 PM    Acceptance, E, VU by RANDALL at 2/8/2019  8:04 AM    Acceptance, E,TB, VU by KM at 2/7/2019  9:34 PM    Acceptance, E, VU by BC at 2/7/2019  3:42 PM                   Point: Body mechanics (Done)     Learning Progress Summary           Patient Acceptance, E,D, NR,VU by AG at 2/18/2019  5:53 PM    Acceptance, E, VU by MC at 2/17/2019 12:46 AM    Acceptance, E, NR by EA at 2/14/2019  9:27 PM    Acceptance, E,TB, VU by RT at 2/14/2019  9:53 AM    Acceptance, E, VU by EH at 2/11/2019  9:38 AM    Acceptance, E, VU by GHAZAL at 2/10/2019  8:15 AM    Acceptance, E,TB, VU by COLTON at 2/9/2019  9:21 PM    Acceptance, E, VU by BC at 2/8/2019 12:39 PM    Acceptance, E, VU by BC at 2/8/2019 12:39 PM    Acceptance, E, VU by RANDALL at 2/8/2019  8:04 AM    Acceptance, E,TB, VU by KM at 2/7/2019  9:34 PM    Acceptance, E, VU by BC at 2/7/2019  3:42 PM                   Point: Precautions (Done)     Learning Progress Summary           Patient Acceptance, E,D, NR,VU by AG at 2/18/2019  5:53 PM    Acceptance, E, VU by MC at 2/17/2019 12:46 AM    Acceptance, E, NR by EA at 2/14/2019  9:27 PM    Acceptance, E,TB, VU by RT at 2/14/2019  9:53 AM    Acceptance, E, VU by EH at 2/11/2019  9:38 AM    Acceptance, E, VU by BB at 2/10/2019  8:15 AM    Acceptance, E,TB, VU by COLTON at 2/9/2019  9:21 PM    Acceptance, E, VU by BC at 2/8/2019 12:39 PM    Acceptance, E, VU by BC at 2/8/2019 12:39 PM    Acceptance, E, VU by RANDALL at 2/8/2019  8:04 AM    Acceptance, E,TB, VU by COLTON at 2/7/2019  9:34 PM    Acceptance, E, VU by BC at 2/7/2019  3:42 PM                               User Key     Initials Effective Dates Name Provider Type Discipline    COLTON 06/16/16  -  Bandar Beth, RN Registered Nurse Nurse    EA 06/16/16 -  Mitra De Jesus, RN Registered Nurse Nurse    RT 02/05/19 -  Saeed Davila, RN Registered Nurse Nurse    BC 03/14/16 -  Laura Cali, PT Physical Therapist PT    AG 04/03/18 -  Karyn Delgado, PT Physical Therapist PT     07/19/18 -  Conrado Ramon, RN Registered Nurse Nurse    BB 01/21/17 -  Magdaleno Rodgers RN Registered Nurse Nurse     12/28/17 -  Geetha Bernardo RN Registered Nurse Nurse     09/06/18 -  Collett, Morgan, RN Registered Nurse Nurse                PT Recommendation and Plan  Therapy Frequency (PT Clinical Impression): other (see comments)(3-5 times/ week per priority policy)  Outcome Summary/Treatment Plan (PT)  Daily Summary of Progress (PT): progress toward functional goals as expected  Plan of Care Reviewed With: patient     Time Calculation:   PT Charges     Row Name 02/18/19 2103             Time Calculation    PT Received On  02/18/19  -      PT - Next Appointment  02/19/19  -      PT Goal Re-Cert Due Date  02/21/19  -         Time Calculation- PT    TCU Minutes- PT  38 min  -        User Key  (r) = Recorded By, (t) = Taken By, (c) = Cosigned By    Initials Name Provider Type    AG Karyn Delgado, PT Physical Therapist        Therapy Suggested Charges     Code   Minutes Charges    08075 (CPT®) Hc Pt Neuromusc Re Education Ea 15 Min      32907 (CPT®) Hc Pt Ther Proc Ea 15 Min      65154 (CPT®) Hc Gait Training Ea 15 Min 15 1    55993 (CPT®) Hc Pt Therapeutic Act Ea 15 Min 14 1    32775 (CPT®) Hc Pt Manual Therapy Ea 15 Min      23197 (CPT®) Hc Pt Iontophoresis Ea 15 Min      86230 (CPT®) Hc Pt Elec Stim Ea-Per 15 Min      12435 (CPT®) Hc Pt Ultrasound Ea 15 Min      43215 (CPT®) Hc Pt Self Care/Mgmt/Train Ea 15 Min      32650 (CPT®) Hc Pt Prosthetic (S) Train Initial Encounter, Each 15 Min      03451 (CPT®) Hc Pt Orthotic(S)/Prosthetic(S) Encounter, Each 15 Min      02685 (CPT®) Hc  Orthotic(S) Mgmt/Train Initial Encounter, Each 15min      Total  29 2        Therapy Charges for Today     Code Description Service Date Service Provider Modifiers Qty    25721325534 HC PT THER SUPP EA 15 MIN 2/18/2019 Karyn Delgado, PT GP 2    39980521730 HC PT THERAPEUTIC ACT EA 15 MIN 2/18/2019 Karyn Delgado, PT GP 1    94654488938 HC GAIT TRAINING EA 15 MIN 2/18/2019 Karyn Delgado, PT GP 1    84055986317 HC PT THER PROC EA 15 MIN 2/18/2019 Karyn Delgado, PT GP 1          PT G-Codes  Outcome Measure Options: AM-PAC 6 Clicks Basic Mobility (PT)  AM-PAC 6 Clicks Score: 20    Karyn Delgado PT  2/18/2019

## 2019-02-18 NOTE — PROGRESS NOTES
Nephrology  Note    Subjective       She wants to go home. C/o cough but no SOB. No nausea, vomiting or diarrhea    Objective     Vital Signs  Temp:  [97.2 °F (36.2 °C)-98.4 °F (36.9 °C)] 98.2 °F (36.8 °C)  Heart Rate:  [63-74] 71  Resp:  [18] 18  BP: (112-145)/(42-57) 145/50    No intake/output data recorded.  I/O last 3 completed shifts:  In: 280 [P.O.:280]  Out: 2750 [Urine:2750]    Physical Examination:    General Appearance : alert, appears stated age, cooperative and no distress  Head : normocephalic  Eyes : no pallor   Throat : oral mucosa moist  Neck:no JVD  Lungs : reduced breath sounds at bases  Heart : regular rhythm & normal rate, normal S1, S2, no murmur  Abdomen :   soft non-tender  Extremities :  trace edema  Pulses :  palpable and equal bilaterally  Neurologic : orientated to person, place, time, grossly no focal deficitis    Laboratory Data :      WBC No results found for: WBC   HGB No results found for: HGB   HCT No results found for: HCT   Platlets No results found for: LABPLAT   MCV No results found for: MCV       Sodium Sodium   Date Value Ref Range Status   02/18/2019 136 135 - 153 mmol/L Final   02/17/2019 133 (L) 135 - 153 mmol/L Final   02/16/2019 133 (L) 135 - 153 mmol/L Final      Potassium Potassium   Date Value Ref Range Status   02/18/2019 4.1 3.5 - 5.3 mmol/L Final   02/17/2019 5.1 3.5 - 5.3 mmol/L Final   02/17/2019 3.5 3.5 - 5.3 mmol/L Final   02/16/2019 4.2 3.5 - 5.3 mmol/L Final      Chloride Chloride   Date Value Ref Range Status   02/18/2019 107 99 - 112 mmol/L Final   02/17/2019 103 99 - 112 mmol/L Final   02/16/2019 102 99 - 112 mmol/L Final      CO2 CO2   Date Value Ref Range Status   02/18/2019 24.7 24.3 - 31.9 mmol/L Final   02/17/2019 23.7 (L) 24.3 - 31.9 mmol/L Final   02/16/2019 23.9 (L) 24.3 - 31.9 mmol/L Final      BUN BUN   Date Value Ref Range Status   02/18/2019 45 (H) 7 - 21 mg/dL Final   02/17/2019 41 (H) 7 - 21 mg/dL Final   02/16/2019 53 (H) 7 - 21 mg/dL Final       Creatinine Creatinine   Date Value Ref Range Status   02/18/2019 1.26 0.43 - 1.29 mg/dL Final   02/17/2019 1.20 0.43 - 1.29 mg/dL Final   02/16/2019 1.31 (H) 0.43 - 1.29 mg/dL Final      Calcium Calcium   Date Value Ref Range Status   02/18/2019 7.9 7.7 - 10.0 mg/dL Final   02/17/2019 7.6 (L) 7.7 - 10.0 mg/dL Final   02/16/2019 7.7 7.7 - 10.0 mg/dL Final      PO4 No results found for: CAPO4   Albumin No results found for: ALBUMIN   Magnesium No results found for: MG   Uric Acid No results found for: URICACID     Radiology results :     Imaging Results (last 24 hours)     Procedure Component Value Units Date/Time    XR Chest AP [[id number]] Collected:  02/18/19 0803     Updated:  02/18/19 0806    Narrative:       Technique: Frontal view the chest.     COMPARISON:  None     INDICATION:     resp failure; A41.9-Sepsis, unspecified organism;  R65.21-Severe sepsis with septic shock; N17.9-Acute kidney failure,  unspecified      FINDINGS:    Bibasilar airspace disease noted. Cardiomegaly is noted.  Prominent interstitial markings are noted. Tiny left pleural effusion.       Impression:       As above.     This report was finalized on 2/18/2019 8:04 AM by Dr. Duke Brewer MD.                 Medications:        aspirin 81 mg Oral Daily   budesonide-formoterol 2 puff Inhalation BID - RT   bumetanide 2 mg Intravenous Q12H   guaiFENesin 1,200 mg Oral Q12H   heparin (porcine) 5,000 Units Subcutaneous BID   hydrALAZINE 75 mg Oral TID   insulin aspart 0-7 Units Subcutaneous 4x Daily PC & at Bedtime   insulin detemir 10 Units Subcutaneous Nightly   ipratropium-albuterol 3 mL Nebulization 4x Daily - RT   metoprolol tartrate 100 mg Oral Q12H   montelukast 10 mg Oral Nightly   sennosides-docusate sodium 2 tablet Oral Nightly   sodium chloride 3 mL Intravenous Q12H       hold 1 each       Assessment/Plan       Septic shock (CMS/HCC)      1. CKD stage 3 with baseline creatinine 1.1-1.3 since 2015 . Creatinine is stable at 1.2 today  with diuresis. Monitor closely    2. Proteinuria : urine protein/creatinine is 1.6 gm/gm.serologies pending, complements normal. She has no hematuria    3. HTN : stable    4. Bilateral pleural effusions : conitnue bumex  2 mg iv bid   CXR reviewed personally and shows interstitial infiltrates and left pleural effusion      I discussed the patient's findings and my recommendations with patient    Andrés Shaikh MD  02/18/19  9:17 AM

## 2019-02-18 NOTE — PROGRESS NOTES
LOS: 12 days     Chief Complaint:  Pulmonology is following for shortness of breath.     Subjective     Interval History:     Ms. Graf was seated in bed, awake and alert. She reports that she noticed much improvement today, but has occasional congestion that requires her to clear her throat/cough. She has not been coughing up a significant amount of sputum. Ultrasound guided thoracentesis was completed on Friday 02/15/19, with 60 cc of serosanginous fluid removed. She asks if she is being discharged home soon.  . Heart rate at 67. Hemodynamically stable. No fever reported overnight. Input/Output was net -1.69 L over the past 24 hours, with 1.85 L output.  She is currently on 3 L/m of supplemental oxygen via nasal cannula saturating 96%      History taken from: patient chart RN    Review of Systems:     Review of Systems - History obtained from chart review and the patient  General ROS: negative for - chills, fatigue or fever  Psychological ROS: negative for - anxiety or depression  ENT ROS: negative for - headaches or vocal changes  Allergy and Immunology ROS: negative for - nasal congestion or postnasal drip  Endocrine ROS: negative for - polydipsia/polyuria  Respiratory ROS: positive for -shortness of breath and cough  negative for - sputum changes or wheezing  Cardiovascular ROS: negative for - chest pain or edema  Gastrointestinal ROS: negative for - abdominal pain or change in bowel habits  Musculoskeletal ROS: negative for - joint pain or joint swelling  Neurological ROS: no TIA or stroke symptoms                  Objective     Vital Signs  Temp:  [97.2 °F (36.2 °C)-98.4 °F (36.9 °C)] 98 °F (36.7 °C)  Heart Rate:  [61-74] 61  Resp:  [18] 18  BP: (112-145)/(42-57) 119/48  Body mass index is 26.01 kg/m².    Intake/Output Summary (Last 24 hours) at 2/18/2019 1341  Last data filed at 2/18/2019 0500  Gross per 24 hour   Intake --   Output 1850 ml   Net -1850 ml     No intake/output data recorded.    Physical  Exam:  GENERAL APPEARANCE: Well developed, well nourished, alert and cooperative, no acute distress. Tolerating nasal cannula on 3 L.     HEAD: normocephalic.  Atraumatic    EYES: PERRL. EOMI. Vision grossly intact.     NECK: Neck supple. No thyromegaly.     CARDIAC: Normal S1 and S2. No S3, S4 or murmurs. Rhythm is regular. There is no peripheral edema, cyanosis or pallor. Extremities are warm and well perfused. Capillary refill is less than 2 seconds.     RESPIRATORY: Bilateral air entry positive. Diminished breath sounds at the bases bilaterally. No wheezing, rhonchi, or crackles. Normal rate and effort.     GI: Positive bowel sounds. Soft, nondistended, nontender.     MUSCULOSKELTAL: No significant deformity or joint abnormality. No edema. Peripheral pulses intact.     NEUROLOGICAL: Strength and sensation symmetric and intact throughout.     PSYCHIATRIC: The mental examination revealed the patient was oriented to person, place, and time.                   Results Review:                I reviewed the patient's new clinical results.  I reviewed the patient's new imaging results and agree with the interpretation.  Results from last 7 days   Lab Units 02/18/19  1146 02/15/19  0051   WBC 10*3/mm3 10.00 9.57   HEMOGLOBIN g/dL 10.2* 10.2*   PLATELETS 10*3/mm3 337 368     Results from last 7 days   Lab Units 02/18/19  0440 02/17/19  1956 02/17/19  0451 02/16/19  0111   SODIUM mmol/L 136  --  133* 133*   POTASSIUM mmol/L 4.1 5.1 3.5 4.2   CHLORIDE mmol/L 107  --  103 102   CO2 mmol/L 24.7  --  23.7* 23.9*   BUN mg/dL 45*  --  41* 53*   CREATININE mg/dL 1.26  --  1.20 1.31*   CALCIUM mg/dL 7.9  --  7.6* 7.7   GLUCOSE mg/dL 56*  --  238* 270*     Lab Results   Component Value Date    INR 0.97 02/18/2019    INR 1.03 02/17/2019    INR 1.08 02/16/2019    PROTIME 13.1 02/18/2019    PROTIME 13.7 02/17/2019    PROTIME 14.2 02/16/2019           Invalid input(s): PROT, LABALBU  Results from last 7 days   Lab Units 02/13/19  1264    PH, ARTERIAL pH units 7.383   PO2 ART mm Hg 83.2   PCO2, ARTERIAL mm Hg 31.5*   HCO3 ART mmol/L 18.3*     Imaging Results (last 24 hours)     Procedure Component Value Units Date/Time    XR Chest AP [[id number]] Collected:  02/18/19 0803     Updated:  02/18/19 0806    Narrative:       Technique: Frontal view the chest.     COMPARISON:  None     INDICATION:     resp failure; A41.9-Sepsis, unspecified organism;  R65.21-Severe sepsis with septic shock; N17.9-Acute kidney failure,  unspecified      FINDINGS:    Bibasilar airspace disease noted. Cardiomegaly is noted.  Prominent interstitial markings are noted. Tiny left pleural effusion.       Impression:       As above.     This report was finalized on 2/18/2019 8:04 AM by Dr. Duke Brewer MD.                Medication Review:   Scheduled Medications:    aspirin 81 mg Oral Daily   budesonide-formoterol 2 puff Inhalation BID - RT   bumetanide 2 mg Intravenous Q12H   guaiFENesin 1,200 mg Oral Q12H   heparin (porcine) 5,000 Units Subcutaneous BID   hydrALAZINE 75 mg Oral TID   insulin aspart 0-7 Units Subcutaneous 4x Daily PC & at Bedtime   ipratropium-albuterol 3 mL Nebulization 4x Daily - RT   metoprolol tartrate 100 mg Oral Q12H   montelukast 10 mg Oral Nightly   sennosides-docusate sodium 2 tablet Oral Nightly   sodium chloride 3 mL Intravenous Q12H     Continuous infusions:    hold 1 each       Assessment/Plan     Patient Active Problem List   Diagnosis Code   • Pneumonia J18.9   • Septic shock (CMS/[facility]) A41.9, R65.21   • Pseudomonas pneumonia (CMS/[facility]) J15.1           Kaley Cutler PA-C  02/18/19  1:41 PM      Scribed for Dr. Noonan by Kaley Cutler PA-C    Lab Results   Component Value Date    PHART 7.383 02/13/2019    JVT0SNU 31.5 (L) 02/13/2019    PO2ART 83.2 02/13/2019    UKA4DFC 18.3 (C) 02/13/2019    BASEEXCESS -5.8 02/13/2019    O9JDSPBG 94.5 02/13/2019     XR Chest AP  Narrative: Technique: Frontal view the chest.     COMPARISON:  None     INDICATION:      resp failure; A41.9-Sepsis, unspecified organism;  R65.21-Severe sepsis with septic shock; N17.9-Acute kidney failure,  unspecified      FINDINGS:    Bibasilar airspace disease noted. Cardiomegaly is noted.  Prominent interstitial markings are noted. Tiny left pleural effusion.     Impression: As above.     This report was finalized on 2/18/2019 8:04 AM by Dr. Duke Brewer MD.       Lab Results   Component Value Date    WBC 10.00 02/18/2019    HGB 10.2 (L) 02/18/2019    HCT 32.4 (L) 02/18/2019    MCV 89.8 02/18/2019     02/18/2019     Lab Results   Component Value Date    GLUCOSE 56 (L) 02/18/2019    CALCIUM 7.9 02/18/2019     02/18/2019    K 4.1 02/18/2019    CO2 24.7 02/18/2019     02/18/2019    BUN 45 (H) 02/18/2019    CREATININE 1.26 02/18/2019    EGFRIFNONA 42 (L) 02/18/2019    BCR 35.7 (H) 02/18/2019    ANIONGAP 4.3 02/18/2019         I have reviewed the labs.    I have reviewed the chest x-ray image.      Pleural fluid was neutrophil in cytology.  High level of glucose.  Pleural fluid is exudative based on LDH criteria.  Pleural fluid cultures negative till date.  Cytology is negative for malignant cells.  Flow cytometry is pending.    Assessment:  Shortness of breath: improving   Acute on chronic hypoxic respiratory failure: improving   Chronic hypoxic respiratory failure on home oxygen  Right middle lobe pneumonia , unspecified organism   COPD, centrilobular emphysema type  Bilateral pleural effusion  Physical deconditioning  Bilateral basal atelectasis        Plan:  - Bedside ultrasound done on 2/15/2019 showed complex fluid with septation.  Pleural fluid was neutrophilic in cytology.  High level of glucose present.  Pleural fluid is exudative based on LDH criteria.  Cytology is negative for malignant cells.  Flow cytometry is pending.  Pleural fluid cultures are negative to date.  She is currently hemodynamically stable.  No fevers.  ANC is trending down.  I'm still concerned about  the complex fluid with septation.  The fluid drawn via ultrasound-guided thoracentesis was serosanguineous.  She may developed empyema or extra parenchymal restrictive pulmonary disease in future.  Currently she wishes to try diuresis and refusing chest tube placement.  - Nephrology on board.  Diuretics as per nephrology recommendations  - Continue with duo nebs  - Continue with Symbicort nebs  - continue Mucomyst nebs  - Aggressive PTOT while in hospital  - Incentive spirometer to her basal atelectasis  - Continue heparin subcutaneous for DVT prophylaxis     Nurses and respiratory therapist were updated about the plan.   I, Quang Noonan M.D. attest that the above note accurately reflects the work and decisions made by me.  Patient was seen and evaluated by me, including history of present illness, physical exam, assessment, and treatment plan.  The above note was reviewed and edited by me.    Thank you for involving us in the care of the patient.    Quang Noonan M.D  Pulmonary and Critical Care Medicine

## 2019-02-18 NOTE — PLAN OF CARE
Problem: Sepsis/Septic Shock (Adult)  Goal: Signs and Symptoms of Listed Potential Problems Will be Absent, Minimized or Managed (Sepsis/Septic Shock)  Outcome: Ongoing (interventions implemented as appropriate)      Problem: Fall Risk (Adult)  Goal: Absence of Fall  Outcome: Ongoing (interventions implemented as appropriate)      Problem: Skin Injury Risk (Adult)  Goal: Skin Health and Integrity  Outcome: Ongoing (interventions implemented as appropriate)      Problem: Patient Care Overview  Goal: Plan of Care Review  Outcome: Ongoing (interventions implemented as appropriate)

## 2019-02-18 NOTE — PROGRESS NOTES
Discharge Planning Assessment   [facility]     Patient Name: Ashley Geronimo  MRN: [id number]  Today's Date: 2/18/2019    Admit Date: 2/5/2019      Discharge Plan     Row Name 02/18/19 1602       Plan    Plan  Pt admitted on 2/5/19.  Pt lives at home and plans to return home at discharge.  [facility] utilizes [facility].  [facility] will need new referral with [facility] at discharge.  [facility].  SS will follow.                 Milka Reyes

## 2019-02-19 ENCOUNTER — APPOINTMENT (OUTPATIENT)
Dept: ULTRASOUND IMAGING | Facility: HOSPITAL | Age: 74
End: 2019-02-19

## 2019-02-19 LAB
ALBUMIN SERPL-MCNC: 2.7 G/DL (ref 2.9–4.4)
ALBUMIN SERPL-MCNC: 2.9 G/DL (ref 3.4–4.8)
ALBUMIN/GLOB SERPL: 1.1 {RATIO} (ref 0.7–1.7)
ALBUMIN/GLOB SERPL: 1.3 G/DL (ref 1.5–2.5)
ALP SERPL-CCNC: 73 U/L (ref 35–104)
ALPHA1 GLOB FLD ELPH-MCNC: 0.3 G/DL (ref 0–0.4)
ALPHA2 GLOB SERPL ELPH-MCNC: 0.8 G/DL (ref 0.4–1)
ALT SERPL W P-5'-P-CCNC: 9 U/L (ref 10–36)
ANION GAP SERPL CALCULATED.3IONS-SCNC: 7.1 MMOL/L (ref 3.6–11.2)
AST SERPL-CCNC: 13 U/L (ref 10–30)
B-GLOBULIN SERPL ELPH-MCNC: 0.8 G/DL (ref 0.7–1.3)
BASOPHILS # BLD AUTO: 0.02 10*3/MM3 (ref 0–0.3)
BASOPHILS NFR BLD AUTO: 0.3 % (ref 0–2)
BILIRUB SERPL-MCNC: 0.2 MG/DL (ref 0.2–1.8)
BUN BLD-MCNC: 50 MG/DL (ref 7–21)
BUN/CREAT SERPL: 34.5 (ref 7–25)
CALCIUM SPEC-SCNC: 7.7 MG/DL (ref 7.7–10)
CHLORIDE SERPL-SCNC: 99 MMOL/L (ref 99–112)
CHOLEST FLD-MCNC: 47 MG/DL
CO2 SERPL-SCNC: 24.9 MMOL/L (ref 24.3–31.9)
CREAT BLD-MCNC: 1.45 MG/DL (ref 0.43–1.29)
DEPRECATED RDW RBC AUTO: 45.7 FL (ref 37–54)
EOSINOPHIL # BLD AUTO: 0.12 10*3/MM3 (ref 0–0.7)
EOSINOPHIL NFR BLD AUTO: 1.6 % (ref 0–7)
ERYTHROCYTE [DISTWIDTH] IN BLOOD BY AUTOMATED COUNT: 14.2 % (ref 11.5–14.5)
GAMMA GLOB SERPL ELPH-MCNC: 0.5 G/DL (ref 0.4–1.8)
GBM IGG SER-ACNC: 3 UNITS (ref 0–20)
GFR SERPL CREATININE-BSD FRML MDRD: 35 ML/MIN/1.73
GLOBULIN SER CALC-MCNC: 2.5 G/DL (ref 2.2–3.9)
GLOBULIN UR ELPH-MCNC: 2.2 GM/DL
GLUCOSE BLD-MCNC: 436 MG/DL (ref 70–110)
GLUCOSE BLDC GLUCOMTR-MCNC: 204 MG/DL (ref 70–130)
GLUCOSE BLDC GLUCOMTR-MCNC: 247 MG/DL (ref 70–130)
GLUCOSE BLDC GLUCOMTR-MCNC: 265 MG/DL (ref 70–130)
GLUCOSE BLDC GLUCOMTR-MCNC: 341 MG/DL (ref 70–130)
GLUCOSE BLDC GLUCOMTR-MCNC: 437 MG/DL (ref 70–130)
HCT VFR BLD AUTO: 30.3 % (ref 37–47)
HGB BLD-MCNC: 9.6 G/DL (ref 12–16)
IGA SERPL-MCNC: 263 MG/DL (ref 64–422)
IGG SERPL-MCNC: 589 MG/DL (ref 700–1600)
IGM SERPL-MCNC: 28 MG/DL (ref 26–217)
IMM GRANULOCYTES # BLD AUTO: 0.11 10*3/MM3 (ref 0–0.03)
IMM GRANULOCYTES NFR BLD AUTO: 1.5 % (ref 0–0.5)
INR PPP: 0.95 (ref 0.9–1.1)
INTERPRETATION SERPL IEP-IMP: ABNORMAL
KAPPA LC SERPL-MCNC: 44.8 MG/L (ref 3.3–19.4)
KAPPA LC/LAMBDA SER: 2.02 {RATIO} (ref 0.26–1.65)
LAMBDA LC FREE SERPL-MCNC: 22.2 MG/L (ref 5.7–26.3)
LYMPHOCYTES # BLD AUTO: 0.92 10*3/MM3 (ref 1–3)
LYMPHOCYTES NFR BLD AUTO: 12.6 % (ref 16–46)
Lab: ABNORMAL
M-SPIKE: ABNORMAL G/DL
MCH RBC QN AUTO: 28.9 PG (ref 27–33)
MCHC RBC AUTO-ENTMCNC: 31.7 G/DL (ref 33–37)
MCV RBC AUTO: 91.3 FL (ref 80–94)
MONOCYTES # BLD AUTO: 0.75 10*3/MM3 (ref 0.1–0.9)
MONOCYTES NFR BLD AUTO: 10.3 % (ref 0–12)
NEUTROPHILS # BLD AUTO: 5.37 10*3/MM3 (ref 1.4–6.5)
NEUTROPHILS NFR BLD AUTO: 73.7 % (ref 40–75)
OSMOLALITY SERPL CALC.SUM OF ELEC: 294.7 MOSM/KG (ref 273–305)
PLATELET # BLD AUTO: 313 10*3/MM3 (ref 130–400)
PMV BLD AUTO: 11.9 FL (ref 6–10)
POTASSIUM BLD-SCNC: 4.5 MMOL/L (ref 3.5–5.3)
PROT SERPL-MCNC: 5.1 G/DL (ref 6–8)
PROT SERPL-MCNC: 5.2 G/DL (ref 6–8.5)
PROTHROMBIN TIME: 12.9 SECONDS (ref 11–15.4)
RBC # BLD AUTO: 3.32 10*6/MM3 (ref 4.2–5.4)
SODIUM BLD-SCNC: 131 MMOL/L (ref 135–153)
WBC NRBC COR # BLD: 7.29 10*3/MM3 (ref 4.5–12.5)

## 2019-02-19 PROCEDURE — 80053 COMPREHEN METABOLIC PANEL: CPT | Performed by: PHYSICIAN ASSISTANT

## 2019-02-19 PROCEDURE — 63710000001 INSULIN ASPART PER 5 UNITS: Performed by: PHYSICIAN ASSISTANT

## 2019-02-19 PROCEDURE — 94799 UNLISTED PULMONARY SVC/PX: CPT

## 2019-02-19 PROCEDURE — 85610 PROTHROMBIN TIME: CPT | Performed by: HOSPITALIST

## 2019-02-19 PROCEDURE — 99233 SBSQ HOSP IP/OBS HIGH 50: CPT | Performed by: PHYSICIAN ASSISTANT

## 2019-02-19 PROCEDURE — 99232 SBSQ HOSP IP/OBS MODERATE 35: CPT | Performed by: INTERNAL MEDICINE

## 2019-02-19 PROCEDURE — 94640 AIRWAY INHALATION TREATMENT: CPT

## 2019-02-19 PROCEDURE — 99221 1ST HOSP IP/OBS SF/LOW 40: CPT | Performed by: SURGERY

## 2019-02-19 PROCEDURE — 25010000002 HEPARIN (PORCINE) PER 1000 UNITS: Performed by: INTERNAL MEDICINE

## 2019-02-19 PROCEDURE — 85025 COMPLETE CBC W/AUTO DIFF WBC: CPT | Performed by: PHYSICIAN ASSISTANT

## 2019-02-19 PROCEDURE — 82962 GLUCOSE BLOOD TEST: CPT

## 2019-02-19 RX ORDER — BUMETANIDE 1 MG/1
1 TABLET ORAL 2 TIMES DAILY
Status: DISCONTINUED | OUTPATIENT
Start: 2019-02-19 | End: 2019-02-20 | Stop reason: HOSPADM

## 2019-02-19 RX ADMIN — HEPARIN SODIUM 5000 UNITS: 5000 INJECTION INTRAVENOUS; SUBCUTANEOUS at 13:57

## 2019-02-19 RX ADMIN — IPRATROPIUM BROMIDE AND ALBUTEROL SULFATE 3 ML: .5; 3 SOLUTION RESPIRATORY (INHALATION) at 18:49

## 2019-02-19 RX ADMIN — SODIUM CHLORIDE, PRESERVATIVE FREE 3 ML: 5 INJECTION INTRAVENOUS at 08:46

## 2019-02-19 RX ADMIN — BUDESONIDE AND FORMOTEROL FUMARATE DIHYDRATE 2 PUFF: 160; 4.5 AEROSOL RESPIRATORY (INHALATION) at 07:08

## 2019-02-19 RX ADMIN — IPRATROPIUM BROMIDE AND ALBUTEROL SULFATE 3 ML: .5; 3 SOLUTION RESPIRATORY (INHALATION) at 01:00

## 2019-02-19 RX ADMIN — ACETAMINOPHEN 650 MG: 325 TABLET, FILM COATED ORAL at 12:39

## 2019-02-19 RX ADMIN — GUAIFENESIN 1200 MG: 600 TABLET, EXTENDED RELEASE ORAL at 08:42

## 2019-02-19 RX ADMIN — INSULIN ASPART 4 UNITS: 100 INJECTION, SOLUTION INTRAVENOUS; SUBCUTANEOUS at 12:34

## 2019-02-19 RX ADMIN — MONTELUKAST SODIUM 10 MG: 10 TABLET, COATED ORAL at 21:09

## 2019-02-19 RX ADMIN — IPRATROPIUM BROMIDE AND ALBUTEROL SULFATE 3 ML: .5; 3 SOLUTION RESPIRATORY (INHALATION) at 07:09

## 2019-02-19 RX ADMIN — BUMETANIDE 1 MG: 1 TABLET ORAL at 10:30

## 2019-02-19 RX ADMIN — METOPROLOL TARTRATE 100 MG: 100 TABLET, FILM COATED ORAL at 21:09

## 2019-02-19 RX ADMIN — SODIUM CHLORIDE, PRESERVATIVE FREE 3 ML: 5 INJECTION INTRAVENOUS at 21:16

## 2019-02-19 RX ADMIN — INSULIN ASPART 6 UNITS: 100 INJECTION, SOLUTION INTRAVENOUS; SUBCUTANEOUS at 21:07

## 2019-02-19 RX ADMIN — ASPIRIN 81 MG: 81 TABLET ORAL at 08:42

## 2019-02-19 RX ADMIN — METOPROLOL TARTRATE 100 MG: 100 TABLET, FILM COATED ORAL at 08:42

## 2019-02-19 RX ADMIN — IPRATROPIUM BROMIDE AND ALBUTEROL SULFATE 3 ML: .5; 3 SOLUTION RESPIRATORY (INHALATION) at 13:25

## 2019-02-19 RX ADMIN — GUAIFENESIN 1200 MG: 600 TABLET, EXTENDED RELEASE ORAL at 21:09

## 2019-02-19 RX ADMIN — HEPARIN SODIUM 5000 UNITS: 5000 INJECTION INTRAVENOUS; SUBCUTANEOUS at 21:08

## 2019-02-19 RX ADMIN — HYDRALAZINE HYDROCHLORIDE 75 MG: 50 TABLET ORAL at 21:09

## 2019-02-19 RX ADMIN — BUMETANIDE 1 MG: 1 TABLET ORAL at 21:10

## 2019-02-19 RX ADMIN — INSULIN ASPART 4 UNITS: 100 INJECTION, SOLUTION INTRAVENOUS; SUBCUTANEOUS at 18:01

## 2019-02-19 RX ADMIN — HYDRALAZINE HYDROCHLORIDE 75 MG: 50 TABLET ORAL at 08:42

## 2019-02-19 RX ADMIN — HEPARIN SODIUM 5000 UNITS: 5000 INJECTION INTRAVENOUS; SUBCUTANEOUS at 05:15

## 2019-02-19 RX ADMIN — SENNOSIDES AND DOCUSATE SODIUM 2 TABLET: 8.6; 5 TABLET ORAL at 21:09

## 2019-02-19 RX ADMIN — INSULIN ASPART 7 UNITS: 100 INJECTION, SOLUTION INTRAVENOUS; SUBCUTANEOUS at 06:09

## 2019-02-19 RX ADMIN — INSULIN ASPART 9 UNITS: 100 INJECTION, SOLUTION INTRAVENOUS; SUBCUTANEOUS at 08:41

## 2019-02-19 RX ADMIN — BUDESONIDE AND FORMOTEROL FUMARATE DIHYDRATE 2 PUFF: 160; 4.5 AEROSOL RESPIRATORY (INHALATION) at 18:49

## 2019-02-19 NOTE — CONSULTS
Consulting physician:  Dr. Talavera    Referring physician: Dr. Noonan    Date of consultation: 02/19/19     Chief complaint pleural effusion    Subjective     Patient is a 73 y.o. female who was admitted on 2/5/19 with sepsis.  During the hospitalization the patient was found to have a left pleural effusion and had attempted thoracentesis with only a small amount of fluid obtained.  Patient's last chest x-ray was yesterday which showed a tiny left pleural effusion.  According to the chart notes the patient had an ultrasound of the chest which reported a complex fluid collection and concern was raised for the possible development of a pulmonary abscess or empyema because of the complex nature of the fluid collection.  The patient states she feels good and she is anxious to go home.  She is currently off antibiotics.  She has been getting fairly aggressive diuresis.      Review of Systems  Review of Systems - General ROS: negative for - weight loss  Psychological ROS: negative for - behavioral disorder  Ophthalmic ROS: negative for - dry eyes  ENT ROS: negative for - vertigo or vocal changes  Hematological and Lymphatic ROS: negative for - swollen lymph nodes, DVT, PE.   Respiratory ROS: negative for - sputum changes or stridor.  Cardiovascular ROS: negative for - irregular heartbeat or murmur  Gastrointestinal ROS: negative for - blood in stools or change in stools  Genito-Urinary ROS: negative for - hematuria or incontinence  Musculoskeletal ROS: negative for - gait disturbance      History  Past Medical History:   Diagnosis Date   • Diabetes mellitus (CMS/[facility])    • Hypertension    • NSTEMI (non-ST elevated myocardial infarction) (CMS/[facility]) 01/2019   • Paroxysmal atrial fibrillation (CMS/[facility])    • Severe sepsis with septic shock (CMS/[facility])      History reviewed. No pertinent surgical history.  History reviewed. No pertinent family history.  Social History     Tobacco Use   • Smoking status: Never Smoker   •  Smokeless tobacco: Never Used   Substance Use Topics   • Alcohol use: No     Frequency: Never   • Drug use: No     Medications Prior to Admission   Medication Sig Dispense Refill Last Dose   • aspirin 81 MG EC tablet Take 1 tablet by mouth Daily.   2/5/2019 at Unknown time   • budesonide-formoterol (SYMBICORT) 160-4.5 MCG/ACT inhaler Inhale 2 puffs 2 (Two) Times a Day. 1 inhaler 12 2/5/2019 at AM   • metFORMIN (GLUCOPHAGE) 1000 MG tablet Take 1,000 mg by mouth 2 (Two) Times a Day.   2/5/2019 at AM   • metoprolol tartrate (LOPRESSOR) 25 MG tablet Take 1 tablet by mouth Every 12 (Twelve) Hours. 60 tablet 0 2/5/2019 at AM   • NIFEdipine CC (ADALAT CC) 60 MG 24 hr tablet Take 1 tablet by mouth Daily. 30 tablet 0 2/5/2019 at AM   • warfarin (COUMADIN) 2 MG tablet Take 2 mg by mouth Every Night.   2/4/2019 at PM   • atorvastatin (LIPITOR) 40 MG tablet Take 1 tablet by mouth Every Night. 30 tablet 0 2/4/2019 at PM   • insulin detemir (LEVEMIR) 100 UNIT/ML injection Inject 10 Units under the skin into the appropriate area as directed Every Night.  12 2/4/2019 at PM   • montelukast (SINGULAIR) 10 MG tablet Take 10 mg by mouth Every Night.   2/4/2019 at PM     Allergies:  Sulfa antibiotics    Objective     Vital Signs  Temp:  [98.1 °F (36.7 °C)-98.3 °F (36.8 °C)] 98.3 °F (36.8 °C)  Heart Rate:  [56-84] 60  Resp:  [18] 18  BP: (117-146)/(43-54) 137/54    Physical Exam:  General:  This is a WD WN petite female in no acute distress  Vital signs stable, afebrile  HEENT exam:  WNL. Sclera are anicteric.  EOMI  Neck:  supple, FROM.  No JVD.  Trachea midline  Lungs:  Respiratory effort normal. Auscultation: Clear, without wheezes, rhonchi, rales.  Breath sounds are decreased in the left base as compared to the right  Heart:  Regular rate and rhythm, without murmur, gallop, rub.  No pedal edema  Abdomen: Nontender, nondistended  Musculoskeletal:  muscle strength/tone is normal.    Psyc:  alert, oriented x 3.  Mood and affect are  appropriate  skin:  Warm with good turgor.  Without rash or lesion  extremities:  Examination of the extremities revealed no cyanosis, clubbing or edema.    Results Review:       Results from last 7 days   Lab Units 02/19/19  0428 02/18/19  1146 02/18/19  0440 02/17/19  0451 02/16/19  0110 02/15/19  0826 02/15/19  0051 02/14/19  0423   WBC 10*3/mm3 7.29 10.00  --   --   --   --  9.57  --    HEMOGLOBIN g/dL 9.6* 10.2*  --   --   --   --  10.2*  --    HEMATOCRIT % 30.3* 32.4*  --   --   --   --  31.4*  --    PLATELETS 10*3/mm3 313 337  --   --   --   --  368  --    INR  0.95  --  0.97 1.03 1.08 1.17* 1.45* 2.34*     Results from last 7 days   Lab Units 02/13/19  1353   PH, ARTERIAL pH units 7.383   PO2 ART mm Hg 83.2   PCO2, ARTERIAL mm Hg 31.5*   HCO3 ART mmol/L 18.3*     Results from last 7 days   Lab Units 02/19/19  0428 02/18/19  0440 02/17/19  1956 02/17/19  0451 02/16/19  0111   SODIUM mmol/L 131* 136  --  133* 133*   POTASSIUM mmol/L 4.5 4.1 5.1 3.5 4.2   CHLORIDE mmol/L 99 107  --  103 102   CO2 mmol/L 24.9 24.7  --  23.7* 23.9*   BUN mg/dL 50* 45*  --  41* 53*   CREATININE mg/dL 1.45* 1.26  --  1.20 1.31*   EGFR IF NONAFRICN AM mL/min/1.73 35* 42*  --  44* 40*   CALCIUM mg/dL 7.7 7.9  --  7.6* 7.7   GLUCOSE mg/dL 436* 56*  --  238* 270*   ALBUMIN g/dL 2.90*  --   --   --  2.7*   BILIRUBIN mg/dL 0.2  --   --   --   --    ALK PHOS U/L 73  --   --   --   --    AST (SGOT) U/L 13  --   --   --   --    ALT (SGPT) U/L 9*  --   --   --   --    Estimated Creatinine Clearance: 27.6 mL/min (A) (by C-G formula based on SCr of 1.45 mg/dL (H)).  No results found for: AMMONIA                    Imaging:  Imaging Results (last 24 hours)     ** No results found for the last 24 hours. **          Prior CT of the chest on 2/8/19 and recent chest x-ray were reviewed and I agree with the assessment      Impression:  Patient Active Problem List   Diagnosis Code   • Pneumonia J18.9   • Septic shock (CMS/[facility]) A41.9, R65.21   •  Pseudomonas pneumonia (CMS/[facility]) J15.1     Impression: Left pleural effusion  Plan:  It is not clear to me at this point in time that the patient would obtain any benefit from a chest tube.  Last chest x-ray demonstrated only a tiny pleural effusion.  In addition if the fluid in the chest is septated/loculated drainage would not be achieved with chest tube placement  Plan is to repeat the CT scan of the chest and see what the status of the pleural effusion is.  Evidence of infection or a pleural effusion large enough to cause symptoms would be appropriate indications for chest tube placement.      Discussion:      Errors in dictation may reflect use of voice recognition software and not all errors in transcription may have been detected prior to signing.  Umm Talavera MD  02/19/19  6:21 PM    Time: Time spent:

## 2019-02-19 NOTE — PROGRESS NOTES
Nephrology  Note    Subjective       She is on 2L NC. Denies chest pain or SOB. She refused chest tube and wants to go home    Objective     Vital Signs  Temp:  [98 °F (36.7 °C)-98.3 °F (36.8 °C)] 98.1 °F (36.7 °C)  Heart Rate:  [56-84] 80  Resp:  [18] 18  BP: (119-146)/(43-50) 120/43    No intake/output data recorded.  I/O last 3 completed shifts:  In: -   Out: 2450 [Urine:2450]    Physical Examination:    General Appearance : alert, appears stated age, cooperative and no distress  Head : normocephalic  Eyes : no pallor   Throat : oral mucosa moist  Neck:no JVD  Lungs : reduced breath sounds at bases  Heart : regular rhythm & normal rate, normal S1, S2, no murmur  Abdomen :   soft non-tender  Extremities :  trace edema  Pulses :  palpable and equal bilaterally  Neurologic : orientated to person, place, time, grossly no focal deficitis    Laboratory Data :      WBC WBC   Date Value Ref Range Status   02/19/2019 7.29 4.50 - 12.50 10*3/mm3 Final   02/18/2019 10.00 4.50 - 12.50 10*3/mm3 Final      HGB Hemoglobin   Date Value Ref Range Status   02/19/2019 9.6 (L) 12.0 - 16.0 g/dL Final   02/18/2019 10.2 (L) 12.0 - 16.0 g/dL Final      HCT Hematocrit   Date Value Ref Range Status   02/19/2019 30.3 (L) 37.0 - 47.0 % Final   02/18/2019 32.4 (L) 37.0 - 47.0 % Final      Platlets No results found for: LABPLAT   MCV MCV   Date Value Ref Range Status   02/19/2019 91.3 80.0 - 94.0 fL Final   02/18/2019 89.8 80.0 - 94.0 fL Final          Sodium Sodium   Date Value Ref Range Status   02/19/2019 131 (L) 135 - 153 mmol/L Final   02/18/2019 136 135 - 153 mmol/L Final   02/17/2019 133 (L) 135 - 153 mmol/L Final      Potassium Potassium   Date Value Ref Range Status   02/19/2019 4.5 3.5 - 5.3 mmol/L Final   02/18/2019 4.1 3.5 - 5.3 mmol/L Final   02/17/2019 5.1 3.5 - 5.3 mmol/L Final   02/17/2019 3.5 3.5 - 5.3 mmol/L Final      Chloride Chloride   Date Value Ref Range Status   02/19/2019 99 99 - 112 mmol/L Final   02/18/2019 107 99 -  112 mmol/L Final   02/17/2019 103 99 - 112 mmol/L Final      CO2 CO2   Date Value Ref Range Status   02/19/2019 24.9 24.3 - 31.9 mmol/L Final   02/18/2019 24.7 24.3 - 31.9 mmol/L Final   02/17/2019 23.7 (L) 24.3 - 31.9 mmol/L Final      BUN BUN   Date Value Ref Range Status   02/19/2019 50 (H) 7 - 21 mg/dL Final   02/18/2019 45 (H) 7 - 21 mg/dL Final   02/17/2019 41 (H) 7 - 21 mg/dL Final      Creatinine Creatinine   Date Value Ref Range Status   02/19/2019 1.45 (H) 0.43 - 1.29 mg/dL Final   02/18/2019 1.26 0.43 - 1.29 mg/dL Final   02/17/2019 1.20 0.43 - 1.29 mg/dL Final      Calcium Calcium   Date Value Ref Range Status   02/19/2019 7.7 7.7 - 10.0 mg/dL Final   02/18/2019 7.9 7.7 - 10.0 mg/dL Final   02/17/2019 7.6 (L) 7.7 - 10.0 mg/dL Final      PO4 No results found for: CAPO4   Albumin Albumin   Date Value Ref Range Status   02/19/2019 2.90 (L) 3.40 - 4.80 g/dL Final      Magnesium No results found for: MG   Uric Acid No results found for: URICACID     Radiology results :     Imaging Results (last 24 hours)     ** No results found for the last 24 hours. **              Medications:        aspirin 81 mg Oral Daily   budesonide-formoterol 2 puff Inhalation BID - RT   bumetanide 1 mg Oral BID   guaiFENesin 1,200 mg Oral Q12H   heparin (porcine) 5,000 Units Subcutaneous Q8H   hydrALAZINE 75 mg Oral TID   insulin aspart 0-9 Units Subcutaneous 4x Daily AC & at Bedtime   ipratropium-albuterol 3 mL Nebulization 4x Daily - RT   metoprolol tartrate 100 mg Oral Q12H   montelukast 10 mg Oral Nightly   sennosides-docusate sodium 2 tablet Oral Nightly   sodium chloride 3 mL Intravenous Q12H       hold 1 each       Assessment/Plan       Septic shock (CMS/HCC)      1. CKD stage 3 with baseline creatinine 1.1-1.3 since 2015 . Creatinine is up at 1.4 today with diuresis so will change bumex to 1 mg po BID    2. Proteinuria : urine protein/creatinine is 1.6 gm/gm. Presumed diabetic nephropathy  Anti GBM and MM labs pending, rest  serologies negative    3. HTN : stable    4. Bilateral pleural effusions : bumex as above      I discussed the patient's findings and my recommendations with patient    Andrés Shaikh MD  02/19/19  10:37 AM

## 2019-02-19 NOTE — PLAN OF CARE
Problem: Sepsis/Septic Shock (Adult)  Goal: Signs and Symptoms of Listed Potential Problems Will be Absent, Minimized or Managed (Sepsis/Septic Shock)  Outcome: Ongoing (interventions implemented as appropriate)      Problem: Fall Risk (Adult)  Goal: Absence of Fall  Outcome: Ongoing (interventions implemented as appropriate)      Problem: Skin Injury Risk (Adult)  Goal: Skin Health and Integrity  Outcome: Ongoing (interventions implemented as appropriate)      Problem: Patient Care Overview  Goal: Plan of Care Review  Outcome: Ongoing (interventions implemented as appropriate)    Goal: Individualization and Mutuality  Outcome: Ongoing (interventions implemented as appropriate)    Goal: Discharge Needs Assessment  Outcome: Ongoing (interventions implemented as appropriate)    Goal: Interprofessional Rounds/Family Conf  Outcome: Ongoing (interventions implemented as appropriate)      Problem: Mobility, Physical Impaired (Adult)  Goal: Identify Related Risk Factors and Signs and Symptoms  Outcome: Ongoing (interventions implemented as appropriate)    Goal: Enhanced Mobility Skills  Outcome: Ongoing (interventions implemented as appropriate)    Goal: Enhanced Functional Ability  Outcome: Ongoing (interventions implemented as appropriate)

## 2019-02-19 NOTE — SIGNIFICANT NOTE
02/19/19 1506   Rehab Treatment   Discipline physical therapist   Reason Treatment Not Performed patient/family declined treatment, not feeling well  (Patient noted that she did not feel well, reporting her stomach was upset.)   Recommendation   PT - Next Appointment 02/20/19

## 2019-02-19 NOTE — PROGRESS NOTES
"Patient Identification:  Name:  Ashley Geronimo  Age:  73 y.o.  Sex:  female  :  1945  MRN:  [id number]  Visit Number:  [id number]  Primary Care Provider:  Silvano Parker MD    Length of stay:  13    Subjective     Chief complaint: Follow-up respiratory failure, CHF, pleural effusions, pneumonia s/p treatment    Consultants:  Consults     Date and Time Order Name Status Description    2019 0926 Inpatient Nephrology Consult Completed     2019 1138 Inpatient Pulmonology Consult Completed     2019 1203 Inpatient Infectious Diseases Consult Completed     2019 1703 Inpatient Infectious Diseases Consult Completed     2019 0757 Inpatient Cardiology Consult Completed     2019 1821 Inpatient Pulmonology Consult Completed           Procedures:  -US guided thoracentesis on 2/15/2019 by Dr. Noonan with removal of 60cc of serosanguinous fluid. Fluid culture without growth.     Subjective:      Mrs. Geronimo is evaluated resting comfortably in bedside chair. She expresses that she is upset that she cannot go home today. We discuss recent serosanguinous fluid with recent thoracentesis and concern for development of empyema.  We discussed recommendations for chest tube and she reports, \"I am fine either way.\"     Discussed with AM ALDO Agudelo with no known acute events overnight.   ----------------------------------------------------------------------------------------------------------------------  Current Hospital Meds:    aspirin 81 mg Oral Daily   budesonide-formoterol 2 puff Inhalation BID - RT   bumetanide 1 mg Oral BID   guaiFENesin 1,200 mg Oral Q12H   heparin (porcine) 5,000 Units Subcutaneous Q8H   hydrALAZINE 75 mg Oral TID   insulin aspart 0-9 Units Subcutaneous 4x Daily AC & at Bedtime   ipratropium-albuterol 3 mL Nebulization 4x Daily - RT   metoprolol tartrate 100 mg Oral Q12H   montelukast 10 mg Oral Nightly   sennosides-docusate sodium 2 tablet Oral Nightly   sodium chloride " 3 mL Intravenous Q12H       hold 1 each     ----------------------------------------------------------------------------------------------------------------------      Objective     Vital Signs:  Temp:  [98 °F (36.7 °C)-98.3 °F (36.8 °C)] 98.1 °F (36.7 °C)  Heart Rate:  [56-84] 74  Resp:  [18] 18  BP: (119-146)/(43-50) 146/49      02/17/19  0300 02/18/19  0324 02/19/19  0307   Weight: 60.6 kg (133 lb 9.6 oz) 60.4 kg (133 lb 3.2 oz) 60.1 kg (132 lb 8 oz)     Body mass index is 25.88 kg/m².    Intake/Output Summary (Last 24 hours) at 2/19/2019 0928  Last data filed at 2/19/2019 0000  Gross per 24 hour   Intake --   Output 1450 ml   Net -1450 ml     No intake/output data recorded.  Diet Regular; Cardiac, Consistent Carbohydrate  ----------------------------------------------------------------------------------------------------------------------  Physical exam:  Constitutional:  Well-developed and well-nourished.  No respiratory distress.      HENT:  Head:  Normocephalic and atraumatic.  Mouth:  Moist mucous membranes.    Eyes:  Conjunctivae and EOM are normal.  Pupils are equal, round, and reactive to light.  No scleral icterus.    Neck:  Neck supple.  No JVD present.    Cardiovascular:  Regular rate, regular rhythm and normal heart sounds with no murmur.  Pulmonary/Chest:  Diminished breath sounds without significant wheezing, rhonchi, or crackles.    Abdominal:  Soft.  Bowel sounds are normal.  No distension and no tenderness.   Musculoskeletal:  No edema, no tenderness, and no deformity.  No red or swollen joints appreciated during examination.    Neurological:  Alert and oriented to person, place, and time.  No cranial nerve deficit.  No tongue deviation.  No facial droop.  No slurred speech.   Skin:  Skin is warm and dry. No rash noted. No pallor.   ----------------------------------------------------------------------------------------------------------------------  Tele:  SR 60s-70s  th----------------------------------------------------------------------------------------------------------------------      Results from last 7 days   Lab Units 02/19/19 0428 02/18/19  1146 02/18/19 0440 02/17/19  0451 02/16/19  0110 02/15/19  0826 02/15/19  0051 02/14/19  0423   WBC 10*3/mm3 7.29 10.00  --   --   --   --  9.57  --    HEMOGLOBIN g/dL 9.6* 10.2*  --   --   --   --  10.2*  --    HEMATOCRIT % 30.3* 32.4*  --   --   --   --  31.4*  --    MCV fL 91.3 89.8  --   --   --   --  89.0  --    MCHC g/dL 31.7* 31.5*  --   --   --   --  32.5*  --    PLATELETS 10*3/mm3 313 337  --   --   --   --  368  --    INR  0.95  --  0.97 1.03 1.08 1.17* 1.45* 2.34*     Results from last 7 days   Lab Units 02/13/19  1353   PH, ARTERIAL pH units 7.383   PO2 ART mm Hg 83.2   PCO2, ARTERIAL mm Hg 31.5*   HCO3 ART mmol/L 18.3*     Results from last 7 days   Lab Units 02/19/19 0428 02/18/19 0440 02/17/19 1956 02/17/19  0451   SODIUM mmol/L 131* 136  --  133*   POTASSIUM mmol/L 4.5 4.1 5.1 3.5   CHLORIDE mmol/L 99 107  --  103   CO2 mmol/L 24.9 24.7  --  23.7*   BUN mg/dL 50* 45*  --  41*   CREATININE mg/dL 1.45* 1.26  --  1.20   EGFR IF NONAFRICN AM mL/min/1.73 35* 42*  --  44*   CALCIUM mg/dL 7.7 7.9  --  7.6*   GLUCOSE mg/dL 436* 56*  --  238*   ALBUMIN g/dL 2.90*  --   --   --    BILIRUBIN mg/dL 0.2  --   --   --    ALK PHOS U/L 73  --   --   --    AST (SGOT) U/L 13  --   --   --    ALT (SGPT) U/L 9*  --   --   --    Estimated Creatinine Clearance: 27.6 mL/min (A) (by C-G formula based on SCr of 1.45 mg/dL (H)).  No results found for: AMMONIA                  ----------------------------------------------------------------------------------------------------------------------  Imaging Results (last 24 hours)     ** No results found for the last 24 hours. **            ----------------------------------------------------------------------------------------------------------------------      Assessment/Plan     Assessment  and Plan:  · Acute on chronic respiratory failure 2/2 CHF and pneumonia:  Pneumonia s/p treatment.   Nephrology on board with assistance with diuretics with decreased in bumex on this date 2/2 increased creatinine.     · Acute on chronic diastolic CHF:  Bumex decreased to 1mg oral BID per Dr. Shaikh 2/2 worsening creatinine.  Daily weight stable at present decreased 7lbs (minimum) since admission.  Negative fluid balance of 1450. Will continue to closely monitor creatinine    · Bilateral pleural effusions L>R:  S/p US-guided thoracentesis by Pulm.  Fluid culture without growth.  Large bore chest tube placement continues to be recommended per Pulmonology and was discussed with Dr. Noonan on 2/18/19.     · Paroxysmal atrial fibrillation:  [id number] at least 2.   Coumadin stopped per Pulm after bloody appearing thoracentesis. She is on Coumadin rather than novel anticoagulant 2/2 costs; regardless this is stopped at present.  Continue Metoprolol 100mg BID with hold parameters in place.  Risk vs. Benefits of anticoagulation in setting of concern for empyema development discussed.      · RLL pneumonia, s/p treatment    · Diabetes mellitus type 2, NID: Continue FSBC AC&HS.  Levemir discontinued 2/2 hypoglycemia overnight on 2/18/19. SSI PRN has been increased to low-moderate dosing at present given prior sensitivity to Levemir.  Metformin stopped on admission 2/2 lactic acid of 6.     · Essential hypertension:   Continue Metoprolol 100 mg BId in addition to hydralazine 75mg TID.      -----------  -DVT prophylaxis: SQ Heparin  -Activity: Ad naren  -Disposition: remains hospitalized 2/2 IV diuresis with close monitoring. Hopefully home soon.   -Diet: Cardiac/consistent carb    The patient is high risk due to the following diagnoses/reasons:  Acute on chronic CHF, acute respiratory failure on admission    Dang Daley PA-C  02/19/19  9:28 AM  Pager # [phone]

## 2019-02-19 NOTE — PROGRESS NOTES
LOS: 13 days     Chief Complaint:  Pulmonology is following for shortness of breath    Subjective     Interval History:     Ms. Geronimo was seated in bed, awake and alert. She is tolerating 2 L nasal cannula. She reports she is doing well today and again ready to go home if allowed. When the chest tube placement was discussed, she stated that she would be willing to have this procedure completed if it was necessary. Discussed that she could think about this option and we would further discuss it to make a decision. Saturation noted at 98%.   Input/Output was net -1.45 L over the past 24 hours, with 1.45 L output.   Hemodynamically stable. Grandson was at bedside, and said that he would inform the family.       History taken from: patient chart family RN      Review of Systems:     Review of Systems - History obtained from chart review and the patient  General ROS: negative for - chills, fatigue or fever  Psychological ROS: negative for - anxiety or depression  ENT ROS: negative for - headaches or vocal changes  Allergy and Immunology ROS: negative for - nasal congestion or postnasal drip  Endocrine ROS: negative for - polydipsia/polyuria  Respiratory ROS: positive for - shortness of breath  negative for - cough, sputum changes or wheezing  Cardiovascular ROS: positive for - edema.  negative for - chest pain  Gastrointestinal ROS: negative for - abdominal pain or change in bowel habits  Musculoskeletal ROS: negative for - joint pain or joint swelling  Neurological ROS: no TIA or stroke symptoms      Objective     Vital Signs  Temp:  [98.1 °F (36.7 °C)-98.3 °F (36.8 °C)] 98.1 °F (36.7 °C)  Heart Rate:  [56-84] 80  Resp:  [18] 18  BP: (120-146)/(43-50) 120/43  Body mass index is 25.88 kg/m².    Intake/Output Summary (Last 24 hours) at 2/19/2019 1124  Last data filed at 2/19/2019 0000  Gross per 24 hour   Intake --   Output 1450 ml   Net -1450 ml     No intake/output data recorded.    Physical Exam:  GENERAL  APPEARANCE: Well developed, well nourished, alert and cooperative, and appears to be in no acute distress. Tolerating nasal cannula on 2 L.     HEAD: normocephalic. Atraumatic.     EYES: PERRL. EOMI. Vision grossly intact.     NECK: Neck supple. No thyromegaly.     CARDIAC: Normal S1 and S2. No S3, S4 or murmurs. Rhythm is regular. There is no cyanosis or pallor. Extremities are warm and well perfused. Capillary refill is less than 2 seconds. Trace edema of the lower extremities bilaterally.     RESPIRATORY:  Bilateral air entry positive. Diminished breath sounds at the bases, crackles noted throughout. No wheezing or rhonchi. Normal rate and effort.     GI: Positive bowel sounds. Soft, nondistended, nontender.     MUSCULOSKELTAL: No significant deformity or joint abnormality. Trace edema of the lower extremities bilaterally. Peripheral pulses intact.     NEUROLOGICAL: Strength and sensation symmetric and intact throughout.     PSYCHIATRIC: The mental examination revealed the patient was oriented to person, place, and time.                  Results Review:                I reviewed the patient's new clinical results.  I reviewed the patient's new imaging results and agree with the interpretation.  Results from last 7 days   Lab Units 02/19/19  0428 02/18/19  1146 02/15/19  0051   WBC 10*3/mm3 7.29 10.00 9.57   HEMOGLOBIN g/dL 9.6* 10.2* 10.2*   PLATELETS 10*3/mm3 313 337 368     Results from last 7 days   Lab Units 02/19/19  0428 02/18/19  0440 02/17/19  1956 02/17/19  0451   SODIUM mmol/L 131* 136  --  133*   POTASSIUM mmol/L 4.5 4.1 5.1 3.5   CHLORIDE mmol/L 99 107  --  103   CO2 mmol/L 24.9 24.7  --  23.7*   BUN mg/dL 50* 45*  --  41*   CREATININE mg/dL 1.45* 1.26  --  1.20   CALCIUM mg/dL 7.7 7.9  --  7.6*   GLUCOSE mg/dL 436* 56*  --  238*     Lab Results   Component Value Date    INR 0.95 02/19/2019    INR 0.97 02/18/2019    INR 1.03 02/17/2019    PROTIME 12.9 02/19/2019    PROTIME 13.1 02/18/2019    PROTIME  13.7 02/17/2019     Results from last 7 days   Lab Units 02/19/19  0428   ALK PHOS U/L 73   BILIRUBIN mg/dL 0.2   ALT (SGPT) U/L 9*   AST (SGOT) U/L 13     Results from last 7 days   Lab Units 02/13/19  1353   PH, ARTERIAL pH units 7.383   PO2 ART mm Hg 83.2   PCO2, ARTERIAL mm Hg 31.5*   HCO3 ART mmol/L 18.3*     Imaging Results (last 24 hours)     ** No results found for the last 24 hours. **             Medication Review:   Scheduled Medications:    aspirin 81 mg Oral Daily   budesonide-formoterol 2 puff Inhalation BID - RT   bumetanide 1 mg Oral BID   guaiFENesin 1,200 mg Oral Q12H   heparin (porcine) 5,000 Units Subcutaneous Q8H   hydrALAZINE 75 mg Oral TID   insulin aspart 0-9 Units Subcutaneous 4x Daily AC & at Bedtime   ipratropium-albuterol 3 mL Nebulization 4x Daily - RT   metoprolol tartrate 100 mg Oral Q12H   montelukast 10 mg Oral Nightly   sennosides-docusate sodium 2 tablet Oral Nightly   sodium chloride 3 mL Intravenous Q12H     Continuous infusions:    hold 1 each       Assessment/Plan     Patient Active Problem List   Diagnosis Code   • Pneumonia J18.9   • Septic shock (CMS/[facility]) A41.9, R65.21   • Pseudomonas pneumonia (CMS/[facility]) J15.1           Kaley Cutler PA-C  02/19/19  11:24 AM      Scribed for Dr. Noonan by Kaley Cutler PA-C             Lab Results   Component Value Date     PHART 7.383 02/13/2019     GCL7GRO 31.5 (L) 02/13/2019     PO2ART 83.2 02/13/2019     DND7LQY 18.3 (C) 02/13/2019     BASEEXCESS -5.8 02/13/2019     H2UINDJD 94.5 02/13/2019      XR Chest AP  Narrative: Technique: Frontal view the chest.     COMPARISON:  None     INDICATION:     resp failure; A41.9-Sepsis, unspecified organism;  R65.21-Severe sepsis with septic shock; N17.9-Acute kidney failure,  unspecified      FINDINGS:    Bibasilar airspace disease noted. Cardiomegaly is noted.  Prominent interstitial markings are noted. Tiny left pleural effusion.     Impression: As above.     This report was finalized on  2/18/2019 8:04 AM by Dr. Duke Brewer MD.              Lab Results   Component Value Date     WBC 7.29 02/19/2019     HGB 9.6 (L) 02/19/2019     HCT 30.3 (L) 02/19/2019     MCV 91.3 02/19/2019      02/19/2019            Lab Results   Component Value Date     GLUCOSE 436 (H) 02/19/2019     CALCIUM 7.7 02/19/2019      (L) 02/19/2019     K 4.5 02/19/2019     CO2 24.9 02/19/2019     CL 99 02/19/2019     BUN 50 (H) 02/19/2019     CREATININE 1.45 (H) 02/19/2019     EGFRIFNONA 35 (L) 02/19/2019     BCR 34.5 (H) 02/19/2019     ANIONGAP 7.1 02/19/2019            I have reviewed the labs.              Pleural fluid was neutrophil in cytology.  High level of glucose.  Pleural fluid is exudative based on LDH criteria.  Pleural fluid cultures negative till date.  Cytology is negative for malignant cells.  Flow cytometry is pending.     Assessment:  Shortness of breath: improving   Acute on chronic hypoxic respiratory failure: improving   Chronic hypoxic respiratory failure on home oxygen  Right middle lobe pneumonia , unspecified organism   COPD, centrilobular emphysema type  Bilateral pleural effusion  Physical deconditioning  Bilateral basal atelectasis        Plan:  - Bedside ultrasound done on 2/15/2019 showed complex fluid with septation.  Pleural fluid was neutrophilic in cytology.  High level of glucose present.  Pleural fluid is exudative based on LDH criteria.  Cytology is negative for malignant cells.  Flow cytometry is pending.  Pleural fluid cultures are negative to date.  She is currently hemodynamically stable.  No fevers.  ANC is trending down.  I'm still concerned about the complex fluid with septation.  The fluid drawn via ultrasound-guided thoracentesis was serosanguineous.  She may developed empyema or extra parenchymal restrictive pulmonary disease in future.  Currently she wishes to try diuresis and refusing chest tube placement.  I will discuss the risk and the benefits of placing chest tube  again with her today.  - Nephrology on board.  Diuretics as per nephrology recommendations  - Continue with duo nebs  - Continue with Symbicort nebs  - continue Mucomyst nebs  - Aggressive PTOT while in hospital  - Incentive spirometer to her basal atelectasis  - Continue heparin subcutaneous for DVT prophylaxis        I have discussed the clinical plan and the test results with Dr. Velasco.     Nurses and respiratory therapist were updated about the plan.   I, Quang Noonan M.D. attest that the above note accurately reflects the work and decisions made by me.  Patient was seen and evaluated by me, including history of present illness, physical exam, assessment, and treatment plan.  The above note was reviewed and edited by me.    Thank you for involving us in the care of the patient.    Quang Noonan M.D  Pulmonary and Critical Care Medicine

## 2019-02-20 ENCOUNTER — APPOINTMENT (OUTPATIENT)
Dept: ULTRASOUND IMAGING | Facility: HOSPITAL | Age: 74
End: 2019-02-20

## 2019-02-20 ENCOUNTER — APPOINTMENT (OUTPATIENT)
Dept: CT IMAGING | Facility: HOSPITAL | Age: 74
End: 2019-02-20

## 2019-02-20 VITALS
OXYGEN SATURATION: 98 % | TEMPERATURE: 98 F | HEIGHT: 60 IN | SYSTOLIC BLOOD PRESSURE: 135 MMHG | RESPIRATION RATE: 18 BRPM | WEIGHT: 133.1 LBS | BODY MASS INDEX: 26.13 KG/M2 | HEART RATE: 67 BPM | DIASTOLIC BLOOD PRESSURE: 46 MMHG

## 2019-02-20 LAB
ALBUMIN SERPL-MCNC: 2.9 G/DL (ref 3.4–4.8)
ALBUMIN/GLOB SERPL: 1.3 G/DL (ref 1.5–2.5)
ALP SERPL-CCNC: 72 U/L (ref 35–104)
ALT SERPL W P-5'-P-CCNC: 19 U/L (ref 10–36)
ANION GAP SERPL CALCULATED.3IONS-SCNC: 6.3 MMOL/L (ref 3.6–11.2)
AST SERPL-CCNC: 16 U/L (ref 10–30)
BACTERIA FLD CULT: NORMAL
BASOPHILS # BLD AUTO: 0.04 10*3/MM3 (ref 0–0.3)
BASOPHILS NFR BLD AUTO: 0.4 % (ref 0–2)
BILIRUB SERPL-MCNC: 0.2 MG/DL (ref 0.2–1.8)
BUN BLD-MCNC: 48 MG/DL (ref 7–21)
BUN/CREAT SERPL: 35.6 (ref 7–25)
CALCIUM SPEC-SCNC: 8.2 MG/DL (ref 7.7–10)
CHLORIDE SERPL-SCNC: 100 MMOL/L (ref 99–112)
CO2 SERPL-SCNC: 24.7 MMOL/L (ref 24.3–31.9)
CREAT BLD-MCNC: 1.35 MG/DL (ref 0.43–1.29)
DEPRECATED RDW RBC AUTO: 44.6 FL (ref 37–54)
EOSINOPHIL # BLD AUTO: 0.1 10*3/MM3 (ref 0–0.7)
EOSINOPHIL NFR BLD AUTO: 1.1 % (ref 0–7)
ERYTHROCYTE [DISTWIDTH] IN BLOOD BY AUTOMATED COUNT: 14.2 % (ref 11.5–14.5)
GFR SERPL CREATININE-BSD FRML MDRD: 38 ML/MIN/1.73
GLOBULIN UR ELPH-MCNC: 2.2 GM/DL
GLUCOSE BLD-MCNC: 124 MG/DL (ref 70–110)
GLUCOSE BLDC GLUCOMTR-MCNC: 243 MG/DL (ref 70–130)
GLUCOSE BLDC GLUCOMTR-MCNC: 243 MG/DL (ref 70–130)
GLUCOSE BLDC GLUCOMTR-MCNC: 248 MG/DL (ref 70–130)
GLUCOSE BLDC GLUCOMTR-MCNC: 385 MG/DL (ref 70–130)
HCT VFR BLD AUTO: 29.7 % (ref 37–47)
HGB BLD-MCNC: 9.4 G/DL (ref 12–16)
IMM GRANULOCYTES # BLD AUTO: 0.14 10*3/MM3 (ref 0–0.03)
IMM GRANULOCYTES NFR BLD AUTO: 1.5 % (ref 0–0.5)
INR PPP: 0.98 (ref 0.9–1.1)
LYMPHOCYTES # BLD AUTO: 1.43 10*3/MM3 (ref 1–3)
LYMPHOCYTES NFR BLD AUTO: 15.2 % (ref 16–46)
MCH RBC QN AUTO: 28.6 PG (ref 27–33)
MCHC RBC AUTO-ENTMCNC: 31.6 G/DL (ref 33–37)
MCV RBC AUTO: 90.3 FL (ref 80–94)
MONOCYTES # BLD AUTO: 1.04 10*3/MM3 (ref 0.1–0.9)
MONOCYTES NFR BLD AUTO: 11.1 % (ref 0–12)
NEUTROPHILS # BLD AUTO: 6.64 10*3/MM3 (ref 1.4–6.5)
NEUTROPHILS NFR BLD AUTO: 70.7 % (ref 40–75)
OSMOLALITY SERPL CALC.SUM OF ELEC: 276.7 MOSM/KG (ref 273–305)
PLATELET # BLD AUTO: 338 10*3/MM3 (ref 130–400)
PMV BLD AUTO: 11.6 FL (ref 6–10)
POTASSIUM BLD-SCNC: 4.2 MMOL/L (ref 3.5–5.3)
PROT SERPL-MCNC: 5.1 G/DL (ref 6–8)
PROTHROMBIN TIME: 13.2 SECONDS (ref 11–15.4)
RBC # BLD AUTO: 3.29 10*6/MM3 (ref 4.2–5.4)
SODIUM BLD-SCNC: 131 MMOL/L (ref 135–153)
WBC NRBC COR # BLD: 9.39 10*3/MM3 (ref 4.5–12.5)

## 2019-02-20 PROCEDURE — 71250 CT THORAX DX C-: CPT

## 2019-02-20 PROCEDURE — 97116 GAIT TRAINING THERAPY: CPT | Performed by: PHYSICAL THERAPIST

## 2019-02-20 PROCEDURE — 94799 UNLISTED PULMONARY SVC/PX: CPT

## 2019-02-20 PROCEDURE — 25010000002 HEPARIN (PORCINE) PER 1000 UNITS: Performed by: INTERNAL MEDICINE

## 2019-02-20 PROCEDURE — 80053 COMPREHEN METABOLIC PANEL: CPT | Performed by: PHYSICIAN ASSISTANT

## 2019-02-20 PROCEDURE — 85025 COMPLETE CBC W/AUTO DIFF WBC: CPT | Performed by: PHYSICIAN ASSISTANT

## 2019-02-20 PROCEDURE — 97110 THERAPEUTIC EXERCISES: CPT | Performed by: PHYSICAL THERAPIST

## 2019-02-20 PROCEDURE — 76705 ECHO EXAM OF ABDOMEN: CPT | Performed by: RADIOLOGY

## 2019-02-20 PROCEDURE — 99239 HOSP IP/OBS DSCHRG MGMT >30: CPT | Performed by: PHYSICIAN ASSISTANT

## 2019-02-20 PROCEDURE — 82962 GLUCOSE BLOOD TEST: CPT

## 2019-02-20 PROCEDURE — 76705 ECHO EXAM OF ABDOMEN: CPT

## 2019-02-20 PROCEDURE — 63710000001 INSULIN ASPART PER 5 UNITS: Performed by: PHYSICIAN ASSISTANT

## 2019-02-20 PROCEDURE — 71250 CT THORAX DX C-: CPT | Performed by: RADIOLOGY

## 2019-02-20 PROCEDURE — 99232 SBSQ HOSP IP/OBS MODERATE 35: CPT | Performed by: INTERNAL MEDICINE

## 2019-02-20 PROCEDURE — 85610 PROTHROMBIN TIME: CPT | Performed by: HOSPITALIST

## 2019-02-20 RX ORDER — METOPROLOL TARTRATE 100 MG/1
100 TABLET ORAL EVERY 12 HOURS SCHEDULED
Qty: 60 TABLET | Refills: 0 | Status: ON HOLD | OUTPATIENT
Start: 2019-02-20 | End: 2019-10-16 | Stop reason: SDUPTHER

## 2019-02-20 RX ORDER — BUMETANIDE 1 MG/1
1 TABLET ORAL 2 TIMES DAILY
Qty: 60 TABLET | Refills: 0 | Status: SHIPPED | OUTPATIENT
Start: 2019-02-20 | End: 2019-03-22

## 2019-02-20 RX ORDER — HYDRALAZINE HYDROCHLORIDE 25 MG/1
75 TABLET, FILM COATED ORAL 3 TIMES DAILY
Qty: 270 TABLET | Refills: 0 | Status: SHIPPED | OUTPATIENT
Start: 2019-02-20 | End: 2019-03-22

## 2019-02-20 RX ORDER — WARFARIN SODIUM 5 MG/1
5 TABLET ORAL NIGHTLY
Qty: 30 TABLET | Refills: 0 | Status: SHIPPED | OUTPATIENT
Start: 2019-02-20 | End: 2019-02-20 | Stop reason: SDUPTHER

## 2019-02-20 RX ORDER — WARFARIN SODIUM 5 MG/1
5 TABLET ORAL NIGHTLY
Qty: 30 TABLET | Refills: 0 | Status: SHIPPED | OUTPATIENT
Start: 2019-02-20 | End: 2019-03-06 | Stop reason: SINTOL

## 2019-02-20 RX ADMIN — BUDESONIDE AND FORMOTEROL FUMARATE DIHYDRATE 2 PUFF: 160; 4.5 AEROSOL RESPIRATORY (INHALATION) at 06:48

## 2019-02-20 RX ADMIN — HYDRALAZINE HYDROCHLORIDE 75 MG: 50 TABLET ORAL at 08:13

## 2019-02-20 RX ADMIN — GUAIFENESIN 1200 MG: 600 TABLET, EXTENDED RELEASE ORAL at 08:13

## 2019-02-20 RX ADMIN — INSULIN ASPART 8 UNITS: 100 INJECTION, SOLUTION INTRAVENOUS; SUBCUTANEOUS at 11:26

## 2019-02-20 RX ADMIN — HEPARIN SODIUM 5000 UNITS: 5000 INJECTION INTRAVENOUS; SUBCUTANEOUS at 05:49

## 2019-02-20 RX ADMIN — IPRATROPIUM BROMIDE AND ALBUTEROL SULFATE 3 ML: .5; 3 SOLUTION RESPIRATORY (INHALATION) at 13:07

## 2019-02-20 RX ADMIN — BUMETANIDE 1 MG: 1 TABLET ORAL at 08:13

## 2019-02-20 RX ADMIN — ASPIRIN 81 MG: 81 TABLET ORAL at 08:13

## 2019-02-20 RX ADMIN — INSULIN ASPART 4 UNITS: 100 INJECTION, SOLUTION INTRAVENOUS; SUBCUTANEOUS at 08:13

## 2019-02-20 RX ADMIN — IPRATROPIUM BROMIDE AND ALBUTEROL SULFATE 3 ML: .5; 3 SOLUTION RESPIRATORY (INHALATION) at 06:36

## 2019-02-20 NOTE — DISCHARGE PLACEMENT REQUEST
"Ge Geronimo (73 y.o. Female)     Date of Birth Social Security Number Address Home Phone MRN    1945  [address] [facility] [address] [phone] [id number]    [facility] Marital Status          Religion        Admission Date Admission Type Admitting Provider Attending Provider Department, Room/Bed    2/5/19 Emergency Jayden Anderson MD Troxell, Christopher A, DO [address], 3308/2S    Discharge Date Discharge Disposition Discharge Destination         Home or Self Care              Attending Provider:  Martin Velasco DO    Allergies:  Sulfa Antibiotics    Isolation:  None   Infection:  None   Code Status:  CPR    Ht:  152.4 cm (60\")   Wt:  60.4 kg (133 lb 1.6 oz)    Admission Cmt:  None   Principal Problem:  None                Active Insurance as of 2/5/2019     Primary Coverage     Payor Plan Insurance Group Employer/Plan Group    MEDICARE RAILROAD MEDICARE      Payor Plan Address Payor Plan Phone Number Payor Plan Fax Number Effective Dates    [address] [phone]  8/1/2009 - None Entered    [place] [address]       Subscriber Name Subscriber Birth Date Member ID       GE GERONIMO 1945 [id number]                 Emergency Contacts      (Rel.) Home Phone Work Phone Mobile Phone    Juan Geronimo (Son) [phone] -- --    Katelyn Castaneda (Grandchild) [phone] -- --    Jennifer Key (Daughter) -- -- [phone]            Emergency Contact Information     Name Relation Home Work Mobile    Juan Geronimo Son [phone]      Katelyn Castaneda Grandchild [phone]      Jennifer Key Daughter   [phone]          Insurance Information                MEDICARE/RAILROAD MEDICARE Phone: [phone]    Subscriber: Ge Geronimo Subscriber#: [id number]    Group#:  Precert#:           Treatment Team     Provider Relationship Specialty Contact    Martin Velasco DO Attending, Physician of Record " Hospitalist  [phone]    Deena Zelaya Patient Care Technician --      Daisy Hi, ALDO Case Manager --      Anusha Escobar, RRT Respiratory Therapist --  [phone]    Umm Talavera MD Consulting Physician General Surgery  [phone]    Sheree Daniel Patient Care Technician --      Taylor Llanos Patient Care Technician --      Óscar Marvin RN Registered Nurse --      Quang Noonan MD Consulting Physician Pulmonary Disease  [phone]          Problem List           Codes Noted - Resolved       Hospital    Septic shock (CMS/HCC) ICD-10-CM: A41.9, R65.21  ICD-9-CM: 038.9, 785.52, 995.92 2019 - Present       Non-Hospital    Pseudomonas pneumonia (CMS/[facility]) ICD-10-CM: J15.1  ICD-9-CM: 482.1 2019 - Present    Pneumonia ICD-10-CM: J18.9  ICD-9-CM: 486 2019 - Present             History & Physical      Charles Dickinson MD at 2019  7:48 AM              [facility] Medicine Services  History & Physical    Patient Identification:  Name:  Ashley Geronimo  Age:  73 y.o.  Sex:  female  :  1945  MRN:  [id number]   Visit Number:  [id number]  Primary Care Physician:  Silvano Parker MD    I have seen the patient in conjunction with Dang Daley PA-C and I agree with the following statements:    Subjective     Chief complaint: Weakness, congestion, leg swelling    History of presenting illness:      Ashley Geronimo is a 73 y.o. female with past medical history significant for recent admission to this facility from  through 2019 with diagnosis of septic shock secondary to UTI and community-acquired pneumonia with possible cellulitis during which time she also experienced paroxysmal atrial fibrillation, acute kidney injury, non-ST elevation myocardial infarction felt to be secondary to most likely demand ischemia, and non-gap metabolic acidosis felt to be secondary to lactic acidosis, and diabetes mellitus type  "2.  Of note, Olegario said hospitalization she did have sputum cultures that were polymicrobial including Pseudomonas with antibiotic therapies guided by infectious disease.  On discharge, in setting of atrial fibrillation, she was discharged home on Coumadin with subcutaneous Lovenox until her INR was therapeutic; as normal anticoagulants were too expensive at that time.    She presented to the office of her PCP yesterday, and was referred to the ED.  She tells me her PCP felt her lungs were \"still wet sounding\".  She reports ongoing weakness since hospitalization.  Though she tells me she is able to ambulate around her home.  She has been utilizing 2 L of oxygen via nasal cannula since discharge.  She tells me she has been taking Coumadin since discharge and home health of take her blood for PT/INR; however: She denies any further adjustments in her medication since discharge and is reports she is taking the Coumadin pill daily of unknown dosing but thinks it is the dose from her discharge.  She reports she has felt short of breath over the past few days.  She reports she feels congested and does feel that she is sputum she is unable to clear.  She denies chest pain.  She reports she did have some redness and her ankles a few days ago.  Upon examination, there is no redness at present.    Upon presentation to the emergency department, temperature is 98.4, pulse 79, respiratory rate 20, and blood pressure was 118/44.  Lactic acid was found to be 6 with some improvement of 3.2.  CRP was 3.39.  Glucose levels were 213 with bowel and on gap of 12.5 and elevated creatinine of 1.50.  Magnesium levels found to be 1.5.  Please see pertinent laboratory data regarding urinalysis.  Chest x-ray did not reveal evidence of acute infiltrate; however, CT chest only showed small bibasilar effusions. UA showed trace leukocyte " esterase  --------------------------------------------------------------------------------------------------------------------   Review of Systems   Constitutional: Positive for fatigue. Negative for chills and fever.   HENT: Positive for congestion. Negative for drooling.    Eyes: Negative for pain and discharge.   Respiratory: Positive for cough, shortness of breath and wheezing.    Cardiovascular: Negative for chest pain and leg swelling.   Gastrointestinal: Negative for abdominal distention, constipation, nausea and vomiting.   Endocrine: Negative for cold intolerance and heat intolerance.   Genitourinary: Negative for difficulty urinating and dysuria.   Musculoskeletal: Negative for arthralgias and gait problem.   Skin: Negative for color change and pallor.   Allergic/Immunologic: Negative for environmental allergies and food allergies.   Neurological: Negative for dizziness and headaches.   Hematological: Negative for adenopathy. Does not bruise/bleed easily.   Psychiatric/Behavioral: Negative for agitation and confusion.      ---------------------------------------------------------------------------------------------------------------------   Past Medical History:   Diagnosis Date   • Diabetes mellitus (CMS/[facility])    • Hypertension    • NSTEMI (non-ST elevated myocardial infarction) (CMS/[facility]) 01/2019   • Paroxysmal atrial fibrillation (CMS/[facility])    • Severe sepsis with septic shock (CMS/[facility])      History reviewed. No pertinent surgical history.  History reviewed. No pertinent family history.  Social History     Socioeconomic History   • Marital status:      Spouse name: Not on file   • Number of children: Not on file   • Years of education: Not on file   • Highest education level: Not on file   Tobacco Use   • Smoking status: Never Smoker   • Smokeless tobacco: Never Used   Substance and Sexual Activity   • Alcohol use: No     Frequency: Never   • Drug use: No      ---------------------------------------------------------------------------------------------------------------------   Allergies:  Sulfa antibiotics  ---------------------------------------------------------------------------------------------------------------------   Home medications:    Medications below are reported home medications pulling from within the system; at this time, these medications have not been reconciled unless otherwise specified and are in the verification process for further verifcation as current home medications.  Medications Prior to Admission   Medication Sig Dispense Refill Last Dose   • aspirin 81 MG EC tablet Take 1 tablet by mouth Daily.   2/5/2019 at Unknown time   • budesonide-formoterol (SYMBICORT) 160-4.5 MCG/ACT inhaler Inhale 2 puffs 2 (Two) Times a Day. 1 inhaler 12 2/5/2019 at AM   • metFORMIN (GLUCOPHAGE) 1000 MG tablet Take 1,000 mg by mouth 2 (Two) Times a Day.   2/5/2019 at AM   • metoprolol tartrate (LOPRESSOR) 25 MG tablet Take 1 tablet by mouth Every 12 (Twelve) Hours. 60 tablet 0 2/5/2019 at AM   • NIFEdipine CC (ADALAT CC) 60 MG 24 hr tablet Take 1 tablet by mouth Daily. 30 tablet 0 2/5/2019 at AM   • warfarin (COUMADIN) 2 MG tablet Take 2 mg by mouth Every Night.   2/4/2019 at PM   • atorvastatin (LIPITOR) 40 MG tablet Take 1 tablet by mouth Every Night. 30 tablet 0 2/4/2019 at PM   • insulin detemir (LEVEMIR) 100 UNIT/ML injection Inject 10 Units under the skin into the appropriate area as directed Every Night.  12 2/4/2019 at PM   • montelukast (SINGULAIR) 10 MG tablet Take 10 mg by mouth Every Night.   2/4/2019 at PM       Hospital Scheduled Meds:    guaiFENesin 1,200 mg Oral Q12H   insulin aspart 0-7 Units Subcutaneous 4x Daily AC & at Bedtime   ipratropium-albuterol 3 mL Nebulization 4x Daily - RT   magnesium sulfate 2 g Intravenous Q2H   piperacillin-tazobactam 3.375 g Intravenous Q8H   sodium chloride 3 mL Intravenous Q12H       Pharmacy to dose vancomycin      Pharmacy to dose warfarin     Pharmacy to Dose Zosyn     sodium chloride 75 mL/hr Last Rate: 75 mL/hr (02/06/19 0259)       Current listed hospital scheduled medications may not yet reflect those currently placed in orders that are signed and held awaiting patient's arrival to floor.   ---------------------------------------------------------------------------------------------------------------------     Objective     Vital Signs:  Temp:  [98 °F (36.7 °C)-98.6 °F (37 °C)] 98.6 °F (37 °C)  Heart Rate:  [] 96  Resp:  [16-20] 16  BP: (118-179)/(44-86) 178/78      02/05/19  1820 02/06/19  0038   Weight: 57.2 kg (126 lb) 62.7 kg (138 lb 4.8 oz)     Body mass index is 27.01 kg/m².  ---------------------------------------------------------------------------------------------------------------------       Physical Exam    Physical Exam:    Constitutional: Awake, alert, well-nourished, well-developed, nontoxic  HEENT: Normocephalic, atraumatic. PERRLA, EOMI, sclerae anicteric, conjunctivae without injection, mucous membranes moist, no oropharyngeal erythema appreciated.    Neck: Supple. No JVD appreciated.  Pulmonary: Diminished breath sounds bilaterally. No significantly wheezing, rales, crackles.   CV: Normal rate, regular rhythm. Normal s1/s2 with no murmur appreciated.   Abdominal: Soft, No distension or tenderness appreciated. Bowel sounds appreciated in all four quadrants, no guarding or rebound  Musculoskeletal: No erythema or swelling to joints of upper and lower extremities   Extremities: No clubbing, cyanosis, 1+ edema  Vascular: 2+ DP/PT/Radial pulses bilaterally, warm extremities  Skin: Skin is warm and dry. No truncal or extremity rash on limited exam  Neuro: Alert and oriented to person, place, and time. Strength symmetric in all extremities, Cranial Nerves grossly intact to confrontation, speech clear, sensation intact to fine touch throughout.  No slurred speech.  No facial droop.    Psych:  Appropriate mood and affect.  Judgement and though content appropriate.       ---------------------------------------------------------------------------------------------------------------------  EKG:            Telemetry:  SR 80s-90s with frequent PACs  I have personally looked at both the EKG and the telemetry strips.  ---------------------------------------------------------------------------------------------------------------------   Results from last 7 days   Lab Units 02/06/19  0121 02/06/19 0100 02/05/19 2111 02/05/19  1838   CRP mg/dL  --  3.39*  --  3.34*   LACTATE mmol/L 3.2*  --  6.0*  --    WBC 10*3/mm3  --  7.55  --  8.39   HEMOGLOBIN g/dL  --  11.2*  --  12.2   HEMATOCRIT %  --  34.8*  --  37.5   MCV fL  --  90.9  --  89.5   MCHC g/dL  --  32.2*  --  32.5*   PLATELETS 10*3/mm3  --  292  --  348   INR   --   --   --  1.14*         Results from last 7 days   Lab Units 02/06/19  0710 02/06/19 0100 02/05/19  1838   SODIUM mmol/L  --  138 137   POTASSIUM mmol/L  --  4.2 4.9   MAGNESIUM mg/dL 1.5*  --  1.0*   CHLORIDE mmol/L  --  105 99   CO2 mmol/L  --  20.5* 22.8*   BUN mg/dL  --  34* 42*   CREATININE mg/dL  --  1.50* 1.69*   EGFR IF NONAFRICN AM mL/min/1.73  --  34* 30*   CALCIUM mg/dL  --  7.1* 8.1   GLUCOSE mg/dL  --  213* 238*   ALBUMIN g/dL  --  3.70 4.30   BILIRUBIN mg/dL  --  0.3 0.3   ALK PHOS U/L  --  79 84   AST (SGOT) U/L  --  16 18   ALT (SGPT) U/L  --  13 14   Estimated Creatinine Clearance: 27.2 mL/min (A) (by C-G formula based on SCr of 1.5 mg/dL (H)).  No results found for: AMMONIA  Results from last 7 days   Lab Units 02/06/19  0710 02/06/19  0100 02/05/19  2324   TROPONIN I ng/mL 0.024 0.021 0.024         Lab Results   Component Value Date    HGBA1C 11.50 (H) 01/16/2019     Lab Results   Component Value Date    TSH 4.095 01/16/2019     No results found for: PREGTESTUR, PREGSERUM, HCG, HCGQUANT  Pain Management Panel     Pain Management Panel Latest Ref Rng & Units 1/16/2019 4/12/2018     CREATININE UR mg/dL - 96.6    AMPHETAMINES SCREEN, URINE Negative Negative -    BARBITURATES SCREEN Negative Negative -    BENZODIAZEPINE SCREEN, URINE Negative Negative -    BUPRENORPHINE Negative Negative -    COCAINE SCREEN, URINE Negative Negative -    METHADONE SCREEN, URINE Negative Negative -                        ---------------------------------------------------------------------------------------------------------------------  Imaging Results (last 7 days)     Procedure Component Value Units Date/Time    XR Chest 1 View [[id number]] Collected:  02/06/19 0642     Updated:  02/06/19 0642    Narrative:       XR CHEST 1 VIEW-     CLINICAL INDICATION: Weak/Dizzy/AMS triage protocol          COMPARISON: 01/17/2019      TECHNIQUE: Single frontal view of the chest.     FINDINGS:     Probable atelectasis in both lungs.  Diffuse interstitial lung disease.  Trace bibasilar effusions.  The cardiac silhouette is normal. The pulmonary vasculature is  unremarkable.  There is no evidence of an acute osseous abnormality.   There are no suspicious-appearing parenchymal soft tissue nodules.            Impression:       Trace bibasilar effusions.           CT Abdomen Pelvis Stone Protocol [[id number]] Updated:  02/05/19 2302    CT Chest Without Contrast [[id number]] Updated:  02/05/19 2149          Cultures:       Last echocardiogram:  Results for orders placed during the hospital encounter of 01/16/19   Adult Transthoracic Echo Complete W/ Cont if Necessary Per Protocol    Narrative · Estimated EF = 66%.  · Left ventricular systolic function is normal.  · No significant valvular disease  · No change since 3/21/18          CHADS-VASc Risk Assessment            4       Total Score        1 Hypertension    1 DM    1 Age 65-74    1 Sex: Female            I have personally reviewed the radiology images and read the final radiology  report.  ---------------------------------------------------------------------------------------------------------------------  Assessment / Plan       Assessment and Plan:  UTI  qSOFA-0. Not sepsis  -Ceftriaxone started.   -Urine culture ordered    Lactic acidosis  Likely multifactorial due to dehydration +/- metformin  -Continue IV fluids  -Monitor lactic acidosi      · Acute kidney injury:  Will closely monitor renal function and avoid nephrotoxins when possible.  Will continue to hydrate and discontinue home metformin.  · Suspected underlying metabolic acidosis with elevated but improving anion gap likely secondary to underlying acute kidney injury: Anion gap is improving thus far.  Acetone negative emergency department.  We'll hold further metformin therapy.  Will treat possible underlying infection as previously outlined within this document. Will check one time ABG.   · Severe hypomagnesemia: Initial value level of 1.0 with improvement on 21.5 with supplementation.  We will continue supplementation via electrolyte replacement protocol.  · Paroxysmal atrial fibrillation, currently sinus rhythm: Monitor on telemetry.  Pharmacy consultation has been placed to dose Coumadin. [id number] at least 2. Will review her medication regimen.  Subtherapeutic chronic anticoagulation: Eliquis started. Will follow up with pharmacy  Hyperglycemia in the setting of diabetes mellitus type 2: Given diabetes mellitus, fingerstick blood glucose monitoring has been ordered AC&HS. Sliding scale insulin has been ordered.  Hemoglobin a1c 11.5% on January 16, 2019.  She does follow with Dr. Parker, Endocrinologist.  Will review home Levemir regimen upon availability.      ----------  -DVT prophylaxis: Coumadin to serve  -Activity: Ad naren  -Expected length of stay: INPATIENT status due to the need for care which can only be reasonably provided in an hospital setting such as aggressive/expedited ancillary services and/or consultation  services, the necessity for IV medications, close physician monitoring and/or the possible need for procedures.  In such, I feel patient’s risk for adverse outcomes and need for care warrant INPATIENT evaluation and predict the patient’s care encounter to likely last beyond 2 midnights.      Dang Daley PA-C  02/06/19  8:41 AM  Pager # [phone]  ---------------------------------------------------------------------------------------------------------------------             Electronically signed by Charles Dickinson MD at 2/6/2019 12:07 PM       ICU Vital Signs     Row Name 02/20/19 0815 02/20/19 0813 02/20/19 0648 02/20/19 0647 02/20/19 0636       Vitals    Pulse  --  56  68  63  71    Resp  --  --  18  18  18       Oxygen Therapy    SpO2  --  --  92 %  90 %  92 %    Device (Oxygen Therapy)  nasal cannula  --  nasal cannula;humidified  nasal cannula;humidified  nasal cannula;humidified    Flow (L/min)  2  --  2  2  2    Row Name 02/20/19 0540 02/20/19 0300 02/19/19 2054 02/19/19 2045 02/19/19 1855       Height and Weight    Weight  --  60.4 kg (133 lb 1.6 oz)  --  --  --    Weight Method  --  Bed scale  --  --  --       Vitals    Temp  98 °F (36.7 °C)  97.9 °F (36.6 °C)  --  --  --    Temp src  Oral  Oral  --  --  --    Pulse  81  74  73  --  74    Heart Rate Source  Monitor  Monitor  Monitor  --  --    Resp  18  18  --  --  18    Resp Rate Source  Visual  Visual  --  --  --    BP  161/56  135/51  152/53  --  --    Noninvasive MAP (mmHg)  87  85  --  --  --    BP Location  Right arm  Right arm  --  --  --    BP Method  Automatic  Automatic  --  --  --    Patient Position  Lying  Lying  --  --  --       Oxygen Therapy    SpO2  94 %  94 %  --  --  96 %    Device (Oxygen Therapy)  --  nasal cannula  --  nasal cannula  nasal cannula    Flow (L/min)  --  --  --  2  2    Row Name 02/19/19 1849 02/19/19 1814 02/19/19 1334 02/19/19 1325 02/19/19 1311       Vitals    Temp  --  98.3 °F (36.8 °C)  --   --  --    Temp src  --  Axillary  --  --  --    Pulse  65  60  60  56  62    Heart Rate Source  --  Monitor  --  --  Monitor    Resp  18  18  18  18  18    Resp Rate Source  --  Visual  --  --  Visual    BP  --  137/54  --  --  117/44    Noninvasive MAP (mmHg)  --  73  --  --  77    BP Location  --  Right arm  --  --  Right arm    BP Method  --  Automatic  --  --  Automatic    Patient Position  --  Lying  --  --  Sitting       Oxygen Therapy    SpO2  96 %  97 %  99 %  97 %  96 %    Pulse Oximetry Type  --  Continuous  --  --  --    Device (Oxygen Therapy)  nasal cannula  nasal cannula  --  nasal cannula  nasal cannula    Flow (L/min)  2  2  --  2  --        Intake & Output (last day)       02/19 0701 - 02/20 0700 02/20 0701 - 02/21 0700    P.O. 480 240    Total Intake(mL/kg) 480 (7.9) 240 (4)    Urine (mL/kg/hr)      Total Output      Net +480 +240          Urine Unmeasured Occurrence 2 x         Lines, Drains & Airways    Active LDAs     Name:   Placement date:   Placement time:   Site:   Days:    Peripheral IV 02/16/19 2240 Anterior;Distal;Right Forearm   02/16/19 2240    Forearm   3                Hospital Medications (active)       Dose Frequency Start End    acetaminophen (TYLENOL) tablet 650 mg 650 mg Every 6 Hours PRN 2/10/2019     Sig - Route: Take 2 tablets by mouth Every 6 (Six) Hours As Needed for Mild Pain . - Oral    Cosign for Ordering: Accepted by Charles Dickinson MD on 2/10/2019  9:45 AM    aspirin EC tablet 81 mg 81 mg Daily 2/7/2019     Sig - Route: Take 1 tablet by mouth Daily. - Oral    budesonide-formoterol (SYMBICORT) 160-4.5 MCG/ACT inhaler 2 puff 2 puff 2 Times Daily - RT 2/7/2019     Sig - Route: Inhale 2 puffs 2 (Two) Times a Day. - Inhalation    bumetanide (BUMEX) tablet 1 mg 1 mg 2 Times Daily 2/19/2019     Sig - Route: Take 1 tablet by mouth 2 (Two) Times a Day. - Oral    dextrose (D50W) 25 g/ 50mL Intravenous Solution 25 g 25 g Every 15 Minutes PRN 2/6/2019     Sig -  Route: Infuse 50 mL into a venous catheter Every 15 (Fifteen) Minutes As Needed for Low Blood Sugar (Blood Sugar Less Than 70). - Intravenous    Cosign for Ordering: Accepted by Charles Dickinson MD on 2/6/2019 12:08 PM    dextrose (GLUTOSE) oral gel 15 g 15 g Every 15 Minutes PRN 2/6/2019     Sig - Route: Take 15 g by mouth Every 15 (Fifteen) Minutes As Needed for Low Blood Sugar (Blood sugar less than 70). - Oral    Cosign for Ordering: Accepted by Charles Dickinson MD on 2/6/2019 12:08 PM    glucagon (human recombinant) (GLUCAGEN DIAGNOSTIC) injection 1 mg 1 mg As Needed 2/6/2019     Sig - Route: Inject 1 mg under the skin into the appropriate area as directed As Needed (Blood Glucose Less Than 70). - Subcutaneous    Cosign for Ordering: Accepted by Charles Dcikinson MD on 2/6/2019 12:08 PM    guaiFENesin (MUCINEX) 12 hr tablet 1,200 mg 1,200 mg Every 12 Hours Scheduled 2/6/2019     Sig - Route: Take 2 tablets by mouth Every 12 (Twelve) Hours. - Oral    Cosign for Ordering: Accepted by Charles Dickinson MD on 2/6/2019 12:08 PM    heparin (porcine) 5000 UNIT/ML injection 5,000 Units 5,000 Units Every 8 Hours Scheduled 2/18/2019     Sig - Route: Inject 1 mL under the skin into the appropriate area as directed Every 8 (Eight) Hours. - Subcutaneous    Hold medication 1 each Continuous PRN 2/15/2019     Sig - Route: 1 each Continuous As Needed (For 24 Hours Prior to Thoracentesis). - Does not apply    hydrALAZINE (APRESOLINE) injection 10 mg 10 mg Every 6 Hours PRN 2/8/2019     Sig - Route: Infuse 0.5 mL into a venous catheter Every 6 (Six) Hours As Needed for High Blood Pressure. - Intravenous    hydrALAZINE (APRESOLINE) tablet 75 mg 75 mg 3 Times Daily 2/15/2019     Sig - Route: Take 75 mg by mouth 3 (Three) Times a Day. - Oral    insulin aspart (novoLOG) injection 0-9 Units 0-9 Units 4 Times Daily Before Meals & Nightly 2/19/2019     Sig - Route: Inject 0-9 Units under the  "skin into the appropriate area as directed 4 (Four) Times a Day Before Meals & at Bedtime. - Subcutaneous    Cosign for Ordering: Accepted by Martin Velasco DO on 2/19/2019  4:22 PM    insulin detemir (LEVEMIR) injection 10 Units 10 Units Nightly 2/20/2019     Sig - Route: Inject 10 Units under the skin into the appropriate area as directed Every Night. - Subcutaneous    Cosign for Ordering: Accepted by Martin Velasco DO on 2/20/2019 10:37 AM    ipratropium-albuterol (DUO-NEB) nebulizer solution 3 mL 3 mL 4 Times Daily - RT 2/6/2019     Sig - Route: Take 3 mL by nebulization 4 (Four) Times a Day. - Nebulization    Cosign for Ordering: Accepted by Charles Dickinson MD on 2/6/2019 12:08 PM    ipratropium-albuterol (DUO-NEB) nebulizer solution 3 mL 3 mL Every 4 Hours PRN 2/7/2019     Sig - Route: Take 3 mL by nebulization Every 4 (Four) Hours As Needed for Wheezing or Shortness of Air. - Nebulization    Cosign for Ordering: Accepted by Charles Dickinson MD on 2/7/2019 11:23 AM    Magnesium Sulfate 2 gram / 50mL Infusion (GIVE X 3 BAGS TO EQUAL 6GM TOTAL DOSE) - Mg 1.1 - 1.5 mg/dl 2 g As Needed 2/6/2019     Sig - Route: Infuse 50 mL into a venous catheter As Needed (See Administration Instructions). - Intravenous    Cosign for Ordering: Accepted by Charles Dickinson MD on 2/6/2019 12:08 PM    Linked Group 1:  \"Or\" Linked Group Details        Magnesium Sulfate 2 gram Bolus, followed by 8 gram infusion (total Mg dose 10 grams)- Mg less than or equal to 1mg/dL 2 g As Needed 2/6/2019     Sig - Route: Infuse 50 mL into a venous catheter As Needed (See Administration Instructions). - Intravenous    Cosign for Ordering: Accepted by Charles Dickinson MD on 2/6/2019 12:08 PM    Linked Group 1:  \"Or\" Linked Group Details        Magnesium Sulfate 4 gram infusion- Mg 1.6-1.9 mg/dL 4 g As Needed 2/6/2019     Sig - Route: Infuse 100 mL into a venous catheter As Needed (See " "Administration Instructions). - Intravenous    Cosign for Ordering: Accepted by Charles Dickinson MD on 2/6/2019 12:08 PM    Linked Group 1:  \"Or\" Linked Group Details        metoprolol tartrate (LOPRESSOR) tablet 100 mg 100 mg Every 12 Hours Scheduled 2/9/2019     Sig - Route: Take 1 tablet by mouth Every 12 (Twelve) Hours. - Oral    montelukast (SINGULAIR) tablet 10 mg 10 mg Nightly 2/7/2019     Sig - Route: Take 1 tablet by mouth Every Night. - Oral    nitroglycerin (NITROSTAT) SL tablet 0.4 mg 0.4 mg Every 5 Minutes PRN 2/6/2019     Sig - Route: Place 1 tablet under the tongue Every 5 (Five) Minutes As Needed for Chest Pain (Chest Pain With Systolic Blood Pressure Greater Than 100). - Sublingual    potassium chloride (K-DUR,KLOR-CON) CR tablet 40 mEq 40 mEq As Needed 2/6/2019     Sig - Route: Take 2 tablets by mouth As Needed (potassium replacement.  see admin instructions). - Oral    Cosign for Ordering: Accepted by Charles Dickinson MD on 2/6/2019 12:08 PM    potassium chloride (KLOR-CON) packet 40 mEq 40 mEq As Needed 2/6/2019     Sig - Route: Take 40 mEq by mouth As Needed (potassium replacement, see admin instructions). - Oral    Cosign for Ordering: Accepted by Charles Dickinson MD on 2/6/2019 12:08 PM    sennosides-docusate sodium (SENOKOT-S) 8.6-50 MG tablet 2 tablet 2 tablet Nightly 2/11/2019     Sig - Route: Take 2 tablets by mouth Every Night. - Oral    sodium chloride 0.9 % flush 3 mL 3 mL Every 12 Hours Scheduled 2/6/2019     Sig - Route: Infuse 3 mL into a venous catheter Every 12 (Twelve) Hours. - Intravenous    sodium chloride 0.9 % flush 3-10 mL 3-10 mL As Needed 2/6/2019     Sig - Route: Infuse 3-10 mL into a venous catheter As Needed for Line Care. - Intravenous    insulin detemir (LEVEMIR) injection 5 Units (Discontinued) 5 Units Nightly 2/19/2019 2/20/2019    Sig - Route: Inject 5 Units under the skin into the appropriate area as directed Every Night. - " Subcutaneous    Cosign for Ordering: Accepted by Martin Velasco DO on 2/19/2019  4:22 PM            Lab Results (last 24 hours)     Procedure Component Value Units Date/Time    POC Glucose Once [[id number]]  (Abnormal) Collected:  02/20/19 1115    Specimen:  Blood Updated:  02/20/19 1121     Glucose 385 mg/dL     POC Glucose Once [[id number]]  (Abnormal) Collected:  02/20/19 0723    Specimen:  Blood Updated:  02/20/19 0730     Glucose 248 mg/dL     POC Glucose Once [[id number]]  (Abnormal) Collected:  02/20/19 0657    Specimen:  Blood Updated:  02/20/19 0704     Glucose 243 mg/dL     POC Glucose Once [[id number]]  (Abnormal) Collected:  02/20/19 0553    Specimen:  Blood Updated:  02/20/19 0559     Glucose 243 mg/dL     Osmolality, Calculated [[id number]]  (Normal) Collected:  02/20/19 0059    Specimen:  Blood Updated:  02/20/19 0317     Osmolality Calc 276.7 mOsm/kg     Comprehensive Metabolic Panel [[id number]]  (Abnormal) Collected:  02/20/19 0059    Specimen:  Blood Updated:  02/20/19 0314     Glucose 124 mg/dL      BUN 48 mg/dL      Creatinine 1.35 mg/dL      Sodium 131 mmol/L      Potassium 4.2 mmol/L      Chloride 100 mmol/L      CO2 24.7 mmol/L      Calcium 8.2 mg/dL      Total Protein 5.1 g/dL      Albumin 2.90 g/dL      ALT (SGPT) 19 U/L      AST (SGOT) 16 U/L      Alkaline Phosphatase 72 U/L      Comment: Note New Reference Ranges        Total Bilirubin 0.2 mg/dL      eGFR Non African Amer 38 mL/min/1.73      Globulin 2.2 gm/dL      A/G Ratio 1.3 g/dL      BUN/Creatinine Ratio 35.6     Anion Gap 6.3 mmol/L     Narrative:       The MDRD GFR formula is only valid for adults with stable renal function between ages 18 and 70.    Protime-INR [[id number]]  (Normal) Collected:  02/20/19 0059    Specimen:  Blood Updated:  02/20/19 0136     Protime 13.2 Seconds      INR 0.98    Narrative:       Suggested INR therapeutic range for stable oral anticoagulant therapy:    Low Intensity therapy:   1.5-2.0  Moderate  Intensity therapy:   2.0-3.0  High Intensity therapy:   2.5-4.0    CBC & Differential [[id number]] Collected:  02/20/19 0059    Specimen:  Blood Updated:  02/20/19 0132    Narrative:       The following orders were created for panel order CBC & Differential.  Procedure                               Abnormality         Status                     ---------                               -----------         ------                     CBC Auto Differential[[id number]]        Abnormal            Final result                 Please view results for these tests on the individual orders.    CBC Auto Differential [[id number]]  (Abnormal) Collected:  02/20/19 0059    Specimen:  Blood Updated:  02/20/19 0132     WBC 9.39 10*3/mm3      RBC 3.29 10*6/mm3      Hemoglobin 9.4 g/dL      Hematocrit 29.7 %      MCV 90.3 fL      MCH 28.6 pg      MCHC 31.6 g/dL      RDW 14.2 %      RDW-SD 44.6 fl      MPV 11.6 fL      Platelets 338 10*3/mm3      Neutrophil % 70.7 %      Lymphocyte % 15.2 %      Monocyte % 11.1 %      Eosinophil % 1.1 %      Basophil % 0.4 %      Immature Grans % 1.5 %      Neutrophils, Absolute 6.64 10*3/mm3      Lymphocytes, Absolute 1.43 10*3/mm3      Monocytes, Absolute 1.04 10*3/mm3      Eosinophils, Absolute 0.10 10*3/mm3      Basophils, Absolute 0.04 10*3/mm3      Immature Grans, Absolute 0.14 10*3/mm3     POC Glucose Once [[id number]]  (Abnormal) Collected:  02/19/19 1927    Specimen:  Blood Updated:  02/19/19 1933     Glucose 265 mg/dL     Body Fluid Culture In Blood Culture Bottles - Pleural Fluid, Pleural Cavity [[id number]] Collected:  02/15/19 1539    Specimen:  Pleural Fluid from Pleural Cavity Updated:  02/19/19 1702     BF Culture No growth at 4 days    POC Glucose Once [[id number]]  (Abnormal) Collected:  02/19/19 1642    Specimen:  Blood Updated:  02/19/19 1649     Glucose 247 mg/dL     JAMIE,PE and FLC, Serum [[id number]]  (Abnormal) Collected:  02/16/19 0111    Specimen:  Blood Updated:  02/19/19 1615     IgG  589 mg/dL      IgA 263 mg/dL      IgM 28 mg/dL      Total Protein 5.2 g/dL      Albumin 2.7 g/dL      Alpha-1-Globulin 0.3 g/dL      Alpha-2-Globulin 0.8 g/dL      Beta Globulin 0.8 g/dL      Gamma Globulin 0.5 g/dL      M-Juan J Not Observed g/dL      Globulin 2.5 g/dL      A/G Ratio 1.1     Immunofixation Reflex, Serum Comment     Comment: No monoclonality detected.        Please note Comment     Comment: Protein electrophoresis scan will follow via computer, mail, or   delivery.        Free Light Chain, Kappa 44.8 mg/L      Free Lambda Light Chains 22.2 mg/L      Kappa/Lambda Ratio 2.02    Narrative:       Performed at:  01 - [facility]  [id number]  : Rony Naik PhD, Phone:  [phone]    Cholesterol, Body Fluid - Pleural Fluid, Pleural Cavity [[id number]] Collected:  02/15/19 1539    Specimen:  Pleural Fluid from Pleural Cavity Updated:  02/19/19 1414     Cholesterol, Fluid 47 mg/dL      Comment: INTERPRETIVE INFORMATION: Cholesterol, Body Fluid  For information on body fluid reference ranges and/or interpretive  guidance visit [email or web address]  Test developed and characteristics determined by [employer]. See Compliance Statement B: [employer]/CS       Narrative:       Performed at:  01 - [facility]  [address] [place] [address]  [id number]  : Santosh Mae MD, Phone:  [phone]        Orders (last 24 hrs)     Start     Ordered    02/20/19 2100  insulin detemir (LEVEMIR) injection 10 Units  Nightly      02/20/19 0720    02/20/19 1225  Discontinue IV  Once      02/20/19 1227    02/20/19 1212  Diabetes Education  Once     Comments:  Sliding scale instructions with meals:      Blood glucose 150-199 mg/dL - 2 units  Blood glucose 200-249 mg/dL - 4 units  Blood glucose 250-299 mg/dL - 6 units  Blood glucose 300-349 mg/dL - 7 units  Blood glucose 350-400 mg/dL - 8 units  Blood glucose greater than 400 mg/dL -  9 units & call PCP    02/20/19 1217    02/20/19 1211  Discharge Prescription Status  Continuous      02/20/19 1217    02/20/19 1211  ACEI or ARB for EF < 40%  Once      02/20/19 1217    02/20/19 1211  Anticoagulation Therapy for Atrial Fibrillation or Atrial Flutter Prescribed at Discharge  Once      02/20/19 1217    02/20/19 1210  Discontinue IV  Once      02/20/19 1217    02/20/19 1210  Discontinue Telemetry  Once      02/20/19 1217    02/20/19 1207  Discharge patient  Once      02/20/19 1217    02/20/19 1207  Discontinue IV  Once      02/20/19 1217    02/20/19 1122  POC Glucose Once  Once      02/20/19 1115    02/20/19 1030  Diet Regular; Thin; Cardiac, Consistent Carbohydrate, Daily Fluid Restriction; 1500 mL Fluid Per Day  Diet Effective Now      02/20/19 1030    02/20/19 1030  Fu 2-3 weeks Needs to arrange outpatient appointment with Dr Uribe in 2-3 weeks for MGUS  Nursing Communication  Once     Comments:  Fu 2-3 weeks  Needs to arrange outpatient appointment with Dr Uribe in 2-3 weeks for MGUS    02/20/19 1030    02/20/19 0743  Diet Regular; Thin; Cardiac, Consistent Carbohydrate  Diet Effective Now,   Status:  Canceled      02/20/19 0742    02/20/19 0731  POC Glucose Once  Once      02/20/19 0723    02/20/19 0705  POC Glucose Once  Once      02/20/19 0657    02/20/19 0600  CBC Auto Differential  PROCEDURE ONCE      02/20/19 0001    02/20/19 0600  POC Glucose Once  Once      02/20/19 0553    02/20/19 0305  Osmolality, Calculated  Once      02/20/19 0304    02/20/19 0001  NPO Diet  Diet Effective Midnight,   Status:  Canceled     Comments:  FOR U/S GALLBLADDER.    02/19/19 1832    02/20/19 0000  CT Chest Without Contrast  1 Time Imaging      02/19/19 1817    02/20/19 0000  warfarin (COUMADIN) 5 MG tablet  Nightly      02/20/19 1217    02/20/19 0000  insulin aspart (novoLOG) 100 UNIT/ML injection      02/20/19 1217    02/20/19 0000  hydrALAZINE (APRESOLINE) 25 MG tablet  3 Times Daily      02/20/19 1216     02/20/19 0000  metoprolol tartrate (LOPRESSOR) 25 MG tablet  Every 12 Hours Scheduled,   Status:  Discontinued      02/20/19 1217    02/20/19 0000  bumetanide (BUMEX) 1 MG tablet  2 Times Daily      02/20/19 1217    02/20/19 0000  Protime-INR     Comments:  Send to Josie Massey at [facility] in [place], [place]      02/20/19 1217    02/20/19 0000  Referral to [facility]      02/20/19 1217    02/20/19 0000  Discharge Instructions     Comments:  -Low salt, diabetic diet  -Call PCP if blood glucose>400  -Check blood glucose 3x day with meals    02/20/19 1217    02/20/19 0000  Discharge Follow-up with PCP      02/20/19 1217    02/20/19 0000  Discharge Follow-up with Specified Provider: Dr. Shaikh or partners; 2 Weeks      02/20/19 1217    02/20/19 0000  Discharge Follow-up with Specialty: Keep pulmonology appointment in early March 02/20/19 1217    02/20/19 0000  Discharge Follow-up with Specified Provider: Keep appointment with Dr. Ventura as previously written      02/20/19 1217    02/20/19 0000  Diet: Consistent Carbohydrate      02/20/19 1217    02/20/19 0000  Activity as Tolerated      02/20/19 1217    02/20/19 0000  Walker      02/20/19 1217    02/20/19 0000  Basic Metabolic Panel     Comments:  To be sent to Josie Massey. Performed by [facility].      02/20/19 1217    02/20/19 0000  Resume Oxygen Therapy After Discharge      02/20/19 1217    02/20/19 0000  metoprolol tartrate (LOPRESSOR) 100 MG tablet  Every 12 Hours Scheduled      02/20/19 1222    02/20/19 0000  Discharge Follow-up with Specified Provider: Dr. Ventura/ Z[facility]; 2 Weeks      02/20/19 1227    02/19/19 2100  insulin detemir (LEVEMIR) injection 3 Units  Nightly,   Status:  Discontinued      02/19/19 1149    02/19/19 2100  insulin detemir (LEVEMIR) injection 5 Units  Nightly,   Status:  Discontinued      02/19/19 1149    02/19/19 1934  POC Glucose Once  Once      02/19/19 1927    02/19/19 1703  Inpatient General Surgery Consult  Once      Specialty:  General Surgery  Provider:  Umm Talavera MD    02/19/19 1702    02/19/19 1658  US Gallbladder  1 Time Imaging      02/19/19 1657    02/19/19 1650  POC Glucose Once  Once      02/19/19 1642    02/19/19 1130  insulin aspart (novoLOG) injection 0-9 Units  4 Times Daily Before Meals & Nightly      02/19/19 0747    02/19/19 0900  bumetanide (BUMEX) tablet 1 mg  2 Times Daily      02/19/19 0802    02/19/19 0600  CBC & Differential  Daily      02/18/19 0918    02/19/19 0600  Comprehensive Metabolic Panel  Daily      02/18/19 0918    02/18/19 2200  heparin (porcine) 5000 UNIT/ML injection 5,000 Units  Every 8 Hours Scheduled      02/18/19 1528    02/16/19 0600  Basic Metabolic Panel  Daily      02/15/19 1243    02/15/19 1600  hydrALAZINE (APRESOLINE) tablet 75 mg  3 Times Daily      02/15/19 1233    02/15/19 1517  Hold medication  Continuous PRN      02/15/19 1518    02/12/19 1800  Dietary Nutrition Supplements Boost Plus (Ensure Enlive, Ensure Plus)  Daily With Lunch & Dinner     Comments:  strawberry    02/12/19 1526    02/11/19 2100  sennosides-docusate sodium (SENOKOT-S) 8.6-50 MG tablet 2 tablet  Nightly      02/11/19 1158    02/11/19 1158  Protime-INR  Daily      02/11/19 1157    02/10/19 0911  acetaminophen (TYLENOL) tablet 650 mg  Every 6 Hours PRN      02/10/19 0911    02/09/19 2100  metoprolol tartrate (LOPRESSOR) tablet 100 mg  Every 12 Hours Scheduled      02/09/19 1058    02/08/19 0330  hydrALAZINE (APRESOLINE) injection 10 mg  Every 6 Hours PRN      02/08/19 0330    02/07/19 0820  ipratropium-albuterol (DUO-NEB) nebulizer solution 3 mL  Every 4 Hours PRN      02/07/19 0832    02/07/19 0436  aspirin EC tablet 81 mg  Daily      02/07/19 0440    02/07/19 0436  budesonide-formoterol (SYMBICORT) 160-4.5 MCG/ACT inhaler 2 puff  2 Times Daily - RT      02/07/19 0440    02/07/19 0436  montelukast (SINGULAIR) tablet 10 mg  Nightly      02/07/19 0440    02/06/19 1300  ipratropium-albuterol (DUO-NEB)  nebulizer solution 3 mL  4 Times Daily - RT      02/06/19 0825    02/06/19 1100  POC Glucose 4x Daily AC & at Bedtime  4 Times Daily Before Meals & at Bedtime      02/06/19 0807    02/06/19 0915  guaiFENesin (MUCINEX) 12 hr tablet 1,200 mg  Every 12 Hours Scheduled      02/06/19 0825    02/06/19 0807  dextrose (GLUTOSE) oral gel 15 g  Every 15 Minutes PRN      02/06/19 0807    02/06/19 0807  dextrose (D50W) 25 g/ 50mL Intravenous Solution 25 g  Every 15 Minutes PRN      02/06/19 0807    02/06/19 0807  glucagon (human recombinant) (GLUCAGEN DIAGNOSTIC) injection 1 mg  As Needed      02/06/19 0807    02/06/19 0807  Magnesium Sulfate 2 gram Bolus, followed by 8 gram infusion (total Mg dose 10 grams)- Mg less than or equal to 1mg/dL  As Needed      02/06/19 0807    02/06/19 0807  Magnesium Sulfate 2 gram / 50mL Infusion (GIVE X 3 BAGS TO EQUAL 6GM TOTAL DOSE) - Mg 1.1 - 1.5 mg/dl  As Needed      02/06/19 0807    02/06/19 0807  Magnesium Sulfate 4 gram infusion- Mg 1.6-1.9 mg/dL  As Needed      02/06/19 0807    02/06/19 0807  potassium chloride (K-DUR,KLOR-CON) CR tablet 40 mEq  As Needed      02/06/19 0807    02/06/19 0807  potassium chloride (KLOR-CON) packet 40 mEq  As Needed      02/06/19 0807    02/06/19 0130  sodium chloride 0.9 % flush 3 mL  Every 12 Hours Scheduled      02/06/19 0039    02/06/19 0038  sodium chloride 0.9 % flush 3-10 mL  As Needed      02/06/19 0039    02/06/19 0038  nitroglycerin (NITROSTAT) SL tablet 0.4 mg  Every 5 Minutes PRN      02/06/19 0039    Unscheduled  Oxygen Therapy- Nasal Cannula; 2 LPM; Titrate for SPO2: 92%, Greater Than or Equal To  Continuous PRN      02/05/19 1829    Unscheduled  ECG 12 Lead  As Needed     Comments:  Nurse to Release if Patient Expericences Acute Chest Pain or Dysrhythmias    02/06/19 0039    Unscheduled  Potassium  As Needed     Comments:  For Ventricular Arrhythmias      02/06/19 0039    Unscheduled  Magnesium  As Needed     Comments:  For Ventricular  Arrhythmias      02/06/19 0039    Unscheduled  Troponin  As Needed     Comments:  For Chest Pain      02/06/19 0039    Unscheduled  Digoxin Level  As Needed     Comments:  For Atrial Arrhythmias      02/06/19 0039    Unscheduled  Blood Gas, Arterial  As Needed     Comments:  Per O2 PolicyNotify Physician      02/06/19 0039    Unscheduled  Magnesium  As Needed      02/06/19 0807    Unscheduled  Potassium  As Needed      02/06/19 0807    Unscheduled  Transfuse Fresh Frozen Plasma, 3 Units  Transfusion      02/14/19 1606    Unscheduled  Bladder Scan if Patient Unable to Void 4-6 Hours After Catheter Removal  As Needed      02/18/19 1154    Unscheduled  If Bladder Scan Volume is Less Than 350-500mL & Patient is Without Symptoms of Bladder Discomfort / Distention Monitor Every 1-2 Hours for Spontaneous Void  As Needed      02/18/19 1154    Unscheduled  Straight Cath Every 4-6 Hours As Needed If Patient is Unable to Void After 4-6 Hours, Bladder Scan Volume is Greater Than 350-500mL & Patient Has Symptoms of Bladder Discomfort / Distention  As Needed      02/18/19 1154    Unscheduled  Schedule / Prompt Voiding For Patients With Urinary Incontinence  As Needed      02/18/19 1154    --  metFORMIN (GLUCOPHAGE) 1000 MG tablet  2 Times Daily      02/06/19 0033    --  warfarin (COUMADIN) 2 MG tablet  Nightly,   Status:  Discontinued      02/06/19 0033    --  SCANNED - TELEMETRY        02/05/19 0000    --  SCANNED - TELEMETRY        02/05/19 0000    --  SCANNED - TELEMETRY        02/05/19 0000    --  SCANNED - TELEMETRY        02/05/19 0000    --  SCANNED - TELEMETRY        02/05/19 0000    --  SCANNED - TELEMETRY        02/05/19 0000          Operative/Procedure Notes (last 24 hours) (Notes from 2/19/2019 12:27 PM through 2/20/2019 12:27 PM)     No notes of this type exist for this encounter.           Physician Progress Notes (last 24 hours) (Notes from 2/19/2019 12:28 PM through 2/20/2019 12:28 PM)      Andrés Shaikh MD at  2/20/2019 10:27 AM          Nephrology  Note    Subjective     She wants to go home. She denies chest pain or SOB. Feeling well    Objective     Vital Signs  Temp:  [97.9 °F (36.6 °C)-98.3 °F (36.8 °C)] 98 °F (36.7 °C)  Heart Rate:  [56-81] 56  Resp:  [18] 18  BP: (117-161)/(44-56) 161/56    I/O this shift:  In: 240 [P.O.:240]  Out: -   I/O last 3 completed shifts:  In: 480 [P.O.:480]  Out: 150 [Urine:150]    Physical Examination:    General Appearance : alert, appears stated age, cooperative and no distress  Head : normocephalic  Eyes : no pallor   Throat : oral mucosa moist  Neck:no JVD  Lungs : reduced breath sounds at bases  Heart : regular rhythm & normal rate, normal S1, S2, no murmur  Abdomen :   soft non-tender  Extremities :  trace edema  Neurologic : orientated to person, place, time, grossly no focal deficitis    Laboratory Data :      WBC WBC   Date Value Ref Range Status   02/20/2019 9.39 4.50 - 12.50 10*3/mm3 Final   02/19/2019 7.29 4.50 - 12.50 10*3/mm3 Final   02/18/2019 10.00 4.50 - 12.50 10*3/mm3 Final      HGB Hemoglobin   Date Value Ref Range Status   02/20/2019 9.4 (L) 12.0 - 16.0 g/dL Final   02/19/2019 9.6 (L) 12.0 - 16.0 g/dL Final   02/18/2019 10.2 (L) 12.0 - 16.0 g/dL Final      HCT Hematocrit   Date Value Ref Range Status   02/20/2019 29.7 (L) 37.0 - 47.0 % Final   02/19/2019 30.3 (L) 37.0 - 47.0 % Final   02/18/2019 32.4 (L) 37.0 - 47.0 % Final      Platlets No results found for: LABPLAT   MCV MCV   Date Value Ref Range Status   02/20/2019 90.3 80.0 - 94.0 fL Final   02/19/2019 91.3 80.0 - 94.0 fL Final   02/18/2019 89.8 80.0 - 94.0 fL Final          Sodium Sodium   Date Value Ref Range Status   02/20/2019 131 (L) 135 - 153 mmol/L Final   02/19/2019 131 (L) 135 - 153 mmol/L Final   02/18/2019 136 135 - 153 mmol/L Final      Potassium Potassium   Date Value Ref Range Status   02/20/2019 4.2 3.5 - 5.3 mmol/L Final   02/19/2019 4.5 3.5 - 5.3 mmol/L Final   02/18/2019 4.1 3.5 - 5.3 mmol/L  Final   02/17/2019 5.1 3.5 - 5.3 mmol/L Final      Chloride Chloride   Date Value Ref Range Status   02/20/2019 100 99 - 112 mmol/L Final   02/19/2019 99 99 - 112 mmol/L Final   02/18/2019 107 99 - 112 mmol/L Final      CO2 CO2   Date Value Ref Range Status   02/20/2019 24.7 24.3 - 31.9 mmol/L Final   02/19/2019 24.9 24.3 - 31.9 mmol/L Final   02/18/2019 24.7 24.3 - 31.9 mmol/L Final      BUN BUN   Date Value Ref Range Status   02/20/2019 48 (H) 7 - 21 mg/dL Final   02/19/2019 50 (H) 7 - 21 mg/dL Final   02/18/2019 45 (H) 7 - 21 mg/dL Final      Creatinine Creatinine   Date Value Ref Range Status   02/20/2019 1.35 (H) 0.43 - 1.29 mg/dL Final   02/19/2019 1.45 (H) 0.43 - 1.29 mg/dL Final   02/18/2019 1.26 0.43 - 1.29 mg/dL Final      Calcium Calcium   Date Value Ref Range Status   02/20/2019 8.2 7.7 - 10.0 mg/dL Final   02/19/2019 7.7 7.7 - 10.0 mg/dL Final   02/18/2019 7.9 7.7 - 10.0 mg/dL Final      PO4 No results found for: CAPO4   Albumin Albumin   Date Value Ref Range Status   02/20/2019 2.90 (L) 3.40 - 4.80 g/dL Final   02/19/2019 2.90 (L) 3.40 - 4.80 g/dL Final      Magnesium No results found for: MG   Uric Acid No results found for: URICACID     Radiology results :     Imaging Results (last 24 hours)     Procedure Component Value Units Date/Time    US Gallbladder [[id number]] Collected:  02/20/19 0836     Updated:  02/20/19 0839    Narrative:       EXAMINATION: US GALLBLADDER-         CLINICAL INDICATION:     RUQ pain; A41.9-Sepsis, unspecified organism;  R65.21-Severe sepsis with septic shock; N17.9-Acute kidney failure,  unspecified     TECHNIQUE: Multiplanar gray scale ultrasound of the abdomen.     COMPARISON: NONE      FINDINGS:   Visualized pancreas is unremarkable.   The gallbladder is unremarkable without stones or wall thickening.   The CBD measures 6.5 mm.  The liver demonstrates normal echogenicity without focal lesion.    No ascites demonstrated.   There is no sonographic Coleman sign.        Impression:       No sonographic findings to explain abdominal pain.      This report was finalized on 2/20/2019 8:37 AM by Dr. Duke Brewer MD.       CT Chest Without Contrast [[id number]] Collected:  02/20/19 0836     Updated:  02/20/19 0839    Narrative:       CT CHEST WO CONTRAST-        TECHNIQUE: Multiple axial CT images were obtained from lung apex through  upper abdomen without administration of IV contrast. Reformatted images  in the coronal and/or sagittal plane(s) were generated from the axial  data set to facilitate diagnostic accuracy and/or surgical planning.  Oral Contrast:NONE.     Radiation dose reduction techniques were utilized per ALARA protocol.  Automated exposure control was initiated through either or [employer] or  [employer] software packages by  protocol.          [id number] mGy.cm  Clinical information  Pleural effusion; A41.9-Sepsis, unspecified organism; R65.21-Severe  sepsis with septic shock; N17.9-Acute kidney failure, unspecified      Comparison  NONE.     Findings  LUNGS: PATCHY LEFT LUNG BASE GROUNDGLASS ATTENUATION THAT MAY REPRESENT  SOME PNEUMONIA OR POTENTIALLY ALVEOLAR EDEMA.     HEART: CARDIOMEGALY.     PERICARDIUM: No effusion.     MEDIASTINUM: No masses. No enlarged lymph nodes.  No fluid collections.     PLEURA: SMALL BILATERAL PLEURAL EFFUSIONS.     MAJOR AIRWAYS: Clear. No intrinsic mass.     VASCULATURE: No evidence of aneurysm.     VISUALIZED UPPER ABDOMEN:        LIVER: Homogeneous. No focal hepatic mass or ductal dilatation.        SPLEEN: Homogeneous. No splenomegaly.        ADRENALS: No mass.        KIDNEYS: No mass. No obstructive uropathy.  No evidence of  urolithiasis.        GI TRACT: Non-dilated. No definite wall thickening.        PERITONEUM: No free air. No free fluid or loculated fluid  collections.           ABDOMINAL WALL: No focal hernia or mass.           OTHER: None.     BONES: No acute bony abnormality.       Impression:        Impression:  Small bilateral pleural effusions. Patchy left lung base groundglass  attenuation that may represent some pneumonia or potentially alveolar  edema. Cardiomegaly.     This report was finalized on 2/20/2019 8:36 AM by Dr. Duke Brewer MD.                 Medications:        aspirin 81 mg Oral Daily   budesonide-formoterol 2 puff Inhalation BID - RT   bumetanide 1 mg Oral BID   guaiFENesin 1,200 mg Oral Q12H   heparin (porcine) 5,000 Units Subcutaneous Q8H   hydrALAZINE 75 mg Oral TID   insulin aspart 0-9 Units Subcutaneous 4x Daily AC & at Bedtime   insulin detemir 10 Units Subcutaneous Nightly   ipratropium-albuterol 3 mL Nebulization 4x Daily - RT   metoprolol tartrate 100 mg Oral Q12H   montelukast 10 mg Oral Nightly   sennosides-docusate sodium 2 tablet Oral Nightly   sodium chloride 3 mL Intravenous Q12H       hold 1 each       Assessment/Plan       Septic shock (CMS/HCC)      1. CKD stage 3 with baseline creatinine 1.1-1.3 since 2015 . Creatinine is better at 1.3 today  contiunue bumex  1 mg po BID    2. Proteinuria : urine protein/creatinine is 1.6 gm/gm. Presumed diabetic nephropathy  Anti GBM and MM labs pending, rest serologies negative    3. HTN : stable    4. Bilateral pleural effusions : CT chest noted and pleural  Effusions are small    5. MGUS : no M spike, and serum immunofixation is negative but serum K/L is slightly high. Arrange outpatient hem onc fu    6. Dilutional hyponatremia ct fluid restriction, Na 131    Ok to dc from renal standpoint. Fu in 2-3 weeks  I discussed the patient's findings and my recommendations with patient    Andrés Shaikh MD  02/20/19  10:27 AM            Electronically signed by Andrés Shaikh MD at 2/20/2019 10:29 AM          Consult Notes (last 24 hours) (Notes from 2/19/2019 12:28 PM through 2/20/2019 12:28 PM)      Umm Talavera MD at 2/19/2019  6:17 PM      Consult Orders    1. Inpatient General Surgery Consult [[id number]] ordered by Kaley Cutler,  TISH at 02/19/19 3909                    Consulting physician:  Dr. Talavera    Referring physician: Dr. Noonan    Date of consultation: 02/19/19     Chief complaint pleural effusion    Subjective     Patient is a 73 y.o. female who was admitted on 2/5/19 with sepsis.  During the hospitalization the patient was found to have a left pleural effusion and had attempted thoracentesis with only a small amount of fluid obtained.  Patient's last chest x-ray was yesterday which showed a tiny left pleural effusion.  According to the chart notes the patient had an ultrasound of the chest which reported a complex fluid collection and concern was raised for the possible development of a pulmonary abscess or empyema because of the complex nature of the fluid collection.  The patient states she feels good and she is anxious to go home.  She is currently off antibiotics.  She has been getting fairly aggressive diuresis.      Review of Systems  Review of Systems - General ROS: negative for - weight loss  Psychological ROS: negative for - behavioral disorder  Ophthalmic ROS: negative for - dry eyes  ENT ROS: negative for - vertigo or vocal changes  Hematological and Lymphatic ROS: negative for - swollen lymph nodes, DVT, PE.   Respiratory ROS: negative for - sputum changes or stridor.  Cardiovascular ROS: negative for - irregular heartbeat or murmur  Gastrointestinal ROS: negative for - blood in stools or change in stools  Genito-Urinary ROS: negative for - hematuria or incontinence  Musculoskeletal ROS: negative for - gait disturbance      History  Past Medical History:   Diagnosis Date   • Diabetes mellitus (CMS/[facility])    • Hypertension    • NSTEMI (non-ST elevated myocardial infarction) (CMS/[facility]) 01/2019   • Paroxysmal atrial fibrillation (CMS/[facility])    • Severe sepsis with septic shock (CMS/[facility])      History reviewed. No pertinent surgical history.  History reviewed. No pertinent family history.  Social History     Tobacco Use   •  Smoking status: Never Smoker   • Smokeless tobacco: Never Used   Substance Use Topics   • Alcohol use: No     Frequency: Never   • Drug use: No     Medications Prior to Admission   Medication Sig Dispense Refill Last Dose   • aspirin 81 MG EC tablet Take 1 tablet by mouth Daily.   2/5/2019 at Unknown time   • budesonide-formoterol (SYMBICORT) 160-4.5 MCG/ACT inhaler Inhale 2 puffs 2 (Two) Times a Day. 1 inhaler 12 2/5/2019 at AM   • metFORMIN (GLUCOPHAGE) 1000 MG tablet Take 1,000 mg by mouth 2 (Two) Times a Day.   2/5/2019 at AM   • metoprolol tartrate (LOPRESSOR) 25 MG tablet Take 1 tablet by mouth Every 12 (Twelve) Hours. 60 tablet 0 2/5/2019 at AM   • NIFEdipine CC (ADALAT CC) 60 MG 24 hr tablet Take 1 tablet by mouth Daily. 30 tablet 0 2/5/2019 at AM   • warfarin (COUMADIN) 2 MG tablet Take 2 mg by mouth Every Night.   2/4/2019 at PM   • atorvastatin (LIPITOR) 40 MG tablet Take 1 tablet by mouth Every Night. 30 tablet 0 2/4/2019 at PM   • insulin detemir (LEVEMIR) 100 UNIT/ML injection Inject 10 Units under the skin into the appropriate area as directed Every Night.  12 2/4/2019 at PM   • montelukast (SINGULAIR) 10 MG tablet Take 10 mg by mouth Every Night.   2/4/2019 at PM     Allergies:  Sulfa antibiotics    Objective     Vital Signs  Temp:  [98.1 °F (36.7 °C)-98.3 °F (36.8 °C)] 98.3 °F (36.8 °C)  Heart Rate:  [56-84] 60  Resp:  [18] 18  BP: (117-146)/(43-54) 137/54    Physical Exam:  General:  This is a WD WN petite female in no acute distress  Vital signs stable, afebrile  HEENT exam:  WNL. Sclera are anicteric.  EOMI  Neck:  supple, FROM.  No JVD.  Trachea midline  Lungs:  Respiratory effort normal. Auscultation: Clear, without wheezes, rhonchi, rales.  Breath sounds are decreased in the left base as compared to the right  Heart:  Regular rate and rhythm, without murmur, gallop, rub.  No pedal edema  Abdomen: Nontender, nondistended  Musculoskeletal:  muscle strength/tone is normal.    Psyc:  alert,  oriented x 3.  Mood and affect are appropriate  skin:  Warm with good turgor.  Without rash or lesion  extremities:  Examination of the extremities revealed no cyanosis, clubbing or edema.    Results Review:       Results from last 7 days   Lab Units 02/19/19  0428 02/18/19  1146 02/18/19  0440 02/17/19  0451 02/16/19  0110 02/15/19  0826 02/15/19  0051 02/14/19  0423   WBC 10*3/mm3 7.29 10.00  --   --   --   --  9.57  --    HEMOGLOBIN g/dL 9.6* 10.2*  --   --   --   --  10.2*  --    HEMATOCRIT % 30.3* 32.4*  --   --   --   --  31.4*  --    PLATELETS 10*3/mm3 313 337  --   --   --   --  368  --    INR  0.95  --  0.97 1.03 1.08 1.17* 1.45* 2.34*     Results from last 7 days   Lab Units 02/13/19  1353   PH, ARTERIAL pH units 7.383   PO2 ART mm Hg 83.2   PCO2, ARTERIAL mm Hg 31.5*   HCO3 ART mmol/L 18.3*     Results from last 7 days   Lab Units 02/19/19  0428 02/18/19  0440 02/17/19  1956 02/17/19  0451 02/16/19  0111   SODIUM mmol/L 131* 136  --  133* 133*   POTASSIUM mmol/L 4.5 4.1 5.1 3.5 4.2   CHLORIDE mmol/L 99 107  --  103 102   CO2 mmol/L 24.9 24.7  --  23.7* 23.9*   BUN mg/dL 50* 45*  --  41* 53*   CREATININE mg/dL 1.45* 1.26  --  1.20 1.31*   EGFR IF NONAFRICN AM mL/min/1.73 35* 42*  --  44* 40*   CALCIUM mg/dL 7.7 7.9  --  7.6* 7.7   GLUCOSE mg/dL 436* 56*  --  238* 270*   ALBUMIN g/dL 2.90*  --   --   --  2.7*   BILIRUBIN mg/dL 0.2  --   --   --   --    ALK PHOS U/L 73  --   --   --   --    AST (SGOT) U/L 13  --   --   --   --    ALT (SGPT) U/L 9*  --   --   --   --    Estimated Creatinine Clearance: 27.6 mL/min (A) (by C-G formula based on SCr of 1.45 mg/dL (H)).  No results found for: AMMONIA                    Imaging:  Imaging Results (last 24 hours)     ** No results found for the last 24 hours. **          Prior CT of the chest on 2/8/19 and recent chest x-ray were reviewed and I agree with the assessment      Impression:  Patient Active Problem List   Diagnosis Code   • Pneumonia J18.9   • Septic  shock (CMS/[facility]) A41.9, R65.21   • Pseudomonas pneumonia (CMS/[facility]) J15.1     Impression: Left pleural effusion  Plan:  It is not clear to me at this point in time that the patient would obtain any benefit from a chest tube.  Last chest x-ray demonstrated only a tiny pleural effusion.  In addition if the fluid in the chest is septated/loculated drainage would not be achieved with chest tube placement  Plan is to repeat the CT scan of the chest and see what the status of the pleural effusion is.  Evidence of infection or a pleural effusion large enough to cause symptoms would be appropriate indications for chest tube placement.      Discussion:      Errors in dictation may reflect use of voice recognition software and not all errors in transcription may have been detected prior to signing.  Umm Talavera MD  19  6:21 PM    Time: Time spent:    Electronically signed by Umm Talavera MD at 2019  6:24 PM       Nutrition Notes (last 24 hours) (Notes from 2019 12:28 PM through 2019 12:28 PM)     No notes of this type exist for this encounter.           Physical Therapy Notes (last 24 hours) (Notes from 2019 12:28 PM through 2019 12:28 PM)      Heidi Qureshi, PT at 2019  3:06 PM  Version 1 of 1            19 1506   Rehab Treatment   Discipline physical therapist   Reason Treatment Not Performed patient/family declined treatment, not feeling well  (Patient noted that she did not feel well, reporting her stomach was upset.)   Recommendation   PT - Next Appointment 19       Electronically signed by Heidi Qureshi PT at 2019  3:06 PM     Dalton, Ashley Claudene, PT at 2019 10:58 AM  Version 1 of 1         Acute Care - Physical Therapy Treatment Note  [facility]     Patient Name: Ashley Geronimo  : 1945  MRN: [id number]  Today's Date: 2019  Onset of Illness/Injury or Date of Surgery: 19  Date of Referral to PT: 19  Referring  Physician: Dr. Jefferson    Admit Date: 2/5/2019    Visit Dx:    ICD-10-CM ICD-9-CM   1. Septic shock (CMS/[facility]) A41.9 038.9    R65.21 785.52     995.92   2. Acute renal failure, unspecified acute renal failure type (CMS/[facility]) N17.9 584.9     Patient Active Problem List   Diagnosis   • Pneumonia   • Septic shock (CMS/[facility])   • Pseudomonas pneumonia (CMS/[facility])       Therapy Treatment    Rehabilitation Treatment Summary     Row Name 02/20/19 1000             Treatment Time/Intention    Discipline  physical therapist  -AD      Document Type  therapy note (daily note)  -AD      Subjective Information  fatigue  -AD      Mode of Treatment  physical therapy  -AD      Patient/Family Observations  Pt supine in bed with 2L O2 via nasal cannula. No family or visitors were present.  -AD      Care Plan Review  care plan/treatment goals reviewed;risks/benefits reviewed;patient/other agree to care plan;current/potential barriers reviewed  -AD      Total Minutes, Physical Therapy Treatment  25  -AD      Patient Effort  good  -AD      Existing Precautions/Restrictions  fall;oxygen therapy device and L/min  -AD      Patient Response to Treatment  Pt tolerated today's session well, with reports of fatigue upon conclusion. [facility] requierd CGA with bed mobility, transfers, and gait. The patient ambulated 100' with RW and then performed seated therapeutic exercises.  -AD      Recorded by [AD] Dalton, Ashley Claudene, PT 02/20/19 1057      Row Name 02/20/19 1000             Cognitive Assessment/Intervention- PT/OT    Orientation Status (Cognition)  oriented x 3  -AD      Follows Commands (Cognition)  follows one step commands  -AD      Recorded by [AD] Dalton, Ashley Claudene, PT 02/20/19 1057      Row Name 02/20/19 1000             Mobility Assessment/Intervention    Extremity Weight-bearing Status  left lower extremity;right lower extremity  -AD      Left Lower Extremity (Weight-bearing Status)  full weight-bearing (FWB)  -AD      Right Lower  Extremity (Weight-bearing Status)  full weight-bearing (FWB)  -AD      Recorded by [AD] Dalton, Ashley Claudene, PT 02/20/19 1057      Row Name 02/20/19 1000             Bed Mobility Assessment/Treatment    Bed Mobility Assessment/Treatment  bed mobility (all) activities  -AD      Will Level (Bed Mobility)  verbal cues;nonverbal cues (demo/gesture);contact guard assist  -AD      Assistive Device (Bed Mobility)  bed rails  -AD      Recorded by [AD] Dalton, Ashley Claudene, PT 02/20/19 1057      Row Name 02/20/19 1000             Transfer Assessment/Treatment    Transfer Assessment/Treatment  sit-stand transfer;stand-sit transfer  -AD      Recorded by [AD] Dalton, Ashley Claudene, PT 02/20/19 1057      Row Name 02/20/19 1000             Sit-Stand Transfer    Sit-Stand Will (Transfers)  verbal cues;nonverbal cues (demo/gesture);contact guard  -AD      Assistive Device (Sit-Stand Transfers)  walker, front-wheeled  -AD      Recorded by [AD] Dalton, Ashley Claudene, PT 02/20/19 1057      Row Name 02/20/19 1000             Stand-Sit Transfer    Stand-Sit Will (Transfers)  verbal cues;nonverbal cues (demo/gesture);contact guard  -AD      Assistive Device (Stand-Sit Transfers)  walker, front-wheeled  -AD      Recorded by [AD] Dalton, Ashley Claudene, PT 02/20/19 1057      Row Name 02/20/19 1000             Gait/Stairs Assessment/Training    77582 - Gait Training Minutes   10  -AD      Will Level (Gait)  verbal cues;nonverbal cues (demo/gesture);contact guard  -AD      Assistive Device (Gait)  walker, front-wheeled  -AD      Distance in Feet (Gait)  100  -AD      Deviations/Abnormal Patterns (Gait)  eldon decreased;gait speed decreased;stride length decreased  -AD      Comment (Gait/Stairs)  Decreased eldon, decreased weight shifting and toe clearance.  -AD      Recorded by [AD] Dalton, Ashley Claudene, PT 02/20/19 1057      Row Name 02/20/19 1000             Therapeutic Exercise     Therapeutic Exercise  seated, lower extremities  -AD      51923 - PT Therapeutic Exercise Minutes  15  -AD      Recorded by [AD] Dalton, Ashley Claudene, PT 02/20/19 1057      Row Name 02/20/19 1000             Lower Extremity Seated Therapeutic Exercise    Exercise Type, Seated Lower Extremity (Therapeutic Exercise)  AROM (active range of motion)  -AD      Expected Outcomes, Seated Lower Extremity (Therapeutic Exercise)  improve performance, gait skills;improve performance, gross motor coordination skills;improve performance, transfer skills  -AD      Sets/Reps Detail, Seated Lower Extremity (Therapeutic Exercise)  20  -AD      Transfers Skills, Training to Functional Activity, Seated Lower Extremity (Therapeutic Exercise)  able to transfer skills from training to functional activity  -AD      Recorded by [AD] Dalton, Ashley Claudene, PT 02/20/19 1057      Row Name 02/20/19 1000             Static Sitting Balance    Level of [place] (Unsupported Sitting, Static Balance)  standby assist  -AD      Sitting Position (Unsupported Sitting, Static Balance)  sitting in chair;sitting on edge of bed  -AD      Time Able to Maintain Position (Unsupported Sitting, Static Balance)  more than 5 minutes  -AD      Recorded by [AD] Dalton, Ashley Claudene, PT 02/20/19 1057      Row Name 02/20/19 1000             Static Standing Balance    Level of [place] (Supported Standing, Static Balance)  contact guard assist  -AD      Assistive Device Utilized (Supported Standing, Static Balance)  walker, rolling  -AD      Recorded by [AD] Dalton, Ashley Claudene, PT 02/20/19 1057      Row Name 02/20/19 1000             Positioning and Restraints    Pre-Treatment Position  in bed  -AD      Post Treatment Position  chair  -AD      In Chair  notified nsg;sitting;call light within reach;encouraged to call for assist;waffle cushion  -AD      Recorded by [AD] Dalton, Ashley Claudene, PT 02/20/19 1057      Row Name 02/20/19 1000              Pain Scale: Numbers Pre/Post-Treatment    Pain Scale: Numbers, Pretreatment  0/10 - no pain  -AD      Pain Scale: Numbers, Post-Treatment  0/10 - no pain  -AD      Recorded by [AD] Dalton, Ashley Claudene, PT 02/20/19 1057      Row Name 02/20/19 1000             Plan of Care Review    Plan of Care Reviewed With  patient  -AD      Recorded by [AD] Dalton, Ashley Claudene, PT 02/20/19 1057      Row Name 02/20/19 1000             Outcome Summary/Treatment Plan (PT)    Daily Summary of Progress (PT)  progress toward functional goals as expected  -AD      Recorded by [AD] Dalton, Ashley Claudene, PT 02/20/19 1057        User Key  (r) = Recorded By, (t) = Taken By, (c) = Cosigned By    Initials Name Effective Dates Discipline    AD Dalton, Ashley Claudene, PT 04/03/18 -  PT                   Physical Therapy Education     Title: PT OT SLP Therapies (Done)     Topic: Physical Therapy (Done)     Point: Mobility training (Done)     Learning Progress Summary           Patient Acceptance, E,TB, VU by SEGUNDO at 2/20/2019 10:57 AM    Acceptance, E, VU by CRISTY at 2/20/2019 12:16 AM    Acceptance, E, VU by LT at 2/19/2019  5:46 PM    Acceptance, E,D, NR,VU by AG at 2/18/2019  5:53 PM    Acceptance, E, VU by MC at 2/17/2019 12:46 AM    Acceptance, E, NR by EA at 2/14/2019  9:27 PM    Acceptance, E,TB, VU by RT at 2/14/2019  9:53 AM    Acceptance, E, VU by EH at 2/11/2019  9:38 AM    Acceptance, E, VU by BB at 2/10/2019  8:15 AM    Acceptance, E,TB, VU by KM at 2/9/2019  9:21 PM    Acceptance, E, VU by BC at 2/8/2019 12:39 PM    Acceptance, E, VU by BC at 2/8/2019 12:39 PM    Acceptance, E, VU by RANDALL at 2/8/2019  8:04 AM    Acceptance, E,TB, VU by COLTON at 2/7/2019  9:34 PM    Acceptance, E, VU by BC at 2/7/2019  3:42 PM                   Point: Home exercise program (Done)     Learning Progress Summary           Patient Acceptance, E,TB, VU by AD at 2/20/2019 10:57 AM    Acceptance, E, VU by MC at 2/20/2019 12:16 AM    Acceptance, E, VU by  LT at 2/19/2019  5:46 PM    Acceptance, E,D, NR,VU by AG at 2/18/2019  5:53 PM    Acceptance, E, VU by MC at 2/17/2019 12:46 AM    Acceptance, E, NR by EA at 2/14/2019  9:27 PM    Acceptance, E,TB, VU by RT at 2/14/2019  9:53 AM    Acceptance, E, VU by EH at 2/11/2019  9:38 AM    Acceptance, E, VU by BB at 2/10/2019  8:15 AM    Acceptance, E,TB, VU by KM at 2/9/2019  9:21 PM    Acceptance, E, VU by BC at 2/8/2019 12:39 PM    Acceptance, E, VU by BC at 2/8/2019 12:39 PM    Acceptance, E, VU by RANDALL at 2/8/2019  8:04 AM    Acceptance, E,TB, VU by KM at 2/7/2019  9:34 PM    Acceptance, E, VU by BC at 2/7/2019  3:42 PM                   Point: Body mechanics (Done)     Learning Progress Summary           Patient Acceptance, E,TB, VU by AD at 2/20/2019 10:57 AM    Acceptance, E, VU by MC at 2/20/2019 12:16 AM    Acceptance, E, VU by LT at 2/19/2019  5:46 PM    Acceptance, E,D, NR,VU by AG at 2/18/2019  5:53 PM    Acceptance, E, VU by MC at 2/17/2019 12:46 AM    Acceptance, E, NR by EA at 2/14/2019  9:27 PM    Acceptance, E,TB, VU by RT at 2/14/2019  9:53 AM    Acceptance, E, VU by EH at 2/11/2019  9:38 AM    Acceptance, E, VU by BB at 2/10/2019  8:15 AM    Acceptance, E,TB, VU by KM at 2/9/2019  9:21 PM    Acceptance, E, VU by BC at 2/8/2019 12:39 PM    Acceptance, E, VU by BC at 2/8/2019 12:39 PM    Acceptance, E, VU by RANDALL at 2/8/2019  8:04 AM    Acceptance, E,TB, VU by KM at 2/7/2019  9:34 PM    Acceptance, E, VU by BC at 2/7/2019  3:42 PM                   Point: Precautions (Done)     Learning Progress Summary           Patient Acceptance, E,TB, VU by AD at 2/20/2019 10:57 AM    Acceptance, E, VU by MC at 2/20/2019 12:16 AM    Acceptance, E, VU by LT at 2/19/2019  5:46 PM    Acceptance, E,D, NR,VU by AG at 2/18/2019  5:53 PM    Acceptance, E, VU by MC at 2/17/2019 12:46 AM    Acceptance, E, NR by EA at 2/14/2019  9:27 PM    Acceptance, E,TB, VU by RT at 2/14/2019  9:53 AM    Acceptance, E, VU by EH at 2/11/2019  9:38  AM    Acceptance, E, VU by BB at 2/10/2019  8:15 AM    Acceptance, E,TB, VU by  at 2/9/2019  9:21 PM    Acceptance, E, VU by BC at 2/8/2019 12:39 PM    Acceptance, E, VU by BC at 2/8/2019 12:39 PM    Acceptance, E, VU by  at 2/8/2019  8:04 AM    Acceptance, E,TB, VU by  at 2/7/2019  9:34 PM    Acceptance, E, VU by BC at 2/7/2019  3:42 PM                               User Key     Initials Effective Dates Name Provider Type Discipline    AD 04/03/18 -  Dalton, Ashley Claudene, PT Physical Therapist PT    KM 06/16/16 -  Bandar Beth, RN Registered Nurse Nurse    LT 06/16/16 -  Ximena Davila, RN Registered Nurse Nurse    EA 06/16/16 -  Mitra De Jesus, RN Registered Nurse Nurse    RT 02/05/19 -  Saeed Davila, RN Registered Nurse Nurse    BC 03/14/16 -  Laura Cali, PT Physical Therapist PT    AG 04/03/18 -  Karyn Delgado, PT Physical Therapist PT     07/19/18 -  Conrado Ramon RN Registered Nurse Nurse    BB 01/21/17 -  Magdaleno Rodgers RN Registered Nurse Nurse     12/28/17 -  Geetha Bernardo, RN Registered Nurse Nurse     09/06/18 -  Collett, Morgan, RN Registered Nurse Nurse                PT Recommendation and Plan     Outcome Summary/Treatment Plan (PT)  Daily Summary of Progress (PT): progress toward functional goals as expected  Plan of Care Reviewed With: patient  Outcome Measures     Row Name 02/20/19 1000             How much help from another person do you currently need...    Turning from your back to your side while in flat bed without using bedrails?  4  -AD      Moving from lying on back to sitting on the side of a flat bed without bedrails?  4  -AD      Moving to and from a bed to a chair (including a wheelchair)?  3  -AD      Standing up from a chair using your arms (e.g., wheelchair, bedside chair)?  3  -AD      Climbing 3-5 steps with a railing?  3  -AD      To walk in hospital room?  3  -AD      AM-PAC 6 Clicks Score  20  -AD          Functional Assessment    Outcome Measure Options  AM-PAC 6 Clicks Basic Mobility (PT)  -AD        User Key  (r) = Recorded By, (t) = Taken By, (c) = Cosigned By    Initials Name Provider Type    AD Dalton, Ashley Claudene, PT Physical Therapist         Time Calculation:   PT Charges     Row Name 02/20/19 1057 02/20/19 1000          Time Calculation    Start Time  -- 30 minutes  -AD  --     PT Received On  02/20/19  -AD  --     PT - Next Appointment  02/21/19  -AD  --     PT Goal Re-Cert Due Date  02/21/19  -AD  --        Timed Charges    37781 - PT Therapeutic Exercise Minutes  --  15  -AD     12446 - Gait Training Minutes   --  10  -AD       User Key  (r) = Recorded By, (t) = Taken By, (c) = Cosigned By    Initials Name Provider Type    AD Dalton, Ashley Claudene, PT Physical Therapist        Therapy Suggested Charges     Code   Minutes Charges    23594 (CPT®) Hc Pt Neuromusc Re Education Ea 15 Min      04477 (CPT®) Hc Pt Ther Proc Ea 15 Min 15 1    68798 (CPT®) Hc Gait Training Ea 15 Min 10 1    74091 (CPT®) Hc Pt Therapeutic Act Ea 15 Min      04806 (CPT®) Hc Pt Manual Therapy Ea 15 Min      41103 (CPT®) Hc Pt Iontophoresis Ea 15 Min      57283 (CPT®) Hc Pt Elec Stim Ea-Per 15 Min      99750 (CPT®) Hc Pt Ultrasound Ea 15 Min      79944 (CPT®) Hc Pt Self Care/Mgmt/Train Ea 15 Min      08959 (CPT®) Hc Pt Prosthetic (S) Train Initial Encounter, Each 15 Min      24149 (CPT®) Hc Pt Orthotic(S)/Prosthetic(S) Encounter, Each 15 Min      76127 (CPT®) Hc Orthotic(S) Mgmt/Train Initial Encounter, Each 15min      Total  25 2        Therapy Charges for Today     Code Description Service Date Service Provider Modifiers Qty    94812807445 HC PT THER PROC EA 15 MIN 2/20/2019 Dalton, Ashley Claudene, PT GP 1    01337663825 HC GAIT TRAINING EA 15 MIN 2/20/2019 Dalton, Ashley Claudene, PT GP 1    80810778168 HC PT THER SUPP EA 15 MIN 2/20/2019 Dalton, Ashley Claudene, PT GP 1          PT G-Codes  Outcome Measure Options: AM-PAC 6  Clicks Basic Mobility (PT)  AM-PAC 6 Clicks Score: 20    Ashley Claudene Dalton, PT  2/20/2019         Electronically signed by Dalton, Ashley Claudene, PT at 2/20/2019 10:58 AM       Occupational Therapy Notes (last 24 hours) (Notes from 2/19/2019 12:28 PM through 2/20/2019 12:28 PM)     No notes of this type exist for this encounter.          Discharge Summary     No notes of this type exist for this encounter.        Discharge Order (From admission, onward)    Start     Ordered    02/20/19 1207  Discharge patient  Once     Expected Discharge Date:  02/20/19    Discharge Disposition:  Home or Self Care    Physician of Record for Attribution - Please select from Treatment Team:  CHAMP HUNT [[id number]]    Review needed by CMO to determine Physician of Record:  No       Question Answer Comment   Physician of Record for Attribution - Please select from Treatment Team CHAMP HUNT    Review needed by CMO to determine Physician of Record No        02/20/19 1210

## 2019-02-20 NOTE — PROGRESS NOTES
Nephrology  Note    Subjective     She wants to go home. She denies chest pain or SOB. Feeling well    Objective     Vital Signs  Temp:  [97.9 °F (36.6 °C)-98.3 °F (36.8 °C)] 98 °F (36.7 °C)  Heart Rate:  [56-81] 56  Resp:  [18] 18  BP: (117-161)/(44-56) 161/56    I/O this shift:  In: 240 [P.O.:240]  Out: -   I/O last 3 completed shifts:  In: 480 [P.O.:480]  Out: 150 [Urine:150]    Physical Examination:    General Appearance : alert, appears stated age, cooperative and no distress  Head : normocephalic  Eyes : no pallor   Throat : oral mucosa moist  Neck:no JVD  Lungs : reduced breath sounds at bases  Heart : regular rhythm & normal rate, normal S1, S2, no murmur  Abdomen :   soft non-tender  Extremities :  trace edema  Neurologic : orientated to person, place, time, grossly no focal deficitis    Laboratory Data :      WBC WBC   Date Value Ref Range Status   02/20/2019 9.39 4.50 - 12.50 10*3/mm3 Final   02/19/2019 7.29 4.50 - 12.50 10*3/mm3 Final   02/18/2019 10.00 4.50 - 12.50 10*3/mm3 Final      HGB Hemoglobin   Date Value Ref Range Status   02/20/2019 9.4 (L) 12.0 - 16.0 g/dL Final   02/19/2019 9.6 (L) 12.0 - 16.0 g/dL Final   02/18/2019 10.2 (L) 12.0 - 16.0 g/dL Final      HCT Hematocrit   Date Value Ref Range Status   02/20/2019 29.7 (L) 37.0 - 47.0 % Final   02/19/2019 30.3 (L) 37.0 - 47.0 % Final   02/18/2019 32.4 (L) 37.0 - 47.0 % Final      Platlets No results found for: LABPLAT   MCV MCV   Date Value Ref Range Status   02/20/2019 90.3 80.0 - 94.0 fL Final   02/19/2019 91.3 80.0 - 94.0 fL Final   02/18/2019 89.8 80.0 - 94.0 fL Final          Sodium Sodium   Date Value Ref Range Status   02/20/2019 131 (L) 135 - 153 mmol/L Final   02/19/2019 131 (L) 135 - 153 mmol/L Final   02/18/2019 136 135 - 153 mmol/L Final      Potassium Potassium   Date Value Ref Range Status   02/20/2019 4.2 3.5 - 5.3 mmol/L Final   02/19/2019 4.5 3.5 - 5.3 mmol/L Final   02/18/2019 4.1 3.5 - 5.3 mmol/L Final   02/17/2019 5.1 3.5 -  5.3 mmol/L Final      Chloride Chloride   Date Value Ref Range Status   02/20/2019 100 99 - 112 mmol/L Final   02/19/2019 99 99 - 112 mmol/L Final   02/18/2019 107 99 - 112 mmol/L Final      CO2 CO2   Date Value Ref Range Status   02/20/2019 24.7 24.3 - 31.9 mmol/L Final   02/19/2019 24.9 24.3 - 31.9 mmol/L Final   02/18/2019 24.7 24.3 - 31.9 mmol/L Final      BUN BUN   Date Value Ref Range Status   02/20/2019 48 (H) 7 - 21 mg/dL Final   02/19/2019 50 (H) 7 - 21 mg/dL Final   02/18/2019 45 (H) 7 - 21 mg/dL Final      Creatinine Creatinine   Date Value Ref Range Status   02/20/2019 1.35 (H) 0.43 - 1.29 mg/dL Final   02/19/2019 1.45 (H) 0.43 - 1.29 mg/dL Final   02/18/2019 1.26 0.43 - 1.29 mg/dL Final      Calcium Calcium   Date Value Ref Range Status   02/20/2019 8.2 7.7 - 10.0 mg/dL Final   02/19/2019 7.7 7.7 - 10.0 mg/dL Final   02/18/2019 7.9 7.7 - 10.0 mg/dL Final      PO4 No results found for: CAPO4   Albumin Albumin   Date Value Ref Range Status   02/20/2019 2.90 (L) 3.40 - 4.80 g/dL Final   02/19/2019 2.90 (L) 3.40 - 4.80 g/dL Final      Magnesium No results found for: MG   Uric Acid No results found for: URICACID     Radiology results :     Imaging Results (last 24 hours)     Procedure Component Value Units Date/Time    US Gallbladder [[id number]] Collected:  02/20/19 0836     Updated:  02/20/19 0839    Narrative:       EXAMINATION: US GALLBLADDER-         CLINICAL INDICATION:     RUQ pain; A41.9-Sepsis, unspecified organism;  R65.21-Severe sepsis with septic shock; N17.9-Acute kidney failure,  unspecified     TECHNIQUE: Multiplanar gray scale ultrasound of the abdomen.     COMPARISON: NONE      FINDINGS:   Visualized pancreas is unremarkable.   The gallbladder is unremarkable without stones or wall thickening.   The CBD measures 6.5 mm.  The liver demonstrates normal echogenicity without focal lesion.    No ascites demonstrated.   There is no sonographic Coleman sign.       Impression:       No sonographic  findings to explain abdominal pain.      This report was finalized on 2/20/2019 8:37 AM by Dr. Duke Brewer MD.       CT Chest Without Contrast [[id number]] Collected:  02/20/19 0836     Updated:  02/20/19 0839    Narrative:       CT CHEST WO CONTRAST-        TECHNIQUE: Multiple axial CT images were obtained from lung apex through  upper abdomen without administration of IV contrast. Reformatted images  in the coronal and/or sagittal plane(s) were generated from the axial  data set to facilitate diagnostic accuracy and/or surgical planning.  Oral Contrast:NONE.     Radiation dose reduction techniques were utilized per ALARA protocol.  Automated exposure control was initiated through either or [employer] or  [employer] software packages by  protocol.          [id number] mGy.cm  Clinical information  Pleural effusion; A41.9-Sepsis, unspecified organism; R65.21-Severe  sepsis with septic shock; N17.9-Acute kidney failure, unspecified      Comparison  NONE.     Findings  LUNGS: PATCHY LEFT LUNG BASE GROUNDGLASS ATTENUATION THAT MAY REPRESENT  SOME PNEUMONIA OR POTENTIALLY ALVEOLAR EDEMA.     HEART: CARDIOMEGALY.     PERICARDIUM: No effusion.     MEDIASTINUM: No masses. No enlarged lymph nodes.  No fluid collections.     PLEURA: SMALL BILATERAL PLEURAL EFFUSIONS.     MAJOR AIRWAYS: Clear. No intrinsic mass.     VASCULATURE: No evidence of aneurysm.     VISUALIZED UPPER ABDOMEN:        LIVER: Homogeneous. No focal hepatic mass or ductal dilatation.        SPLEEN: Homogeneous. No splenomegaly.        ADRENALS: No mass.        KIDNEYS: No mass. No obstructive uropathy.  No evidence of  urolithiasis.        GI TRACT: Non-dilated. No definite wall thickening.        PERITONEUM: No free air. No free fluid or loculated fluid  collections.           ABDOMINAL WALL: No focal hernia or mass.           OTHER: None.     BONES: No acute bony abnormality.       Impression:       Impression:  Small bilateral pleural effusions.  Patchy left lung base groundglass  attenuation that may represent some pneumonia or potentially alveolar  edema. Cardiomegaly.     This report was finalized on 2/20/2019 8:36 AM by Dr. Duke Brewer MD.                 Medications:        aspirin 81 mg Oral Daily   budesonide-formoterol 2 puff Inhalation BID - RT   bumetanide 1 mg Oral BID   guaiFENesin 1,200 mg Oral Q12H   heparin (porcine) 5,000 Units Subcutaneous Q8H   hydrALAZINE 75 mg Oral TID   insulin aspart 0-9 Units Subcutaneous 4x Daily AC & at Bedtime   insulin detemir 10 Units Subcutaneous Nightly   ipratropium-albuterol 3 mL Nebulization 4x Daily - RT   metoprolol tartrate 100 mg Oral Q12H   montelukast 10 mg Oral Nightly   sennosides-docusate sodium 2 tablet Oral Nightly   sodium chloride 3 mL Intravenous Q12H       hold 1 each       Assessment/Plan       Septic shock (CMS/HCC)      1. CKD stage 3 with baseline creatinine 1.1-1.3 since 2015 . Creatinine is better at 1.3 today  contiunue bumex  1 mg po BID    2. Proteinuria : urine protein/creatinine is 1.6 gm/gm. Presumed diabetic nephropathy  Anti GBM and MM labs pending, rest serologies negative    3. HTN : stable    4. Bilateral pleural effusions : CT chest noted and pleural  Effusions are small    5. MGUS : no M spike, and serum immunofixation is negative but serum K/L is slightly high. Arrange outpatient hem onc fu    6. Dilutional hyponatremia ct fluid restriction, Na 131    Ok to dc from renal standpoint. Fu in 2-3 weeks  I discussed the patient's findings and my recommendations with patient    Andrés Shaikh MD  02/20/19  10:27 AM

## 2019-02-20 NOTE — PROGRESS NOTES
LOS: 14 days     Chief Complaint:  Pulmonology is following for shortness of breath, acute on chronic hypoxic respiratory failure, right middle lobe pneumonia.        Subjective     Interval History:     Ms. Geronimo reports that she is ready to go home today. She states that she is feeling well and denies shortness of breath. Tolerating 2 L nasal cannula. She was evaluated by surgery for the effusion assessment to determine if chest tube placement was recommended. No fever reported overnight. Creatinine showed some improvement to 1.35. WBC showed slight increase to 9.39.       History taken from: patient chart RN    Review of Systems:     Review of Systems - History obtained from chart review and the patient  General ROS: negative for - chills, fatigue or fever  Psychological ROS: negative for - anxiety or depression  ENT ROS: negative for - headaches or vocal changes  Allergy and Immunology ROS: negative for - nasal congestion or postnasal drip  Endocrine ROS: negative for - polydipsia/polyuria  Respiratory ROS: negative for - shortness of breath, cough, sputum changes or wheezing  Cardiovascular ROS: positive for - edema.  negative for - chest pain   Gastrointestinal ROS: negative for - abdominal pain or change in bowel habits  Musculoskeletal ROS: negative for - joint pain or joint swelling  Neurological ROS: no TIA or stroke symptoms                    Objective     Vital Signs  Temp:  [97.9 °F (36.6 °C)-98.3 °F (36.8 °C)] 98 °F (36.7 °C)  Heart Rate:  [56-81] 67  Resp:  [18] 18  BP: (135-161)/(51-56) 161/56  Body mass index is 25.99 kg/m².    Intake/Output Summary (Last 24 hours) at 2/20/2019 1337  Last data filed at 2/20/2019 0833  Gross per 24 hour   Intake 240 ml   Output --   Net 240 ml     I/O this shift:  In: 240 [P.O.:240]  Out: -     Physical Exam:  GENERAL APPEARANCE: Well developed, well nourished, alert and cooperative, and appears to be in no acute distress. Tolerating nasal cannula.     HEAD:  normocephalic. Atraumatic.     EYES: PERRL, EOMI. vision is grossly intact.    THROAT: Oral cavity and pharynx normal. No inflammation, swelling, exudate, or lesions.     NECK: Neck supple. No thyromegaly.     CARDIAC: Normal S1 and S2. No S3, S4 or murmurs. Rhythm is regular. There is no cyanosis or pallor. Extremities are warm and well perfused. Capillary refill is less than 2 seconds. Trace edema of the bilateral lower extremities.     RESPIRATORY: Bilateral air entry positive. Diminished breath sounds at the bases. No wheezing, rhonchi, or bibasilar crackles.     GI: Positive bowel sounds. Soft, nondistended, nontender.     MUSCULOSKELETAL: No significant deformity or joint abnormality. Trace edema of the bilateral lower extremities.  Peripheral pulses intact. No varicosities.    NEUROLOGICAL: Strength and sensation symmetric and intact throughout.     PSYCHIATRIC: The mental examination revealed the patient was oriented to person, place, and time.                     Results Review:                I reviewed the patient's new clinical results.  I reviewed the patient's new imaging results and agree with the interpretation.  Results from last 7 days   Lab Units 02/20/19 0059 02/19/19 0428 02/18/19  1146   WBC 10*3/mm3 9.39 7.29 10.00   HEMOGLOBIN g/dL 9.4* 9.6* 10.2*   PLATELETS 10*3/mm3 338 313 337     Results from last 7 days   Lab Units 02/20/19 0059 02/19/19 0428 02/18/19  0440   SODIUM mmol/L 131* 131* 136   POTASSIUM mmol/L 4.2 4.5 4.1   CHLORIDE mmol/L 100 99 107   CO2 mmol/L 24.7 24.9 24.7   BUN mg/dL 48* 50* 45*   CREATININE mg/dL 1.35* 1.45* 1.26   CALCIUM mg/dL 8.2 7.7 7.9   GLUCOSE mg/dL 124* 436* 56*     Lab Results   Component Value Date    INR 0.98 02/20/2019    INR 0.95 02/19/2019    INR 0.97 02/18/2019    PROTIME 13.2 02/20/2019    PROTIME 12.9 02/19/2019    PROTIME 13.1 02/18/2019     Results from last 7 days   Lab Units 02/20/19  0059 02/19/19  0428   ALK PHOS U/L 72 73   BILIRUBIN mg/dL  0.2 0.2   ALT (SGPT) U/L 19 9*   AST (SGOT) U/L 16 13     Results from last 7 days   Lab Units 02/13/19  1353   PH, ARTERIAL pH units 7.383   PO2 ART mm Hg 83.2   PCO2, ARTERIAL mm Hg 31.5*   HCO3 ART mmol/L 18.3*     Imaging Results (last 24 hours)     Procedure Component Value Units Date/Time    US Gallbladder [[id number]] Collected:  02/20/19 0836     Updated:  02/20/19 0839    Narrative:       EXAMINATION: US GALLBLADDER-         CLINICAL INDICATION:     RUQ pain; A41.9-Sepsis, unspecified organism;  R65.21-Severe sepsis with septic shock; N17.9-Acute kidney failure,  unspecified     TECHNIQUE: Multiplanar gray scale ultrasound of the abdomen.     COMPARISON: NONE      FINDINGS:   Visualized pancreas is unremarkable.   The gallbladder is unremarkable without stones or wall thickening.   The CBD measures 6.5 mm.  The liver demonstrates normal echogenicity without focal lesion.    No ascites demonstrated.   There is no sonographic Coleman sign.       Impression:       No sonographic findings to explain abdominal pain.      This report was finalized on 2/20/2019 8:37 AM by Dr. Duke Brewer MD.       CT Chest Without Contrast [[id number]] Collected:  02/20/19 0836     Updated:  02/20/19 0839    Narrative:       CT CHEST WO CONTRAST-        TECHNIQUE: Multiple axial CT images were obtained from lung apex through  upper abdomen without administration of IV contrast. Reformatted images  in the coronal and/or sagittal plane(s) were generated from the axial  data set to facilitate diagnostic accuracy and/or surgical planning.  Oral Contrast:NONE.     Radiation dose reduction techniques were utilized per ALARA protocol.  Automated exposure control was initiated through either or [employer] or  [employer] software packages by  protocol.          [id number] mGy.cm  Clinical information  Pleural effusion; A41.9-Sepsis, unspecified organism; R65.21-Severe  sepsis with septic shock; N17.9-Acute kidney failure,  unspecified      Comparison  NONE.     Findings  LUNGS: PATCHY LEFT LUNG BASE GROUNDGLASS ATTENUATION THAT MAY REPRESENT  SOME PNEUMONIA OR POTENTIALLY ALVEOLAR EDEMA.     HEART: CARDIOMEGALY.     PERICARDIUM: No effusion.     MEDIASTINUM: No masses. No enlarged lymph nodes.  No fluid collections.     PLEURA: SMALL BILATERAL PLEURAL EFFUSIONS.     MAJOR AIRWAYS: Clear. No intrinsic mass.     VASCULATURE: No evidence of aneurysm.     VISUALIZED UPPER ABDOMEN:        LIVER: Homogeneous. No focal hepatic mass or ductal dilatation.        SPLEEN: Homogeneous. No splenomegaly.        ADRENALS: No mass.        KIDNEYS: No mass. No obstructive uropathy.  No evidence of  urolithiasis.        GI TRACT: Non-dilated. No definite wall thickening.        PERITONEUM: No free air. No free fluid or loculated fluid  collections.           ABDOMINAL WALL: No focal hernia or mass.           OTHER: None.     BONES: No acute bony abnormality.       Impression:       Impression:  Small bilateral pleural effusions. Patchy left lung base groundglass  attenuation that may represent some pneumonia or potentially alveolar  edema. Cardiomegaly.     This report was finalized on 2/20/2019 8:36 AM by Dr. Duke Brewer MD.                Medication Review:   Scheduled Medications:    aspirin 81 mg Oral Daily   budesonide-formoterol 2 puff Inhalation BID - RT   bumetanide 1 mg Oral BID   guaiFENesin 1,200 mg Oral Q12H   heparin (porcine) 5,000 Units Subcutaneous Q8H   hydrALAZINE 75 mg Oral TID   insulin aspart 0-9 Units Subcutaneous 4x Daily AC & at Bedtime   insulin detemir 10 Units Subcutaneous Nightly   ipratropium-albuterol 3 mL Nebulization 4x Daily - RT   metoprolol tartrate 100 mg Oral Q12H   montelukast 10 mg Oral Nightly   sennosides-docusate sodium 2 tablet Oral Nightly   sodium chloride 3 mL Intravenous Q12H     Continuous infusions:    hold 1 each       Assessment/Plan     Patient Active Problem List   Diagnosis Code   • Pneumonia  J18.9   • Septic shock (CMS/[facility]) A41.9, R65.21   • Pseudomonas pneumonia (CMS/[facility]) J15.1           Kaley Cutler PA-C  02/20/19  1:37 PM      Scribed for Dr. Noonan by Kaley Cutler PA-C                Lab Results   Component Value Date     PHART 7.383 02/13/2019     FBD7HDB 31.5 (L) 02/13/2019     PO2ART 83.2 02/13/2019     ALN8TUW 18.3 (C) 02/13/2019     BASEEXCESS -5.8 02/13/2019     T3EUQFUE 94.5 02/13/2019      US Gallbladder  Narrative: EXAMINATION: US GALLBLADDER-         CLINICAL INDICATION:     RUQ pain; A41.9-Sepsis, unspecified organism;  R65.21-Severe sepsis with septic shock; N17.9-Acute kidney failure,  unspecified     TECHNIQUE: Multiplanar gray scale ultrasound of the abdomen.     COMPARISON: NONE      FINDINGS:   Visualized pancreas is unremarkable.   The gallbladder is unremarkable without stones or wall thickening.   The CBD measures 6.5 mm.  The liver demonstrates normal echogenicity without focal lesion.    No ascites demonstrated.   There is no sonographic Coleman sign.     Impression: No sonographic findings to explain abdominal pain.      This report was finalized on 2/20/2019 8:37 AM by Dr. Duke Brewer MD.     CT Chest Without Contrast  Narrative: CT CHEST WO CONTRAST-        TECHNIQUE: Multiple axial CT images were obtained from lung apex through  upper abdomen without administration of IV contrast. Reformatted images  in the coronal and/or sagittal plane(s) were generated from the axial  data set to facilitate diagnostic accuracy and/or surgical planning.  Oral Contrast:NONE.     Radiation dose reduction techniques were utilized per ALARA protocol.  Automated exposure control was initiated through either or [employer] or  [employer] software packages by  protocol.          [id number] mGy.cm  Clinical information  Pleural effusion; A41.9-Sepsis, unspecified organism; R65.21-Severe  sepsis with septic shock; N17.9-Acute kidney failure, unspecified      Comparison  NONE.      Findings  LUNGS: PATCHY LEFT LUNG BASE GROUNDGLASS ATTENUATION THAT MAY REPRESENT  SOME PNEUMONIA OR POTENTIALLY ALVEOLAR EDEMA.     HEART: CARDIOMEGALY.     PERICARDIUM: No effusion.     MEDIASTINUM: No masses. No enlarged lymph nodes.  No fluid collections.     PLEURA: SMALL BILATERAL PLEURAL EFFUSIONS.     MAJOR AIRWAYS: Clear. No intrinsic mass.     VASCULATURE: No evidence of aneurysm.     VISUALIZED UPPER ABDOMEN:        LIVER: Homogeneous. No focal hepatic mass or ductal dilatation.        SPLEEN: Homogeneous. No splenomegaly.        ADRENALS: No mass.        KIDNEYS: No mass. No obstructive uropathy.  No evidence of  urolithiasis.        GI TRACT: Non-dilated. No definite wall thickening.        PERITONEUM: No free air. No free fluid or loculated fluid  collections.           ABDOMINAL WALL: No focal hernia or mass.           OTHER: None.     BONES: No acute bony abnormality.     Impression: Impression:  Small bilateral pleural effusions. Patchy left lung base groundglass  attenuation that may represent some pneumonia or potentially alveolar  edema. Cardiomegaly.     This report was finalized on 2/20/2019 8:36 AM by Dr. Duke Brewer MD.              Lab Results   Component Value Date     WBC 9.39 02/20/2019     HGB 9.4 (L) 02/20/2019     HCT 29.7 (L) 02/20/2019     MCV 90.3 02/20/2019      02/20/2019            Lab Results   Component Value Date     GLUCOSE 124 (H) 02/20/2019     CALCIUM 8.2 02/20/2019      (L) 02/20/2019     K 4.2 02/20/2019     CO2 24.7 02/20/2019      02/20/2019     BUN 48 (H) 02/20/2019     CREATININE 1.35 (H) 02/20/2019     EGFRIFNONA 38 (L) 02/20/2019     BCR 35.6 (H) 02/20/2019     ANIONGAP 6.3 02/20/2019            I have reviewed the labs.     I have reviewed the images of CT scan of the chest.        Pleural fluid was neutrophil in cytology.  High level of glucose.  Pleural fluid is exudative based on LDH criteria.  Pleural fluid cultures negative till date.   Cytology is negative for malignant cells.  Flow cytometry is pending.     Assessment:  Shortness of breath: improving   Acute on chronic hypoxic respiratory failure: improving   Chronic hypoxic respiratory failure on home oxygen  Right middle lobe pneumonia , unspecified organism   COPD, centrilobular emphysema type  Bilateral pleural effusion  Physical deconditioning  Bilateral basal atelectasis        Plan:  - Bedside ultrasound done on 2/15/2019 showed complex fluid with septation.  Pleural fluid was neutrophilic in cytology.  High level of glucose present.  Pleural fluid is exudative based on LDH criteria.  Cytology is negative for malignant cells.  Flow cytometry is pending.  Pleural fluid cultures are negative to date.  She is currently hemodynamically stable.     - Appreciate general surgery recommendations.  - I have reviewed the CT images of the chest.  No urgent indication for chest tube placement.  I agree on restarting anticoagulation.  - Nephrology on board.  Diuretics as per nephrology recommendations  - Continue with duo nebs  - Continue with Symbicort nebs  - continue Mucomyst nebs  - Aggressive PTOT while in hospital  - Incentive spirometer to her basal atelectasis  - Continue heparin subcutaneous for DVT prophylaxis           I have discussed the clinical plan and the test results with Dr. Velasco.   Nurse and respiratory therapist were updated about the clinical plan.  I, Quang Noonan M.D. attest that the above note accurately reflects the work and decisions made by me.  Patient was seen and evaluated by me, including history of present illness, physical exam, assessment, and treatment plan.  The above note was reviewed and edited by me.    Thank you for involving us in the care of the patient.    Quang Noonan M.D  Pulmonary and Critical Care Medicine

## 2019-02-20 NOTE — DISCHARGE PLACEMENT REQUEST
"Ge Geronimo (73 y.o. Female)     Date of Birth Social Security Number Address Home Phone MRN    1945  [address] [facility] [address] [phone] [id number]    Jehovah's witness Marital Status          Yazidism        Admission Date Admission Type Admitting Provider Attending Provider Department, Room/Bed    2/5/19 Emergency Jayden Anderson MD Troxell, Christopher A, DO [address], 3308/2S    Discharge Date Discharge Disposition Discharge Destination         Home or Self Care              Attending Provider:  Martin Velasco DO    Allergies:  Sulfa Antibiotics    Isolation:  None   Infection:  None   Code Status:  CPR    Ht:  152.4 cm (60\")   Wt:  60.4 kg (133 lb 1.6 oz)    Admission Cmt:  None   Principal Problem:  None                Active Insurance as of 2/5/2019     Primary Coverage     Payor Plan Insurance Group Employer/Plan Group    MEDICARE RAILROAD MEDICARE      Payor Plan Address Payor Plan Phone Number Payor Plan Fax Number Effective Dates    [address] [phone]  8/1/2009 - None Entered    [place] [address]       Subscriber Name Subscriber Birth Date Member ID       GE GERONIMO 1945 [id number]                 Emergency Contacts      (Rel.) Home Phone Work Phone Mobile Phone    Juan Geronimo (Son) [phone] -- --    Katelyn Castaneda (Grandchild) [phone] -- --    Jennifer Key (Daughter) -- -- [phone]            Emergency Contact Information     Name Relation Home Work Mobile    Juan Geronimo Son [phone]      Katelyn Castaneda Grandchild [phone]      Jennifer Key Daughter   [phone]          Insurance Information                MEDICARE/RAILROAD MEDICARE Phone: [phone]    Subscriber: Ge Geronimo Subscriber#: [id number]    Group#:  Precert#:           Treatment Team     Provider Relationship Specialty Contact    Martin Velasco DO Attending, Physician of Record " Hospitalist  [phone]    Deena Zelaya Patient Care Technician --      Daisy Hi, ALDO Case Manager --      Anusha Escobar, RRT Respiratory Therapist --  [phone]    Umm Talavera MD Consulting Physician General Surgery  [phone]    Sheree Daniel Patient Care Technician --      Taylor Llanos Patient Care Technician --      Óscar Marvin RN Registered Nurse --      Quang Noonan MD Consulting Physician Pulmonary Disease  [phone]          Problem List           Codes Noted - Resolved       Hospital    Septic shock (CMS/HCC) ICD-10-CM: A41.9, R65.21  ICD-9-CM: 038.9, 785.52, 995.92 2019 - Present       Non-Hospital    Pseudomonas pneumonia (CMS/[facility]) ICD-10-CM: J15.1  ICD-9-CM: 482.1 2019 - Present    Pneumonia ICD-10-CM: J18.9  ICD-9-CM: 486 2019 - Present             History & Physical      Charles Dickinson MD at 2019  7:48 AM              [facility] Medicine Services  History & Physical    Patient Identification:  Name:  Ashley Geronimo  Age:  73 y.o.  Sex:  female  :  1945  MRN:  [id number]   Visit Number:  [id number]  Primary Care Physician:  Silvano Parker MD    I have seen the patient in conjunction with Dang Daley PA-C and I agree with the following statements:    Subjective     Chief complaint: Weakness, congestion, leg swelling    History of presenting illness:      Ashley Geronimo is a 73 y.o. female with past medical history significant for recent admission to this facility from  through 2019 with diagnosis of septic shock secondary to UTI and community-acquired pneumonia with possible cellulitis during which time she also experienced paroxysmal atrial fibrillation, acute kidney injury, non-ST elevation myocardial infarction felt to be secondary to most likely demand ischemia, and non-gap metabolic acidosis felt to be secondary to lactic acidosis, and diabetes mellitus type  "2.  Of note, Olegario said hospitalization she did have sputum cultures that were polymicrobial including Pseudomonas with antibiotic therapies guided by infectious disease.  On discharge, in setting of atrial fibrillation, she was discharged home on Coumadin with subcutaneous Lovenox until her INR was therapeutic; as normal anticoagulants were too expensive at that time.    She presented to the office of her PCP yesterday, and was referred to the ED.  She tells me her PCP felt her lungs were \"still wet sounding\".  She reports ongoing weakness since hospitalization.  Though she tells me she is able to ambulate around her home.  She has been utilizing 2 L of oxygen via nasal cannula since discharge.  She tells me she has been taking Coumadin since discharge and home health of take her blood for PT/INR; however: She denies any further adjustments in her medication since discharge and is reports she is taking the Coumadin pill daily of unknown dosing but thinks it is the dose from her discharge.  She reports she has felt short of breath over the past few days.  She reports she feels congested and does feel that she is sputum she is unable to clear.  She denies chest pain.  She reports she did have some redness and her ankles a few days ago.  Upon examination, there is no redness at present.    Upon presentation to the emergency department, temperature is 98.4, pulse 79, respiratory rate 20, and blood pressure was 118/44.  Lactic acid was found to be 6 with some improvement of 3.2.  CRP was 3.39.  Glucose levels were 213 with bowel and on gap of 12.5 and elevated creatinine of 1.50.  Magnesium levels found to be 1.5.  Please see pertinent laboratory data regarding urinalysis.  Chest x-ray did not reveal evidence of acute infiltrate; however, CT chest only showed small bibasilar effusions. UA showed trace leukocyte " esterase  --------------------------------------------------------------------------------------------------------------------   Review of Systems   Constitutional: Positive for fatigue. Negative for chills and fever.   HENT: Positive for congestion. Negative for drooling.    Eyes: Negative for pain and discharge.   Respiratory: Positive for cough, shortness of breath and wheezing.    Cardiovascular: Negative for chest pain and leg swelling.   Gastrointestinal: Negative for abdominal distention, constipation, nausea and vomiting.   Endocrine: Negative for cold intolerance and heat intolerance.   Genitourinary: Negative for difficulty urinating and dysuria.   Musculoskeletal: Negative for arthralgias and gait problem.   Skin: Negative for color change and pallor.   Allergic/Immunologic: Negative for environmental allergies and food allergies.   Neurological: Negative for dizziness and headaches.   Hematological: Negative for adenopathy. Does not bruise/bleed easily.   Psychiatric/Behavioral: Negative for agitation and confusion.      ---------------------------------------------------------------------------------------------------------------------   Past Medical History:   Diagnosis Date   • Diabetes mellitus (CMS/[facility])    • Hypertension    • NSTEMI (non-ST elevated myocardial infarction) (CMS/[facility]) 01/2019   • Paroxysmal atrial fibrillation (CMS/[facility])    • Severe sepsis with septic shock (CMS/[facility])      History reviewed. No pertinent surgical history.  History reviewed. No pertinent family history.  Social History     Socioeconomic History   • Marital status:      Spouse name: Not on file   • Number of children: Not on file   • Years of education: Not on file   • Highest education level: Not on file   Tobacco Use   • Smoking status: Never Smoker   • Smokeless tobacco: Never Used   Substance and Sexual Activity   • Alcohol use: No     Frequency: Never   • Drug use: No      ---------------------------------------------------------------------------------------------------------------------   Allergies:  Sulfa antibiotics  ---------------------------------------------------------------------------------------------------------------------   Home medications:    Medications below are reported home medications pulling from within the system; at this time, these medications have not been reconciled unless otherwise specified and are in the verification process for further verifcation as current home medications.  Medications Prior to Admission   Medication Sig Dispense Refill Last Dose   • aspirin 81 MG EC tablet Take 1 tablet by mouth Daily.   2/5/2019 at Unknown time   • budesonide-formoterol (SYMBICORT) 160-4.5 MCG/ACT inhaler Inhale 2 puffs 2 (Two) Times a Day. 1 inhaler 12 2/5/2019 at AM   • metFORMIN (GLUCOPHAGE) 1000 MG tablet Take 1,000 mg by mouth 2 (Two) Times a Day.   2/5/2019 at AM   • metoprolol tartrate (LOPRESSOR) 25 MG tablet Take 1 tablet by mouth Every 12 (Twelve) Hours. 60 tablet 0 2/5/2019 at AM   • NIFEdipine CC (ADALAT CC) 60 MG 24 hr tablet Take 1 tablet by mouth Daily. 30 tablet 0 2/5/2019 at AM   • warfarin (COUMADIN) 2 MG tablet Take 2 mg by mouth Every Night.   2/4/2019 at PM   • atorvastatin (LIPITOR) 40 MG tablet Take 1 tablet by mouth Every Night. 30 tablet 0 2/4/2019 at PM   • insulin detemir (LEVEMIR) 100 UNIT/ML injection Inject 10 Units under the skin into the appropriate area as directed Every Night.  12 2/4/2019 at PM   • montelukast (SINGULAIR) 10 MG tablet Take 10 mg by mouth Every Night.   2/4/2019 at PM       Hospital Scheduled Meds:    guaiFENesin 1,200 mg Oral Q12H   insulin aspart 0-7 Units Subcutaneous 4x Daily AC & at Bedtime   ipratropium-albuterol 3 mL Nebulization 4x Daily - RT   magnesium sulfate 2 g Intravenous Q2H   piperacillin-tazobactam 3.375 g Intravenous Q8H   sodium chloride 3 mL Intravenous Q12H       Pharmacy to dose vancomycin      Pharmacy to dose warfarin     Pharmacy to Dose Zosyn     sodium chloride 75 mL/hr Last Rate: 75 mL/hr (02/06/19 0259)       Current listed hospital scheduled medications may not yet reflect those currently placed in orders that are signed and held awaiting patient's arrival to floor.   ---------------------------------------------------------------------------------------------------------------------     Objective     Vital Signs:  Temp:  [98 °F (36.7 °C)-98.6 °F (37 °C)] 98.6 °F (37 °C)  Heart Rate:  [] 96  Resp:  [16-20] 16  BP: (118-179)/(44-86) 178/78      02/05/19  1820 02/06/19  0038   Weight: 57.2 kg (126 lb) 62.7 kg (138 lb 4.8 oz)     Body mass index is 27.01 kg/m².  ---------------------------------------------------------------------------------------------------------------------       Physical Exam    Physical Exam:    Constitutional: Awake, alert, well-nourished, well-developed, nontoxic  HEENT: Normocephalic, atraumatic. PERRLA, EOMI, sclerae anicteric, conjunctivae without injection, mucous membranes moist, no oropharyngeal erythema appreciated.    Neck: Supple. No JVD appreciated.  Pulmonary: Diminished breath sounds bilaterally. No significantly wheezing, rales, crackles.   CV: Normal rate, regular rhythm. Normal s1/s2 with no murmur appreciated.   Abdominal: Soft, No distension or tenderness appreciated. Bowel sounds appreciated in all four quadrants, no guarding or rebound  Musculoskeletal: No erythema or swelling to joints of upper and lower extremities   Extremities: No clubbing, cyanosis, 1+ edema  Vascular: 2+ DP/PT/Radial pulses bilaterally, warm extremities  Skin: Skin is warm and dry. No truncal or extremity rash on limited exam  Neuro: Alert and oriented to person, place, and time. Strength symmetric in all extremities, Cranial Nerves grossly intact to confrontation, speech clear, sensation intact to fine touch throughout.  No slurred speech.  No facial droop.    Psych:  Appropriate mood and affect.  Judgement and though content appropriate.       ---------------------------------------------------------------------------------------------------------------------  EKG:            Telemetry:  SR 80s-90s with frequent PACs  I have personally looked at both the EKG and the telemetry strips.  ---------------------------------------------------------------------------------------------------------------------   Results from last 7 days   Lab Units 02/06/19  0121 02/06/19 0100 02/05/19 2111 02/05/19  1838   CRP mg/dL  --  3.39*  --  3.34*   LACTATE mmol/L 3.2*  --  6.0*  --    WBC 10*3/mm3  --  7.55  --  8.39   HEMOGLOBIN g/dL  --  11.2*  --  12.2   HEMATOCRIT %  --  34.8*  --  37.5   MCV fL  --  90.9  --  89.5   MCHC g/dL  --  32.2*  --  32.5*   PLATELETS 10*3/mm3  --  292  --  348   INR   --   --   --  1.14*         Results from last 7 days   Lab Units 02/06/19  0710 02/06/19 0100 02/05/19  1838   SODIUM mmol/L  --  138 137   POTASSIUM mmol/L  --  4.2 4.9   MAGNESIUM mg/dL 1.5*  --  1.0*   CHLORIDE mmol/L  --  105 99   CO2 mmol/L  --  20.5* 22.8*   BUN mg/dL  --  34* 42*   CREATININE mg/dL  --  1.50* 1.69*   EGFR IF NONAFRICN AM mL/min/1.73  --  34* 30*   CALCIUM mg/dL  --  7.1* 8.1   GLUCOSE mg/dL  --  213* 238*   ALBUMIN g/dL  --  3.70 4.30   BILIRUBIN mg/dL  --  0.3 0.3   ALK PHOS U/L  --  79 84   AST (SGOT) U/L  --  16 18   ALT (SGPT) U/L  --  13 14   Estimated Creatinine Clearance: 27.2 mL/min (A) (by C-G formula based on SCr of 1.5 mg/dL (H)).  No results found for: AMMONIA  Results from last 7 days   Lab Units 02/06/19  0710 02/06/19  0100 02/05/19  2324   TROPONIN I ng/mL 0.024 0.021 0.024         Lab Results   Component Value Date    HGBA1C 11.50 (H) 01/16/2019     Lab Results   Component Value Date    TSH 4.095 01/16/2019     No results found for: PREGTESTUR, PREGSERUM, HCG, HCGQUANT  Pain Management Panel     Pain Management Panel Latest Ref Rng & Units 1/16/2019 4/12/2018     CREATININE UR mg/dL - 96.6    AMPHETAMINES SCREEN, URINE Negative Negative -    BARBITURATES SCREEN Negative Negative -    BENZODIAZEPINE SCREEN, URINE Negative Negative -    BUPRENORPHINE Negative Negative -    COCAINE SCREEN, URINE Negative Negative -    METHADONE SCREEN, URINE Negative Negative -                        ---------------------------------------------------------------------------------------------------------------------  Imaging Results (last 7 days)     Procedure Component Value Units Date/Time    XR Chest 1 View [[id number]] Collected:  02/06/19 0642     Updated:  02/06/19 0642    Narrative:       XR CHEST 1 VIEW-     CLINICAL INDICATION: Weak/Dizzy/AMS triage protocol          COMPARISON: 01/17/2019      TECHNIQUE: Single frontal view of the chest.     FINDINGS:     Probable atelectasis in both lungs.  Diffuse interstitial lung disease.  Trace bibasilar effusions.  The cardiac silhouette is normal. The pulmonary vasculature is  unremarkable.  There is no evidence of an acute osseous abnormality.   There are no suspicious-appearing parenchymal soft tissue nodules.            Impression:       Trace bibasilar effusions.           CT Abdomen Pelvis Stone Protocol [[id number]] Updated:  02/05/19 2302    CT Chest Without Contrast [[id number]] Updated:  02/05/19 2149          Cultures:       Last echocardiogram:  Results for orders placed during the hospital encounter of 01/16/19   Adult Transthoracic Echo Complete W/ Cont if Necessary Per Protocol    Narrative · Estimated EF = 66%.  · Left ventricular systolic function is normal.  · No significant valvular disease  · No change since 3/21/18          CHADS-VASc Risk Assessment            4       Total Score        1 Hypertension    1 DM    1 Age 65-74    1 Sex: Female            I have personally reviewed the radiology images and read the final radiology  report.  ---------------------------------------------------------------------------------------------------------------------  Assessment / Plan       Assessment and Plan:  UTI  qSOFA-0. Not sepsis  -Ceftriaxone started.   -Urine culture ordered    Lactic acidosis  Likely multifactorial due to dehydration +/- metformin  -Continue IV fluids  -Monitor lactic acidosi      · Acute kidney injury:  Will closely monitor renal function and avoid nephrotoxins when possible.  Will continue to hydrate and discontinue home metformin.  · Suspected underlying metabolic acidosis with elevated but improving anion gap likely secondary to underlying acute kidney injury: Anion gap is improving thus far.  Acetone negative emergency department.  We'll hold further metformin therapy.  Will treat possible underlying infection as previously outlined within this document. Will check one time ABG.   · Severe hypomagnesemia: Initial value level of 1.0 with improvement on 21.5 with supplementation.  We will continue supplementation via electrolyte replacement protocol.  · Paroxysmal atrial fibrillation, currently sinus rhythm: Monitor on telemetry.  Pharmacy consultation has been placed to dose Coumadin. [id number] at least 2. Will review her medication regimen.  Subtherapeutic chronic anticoagulation: Eliquis started. Will follow up with pharmacy  Hyperglycemia in the setting of diabetes mellitus type 2: Given diabetes mellitus, fingerstick blood glucose monitoring has been ordered AC&HS. Sliding scale insulin has been ordered.  Hemoglobin a1c 11.5% on January 16, 2019.  She does follow with Dr. Parker, Endocrinologist.  Will review home Levemir regimen upon availability.      ----------  -DVT prophylaxis: Coumadin to serve  -Activity: Ad naren  -Expected length of stay: INPATIENT status due to the need for care which can only be reasonably provided in an hospital setting such as aggressive/expedited ancillary services and/or consultation  services, the necessity for IV medications, close physician monitoring and/or the possible need for procedures.  In such, I feel patient’s risk for adverse outcomes and need for care warrant INPATIENT evaluation and predict the patient’s care encounter to likely last beyond 2 midnights.      Dang Daley PA-C  02/06/19  8:41 AM  Pager # [phone]  ---------------------------------------------------------------------------------------------------------------------             Electronically signed by Charles Dickinson MD at 2/6/2019 12:07 PM       ICU Vital Signs     Row Name 02/20/19 0815 02/20/19 0813 02/20/19 0648 02/20/19 0647 02/20/19 0636       Vitals    Pulse  --  56  68  63  71    Resp  --  --  18  18  18       Oxygen Therapy    SpO2  --  --  92 %  90 %  92 %    Device (Oxygen Therapy)  nasal cannula  --  nasal cannula;humidified  nasal cannula;humidified  nasal cannula;humidified    Flow (L/min)  2  --  2  2  2    Row Name 02/20/19 0540 02/20/19 0300 02/19/19 2054 02/19/19 2045 02/19/19 1855       Height and Weight    Weight  --  60.4 kg (133 lb 1.6 oz)  --  --  --    Weight Method  --  Bed scale  --  --  --       Vitals    Temp  98 °F (36.7 °C)  97.9 °F (36.6 °C)  --  --  --    Temp src  Oral  Oral  --  --  --    Pulse  81  74  73  --  74    Heart Rate Source  Monitor  Monitor  Monitor  --  --    Resp  18  18  --  --  18    Resp Rate Source  Visual  Visual  --  --  --    BP  161/56  135/51  152/53  --  --    Noninvasive MAP (mmHg)  87  85  --  --  --    BP Location  Right arm  Right arm  --  --  --    BP Method  Automatic  Automatic  --  --  --    Patient Position  Lying  Lying  --  --  --       Oxygen Therapy    SpO2  94 %  94 %  --  --  96 %    Device (Oxygen Therapy)  --  nasal cannula  --  nasal cannula  nasal cannula    Flow (L/min)  --  --  --  2  2    Row Name 02/19/19 1849 02/19/19 1814 02/19/19 1334 02/19/19 1325 02/19/19 1311       Vitals    Temp  --  98.3 °F (36.8 °C)  --   --  --    Temp src  --  Axillary  --  --  --    Pulse  65  60  60  56  62    Heart Rate Source  --  Monitor  --  --  Monitor    Resp  18  18  18  18  18    Resp Rate Source  --  Visual  --  --  Visual    BP  --  137/54  --  --  117/44    Noninvasive MAP (mmHg)  --  73  --  --  77    BP Location  --  Right arm  --  --  Right arm    BP Method  --  Automatic  --  --  Automatic    Patient Position  --  Lying  --  --  Sitting       Oxygen Therapy    SpO2  96 %  97 %  99 %  97 %  96 %    Pulse Oximetry Type  --  Continuous  --  --  --    Device (Oxygen Therapy)  nasal cannula  nasal cannula  --  nasal cannula  nasal cannula    Flow (L/min)  2  2  --  2  --        Lines, Drains & Airways    Active LDAs     Name:   Placement date:   Placement time:   Site:   Days:    Peripheral IV 02/16/19 2240 Anterior;Distal;Right Forearm   02/16/19 2240    Forearm   3                Hospital Medications (active)       Dose Frequency Start End    acetaminophen (TYLENOL) tablet 650 mg 650 mg Every 6 Hours PRN 2/10/2019     Sig - Route: Take 2 tablets by mouth Every 6 (Six) Hours As Needed for Mild Pain . - Oral    Cosign for Ordering: Accepted by Charles Dickinson MD on 2/10/2019  9:45 AM    aspirin EC tablet 81 mg 81 mg Daily 2/7/2019     Sig - Route: Take 1 tablet by mouth Daily. - Oral    budesonide-formoterol (SYMBICORT) 160-4.5 MCG/ACT inhaler 2 puff 2 puff 2 Times Daily - RT 2/7/2019     Sig - Route: Inhale 2 puffs 2 (Two) Times a Day. - Inhalation    bumetanide (BUMEX) tablet 1 mg 1 mg 2 Times Daily 2/19/2019     Sig - Route: Take 1 tablet by mouth 2 (Two) Times a Day. - Oral    dextrose (D50W) 25 g/ 50mL Intravenous Solution 25 g 25 g Every 15 Minutes PRN 2/6/2019     Sig - Route: Infuse 50 mL into a venous catheter Every 15 (Fifteen) Minutes As Needed for Low Blood Sugar (Blood Sugar Less Than 70). - Intravenous    Cosign for Ordering: Accepted by Charles Dickinson MD on 2/6/2019 12:08 PM    dextrose  (GLUTOSE) oral gel 15 g 15 g Every 15 Minutes PRN 2/6/2019     Sig - Route: Take 15 g by mouth Every 15 (Fifteen) Minutes As Needed for Low Blood Sugar (Blood sugar less than 70). - Oral    Cosign for Ordering: Accepted by Charles Dickinson MD on 2/6/2019 12:08 PM    glucagon (human recombinant) (GLUCAGEN DIAGNOSTIC) injection 1 mg 1 mg As Needed 2/6/2019     Sig - Route: Inject 1 mg under the skin into the appropriate area as directed As Needed (Blood Glucose Less Than 70). - Subcutaneous    Cosign for Ordering: Accepted by Charles Dickinson MD on 2/6/2019 12:08 PM    guaiFENesin (MUCINEX) 12 hr tablet 1,200 mg 1,200 mg Every 12 Hours Scheduled 2/6/2019     Sig - Route: Take 2 tablets by mouth Every 12 (Twelve) Hours. - Oral    Cosign for Ordering: Accepted by Charles Dickinson MD on 2/6/2019 12:08 PM    heparin (porcine) 5000 UNIT/ML injection 5,000 Units 5,000 Units Every 8 Hours Scheduled 2/18/2019     Sig - Route: Inject 1 mL under the skin into the appropriate area as directed Every 8 (Eight) Hours. - Subcutaneous    Hold medication 1 each Continuous PRN 2/15/2019     Sig - Route: 1 each Continuous As Needed (For 24 Hours Prior to Thoracentesis). - Does not apply    hydrALAZINE (APRESOLINE) injection 10 mg 10 mg Every 6 Hours PRN 2/8/2019     Sig - Route: Infuse 0.5 mL into a venous catheter Every 6 (Six) Hours As Needed for High Blood Pressure. - Intravenous    hydrALAZINE (APRESOLINE) tablet 75 mg 75 mg 3 Times Daily 2/15/2019     Sig - Route: Take 75 mg by mouth 3 (Three) Times a Day. - Oral    insulin aspart (novoLOG) injection 0-9 Units 0-9 Units 4 Times Daily Before Meals & Nightly 2/19/2019     Sig - Route: Inject 0-9 Units under the skin into the appropriate area as directed 4 (Four) Times a Day Before Meals & at Bedtime. - Subcutaneous    Cosign for Ordering: Accepted by Martin Velasco DO on 2/19/2019  4:22 PM    insulin detemir (LEVEMIR) injection 10 Units 10  "Units Nightly 2/20/2019     Sig - Route: Inject 10 Units under the skin into the appropriate area as directed Every Night. - Subcutaneous    Cosign for Ordering: Accepted by Martin Velasco DO on 2/20/2019 10:37 AM    ipratropium-albuterol (DUO-NEB) nebulizer solution 3 mL 3 mL 4 Times Daily - RT 2/6/2019     Sig - Route: Take 3 mL by nebulization 4 (Four) Times a Day. - Nebulization    Cosign for Ordering: Accepted by Charles Dickinson MD on 2/6/2019 12:08 PM    ipratropium-albuterol (DUO-NEB) nebulizer solution 3 mL 3 mL Every 4 Hours PRN 2/7/2019     Sig - Route: Take 3 mL by nebulization Every 4 (Four) Hours As Needed for Wheezing or Shortness of Air. - Nebulization    Cosign for Ordering: Accepted by Charles Dickinson MD on 2/7/2019 11:23 AM    Magnesium Sulfate 2 gram / 50mL Infusion (GIVE X 3 BAGS TO EQUAL 6GM TOTAL DOSE) - Mg 1.1 - 1.5 mg/dl 2 g As Needed 2/6/2019     Sig - Route: Infuse 50 mL into a venous catheter As Needed (See Administration Instructions). - Intravenous    Cosign for Ordering: Accepted by Charles Dickinson MD on 2/6/2019 12:08 PM    Linked Group 1:  \"Or\" Linked Group Details        Magnesium Sulfate 2 gram Bolus, followed by 8 gram infusion (total Mg dose 10 grams)- Mg less than or equal to 1mg/dL 2 g As Needed 2/6/2019     Sig - Route: Infuse 50 mL into a venous catheter As Needed (See Administration Instructions). - Intravenous    Cosign for Ordering: Accepted by Charles Dickinson MD on 2/6/2019 12:08 PM    Linked Group 1:  \"Or\" Linked Group Details        Magnesium Sulfate 4 gram infusion- Mg 1.6-1.9 mg/dL 4 g As Needed 2/6/2019     Sig - Route: Infuse 100 mL into a venous catheter As Needed (See Administration Instructions). - Intravenous    Cosign for Ordering: Accepted by Charles Dikcinson MD on 2/6/2019 12:08 PM    Linked Group 1:  \"Or\" Linked Group Details        metoprolol tartrate (LOPRESSOR) tablet 100 mg 100 mg Every " 12 Hours Scheduled 2/9/2019     Sig - Route: Take 1 tablet by mouth Every 12 (Twelve) Hours. - Oral    montelukast (SINGULAIR) tablet 10 mg 10 mg Nightly 2/7/2019     Sig - Route: Take 1 tablet by mouth Every Night. - Oral    nitroglycerin (NITROSTAT) SL tablet 0.4 mg 0.4 mg Every 5 Minutes PRN 2/6/2019     Sig - Route: Place 1 tablet under the tongue Every 5 (Five) Minutes As Needed for Chest Pain (Chest Pain With Systolic Blood Pressure Greater Than 100). - Sublingual    potassium chloride (K-DUR,KLOR-CON) CR tablet 40 mEq 40 mEq As Needed 2/6/2019     Sig - Route: Take 2 tablets by mouth As Needed (potassium replacement.  see admin instructions). - Oral    Cosign for Ordering: Accepted by Charles Dickinson MD on 2/6/2019 12:08 PM    potassium chloride (KLOR-CON) packet 40 mEq 40 mEq As Needed 2/6/2019     Sig - Route: Take 40 mEq by mouth As Needed (potassium replacement, see admin instructions). - Oral    Cosign for Ordering: Accepted by Charles Dickinson MD on 2/6/2019 12:08 PM    sennosides-docusate sodium (SENOKOT-S) 8.6-50 MG tablet 2 tablet 2 tablet Nightly 2/11/2019     Sig - Route: Take 2 tablets by mouth Every Night. - Oral    sodium chloride 0.9 % flush 3 mL 3 mL Every 12 Hours Scheduled 2/6/2019     Sig - Route: Infuse 3 mL into a venous catheter Every 12 (Twelve) Hours. - Intravenous    sodium chloride 0.9 % flush 3-10 mL 3-10 mL As Needed 2/6/2019     Sig - Route: Infuse 3-10 mL into a venous catheter As Needed for Line Care. - Intravenous    insulin detemir (LEVEMIR) injection 5 Units (Discontinued) 5 Units Nightly 2/19/2019 2/20/2019    Sig - Route: Inject 5 Units under the skin into the appropriate area as directed Every Night. - Subcutaneous    Cosign for Ordering: Accepted by Martin Velasco DO on 2/19/2019  4:22 PM            Lab Results (last 24 hours)     Procedure Component Value Units Date/Time    POC Glucose Once [[id number]]  (Abnormal) Collected:  02/20/19 1115     Specimen:  Blood Updated:  02/20/19 1121     Glucose 385 mg/dL     POC Glucose Once [[id number]]  (Abnormal) Collected:  02/20/19 0723    Specimen:  Blood Updated:  02/20/19 0730     Glucose 248 mg/dL     POC Glucose Once [[id number]]  (Abnormal) Collected:  02/20/19 0657    Specimen:  Blood Updated:  02/20/19 0704     Glucose 243 mg/dL     POC Glucose Once [[id number]]  (Abnormal) Collected:  02/20/19 0553    Specimen:  Blood Updated:  02/20/19 0559     Glucose 243 mg/dL     Osmolality, Calculated [[id number]]  (Normal) Collected:  02/20/19 0059    Specimen:  Blood Updated:  02/20/19 0317     Osmolality Calc 276.7 mOsm/kg     Comprehensive Metabolic Panel [[id number]]  (Abnormal) Collected:  02/20/19 0059    Specimen:  Blood Updated:  02/20/19 0314     Glucose 124 mg/dL      BUN 48 mg/dL      Creatinine 1.35 mg/dL      Sodium 131 mmol/L      Potassium 4.2 mmol/L      Chloride 100 mmol/L      CO2 24.7 mmol/L      Calcium 8.2 mg/dL      Total Protein 5.1 g/dL      Albumin 2.90 g/dL      ALT (SGPT) 19 U/L      AST (SGOT) 16 U/L      Alkaline Phosphatase 72 U/L      Comment: Note New Reference Ranges        Total Bilirubin 0.2 mg/dL      eGFR Non African Amer 38 mL/min/1.73      Globulin 2.2 gm/dL      A/G Ratio 1.3 g/dL      BUN/Creatinine Ratio 35.6     Anion Gap 6.3 mmol/L     Narrative:       The MDRD GFR formula is only valid for adults with stable renal function between ages 18 and 70.    Protime-INR [[id number]]  (Normal) Collected:  02/20/19 0059    Specimen:  Blood Updated:  02/20/19 0136     Protime 13.2 Seconds      INR 0.98    Narrative:       Suggested INR therapeutic range for stable oral anticoagulant therapy:    Low Intensity therapy:   1.5-2.0  Moderate Intensity therapy:   2.0-3.0  High Intensity therapy:   2.5-4.0    CBC & Differential [[id number]] Collected:  02/20/19 0059    Specimen:  Blood Updated:  02/20/19 0132    Narrative:       The following orders were created for panel order CBC &  Differential.  Procedure                               Abnormality         Status                     ---------                               -----------         ------                     CBC Auto Differential[[id number]]        Abnormal            Final result                 Please view results for these tests on the individual orders.    CBC Auto Differential [[id number]]  (Abnormal) Collected:  02/20/19 0059    Specimen:  Blood Updated:  02/20/19 0132     WBC 9.39 10*3/mm3      RBC 3.29 10*6/mm3      Hemoglobin 9.4 g/dL      Hematocrit 29.7 %      MCV 90.3 fL      MCH 28.6 pg      MCHC 31.6 g/dL      RDW 14.2 %      RDW-SD 44.6 fl      MPV 11.6 fL      Platelets 338 10*3/mm3      Neutrophil % 70.7 %      Lymphocyte % 15.2 %      Monocyte % 11.1 %      Eosinophil % 1.1 %      Basophil % 0.4 %      Immature Grans % 1.5 %      Neutrophils, Absolute 6.64 10*3/mm3      Lymphocytes, Absolute 1.43 10*3/mm3      Monocytes, Absolute 1.04 10*3/mm3      Eosinophils, Absolute 0.10 10*3/mm3      Basophils, Absolute 0.04 10*3/mm3      Immature Grans, Absolute 0.14 10*3/mm3     POC Glucose Once [[id number]]  (Abnormal) Collected:  02/19/19 1927    Specimen:  Blood Updated:  02/19/19 1933     Glucose 265 mg/dL     Body Fluid Culture In Blood Culture Bottles - Pleural Fluid, Pleural Cavity [[id number]] Collected:  02/15/19 1539    Specimen:  Pleural Fluid from Pleural Cavity Updated:  02/19/19 1702     BF Culture No growth at 4 days    POC Glucose Once [[id number]]  (Abnormal) Collected:  02/19/19 1642    Specimen:  Blood Updated:  02/19/19 1649     Glucose 247 mg/dL     JAMIE,PE and FLC, Serum [[id number]]  (Abnormal) Collected:  02/16/19 0111    Specimen:  Blood Updated:  02/19/19 1615     IgG 589 mg/dL      IgA 263 mg/dL      IgM 28 mg/dL      Total Protein 5.2 g/dL      Albumin 2.7 g/dL      Alpha-1-Globulin 0.3 g/dL      Alpha-2-Globulin 0.8 g/dL      Beta Globulin 0.8 g/dL      Gamma Globulin 0.5 g/dL      M-Juan J Not Observed  g/dL      Globulin 2.5 g/dL      A/G Ratio 1.1     Immunofixation Reflex, Serum Comment     Comment: No monoclonality detected.        Please note Comment     Comment: Protein electrophoresis scan will follow via computer, mail, or   delivery.        Free Light Chain, Kappa 44.8 mg/L      Free Lambda Light Chains 22.2 mg/L      Kappa/Lambda Ratio 2.02    Narrative:       Performed at:  01 - [facility]  [id number]  : Rony Naik PhD, Phone:  [phone]    Cholesterol, Body Fluid - Pleural Fluid, Pleural Cavity [[id number]] Collected:  02/15/19 1539    Specimen:  Pleural Fluid from Pleural Cavity Updated:  02/19/19 1414     Cholesterol, Fluid 47 mg/dL      Comment: INTERPRETIVE INFORMATION: Cholesterol, Body Fluid  For information on body fluid reference ranges and/or interpretive  guidance visit http://[employer]/bodyfluids/  Test developed and characteristics determined by [facility]. See Compliance Statement B: [facility]/CS       Narrative:       Performed at:  01 - [facility]  [address] [place] [address]  [id number]  : Santosh Mae MD, Phone:  [phone]        Orders (last 24 hrs)     Start     Ordered    02/20/19 2100  insulin detemir (LEVEMIR) injection 10 Units  Nightly      02/20/19 0720    02/20/19 1225  Discontinue IV  Once      02/20/19 1227    02/20/19 1212  Diabetes Education  Once     Comments:  Sliding scale instructions with meals:      Blood glucose 150-199 mg/dL - 2 units  Blood glucose 200-249 mg/dL - 4 units  Blood glucose 250-299 mg/dL - 6 units  Blood glucose 300-349 mg/dL - 7 units  Blood glucose 350-400 mg/dL - 8 units  Blood glucose greater than 400 mg/dL - 9 units & call PCP    02/20/19 1217    02/20/19 1211  Discharge Prescription Status  Continuous      02/20/19 1217    02/20/19 1211  ACEI or ARB for EF < 40%  Once      02/20/19 1217    02/20/19 1211  Anticoagulation Therapy for Atrial  Fibrillation or Atrial Flutter Prescribed at Discharge  Once      02/20/19 1217    02/20/19 1210  Discontinue IV  Once      02/20/19 1217    02/20/19 1210  Discontinue Telemetry  Once      02/20/19 1217    02/20/19 1207  Discharge patient  Once      02/20/19 1217    02/20/19 1207  Discontinue IV  Once      02/20/19 1217    02/20/19 1122  POC Glucose Once  Once      02/20/19 1115    02/20/19 1030  Diet Regular; Thin; Cardiac, Consistent Carbohydrate, Daily Fluid Restriction; 1500 mL Fluid Per Day  Diet Effective Now      02/20/19 1030    02/20/19 1030  Fu 2-3 weeks Needs to arrange outpatient appointment with Dr Uribe in 2-3 weeks for MGUS  Nursing Communication  Once     Comments:  Fu 2-3 weeks  Needs to arrange outpatient appointment with Dr Uribe in 2-3 weeks for MGUS    02/20/19 1030    02/20/19 0743  Diet Regular; Thin; Cardiac, Consistent Carbohydrate  Diet Effective Now,   Status:  Canceled      02/20/19 0742    02/20/19 0731  POC Glucose Once  Once      02/20/19 0723    02/20/19 0705  POC Glucose Once  Once      02/20/19 0657    02/20/19 0600  CBC Auto Differential  PROCEDURE ONCE      02/20/19 0001    02/20/19 0600  POC Glucose Once  Once      02/20/19 0553    02/20/19 0305  Osmolality, Calculated  Once      02/20/19 0304    02/20/19 0001  NPO Diet  Diet Effective Midnight,   Status:  Canceled     Comments:  FOR U/S GALLBLADDER.    02/19/19 1832    02/20/19 0000  CT Chest Without Contrast  1 Time Imaging      02/19/19 1817    02/20/19 0000  warfarin (COUMADIN) 5 MG tablet  Nightly      02/20/19 1217    02/20/19 0000  insulin aspart (novoLOG) 100 UNIT/ML injection      02/20/19 1217    02/20/19 0000  hydrALAZINE (APRESOLINE) 25 MG tablet  3 Times Daily      02/20/19 1217    02/20/19 0000  metoprolol tartrate (LOPRESSOR) 25 MG tablet  Every 12 Hours Scheduled,   Status:  Discontinued      02/20/19 1217    02/20/19 0000  bumetanide (BUMEX) 1 MG tablet  2 Times Daily      02/20/19 1217    02/20/19 0000   Protime-INR     Comments:  Send to Josie Massey at [facility] in [place], [place]      02/20/19 1217    02/20/19 0000  Referral to Home Health      02/20/19 1217    02/20/19 0000  Discharge Instructions     Comments:  -Low salt, diabetic diet  -Call PCP if blood glucose>400  -Check blood glucose 3x day with meals    02/20/19 1217    02/20/19 0000  Discharge Follow-up with PCP      02/20/19 1217    02/20/19 0000  Discharge Follow-up with Specified Provider: Dr. Shaikh or partners; 2 Weeks      02/20/19 1217    02/20/19 0000  Discharge Follow-up with Specialty: Keep pulmonology appointment in early March 02/20/19 1217    02/20/19 0000  Discharge Follow-up with Specified Provider: Keep appointment with Dr. Ventura as previously written      02/20/19 1217    02/20/19 0000  Diet: Consistent Carbohydrate      02/20/19 1217    02/20/19 0000  Activity as Tolerated      02/20/19 1217    02/20/19 0000  Walker      02/20/19 1217    02/20/19 0000  Basic Metabolic Panel     Comments:  To be sent to Josie Massey. Performed by [facility].      02/20/19 1217    02/20/19 0000  Resume Oxygen Therapy After Discharge      02/20/19 1217    02/20/19 0000  metoprolol tartrate (LOPRESSOR) 100 MG tablet  Every 12 Hours Scheduled      02/20/19 1222    02/20/19 0000  Discharge Follow-up with Specified Provider: Dr. Ventura/ [facility]; 2 Weeks      02/20/19 1227    02/19/19 2100  insulin detemir (LEVEMIR) injection 3 Units  Nightly,   Status:  Discontinued      02/19/19 1149    02/19/19 2100  insulin detemir (LEVEMIR) injection 5 Units  Nightly,   Status:  Discontinued      02/19/19 1149    02/19/19 1934  POC Glucose Once  Once      02/19/19 1927    02/19/19 1703  Inpatient General Surgery Consult  Once     Specialty:  General Surgery  Provider:  Umm Talavera MD    02/19/19 1702    02/19/19 1658  US Gallbladder  1 Time Imaging      02/19/19 1657    02/19/19 1650  POC Glucose Once  Once      02/19/19 1642    02/19/19 1130  insulin  aspart (novoLOG) injection 0-9 Units  4 Times Daily Before Meals & Nightly      02/19/19 0747    02/19/19 0900  bumetanide (BUMEX) tablet 1 mg  2 Times Daily      02/19/19 0802    02/19/19 0600  CBC & Differential  Daily      02/18/19 0918    02/19/19 0600  Comprehensive Metabolic Panel  Daily      02/18/19 0918    02/18/19 2200  heparin (porcine) 5000 UNIT/ML injection 5,000 Units  Every 8 Hours Scheduled      02/18/19 1528    02/16/19 0600  Basic Metabolic Panel  Daily      02/15/19 1243    02/15/19 1600  hydrALAZINE (APRESOLINE) tablet 75 mg  3 Times Daily      02/15/19 1233    02/15/19 1517  Hold medication  Continuous PRN      02/15/19 1518    02/12/19 1800  Dietary Nutrition Supplements Boost Plus (Ensure Enlive, Ensure Plus)  Daily With Lunch & Dinner     Comments:  strawberry    02/12/19 1526    02/11/19 2100  sennosides-docusate sodium (SENOKOT-S) 8.6-50 MG tablet 2 tablet  Nightly      02/11/19 1158    02/11/19 1158  Protime-INR  Daily      02/11/19 1157    02/10/19 0911  acetaminophen (TYLENOL) tablet 650 mg  Every 6 Hours PRN      02/10/19 0911    02/09/19 2100  metoprolol tartrate (LOPRESSOR) tablet 100 mg  Every 12 Hours Scheduled      02/09/19 1058    02/08/19 0330  hydrALAZINE (APRESOLINE) injection 10 mg  Every 6 Hours PRN      02/08/19 0330    02/07/19 0820  ipratropium-albuterol (DUO-NEB) nebulizer solution 3 mL  Every 4 Hours PRN      02/07/19 0832    02/07/19 0436  aspirin EC tablet 81 mg  Daily      02/07/19 0440    02/07/19 0436  budesonide-formoterol (SYMBICORT) 160-4.5 MCG/ACT inhaler 2 puff  2 Times Daily - RT      02/07/19 0440    02/07/19 0436  montelukast (SINGULAIR) tablet 10 mg  Nightly      02/07/19 0440    02/06/19 1300  ipratropium-albuterol (DUO-NEB) nebulizer solution 3 mL  4 Times Daily - RT      02/06/19 0825    02/06/19 1100  POC Glucose 4x Daily AC & at Bedtime  4 Times Daily Before Meals & at Bedtime      02/06/19 0807    02/06/19 0915  guaiFENesin (MUCINEX) 12 hr tablet  1,200 mg  Every 12 Hours Scheduled      02/06/19 0825    02/06/19 0807  dextrose (GLUTOSE) oral gel 15 g  Every 15 Minutes PRN      02/06/19 0807    02/06/19 0807  dextrose (D50W) 25 g/ 50mL Intravenous Solution 25 g  Every 15 Minutes PRN      02/06/19 0807    02/06/19 0807  glucagon (human recombinant) (GLUCAGEN DIAGNOSTIC) injection 1 mg  As Needed      02/06/19 0807    02/06/19 0807  Magnesium Sulfate 2 gram Bolus, followed by 8 gram infusion (total Mg dose 10 grams)- Mg less than or equal to 1mg/dL  As Needed      02/06/19 0807    02/06/19 0807  Magnesium Sulfate 2 gram / 50mL Infusion (GIVE X 3 BAGS TO EQUAL 6GM TOTAL DOSE) - Mg 1.1 - 1.5 mg/dl  As Needed      02/06/19 0807    02/06/19 0807  Magnesium Sulfate 4 gram infusion- Mg 1.6-1.9 mg/dL  As Needed      02/06/19 0807    02/06/19 0807  potassium chloride (K-DUR,KLOR-CON) CR tablet 40 mEq  As Needed      02/06/19 0807    02/06/19 0807  potassium chloride (KLOR-CON) packet 40 mEq  As Needed      02/06/19 0807    02/06/19 0130  sodium chloride 0.9 % flush 3 mL  Every 12 Hours Scheduled      02/06/19 0039    02/06/19 0038  sodium chloride 0.9 % flush 3-10 mL  As Needed      02/06/19 0039    02/06/19 0038  nitroglycerin (NITROSTAT) SL tablet 0.4 mg  Every 5 Minutes PRN      02/06/19 0039    Unscheduled  Oxygen Therapy- Nasal Cannula; 2 LPM; Titrate for SPO2: 92%, Greater Than or Equal To  Continuous PRN      02/05/19 1829    Unscheduled  ECG 12 Lead  As Needed     Comments:  Nurse to Release if Patient Expericences Acute Chest Pain or Dysrhythmias    02/06/19 0039    Unscheduled  Potassium  As Needed     Comments:  For Ventricular Arrhythmias      02/06/19 0039    Unscheduled  Magnesium  As Needed     Comments:  For Ventricular Arrhythmias      02/06/19 0039    Unscheduled  Troponin  As Needed     Comments:  For Chest Pain      02/06/19 0039    Unscheduled  Digoxin Level  As Needed     Comments:  For Atrial Arrhythmias      02/06/19 0039    Unscheduled  Blood  Gas, Arterial  As Needed     Comments:  Per O2 PolicyNotify Physician      02/06/19 0039    Unscheduled  Magnesium  As Needed      02/06/19 0807    Unscheduled  Potassium  As Needed      02/06/19 0807    Unscheduled  Transfuse Fresh Frozen Plasma, 3 Units  Transfusion      02/14/19 1606    Unscheduled  Bladder Scan if Patient Unable to Void 4-6 Hours After Catheter Removal  As Needed      02/18/19 1154    Unscheduled  If Bladder Scan Volume is Less Than 350-500mL & Patient is Without Symptoms of Bladder Discomfort / Distention Monitor Every 1-2 Hours for Spontaneous Void  As Needed      02/18/19 1154    Unscheduled  Straight Cath Every 4-6 Hours As Needed If Patient is Unable to Void After 4-6 Hours, Bladder Scan Volume is Greater Than 350-500mL & Patient Has Symptoms of Bladder Discomfort / Distention  As Needed      02/18/19 1154    Unscheduled  Schedule / Prompt Voiding For Patients With Urinary Incontinence  As Needed      02/18/19 1154    --  metFORMIN (GLUCOPHAGE) 1000 MG tablet  2 Times Daily      02/06/19 0033    --  warfarin (COUMADIN) 2 MG tablet  Nightly,   Status:  Discontinued      02/06/19 0033    --  SCANNED - TELEMETRY        02/05/19 0000    --  SCANNED - TELEMETRY        02/05/19 0000    --  SCANNED - TELEMETRY        02/05/19 0000    --  SCANNED - TELEMETRY        02/05/19 0000    --  SCANNED - TELEMETRY        02/05/19 0000    --  SCANNED - TELEMETRY        02/05/19 0000          Operative/Procedure Notes (last 24 hours) (Notes from 2/19/2019 12:34 PM through 2/20/2019 12:34 PM)     No notes of this type exist for this encounter.           Physician Progress Notes (last 24 hours) (Notes from 2/19/2019 12:34 PM through 2/20/2019 12:34 PM)      Andrés Shaikh MD at 2/20/2019 10:27 AM          Nephrology  Note    Subjective     She wants to go home. She denies chest pain or SOB. Feeling well    Objective     Vital Signs  Temp:  [97.9 °F (36.6 °C)-98.3 °F (36.8 °C)] 98 °F (36.7 °C)  Heart Rate:   [56-81] 56  Resp:  [18] 18  BP: (117-161)/(44-56) 161/56    I/O this shift:  In: 240 [P.O.:240]  Out: -   I/O last 3 completed shifts:  In: 480 [P.O.:480]  Out: 150 [Urine:150]    Physical Examination:    General Appearance : alert, appears stated age, cooperative and no distress  Head : normocephalic  Eyes : no pallor   Throat : oral mucosa moist  Neck:no JVD  Lungs : reduced breath sounds at bases  Heart : regular rhythm & normal rate, normal S1, S2, no murmur  Abdomen :   soft non-tender  Extremities :  trace edema  Neurologic : orientated to person, place, time, grossly no focal deficitis    Laboratory Data :      WBC WBC   Date Value Ref Range Status   02/20/2019 9.39 4.50 - 12.50 10*3/mm3 Final   02/19/2019 7.29 4.50 - 12.50 10*3/mm3 Final   02/18/2019 10.00 4.50 - 12.50 10*3/mm3 Final      HGB Hemoglobin   Date Value Ref Range Status   02/20/2019 9.4 (L) 12.0 - 16.0 g/dL Final   02/19/2019 9.6 (L) 12.0 - 16.0 g/dL Final   02/18/2019 10.2 (L) 12.0 - 16.0 g/dL Final      HCT Hematocrit   Date Value Ref Range Status   02/20/2019 29.7 (L) 37.0 - 47.0 % Final   02/19/2019 30.3 (L) 37.0 - 47.0 % Final   02/18/2019 32.4 (L) 37.0 - 47.0 % Final      Platlets No results found for: LABPLAT   MCV MCV   Date Value Ref Range Status   02/20/2019 90.3 80.0 - 94.0 fL Final   02/19/2019 91.3 80.0 - 94.0 fL Final   02/18/2019 89.8 80.0 - 94.0 fL Final          Sodium Sodium   Date Value Ref Range Status   02/20/2019 131 (L) 135 - 153 mmol/L Final   02/19/2019 131 (L) 135 - 153 mmol/L Final   02/18/2019 136 135 - 153 mmol/L Final      Potassium Potassium   Date Value Ref Range Status   02/20/2019 4.2 3.5 - 5.3 mmol/L Final   02/19/2019 4.5 3.5 - 5.3 mmol/L Final   02/18/2019 4.1 3.5 - 5.3 mmol/L Final   02/17/2019 5.1 3.5 - 5.3 mmol/L Final      Chloride Chloride   Date Value Ref Range Status   02/20/2019 100 99 - 112 mmol/L Final   02/19/2019 99 99 - 112 mmol/L Final   02/18/2019 107 99 - 112 mmol/L Final      CO2 CO2   Date  Value Ref Range Status   02/20/2019 24.7 24.3 - 31.9 mmol/L Final   02/19/2019 24.9 24.3 - 31.9 mmol/L Final   02/18/2019 24.7 24.3 - 31.9 mmol/L Final      BUN BUN   Date Value Ref Range Status   02/20/2019 48 (H) 7 - 21 mg/dL Final   02/19/2019 50 (H) 7 - 21 mg/dL Final   02/18/2019 45 (H) 7 - 21 mg/dL Final      Creatinine Creatinine   Date Value Ref Range Status   02/20/2019 1.35 (H) 0.43 - 1.29 mg/dL Final   02/19/2019 1.45 (H) 0.43 - 1.29 mg/dL Final   02/18/2019 1.26 0.43 - 1.29 mg/dL Final      Calcium Calcium   Date Value Ref Range Status   02/20/2019 8.2 7.7 - 10.0 mg/dL Final   02/19/2019 7.7 7.7 - 10.0 mg/dL Final   02/18/2019 7.9 7.7 - 10.0 mg/dL Final      PO4 No results found for: CAPO4   Albumin Albumin   Date Value Ref Range Status   02/20/2019 2.90 (L) 3.40 - 4.80 g/dL Final   02/19/2019 2.90 (L) 3.40 - 4.80 g/dL Final      Magnesium No results found for: MG   Uric Acid No results found for: URICACID     Radiology results :     Imaging Results (last 24 hours)     Procedure Component Value Units Date/Time    US Gallbladder [[id number]] Collected:  02/20/19 0836     Updated:  02/20/19 0839    Narrative:       EXAMINATION: US GALLBLADDER-         CLINICAL INDICATION:     RUQ pain; A41.9-Sepsis, unspecified organism;  R65.21-Severe sepsis with septic shock; N17.9-Acute kidney failure,  unspecified     TECHNIQUE: Multiplanar gray scale ultrasound of the abdomen.     COMPARISON: NONE      FINDINGS:   Visualized pancreas is unremarkable.   The gallbladder is unremarkable without stones or wall thickening.   The CBD measures 6.5 mm.  The liver demonstrates normal echogenicity without focal lesion.    No ascites demonstrated.   There is no sonographic Coleman sign.       Impression:       No sonographic findings to explain abdominal pain.      This report was finalized on 2/20/2019 8:37 AM by Dr. Duke Brewer MD.       CT Chest Without Contrast [[id number]] Collected:  02/20/19 0836     Updated:  02/20/19  0839    Narrative:       CT CHEST WO CONTRAST-        TECHNIQUE: Multiple axial CT images were obtained from lung apex through  upper abdomen without administration of IV contrast. Reformatted images  in the coronal and/or sagittal plane(s) were generated from the axial  data set to facilitate diagnostic accuracy and/or surgical planning.  Oral Contrast:NONE.     Radiation dose reduction techniques were utilized per ALARA protocol.  Automated exposure control was initiated through either or [employer] or  DoseRight software packages by  protocol.          [phone] mGy.cm  Clinical information  Pleural effusion; A41.9-Sepsis, unspecified organism; R65.21-Severe  sepsis with septic shock; N17.9-Acute kidney failure, unspecified      Comparison  NONE.     Findings  LUNGS: PATCHY LEFT LUNG BASE GROUNDGLASS ATTENUATION THAT MAY REPRESENT  SOME PNEUMONIA OR POTENTIALLY ALVEOLAR EDEMA.     HEART: CARDIOMEGALY.     PERICARDIUM: No effusion.     MEDIASTINUM: No masses. No enlarged lymph nodes.  No fluid collections.     PLEURA: SMALL BILATERAL PLEURAL EFFUSIONS.     MAJOR AIRWAYS: Clear. No intrinsic mass.     VASCULATURE: No evidence of aneurysm.     VISUALIZED UPPER ABDOMEN:        LIVER: Homogeneous. No focal hepatic mass or ductal dilatation.        SPLEEN: Homogeneous. No splenomegaly.        ADRENALS: No mass.        KIDNEYS: No mass. No obstructive uropathy.  No evidence of  urolithiasis.        GI TRACT: Non-dilated. No definite wall thickening.        PERITONEUM: No free air. No free fluid or loculated fluid  collections.           ABDOMINAL WALL: No focal hernia or mass.           OTHER: None.     BONES: No acute bony abnormality.       Impression:       Impression:  Small bilateral pleural effusions. Patchy left lung base groundglass  attenuation that may represent some pneumonia or potentially alveolar  edema. Cardiomegaly.     This report was finalized on 2/20/2019 8:36 AM by Dr. Duke Brewer MD.                  Medications:        aspirin 81 mg Oral Daily   budesonide-formoterol 2 puff Inhalation BID - RT   bumetanide 1 mg Oral BID   guaiFENesin 1,200 mg Oral Q12H   heparin (porcine) 5,000 Units Subcutaneous Q8H   hydrALAZINE 75 mg Oral TID   insulin aspart 0-9 Units Subcutaneous 4x Daily AC & at Bedtime   insulin detemir 10 Units Subcutaneous Nightly   ipratropium-albuterol 3 mL Nebulization 4x Daily - RT   metoprolol tartrate 100 mg Oral Q12H   montelukast 10 mg Oral Nightly   sennosides-docusate sodium 2 tablet Oral Nightly   sodium chloride 3 mL Intravenous Q12H       hold 1 each       Assessment/Plan       Septic shock (CMS/HCC)      1. CKD stage 3 with baseline creatinine 1.1-1.3 since 2015 . Creatinine is better at 1.3 today  contiunue bumex  1 mg po BID    2. Proteinuria : urine protein/creatinine is 1.6 gm/gm. Presumed diabetic nephropathy  Anti GBM and MM labs pending, rest serologies negative    3. HTN : stable    4. Bilateral pleural effusions : CT chest noted and pleural  Effusions are small    5. MGUS : no M spike, and serum immunofixation is negative but serum K/L is slightly high. Arrange outpatient hem onc fu    6. Dilutional hyponatremia ct fluid restriction, Na 131    Ok to dc from renal standpoint. Fu in 2-3 weeks  I discussed the patient's findings and my recommendations with patient    Andrés Shaikh MD  02/20/19  10:27 AM            Electronically signed by Andrés Shaikh MD at 2/20/2019 10:29 AM          Consult Notes (last 24 hours) (Notes from 2/19/2019 12:35 PM through 2/20/2019 12:35 PM)      Umm Talavera MD at 2/19/2019  6:17 PM      Consult Orders    1. Inpatient General Surgery Consult [[id number]] ordered by Kaley Cutler PA-C at 02/19/19 8862                    Consulting physician:  Dr. Talavera    Referring physician: Dr. Noonan    Date of consultation: 02/19/19     Chief complaint pleural effusion    Subjective     Patient is a 73 y.o. female who was admitted on  2/5/19 with sepsis.  During the hospitalization the patient was found to have a left pleural effusion and had attempted thoracentesis with only a small amount of fluid obtained.  Patient's last chest x-ray was yesterday which showed a tiny left pleural effusion.  According to the chart notes the patient had an ultrasound of the chest which reported a complex fluid collection and concern was raised for the possible development of a pulmonary abscess or empyema because of the complex nature of the fluid collection.  The patient states she feels good and she is anxious to go home.  She is currently off antibiotics.  She has been getting fairly aggressive diuresis.      Review of Systems  Review of Systems - General ROS: negative for - weight loss  Psychological ROS: negative for - behavioral disorder  Ophthalmic ROS: negative for - dry eyes  ENT ROS: negative for - vertigo or vocal changes  Hematological and Lymphatic ROS: negative for - swollen lymph nodes, DVT, PE.   Respiratory ROS: negative for - sputum changes or stridor.  Cardiovascular ROS: negative for - irregular heartbeat or murmur  Gastrointestinal ROS: negative for - blood in stools or change in stools  Genito-Urinary ROS: negative for - hematuria or incontinence  Musculoskeletal ROS: negative for - gait disturbance      History  Past Medical History:   Diagnosis Date   • Diabetes mellitus (CMS/[facility])    • Hypertension    • NSTEMI (non-ST elevated myocardial infarction) (CMS/[facility]) 01/2019   • Paroxysmal atrial fibrillation (CMS/[facility])    • Severe sepsis with septic shock (CMS/[facility])      History reviewed. No pertinent surgical history.  History reviewed. No pertinent family history.  Social History     Tobacco Use   • Smoking status: Never Smoker   • Smokeless tobacco: Never Used   Substance Use Topics   • Alcohol use: No     Frequency: Never   • Drug use: No     Medications Prior to Admission   Medication Sig Dispense Refill Last Dose   • aspirin 81 MG EC tablet  Take 1 tablet by mouth Daily.   2/5/2019 at Unknown time   • budesonide-formoterol (SYMBICORT) 160-4.5 MCG/ACT inhaler Inhale 2 puffs 2 (Two) Times a Day. 1 inhaler 12 2/5/2019 at AM   • metFORMIN (GLUCOPHAGE) 1000 MG tablet Take 1,000 mg by mouth 2 (Two) Times a Day.   2/5/2019 at AM   • metoprolol tartrate (LOPRESSOR) 25 MG tablet Take 1 tablet by mouth Every 12 (Twelve) Hours. 60 tablet 0 2/5/2019 at AM   • NIFEdipine CC (ADALAT CC) 60 MG 24 hr tablet Take 1 tablet by mouth Daily. 30 tablet 0 2/5/2019 at AM   • warfarin (COUMADIN) 2 MG tablet Take 2 mg by mouth Every Night.   2/4/2019 at PM   • atorvastatin (LIPITOR) 40 MG tablet Take 1 tablet by mouth Every Night. 30 tablet 0 2/4/2019 at PM   • insulin detemir (LEVEMIR) 100 UNIT/ML injection Inject 10 Units under the skin into the appropriate area as directed Every Night.  12 2/4/2019 at PM   • montelukast (SINGULAIR) 10 MG tablet Take 10 mg by mouth Every Night.   2/4/2019 at PM     Allergies:  Sulfa antibiotics    Objective     Vital Signs  Temp:  [98.1 °F (36.7 °C)-98.3 °F (36.8 °C)] 98.3 °F (36.8 °C)  Heart Rate:  [56-84] 60  Resp:  [18] 18  BP: (117-146)/(43-54) 137/54    Physical Exam:  General:  This is a WD WN petite female in no acute distress  Vital signs stable, afebrile  HEENT exam:  WNL. Sclera are anicteric.  EOMI  Neck:  supple, FROM.  No JVD.  Trachea midline  Lungs:  Respiratory effort normal. Auscultation: Clear, without wheezes, rhonchi, rales.  Breath sounds are decreased in the left base as compared to the right  Heart:  Regular rate and rhythm, without murmur, gallop, rub.  No pedal edema  Abdomen: Nontender, nondistended  Musculoskeletal:  muscle strength/tone is normal.    Psyc:  alert, oriented x 3.  Mood and affect are appropriate  skin:  Warm with good turgor.  Without rash or lesion  extremities:  Examination of the extremities revealed no cyanosis, clubbing or edema.    Results Review:       Results from last 7 days   Lab Units  02/19/19  0428 02/18/19  1146 02/18/19  0440 02/17/19  0451 02/16/19  0110 02/15/19  0826 02/15/19  0051 02/14/19 0423   WBC 10*3/mm3 7.29 10.00  --   --   --   --  9.57  --    HEMOGLOBIN g/dL 9.6* 10.2*  --   --   --   --  10.2*  --    HEMATOCRIT % 30.3* 32.4*  --   --   --   --  31.4*  --    PLATELETS 10*3/mm3 313 337  --   --   --   --  368  --    INR  0.95  --  0.97 1.03 1.08 1.17* 1.45* 2.34*     Results from last 7 days   Lab Units 02/13/19  1353   PH, ARTERIAL pH units 7.383   PO2 ART mm Hg 83.2   PCO2, ARTERIAL mm Hg 31.5*   HCO3 ART mmol/L 18.3*     Results from last 7 days   Lab Units 02/19/19  0428 02/18/19 0440 02/17/19 1956 02/17/19 0451 02/16/19  0111   SODIUM mmol/L 131* 136  --  133* 133*   POTASSIUM mmol/L 4.5 4.1 5.1 3.5 4.2   CHLORIDE mmol/L 99 107  --  103 102   CO2 mmol/L 24.9 24.7  --  23.7* 23.9*   BUN mg/dL 50* 45*  --  41* 53*   CREATININE mg/dL 1.45* 1.26  --  1.20 1.31*   EGFR IF NONAFRICN AM mL/min/1.73 35* 42*  --  44* 40*   CALCIUM mg/dL 7.7 7.9  --  7.6* 7.7   GLUCOSE mg/dL 436* 56*  --  238* 270*   ALBUMIN g/dL 2.90*  --   --   --  2.7*   BILIRUBIN mg/dL 0.2  --   --   --   --    ALK PHOS U/L 73  --   --   --   --    AST (SGOT) U/L 13  --   --   --   --    ALT (SGPT) U/L 9*  --   --   --   --    Estimated Creatinine Clearance: 27.6 mL/min (A) (by C-G formula based on SCr of 1.45 mg/dL (H)).  No results found for: AMMONIA                    Imaging:  Imaging Results (last 24 hours)     ** No results found for the last 24 hours. **          Prior CT of the chest on 2/8/19 and recent chest x-ray were reviewed and I agree with the assessment      Impression:  Patient Active Problem List   Diagnosis Code   • Pneumonia J18.9   • Septic shock (CMS/[facility]) A41.9, R65.21   • Pseudomonas pneumonia (CMS/[facility]) J15.1     Impression: Left pleural effusion  Plan:  It is not clear to me at this point in time that the patient would obtain any benefit from a chest tube.  Last chest x-ray demonstrated  only a tiny pleural effusion.  In addition if the fluid in the chest is septated/loculated drainage would not be achieved with chest tube placement  Plan is to repeat the CT scan of the chest and see what the status of the pleural effusion is.  Evidence of infection or a pleural effusion large enough to cause symptoms would be appropriate indications for chest tube placement.      Discussion:      Errors in dictation may reflect use of voice recognition software and not all errors in transcription may have been detected prior to signing.  Umm Talavera MD  19  6:21 PM    Time: Time spent:    Electronically signed by Umm Talavera MD at 2019  6:24 PM          Physical Therapy Notes (last 24 hours) (Notes from 2019 12:35 PM through 2019 12:35 PM)      Heidi Qureshi, PT at 2019  3:06 PM  Version 1 of 1            19 1506   Rehab Treatment   Discipline physical therapist   Reason Treatment Not Performed patient/family declined treatment, not feeling well  (Patient noted that she did not feel well, reporting her stomach was upset.)   Recommendation   PT - Next Appointment 19       Electronically signed by Heidi Qureshi, PT at 2019  3:06 PM     Dalton, Ashley Claudene, PT at 2019 10:58 AM  Version 1 of 1         Acute Care - Physical Therapy Treatment Note  [facility]     Patient Name: Ashley Geronimo  : 1945  MRN: [id number]  Today's Date: 2019  Onset of Illness/Injury or Date of Surgery: 19  Date of Referral to PT: 19  Referring Physician: Dr. Jefferson    Admit Date: 2019    Visit Dx:    ICD-10-CM ICD-9-CM   1. Septic shock (CMS/HCC) A41.9 038.9    R65.21 785.52     995.92   2. Acute renal failure, unspecified acute renal failure type (CMS/HCC) N17.9 584.9     Patient Active Problem List   Diagnosis   • Pneumonia   • Septic shock (CMS/HCC)   • Pseudomonas pneumonia (CMS/HCC)       Therapy Treatment    Rehabilitation  Treatment Summary     Row Name 02/20/19 1000             Treatment Time/Intention    Discipline  physical therapist  -AD      Document Type  therapy note (daily note)  -AD      Subjective Information  fatigue  -AD      Mode of Treatment  physical therapy  -AD      Patient/Family Observations  Pt supine in bed with 2L O2 via nasal cannula. No family or visitors were present.  -AD      Care Plan Review  care plan/treatment goals reviewed;risks/benefits reviewed;patient/other agree to care plan;current/potential barriers reviewed  -AD      Total Minutes, Physical Therapy Treatment  25  -AD      Patient Effort  good  -AD      Existing Precautions/Restrictions  fall;oxygen therapy device and L/min  -AD      Patient Response to Treatment  Pt tolerated today's session well, with reports of fatigue upon conclusion. [facility] requierd CGA with bed mobility, transfers, and gait. The patient ambulated 100' with RW and then performed seated therapeutic exercises.  -AD      Recorded by [AD] Dalton, Ashley Claudene, PT 02/20/19 1057      Row Name 02/20/19 1000             Cognitive Assessment/Intervention- PT/OT    Orientation Status (Cognition)  oriented x 3  -AD      Follows Commands (Cognition)  follows one step commands  -AD      Recorded by [AD] Dalton, Ashley Claudene, PT 02/20/19 1057      Row Name 02/20/19 1000             Mobility Assessment/Intervention    Extremity Weight-bearing Status  left lower extremity;right lower extremity  -AD      Left Lower Extremity (Weight-bearing Status)  full weight-bearing (FWB)  -AD      Right Lower Extremity (Weight-bearing Status)  full weight-bearing (FWB)  -AD      Recorded by [AD] Dalton, Ashley Claudene, PT 02/20/19 1057      Row Name 02/20/19 1000             Bed Mobility Assessment/Treatment    Bed Mobility Assessment/Treatment  bed mobility (all) activities  -AD      [place] Level (Bed Mobility)  verbal cues;nonverbal cues (demo/gesture);contact guard assist  -AD      Assistive  Device (Bed Mobility)  bed rails  -AD      Recorded by [AD] Dalton, Ashley Claudene, PT 02/20/19 1057      Row Name 02/20/19 1000             Transfer Assessment/Treatment    Transfer Assessment/Treatment  sit-stand transfer;stand-sit transfer  -AD      Recorded by [AD] Dalton, Ashley Claudene, PT 02/20/19 1057      Row Name 02/20/19 1000             Sit-Stand Transfer    Sit-Stand [place] (Transfers)  verbal cues;nonverbal cues (demo/gesture);contact guard  -AD      Assistive Device (Sit-Stand Transfers)  walker, front-wheeled  -AD      Recorded by [AD] Dalton, Ashley Claudene, PT 02/20/19 1057      Row Name 02/20/19 1000             Stand-Sit Transfer    Stand-Sit [place] (Transfers)  verbal cues;nonverbal cues (demo/gesture);contact guard  -AD      Assistive Device (Stand-Sit Transfers)  walker, front-wheeled  -AD      Recorded by [AD] Dalton, Ashley Claudene, PT 02/20/19 1057      Row Name 02/20/19 1000             Gait/Stairs Assessment/Training    61605 - Gait Training Minutes   10  -AD      [place] Level (Gait)  verbal cues;nonverbal cues (demo/gesture);contact guard  -AD      Assistive Device (Gait)  walker, front-wheeled  -AD      Distance in Feet (Gait)  100  -AD      Deviations/Abnormal Patterns (Gait)  eldon decreased;gait speed decreased;stride length decreased  -AD      Comment (Gait/Stairs)  Decreased eldon, decreased weight shifting and toe clearance.  -AD      Recorded by [AD] Dalton, Ashley Claudene, PT 02/20/19 1057      Row Name 02/20/19 1000             Therapeutic Exercise    Therapeutic Exercise  seated, lower extremities  -AD      67137 - PT Therapeutic Exercise Minutes  15  -AD      Recorded by [AD] Dalton, Ashley Claudene, PT 02/20/19 1057      Row Name 02/20/19 1000             Lower Extremity Seated Therapeutic Exercise    Exercise Type, Seated Lower Extremity (Therapeutic Exercise)  AROM (active range of motion)  -AD      Expected Outcomes, Seated Lower Extremity  (Therapeutic Exercise)  improve performance, gait skills;improve performance, gross motor coordination skills;improve performance, transfer skills  -AD      Sets/Reps Detail, Seated Lower Extremity (Therapeutic Exercise)  20  -AD      Transfers Skills, Training to Functional Activity, Seated Lower Extremity (Therapeutic Exercise)  able to transfer skills from training to functional activity  -AD      Recorded by [AD] Dalton, Ashley Claudene, PT 02/20/19 1057      Row Name 02/20/19 1000             Static Sitting Balance    Level of [place] (Unsupported Sitting, Static Balance)  standby assist  -AD      Sitting Position (Unsupported Sitting, Static Balance)  sitting in chair;sitting on edge of bed  -AD      Time Able to Maintain Position (Unsupported Sitting, Static Balance)  more than 5 minutes  -AD      Recorded by [AD] Dalton, Ashley Claudene, PT 02/20/19 1057      Row Name 02/20/19 1000             Static Standing Balance    Level of [place] (Supported Standing, Static Balance)  contact guard assist  -AD      Assistive Device Utilized (Supported Standing, Static Balance)  walker, rolling  -AD      Recorded by [AD] Dalton, Ashley Claudene, PT 02/20/19 1057      Row Name 02/20/19 1000             Positioning and Restraints    Pre-Treatment Position  in bed  -AD      Post Treatment Position  chair  -AD      In Chair  notified nsg;sitting;call light within reach;encouraged to call for assist;waffle cushion  -AD      Recorded by [AD] Dalton, Ashley Claudene, PT 02/20/19 1057      Row Name 02/20/19 1000             Pain Scale: Numbers Pre/Post-Treatment    Pain Scale: Numbers, Pretreatment  0/10 - no pain  -AD      Pain Scale: Numbers, Post-Treatment  0/10 - no pain  -AD      Recorded by [AD] Dalton, Ashley Claudene, PT 02/20/19 1057      Row Name 02/20/19 1000             Plan of Care Review    Plan of Care Reviewed With  patient  -AD      Recorded by [AD] Dalton, Ashley Claudene, PT 02/20/19 1057      Babs  Name 02/20/19 1000             Outcome Summary/Treatment Plan (PT)    Daily Summary of Progress (PT)  progress toward functional goals as expected  -AD      Recorded by [AD] Dalton, Ashley Claudene, PT 02/20/19 1057        User Key  (r) = Recorded By, (t) = Taken By, (c) = Cosigned By    Initials Name Effective Dates Discipline    AD Dalton, Ashley Claudene, PT 04/03/18 -  PT                   Physical Therapy Education     Title: PT OT SLP Therapies (Done)     Topic: Physical Therapy (Done)     Point: Mobility training (Done)     Learning Progress Summary           Patient Acceptance, E,TB, VU by AD at 2/20/2019 10:57 AM    Acceptance, E, VU by MC at 2/20/2019 12:16 AM    Acceptance, E, VU by LT at 2/19/2019  5:46 PM    Acceptance, E,D, NR,VU by AG at 2/18/2019  5:53 PM    Acceptance, E, VU by MC at 2/17/2019 12:46 AM    Acceptance, E, NR by EA at 2/14/2019  9:27 PM    Acceptance, E,TB, VU by RT at 2/14/2019  9:53 AM    Acceptance, E, VU by EH at 2/11/2019  9:38 AM    Acceptance, E, VU by GHAZAL at 2/10/2019  8:15 AM    Acceptance, E,TB, VU by COLTON at 2/9/2019  9:21 PM    Acceptance, E, VU by BC at 2/8/2019 12:39 PM    Acceptance, E, VU by BC at 2/8/2019 12:39 PM    Acceptance, E, VU by RANDALL at 2/8/2019  8:04 AM    Acceptance, E,TB, VU by COLTON at 2/7/2019  9:34 PM    Acceptance, E, VU by BC at 2/7/2019  3:42 PM                   Point: Home exercise program (Done)     Learning Progress Summary           Patient Acceptance, E,TB, VU by AD at 2/20/2019 10:57 AM    Acceptance, E, VU by MC at 2/20/2019 12:16 AM    Acceptance, E, VU by LT at 2/19/2019  5:46 PM    Acceptance, E,D, NR,VU by AG at 2/18/2019  5:53 PM    Acceptance, E, VU by MC at 2/17/2019 12:46 AM    Acceptance, E, NR by EA at 2/14/2019  9:27 PM    Acceptance, E,TB, VU by RT at 2/14/2019  9:53 AM    Acceptance, E, VU by EH at 2/11/2019  9:38 AM    Acceptance, E, VU by BB at 2/10/2019  8:15 AM    Acceptance, E,TB, VU by KM at 2/9/2019  9:21 PM    Acceptance, E, VU by  BC at 2/8/2019 12:39 PM    Acceptance, E, VU by BC at 2/8/2019 12:39 PM    Acceptance, E, VU by RANDALL at 2/8/2019  8:04 AM    Acceptance, E,TB, VU by COLTON at 2/7/2019  9:34 PM    Acceptance, E, VU by BC at 2/7/2019  3:42 PM                   Point: Body mechanics (Done)     Learning Progress Summary           Patient Acceptance, E,TB, VU by AD at 2/20/2019 10:57 AM    Acceptance, E, VU by MC at 2/20/2019 12:16 AM    Acceptance, E, VU by LT at 2/19/2019  5:46 PM    Acceptance, E,D, NR,VU by AG at 2/18/2019  5:53 PM    Acceptance, E, VU by MC at 2/17/2019 12:46 AM    Acceptance, E, NR by EA at 2/14/2019  9:27 PM    Acceptance, E,TB, VU by RT at 2/14/2019  9:53 AM    Acceptance, E, VU by EH at 2/11/2019  9:38 AM    Acceptance, E, VU by GHAZAL at 2/10/2019  8:15 AM    Acceptance, E,TB, VU by KM at 2/9/2019  9:21 PM    Acceptance, E, VU by BC at 2/8/2019 12:39 PM    Acceptance, E, VU by BC at 2/8/2019 12:39 PM    Acceptance, E, VU by RANDALL at 2/8/2019  8:04 AM    Acceptance, E,TB, VU by COLTON at 2/7/2019  9:34 PM    Acceptance, E, VU by BC at 2/7/2019  3:42 PM                   Point: Precautions (Done)     Learning Progress Summary           Patient Acceptance, E,TB, VU by AD at 2/20/2019 10:57 AM    Acceptance, E, VU by MC at 2/20/2019 12:16 AM    Acceptance, E, VU by LT at 2/19/2019  5:46 PM    Acceptance, E,D, NR,VU by AG at 2/18/2019  5:53 PM    Acceptance, E, VU by MC at 2/17/2019 12:46 AM    Acceptance, E, NR by EA at 2/14/2019  9:27 PM    Acceptance, E,TB, VU by RT at 2/14/2019  9:53 AM    Acceptance, E, VU by EH at 2/11/2019  9:38 AM    Acceptance, E, VU by GHAZAL at 2/10/2019  8:15 AM    Acceptance, E,TB, VU by KM at 2/9/2019  9:21 PM    Acceptance, E, VU by BC at 2/8/2019 12:39 PM    Acceptance, E, VU by BC at 2/8/2019 12:39 PM    Acceptance, E, VU by RANDALL at 2/8/2019  8:04 AM    Acceptance, E,TB, VU by COLTON at 2/7/2019  9:34 PM    Acceptance, E, VU by BC at 2/7/2019  3:42 PM                               User Key     Initials  Effective Dates Name Provider Type Discipline    AD 04/03/18 -  Dalton, Ashley Claudene, PT Physical Therapist PT    KM 06/16/16 -  Bandar Beth, RN Registered Nurse Nurse    LT 06/16/16 -  Ximena Davila, RN Registered Nurse Nurse    EA 06/16/16 -  Mitra De Jesus, RN Registered Nurse Nurse    RT 02/05/19 -  Saeed Davila, RN Registered Nurse Nurse    BC 03/14/16 -  Laura Cali, PT Physical Therapist PT    AG 04/03/18 -  Karyn Delgado, PT Physical Therapist PT    JM 07/19/18 -  Conrado Ramon RN Registered Nurse Nurse    BB 01/21/17 -  Magdaleno Rodgers RN Registered Nurse Nurse    EH 12/28/17 -  Geetha Bernardo, RN Registered Nurse Nurse     09/06/18 -  Collett, Morgan, RN Registered Nurse Nurse                PT Recommendation and Plan     Outcome Summary/Treatment Plan (PT)  Daily Summary of Progress (PT): progress toward functional goals as expected  Plan of Care Reviewed With: patient  Outcome Measures     Row Name 02/20/19 1000             How much help from another person do you currently need...    Turning from your back to your side while in flat bed without using bedrails?  4  -AD      Moving from lying on back to sitting on the side of a flat bed without bedrails?  4  -AD      Moving to and from a bed to a chair (including a wheelchair)?  3  -AD      Standing up from a chair using your arms (e.g., wheelchair, bedside chair)?  3  -AD      Climbing 3-5 steps with a railing?  3  -AD      To walk in hospital room?  3  -AD      AM-PAC 6 Clicks Score  20  -AD         Functional Assessment    Outcome Measure Options  AM-PAC 6 Clicks Basic Mobility (PT)  -AD        User Key  (r) = Recorded By, (t) = Taken By, (c) = Cosigned By    Initials Name Provider Type    AD Dalton, Ashley Claudene, PT Physical Therapist         Time Calculation:   PT Charges     Row Name 02/20/19 1057 02/20/19 1000          Time Calculation    Start Time  -- 30 minutes  -AD  --     PT Received  On  02/20/19  -AD  --     PT - Next Appointment  02/21/19  -AD  --     PT Goal Re-Cert Due Date  02/21/19  -AD  --        Timed Charges    12465 - PT Therapeutic Exercise Minutes  --  15  -AD     40552 - Gait Training Minutes   --  10  -AD       User Key  (r) = Recorded By, (t) = Taken By, (c) = Cosigned By    Initials Name Provider Type    AD Dalton, Ashley Claudene, PT Physical Therapist        Therapy Suggested Charges     Code   Minutes Charges    10325 (CPT®) Hc Pt Neuromusc Re Education Ea 15 Min      74965 (CPT®) Hc Pt Ther Proc Ea 15 Min 15 1    13394 (CPT®) Hc Gait Training Ea 15 Min 10 1    37434 (CPT®) Hc Pt Therapeutic Act Ea 15 Min      62920 (CPT®) Hc Pt Manual Therapy Ea 15 Min      79418 (CPT®) Hc Pt Iontophoresis Ea 15 Min      00220 (CPT®) Hc Pt Elec Stim Ea-Per 15 Min      24389 (CPT®) Hc Pt Ultrasound Ea 15 Min      48748 (CPT®) Hc Pt Self Care/Mgmt/Train Ea 15 Min      56890 (CPT®) Hc Pt Prosthetic (S) Train Initial Encounter, Each 15 Min      00782 (CPT®) Hc Pt Orthotic(S)/Prosthetic(S) Encounter, Each 15 Min      73023 (CPT®) Hc Orthotic(S) Mgmt/Train Initial Encounter, Each 15min      Total  25 2        Therapy Charges for Today     Code Description Service Date Service Provider Modifiers Qty    91587369971 HC PT THER PROC EA 15 MIN 2/20/2019 Dalton, Ashley Claudene, PT GP 1    34735325058 HC GAIT TRAINING EA 15 MIN 2/20/2019 Dalton, Ashley Claudene, PT GP 1    73812207094 HC PT THER SUPP EA 15 MIN 2/20/2019 Dalton, Ashley Claudene, PT GP 1          PT G-Codes  Outcome Measure Options: AM-PAC 6 Clicks Basic Mobility (PT)  AM-PAC 6 Clicks Score: 20    Ashley Claudene Dalton, PT  2/20/2019         Electronically signed by Dalton, Ashley Claudene, PT at 2/20/2019 10:58 AM         Discharge Summary     No notes of this type exist for this encounter.        Discharge Order (From admission, onward)    Start     Ordered    02/20/19 1207  Discharge patient  Once     Expected Discharge Date:  02/20/19     Discharge Disposition:  Home or Self Care    Physician of Record for Attribution - Please select from Treatment Team:  CHAMP HUNT [[id number]]    Review needed by CMO to determine Physician of Record:  No       Question Answer Comment   Physician of Record for Attribution - Please select from Treatment Team CHAMP HUNT    Review needed by CMO to determine Physician of Record No        02/20/19 [id number]

## 2019-02-20 NOTE — PLAN OF CARE
Problem: Sepsis/Septic Shock (Adult)  Goal: Signs and Symptoms of Listed Potential Problems Will be Absent, Minimized or Managed (Sepsis/Septic Shock)  Outcome: Ongoing (interventions implemented as appropriate)      Problem: Fall Risk (Adult)  Goal: Absence of Fall  Outcome: Ongoing (interventions implemented as appropriate)      Problem: Skin Injury Risk (Adult)  Goal: Skin Health and Integrity  Outcome: Ongoing (interventions implemented as appropriate)      Problem: Patient Care Overview  Goal: Individualization and Mutuality  Outcome: Ongoing (interventions implemented as appropriate)    Goal: Discharge Needs Assessment  Outcome: Ongoing (interventions implemented as appropriate)    Goal: Interprofessional Rounds/Family Conf  Outcome: Ongoing (interventions implemented as appropriate)      Problem: Mobility, Physical Impaired (Adult)  Goal: Identify Related Risk Factors and Signs and Symptoms  Outcome: Ongoing (interventions implemented as appropriate)    Goal: Enhanced Mobility Skills  Outcome: Ongoing (interventions implemented as appropriate)    Goal: Enhanced Functional Ability  Outcome: Ongoing (interventions implemented as appropriate)

## 2019-02-20 NOTE — DISCHARGE SUMMARY
[facility] Medicine Services  DISCHARGE SUMMARY    Date of Admission: 2/5/2019    Date of Discharge:  2/20/2019    PCP: Silvano Parker MD    Discharging Provider: Dang Daley PA-C   Attending Physician on day of dc:  Dr. Martin Velasco    Admission Diagnosis:   Please see admission H&P    Discharge Diagnosis:   · Acute on chronic respiratory failure likely 2/2 CHF and pneumonia  · Acute on chronic diastolic CHF  · Bilateral pleural effusions L>R, improved  · RLL PNA, improved  · Paroxysmal atrial fibrillation  · Chronic anticoagulation  · Diabetes mellitus type 2  · Lactic acidosis on admission   · Essential Hypertension  · CKD stage III  · Proteinuria  · Monoclonal gammopathy of undetermined significance  · Presumed diabetic nephropathy  · Hyponatremia  · Hyperglycemia in the setting of DM 2, ID  · Severe hypomagnesemia on admission, improved  · RAMESH on CKD III on admission  · Septic shock criteria met on admission with lactic>4, though without initial infective source  · History of Pseudomonas growth in sputum    Procedures Performed:  -US guided thoracentesis on 2/15/2019 by Dr. Noonan with removal of 60cc of serosanguinous fluid. Fluid culture without growth.     Consults:   Consults     Date and Time Order Name Status Description    2/19/2019 1702 Inpatient General Surgery Consult Completed     2/14/2019 0926 Inpatient Nephrology Consult Completed     2/8/2019 1138 Inpatient Pulmonology Consult Completed     1/21/2019 1203 Inpatient Infectious Diseases Consult Completed     1/20/2019 1703 Inpatient Infectious Diseases Consult Completed     1/17/2019 0757 Inpatient Cardiology Consult Completed     1/16/2019 1821 Inpatient Pulmonology Consult Completed             HPI     History of Present Illness:  Ashley Geronimo is a 73 y.o. female who presented to the ED at this facility on 2/5/19 with past medical history significant for recent admission to this facility from  "January 16 through January 24, 2019 with diagnosis of septic shock secondary to UTI and community-acquired pneumonia with possible cellulitis during which time she also experienced paroxysmal atrial fibrillation, acute kidney injury, non-ST elevation myocardial infarction felt to be secondary to most likely demand ischemia, and non-gap metabolic acidosis felt to be secondary to lactic acidosis, and diabetes mellitus type 2.  Of note, during said hospitalization she did have sputum cultures that were polymicrobial including Pseudomonas with antibiotic therapies guided by infectious disease.  On discharge, in setting of atrial fibrillation, she was discharged home on Coumadin with subcutaneous Lovenox until her INR was therapeutic; as normal anticoagulants were too expensive at that time.  Upon 2/5/19 presentation, she was referred to the ED for \"still sounding wet\".   She was found to have lactic acid of 6 in addition to low magnesium and elevated C-RP.  CT of her chest demonstrated small bibasilar effusions.  She was admitted for further evaluation and treatment.       Hospital Course     Hospital Course  Ashley Geronimo was admitted as outlined in above HPI.  Given RAMESH on admission with elevated lactic acid, her home Metformin was stopped as this was her second admission in 30 days with significantly elevated lactic acid.   Her lactic acidosis was initially felt to be multifactorial due to dehydration coupled with Metformin therapy. Given trace bacteria on admission UA, Rocephin was ordered though initial culture was performed of urine from ED and repeat culture was unremarkable. IV Vancomycin and Zosyn were given in ED following lactic acid>4 prior to admission; however, at that time, CXR did not demonstrate pneumonia and urinalysis appeared mostly contaminated.      Given subtherapeutic INR in the setting of chronic anticoagulation for paroxysmal atrial fibrillation, bridging was initiated with Lovenox therapy. "  Pharmacy was asked to again evaluate possible no anticoagulants.  These remained too expensive for the patient as found in prior admission.    Due to decline in respiratory status on 2/8, a CT chest without contrast was ordered revealing bibasilar effusions, consolidation, and mild ground glass change.  IV antibiotics were escalated at that time to cefepime, doxycycline, and vancomycin.  A consultation was placed at that time with Pulmonology with therapeutic Lovenox bridging continued at that time and recommendation to continue IV doxycycline.      On 2/9, her home Nifedipine was discontinued 2/2 edema of the bilateral lower extremities.      Due to acute on chronic respiratory failure with acute CHF, she did also receive intermittent IV diuresis dosing.  On February 10 and 11th, she did have worsening of her creatinine again, at that time, ultrasound-guided therapeutic thoracentesis of effusions was recommended.  However, Coumadin had been reinitiated and needed to be stopped prior to performing this procedure.    On February 14, 2019, ultrasound of the chest was performed by radiology revealing moderate left pleural effusion noted with atelectatic changes in the of the left lower lobe in addition to small medium size right pleural effusion.  Guided thoracentesis was performed by Dr. Noonan with around 60cc of serosanguinous fluid with US imaging concern of complex fluid with septation.   At that time, large bore chest tube was recommended by Dr. Noonan; however, Mrs. Geronimo refused and wished to proceed with medication therapy with diuretics.    Given acute kidney injury upon early admission with known chronic kidney disease stage III with baseline of 1.1-1.3, nephrology was consulted to assist with diuretic guidance.  Bumex 1 mg IV twice daily was started with hold parameters.  She was not found to have hydronephrosis nephrosis on CT scan of her abdomen.  Diuretics were adjusted throughout admission with  some improvement of effusions demonstrated on chest x-ray.  On February 19, 2019 due to concern that she may still require chest tube per pulmonology, general surgery was consulted. Dr. Talavera evaluated Mrs. Geronimo  At that time, CT scan of Ms. Geronimo's chest was ordered with small bilateral pleural effusions and he did have some patchy left lung base ground glass attenuation that may represent pneumonia or potentially alveolar edema.  Felt that there is no urgent indication for chest tube placement.  Pleural fluid culture was without growth.    As Ms. Rodriguez creatinine had improved with decreasing Bumex to 1 mg twice a day and she no longer had urgent angina indication for chest tube placement.  She was felt to be stable for discharge on this date as she had met maximum benefit of inpatient hospitalization.  She reported she felt significantly improved.    Given serosanguineous appearance of thoracentesis, restarting chronic anticoagulation with Coumadin was discussed with pulmonology prior to restart with agreement per .  Discussed dosing with clinical pharmacy with recommendation to discharge with 5 mg of Coumadin daily with PT/INR on February 22, 2019 to be performed by home health and sent to Ms. Bryant's primary care provider Josie Massey NP.     On discharge, in addition to home Levemir 10 units nightly.  Moderate dose sliding scale was prescribed as outlined below:    Sliding scale instructions:  Blood glucose 150-199 mg/dL - 2 units  Blood glucose 200-249 mg/dL - 4 units  Blood glucose 250-299 mg/dL - 6 units  Blood glucose 300-349 mg/dL - 7 units  Blood glucose 350-400 mg/dL - 8 units  Blood glucose greater than 400 mg/dL - 9 units & call PCP    Course of antibiotics for pneumonia was complete during hospitalization.      Given monoclonal gammopathy of undetermined significance with no M spike and serum immunofixation negative but K/L slightly high outpatient hematology oncology follow-up  was recommended per nephrology.  Order was placed for outpatient referral prior to discharge.    Reschedule order placed for missed cardiac appointment.     Discussed with AM RN Mykel on day of discharge.     Telemetry on day of discharge SR 70-80s.     The gallbladder was performed secondary to some complaints of right-sided abdominal pain while hospitalized.  This was unremarkable.    Home O2 continued.  Home health continued. BMP ordered for one week.  PT INR ordered for 2/22/19 to be drawn by home health and sent to her PCP.     Pertinent Laboratory and Radiology Results     Pertinent Test Results:                              Results from last 7 days   Lab Units 02/20/19  0059 02/19/19  0428 02/18/19  1146 02/18/19  0440 02/17/19  1956 02/17/19  0451 02/16/19  0111 02/15/19  0051  02/14/19  0423   WBC 10*3/mm3 9.39 7.29 10.00  --   --   --   --  9.57  --   --    HEMOGLOBIN g/dL 9.4* 9.6* 10.2*  --   --   --   --  10.2*  --   --    HEMATOCRIT % 29.7* 30.3* 32.4*  --   --   --   --  31.4*  --   --    PLATELETS 10*3/mm3 338 313 337  --   --   --   --  368  --   --    MCV fL 90.3 91.3 89.8  --   --   --   --  89.0  --   --    SODIUM mmol/L 131* 131*  --  136  --  133* 133* 135  --  135   POTASSIUM mmol/L 4.2 4.5  --  4.1 5.1 3.5 4.2 4.0   < > 3.2*   CHLORIDE mmol/L 100 99  --  107  --  103 102 108  --  106   CO2 mmol/L 24.7 24.9  --  24.7  --  23.7* 23.9* 21.7*  --  16.8*   BUN mg/dL 48* 50*  --  45*  --  41* 53* 46*  --  44*   CREATININE mg/dL 1.35* 1.45*  --  1.26  --  1.20 1.31* 1.20  --  1.30*   GLUCOSE mg/dL 124* 436*  --  56*  --  238* 270* 149*  --  184*   CALCIUM mg/dL 8.2 7.7  --  7.9  --  7.6* 7.7 7.8  --  8.1    < > = values in this interval not displayed.          Results from last 7 days   Lab Units 02/20/19  0059 02/19/19  0428 02/18/19  1146 02/15/19  0051   WBC 10*3/mm3 9.39 7.29 10.00 9.57     Results from last 7 days   Lab Units 02/20/19  0059 02/19/19  0428 02/18/19  0440 02/17/19  0451  02/16/19  0110 02/15/19  0826 02/15/19  0051   BILIRUBIN mg/dL 0.2 0.2  --   --   --   --   --    ALK PHOS U/L 72 73  --   --   --   --   --    ALT (SGPT) U/L 19 9*  --   --   --   --   --    AST (SGOT) U/L 16 13  --   --   --   --   --    PROTIME Seconds 13.2 12.9 13.1 13.7 14.2 15.1 17.8*   INR  0.98 0.95 0.97 1.03 1.08 1.17* 1.45*           Invalid input(s): TG, LDLCALC, LDLREALC      Brief Urine Lab Results  (Last result in the past 365 days)      Color   Clarity   Blood   Leuk Est   Nitrite   Protein   CREAT   Urine HCG        02/15/19 1132             39.8       02/15/19 1132             40.4       02/15/19 1132             39.8                      Results for orders placed during the hospital encounter of 01/16/19   Adult Transthoracic Echo Complete W/ Cont if Necessary Per Protocol    Narrative · Estimated EF = 66%.  · Left ventricular systolic function is normal.  · No significant valvular disease  · No change since 3/21/18           Order Current Status    Flow Cytometry In process    Body Fluid Culture In Blood Culture Bottles - Pleural Fluid, Pleural Cavity Preliminary result            ----------------------------------------------------------------------------------------------------------------------  Ct Chest Without Contrast    Result Date: 2/20/2019  Impression: Small bilateral pleural effusions. Patchy left lung base groundglass attenuation that may represent some pneumonia or potentially alveolar edema. Cardiomegaly.  This report was finalized on 2/20/2019 8:36 AM by Dr. Duke Brewer MD.      Ct Chest Without Contrast    Result Date: 2/8/2019  1. Bibasilar effusions and bilateral consolidation. 2. Mild ground glass change.   This report was finalized on 2/8/2019 4:41 PM by Dr. Garry Benton MD.      Ct Chest Without Contrast    Result Date: 2/6/2019  Small bibasilar effusions.  Coronary artery calcifications.  This report was finalized on 2/6/2019 9:58 AM by Dr. Garry Benton MD.      Nm Lung  Ventilation Perfusion    Result Date: 2/7/2019  1. Airway disease. 2. Low probability for pulmonary embolus.  This report was finalized on 2/7/2019 2:38 PM by Dr. Garry Benton MD.      Us Chest    Result Date: 2/14/2019  Mild-moderate left greater than right pleural effusions.  This report was finalized on 2/14/2019 10:58 AM by Dr. Woo Jacques MD.      Us Gallbladder    Result Date: 2/20/2019  No sonographic findings to explain abdominal pain.  This report was finalized on 2/20/2019 8:37 AM by Dr. uDke Brewer MD.      Xr Chest 1 View    Result Date: 2/15/2019  1. Decreased left pleural effusion and decreased right pleural effusion. 2. Improvement in CHF/edema noted. 3. No pneumothorax identified.  This report was finalized on 2/15/2019 4:16 PM by Dr. Woo Jacques MD.      Xr Chest 1 View    Result Date: 2/12/2019  Worsening CHF with airspace edema and pleural effusions.  This report was finalized on 2/12/2019 3:46 PM by Dr. Woo Jacques MD.      Xr Chest 1 View    Result Date: 2/7/2019  1. Trace left effusion. 2. Right perihilar consolidation. 3. CHF.  This report was finalized on 2/7/2019 2:39 PM by Dr. Garry Benton MD.      Xr Chest 1 View    Result Date: 2/6/2019  Trace bibasilar effusions.     This report was finalized on 2/6/2019 10:14 AM by Dr. Garry Benton MD.      Ct Abdomen Pelvis Stone Protocol    Result Date: 2/6/2019  Small bilateral effusions. Moderate stool.       This report was finalized on 2/6/2019 9:50 AM by Dr. Garry Benton MD.      Xr Chest Ap    Result Date: 2/18/2019  As above.  This report was finalized on 2/18/2019 8:04 AM by Dr. Duke Brewer MD.      Xr Chest Ap    Result Date: 2/15/2019  Significant interval improvement radiographically of CHF/edema. Decrease in left greater than right pleural effusions.  This report was finalized on 2/15/2019 9:05 AM by Dr. Woo Jacques MD.      Fl Video Swallow    Result Date: 1/21/2019  No evidence of aspiration.  For additional  information please see the report provided by the speech therapy service  This report was finalized on 1/21/2019 10:51 AM by Dr. Garry Benton MD.          Microbiology Results (last 10 days)     Procedure Component Value - Date/Time    Body Fluid Culture In Blood Culture Bottles - Pleural Fluid, Pleural Cavity [[id number]] Collected:  02/15/19 1539    Lab Status:  Preliminary result Specimen:  Pleural Fluid from Pleural Cavity Updated:  02/19/19 1702     BF Culture No growth at 4 days            Discharge Vitals and Physical Examination       Vital Signs  Temp:  [97.9 °F (36.6 °C)-98.3 °F (36.8 °C)] 98 °F (36.7 °C)  Heart Rate:  [56-81] 67  Resp:  [18] 18  BP: (135-161)/(46-56) 135/46     Physical Exam:  General:    Awake, alert, in no acute distress   Heart:      Normal S1 and S2. Regular rate and rhythm. No significant murmur, rubs or gallops appreciated.   Lungs:     Respirations regular, even and unlabored. Lungs clear to auscultation B/L. No wheezes, rales or rhonchi.   Abdomen:   Soft and nontender. No guarding, rebound tenderness or  organomegaly noted. Bowel sounds present x 4.   Extremities:  No clubbing, cyanosis or edema noted. Moves UE and LE equally B/L.         Discharge Disposition, Discharge Medications, and Discharge Appointments     Discharge Disposition:   home    Condition on Discharge:  Fair    Discharge Medications:     Discharge Medications      New Medications      Instructions Start Date   bumetanide 1 MG tablet  Commonly known as:  BUMEX   1 mg, Oral, 2 Times Daily      HUMALOG 100 UNIT/ML injection  Generic drug:  insulin lispro   Use per sliding scale: 150-199=2units, 200-249=4 units, 250-299=6units, 300-349=7units, 350-400=8units, 400 or higher=9 units.      hydrALAZINE 25 MG tablet  Commonly known as:  APRESOLINE   75 mg, Oral, 3 Times Daily, Hold for systolic BP less than 160mmHg.         Changes to Medications      Instructions Start Date   metoprolol tartrate 100 MG tablet  Commonly  known as:  LOPRESSOR  What changed:    · medication strength  · how much to take   100 mg, Oral, Every 12 Hours Scheduled      warfarin 5 MG tablet  Commonly known as:  COUMADIN  What changed:    · medication strength  · how much to take   5 mg, Oral, Nightly         Continue These Medications      Instructions Start Date   aspirin 81 MG EC tablet   81 mg, Oral, Daily      atorvastatin 40 MG tablet  Commonly known as:  LIPITOR   40 mg, Oral, Nightly      budesonide-formoterol 160-4.5 MCG/ACT inhaler  Commonly known as:  SYMBICORT   2 puffs, Inhalation, 2 Times Daily - RT      insulin detemir 100 UNIT/ML injection  Commonly known as:  LEVEMIR   10 Units, Subcutaneous, Nightly      montelukast 10 MG tablet  Commonly known as:  SINGULAIR   10 mg, Oral, Nightly         Stop These Medications    metFORMIN 1000 MG tablet  Commonly known as:  GLUCOPHAGE     NIFEdipine CC 60 MG 24 hr tablet  Commonly known as:  ADALAT CC            Discharged medication regimen discussed with attending physician prior to discharge.     Discharge Diet:   cardiac diet  Dietary Orders (From admission, onward)    Start     Ordered    02/20/19 1030  Diet Regular; Thin; Cardiac, Consistent Carbohydrate, Daily Fluid Restriction; 1500 mL Fluid Per Day  Diet Effective Now     Question Answer Comment   Diet Texture / Consistency Regular    Fluid Consistency Thin    Common Modifiers Cardiac    Common Modifiers Consistent Carbohydrate    Common Modifiers Daily Fluid Restriction    Fluid Restriction Per Day: 1500 mL Fluid Per Day        02/20/19 1030    02/12/19 1800  Dietary Nutrition Supplements Boost Plus (Ensure Enlive, Ensure Plus)  Daily With Lunch & Dinner     Comments:  strawberry   Question:  Select Supplement  Answer:  Boost Plus (Ensure Enlive, Ensure Plus)    02/12/19 1526          Activity at Discharge:  activity as tolerated         Discharge Disposition:    Home or Self Care        Follow-up Appointments:  Your Scheduled Appointments     Mar 06, 2019 10:00 AM EST  Follow Up with ANDREEA Tolentino  [facility] CARDIOLOGY (--) [address]  [facility] [id number] [phone]   Arrive 15 minutes prior to appointment.   Mar 13, 2019  2:40 PM EDT  Follow Up with Kaley Cutler PA-C  [facility] PULMONOLOGY CRITICAL CARE (--) [address]  [facility] [id number] [phone]   Arrive 15 minutes prior to appointment.   Mar 15, 2019 11:30 AM EDT  HOSITAL FOLLOW UP with Stephanie Uribe MD  [facility] HEMATOLOGY  AND ONCOLOGY ([place]) [address] [facility]  [facility] [id number] [phone]    Additional instructions:      Pt  Has  An  Apt  With  Josie Massey    For  febuary 28  At 9:  30  In  [place]  And  Dr hoover  In  [place]  For    March 6  At 10  Am  AND  [facility]  [facility]  11 [facility]30                    Additional Instructions for the Follow-ups that You Need to Schedule     Discharge Follow-up with PCP   As directed       Currently Documented PCP:    Silvano Parker MD    PCP Phone Number:    [phone]     Follow Up Details:  FOllow-up with Josie Massey with Dr. Parker's office within one week 2/2 prolonged hospitalization with CHF and renal failure         Discharge Follow-up with Specialty: Hem/Onc; 2 Weeks   As directed      Specialty:  Hem/Onc    Follow Up:  2 Weeks    Follow Up Details:  [facility], recommended referral ner Nephrology         Discharge Follow-up with Specialty: Keep pulmonology appointment in early March   As directed      Specialty:  Keep pulmonology appointment in early March         Discharge Follow-up with Specified Provider: Dr. Shaikh or partners; 2 Weeks   As directed      To:  Dr. Shaikh or sam    Follow Up:  2 Weeks    Follow Up Details:  Hospitalization with renal failure         Discharge Follow-up with Specified Provider: Dr. Ventura/ Zuri; 2 Weeks   As directed      To:  Dr. Ventura/ Zuri    Follow Up:  2 Weeks     Follow Up Details:  Missed Hospital follow-up due to repeat hospitalization.  CHF. paroxysmal afib         Discharge Follow-up with Specified Provider: Keep appointment with Dr. Ventura as previously written   As directed      To:  Keep appointment with Dr. Ventura as previously written         Referral to Home Health   As directed      -Need PT INR on 2/22/19 to be sent to Isabel Odonnell NP with [facility] in [place] in one week    Face to Face Visit Date:  2/20/2019    Follow-up Provider for Plan of Care?:  I treated the patient in an acute care facility and will not continue treatment after discharge.    Follow-up Provider:  ISABEL ODONNELL [[id number]]    Reason/Clinical Findings:  Debility, chronic respiratory failure, CHF    Describe mobility limitations that make leaving home difficult:  chronic respiratory failure, debility, CHF, chronic anticoagulation    Nursing/Therapeutic Services Requested:  Skilled Nursing    Skilled nursing orders:  Medication education CHF management    Frequency:  1 Week 1         Protime-INR    Feb 22, 2019 (Approximate)      Send to Isabel Odonnell at [facility] in [place], [place]    Order Comments:  Send to Isabel Odonnell at [facility] in [place], [place]          Basic Metabolic Panel    Feb 27, 2019 (Approximate)      To be sent to Isabel Odonnell. Performed by [facility].    Order Comments:  To be sent to Isabel Odonnell. Performed by [facility].             Contact information for follow-up providers     Silvano Parker MD .    Specialty:  Endocrinology  Why:  FOllow-up with Isabel Odonnell with Dr. Parker's office within one week 2/2 prolonged hospitalization with CHF and renal failure  Contact information:  [address] [facility] [address]  [phone]                   Contact information for after-discharge care     Home Medical Care     [facility] .    Service:  Home Health Services  Contact information:  [address]  [facility] [address]  [phone]                             Additional Instructions for the Follow-ups that You Need to Schedule     Discharge Follow-up with PCP   As directed       Currently Documented PCP:    Silvano Parker MD    PCP Phone Number:    [phone]     Follow Up Details:  FOllow-up with Isabel Massey with Dr. Parker's office within one week 2/2 prolonged hospitalization with CHF and renal failure         Discharge Follow-up with Specialty: Hem/Onc; 2 Weeks   As directed      Specialty:  Hem/Onc    Follow Up:  2 Weeks    Follow Up Details:  [facility], recommended referral ner Nephrology         Discharge Follow-up with Specialty: Keep pulmonology appointment in early March   As directed      Specialty:  Keep pulmonology appointment in early March         Discharge Follow-up with Specified Provider: Dr. Shaikh or partners; 2 Weeks   As directed      To:  Dr. Shaikh or partners    Follow Up:  2 Weeks    Follow Up Details:  Hospitalization with renal failure         Discharge Follow-up with Specified Provider: Dr. Ventura/ Zuri; 2 Weeks   As directed      To:  Dr. Ventura/ Zuri    Follow Up:  2 Weeks    Follow Up Details:  Missed Hospital follow-up due to repeat hospitalization.  CHF. paroxysmal afib         Discharge Follow-up with Specified Provider: Keep appointment with Dr. Ventura as previously written   As directed      To:  Keep appointment with Dr. Ventura as previously written         Referral to Home Health   As directed      -Need PT INR on 2/22/19 to be sent to Isabel Massey NP with [facility] in [place] [place] in one week    Face to Face Visit Date:  2/20/2019    Follow-up Provider for Plan of Care?:  I treated the patient in an acute care facility and will not continue treatment after discharge.    Follow-up Provider:  ISABEL MASSEY [[id number]]    Reason/Clinical Findings:  Debility, chronic respiratory failure, CHF    Describe mobility limitations that make leaving home  difficult:  chronic respiratory failure, debility, CHF, chronic anticoagulation    Nursing/Therapeutic Services Requested:  Skilled Nursing    Skilled nursing orders:  Medication education CHF management    Frequency:  1 Week 1         Protime-INR    Feb 22, 2019 (Approximate)      Send to Josie Massey at [facility] in [place], [place]    Order Comments:  Send to Josie Massey at [facility] in [place], [place]          Basic Metabolic Panel    Feb 27, 2019 (Approximate)      To be sent to Josie Massey. Performed by [facility].    Order Comments:  To be sent to Josie Massey. Performed by [facility].                Test Results Pending at Discharge:     Order Current Status    Flow Cytometry In process    Body Fluid Culture In Blood Culture Bottles - Pleural Fluid, Pleural Cavity Preliminary result             Dang Daley PA-C  Hospital Medicine Team  02/20/19  4:06 PM      Time: Greater than 30 minutes spent on this discharge.

## 2019-02-20 NOTE — DISCHARGE INSTR - APPOINTMENTS
Pt  Has  An  Apt  With  Josie Massey    For  febuary 28  At 9:  30  In  Post  And  Dr hoover  In  [place]  For    March 6  At 10  Am  AND  APT  WITH  DR NO HUDSON  15  AT  11 :30

## 2019-02-20 NOTE — THERAPY TREATMENT NOTE
Acute Care - Physical Therapy Treatment Note  [facility]     Patient Name: Ashley Geronimo  : 1945  MRN: [id number]  Today's Date: 2019  Onset of Illness/Injury or Date of Surgery: 19  Date of Referral to PT: 19  Referring Physician: Dr. Jefferson    Admit Date: 2019    Visit Dx:    ICD-10-CM ICD-9-CM   1. Septic shock (CMS/[facility]) A41.9 038.9    R65.21 785.52     995.92   2. Acute renal failure, unspecified acute renal failure type (CMS/[facility]) N17.9 584.9     Patient Active Problem List   Diagnosis   • Pneumonia   • Septic shock (CMS/[facility])   • Pseudomonas pneumonia (CMS/[facility])       Therapy Treatment    Rehabilitation Treatment Summary     Row Name 19 1000             Treatment Time/Intention    Discipline  physical therapist  -AD      Document Type  therapy note (daily note)  -AD      Subjective Information  fatigue  -AD      Mode of Treatment  physical therapy  -AD      Patient/Family Observations  Pt supine in bed with 2L O2 via nasal cannula. No family or visitors were present.  -AD      Care Plan Review  care plan/treatment goals reviewed;risks/benefits reviewed;patient/other agree to care plan;current/potential barriers reviewed  -AD      Total Minutes, Physical Therapy Treatment  25  -AD      Patient Effort  good  -AD      Existing Precautions/Restrictions  fall;oxygen therapy device and L/min  -AD      Patient Response to Treatment  Pt tolerated today's session well, with reports of fatigue upon conclusion. [facility] requierd CGA with bed mobility, transfers, and gait. The patient ambulated 100' with RW and then performed seated therapeutic exercises.  -AD      Recorded by [AD] Dalton, Ashley Claudene, PT 19 1057      Row Name 19 1000             Cognitive Assessment/Intervention- PT/OT    Orientation Status (Cognition)  oriented x 3  -AD      Follows Commands (Cognition)  follows one step commands  -AD      Recorded by [AD] Dalton, Ashley Claudene, PT 19 1050       Row Name 02/20/19 1000             Mobility Assessment/Intervention    Extremity Weight-bearing Status  left lower extremity;right lower extremity  -AD      Left Lower Extremity (Weight-bearing Status)  full weight-bearing (FWB)  -AD      Right Lower Extremity (Weight-bearing Status)  full weight-bearing (FWB)  -AD      Recorded by [AD] Dalton, Ashley Claudene, PT 02/20/19 1057      Row Name 02/20/19 1000             Bed Mobility Assessment/Treatment    Bed Mobility Assessment/Treatment  bed mobility (all) activities  -AD      Phillips Level (Bed Mobility)  verbal cues;nonverbal cues (demo/gesture);contact guard assist  -AD      Assistive Device (Bed Mobility)  bed rails  -AD      Recorded by [AD] Dalton, Ashley Claudene, PT 02/20/19 1057      Row Name 02/20/19 1000             Transfer Assessment/Treatment    Transfer Assessment/Treatment  sit-stand transfer;stand-sit transfer  -AD      Recorded by [AD] Dalton, Ashley Claudene, PT 02/20/19 1057      Row Name 02/20/19 1000             Sit-Stand Transfer    Sit-Stand Phillips (Transfers)  verbal cues;nonverbal cues (demo/gesture);contact guard  -AD      Assistive Device (Sit-Stand Transfers)  walker, front-wheeled  -AD      Recorded by [AD] Dalton, Ashley Claudene, PT 02/20/19 1057      Row Name 02/20/19 1000             Stand-Sit Transfer    Stand-Sit Phillips (Transfers)  verbal cues;nonverbal cues (demo/gesture);contact guard  -AD      Assistive Device (Stand-Sit Transfers)  walker, front-wheeled  -AD      Recorded by [AD] Dalton, Ashley Claudene, PT 02/20/19 1057      Row Name 02/20/19 1000             Gait/Stairs Assessment/Training    90638 - Gait Training Minutes   10  -AD      Phillips Level (Gait)  verbal cues;nonverbal cues (demo/gesture);contact guard  -AD      Assistive Device (Gait)  walker, front-wheeled  -AD      Distance in Feet (Gait)  100  -AD      Deviations/Abnormal Patterns (Gait)  eldon decreased;gait speed decreased;stride  length decreased  -AD      Comment (Gait/Stairs)  Decreased eldon, decreased weight shifting and toe clearance.  -AD      Recorded by [AD] Dalton, Ashley Claudene, PT 02/20/19 1057      Row Name 02/20/19 1000             Therapeutic Exercise    Therapeutic Exercise  seated, lower extremities  -AD      72579 - PT Therapeutic Exercise Minutes  15  -AD      Recorded by [AD] Dalton, Ashley Claudene, PT 02/20/19 1057      Row Name 02/20/19 1000             Lower Extremity Seated Therapeutic Exercise    Exercise Type, Seated Lower Extremity (Therapeutic Exercise)  AROM (active range of motion)  -AD      Expected Outcomes, Seated Lower Extremity (Therapeutic Exercise)  improve performance, gait skills;improve performance, gross motor coordination skills;improve performance, transfer skills  -AD      Sets/Reps Detail, Seated Lower Extremity (Therapeutic Exercise)  20  -AD      Transfers Skills, Training to Functional Activity, Seated Lower Extremity (Therapeutic Exercise)  able to transfer skills from training to functional activity  -AD      Recorded by [AD] Dalton, Ashley Claudene, PT 02/20/19 1057      Row Name 02/20/19 1000             Static Sitting Balance    Level of [place] (Unsupported Sitting, Static Balance)  standby assist  -AD      Sitting Position (Unsupported Sitting, Static Balance)  sitting in chair;sitting on edge of bed  -AD      Time Able to Maintain Position (Unsupported Sitting, Static Balance)  more than 5 minutes  -AD      Recorded by [AD] Dalton, Ashley Claudene, PT 02/20/19 1057      Row Name 02/20/19 1000             Static Standing Balance    Level of [place] (Supported Standing, Static Balance)  contact guard assist  -AD      Assistive Device Utilized (Supported Standing, Static Balance)  walker, rolling  -AD      Recorded by [AD] Dalton, Ashley Claudene, PT 02/20/19 1057      Row Name 02/20/19 1000             Positioning and Restraints    Pre-Treatment Position  in bed  -AD       Post Treatment Position  chair  -AD      In Chair  notified nsg;sitting;call light within reach;encouraged to call for assist;waffle cushion  -AD      Recorded by [AD] Rosa M Beckfordphuong, PT 02/20/19 1057      Row Name 02/20/19 1000             Pain Scale: Numbers Pre/Post-Treatment    Pain Scale: Numbers, Pretreatment  0/10 - no pain  -AD      Pain Scale: Numbers, Post-Treatment  0/10 - no pain  -AD      Recorded by [AD] ShakeelRobinRosa Mley Claudene, PT 02/20/19 1057      Row Name 02/20/19 1000             Plan of Care Review    Plan of Care Reviewed With  patient  -AD      Recorded by [AD] ShakeelRobinRosa Mley Claudene, PT 02/20/19 1057      Row Name 02/20/19 1000             Outcome Summary/Treatment Plan (PT)    Daily Summary of Progress (PT)  progress toward functional goals as expected  -AD      Recorded by [AD] Shakeel Rosa Mley Claudene, PT 02/20/19 1057        User Key  (r) = Recorded By, (t) = Taken By, (c) = Cosigned By    Initials Name Effective Dates Discipline    AD Rosa M Beckfordphuong, PT 04/03/18 -  PT                   Physical Therapy Education     Title: PT OT SLP Therapies (Done)     Topic: Physical Therapy (Done)     Point: Mobility training (Done)     Learning Progress Summary           Patient Acceptance, E,TB, VU by SEGUNDO at 2/20/2019 10:57 AM    Acceptance, E, VU by MC at 2/20/2019 12:16 AM    Acceptance, E, VU by LT at 2/19/2019  5:46 PM    Acceptance, E,D, NR,VU by AG at 2/18/2019  5:53 PM    Acceptance, E, VU by MC at 2/17/2019 12:46 AM    Acceptance, E, NR by EA at 2/14/2019  9:27 PM    Acceptance, E,TB, VU by RT at 2/14/2019  9:53 AM    Acceptance, E, VU by EH at 2/11/2019  9:38 AM    Acceptance, E, VU by BB at 2/10/2019  8:15 AM    Acceptance, E,TB, VU by KM at 2/9/2019  9:21 PM    Acceptance, E, VU by BC at 2/8/2019 12:39 PM    Acceptance, E, VU by BC at 2/8/2019 12:39 PM    Acceptance, E, VU by RANDALL at 2/8/2019  8:04 AM    SUSAN Wilkinson TB, VU by COLTON at 2/7/2019  9:34 PM    SUSAN Wilkinson VU by  BC at 2/7/2019  3:42 PM                   Point: Home exercise program (Done)     Learning Progress Summary           Patient Acceptance, E,TB, VU by AD at 2/20/2019 10:57 AM    Acceptance, E, VU by MC at 2/20/2019 12:16 AM    Acceptance, E, VU by LT at 2/19/2019  5:46 PM    Acceptance, E,D, NR,VU by AG at 2/18/2019  5:53 PM    Acceptance, E, VU by MC at 2/17/2019 12:46 AM    Acceptance, E, NR by EA at 2/14/2019  9:27 PM    Acceptance, E,TB, VU by RT at 2/14/2019  9:53 AM    Acceptance, E, VU by EH at 2/11/2019  9:38 AM    Acceptance, E, VU by BB at 2/10/2019  8:15 AM    Acceptance, E,TB, VU by KM at 2/9/2019  9:21 PM    Acceptance, E, VU by BC at 2/8/2019 12:39 PM    Acceptance, E, VU by BC at 2/8/2019 12:39 PM    Acceptance, E, VU by RANDALL at 2/8/2019  8:04 AM    Acceptance, E,TB, VU by KM at 2/7/2019  9:34 PM    Acceptance, E, VU by BC at 2/7/2019  3:42 PM                   Point: Body mechanics (Done)     Learning Progress Summary           Patient Acceptance, E,TB, VU by AD at 2/20/2019 10:57 AM    Acceptance, E, VU by MC at 2/20/2019 12:16 AM    Acceptance, E, VU by LT at 2/19/2019  5:46 PM    Acceptance, E,D, NR,VU by AG at 2/18/2019  5:53 PM    Acceptance, E, VU by MC at 2/17/2019 12:46 AM    Acceptance, E, NR by EA at 2/14/2019  9:27 PM    Acceptance, E,TB, VU by RT at 2/14/2019  9:53 AM    Acceptance, E, VU by EH at 2/11/2019  9:38 AM    Acceptance, E, VU by BB at 2/10/2019  8:15 AM    Acceptance, E,TB, VU by KM at 2/9/2019  9:21 PM    Acceptance, E, VU by BC at 2/8/2019 12:39 PM    Acceptance, E, VU by BC at 2/8/2019 12:39 PM    Acceptance, E, VU by JM at 2/8/2019  8:04 AM    Acceptance, E,TB, VU by KM at 2/7/2019  9:34 PM    Acceptance, E, VU by BC at 2/7/2019  3:42 PM                   Point: Precautions (Done)     Learning Progress Summary           Patient Acceptance, E,TB, VU by AD at 2/20/2019 10:57 AM    Acceptance, E, VU by MC at 2/20/2019 12:16 AM    Acceptance, E, VU by LT at 2/19/2019  5:46 PM     Acceptance, E,D, NR,VU by AG at 2/18/2019  5:53 PM    Acceptance, E, VU by MC at 2/17/2019 12:46 AM    Acceptance, E, NR by EA at 2/14/2019  9:27 PM    Acceptance, E,TB, VU by RT at 2/14/2019  9:53 AM    Acceptance, E, VU by EH at 2/11/2019  9:38 AM    Acceptance, E, VU by BB at 2/10/2019  8:15 AM    Acceptance, E,TB, VU by KM at 2/9/2019  9:21 PM    Acceptance, E, VU by BC at 2/8/2019 12:39 PM    Acceptance, E, VU by BC at 2/8/2019 12:39 PM    Acceptance, E, VU by  at 2/8/2019  8:04 AM    Acceptance, E,TB, VU by  at 2/7/2019  9:34 PM    Acceptance, E, VU by BC at 2/7/2019  3:42 PM                               User Key     Initials Effective Dates Name Provider Type Discipline    AD 04/03/18 -  Dalton, Ashley Claudene, PT Physical Therapist PT     06/16/16 -  Bandar Beth, RN Registered Nurse Nurse     06/16/16 -  Ximena Davila, RN Registered Nurse Nurse    EA 06/16/16 -  Mitra De Jesus, RN Registered Nurse Nurse    RT 02/05/19 -  Saeed Davila, RN Registered Nurse Nurse    BC 03/14/16 -  Laura Cali, PT Physical Therapist PT    AG 04/03/18 -  Karyn Delgado, PT Physical Therapist PT     07/19/18 -  Conrado Ramon, RN Registered Nurse Nurse    BB 01/21/17 -  Magdaleno Rodgers RN Registered Nurse Nurse     12/28/17 -  Geetha Bernardo RN Registered Nurse Nurse     09/06/18 -  Collett, Morgan, RN Registered Nurse Nurse                PT Recommendation and Plan     Outcome Summary/Treatment Plan (PT)  Daily Summary of Progress (PT): progress toward functional goals as expected  Plan of Care Reviewed With: patient  Outcome Measures     Row Name 02/20/19 1000             How much help from another person do you currently need...    Turning from your back to your side while in flat bed without using bedrails?  4  -AD      Moving from lying on back to sitting on the side of a flat bed without bedrails?  4  -AD      Moving to and from a bed to a chair (including a  wheelchair)?  3  -AD      Standing up from a chair using your arms (e.g., wheelchair, bedside chair)?  3  -AD      Climbing 3-5 steps with a railing?  3  -AD      To walk in hospital room?  3  -AD      AM-PAC 6 Clicks Score  20  -AD         Functional Assessment    Outcome Measure Options  AM-PAC 6 Clicks Basic Mobility (PT)  -AD        User Key  (r) = Recorded By, (t) = Taken By, (c) = Cosigned By    Initials Name Provider Type    AD Dalton, Ashley Claudene, PT Physical Therapist         Time Calculation:   PT Charges     Row Name 02/20/19 1057 02/20/19 1000          Time Calculation    Start Time  -- 30 minutes  -AD  --     PT Received On  02/20/19  -AD  --     PT - Next Appointment  02/21/19 -AD  --     PT Goal Re-Cert Due Date  02/21/19  -AD  --        Timed Charges    77595 - PT Therapeutic Exercise Minutes  --  15  -AD     87150 - Gait Training Minutes   --  10  -AD       User Key  (r) = Recorded By, (t) = Taken By, (c) = Cosigned By    Initials Name Provider Type    AD Dalton, Ashley Claudene, CHICA Physical Therapist        Therapy Suggested Charges     Code   Minutes Charges    89366 (CPT®) Hc Pt Neuromusc Re Education Ea 15 Min      03193 (CPT®) Hc Pt Ther Proc Ea 15 Min 15 1    38965 (CPT®) Hc Gait Training Ea 15 Min 10 1    56021 (CPT®) Hc Pt Therapeutic Act Ea 15 Min      89626 (CPT®) Hc Pt Manual Therapy Ea 15 Min      19969 (CPT®) Hc Pt Iontophoresis Ea 15 Min      14697 (CPT®) Hc Pt Elec Stim Ea-Per 15 Min      44951 (CPT®) Hc Pt Ultrasound Ea 15 Min      97933 (CPT®) Hc Pt Self Care/Mgmt/Train Ea 15 Min      34749 (CPT®) Hc Pt Prosthetic (S) Train Initial Encounter, Each 15 Min      73635 (CPT®) Hc Pt Orthotic(S)/Prosthetic(S) Encounter, Each 15 Min      52995 (CPT®) Hc Orthotic(S) Mgmt/Train Initial Encounter, Each 15min      Total  25 2        Therapy Charges for Today     Code Description Service Date Service Provider Modifiers Qty    67375157102 HC PT THER PROC EA 15 MIN 2/20/2019 Rosa M Beckford  Claudene, PT GP 1    64928934153  GAIT TRAINING EA 15 MIN 2/20/2019 Dalton, Ashley Claudene, PT GP 1    87241000208 HC PT THER SUPP EA 15 MIN 2/20/2019 Dalton, Ashley Claudene, PT GP 1          PT G-Codes  Outcome Measure Options: AM-PAC 6 Clicks Basic Mobility (PT)  AM-PAC 6 Clicks Score: 20    Ashley Claudene Dalton, PT  2/20/2019

## 2019-02-20 NOTE — PROGRESS NOTES
Discharge Planning Assessment   [facility]     Patient Name: Ashley Geronimo  MRN: [id number]  Today's Date: 2/20/2019    Admit Date: 2/5/2019      Discharge Plan     Row Name 02/20/19 1246       Plan    Final Discharge Disposition Code  06 - home with home health care    Final Note  Pt to be discharged home on this date with Physician orders to continue home health [facility] .  [facility] spoke with [facility] per Jail[facility] and faxed orders to agency at [phone].  Pt currently utilizes home 02 via [facility].  Physicin ordered to continue home 02 and ordered walker.  Pt requested walker from [employer] to be delivered to home.  [employer] to provide pt with portable tank prior to discharge per Dang.  Pt aware and agreeable.             Home Medical Care - Selection Complete      Service Provider Request Status Selected Services Address Phone Number Fax Number    [facility] Health Services [address] [address] [phone]      Community Resources      No service coordination in this encounter.        Expected Discharge Date and Time     Expected Discharge Date Expected Discharge Time    Feb 20, 2019         Demographic Summary    No documentation.       Functional Status    No documentation.       Psychosocial    No documentation.       Abuse/Neglect    No documentation.       Legal    No documentation.       Substance Abuse    No documentation.       Patient Forms    No documentation.           Milka Reyes

## 2019-02-21 ENCOUNTER — READMISSION MANAGEMENT (OUTPATIENT)
Dept: CALL CENTER | Facility: HOSPITAL | Age: 74
End: 2019-02-21

## 2019-02-21 NOTE — OUTREACH NOTE
Prep Survey      Responses   Facility patient discharged from?  [facility]   Is patient eligible?  Yes   Discharge diagnosis  thoracentesis,  CHF,  PNA,  pleural effusions,  AFIB,  chronic anticoagulation,  T2DM,  lactic acidosis,  HTN,  CKD,  proteinuria,  septic shock   Does the patient have one of the following disease processes/diagnoses(primary or secondary)?  Sepsis   Does the patient have Home health ordered?  Yes   What is the Home health agency?   [facility]   Is there a DME ordered?  Yes   What DME was ordered?  [facility] - [facility]   Comments regarding appointments  see AVS   Medication alerts for this patient  metformin stopped due to elevated lactic acid   Prep survey completed?  Yes          Yamilex Darby RN

## 2019-02-22 ENCOUNTER — READMISSION MANAGEMENT (OUTPATIENT)
Dept: CALL CENTER | Facility: HOSPITAL | Age: 74
End: 2019-02-22

## 2019-02-22 NOTE — OUTREACH NOTE
Sepsis Week 1 Survey      Responses   Facility patient discharged from?  [facility]   Does the patient have one of the following disease processes/diagnoses(primary or secondary)?  Sepsis   Is there a successful TCM telephone encounter documented?  No   Week 1 attempt successful?  Yes   Call start time  0729   Call end time  0751   General alerts for this patient  Talk with daughter-patient is confused.   Is patient permission given to speak with other caregiver?  Yes   List who call center can speak with  Sari-daughter   Person spoke with today (if not patient) and relationship  Sari   Meds reviewed with patient/caregiver?  Yes   Is the patient having any side effects they believe may be caused by any medication additions or changes?  No   Does the patient have all medications related to this admission filled (includes all antibiotics, inhalers, nebulizers,steroids,etc.)  Yes   Is the patient taking all medications as directed (includes completed medication regime)?  Yes   Does the patient have a primary care provider?   Yes   Comments regarding PCP  Josie Massey   Does the patient have an appointment with their PCP within 7 days of discharge?  Greater than 7 days   What is preventing the patient from scheduling follow up appointments within 7 days of discharge?  Earlier appointment not available   Nursing Interventions  Verified appointment date/time/provider   Has the patient kept scheduled appointments due by today?  N/A   What is the Home health agency?   [facility]   Has home health visited the patient within 72 hours of discharge?  Yes   Home health comments  Home health has visited.   What DME was ordered?  oxygen   Has all DME been delivered?  Yes   Psychosocial issues?  No   Psychosocial comments  Has several family members to help patient. Patient is staying with daughter.   Did the patient receive a copy of their discharge instructions?  Yes   Nursing interventions  Reviewed instructions with patient, Educated  on MyChart   What is the patient's perception of their health status since discharge?  Same   Nursing interventions  Nurse provided patient education   Is the patient/caregiver able to teach back Sepsis?  S - Shivering,fever or very cold, E - Extreme pain or generalized discomfort (worst ever,especially abdomen), P - Pale or discolored skin, S - Sleepy, difficult to arouse,confused, I -   I feel like I might die-a feeling of hopelessness, S - Short of breath   Nursing interventions  Nurse provided patient education   Is patient/caregiver able to teach back steps to recovery at home?  Set small, achievable goals for return to baseline health, Rest and regain strength, Talk about feelings with family/friends, Record milestones and struggles in a journal, Learn about sepsis(sepsis.org), Eat a balanced diet, Exercise as tolerated, Make a list of questions for PCP appoinment   Is the patient/caregiver able to teach back signs and symptoms of worsening condition:  Fever, Rapid heart rate (>90), Altered mental status(confusion/coma), Hyperthermia, Shortness of breath/rapid respiratory rate, Edema   Is the patient/caregiver able to teach back the hierarchy of who to call/visit for symptoms/problems? PCP, Specialist, Home health nurse, Urgent Care, ED, 911  Yes   Additional teach back comments  Reviewed lung s/s of pneumonia, worsening effusion.   Week 1 call completed?  Yes   Wrap up additional comments  Daughter states patient is about the same. States is wearing home oxygen at all times. Denies any fever-states is still confused at times which is thought to be dementia. States BS was 390 last PM, and will have PCP evaluate.           Gina Plasencia RN

## 2019-03-04 ENCOUNTER — READMISSION MANAGEMENT (OUTPATIENT)
Dept: CALL CENTER | Facility: HOSPITAL | Age: 74
End: 2019-03-04

## 2019-03-04 NOTE — OUTREACH NOTE
Sepsis Week 2 Survey      Responses   Facility patient discharged from?  [facility]   Does the patient have one of the following disease processes/diagnoses(primary or secondary)?  Sepsis   Week 2 attempt successful?  No   Unsuccessful attempts  Attempt 1          Elena Tarango RN

## 2019-03-06 ENCOUNTER — READMISSION MANAGEMENT (OUTPATIENT)
Dept: CALL CENTER | Facility: HOSPITAL | Age: 74
End: 2019-03-06

## 2019-03-06 ENCOUNTER — OFFICE VISIT (OUTPATIENT)
Dept: CARDIOLOGY | Facility: CLINIC | Age: 74
End: 2019-03-06

## 2019-03-06 VITALS
HEART RATE: 60 BPM | DIASTOLIC BLOOD PRESSURE: 77 MMHG | WEIGHT: 136.4 LBS | SYSTOLIC BLOOD PRESSURE: 194 MMHG | HEIGHT: 60 IN | BODY MASS INDEX: 26.78 KG/M2 | OXYGEN SATURATION: 96 %

## 2019-03-06 DIAGNOSIS — R00.1 BRADYCARDIA: ICD-10-CM

## 2019-03-06 DIAGNOSIS — I21.4 NSTEMI (NON-ST ELEVATED MYOCARDIAL INFARCTION) (HCC): Primary | ICD-10-CM

## 2019-03-06 DIAGNOSIS — I48.0 PAROXYSMAL ATRIAL FIBRILLATION (HCC): ICD-10-CM

## 2019-03-06 PROCEDURE — 99213 OFFICE O/P EST LOW 20 MIN: CPT | Performed by: NURSE PRACTITIONER

## 2019-03-06 RX ORDER — FLUCONAZOLE 150 MG/1
TABLET ORAL
Refills: 0 | Status: ON HOLD | COMMUNITY
Start: 2019-02-28 | End: 2019-09-22

## 2019-03-06 RX ORDER — ONDANSETRON HYDROCHLORIDE 8 MG/1
TABLET, FILM COATED ORAL
Refills: 2 | Status: ON HOLD | COMMUNITY
Start: 2019-02-28 | End: 2019-10-02

## 2019-03-06 RX ORDER — APIXABAN 5 MG/1
5 TABLET, FILM COATED ORAL 2 TIMES DAILY
Status: ON HOLD | COMMUNITY
End: 2019-10-16 | Stop reason: SDUPTHER

## 2019-03-06 NOTE — PROGRESS NOTES
Subjective     Chief Complaint: NSTEMI and Slow Heart Rate    History of Present Illness   Ashley Geronimo is a 73 y.o. female who presents  with past medical history significant for hypertension, paroxysmal atrial fibrillation, non-insulin requiring type 2 diabetes, chronic kidney disease stage III, breast cancer status post left mastectomy and lymph node removal in 2008, and tobacco use.  Ms Geronimo had a recent hospitalization for acute on chronic respiratory failure secondary to acute diastolic heart failure and pneumonia.  During hospitalization she did have a troponin elevation, however it was felt that this was most likely due to demand ischemia.  She initially required intubation and ventilation when she arrived at the hospital.  She is here today for follow up.    Ms Geronimo reports that she has not taken her medications yet this morning.  Her blood pressure is usually around 130s systolically.    She reports intermittent chest pain, particularly in the diaphragm area.  She reports associated shortness of breath, even with her oxygen on.  Chest pain can last just a few minutes but has lasted close to an hour, resolves spontaneously.         Current Outpatient Medications:   •  aspirin 81 MG EC tablet, Take 1 tablet by mouth Daily., Disp: , Rfl:   •  atorvastatin (LIPITOR) 40 MG tablet, Take 1 tablet by mouth Every Night., Disp: 30 tablet, Rfl: 0  •  bumetanide (BUMEX) 1 MG tablet, Take 1 tablet by mouth 2 (Two) Times a Day for 30 days., Disp: 60 tablet, Rfl: 0  •  ELIQUIS 5 MG tablet tablet, , Disp: , Rfl:   •  fluconazole (DIFLUCAN) 150 MG tablet, TK 1 T PO TODAY THEN REPEAT IN 72 HOURS IF NEEDED, Disp: , Rfl: 0  •  hydrALAZINE (APRESOLINE) 25 MG tablet, Take 3 tablets by mouth 3 (Three) Times a Day for 30 days. Hold for systolic BP less than 160mmHg., Disp: 270 tablet, Rfl: 0  •  insulin detemir (LEVEMIR) 100 UNIT/ML injection, Inject 10 Units under the skin into the appropriate area as directed Every  "Night., Disp: , Rfl: 12  •  insulin lispro (humaLOG) 100 UNIT/ML injection, Use per sliding scale: 150-199=2units, 200-249=4 units, 250-299=6units, 300-349=7units, 350-400=8units, 400 or higher=9 units., Disp: 10 mL, Rfl: 0  •  metoprolol tartrate (LOPRESSOR) 100 MG tablet, Take 1 tablet by mouth Every 12 (Twelve) Hours., Disp: 60 tablet, Rfl: 0  •  montelukast (SINGULAIR) 10 MG tablet, Take 10 mg by mouth Every Night., Disp: , Rfl:   •  ondansetron (ZOFRAN) 8 MG tablet, TK 1 T PO Q 8 H PRN, Disp: , Rfl: 2       Current outpatient and discharge medications have been reconciled for the patient.  Reviewed by: ANDREEA Tolentino    The following portions of the patient's history were reviewed and updated as appropriate: allergies, current medications, past family history, past medical history, past social history, past surgical history and problem list.    Review of Systems   Constitution: Negative for weakness and malaise/fatigue.   Cardiovascular: Positive for chest pain and orthopnea. Negative for dyspnea on exertion, irregular heartbeat, leg swelling, near-syncope, palpitations, paroxysmal nocturnal dyspnea and syncope.   Respiratory: Positive for shortness of breath.    Hematologic/Lymphatic: Does not bruise/bleed easily.   Neurological: Negative for dizziness and light-headedness.         Objective     BP (!) 194/77 (BP Location: Right arm)   Pulse 60   Ht 152.4 cm (60\")   Wt 61.9 kg (136 lb 6.4 oz)   SpO2 96%   BMI 26.64 kg/m²     Physical Exam   Constitutional: She is oriented to person, place, and time. She appears well-developed and well-nourished.   HENT:   Head: Normocephalic and atraumatic.   Eyes: Pupils are equal, round, and reactive to light.   Neck: No JVD present.   Cardiovascular: Normal rate, regular rhythm and intact distal pulses. Exam reveals no gallop and no friction rub.   No murmur heard.  Pulmonary/Chest: Effort normal. No respiratory distress. She has decreased breath sounds. She has " wheezes (exp). She has no rales.   Neurological: She is alert and oriented to person, place, and time.   Skin: Skin is warm and dry.   Psychiatric: She has a normal mood and affect.       Procedures    1/17/2019  Transthoracic Echocardiogram    Interpretation Summary     · Estimated EF = 66%.  · Left ventricular systolic function is normal.  · No significant valvular disease  · No change since 3/21/18     Echocardiogram Findings     Left Ventricle Left ventricular systolic function is normal. Estimated EF appears to be in the range of 66 - 70%. Estimated EF = 66%. Normal left ventricular cavity size and wall thickness noted. All left ventricular wall segments contract normally. Left ventricular diastolic function is normal.   Right Ventricle Normal right ventricular cavity size, wall thickness, systolic function and septal motion noted.   Left Atrium Normal left atrial size and volume noted.   Right Atrium Normal right atrial size noted.   Aortic Valve The aortic valve is structurally normal. No aortic valve regurgitation is present. No aortic valve stenosis is present.   Mitral Valve The mitral valve is abnormal in structure. Moderate MAC is present. No mitral valve regurgitation is present. No significant mitral valve stenosis is present.   Tricuspid Valve Tricuspid valve not well visualized. The tricuspid valve is grossly normal. Estimated right ventricular systolic pressure from tricuspid regurgitation is normal (<35 mmHg).   Pulmonic Valve The pulmonic valve is not well visualized. The pulmonic valve is grossly normal in structure.   Greater Vessels No dilation of the aortic root is present. No dilation of the sinuses of Valsalva is present.   Pericardium The pericardium is normal. There is no evidence of pericardial effusion.         Assessment/Plan       Ashley was seen today for nstemi and slow heart rate.    Diagnoses and all orders for this visit:    Assessment:  1. Chest pain  2. Paroxysmal atrial  fibrillation, CHADsVASc is at least 5 for heart failure, hypertension, diabetes, age and sex.  Patient is anticoagulated on Eliquis.  3. Hypertension, question of adequate control.  4. Chronic diastolic heart failure, well compensated today.  5. Chronic respiratory failure, on home O2 and compliant.  6. Stage II chronic kidney disease        Plan:  1. Nuclear stress test added  2. Requested patient keep a blood pressure diary and bring to her next visit.  3. Return to clinic in 4 weeks, sooner for any worsening or concerning symptoms.      Return in about 4 weeks (around 4/3/2019).      ANDREEA Ochoa

## 2019-03-06 NOTE — OUTREACH NOTE
Sepsis Week 2 Survey      Responses   Facility patient discharged from?  [facility]   Does the patient have one of the following disease processes/diagnoses(primary or secondary)?  Sepsis   Week 2 attempt successful?  Yes   Call start time  1121   Call end time  1125   General alerts for this patient  Talk with daughter-patient is confused.   Discharge diagnosis  thoracentesis,  CHF,  PNA,  pleural effusions,  AFIB,  chronic anticoagulation,  T2DM,  lactic acidosis,  HTN,  CKD,  proteinuria,  septic shock   Is patient permission given to speak with other caregiver?  Yes   List who call center can speak with  Sari-daughter   Person spoke with today (if not patient) and relationship  Sari   Meds reviewed with patient/caregiver?  Yes   Is the patient taking all medications as directed (includes completed medication regime)?  Yes   Medication comments  BS running high but daughter will states they will discuss with her MD next week   Has the patient kept scheduled appointments due by today?  N/A   Comments  Pt will keep appt coming up   What is the Home health agency?   [facility]   What is the patient's perception of their health status since discharge?  Improving   Nursing interventions  Nurse provided patient education   Is the patient/caregiver able to teach back Sepsis?  S - Shivering,fever or very cold, E - Extreme pain or generalized discomfort (worst ever,especially abdomen), P - Pale or discolored skin, S - Sleepy, difficult to arouse,confused, I -   I feel like I might die-a feeling of hopelessness, S - Short of breath   Nursing interventions  Nurse provided patient education   Is patient/caregiver able to teach back steps to recovery at home?  Set small, achievable goals for return to baseline health, Rest and regain strength, Talk about feelings with family/friends, Record milestones and struggles in a journal, Learn about sepsis(sepsis.org), Eat a balanced diet, Exercise as tolerated, Make a list of questions for  PCP appoinment   Is the patient/caregiver able to teach back signs and symptoms of worsening condition:  Fever, Rapid heart rate (>90), Altered mental status(confusion/coma), Hyperthermia, Shortness of breath/rapid respiratory rate, Edema   Additional teach back comments  Reviewed lung s/s of pneumonia, worsening effusion.   Week 2 call completed?  Yes          Cynthia Garcia RN

## 2019-03-15 ENCOUNTER — READMISSION MANAGEMENT (OUTPATIENT)
Dept: CALL CENTER | Facility: HOSPITAL | Age: 74
End: 2019-03-15

## 2019-03-15 PROBLEM — J96.12 CHRONIC RESPIRATORY FAILURE WITH HYPOXIA AND HYPERCAPNIA (HCC): Status: ACTIVE | Noted: 2019-03-15

## 2019-03-15 PROBLEM — J96.11 CHRONIC RESPIRATORY FAILURE WITH HYPOXIA AND HYPERCAPNIA (HCC): Status: ACTIVE | Noted: 2019-03-15

## 2019-03-22 ENCOUNTER — READMISSION MANAGEMENT (OUTPATIENT)
Dept: CALL CENTER | Facility: HOSPITAL | Age: 74
End: 2019-03-22

## 2019-03-22 NOTE — OUTREACH NOTE
Sepsis Week 4 Survey      Responses   Facility patient discharged from?  [facility]   Does the patient have one of the following disease processes/diagnoses(primary or secondary)?  Sepsis   Week 4 attempt successful?  No          Cynthia Garcia RN

## 2019-04-05 ENCOUNTER — APPOINTMENT (OUTPATIENT)
Dept: NUCLEAR MEDICINE | Facility: HOSPITAL | Age: 74
End: 2019-04-05

## 2019-04-05 ENCOUNTER — APPOINTMENT (OUTPATIENT)
Dept: CARDIOLOGY | Facility: HOSPITAL | Age: 74
End: 2019-04-05

## 2019-04-30 ENCOUNTER — TELEPHONE (OUTPATIENT)
Dept: CARDIOLOGY | Facility: CLINIC | Age: 74
End: 2019-04-30

## 2019-04-30 NOTE — TELEPHONE ENCOUNTER
Called patient to see if she would reschedule her tests. Phone number is no longer accepting calls. Will mail letter.

## 2019-09-22 ENCOUNTER — HOSPITAL ENCOUNTER (EMERGENCY)
Facility: HOSPITAL | Age: 74
Discharge: ANOTHER HOSPITAL | End: 2019-09-22
Attending: EMERGENCY MEDICINE | Admitting: EMERGENCY MEDICINE

## 2019-09-22 ENCOUNTER — APPOINTMENT (OUTPATIENT)
Dept: GENERAL RADIOLOGY | Facility: HOSPITAL | Age: 74
End: 2019-09-22

## 2019-09-22 ENCOUNTER — APPOINTMENT (OUTPATIENT)
Dept: NEUROLOGY | Facility: HOSPITAL | Age: 74
End: 2019-09-22

## 2019-09-22 ENCOUNTER — HOSPITAL ENCOUNTER (INPATIENT)
Facility: HOSPITAL | Age: 74
LOS: 9 days | Discharge: NURSING, REHAB OR LONG-TERM CARE | End: 2019-10-01
Attending: INTERNAL MEDICINE | Admitting: INTERNAL MEDICINE

## 2019-09-22 ENCOUNTER — APPOINTMENT (OUTPATIENT)
Dept: CT IMAGING | Facility: HOSPITAL | Age: 74
End: 2019-09-22

## 2019-09-22 VITALS
HEART RATE: 89 BPM | OXYGEN SATURATION: 99 % | RESPIRATION RATE: 20 BRPM | WEIGHT: 136.4 LBS | DIASTOLIC BLOOD PRESSURE: 74 MMHG | BODY MASS INDEX: 26.78 KG/M2 | HEIGHT: 60 IN | TEMPERATURE: 101.6 F | SYSTOLIC BLOOD PRESSURE: 165 MMHG

## 2019-09-22 DIAGNOSIS — R65.20 SEVERE SEPSIS (HCC): ICD-10-CM

## 2019-09-22 DIAGNOSIS — R13.13 PHARYNGEAL DYSPHAGIA: Primary | ICD-10-CM

## 2019-09-22 DIAGNOSIS — K92.2 UPPER GI BLEEDING: ICD-10-CM

## 2019-09-22 DIAGNOSIS — Z74.09 IMPAIRED MOBILITY AND ADLS: ICD-10-CM

## 2019-09-22 DIAGNOSIS — A41.9 SEPTIC SHOCK (HCC): ICD-10-CM

## 2019-09-22 DIAGNOSIS — N39.0 URINARY TRACT INFECTION WITHOUT HEMATURIA, SITE UNSPECIFIED: ICD-10-CM

## 2019-09-22 DIAGNOSIS — R65.21 SEPTIC SHOCK (HCC): ICD-10-CM

## 2019-09-22 DIAGNOSIS — I16.0 HYPERTENSIVE URGENCY: ICD-10-CM

## 2019-09-22 DIAGNOSIS — R40.2432 GLASGOW COMA SCALE TOTAL SCORE 3-8, AT ARRIVAL TO EMERGENCY DEPARTMENT (HCC): Primary | ICD-10-CM

## 2019-09-22 DIAGNOSIS — Z78.9 IMPAIRED MOBILITY AND ADLS: ICD-10-CM

## 2019-09-22 DIAGNOSIS — A41.9 SEVERE SEPSIS (HCC): ICD-10-CM

## 2019-09-22 DIAGNOSIS — IMO0002 INSULIN DEPENDENT TYPE 2 DIABETES MELLITUS, UNCONTROLLED: ICD-10-CM

## 2019-09-22 PROBLEM — J96.21 ACUTE ON CHRONIC RESPIRATORY FAILURE WITH HYPOXIA (HCC): Status: ACTIVE | Noted: 2019-09-22

## 2019-09-22 PROBLEM — G93.40 ENCEPHALOPATHY: Status: ACTIVE | Noted: 2019-09-22

## 2019-09-22 PROBLEM — K92.0 HEMATEMESIS: Status: ACTIVE | Noted: 2019-09-22

## 2019-09-22 PROBLEM — N17.9 AKI (ACUTE KIDNEY INJURY) (HCC): Status: ACTIVE | Noted: 2019-09-22

## 2019-09-22 PROBLEM — E11.10 DKA (DIABETIC KETOACIDOSES): Status: ACTIVE | Noted: 2019-09-22

## 2019-09-22 LAB
6-ACETYL MORPHINE: NEGATIVE
A-A DO2: 102.3 MMHG (ref 0–300)
ABO GROUP BLD: NORMAL
ACETONE BLD QL: NEGATIVE
ALBUMIN SERPL-MCNC: 4.2 G/DL (ref 3.5–5.2)
ALBUMIN SERPL-MCNC: 5 G/DL (ref 3.5–5.2)
ALBUMIN/GLOB SERPL: 1.4 G/DL
ALBUMIN/GLOB SERPL: 1.4 G/DL
ALP SERPL-CCNC: 129 U/L (ref 39–117)
ALP SERPL-CCNC: 174 U/L (ref 39–117)
ALT SERPL W P-5'-P-CCNC: 18 U/L (ref 1–33)
ALT SERPL W P-5'-P-CCNC: 18 U/L (ref 1–33)
AMMONIA BLD-SCNC: 57 UMOL/L (ref 11–51)
AMPHET+METHAMPHET UR QL: NEGATIVE
ANION GAP SERPL CALCULATED.3IONS-SCNC: 16 MMOL/L (ref 5–15)
ANION GAP SERPL CALCULATED.3IONS-SCNC: 17 MMOL/L (ref 5–15)
ANION GAP SERPL CALCULATED.3IONS-SCNC: 23.9 MMOL/L (ref 5–15)
APTT PPP: 25.6 SECONDS (ref 23.8–36.1)
ARTERIAL PATENCY WRIST A: ABNORMAL
AST SERPL-CCNC: 19 U/L (ref 1–32)
AST SERPL-CCNC: 27 U/L (ref 1–32)
ATMOSPHERIC PRESS: 733 MMHG
ATMOSPHERIC PRESS: ABNORMAL MM[HG]
BACTERIA UR QL AUTO: ABNORMAL /HPF
BARBITURATES UR QL SCN: NEGATIVE
BASE EXCESS BLDA CALC-SCNC: -4.2 MMOL/L (ref 0–2)
BASE EXCESS BLDA CALC-SCNC: -4.9 MMOL/L
BASOPHILS # BLD AUTO: 0.02 10*3/MM3 (ref 0–0.2)
BASOPHILS # BLD AUTO: 0.03 10*3/MM3 (ref 0–0.2)
BASOPHILS NFR BLD AUTO: 0.1 % (ref 0–1.5)
BASOPHILS NFR BLD AUTO: 0.2 % (ref 0–1.5)
BDY SITE: ABNORMAL
BENZODIAZ UR QL SCN: NEGATIVE
BILIRUB SERPL-MCNC: 0.5 MG/DL (ref 0.2–1.2)
BILIRUB SERPL-MCNC: 0.7 MG/DL (ref 0.2–1.2)
BILIRUB UR QL STRIP: NEGATIVE
BLD GP AB SCN SERPL QL: NEGATIVE
BLD GP AB SCN SERPL QL: NEGATIVE
BODY TEMPERATURE: 98.6 C
BUN BLD-MCNC: 36 MG/DL (ref 8–23)
BUN BLD-MCNC: 38 MG/DL (ref 8–23)
BUN BLD-MCNC: 43 MG/DL (ref 8–23)
BUN/CREAT SERPL: 21.3 (ref 7–25)
BUN/CREAT SERPL: 22 (ref 7–25)
BUN/CREAT SERPL: 22.4 (ref 7–25)
BUPRENORPHINE SERPL-MCNC: NEGATIVE NG/ML
CALCIUM SPEC-SCNC: 7.9 MG/DL (ref 8.6–10.5)
CALCIUM SPEC-SCNC: 8.6 MG/DL (ref 8.6–10.5)
CALCIUM SPEC-SCNC: 9.9 MG/DL (ref 8.6–10.5)
CANNABINOIDS SERPL QL: NEGATIVE
CHLORIDE SERPL-SCNC: 106 MMOL/L (ref 98–107)
CHLORIDE SERPL-SCNC: 114 MMOL/L (ref 98–107)
CHLORIDE SERPL-SCNC: 97 MMOL/L (ref 98–107)
CLARITY UR: CLEAR
CO2 BLDA-SCNC: 20.4 MMOL/L (ref 23–27)
CO2 SERPL-SCNC: 17 MMOL/L (ref 22–29)
CO2 SERPL-SCNC: 19 MMOL/L (ref 22–29)
CO2 SERPL-SCNC: 21.1 MMOL/L (ref 22–29)
COCAINE UR QL: NEGATIVE
COHGB MFR BLD: 0.9 % (ref 0–2)
COHGB MFR BLD: 2.1 % (ref 0–5)
COLOR UR: YELLOW
CREAT BLD-MCNC: 1.69 MG/DL (ref 0.57–1)
CREAT BLD-MCNC: 1.73 MG/DL (ref 0.57–1)
CREAT BLD-MCNC: 1.92 MG/DL (ref 0.57–1)
D-LACTATE SERPL-SCNC: 2 MMOL/L (ref 0.5–2)
D-LACTATE SERPL-SCNC: 5.5 MMOL/L (ref 0.5–2)
DEPRECATED RDW RBC AUTO: 46.1 FL (ref 37–54)
DEPRECATED RDW RBC AUTO: 46.8 FL (ref 37–54)
EOSINOPHIL # BLD AUTO: 0.01 10*3/MM3 (ref 0–0.4)
EOSINOPHIL # BLD AUTO: 0.16 10*3/MM3 (ref 0–0.4)
EOSINOPHIL NFR BLD AUTO: 0.1 % (ref 0.3–6.2)
EOSINOPHIL NFR BLD AUTO: 1.1 % (ref 0.3–6.2)
ERYTHROCYTE [DISTWIDTH] IN BLOOD BY AUTOMATED COUNT: 14.8 % (ref 12.3–15.4)
ERYTHROCYTE [DISTWIDTH] IN BLOOD BY AUTOMATED COUNT: 15.2 % (ref 12.3–15.4)
GFR SERPL CREATININE-BSD FRML MDRD: 25 ML/MIN/1.73
GFR SERPL CREATININE-BSD FRML MDRD: 29 ML/MIN/1.73
GFR SERPL CREATININE-BSD FRML MDRD: 30 ML/MIN/1.73
GLOBULIN UR ELPH-MCNC: 3 GM/DL
GLOBULIN UR ELPH-MCNC: 3.7 GM/DL
GLUCOSE BLD-MCNC: 223 MG/DL (ref 65–99)
GLUCOSE BLD-MCNC: 351 MG/DL (ref 65–99)
GLUCOSE BLD-MCNC: 616 MG/DL (ref 65–99)
GLUCOSE BLDC GLUCOMTR-MCNC: 165 MG/DL (ref 70–130)
GLUCOSE BLDC GLUCOMTR-MCNC: 199 MG/DL (ref 70–130)
GLUCOSE BLDC GLUCOMTR-MCNC: 203 MG/DL (ref 70–130)
GLUCOSE BLDC GLUCOMTR-MCNC: 229 MG/DL (ref 70–130)
GLUCOSE BLDC GLUCOMTR-MCNC: 288 MG/DL (ref 70–130)
GLUCOSE BLDC GLUCOMTR-MCNC: 290 MG/DL (ref 70–130)
GLUCOSE BLDC GLUCOMTR-MCNC: 337 MG/DL (ref 70–130)
GLUCOSE BLDC GLUCOMTR-MCNC: 405 MG/DL (ref 70–130)
GLUCOSE BLDC GLUCOMTR-MCNC: 592 MG/DL (ref 70–130)
GLUCOSE UR STRIP-MCNC: ABNORMAL MG/DL
HBA1C MFR BLD: 10.2 % (ref 4.8–5.6)
HCO3 BLDA-SCNC: 18.4 MMOL/L (ref 22–26)
HCO3 BLDA-SCNC: 19.5 MMOL/L (ref 20–26)
HCT VFR BLD AUTO: 45.9 % (ref 34–46.6)
HCT VFR BLD AUTO: 45.9 % (ref 34–46.6)
HCT VFR BLD AUTO: 48.8 % (ref 34–46.6)
HCT VFR BLD CALC: 45 %
HCT VFR BLD CALC: 47 % (ref 37–47)
HGB BLD-MCNC: 14.6 G/DL (ref 12–15.9)
HGB BLD-MCNC: 14.6 G/DL (ref 12–15.9)
HGB BLD-MCNC: 16.2 G/DL (ref 12–15.9)
HGB BLDA-MCNC: 14.7 G/DL (ref 14–18)
HGB BLDA-MCNC: 16.1 G/DL (ref 12–16)
HGB UR QL STRIP.AUTO: ABNORMAL
HOLD SPECIMEN: NORMAL
HOROWITZ INDEX BLD+IHG-RTO: 40 %
HOROWITZ INDEX BLD+IHG-RTO: 45 %
HYALINE CASTS UR QL AUTO: ABNORMAL /LPF
IMM GRANULOCYTES # BLD AUTO: 0.02 10*3/MM3 (ref 0–0.05)
IMM GRANULOCYTES # BLD AUTO: 0.06 10*3/MM3 (ref 0–0.05)
IMM GRANULOCYTES NFR BLD AUTO: 0.1 % (ref 0–0.5)
IMM GRANULOCYTES NFR BLD AUTO: 0.4 % (ref 0–0.5)
INR PPP: 0.95 (ref 0.9–1.1)
KETONES UR QL STRIP: ABNORMAL
LEUKOCYTE ESTERASE UR QL STRIP.AUTO: NEGATIVE
LYMPHOCYTES # BLD AUTO: 1.18 10*3/MM3 (ref 0.7–3.1)
LYMPHOCYTES # BLD AUTO: 1.25 10*3/MM3 (ref 0.7–3.1)
LYMPHOCYTES NFR BLD AUTO: 8.2 % (ref 19.6–45.3)
LYMPHOCYTES NFR BLD AUTO: 8.9 % (ref 19.6–45.3)
MAGNESIUM SERPL-MCNC: 1.4 MG/DL (ref 1.6–2.4)
MAGNESIUM SERPL-MCNC: 1.7 MG/DL (ref 1.6–2.4)
MCH RBC QN AUTO: 27.5 PG (ref 26.6–33)
MCH RBC QN AUTO: 27.6 PG (ref 26.6–33)
MCHC RBC AUTO-ENTMCNC: 31.8 G/DL (ref 31.5–35.7)
MCHC RBC AUTO-ENTMCNC: 33.2 G/DL (ref 31.5–35.7)
MCV RBC AUTO: 82.9 FL (ref 79–97)
MCV RBC AUTO: 86.8 FL (ref 79–97)
METHADONE UR QL SCN: NEGATIVE
METHGB BLD QL: 0.4 % (ref 0–3)
METHGB BLD QL: 1 % (ref 0–1.5)
MODALITY: ABNORMAL
MONOCYTES # BLD AUTO: 0.71 10*3/MM3 (ref 0.1–0.9)
MONOCYTES # BLD AUTO: 0.8 10*3/MM3 (ref 0.1–0.9)
MONOCYTES NFR BLD AUTO: 4.9 % (ref 5–12)
MONOCYTES NFR BLD AUTO: 5.7 % (ref 5–12)
NEUTROPHILS # BLD AUTO: 11.73 10*3/MM3 (ref 1.7–7)
NEUTROPHILS # BLD AUTO: 12.51 10*3/MM3 (ref 1.7–7)
NEUTROPHILS NFR BLD AUTO: 83.7 % (ref 42.7–76)
NEUTROPHILS NFR BLD AUTO: 86.6 % (ref 42.7–76)
NITRITE UR QL STRIP: NEGATIVE
NOTE: ABNORMAL
NRBC BLD AUTO-RTO: 0 /100 WBC (ref 0–0.2)
OPIATES UR QL: NEGATIVE
OSMOLALITY SERPL: 322 MOSM/KG (ref 275–295)
OXYCODONE UR QL SCN: NEGATIVE
OXYHGB MFR BLDV: 96.3 % (ref 85–100)
OXYHGB MFR BLDV: 97.8 % (ref 94–99)
PCO2 BLDA: 30.3 MM HG (ref 35–45)
PCO2 BLDA: 31.4 MM HG (ref 35–45)
PCO2 TEMP ADJ BLD: ABNORMAL MM[HG]
PCP UR QL SCN: NEGATIVE
PEEP RESPIRATORY: 5 CM[H2O]
PH BLDA: 7.4 PH UNITS (ref 7.35–7.45)
PH BLDA: 7.4 PH UNITS (ref 7.35–7.45)
PH UR STRIP.AUTO: 5.5 [PH] (ref 5–8)
PH, TEMP CORRECTED: ABNORMAL
PHOSPHATE SERPL-MCNC: 1 MG/DL (ref 2.5–4.5)
PHOSPHATE SERPL-MCNC: 2.4 MG/DL (ref 2.5–4.5)
PLATELET # BLD AUTO: 208 10*3/MM3 (ref 140–450)
PLATELET # BLD AUTO: 276 10*3/MM3 (ref 140–450)
PMV BLD AUTO: 12 FL (ref 6–12)
PMV BLD AUTO: 12.3 FL (ref 6–12)
PO2 BLDA: 137.3 MM HG (ref 80–100)
PO2 BLDA: 190 MM HG (ref 83–108)
PO2 TEMP ADJ BLD: ABNORMAL MM[HG]
POTASSIUM BLD-SCNC: 3.1 MMOL/L (ref 3.5–5.2)
POTASSIUM BLD-SCNC: 3.7 MMOL/L (ref 3.5–5.2)
POTASSIUM BLD-SCNC: 3.8 MMOL/L (ref 3.5–5.2)
PROCALCITONIN SERPL-MCNC: 0.26 NG/ML (ref 0.1–0.25)
PROT SERPL-MCNC: 7.2 G/DL (ref 6–8.5)
PROT SERPL-MCNC: 8.7 G/DL (ref 6–8.5)
PROT UR QL STRIP: ABNORMAL
PROTHROMBIN TIME: 13.1 SECONDS (ref 11–15.4)
RBC # BLD AUTO: 5.29 10*6/MM3 (ref 3.77–5.28)
RBC # BLD AUTO: 5.89 10*6/MM3 (ref 3.77–5.28)
RBC # UR: ABNORMAL /HPF
REF LAB TEST METHOD: ABNORMAL
RH BLD: POSITIVE
SAO2 % BLDCOA: 98.8 % (ref 90–100)
SET MECH RESP RATE: 16
SODIUM BLD-SCNC: 142 MMOL/L (ref 136–145)
SODIUM BLD-SCNC: 142 MMOL/L (ref 136–145)
SODIUM BLD-SCNC: 147 MMOL/L (ref 136–145)
SP GR UR STRIP: 1.02 (ref 1–1.03)
SQUAMOUS #/AREA URNS HPF: ABNORMAL /HPF
T&S EXPIRATION DATE: NORMAL
T&S EXPIRATION DATE: NORMAL
TROPONIN T SERPL-MCNC: 0.03 NG/ML (ref 0–0.03)
TROPONIN T SERPL-MCNC: 0.05 NG/ML (ref 0–0.03)
TROPONIN T SERPL-MCNC: 0.06 NG/ML (ref 0–0.03)
TROPONIN T SERPL-MCNC: 0.06 NG/ML (ref 0–0.03)
TROPONIN T SERPL-MCNC: 0.07 NG/ML (ref 0–0.03)
TSH SERPL DL<=0.05 MIU/L-ACNC: 1.27 UIU/ML (ref 0.27–4.2)
UROBILINOGEN UR QL STRIP: ABNORMAL
VENTILATOR MODE: AC
VT ON VENT VENT: 450 ML
WBC NRBC COR # BLD: 14.03 10*3/MM3 (ref 3.4–10.8)
WBC NRBC COR # BLD: 14.45 10*3/MM3 (ref 3.4–10.8)
WBC UR QL AUTO: ABNORMAL /HPF

## 2019-09-22 PROCEDURE — 83605 ASSAY OF LACTIC ACID: CPT | Performed by: EMERGENCY MEDICINE

## 2019-09-22 PROCEDURE — 94002 VENT MGMT INPAT INIT DAY: CPT

## 2019-09-22 PROCEDURE — 94640 AIRWAY INHALATION TREATMENT: CPT

## 2019-09-22 PROCEDURE — 63710000001 INSULIN REGULAR HUMAN PER 5 UNITS: Performed by: EMERGENCY MEDICINE

## 2019-09-22 PROCEDURE — 51702 INSERT TEMP BLADDER CATH: CPT

## 2019-09-22 PROCEDURE — 80307 DRUG TEST PRSMV CHEM ANLYZR: CPT | Performed by: EMERGENCY MEDICINE

## 2019-09-22 PROCEDURE — 86900 BLOOD TYPING SEROLOGIC ABO: CPT

## 2019-09-22 PROCEDURE — 85730 THROMBOPLASTIN TIME PARTIAL: CPT | Performed by: EMERGENCY MEDICINE

## 2019-09-22 PROCEDURE — 5A1945Z RESPIRATORY VENTILATION, 24-96 CONSECUTIVE HOURS: ICD-10-PCS | Performed by: INTERNAL MEDICINE

## 2019-09-22 PROCEDURE — 96375 TX/PRO/DX INJ NEW DRUG ADDON: CPT

## 2019-09-22 PROCEDURE — 86900 BLOOD TYPING SEROLOGIC ABO: CPT | Performed by: EMERGENCY MEDICINE

## 2019-09-22 PROCEDURE — 36600 WITHDRAWAL OF ARTERIAL BLOOD: CPT | Performed by: EMERGENCY MEDICINE

## 2019-09-22 PROCEDURE — 84100 ASSAY OF PHOSPHORUS: CPT | Performed by: NURSE PRACTITIONER

## 2019-09-22 PROCEDURE — 94799 UNLISTED PULMONARY SVC/PX: CPT

## 2019-09-22 PROCEDURE — 86850 RBC ANTIBODY SCREEN: CPT | Performed by: EMERGENCY MEDICINE

## 2019-09-22 PROCEDURE — 25010000002 PIPERACILLIN SOD-TAZOBACTAM PER 1 G: Performed by: NURSE PRACTITIONER

## 2019-09-22 PROCEDURE — 25010000002 SUCCINYLCHOLINE PER 20 MG

## 2019-09-22 PROCEDURE — 81001 URINALYSIS AUTO W/SCOPE: CPT | Performed by: EMERGENCY MEDICINE

## 2019-09-22 PROCEDURE — 87186 SC STD MICRODIL/AGAR DIL: CPT | Performed by: EMERGENCY MEDICINE

## 2019-09-22 PROCEDURE — 85018 HEMOGLOBIN: CPT | Performed by: NURSE PRACTITIONER

## 2019-09-22 PROCEDURE — 25010000002 SUCCINYLCHOLINE PER 20 MG: Performed by: EMERGENCY MEDICINE

## 2019-09-22 PROCEDURE — 83735 ASSAY OF MAGNESIUM: CPT | Performed by: NURSE PRACTITIONER

## 2019-09-22 PROCEDURE — 25010000002 PIPERACILLIN-TAZOBACTAM: Performed by: EMERGENCY MEDICINE

## 2019-09-22 PROCEDURE — 82805 BLOOD GASES W/O2 SATURATION: CPT | Performed by: EMERGENCY MEDICINE

## 2019-09-22 PROCEDURE — 82009 KETONE BODYS QUAL: CPT | Performed by: EMERGENCY MEDICINE

## 2019-09-22 PROCEDURE — 99291 CRITICAL CARE FIRST HOUR: CPT

## 2019-09-22 PROCEDURE — 82140 ASSAY OF AMMONIA: CPT | Performed by: INTERNAL MEDICINE

## 2019-09-22 PROCEDURE — 84145 PROCALCITONIN (PCT): CPT | Performed by: NURSE PRACTITIONER

## 2019-09-22 PROCEDURE — 84484 ASSAY OF TROPONIN QUANT: CPT | Performed by: EMERGENCY MEDICINE

## 2019-09-22 PROCEDURE — 86901 BLOOD TYPING SEROLOGIC RH(D): CPT | Performed by: EMERGENCY MEDICINE

## 2019-09-22 PROCEDURE — 25010000002 PROPOFOL 10 MG/ML EMULSION: Performed by: EMERGENCY MEDICINE

## 2019-09-22 PROCEDURE — 96366 THER/PROPH/DIAG IV INF ADDON: CPT

## 2019-09-22 PROCEDURE — 87086 URINE CULTURE/COLONY COUNT: CPT | Performed by: EMERGENCY MEDICINE

## 2019-09-22 PROCEDURE — C1751 CATH, INF, PER/CENT/MIDLINE: HCPCS

## 2019-09-22 PROCEDURE — 96367 TX/PROPH/DG ADDL SEQ IV INF: CPT

## 2019-09-22 PROCEDURE — 84443 ASSAY THYROID STIM HORMONE: CPT | Performed by: INTERNAL MEDICINE

## 2019-09-22 PROCEDURE — 86850 RBC ANTIBODY SCREEN: CPT | Performed by: NURSE PRACTITIONER

## 2019-09-22 PROCEDURE — 87186 SC STD MICRODIL/AGAR DIL: CPT | Performed by: NURSE PRACTITIONER

## 2019-09-22 PROCEDURE — 96361 HYDRATE IV INFUSION ADD-ON: CPT

## 2019-09-22 PROCEDURE — 83605 ASSAY OF LACTIC ACID: CPT | Performed by: NURSE PRACTITIONER

## 2019-09-22 PROCEDURE — 82962 GLUCOSE BLOOD TEST: CPT

## 2019-09-22 PROCEDURE — 99285 EMERGENCY DEPT VISIT HI MDM: CPT

## 2019-09-22 PROCEDURE — 85610 PROTHROMBIN TIME: CPT | Performed by: EMERGENCY MEDICINE

## 2019-09-22 PROCEDURE — 83930 ASSAY OF BLOOD OSMOLALITY: CPT | Performed by: NURSE PRACTITIONER

## 2019-09-22 PROCEDURE — 83036 HEMOGLOBIN GLYCOSYLATED A1C: CPT | Performed by: NURSE PRACTITIONER

## 2019-09-22 PROCEDURE — 99291 CRITICAL CARE FIRST HOUR: CPT | Performed by: INTERNAL MEDICINE

## 2019-09-22 PROCEDURE — 63710000001 INSULIN REGULAR HUMAN PER 5 UNITS: Performed by: NURSE PRACTITIONER

## 2019-09-22 PROCEDURE — 70450 CT HEAD/BRAIN W/O DYE: CPT

## 2019-09-22 PROCEDURE — 86900 BLOOD TYPING SEROLOGIC ABO: CPT | Performed by: NURSE PRACTITIONER

## 2019-09-22 PROCEDURE — 95819 EEG AWAKE AND ASLEEP: CPT

## 2019-09-22 PROCEDURE — 80053 COMPREHEN METABOLIC PANEL: CPT | Performed by: NURSE PRACTITIONER

## 2019-09-22 PROCEDURE — 94770: CPT

## 2019-09-22 PROCEDURE — 96376 TX/PRO/DX INJ SAME DRUG ADON: CPT

## 2019-09-22 PROCEDURE — 93005 ELECTROCARDIOGRAM TRACING: CPT | Performed by: NURSE PRACTITIONER

## 2019-09-22 PROCEDURE — 85014 HEMATOCRIT: CPT | Performed by: NURSE PRACTITIONER

## 2019-09-22 PROCEDURE — 85025 COMPLETE CBC W/AUTO DIFF WBC: CPT | Performed by: NURSE PRACTITIONER

## 2019-09-22 PROCEDURE — 86901 BLOOD TYPING SEROLOGIC RH(D): CPT

## 2019-09-22 PROCEDURE — 25010000002 DEXAMETHASONE PER 1 MG: Performed by: EMERGENCY MEDICINE

## 2019-09-22 PROCEDURE — 31500 INSERT EMERGENCY AIRWAY: CPT

## 2019-09-22 PROCEDURE — 87088 URINE BACTERIA CULTURE: CPT | Performed by: EMERGENCY MEDICINE

## 2019-09-22 PROCEDURE — 87205 SMEAR GRAM STAIN: CPT | Performed by: NURSE PRACTITIONER

## 2019-09-22 PROCEDURE — 96365 THER/PROPH/DIAG IV INF INIT: CPT

## 2019-09-22 PROCEDURE — 84484 ASSAY OF TROPONIN QUANT: CPT | Performed by: NURSE PRACTITIONER

## 2019-09-22 PROCEDURE — 85025 COMPLETE CBC W/AUTO DIFF WBC: CPT | Performed by: EMERGENCY MEDICINE

## 2019-09-22 PROCEDURE — 36600 WITHDRAWAL OF ARTERIAL BLOOD: CPT | Performed by: INTERNAL MEDICINE

## 2019-09-22 PROCEDURE — 25010000002 VANCOMYCIN 5 G RECONSTITUTED SOLUTION 5,000 MG VIAL: Performed by: EMERGENCY MEDICINE

## 2019-09-22 PROCEDURE — 25010000002 PROPOFOL 10 MG/ML EMULSION: Performed by: NURSE PRACTITIONER

## 2019-09-22 PROCEDURE — 82805 BLOOD GASES W/O2 SATURATION: CPT | Performed by: INTERNAL MEDICINE

## 2019-09-22 PROCEDURE — 87070 CULTURE OTHR SPECIMN AEROBIC: CPT | Performed by: NURSE PRACTITIONER

## 2019-09-22 PROCEDURE — 86901 BLOOD TYPING SEROLOGIC RH(D): CPT | Performed by: NURSE PRACTITIONER

## 2019-09-22 PROCEDURE — 71045 X-RAY EXAM CHEST 1 VIEW: CPT

## 2019-09-22 PROCEDURE — 80053 COMPREHEN METABOLIC PANEL: CPT | Performed by: EMERGENCY MEDICINE

## 2019-09-22 PROCEDURE — 93005 ELECTROCARDIOGRAM TRACING: CPT | Performed by: EMERGENCY MEDICINE

## 2019-09-22 PROCEDURE — 87040 BLOOD CULTURE FOR BACTERIA: CPT | Performed by: NURSE PRACTITIONER

## 2019-09-22 PROCEDURE — 74176 CT ABD & PELVIS W/O CONTRAST: CPT

## 2019-09-22 PROCEDURE — 87040 BLOOD CULTURE FOR BACTERIA: CPT | Performed by: EMERGENCY MEDICINE

## 2019-09-22 PROCEDURE — 82375 ASSAY CARBOXYHB QUANT: CPT | Performed by: EMERGENCY MEDICINE

## 2019-09-22 PROCEDURE — 83050 HGB METHEMOGLOBIN QUAN: CPT | Performed by: EMERGENCY MEDICINE

## 2019-09-22 PROCEDURE — 25010000002 PROPOFOL 10 MG/ML EMULSION

## 2019-09-22 PROCEDURE — 25010000002 LEVETRIRACETAM PER 10 MG: Performed by: EMERGENCY MEDICINE

## 2019-09-22 RX ORDER — SODIUM CHLORIDE AND POTASSIUM CHLORIDE 150; 450 MG/100ML; MG/100ML
250 INJECTION, SOLUTION INTRAVENOUS CONTINUOUS PRN
Status: DISCONTINUED | OUTPATIENT
Start: 2019-09-22 | End: 2019-09-23

## 2019-09-22 RX ORDER — CHLORHEXIDINE GLUCONATE 0.12 MG/ML
15 RINSE ORAL EVERY 12 HOURS SCHEDULED
Status: DISCONTINUED | OUTPATIENT
Start: 2019-09-22 | End: 2019-09-25

## 2019-09-22 RX ORDER — DEXTROSE MONOHYDRATE 25 G/50ML
12.5 INJECTION, SOLUTION INTRAVENOUS AS NEEDED
Status: DISCONTINUED | OUTPATIENT
Start: 2019-09-22 | End: 2019-09-25

## 2019-09-22 RX ORDER — ONDANSETRON 2 MG/ML
4 INJECTION INTRAMUSCULAR; INTRAVENOUS EVERY 6 HOURS PRN
Status: DISCONTINUED | OUTPATIENT
Start: 2019-09-22 | End: 2019-10-01 | Stop reason: HOSPADM

## 2019-09-22 RX ORDER — DEXTROSE AND SODIUM CHLORIDE 5; .45 G/100ML; G/100ML
250 INJECTION, SOLUTION INTRAVENOUS CONTINUOUS PRN
Status: DISCONTINUED | OUTPATIENT
Start: 2019-09-22 | End: 2019-09-25

## 2019-09-22 RX ORDER — SODIUM CHLORIDE 0.9 % (FLUSH) 0.9 %
10 SYRINGE (ML) INJECTION ONCE AS NEEDED
Status: DISCONTINUED | OUTPATIENT
Start: 2019-09-22 | End: 2019-09-25

## 2019-09-22 RX ORDER — SODIUM CHLORIDE 0.9 % (FLUSH) 0.9 %
10 SYRINGE (ML) INJECTION AS NEEDED
Status: DISCONTINUED | OUTPATIENT
Start: 2019-09-22 | End: 2019-09-22 | Stop reason: HOSPADM

## 2019-09-22 RX ORDER — THIAMINE MONONITRATE (VIT B1) 100 MG
100 TABLET ORAL DAILY
Status: ON HOLD | COMMUNITY
End: 2019-10-02

## 2019-09-22 RX ORDER — SODIUM CHLORIDE 9 MG/ML
10 INJECTION, SOLUTION INTRAVENOUS CONTINUOUS PRN
Status: DISCONTINUED | OUTPATIENT
Start: 2019-09-22 | End: 2019-09-23

## 2019-09-22 RX ORDER — SODIUM CHLORIDE 0.9 % (FLUSH) 0.9 %
10 SYRINGE (ML) INJECTION EVERY 12 HOURS SCHEDULED
Status: DISCONTINUED | OUTPATIENT
Start: 2019-09-22 | End: 2019-10-01 | Stop reason: HOSPADM

## 2019-09-22 RX ORDER — PANTOPRAZOLE SODIUM 40 MG/10ML
40 INJECTION, POWDER, LYOPHILIZED, FOR SOLUTION INTRAVENOUS EVERY 12 HOURS SCHEDULED
Status: DISCONTINUED | OUTPATIENT
Start: 2019-09-22 | End: 2019-09-27

## 2019-09-22 RX ORDER — PANTOPRAZOLE SODIUM 40 MG/10ML
80 INJECTION, POWDER, LYOPHILIZED, FOR SOLUTION INTRAVENOUS ONCE
Status: COMPLETED | OUTPATIENT
Start: 2019-09-22 | End: 2019-09-22

## 2019-09-22 RX ORDER — AMLODIPINE BESYLATE 5 MG/1
5 TABLET ORAL DAILY
Status: ON HOLD | COMMUNITY
End: 2019-10-02

## 2019-09-22 RX ORDER — DEXTROSE, SODIUM CHLORIDE, AND POTASSIUM CHLORIDE 5; .45; .15 G/100ML; G/100ML; G/100ML
250 INJECTION INTRAVENOUS CONTINUOUS PRN
Status: DISCONTINUED | OUTPATIENT
Start: 2019-09-22 | End: 2019-09-25

## 2019-09-22 RX ORDER — DEXAMETHASONE SODIUM PHOSPHATE 4 MG/ML
4 INJECTION, SOLUTION INTRA-ARTICULAR; INTRALESIONAL; INTRAMUSCULAR; INTRAVENOUS; SOFT TISSUE ONCE
Status: COMPLETED | OUTPATIENT
Start: 2019-09-22 | End: 2019-09-22

## 2019-09-22 RX ORDER — SODIUM CHLORIDE 0.9 % (FLUSH) 0.9 %
10 SYRINGE (ML) INJECTION AS NEEDED
Status: DISCONTINUED | OUTPATIENT
Start: 2019-09-22 | End: 2019-10-01 | Stop reason: HOSPADM

## 2019-09-22 RX ORDER — DEXTROSE, SODIUM CHLORIDE, AND POTASSIUM CHLORIDE 5; .45; .3 G/100ML; G/100ML; G/100ML
250 INJECTION INTRAVENOUS CONTINUOUS PRN
Status: DISCONTINUED | OUTPATIENT
Start: 2019-09-22 | End: 2019-09-25

## 2019-09-22 RX ORDER — ACETAMINOPHEN 650 MG/1
650 SUPPOSITORY RECTAL EVERY 4 HOURS PRN
Status: DISCONTINUED | OUTPATIENT
Start: 2019-09-22 | End: 2019-10-01 | Stop reason: HOSPADM

## 2019-09-22 RX ORDER — SODIUM CHLORIDE AND POTASSIUM CHLORIDE 150; 900 MG/100ML; MG/100ML
250 INJECTION, SOLUTION INTRAVENOUS CONTINUOUS PRN
Status: DISCONTINUED | OUTPATIENT
Start: 2019-09-22 | End: 2019-09-23

## 2019-09-22 RX ORDER — SODIUM CHLORIDE AND POTASSIUM CHLORIDE 300; 900 MG/100ML; MG/100ML
250 INJECTION, SOLUTION INTRAVENOUS CONTINUOUS PRN
Status: DISCONTINUED | OUTPATIENT
Start: 2019-09-22 | End: 2019-09-23

## 2019-09-22 RX ORDER — NALOXONE HYDROCHLORIDE 1 MG/ML
2 INJECTION INTRAMUSCULAR; INTRAVENOUS; SUBCUTANEOUS ONCE
Status: COMPLETED | OUTPATIENT
Start: 2019-09-22 | End: 2019-09-22

## 2019-09-22 RX ORDER — NICOTINE POLACRILEX 4 MG
15 LOZENGE BUCCAL
Status: DISCONTINUED | OUTPATIENT
Start: 2019-09-22 | End: 2019-10-01 | Stop reason: HOSPADM

## 2019-09-22 RX ORDER — DEXTROSE MONOHYDRATE 25 G/50ML
25 INJECTION, SOLUTION INTRAVENOUS
Status: DISCONTINUED | OUTPATIENT
Start: 2019-09-22 | End: 2019-10-01 | Stop reason: HOSPADM

## 2019-09-22 RX ORDER — VECURONIUM BROMIDE 1 MG/ML
0.6 INJECTION, POWDER, LYOPHILIZED, FOR SOLUTION INTRAVENOUS ONCE
Status: COMPLETED | OUTPATIENT
Start: 2019-09-22 | End: 2019-09-22

## 2019-09-22 RX ORDER — PROPOFOL 10 MG/ML
VIAL (ML) INTRAVENOUS
Status: COMPLETED
Start: 2019-09-22 | End: 2019-09-22

## 2019-09-22 RX ORDER — NALOXONE HYDROCHLORIDE 1 MG/ML
INJECTION INTRAMUSCULAR; INTRAVENOUS; SUBCUTANEOUS
Status: DISCONTINUED
Start: 2019-09-22 | End: 2019-09-22 | Stop reason: HOSPADM

## 2019-09-22 RX ORDER — SODIUM CHLORIDE 9 MG/ML
250 INJECTION, SOLUTION INTRAVENOUS CONTINUOUS PRN
Status: DISCONTINUED | OUTPATIENT
Start: 2019-09-22 | End: 2019-09-23

## 2019-09-22 RX ORDER — ONDANSETRON 4 MG/1
4 TABLET, FILM COATED ORAL EVERY 6 HOURS PRN
Status: DISCONTINUED | OUTPATIENT
Start: 2019-09-22 | End: 2019-10-01 | Stop reason: HOSPADM

## 2019-09-22 RX ORDER — IPRATROPIUM BROMIDE AND ALBUTEROL SULFATE 2.5; .5 MG/3ML; MG/3ML
3 SOLUTION RESPIRATORY (INHALATION)
Status: DISCONTINUED | OUTPATIENT
Start: 2019-09-22 | End: 2019-09-27

## 2019-09-22 RX ORDER — BUMETANIDE 1 MG/1
1 TABLET ORAL DAILY
COMMUNITY
End: 2019-10-01 | Stop reason: HOSPADM

## 2019-09-22 RX ORDER — INSULIN GLARGINE 100 [IU]/ML
10 INJECTION, SOLUTION SUBCUTANEOUS NIGHTLY
COMMUNITY
End: 2019-10-01 | Stop reason: HOSPADM

## 2019-09-22 RX ORDER — SODIUM CHLORIDE 450 MG/100ML
250 INJECTION, SOLUTION INTRAVENOUS CONTINUOUS PRN
Status: DISCONTINUED | OUTPATIENT
Start: 2019-09-22 | End: 2019-09-23

## 2019-09-22 RX ORDER — ETOMIDATE 2 MG/ML
18 INJECTION INTRAVENOUS ONCE
Status: COMPLETED | OUTPATIENT
Start: 2019-09-22 | End: 2019-09-22

## 2019-09-22 RX ORDER — ACETAMINOPHEN 325 MG/1
650 TABLET ORAL EVERY 4 HOURS PRN
Status: DISCONTINUED | OUTPATIENT
Start: 2019-09-22 | End: 2019-10-01 | Stop reason: HOSPADM

## 2019-09-22 RX ORDER — ACETAMINOPHEN 650 MG/1
650 SUPPOSITORY RECTAL ONCE
Status: COMPLETED | OUTPATIENT
Start: 2019-09-22 | End: 2019-09-22

## 2019-09-22 RX ORDER — SUCCINYLCHOLINE CHLORIDE 20 MG/ML
80 INJECTION INTRAMUSCULAR; INTRAVENOUS ONCE
Status: COMPLETED | OUTPATIENT
Start: 2019-09-22 | End: 2019-09-22

## 2019-09-22 RX ORDER — HYDRALAZINE HYDROCHLORIDE 25 MG/1
25 TABLET, FILM COATED ORAL 3 TIMES DAILY
COMMUNITY
End: 2019-10-01 | Stop reason: HOSPADM

## 2019-09-22 RX ADMIN — ACETAMINOPHEN 650 MG: 650 SUPPOSITORY RECTAL at 09:23

## 2019-09-22 RX ADMIN — PANTOPRAZOLE SODIUM 80 MG: 40 INJECTION, POWDER, FOR SOLUTION INTRAVENOUS at 10:49

## 2019-09-22 RX ADMIN — NICARDIPINE HYDROCHLORIDE 5 MG/HR: 0.2 INJECTION, SOLUTION INTRAVENOUS at 20:17

## 2019-09-22 RX ADMIN — DEXAMETHASONE SODIUM PHOSPHATE 4 MG: 4 INJECTION, SOLUTION INTRAMUSCULAR; INTRAVENOUS at 13:27

## 2019-09-22 RX ADMIN — PROPOFOL 5 MCG/KG/MIN: 10 INJECTION, EMULSION INTRAVENOUS at 20:31

## 2019-09-22 RX ADMIN — SODIUM CHLORIDE 2000 ML: 9 INJECTION, SOLUTION INTRAVENOUS at 16:49

## 2019-09-22 RX ADMIN — SODIUM CHLORIDE 1857 ML: 9 INJECTION, SOLUTION INTRAVENOUS at 09:23

## 2019-09-22 RX ADMIN — SODIUM CHLORIDE 6 UNITS/HR: 9 INJECTION, SOLUTION INTRAVENOUS at 17:40

## 2019-09-22 RX ADMIN — HUMAN INSULIN 16 UNITS: 100 INJECTION, SOLUTION SUBCUTANEOUS at 09:31

## 2019-09-22 RX ADMIN — POTASSIUM CHLORIDE, DEXTROSE MONOHYDRATE AND SODIUM CHLORIDE 250 ML/HR: 150; 5; 450 INJECTION, SOLUTION INTRAVENOUS at 21:04

## 2019-09-22 RX ADMIN — SUCCINYLCHOLINE CHLORIDE 80 MG: 20 INJECTION, SOLUTION INTRAMUSCULAR; INTRAVENOUS at 09:15

## 2019-09-22 RX ADMIN — ETOMIDATE 18 MG: 2 INJECTION, SOLUTION INTRAVENOUS at 09:14

## 2019-09-22 RX ADMIN — VECURONIUM BROMIDE 0.6 MG: 1 INJECTION, POWDER, LYOPHILIZED, FOR SOLUTION INTRAVENOUS at 09:13

## 2019-09-22 RX ADMIN — POTASSIUM CHLORIDE AND SODIUM CHLORIDE 250 ML/HR: 450; 150 INJECTION, SOLUTION INTRAVENOUS at 20:04

## 2019-09-22 RX ADMIN — VANCOMYCIN HYDROCHLORIDE 1250 MG: 5 INJECTION, POWDER, LYOPHILIZED, FOR SOLUTION INTRAVENOUS at 10:00

## 2019-09-22 RX ADMIN — PROPOFOL 1000 MG: 10 INJECTION, EMULSION INTRAVENOUS at 15:29

## 2019-09-22 RX ADMIN — DOXYCYCLINE 100 MG: 100 INJECTION, POWDER, LYOPHILIZED, FOR SOLUTION INTRAVENOUS at 18:07

## 2019-09-22 RX ADMIN — SODIUM CHLORIDE 1000 MG: 9 INJECTION, SOLUTION INTRAVENOUS at 12:52

## 2019-09-22 RX ADMIN — POTASSIUM CHLORIDE, DEXTROSE MONOHYDRATE AND SODIUM CHLORIDE 250 ML/HR: 300; 5; 450 INJECTION, SOLUTION INTRAVENOUS at 22:17

## 2019-09-22 RX ADMIN — NALOXONE HYDROCHLORIDE 2 MG: 1 INJECTION PARENTERAL at 08:52

## 2019-09-22 RX ADMIN — TAZOBACTAM SODIUM AND PIPERACILLIN SODIUM 3.38 G: 375; 3 INJECTION, SOLUTION INTRAVENOUS at 18:08

## 2019-09-22 RX ADMIN — POTASSIUM PHOSPHATE, MONOBASIC AND POTASSIUM PHOSPHATE, DIBASIC 15 MMOL: 224; 236 INJECTION, SOLUTION, CONCENTRATE INTRAVENOUS at 22:14

## 2019-09-22 RX ADMIN — IPRATROPIUM BROMIDE AND ALBUTEROL SULFATE 3 ML: 2.5; .5 SOLUTION RESPIRATORY (INHALATION) at 21:04

## 2019-09-22 RX ADMIN — CHLORHEXIDINE GLUCONATE 15 ML: 1.2 RINSE ORAL at 20:17

## 2019-09-22 RX ADMIN — PANTOPRAZOLE SODIUM 8 MG/HR: 40 INJECTION, POWDER, FOR SOLUTION INTRAVENOUS at 11:01

## 2019-09-22 RX ADMIN — NICARDIPINE HYDROCHLORIDE 5 MG/HR: 0.2 INJECTION, SOLUTION INTRAVENOUS at 15:30

## 2019-09-22 RX ADMIN — PIPERACILLIN AND TAZOBACTAM 4.5 G: 4; .5 INJECTION, POWDER, LYOPHILIZED, FOR SOLUTION INTRAVENOUS; PARENTERAL at 09:30

## 2019-09-22 RX ADMIN — PROPOFOL 5 MCG/KG/MIN: 10 INJECTION, EMULSION INTRAVENOUS at 10:50

## 2019-09-22 RX ADMIN — SODIUM CHLORIDE 2.5 MG/HR: 9 INJECTION, SOLUTION INTRAVENOUS at 10:24

## 2019-09-22 RX ADMIN — PANTOPRAZOLE SODIUM 40 MG: 40 INJECTION, POWDER, FOR SOLUTION INTRAVENOUS at 20:17

## 2019-09-22 RX ADMIN — ACETAMINOPHEN 650 MG: 650 SUPPOSITORY RECTAL at 12:23

## 2019-09-22 NOTE — ED NOTES
Pt's son, Puneet, returns call.  Explained to son that [facility] crew is present in ED at this time to load pt for transfer to [place] [id number].  Son verbs understanding, agreeable to transfer.     Coral Souza RN  09/22/19 [id number]

## 2019-09-22 NOTE — ED NOTES
Called [facility], provider on the line with  at this time.      Katherine Vieira  09/22/19 [id number]

## 2019-09-22 NOTE — ED NOTES
Attempted to reach pt's son, Puneet, to obtain verbal consent to transfer to [facility] via helicopter with [facility].  Call forwards to voicemail, left message requesting return call.       Coral Souza RN  09/22/19 [id number]

## 2019-09-22 NOTE — ED NOTES
Called [facility] for transport to [facility], they excepted with 7 minute ETA.     Katherine Vieira  09/22/19 1349

## 2019-09-22 NOTE — ED PROVIDER NOTES
Subjective   74-year-old white female here for presented via EMS unresponsive.  Patient is unable to give any history.  When family arrives later, they give the history that the patient went to a cookout yesterday and was in her normal state of health.  This morning her son found her lying in bed unresponsive.  She had had episode of vomiting, with emesis on her bed close..  Her blood sugar read high at that time at home.  His son gave her insulin there.  In route via EMS, her glucose was 381 and she was febrile.  She remained unresponsive en route.  She does have a history of diabetes, hypertension, coronary artery disease, sepsis, and paroxysmal atrial fibrillation on chronic anticoagulation with Eliquis.            Review of Systems   All other systems reviewed and are negative.      Past Medical History:   Diagnosis Date   • Diabetes mellitus (CMS/[facility])    • Hypertension    • NSTEMI (non-ST elevated myocardial infarction) (CMS/[facility]) 01/2019   • Paroxysmal atrial fibrillation (CMS/[facility])    • Severe sepsis with septic shock (CMS/[facility])        Allergies   Allergen Reactions   • Sulfa Antibiotics GI Intolerance       History reviewed. No pertinent surgical history.    History reviewed. No pertinent family history.    Social History     Socioeconomic History   • Marital status:      Spouse name: Not on file   • Number of children: Not on file   • Years of education: Not on file   • Highest education level: Not on file   Tobacco Use   • Smoking status: Former Smoker   • Smokeless tobacco: Never Used   Substance and Sexual Activity   • Alcohol use: No     Frequency: Never   • Drug use: No           Objective   Physical Exam   Constitutional: She appears well-developed and well-nourished.   HENT:   Head: Normocephalic and atraumatic.   Mouth/Throat: Oropharynx is clear and moist.   Emesis noted in oropharynx, posterior pharynx down to the valleculae.   Eyes: Conjunctivae and EOM are normal. Pupils are equal, round,  and reactive to light.   Neck: Normal range of motion. Neck supple. No JVD present.   EJ distended, but no JVD.     Cardiovascular: Normal rate, regular rhythm and normal heart sounds. Exam reveals no gallop and no friction rub.   No murmur heard.  Pulmonary/Chest: Effort normal and breath sounds normal. No respiratory distress. She has no wheezes. She has no rhonchi. She has no rales.   Abdominal: Soft. Normal appearance. Bowel sounds are decreased. There is no tenderness.   Emesis was heme positive.   Musculoskeletal: She exhibits no edema.   Neurological: She is unresponsive. GCS eye subscore is 1. GCS verbal subscore is 1. GCS motor subscore is 1.   Cranial nerves-patient unable to cooperate with exam.  Pupils equally round reactive light accommodation.  Patient bites the tongue depressor on oral exam, but does not have a gag reflex.  She has slow rhythmic side-to-side movements.  She did not respond to pain, and made no verbal responses.   Skin: Skin is warm and dry.   Nursing note and vitals reviewed.      Intubation  Date/Time: 9/22/2019 1:11 PM  Performed by: Scooby Kingston MD  Authorized by: Scooby Kingston MD     Consent:     Consent obtained:  Emergent situation  Pre-procedure details:     Patient status:  Unresponsive    Mallampati score:  II    Pretreatment meds: Vecuronium, etomidate.    Paralytics:  Succinylcholine  Procedure details:     Preoxygenation:  Bag valve mask    CPR in progress: no      Intubation method:  Oral    Oral intubation technique:  Direct    Laryngoscope blade:  Mac 3    Tube size (mm):  8.0    Tube type:  Cuffed    Number of attempts:  1    Cricoid pressure: no      Tube visualized through cords: yes    Placement assessment:     ETT to lip:  21    Tube secured with:  ETT hannah    Breath sounds:  Equal and absent over the epigastrium    Placement verification: chest rise, condensation, CXR verification, direct visualization, equal breath sounds and ETCO2 detector      CXR  findings:  ETT in proper place  Post-procedure details:     Patient tolerance of procedure:  Tolerated well, no immediate complications  Central Line At Bedside  Date/Time: 9/22/2019 1:26 PM  Performed by: Scooby Kingston MD  Authorized by: Scooby Kingston MD     Consent:     Consent obtained:  Emergent situation  Pre-procedure details:     Hand hygiene: Hand hygiene performed prior to insertion      Sterile barrier technique: All elements of maximal sterile technique followed      Skin preparation:  2% chlorhexidine    Skin preparation agent: Skin preparation agent completely dried prior to procedure    Anesthesia (see MAR for exact dosages):     Anesthesia method:  Local infiltration    Local anesthetic:  Lidocaine 1% w/o epi  Procedure details:     Location:  R subclavian    Patient position:  Flat    Procedural supplies:  Triple lumen    Catheter size:  7 Fr    Landmarks identified: yes      Ultrasound guidance: no      Number of attempts:  1    Successful placement: yes    Post-procedure details:     Post-procedure:  Dressing applied and line sutured    Assessment:  Blood return through all ports, no pneumothorax on x-ray, free fluid flow and placement verified by x-ray    Patient tolerance of procedure:  Tolerated well, no immediate complications      Results for orders placed or performed during the hospital encounter of 09/22/19   Comprehensive Metabolic Panel   Result Value Ref Range    Glucose 616 (C) 65 - 99 mg/dL    BUN 43 (H) 8 - 23 mg/dL    Creatinine 1.92 (H) 0.57 - 1.00 mg/dL    Sodium 142 136 - 145 mmol/L    Potassium 3.7 3.5 - 5.2 mmol/L    Chloride 97 (L) 98 - 107 mmol/L    CO2 21.1 (L) 22.0 - 29.0 mmol/L    Calcium 9.9 8.6 - 10.5 mg/dL    Total Protein 8.7 (H) 6.0 - 8.5 g/dL    Albumin 5.00 3.50 - 5.20 g/dL    ALT (SGPT) 18 1 - 33 U/L    AST (SGOT) 19 1 - 32 U/L    Alkaline Phosphatase 174 (H) 39 - 117 U/L    Total Bilirubin 0.5 0.2 - 1.2 mg/dL    eGFR Non African Amer 25 (L) >60 mL/min/1.73     Globulin 3.7 gm/dL    A/G Ratio 1.4 g/dL    BUN/Creatinine Ratio 22.4 7.0 - 25.0    Anion Gap 23.9 (H) 5.0 - 15.0 mmol/L   aPTT   Result Value Ref Range    PTT 25.6 23.8 - 36.1 seconds   Protime-INR   Result Value Ref Range    Protime 13.1 11.0 - 15.4 Seconds    INR 0.95 0.90 - 1.10   Urinalysis With Culture If Indicated - Urine, Catheter   Result Value Ref Range    Color, UA Yellow Yellow, Straw    Appearance, UA Clear Clear    pH, UA 5.5 5.0 - 8.0    Specific Gravity, UA 1.025 1.005 - 1.030    Glucose, UA >=1000 mg/dL (3+) (A) Negative    Ketones, UA Trace (A) Negative    Bilirubin, UA Negative Negative    Blood, UA Small (1+) (A) Negative    Protein, UA >=300 mg/dL (3+) (A) Negative    Leuk Esterase, UA Negative Negative    Nitrite, UA Negative Negative    Urobilinogen, UA 0.2 E.U./dL 0.2 - 1.0 E.U./dL   Troponin   Result Value Ref Range    Troponin T 0.032 (C) 0.000 - 0.030 ng/mL   Lactic Acid, Plasma   Result Value Ref Range    Lactate 5.5 (C) 0.5 - 2.0 mmol/L   Urine Drug Screen - Urine, Clean Catch   Result Value Ref Range    Amphetamine Screen, Urine Negative Negative    Barbiturates Screen, Urine Negative Negative    Benzodiazepine Screen, Urine Negative Negative    Cocaine Screen, Urine Negative Negative    Methadone Screen, Urine Negative Negative    Opiate Screen Negative Negative    Phencyclidine (PCP), Urine Negative Negative    THC, Screen, Urine Negative Negative    6-ACETYL MORPHINE Negative Negative    Buprenorphine, Screen, Urine Negative Negative    Oxycodone Screen, Urine Negative Negative   CBC Auto Differential   Result Value Ref Range    WBC 14.45 (H) 3.40 - 10.80 10*3/mm3    RBC 5.89 (H) 3.77 - 5.28 10*6/mm3    Hemoglobin 16.2 (H) 12.0 - 15.9 g/dL    Hematocrit 48.8 (H) 34.0 - 46.6 %    MCV 82.9 79.0 - 97.0 fL    MCH 27.5 26.6 - 33.0 pg    MCHC 33.2 31.5 - 35.7 g/dL    RDW 15.2 12.3 - 15.4 %    RDW-SD 46.1 37.0 - 54.0 fl    MPV 12.3 (H) 6.0 - 12.0 fL    Platelets 276 140 - 450 10*3/mm3     Neutrophil % 86.6 (H) 42.7 - 76.0 %    Lymphocyte % 8.2 (L) 19.6 - 45.3 %    Monocyte % 4.9 (L) 5.0 - 12.0 %    Eosinophil % 0.1 (L) 0.3 - 6.2 %    Basophil % 0.1 0.0 - 1.5 %    Immature Grans % 0.1 0.0 - 0.5 %    Neutrophils, Absolute 12.51 (H) 1.70 - 7.00 10*3/mm3    Lymphocytes, Absolute 1.18 0.70 - 3.10 10*3/mm3    Monocytes, Absolute 0.71 0.10 - 0.90 10*3/mm3    Eosinophils, Absolute 0.01 0.00 - 0.40 10*3/mm3    Basophils, Absolute 0.02 0.00 - 0.20 10*3/mm3    Immature Grans, Absolute 0.02 0.00 - 0.05 10*3/mm3   Blood Gas, Arterial   Result Value Ref Range    Site Arterial: right tibial     Jose Luis's Test N/A     pH, Arterial 7.401 7.350 - 7.450 pH units    pCO2, Arterial 30.3 (L) 35.0 - 45.0 mm Hg    pO2, Arterial 137.3 (H) 80.0 - 100.0 mm Hg    HCO3, Arterial 18.4 (C) 22.0 - 26.0 mmol/L    Base Excess, Arterial -4.9 mmol/L    O2 Saturation, Arterial 98.8 90.0 - 100.0 %    Hemoglobin, Blood Gas 16.1 (H) 12 - 16 g/dL    Hematocrit, Blood Gas 47.0 37.0 - 47.0 %    Oxyhemoglobin 96.3 85 - 100 %    Methemoglobin 0.40 0.00 - 3.00 %    Carboxyhemoglobin 2.1 0 - 5 %    A-a Gradiant 102.3 0.0 - 300.0 mmHg    Temperature 98.6 C    Barometric Pressure for Blood Gas 733 mmHg    Modality Ventilator     FIO2 40 %    Ventilator Mode AC     Set Tidal Volume 450     Set Mech Resp Rate 16     PEEP 5    Urinalysis, Microscopic Only - Urine, Catheter   Result Value Ref Range    RBC, UA 0-2 None Seen, 0-2 /HPF    WBC, UA 6-12 (A) None Seen, 0-2 /HPF    Bacteria, UA 4+ (A) None Seen /HPF    Squamous Epithelial Cells, UA 0-2 None Seen, 0-2 /HPF    Hyaline Casts, UA None Seen None Seen /LPF    Methodology Automated Microscopy    Acetone   Result Value Ref Range    Acetone Negative Negative   Troponin   Result Value Ref Range    Troponin T 0.058 (C) 0.000 - 0.030 ng/mL   POC Glucose Once   Result Value Ref Range    Glucose 592 (C) 70 - 130 mg/dL   POC Glucose Once   Result Value Ref Range    Glucose 405 (C) 70 - 130 mg/dL   Type &  Screen   Result Value Ref Range    ABO Type A     RH type Positive     Antibody Screen Negative     T&S Expiration Date 9/25/2019 11:59:59 PM      Ct Abdomen Pelvis Without Contrast    Result Date: 9/22/2019  Narrative: CT Abdomen Pelvis WO INDICATION: Acute GI bleeding TECHNIQUE: CT of the abdomen and pelvis without contrast. Coronal and sagittal reconstructions were obtained.  Radiation dose reduction techniques included automated exposure control or exposure modulation based on body size. Radiation audit for number of CT and nuclear cardiology exams performed in the last year: 7.  COMPARISON: CT abdomen pelvis dated February 2019 FINDINGS: Visualized lung bases are unremarkable. Abdomen: Unenhanced images of the liver and gallbladder and spleen are unremarkable. There is fatty atrophy of the pancreas, but without acute abnormality. The kidneys and adrenal glands show no acute abnormality in the aorta is normal in caliber. There is no evidence of bowel obstruction. Pelvis:  There is moderate distention of the rectum with stool, but there is no hernia. There is no pelvic mass or adenopathy or abnormal fluid collection There is no acute bony abnormality.     Impression: Moderate distention of the rectum and the distal sigmoid by stool, question fecal impaction, otherwise unremarkable. Signer Name: Solo Maldonado MD  Signed: 9/22/2019 9:16 AM  [facility]: [email or web address]  [facility]    Xr Chest 1 View    Result Date: 9/22/2019  Narrative: CR Chest 1 Vw INDICATION: Respiratory distress, intubation COMPARISON:  2/18/2019 FINDINGS: Single portable AP view(s) of the chest. ET tube present, tip 2 cm above the issa. Right subclavian central line, tip lower SVC. NG tube present, tip in the distal stomach. There is no consolidation or effusion or pneumothorax. Heart size normal.     Impression: Life-support tubes and lines as above, no acute abnormality. Signer Name: Solo Maldonado MD   Signed: 9/22/2019 10:18 AM  Workstation Name: [email or web address]  [facility]    Ct Head Without Contrast Stroke Protocol    Result Date: 9/22/2019  Narrative: CT Head WO Code Stroke: 9/22/2019 10:06 AM HISTORY: Acute onset of fixed focal neurologic deficit. TECHNIQUE: Axial unenhanced head CT. Radiation dose reduction techniques included automated exposure control or exposure modulation based on body size. Radiation audit for number of CT and nuclear cardiology exams performed in the last year: 7.  COMPARISON: Head CT January 2019 FINDINGS: No intracranial hemorrhage, mass, or infarct. No hydrocephalus or extra-axial fluid collection. There are senescent changes, including volume loss and nonspecific white matter change, but no acute abnormality is seen. The skull base, calvarium, and extracranial soft tissues are normal, except for a right maxillary sinus air-fluid levels suggesting sinusitis.     Impression: Senescent changes without acute intracranial abnormality. Right maxillary sinus air-fluid levels suggesting sinusitis Signer Name: Solo Maldonado MD  Signed: 9/22/2019 9:13 AM  Workstation Name: [email or web address]  [facility]             ED Course  ED Course as of Sep 22 1329   Sun Sep 22, 2019   1239 Discussed patient's condition at length with her son.  Have reviewed the results of work-up.  She is a full code, and the son understands the grave nature of her illness and guarded prognosis.   Currently in the ER, she is doing well on the ventilator.  Her blood pressure is improved.  She has received antibiotics.  Her temperature is decreased, but still febrile.  She has had no further active GI bleeding, but have avoided use of NSAIDs for her temperature for this reason.  She remains unresponsive on the ventilator.  She is currently receiving sedation, due to biting her ET tube, but she remained unresponsive throughout her ER stay prior to starting her sedation.  Have  held off on LP at this time due to anticoagulation and will defer to neurology.  She continues to have the eye movements as described above.  There is concerned that this is possibly due to status epilepticus, although the patient has no previous seizure history.  Have given loading dose of Keppra.  Discussed with Dr. Loza, neurology at [facility].  He agrees to accept the patient in transfer.  Currently awaiting bed availability.  [BC]   1304 Also spoke with Dr. Gifford[facility] and reviewed the case.  [BC]      ED Course User Index  [BC] Scooby Kingston MD      SEPTIC SHOCK FOCUSED EXAM ATTESTATION    I attest that I have reassessed tissue perfusion after the fluid bolus given.    Scooby Kingston MD  09/22/19  1:29 PM            MDM  Number of Diagnoses or Management Options  [place] coma scale total score 3-8, at arrival to emergency department (CMS/[facility]):   Hypertensive urgency:   Insulin dependent type 2 diabetes mellitus, uncontrolled (CMS/HCC):   Severe sepsis (CMS/HCC):   Upper GI bleeding:   Urinary tract infection without hematuria, site unspecified:      Amount and/or Complexity of Data Reviewed  Clinical lab tests: reviewed  Tests in the radiology section of CPT®: reviewed  Tests in the medicine section of CPT®: reviewed  Decide to obtain previous medical records or to obtain history from someone other than the patient: yes    Risk of Complications, Morbidity, and/or Mortality  Presenting problems: high  Diagnostic procedures: high  Management options: high    Critical Care  Total time providing critical care: 30-74 minutes (60)      Final diagnoses:   Raymond coma scale total score 3-8, at arrival to emergency department (CMS/HCC)   Hypertensive urgency   Upper GI bleeding   Severe sepsis (CMS/HCC)   Insulin dependent type 2 diabetes mellitus, uncontrolled (CMS/[facility])   Urinary tract infection without hematuria, site unspecified               Scooby Kingston MD  09/22/19 [id number]

## 2019-09-22 NOTE — ED NOTES
Patient is being transporting onto the helicopter stretcher at this time. Unable to get blood work.      Velma Reyes  09/22/19 7312

## 2019-09-23 ENCOUNTER — APPOINTMENT (OUTPATIENT)
Dept: CARDIOLOGY | Facility: HOSPITAL | Age: 74
End: 2019-09-23

## 2019-09-23 ENCOUNTER — APPOINTMENT (OUTPATIENT)
Dept: GENERAL RADIOLOGY | Facility: HOSPITAL | Age: 74
End: 2019-09-23

## 2019-09-23 ENCOUNTER — APPOINTMENT (OUTPATIENT)
Dept: MRI IMAGING | Facility: HOSPITAL | Age: 74
End: 2019-09-23

## 2019-09-23 LAB
ALBUMIN SERPL-MCNC: 3.4 G/DL (ref 3.5–5.2)
ALP SERPL-CCNC: 77 U/L (ref 39–117)
ALT SERPL W P-5'-P-CCNC: 13 U/L (ref 1–33)
AMMONIA BLD-SCNC: 26 UMOL/L (ref 11–51)
ANION GAP SERPL CALCULATED.3IONS-SCNC: 11 MMOL/L (ref 5–15)
ANION GAP SERPL CALCULATED.3IONS-SCNC: 14 MMOL/L (ref 5–15)
ANION GAP SERPL CALCULATED.3IONS-SCNC: 14 MMOL/L (ref 5–15)
ANION GAP SERPL CALCULATED.3IONS-SCNC: 15 MMOL/L (ref 5–15)
ARTERIAL PATENCY WRIST A: ABNORMAL
AST SERPL-CCNC: 20 U/L (ref 1–32)
ATMOSPHERIC PRESS: ABNORMAL MM[HG]
BASE EXCESS BLDA CALC-SCNC: -7.3 MMOL/L (ref 0–2)
BASOPHILS # BLD AUTO: 0.03 10*3/MM3 (ref 0–0.2)
BASOPHILS NFR BLD AUTO: 0.2 % (ref 0–1.5)
BDY SITE: ABNORMAL
BILIRUB SERPL-MCNC: 0.6 MG/DL (ref 0.2–1.2)
BODY TEMPERATURE: 98.6 C
BUN BLD-MCNC: 30 MG/DL (ref 8–23)
BUN BLD-MCNC: 30 MG/DL (ref 8–23)
BUN BLD-MCNC: 32 MG/DL (ref 8–23)
BUN BLD-MCNC: 33 MG/DL (ref 8–23)
BUN/CREAT SERPL: 18.5 (ref 7–25)
BUN/CREAT SERPL: 19.3 (ref 7–25)
BUN/CREAT SERPL: 20.3 (ref 7–25)
CALCIUM SPEC-SCNC: 7.7 MG/DL (ref 8.6–10.5)
CALCIUM SPEC-SCNC: 7.8 MG/DL (ref 8.6–10.5)
CALCIUM SPEC-SCNC: 7.8 MG/DL (ref 8.6–10.5)
CALCIUM SPEC-SCNC: 8.1 MG/DL (ref 8.6–10.5)
CHLORIDE SERPL-SCNC: 113 MMOL/L (ref 98–107)
CHLORIDE SERPL-SCNC: 114 MMOL/L (ref 98–107)
CHOLEST SERPL-MCNC: 74 MG/DL (ref 0–200)
CO2 BLDA-SCNC: 16.7 MMOL/L (ref 23–27)
CO2 SERPL-SCNC: 15 MMOL/L (ref 22–29)
CO2 SERPL-SCNC: 16 MMOL/L (ref 22–29)
CO2 SERPL-SCNC: 17 MMOL/L (ref 22–29)
CO2 SERPL-SCNC: 18 MMOL/L (ref 22–29)
COHGB MFR BLD: 1.1 % (ref 0–2)
CORTIS SERPL-MCNC: 19.67 MCG/DL
CREAT BLD-MCNC: 1.58 MG/DL (ref 0.57–1)
CREAT BLD-MCNC: 1.62 MG/DL (ref 0.57–1)
CREAT BLD-MCNC: 1.62 MG/DL (ref 0.57–1)
CREAT BLD-MCNC: 1.71 MG/DL (ref 0.57–1)
CRP SERPL-MCNC: 6.19 MG/DL (ref 0–0.5)
DEPRECATED RDW RBC AUTO: 47.8 FL (ref 37–54)
EOSINOPHIL # BLD AUTO: 0.02 10*3/MM3 (ref 0–0.4)
EOSINOPHIL NFR BLD AUTO: 0.1 % (ref 0.3–6.2)
ERYTHROCYTE [DISTWIDTH] IN BLOOD BY AUTOMATED COUNT: 15.1 % (ref 12.3–15.4)
GFR SERPL CREATININE-BSD FRML MDRD: 29 ML/MIN/1.73
GFR SERPL CREATININE-BSD FRML MDRD: 31 ML/MIN/1.73
GFR SERPL CREATININE-BSD FRML MDRD: 32 ML/MIN/1.73
GLUCOSE BLD-MCNC: 135 MG/DL (ref 65–99)
GLUCOSE BLD-MCNC: 146 MG/DL (ref 65–99)
GLUCOSE BLD-MCNC: 149 MG/DL (ref 65–99)
GLUCOSE BLD-MCNC: 263 MG/DL (ref 65–99)
GLUCOSE BLDC GLUCOMTR-MCNC: 122 MG/DL (ref 70–130)
GLUCOSE BLDC GLUCOMTR-MCNC: 124 MG/DL (ref 70–130)
GLUCOSE BLDC GLUCOMTR-MCNC: 124 MG/DL (ref 70–130)
GLUCOSE BLDC GLUCOMTR-MCNC: 127 MG/DL (ref 70–130)
GLUCOSE BLDC GLUCOMTR-MCNC: 129 MG/DL (ref 70–130)
GLUCOSE BLDC GLUCOMTR-MCNC: 135 MG/DL (ref 70–130)
GLUCOSE BLDC GLUCOMTR-MCNC: 144 MG/DL (ref 70–130)
GLUCOSE BLDC GLUCOMTR-MCNC: 149 MG/DL (ref 70–130)
GLUCOSE BLDC GLUCOMTR-MCNC: 181 MG/DL (ref 70–130)
GLUCOSE BLDC GLUCOMTR-MCNC: 185 MG/DL (ref 70–130)
GLUCOSE BLDC GLUCOMTR-MCNC: 188 MG/DL (ref 70–130)
GLUCOSE BLDC GLUCOMTR-MCNC: 193 MG/DL (ref 70–130)
GLUCOSE BLDC GLUCOMTR-MCNC: 218 MG/DL (ref 70–130)
GLUCOSE BLDC GLUCOMTR-MCNC: 234 MG/DL (ref 70–130)
GLUCOSE BLDC GLUCOMTR-MCNC: 267 MG/DL (ref 70–130)
GLUCOSE BLDC GLUCOMTR-MCNC: 272 MG/DL (ref 70–130)
GLUCOSE BLDC GLUCOMTR-MCNC: 282 MG/DL (ref 70–130)
GLUCOSE BLDC GLUCOMTR-MCNC: 370 MG/DL (ref 70–130)
GLUCOSE BLDC GLUCOMTR-MCNC: 96 MG/DL (ref 70–130)
HCO3 BLDA-SCNC: 15.9 MMOL/L (ref 20–26)
HCT VFR BLD AUTO: 36.8 % (ref 34–46.6)
HCT VFR BLD AUTO: 36.9 % (ref 34–46.6)
HCT VFR BLD AUTO: 36.9 % (ref 34–46.6)
HCT VFR BLD CALC: 37.9 %
HGB BLD-MCNC: 11.8 G/DL (ref 12–15.9)
HGB BLDA-MCNC: 12.4 G/DL (ref 14–18)
HOROWITZ INDEX BLD+IHG-RTO: 30 %
IMM GRANULOCYTES # BLD AUTO: 0.07 10*3/MM3 (ref 0–0.05)
IMM GRANULOCYTES NFR BLD AUTO: 0.5 % (ref 0–0.5)
LYMPHOCYTES # BLD AUTO: 1.49 10*3/MM3 (ref 0.7–3.1)
LYMPHOCYTES NFR BLD AUTO: 10.3 % (ref 19.6–45.3)
MAGNESIUM SERPL-MCNC: 1.3 MG/DL (ref 1.6–2.4)
MAGNESIUM SERPL-MCNC: 1.5 MG/DL (ref 1.6–2.4)
MAGNESIUM SERPL-MCNC: 1.6 MG/DL (ref 1.6–2.4)
MAGNESIUM SERPL-MCNC: 1.7 MG/DL (ref 1.6–2.4)
MCH RBC QN AUTO: 27.4 PG (ref 26.6–33)
MCHC RBC AUTO-ENTMCNC: 32 G/DL (ref 31.5–35.7)
MCV RBC AUTO: 85.8 FL (ref 79–97)
METHGB BLD QL: 0.8 % (ref 0–1.5)
MODALITY: ABNORMAL
MONOCYTES # BLD AUTO: 1.42 10*3/MM3 (ref 0.1–0.9)
MONOCYTES NFR BLD AUTO: 9.8 % (ref 5–12)
MRSA DNA SPEC QL NAA+PROBE: NEGATIVE
NEUTROPHILS # BLD AUTO: 11.47 10*3/MM3 (ref 1.7–7)
NEUTROPHILS NFR BLD AUTO: 79.1 % (ref 42.7–76)
NOTE: ABNORMAL
NRBC BLD AUTO-RTO: 0 /100 WBC (ref 0–0.2)
OXYHGB MFR BLDV: 95.5 % (ref 94–99)
PCO2 BLDA: 25.5 MM HG (ref 35–45)
PCO2 TEMP ADJ BLD: 25.5 MM HG (ref 35–45)
PEEP RESPIRATORY: 5 CM[H2O]
PH BLDA: 7.4 PH UNITS (ref 7.35–7.45)
PH, TEMP CORRECTED: 7.4 PH UNITS
PHOSPHATE SERPL-MCNC: 1.6 MG/DL (ref 2.5–4.5)
PHOSPHATE SERPL-MCNC: 1.6 MG/DL (ref 2.5–4.5)
PHOSPHATE SERPL-MCNC: 1.7 MG/DL (ref 2.5–4.5)
PHOSPHATE SERPL-MCNC: 2.3 MG/DL (ref 2.5–4.5)
PHOSPHATE SERPL-MCNC: 4.4 MG/DL (ref 2.5–4.5)
PLATELET # BLD AUTO: 181 10*3/MM3 (ref 140–450)
PMV BLD AUTO: 11.2 FL (ref 6–12)
PO2 BLDA: 79.5 MM HG (ref 83–108)
PO2 TEMP ADJ BLD: 79.5 MM HG (ref 83–108)
POTASSIUM BLD-SCNC: 4 MMOL/L (ref 3.5–5.2)
POTASSIUM BLD-SCNC: 4 MMOL/L (ref 3.5–5.2)
POTASSIUM BLD-SCNC: 4.1 MMOL/L (ref 3.5–5.2)
POTASSIUM BLD-SCNC: 4.2 MMOL/L (ref 3.5–5.2)
PREALB SERPL-MCNC: 10.6 MG/DL (ref 20–40)
PROT SERPL-MCNC: 5.7 G/DL (ref 6–8.5)
RBC # BLD AUTO: 4.3 10*6/MM3 (ref 3.77–5.28)
SODIUM BLD-SCNC: 142 MMOL/L (ref 136–145)
SODIUM BLD-SCNC: 143 MMOL/L (ref 136–145)
SODIUM BLD-SCNC: 144 MMOL/L (ref 136–145)
SODIUM BLD-SCNC: 144 MMOL/L (ref 136–145)
TRIGL SERPL-MCNC: 46 MG/DL (ref 0–150)
VANCOMYCIN SERPL-MCNC: 10.8 MCG/ML (ref 5–40)
VENTILATOR MODE: AC
VT ON VENT VENT: 350 ML
WBC NRBC COR # BLD: 14.5 10*3/MM3 (ref 3.4–10.8)

## 2019-09-23 PROCEDURE — 25010000002 PIPERACILLIN SOD-TAZOBACTAM PER 1 G: Performed by: NURSE PRACTITIONER

## 2019-09-23 PROCEDURE — 70551 MRI BRAIN STEM W/O DYE: CPT

## 2019-09-23 PROCEDURE — 84100 ASSAY OF PHOSPHORUS: CPT | Performed by: NURSE PRACTITIONER

## 2019-09-23 PROCEDURE — 63710000001 INSULIN REGULAR HUMAN PER 5 UNITS: Performed by: INTERNAL MEDICINE

## 2019-09-23 PROCEDURE — 84100 ASSAY OF PHOSPHORUS: CPT | Performed by: INTERNAL MEDICINE

## 2019-09-23 PROCEDURE — 94799 UNLISTED PULMONARY SVC/PX: CPT

## 2019-09-23 PROCEDURE — 84478 ASSAY OF TRIGLYCERIDES: CPT | Performed by: INTERNAL MEDICINE

## 2019-09-23 PROCEDURE — 99232 SBSQ HOSP IP/OBS MODERATE 35: CPT | Performed by: PSYCHIATRY & NEUROLOGY

## 2019-09-23 PROCEDURE — 94003 VENT MGMT INPAT SUBQ DAY: CPT

## 2019-09-23 PROCEDURE — 83735 ASSAY OF MAGNESIUM: CPT | Performed by: INTERNAL MEDICINE

## 2019-09-23 PROCEDURE — 25010000002 AMPICILLIN PER 500 MG

## 2019-09-23 PROCEDURE — 25010000002 MAGNESIUM SULFATE IN D5W 1G/100ML (PREMIX) 1-5 GM/100ML-% SOLUTION: Performed by: INTERNAL MEDICINE

## 2019-09-23 PROCEDURE — 93306 TTE W/DOPPLER COMPLETE: CPT | Performed by: INTERNAL MEDICINE

## 2019-09-23 PROCEDURE — 84134 ASSAY OF PREALBUMIN: CPT | Performed by: INTERNAL MEDICINE

## 2019-09-23 PROCEDURE — 82533 TOTAL CORTISOL: CPT | Performed by: INTERNAL MEDICINE

## 2019-09-23 PROCEDURE — 94770: CPT

## 2019-09-23 PROCEDURE — 85018 HEMOGLOBIN: CPT | Performed by: NURSE PRACTITIONER

## 2019-09-23 PROCEDURE — 99221 1ST HOSP IP/OBS SF/LOW 40: CPT | Performed by: PHYSICIAN ASSISTANT

## 2019-09-23 PROCEDURE — 83735 ASSAY OF MAGNESIUM: CPT | Performed by: NURSE PRACTITIONER

## 2019-09-23 PROCEDURE — 82465 ASSAY BLD/SERUM CHOLESTEROL: CPT | Performed by: INTERNAL MEDICINE

## 2019-09-23 PROCEDURE — 25010000002 VANCOMYCIN PER 500 MG

## 2019-09-23 PROCEDURE — 36600 WITHDRAWAL OF ARTERIAL BLOOD: CPT

## 2019-09-23 PROCEDURE — 85014 HEMATOCRIT: CPT | Performed by: NURSE PRACTITIONER

## 2019-09-23 PROCEDURE — 82140 ASSAY OF AMMONIA: CPT | Performed by: INTERNAL MEDICINE

## 2019-09-23 PROCEDURE — 25010000002 ACYCLOVIR PER 5 MG

## 2019-09-23 PROCEDURE — 80202 ASSAY OF VANCOMYCIN: CPT

## 2019-09-23 PROCEDURE — 25010000002 MAGNESIUM SULFATE IN D5W 1G/100ML (PREMIX) 1-5 GM/100ML-% SOLUTION: Performed by: NURSE PRACTITIONER

## 2019-09-23 PROCEDURE — 74018 RADEX ABDOMEN 1 VIEW: CPT

## 2019-09-23 PROCEDURE — 93306 TTE W/DOPPLER COMPLETE: CPT

## 2019-09-23 PROCEDURE — 85025 COMPLETE CBC W/AUTO DIFF WBC: CPT | Performed by: NURSE PRACTITIONER

## 2019-09-23 PROCEDURE — 86140 C-REACTIVE PROTEIN: CPT | Performed by: INTERNAL MEDICINE

## 2019-09-23 PROCEDURE — 25010000002 PROPOFOL 10 MG/ML EMULSION: Performed by: NURSE PRACTITIONER

## 2019-09-23 PROCEDURE — 87641 MR-STAPH DNA AMP PROBE: CPT

## 2019-09-23 PROCEDURE — 82805 BLOOD GASES W/O2 SATURATION: CPT

## 2019-09-23 PROCEDURE — 71045 X-RAY EXAM CHEST 1 VIEW: CPT

## 2019-09-23 PROCEDURE — 80053 COMPREHEN METABOLIC PANEL: CPT | Performed by: NURSE PRACTITIONER

## 2019-09-23 PROCEDURE — 99233 SBSQ HOSP IP/OBS HIGH 50: CPT | Performed by: INTERNAL MEDICINE

## 2019-09-23 PROCEDURE — 25010000002 CEFTRIAXONE PER 250 MG

## 2019-09-23 PROCEDURE — 82962 GLUCOSE BLOOD TEST: CPT

## 2019-09-23 RX ORDER — SODIUM CHLORIDE 9 MG/ML
75 INJECTION, SOLUTION INTRAVENOUS CONTINUOUS
Status: DISCONTINUED | OUTPATIENT
Start: 2019-09-23 | End: 2019-09-26

## 2019-09-23 RX ORDER — MAGNESIUM SULFATE HEPTAHYDRATE 40 MG/ML
2 INJECTION, SOLUTION INTRAVENOUS AS NEEDED
Status: DISCONTINUED | OUTPATIENT
Start: 2019-09-23 | End: 2019-09-29

## 2019-09-23 RX ORDER — MAGNESIUM SULFATE HEPTAHYDRATE 40 MG/ML
4 INJECTION, SOLUTION INTRAVENOUS AS NEEDED
Status: DISCONTINUED | OUTPATIENT
Start: 2019-09-23 | End: 2019-09-29

## 2019-09-23 RX ORDER — MAGNESIUM SULFATE 1 G/100ML
1 INJECTION INTRAVENOUS ONCE
Status: COMPLETED | OUTPATIENT
Start: 2019-09-23 | End: 2019-09-23

## 2019-09-23 RX ORDER — MAGNESIUM SULFATE 1 G/100ML
1 INJECTION INTRAVENOUS AS NEEDED
Status: DISCONTINUED | OUTPATIENT
Start: 2019-09-23 | End: 2019-09-29

## 2019-09-23 RX ADMIN — DOXYCYCLINE 100 MG: 100 INJECTION, POWDER, LYOPHILIZED, FOR SOLUTION INTRAVENOUS at 16:23

## 2019-09-23 RX ADMIN — IPRATROPIUM BROMIDE AND ALBUTEROL SULFATE 3 ML: 2.5; .5 SOLUTION RESPIRATORY (INHALATION) at 15:29

## 2019-09-23 RX ADMIN — PANTOPRAZOLE SODIUM 40 MG: 40 INJECTION, POWDER, FOR SOLUTION INTRAVENOUS at 08:28

## 2019-09-23 RX ADMIN — SODIUM CHLORIDE 2.8 UNITS/HR: 9 INJECTION, SOLUTION INTRAVENOUS at 11:13

## 2019-09-23 RX ADMIN — MAGNESIUM SULFATE HEPTAHYDRATE 1 G: 1 INJECTION, SOLUTION INTRAVENOUS at 14:57

## 2019-09-23 RX ADMIN — SODIUM CHLORIDE 75 ML/HR: 9 INJECTION, SOLUTION INTRAVENOUS at 10:19

## 2019-09-23 RX ADMIN — MAGNESIUM SULFATE HEPTAHYDRATE 1 G: 1 INJECTION, SOLUTION INTRAVENOUS at 13:35

## 2019-09-23 RX ADMIN — TAZOBACTAM SODIUM AND PIPERACILLIN SODIUM 3.38 G: 375; 3 INJECTION, SOLUTION INTRAVENOUS at 04:22

## 2019-09-23 RX ADMIN — TAZOBACTAM SODIUM AND PIPERACILLIN SODIUM 3.38 G: 375; 3 INJECTION, SOLUTION INTRAVENOUS at 17:49

## 2019-09-23 RX ADMIN — MAGNESIUM SULFATE HEPTAHYDRATE 1 G: 1 INJECTION, SOLUTION INTRAVENOUS at 02:01

## 2019-09-23 RX ADMIN — VANCOMYCIN HYDROCHLORIDE 500 MG: 500 INJECTION, POWDER, LYOPHILIZED, FOR SOLUTION INTRAVENOUS at 10:19

## 2019-09-23 RX ADMIN — AMPICILLIN SODIUM 2 G: 2 INJECTION, POWDER, FOR SOLUTION INTRAMUSCULAR; INTRAVENOUS at 20:46

## 2019-09-23 RX ADMIN — IPRATROPIUM BROMIDE AND ALBUTEROL SULFATE 3 ML: 2.5; .5 SOLUTION RESPIRATORY (INHALATION) at 21:21

## 2019-09-23 RX ADMIN — SODIUM CHLORIDE, PRESERVATIVE FREE 10 ML: 5 INJECTION INTRAVENOUS at 20:41

## 2019-09-23 RX ADMIN — POTASSIUM CHLORIDE AND SODIUM CHLORIDE 250 ML/HR: 450; 150 INJECTION, SOLUTION INTRAVENOUS at 02:00

## 2019-09-23 RX ADMIN — CHLORHEXIDINE GLUCONATE 15 ML: 1.2 RINSE ORAL at 08:28

## 2019-09-23 RX ADMIN — NICARDIPINE HYDROCHLORIDE 5 MG/HR: 0.2 INJECTION, SOLUTION INTRAVENOUS at 04:55

## 2019-09-23 RX ADMIN — DOXYCYCLINE 100 MG: 100 INJECTION, POWDER, LYOPHILIZED, FOR SOLUTION INTRAVENOUS at 06:06

## 2019-09-23 RX ADMIN — CHLORHEXIDINE GLUCONATE 15 ML: 1.2 RINSE ORAL at 20:46

## 2019-09-23 RX ADMIN — ACYCLOVIR SODIUM 455 MG: 50 INJECTION, SOLUTION INTRAVENOUS at 20:46

## 2019-09-23 RX ADMIN — PANTOPRAZOLE SODIUM 40 MG: 40 INJECTION, POWDER, FOR SOLUTION INTRAVENOUS at 20:41

## 2019-09-23 RX ADMIN — IPRATROPIUM BROMIDE AND ALBUTEROL SULFATE 3 ML: 2.5; .5 SOLUTION RESPIRATORY (INHALATION) at 07:04

## 2019-09-23 RX ADMIN — SODIUM CHLORIDE, PRESERVATIVE FREE 10 ML: 5 INJECTION INTRAVENOUS at 08:28

## 2019-09-23 RX ADMIN — PROPOFOL 5 MCG/KG/MIN: 10 INJECTION, EMULSION INTRAVENOUS at 18:26

## 2019-09-23 RX ADMIN — CEFTRIAXONE 2 G: 2 INJECTION, POWDER, FOR SOLUTION INTRAMUSCULAR; INTRAVENOUS at 20:46

## 2019-09-23 RX ADMIN — IPRATROPIUM BROMIDE AND ALBUTEROL SULFATE 3 ML: 2.5; .5 SOLUTION RESPIRATORY (INHALATION) at 12:43

## 2019-09-23 RX ADMIN — MAGNESIUM SULFATE HEPTAHYDRATE 1 G: 1 INJECTION, SOLUTION INTRAVENOUS at 12:37

## 2019-09-23 RX ADMIN — TAZOBACTAM SODIUM AND PIPERACILLIN SODIUM 3.38 G: 375; 3 INJECTION, SOLUTION INTRAVENOUS at 10:19

## 2019-09-23 RX ADMIN — POTASSIUM CHLORIDE, DEXTROSE MONOHYDRATE AND SODIUM CHLORIDE 250 ML/HR: 150; 5; 450 INJECTION, SOLUTION INTRAVENOUS at 05:02

## 2019-09-23 RX ADMIN — SODIUM PHOSPHATE, MONOBASIC, MONOHYDRATE 15 MMOL: 276; 142 INJECTION, SOLUTION INTRAVENOUS at 12:38

## 2019-09-24 ENCOUNTER — APPOINTMENT (OUTPATIENT)
Dept: GENERAL RADIOLOGY | Facility: HOSPITAL | Age: 74
End: 2019-09-24

## 2019-09-24 LAB
ANION GAP SERPL CALCULATED.3IONS-SCNC: 14 MMOL/L (ref 5–15)
ARTERIAL PATENCY WRIST A: ABNORMAL
ATMOSPHERIC PRESS: ABNORMAL MM[HG]
BACTERIA SPEC AEROBE CULT: ABNORMAL
BASE EXCESS BLDA CALC-SCNC: -8.8 MMOL/L (ref 0–2)
BASOPHILS # BLD AUTO: 0.02 10*3/MM3 (ref 0–0.2)
BASOPHILS NFR BLD AUTO: 0.2 % (ref 0–1.5)
BDY SITE: ABNORMAL
BH CV ECHO MEAS - AO MAX PG (FULL): 7.1 MMHG
BH CV ECHO MEAS - AO MAX PG: 14 MMHG
BH CV ECHO MEAS - AO MEAN PG (FULL): 3.6 MMHG
BH CV ECHO MEAS - AO MEAN PG: 7.2 MMHG
BH CV ECHO MEAS - AO ROOT AREA (BSA CORRECTED): 1.8
BH CV ECHO MEAS - AO ROOT AREA: 6.6 CM^2
BH CV ECHO MEAS - AO ROOT DIAM: 2.9 CM
BH CV ECHO MEAS - AO V2 MAX: 187.3 CM/SEC
BH CV ECHO MEAS - AO V2 MEAN: 124.3 CM/SEC
BH CV ECHO MEAS - AO V2 VTI: 40.7 CM
BH CV ECHO MEAS - ASC AORTA: 2.9 CM
BH CV ECHO MEAS - AVA(I,A): 2.4 CM^2
BH CV ECHO MEAS - AVA(I,D): 2.4 CM^2
BH CV ECHO MEAS - AVA(V,A): 2.2 CM^2
BH CV ECHO MEAS - AVA(V,D): 2.2 CM^2
BH CV ECHO MEAS - BSA(HAYCOCK): 1.6 M^2
BH CV ECHO MEAS - BSA: 1.6 M^2
BH CV ECHO MEAS - BZI_BMI: 26.2 KILOGRAMS/M^2
BH CV ECHO MEAS - BZI_METRIC_HEIGHT: 152.4 CM
BH CV ECHO MEAS - BZI_METRIC_WEIGHT: 60.8 KG
BH CV ECHO MEAS - EDV(CUBED): 83.5 ML
BH CV ECHO MEAS - EDV(TEICH): 86.3 ML
BH CV ECHO MEAS - EF(CUBED): 80.5 %
BH CV ECHO MEAS - EF(TEICH): 73.2 %
BH CV ECHO MEAS - ESV(CUBED): 16.3 ML
BH CV ECHO MEAS - ESV(TEICH): 23.1 ML
BH CV ECHO MEAS - FS: 42 %
BH CV ECHO MEAS - IVS/LVPW: 0.9
BH CV ECHO MEAS - IVSD: 1.1 CM
BH CV ECHO MEAS - LA DIMENSION: 4.1 CM
BH CV ECHO MEAS - LA/AO: 1.4
BH CV ECHO MEAS - LAD MAJOR: 5.6 CM
BH CV ECHO MEAS - LAT PEAK E' VEL: 5.6 CM/SEC
BH CV ECHO MEAS - LATERAL E/E' RATIO: 19.3
BH CV ECHO MEAS - LV MASS(C)D: 188.5 GRAMS
BH CV ECHO MEAS - LV MASS(C)DI: 119.7 GRAMS/M^2
BH CV ECHO MEAS - LV MAX PG: 7 MMHG
BH CV ECHO MEAS - LV MEAN PG: 3.6 MMHG
BH CV ECHO MEAS - LV V1 MAX: 131.9 CM/SEC
BH CV ECHO MEAS - LV V1 MEAN: 87.2 CM/SEC
BH CV ECHO MEAS - LV V1 VTI: 31.3 CM
BH CV ECHO MEAS - LVIDD: 4.4 CM
BH CV ECHO MEAS - LVIDS: 2.5 CM
BH CV ECHO MEAS - LVOT AREA (M): 3.1 CM^2
BH CV ECHO MEAS - LVOT AREA: 3.2 CM^2
BH CV ECHO MEAS - LVOT DIAM: 2 CM
BH CV ECHO MEAS - LVPWD: 1.3 CM
BH CV ECHO MEAS - MED PEAK E' VEL: 5.5 CM/SEC
BH CV ECHO MEAS - MEDIAL E/E' RATIO: 19.7
BH CV ECHO MEAS - MV A MAX VEL: 79.4 CM/SEC
BH CV ECHO MEAS - MV DEC SLOPE: 392.6 CM/SEC^2
BH CV ECHO MEAS - MV DEC TIME: 0.17 SEC
BH CV ECHO MEAS - MV E MAX VEL: 110.3 CM/SEC
BH CV ECHO MEAS - MV E/A: 1.4
BH CV ECHO MEAS - MV MAX PG: 4.7 MMHG
BH CV ECHO MEAS - MV MEAN PG: 2.2 MMHG
BH CV ECHO MEAS - MV P1/2T MAX VEL: 115.4 CM/SEC
BH CV ECHO MEAS - MV P1/2T: 86.1 MSEC
BH CV ECHO MEAS - MV V2 MAX: 108.6 CM/SEC
BH CV ECHO MEAS - MV V2 MEAN: 67.4 CM/SEC
BH CV ECHO MEAS - MV V2 VTI: 35.1 CM
BH CV ECHO MEAS - MVA P1/2T LCG: 1.9 CM^2
BH CV ECHO MEAS - MVA(P1/2T): 2.6 CM^2
BH CV ECHO MEAS - MVA(VTI): 2.8 CM^2
BH CV ECHO MEAS - PA ACC SLOPE: 776.7 CM/SEC^2
BH CV ECHO MEAS - PA ACC TIME: 0.15 SEC
BH CV ECHO MEAS - PA MAX PG: 7.8 MMHG
BH CV ECHO MEAS - PA PR(ACCEL): 13.2 MMHG
BH CV ECHO MEAS - PA V2 MAX: 139.2 CM/SEC
BH CV ECHO MEAS - RAP SYSTOLE: 8 MMHG
BH CV ECHO MEAS - RVDD: 1.6 CM
BH CV ECHO MEAS - RVSP: 51 MMHG
BH CV ECHO MEAS - SI(AO): 171.5 ML/M^2
BH CV ECHO MEAS - SI(CUBED): 42.7 ML/M^2
BH CV ECHO MEAS - SI(LVOT): 62.8 ML/M^2
BH CV ECHO MEAS - SI(TEICH): 40.1 ML/M^2
BH CV ECHO MEAS - SV(AO): 270 ML
BH CV ECHO MEAS - SV(CUBED): 67.2 ML
BH CV ECHO MEAS - SV(LVOT): 98.8 ML
BH CV ECHO MEAS - SV(TEICH): 63.2 ML
BH CV ECHO MEAS - TAPSE (>1.6): 2.6 CM2
BH CV ECHO MEAS - TR MAX PG: 43 MMHG
BH CV ECHO MEAS - TR MAX VEL: 324.3 CM/SEC
BH CV ECHO MEAS - TV MAX PG: 1.6 MMHG
BH CV ECHO MEAS - TV V2 MAX: 62.5 CM/SEC
BH CV ECHO MEASUREMENTS AVERAGE E/E' RATIO: 19.87
BH CV VAS BP RIGHT ARM: NORMAL MMHG
BH CV XLRA - RV BASE: 3.2 CM
BH CV XLRA - RV LENGTH: 6.5 CM
BH CV XLRA - RV MID: 2.8 CM
BH CV XLRA - TDI S': 14.6 CM/SEC
BODY TEMPERATURE: 98.6 C
BUN BLD-MCNC: 33 MG/DL (ref 8–23)
BUN/CREAT SERPL: 18.4 (ref 7–25)
CALCIUM SPEC-SCNC: 7.8 MG/DL (ref 8.6–10.5)
CHLORIDE SERPL-SCNC: 112 MMOL/L (ref 98–107)
CO2 BLDA-SCNC: 16.3 MMOL/L (ref 23–27)
CO2 SERPL-SCNC: 15 MMOL/L (ref 22–29)
COHGB MFR BLD: 1.4 % (ref 0–2)
CREAT BLD-MCNC: 1.79 MG/DL (ref 0.57–1)
DEPRECATED RDW RBC AUTO: 50.6 FL (ref 37–54)
EOSINOPHIL # BLD AUTO: 0.01 10*3/MM3 (ref 0–0.4)
EOSINOPHIL NFR BLD AUTO: 0.1 % (ref 0.3–6.2)
ERYTHROCYTE [DISTWIDTH] IN BLOOD BY AUTOMATED COUNT: 15.6 % (ref 12.3–15.4)
GFR SERPL CREATININE-BSD FRML MDRD: 28 ML/MIN/1.73
GLUCOSE BLD-MCNC: 307 MG/DL (ref 65–99)
GLUCOSE BLDC GLUCOMTR-MCNC: 200 MG/DL (ref 70–130)
GLUCOSE BLDC GLUCOMTR-MCNC: 332 MG/DL (ref 70–130)
GLUCOSE BLDC GLUCOMTR-MCNC: 359 MG/DL (ref 70–130)
GLUCOSE BLDC GLUCOMTR-MCNC: 391 MG/DL (ref 70–130)
GLUCOSE BLDC GLUCOMTR-MCNC: 56 MG/DL (ref 70–130)
HCO3 BLDA-SCNC: 15.4 MMOL/L (ref 20–26)
HCT VFR BLD AUTO: 32.9 % (ref 34–46.6)
HCT VFR BLD CALC: 32.1 %
HGB BLD-MCNC: 10.4 G/DL (ref 12–15.9)
HGB BLDA-MCNC: 10.5 G/DL (ref 14–18)
HOROWITZ INDEX BLD+IHG-RTO: 30 %
IMM GRANULOCYTES # BLD AUTO: 0.05 10*3/MM3 (ref 0–0.05)
IMM GRANULOCYTES NFR BLD AUTO: 0.5 % (ref 0–0.5)
LEFT ATRIUM VOLUME INDEX: 37 ML/M2
LYMPHOCYTES # BLD AUTO: 1.46 10*3/MM3 (ref 0.7–3.1)
LYMPHOCYTES NFR BLD AUTO: 14.1 % (ref 19.6–45.3)
MAGNESIUM SERPL-MCNC: 2.4 MG/DL (ref 1.6–2.4)
MCH RBC QN AUTO: 27.7 PG (ref 26.6–33)
MCHC RBC AUTO-ENTMCNC: 31.6 G/DL (ref 31.5–35.7)
MCV RBC AUTO: 87.7 FL (ref 79–97)
METHGB BLD QL: 0.5 % (ref 0–1.5)
MODALITY: ABNORMAL
MONOCYTES # BLD AUTO: 1.1 10*3/MM3 (ref 0.1–0.9)
MONOCYTES NFR BLD AUTO: 10.6 % (ref 5–12)
NEUTROPHILS # BLD AUTO: 7.7 10*3/MM3 (ref 1.7–7)
NEUTROPHILS NFR BLD AUTO: 74.5 % (ref 42.7–76)
NOTE: ABNORMAL
NRBC BLD AUTO-RTO: 0 /100 WBC (ref 0–0.2)
OXYHGB MFR BLDV: 96.2 % (ref 94–99)
PCO2 BLDA: 27.6 MM HG (ref 35–45)
PCO2 TEMP ADJ BLD: 27.6 MM HG (ref 35–45)
PH BLDA: 7.36 PH UNITS (ref 7.35–7.45)
PH, TEMP CORRECTED: 7.36 PH UNITS
PHOSPHATE SERPL-MCNC: 4.2 MG/DL (ref 2.5–4.5)
PLATELET # BLD AUTO: 153 10*3/MM3 (ref 140–450)
PMV BLD AUTO: 12.3 FL (ref 6–12)
PO2 BLDA: 85.8 MM HG (ref 83–108)
PO2 TEMP ADJ BLD: 85.8 MM HG (ref 83–108)
POTASSIUM BLD-SCNC: 4.1 MMOL/L (ref 3.5–5.2)
RBC # BLD AUTO: 3.75 10*6/MM3 (ref 3.77–5.28)
SODIUM BLD-SCNC: 141 MMOL/L (ref 136–145)
VENTILATOR MODE: ABNORMAL
WBC NRBC COR # BLD: 10.34 10*3/MM3 (ref 3.4–10.8)

## 2019-09-24 PROCEDURE — 25010000002 ACYCLOVIR PER 5 MG

## 2019-09-24 PROCEDURE — 63710000001 INSULIN LISPRO (HUMAN) PER 5 UNITS: Performed by: NURSE PRACTITIONER

## 2019-09-24 PROCEDURE — 93005 ELECTROCARDIOGRAM TRACING: CPT | Performed by: NURSE PRACTITIONER

## 2019-09-24 PROCEDURE — 99233 SBSQ HOSP IP/OBS HIGH 50: CPT | Performed by: INTERNAL MEDICINE

## 2019-09-24 PROCEDURE — 36600 WITHDRAWAL OF ARTERIAL BLOOD: CPT

## 2019-09-24 PROCEDURE — 83735 ASSAY OF MAGNESIUM: CPT | Performed by: INTERNAL MEDICINE

## 2019-09-24 PROCEDURE — 93010 ELECTROCARDIOGRAM REPORT: CPT | Performed by: INTERNAL MEDICINE

## 2019-09-24 PROCEDURE — 84100 ASSAY OF PHOSPHORUS: CPT | Performed by: INTERNAL MEDICINE

## 2019-09-24 PROCEDURE — 94799 UNLISTED PULMONARY SVC/PX: CPT

## 2019-09-24 PROCEDURE — 25010000002 AMPICILLIN PER 500 MG

## 2019-09-24 PROCEDURE — 25010000002 PROPOFOL 10 MG/ML EMULSION: Performed by: NURSE PRACTITIONER

## 2019-09-24 PROCEDURE — 25010000002 PIPERACILLIN SOD-TAZOBACTAM PER 1 G: Performed by: INTERNAL MEDICINE

## 2019-09-24 PROCEDURE — 63710000001 INSULIN DETEMIR PER 5 UNITS: Performed by: INTERNAL MEDICINE

## 2019-09-24 PROCEDURE — 82962 GLUCOSE BLOOD TEST: CPT

## 2019-09-24 PROCEDURE — 80048 BASIC METABOLIC PNL TOTAL CA: CPT | Performed by: INTERNAL MEDICINE

## 2019-09-24 PROCEDURE — 94003 VENT MGMT INPAT SUBQ DAY: CPT

## 2019-09-24 PROCEDURE — 99232 SBSQ HOSP IP/OBS MODERATE 35: CPT | Performed by: PSYCHIATRY & NEUROLOGY

## 2019-09-24 PROCEDURE — 85025 COMPLETE CBC W/AUTO DIFF WBC: CPT | Performed by: INTERNAL MEDICINE

## 2019-09-24 PROCEDURE — 82805 BLOOD GASES W/O2 SATURATION: CPT

## 2019-09-24 PROCEDURE — 63710000001 INSULIN REGULAR HUMAN PER 5 UNITS: Performed by: NURSE PRACTITIONER

## 2019-09-24 PROCEDURE — 94770: CPT

## 2019-09-24 PROCEDURE — 71045 X-RAY EXAM CHEST 1 VIEW: CPT

## 2019-09-24 PROCEDURE — 25010000002 CEFTRIAXONE PER 250 MG

## 2019-09-24 RX ADMIN — PROPOFOL 10 MCG/KG/MIN: 10 INJECTION, EMULSION INTRAVENOUS at 06:29

## 2019-09-24 RX ADMIN — IPRATROPIUM BROMIDE AND ALBUTEROL SULFATE 3 ML: 2.5; .5 SOLUTION RESPIRATORY (INHALATION) at 20:45

## 2019-09-24 RX ADMIN — CHLORHEXIDINE GLUCONATE 15 ML: 1.2 RINSE ORAL at 08:14

## 2019-09-24 RX ADMIN — PANTOPRAZOLE SODIUM 40 MG: 40 INJECTION, POWDER, FOR SOLUTION INTRAVENOUS at 08:15

## 2019-09-24 RX ADMIN — INSULIN LISPRO 6 UNITS: 100 INJECTION, SOLUTION INTRAVENOUS; SUBCUTANEOUS at 00:16

## 2019-09-24 RX ADMIN — DEXTROSE 50 % IN WATER (D50W) INTRAVENOUS SYRINGE 25 G: at 23:36

## 2019-09-24 RX ADMIN — IPRATROPIUM BROMIDE AND ALBUTEROL SULFATE 3 ML: 2.5; .5 SOLUTION RESPIRATORY (INHALATION) at 15:43

## 2019-09-24 RX ADMIN — AMPICILLIN SODIUM 2 G: 2 INJECTION, POWDER, FOR SOLUTION INTRAMUSCULAR; INTRAVENOUS at 00:16

## 2019-09-24 RX ADMIN — CEFTRIAXONE 2 G: 2 INJECTION, POWDER, FOR SOLUTION INTRAMUSCULAR; INTRAVENOUS at 08:15

## 2019-09-24 RX ADMIN — SODIUM CHLORIDE, PRESERVATIVE FREE 10 ML: 5 INJECTION INTRAVENOUS at 21:22

## 2019-09-24 RX ADMIN — PANTOPRAZOLE SODIUM 40 MG: 40 INJECTION, POWDER, FOR SOLUTION INTRAVENOUS at 21:22

## 2019-09-24 RX ADMIN — INSULIN HUMAN 6 UNITS: 100 INJECTION, SOLUTION PARENTERAL at 12:55

## 2019-09-24 RX ADMIN — INSULIN DETEMIR 15 UNITS: 100 INJECTION, SOLUTION SUBCUTANEOUS at 11:57

## 2019-09-24 RX ADMIN — TAZOBACTAM SODIUM AND PIPERACILLIN SODIUM 3.38 G: 375; 3 INJECTION, SOLUTION INTRAVENOUS at 17:50

## 2019-09-24 RX ADMIN — INSULIN HUMAN 6 UNITS: 100 INJECTION, SOLUTION PARENTERAL at 08:33

## 2019-09-24 RX ADMIN — CHLORHEXIDINE GLUCONATE 15 ML: 1.2 RINSE ORAL at 21:22

## 2019-09-24 RX ADMIN — SODIUM CHLORIDE 75 ML/HR: 9 INJECTION, SOLUTION INTRAVENOUS at 00:17

## 2019-09-24 RX ADMIN — ACYCLOVIR SODIUM 455 MG: 50 INJECTION, SOLUTION INTRAVENOUS at 08:15

## 2019-09-24 RX ADMIN — METOPROLOL TARTRATE 5 MG: 5 INJECTION INTRAVENOUS at 21:22

## 2019-09-24 RX ADMIN — IPRATROPIUM BROMIDE AND ALBUTEROL SULFATE 3 ML: 2.5; .5 SOLUTION RESPIRATORY (INHALATION) at 07:36

## 2019-09-24 RX ADMIN — AMPICILLIN SODIUM 2 G: 2 INJECTION, POWDER, FOR SOLUTION INTRAMUSCULAR; INTRAVENOUS at 04:54

## 2019-09-24 RX ADMIN — NICARDIPINE HYDROCHLORIDE 5 MG/HR: 0.2 INJECTION, SOLUTION INTRAVENOUS at 16:19

## 2019-09-24 RX ADMIN — IPRATROPIUM BROMIDE AND ALBUTEROL SULFATE 3 ML: 2.5; .5 SOLUTION RESPIRATORY (INHALATION) at 13:10

## 2019-09-24 RX ADMIN — AMPICILLIN SODIUM 2 G: 2 INJECTION, POWDER, FOR SOLUTION INTRAMUSCULAR; INTRAVENOUS at 11:51

## 2019-09-24 RX ADMIN — INSULIN HUMAN 3 UNITS: 100 INJECTION, SOLUTION PARENTERAL at 18:01

## 2019-09-25 ENCOUNTER — APPOINTMENT (OUTPATIENT)
Dept: GENERAL RADIOLOGY | Facility: HOSPITAL | Age: 74
End: 2019-09-25

## 2019-09-25 PROBLEM — N39.0 UTI (URINARY TRACT INFECTION): Status: ACTIVE | Noted: 2019-09-25

## 2019-09-25 PROBLEM — J40 TRACHEOBRONCHITIS: Status: ACTIVE | Noted: 2019-09-25

## 2019-09-25 LAB
ANION GAP SERPL CALCULATED.3IONS-SCNC: 14 MMOL/L (ref 5–15)
ARTERIAL PATENCY WRIST A: ABNORMAL
ATMOSPHERIC PRESS: ABNORMAL MM[HG]
BASE EXCESS BLDA CALC-SCNC: -3.6 MMOL/L (ref 0–2)
BASOPHILS # BLD AUTO: 0.02 10*3/MM3 (ref 0–0.2)
BASOPHILS NFR BLD AUTO: 0.2 % (ref 0–1.5)
BDY SITE: ABNORMAL
BODY TEMPERATURE: 98.6 C
BUN BLD-MCNC: 23 MG/DL (ref 8–23)
BUN/CREAT SERPL: 16.7 (ref 7–25)
CALCIUM SPEC-SCNC: 8.3 MG/DL (ref 8.6–10.5)
CHLORIDE SERPL-SCNC: 114 MMOL/L (ref 98–107)
CO2 BLDA-SCNC: 19.9 MMOL/L (ref 23–27)
CO2 SERPL-SCNC: 19 MMOL/L (ref 22–29)
COHGB MFR BLD: 1.6 % (ref 0–2)
CREAT BLD-MCNC: 1.38 MG/DL (ref 0.57–1)
DEPRECATED RDW RBC AUTO: 47.8 FL (ref 37–54)
EOSINOPHIL # BLD AUTO: 0.31 10*3/MM3 (ref 0–0.4)
EOSINOPHIL NFR BLD AUTO: 3.2 % (ref 0.3–6.2)
EPAP: 0
ERYTHROCYTE [DISTWIDTH] IN BLOOD BY AUTOMATED COUNT: 15.2 % (ref 12.3–15.4)
GFR SERPL CREATININE-BSD FRML MDRD: 37 ML/MIN/1.73
GLUCOSE BLD-MCNC: 106 MG/DL (ref 65–99)
GLUCOSE BLDC GLUCOMTR-MCNC: 116 MG/DL (ref 70–130)
GLUCOSE BLDC GLUCOMTR-MCNC: 122 MG/DL (ref 70–130)
GLUCOSE BLDC GLUCOMTR-MCNC: 145 MG/DL (ref 70–130)
GLUCOSE BLDC GLUCOMTR-MCNC: 213 MG/DL (ref 70–130)
GLUCOSE BLDC GLUCOMTR-MCNC: 221 MG/DL (ref 70–130)
GLUCOSE BLDC GLUCOMTR-MCNC: 243 MG/DL (ref 70–130)
HCO3 BLDA-SCNC: 19.1 MMOL/L (ref 20–26)
HCT VFR BLD AUTO: 34.9 % (ref 34–46.6)
HCT VFR BLD CALC: 35.1 %
HGB BLD-MCNC: 11.3 G/DL (ref 12–15.9)
HGB BLDA-MCNC: 11.4 G/DL (ref 14–18)
HOROWITZ INDEX BLD+IHG-RTO: 28 %
IMM GRANULOCYTES # BLD AUTO: 0.06 10*3/MM3 (ref 0–0.05)
IMM GRANULOCYTES NFR BLD AUTO: 0.6 % (ref 0–0.5)
IPAP: 0
LYMPHOCYTES # BLD AUTO: 1.24 10*3/MM3 (ref 0.7–3.1)
LYMPHOCYTES NFR BLD AUTO: 12.8 % (ref 19.6–45.3)
MAGNESIUM SERPL-MCNC: 2 MG/DL (ref 1.6–2.4)
MCH RBC QN AUTO: 27.6 PG (ref 26.6–33)
MCHC RBC AUTO-ENTMCNC: 32.4 G/DL (ref 31.5–35.7)
MCV RBC AUTO: 85.3 FL (ref 79–97)
METHGB BLD QL: 0.4 % (ref 0–1.5)
MODALITY: ABNORMAL
MONOCYTES # BLD AUTO: 0.66 10*3/MM3 (ref 0.1–0.9)
MONOCYTES NFR BLD AUTO: 6.8 % (ref 5–12)
NEUTROPHILS # BLD AUTO: 7.37 10*3/MM3 (ref 1.7–7)
NEUTROPHILS NFR BLD AUTO: 76.4 % (ref 42.7–76)
NOTE: ABNORMAL
NRBC BLD AUTO-RTO: 0 /100 WBC (ref 0–0.2)
OXYHGB MFR BLDV: 89.9 % (ref 94–99)
PAW @ PEAK INSP FLOW SETTING VENT: 0 CMH2O
PCO2 BLDA: 26.9 MM HG (ref 35–45)
PCO2 TEMP ADJ BLD: 26.9 MM HG (ref 35–45)
PH BLDA: 7.46 PH UNITS (ref 7.35–7.45)
PH, TEMP CORRECTED: 7.46 PH UNITS
PLATELET # BLD AUTO: 165 10*3/MM3 (ref 140–450)
PMV BLD AUTO: 11.4 FL (ref 6–12)
PO2 BLDA: 53.2 MM HG (ref 83–108)
PO2 TEMP ADJ BLD: 53.2 MM HG (ref 83–108)
POTASSIUM BLD-SCNC: 3.4 MMOL/L (ref 3.5–5.2)
POTASSIUM BLD-SCNC: 4.8 MMOL/L (ref 3.5–5.2)
RBC # BLD AUTO: 4.09 10*6/MM3 (ref 3.77–5.28)
SODIUM BLD-SCNC: 147 MMOL/L (ref 136–145)
TOTAL RATE: 0 BREATHS/MINUTE
WBC NRBC COR # BLD: 9.66 10*3/MM3 (ref 3.4–10.8)

## 2019-09-25 PROCEDURE — 99233 SBSQ HOSP IP/OBS HIGH 50: CPT | Performed by: INTERNAL MEDICINE

## 2019-09-25 PROCEDURE — 84132 ASSAY OF SERUM POTASSIUM: CPT | Performed by: INTERNAL MEDICINE

## 2019-09-25 PROCEDURE — 99232 SBSQ HOSP IP/OBS MODERATE 35: CPT | Performed by: PHYSICIAN ASSISTANT

## 2019-09-25 PROCEDURE — 99231 SBSQ HOSP IP/OBS SF/LOW 25: CPT | Performed by: PSYCHIATRY & NEUROLOGY

## 2019-09-25 PROCEDURE — 85025 COMPLETE CBC W/AUTO DIFF WBC: CPT | Performed by: INTERNAL MEDICINE

## 2019-09-25 PROCEDURE — 92610 EVALUATE SWALLOWING FUNCTION: CPT

## 2019-09-25 PROCEDURE — 25010000002 PIPERACILLIN SOD-TAZOBACTAM PER 1 G: Performed by: INTERNAL MEDICINE

## 2019-09-25 PROCEDURE — 80048 BASIC METABOLIC PNL TOTAL CA: CPT | Performed by: INTERNAL MEDICINE

## 2019-09-25 PROCEDURE — 71045 X-RAY EXAM CHEST 1 VIEW: CPT

## 2019-09-25 PROCEDURE — 83735 ASSAY OF MAGNESIUM: CPT | Performed by: INTERNAL MEDICINE

## 2019-09-25 PROCEDURE — 94799 UNLISTED PULMONARY SVC/PX: CPT

## 2019-09-25 PROCEDURE — 82962 GLUCOSE BLOOD TEST: CPT

## 2019-09-25 PROCEDURE — 36600 WITHDRAWAL OF ARTERIAL BLOOD: CPT

## 2019-09-25 PROCEDURE — 82805 BLOOD GASES W/O2 SATURATION: CPT

## 2019-09-25 RX ORDER — POTASSIUM CHLORIDE 7.45 MG/ML
10 INJECTION INTRAVENOUS
Status: DISCONTINUED | OUTPATIENT
Start: 2019-09-25 | End: 2019-09-29

## 2019-09-25 RX ORDER — POTASSIUM CHLORIDE 1.5 G/1.77G
40 POWDER, FOR SOLUTION ORAL AS NEEDED
Status: DISCONTINUED | OUTPATIENT
Start: 2019-09-25 | End: 2019-09-29

## 2019-09-25 RX ORDER — AMLODIPINE BESYLATE 5 MG/1
5 TABLET ORAL
Status: DISCONTINUED | OUTPATIENT
Start: 2019-09-25 | End: 2019-10-01 | Stop reason: HOSPADM

## 2019-09-25 RX ORDER — METOPROLOL TARTRATE 100 MG/1
100 TABLET ORAL EVERY 12 HOURS SCHEDULED
Status: DISCONTINUED | OUTPATIENT
Start: 2019-09-25 | End: 2019-10-01 | Stop reason: HOSPADM

## 2019-09-25 RX ORDER — POTASSIUM CHLORIDE 750 MG/1
40 CAPSULE, EXTENDED RELEASE ORAL AS NEEDED
Status: DISCONTINUED | OUTPATIENT
Start: 2019-09-25 | End: 2019-09-29

## 2019-09-25 RX ADMIN — POTASSIUM CHLORIDE 40 MEQ: 750 CAPSULE, EXTENDED RELEASE ORAL at 14:12

## 2019-09-25 RX ADMIN — IPRATROPIUM BROMIDE AND ALBUTEROL SULFATE 3 ML: 2.5; .5 SOLUTION RESPIRATORY (INHALATION) at 12:18

## 2019-09-25 RX ADMIN — SODIUM CHLORIDE 75 ML/HR: 9 INJECTION, SOLUTION INTRAVENOUS at 04:18

## 2019-09-25 RX ADMIN — AMLODIPINE BESYLATE 5 MG: 5 TABLET ORAL at 10:13

## 2019-09-25 RX ADMIN — IPRATROPIUM BROMIDE AND ALBUTEROL SULFATE 3 ML: 2.5; .5 SOLUTION RESPIRATORY (INHALATION) at 15:57

## 2019-09-25 RX ADMIN — ACETAMINOPHEN 650 MG: 325 TABLET ORAL at 21:52

## 2019-09-25 RX ADMIN — SODIUM CHLORIDE, PRESERVATIVE FREE 10 ML: 5 INJECTION INTRAVENOUS at 21:48

## 2019-09-25 RX ADMIN — PANTOPRAZOLE SODIUM 40 MG: 40 INJECTION, POWDER, FOR SOLUTION INTRAVENOUS at 08:02

## 2019-09-25 RX ADMIN — TAZOBACTAM SODIUM AND PIPERACILLIN SODIUM 3.38 G: 375; 3 INJECTION, SOLUTION INTRAVENOUS at 15:45

## 2019-09-25 RX ADMIN — INSULIN HUMAN 3 UNITS: 100 INJECTION, SOLUTION PARENTERAL at 17:47

## 2019-09-25 RX ADMIN — IPRATROPIUM BROMIDE AND ALBUTEROL SULFATE 3 ML: 2.5; .5 SOLUTION RESPIRATORY (INHALATION) at 08:31

## 2019-09-25 RX ADMIN — TAZOBACTAM SODIUM AND PIPERACILLIN SODIUM 3.38 G: 375; 3 INJECTION, SOLUTION INTRAVENOUS at 01:02

## 2019-09-25 RX ADMIN — METOPROLOL TARTRATE 100 MG: 100 TABLET ORAL at 10:13

## 2019-09-25 RX ADMIN — CHLORHEXIDINE GLUCONATE 15 ML: 1.2 RINSE ORAL at 08:02

## 2019-09-25 RX ADMIN — PANTOPRAZOLE SODIUM 40 MG: 40 INJECTION, POWDER, FOR SOLUTION INTRAVENOUS at 21:48

## 2019-09-25 RX ADMIN — NICARDIPINE HYDROCHLORIDE 5 MG/HR: 0.2 INJECTION, SOLUTION INTRAVENOUS at 10:09

## 2019-09-25 RX ADMIN — METOPROLOL TARTRATE 5 MG: 5 INJECTION INTRAVENOUS at 04:18

## 2019-09-25 RX ADMIN — METOPROLOL TARTRATE 100 MG: 100 TABLET ORAL at 21:48

## 2019-09-25 RX ADMIN — POTASSIUM CHLORIDE 40 MEQ: 750 CAPSULE, EXTENDED RELEASE ORAL at 10:16

## 2019-09-25 RX ADMIN — TAZOBACTAM SODIUM AND PIPERACILLIN SODIUM 3.38 G: 375; 3 INJECTION, SOLUTION INTRAVENOUS at 07:58

## 2019-09-25 RX ADMIN — INSULIN HUMAN 3 UNITS: 100 INJECTION, SOLUTION PARENTERAL at 12:17

## 2019-09-25 RX ADMIN — IPRATROPIUM BROMIDE AND ALBUTEROL SULFATE 3 ML: 2.5; .5 SOLUTION RESPIRATORY (INHALATION) at 19:49

## 2019-09-26 ENCOUNTER — ANCILLARY PROCEDURE (OUTPATIENT)
Dept: SPEECH THERAPY | Facility: HOSPITAL | Age: 74
End: 2019-09-26

## 2019-09-26 LAB
ANION GAP SERPL CALCULATED.3IONS-SCNC: 19 MMOL/L (ref 5–15)
BASOPHILS # BLD AUTO: 0.03 10*3/MM3 (ref 0–0.2)
BASOPHILS NFR BLD AUTO: 0.4 % (ref 0–1.5)
BUN BLD-MCNC: 17 MG/DL (ref 8–23)
BUN/CREAT SERPL: 13.2 (ref 7–25)
CALCIUM SPEC-SCNC: 8.3 MG/DL (ref 8.6–10.5)
CHLORIDE SERPL-SCNC: 113 MMOL/L (ref 98–107)
CO2 SERPL-SCNC: 17 MMOL/L (ref 22–29)
CREAT BLD-MCNC: 1.29 MG/DL (ref 0.57–1)
DEPRECATED RDW RBC AUTO: 50 FL (ref 37–54)
EOSINOPHIL # BLD AUTO: 0.2 10*3/MM3 (ref 0–0.4)
EOSINOPHIL NFR BLD AUTO: 2.4 % (ref 0.3–6.2)
ERYTHROCYTE [DISTWIDTH] IN BLOOD BY AUTOMATED COUNT: 15.3 % (ref 12.3–15.4)
GFR SERPL CREATININE-BSD FRML MDRD: 40 ML/MIN/1.73
GLUCOSE BLD-MCNC: 186 MG/DL (ref 65–99)
GLUCOSE BLDC GLUCOMTR-MCNC: 116 MG/DL (ref 70–130)
GLUCOSE BLDC GLUCOMTR-MCNC: 178 MG/DL (ref 70–130)
GLUCOSE BLDC GLUCOMTR-MCNC: 188 MG/DL (ref 70–130)
GLUCOSE BLDC GLUCOMTR-MCNC: 74 MG/DL (ref 70–130)
HCT VFR BLD AUTO: 37.2 % (ref 34–46.6)
HGB BLD-MCNC: 11.5 G/DL (ref 12–15.9)
IMM GRANULOCYTES # BLD AUTO: 0.03 10*3/MM3 (ref 0–0.05)
IMM GRANULOCYTES NFR BLD AUTO: 0.4 % (ref 0–0.5)
LYMPHOCYTES # BLD AUTO: 1.11 10*3/MM3 (ref 0.7–3.1)
LYMPHOCYTES NFR BLD AUTO: 13.6 % (ref 19.6–45.3)
MAGNESIUM SERPL-MCNC: 1.9 MG/DL (ref 1.6–2.4)
MCH RBC QN AUTO: 27.4 PG (ref 26.6–33)
MCHC RBC AUTO-ENTMCNC: 30.9 G/DL (ref 31.5–35.7)
MCV RBC AUTO: 88.8 FL (ref 79–97)
MONOCYTES # BLD AUTO: 0.78 10*3/MM3 (ref 0.1–0.9)
MONOCYTES NFR BLD AUTO: 9.5 % (ref 5–12)
NEUTROPHILS # BLD AUTO: 6.03 10*3/MM3 (ref 1.7–7)
NEUTROPHILS NFR BLD AUTO: 73.7 % (ref 42.7–76)
NRBC BLD AUTO-RTO: 0 /100 WBC (ref 0–0.2)
PLATELET # BLD AUTO: 188 10*3/MM3 (ref 140–450)
PMV BLD AUTO: 12.2 FL (ref 6–12)
POTASSIUM BLD-SCNC: 4.5 MMOL/L (ref 3.5–5.2)
RBC # BLD AUTO: 4.19 10*6/MM3 (ref 3.77–5.28)
SODIUM BLD-SCNC: 149 MMOL/L (ref 136–145)
WBC NRBC COR # BLD: 8.18 10*3/MM3 (ref 3.4–10.8)

## 2019-09-26 PROCEDURE — 99233 SBSQ HOSP IP/OBS HIGH 50: CPT | Performed by: INTERNAL MEDICINE

## 2019-09-26 PROCEDURE — 83735 ASSAY OF MAGNESIUM: CPT | Performed by: INTERNAL MEDICINE

## 2019-09-26 PROCEDURE — 92612 ENDOSCOPY SWALLOW (FEES) VID: CPT

## 2019-09-26 PROCEDURE — 80048 BASIC METABOLIC PNL TOTAL CA: CPT | Performed by: INTERNAL MEDICINE

## 2019-09-26 PROCEDURE — 94799 UNLISTED PULMONARY SVC/PX: CPT

## 2019-09-26 PROCEDURE — 85025 COMPLETE CBC W/AUTO DIFF WBC: CPT | Performed by: INTERNAL MEDICINE

## 2019-09-26 PROCEDURE — 82962 GLUCOSE BLOOD TEST: CPT

## 2019-09-26 PROCEDURE — 25010000002 PIPERACILLIN SOD-TAZOBACTAM PER 1 G: Performed by: INTERNAL MEDICINE

## 2019-09-26 PROCEDURE — 63710000001 INSULIN DETEMIR PER 5 UNITS: Performed by: INTERNAL MEDICINE

## 2019-09-26 PROCEDURE — 25010000002 MAGNESIUM SULFATE 2 GM/50ML SOLUTION: Performed by: INTERNAL MEDICINE

## 2019-09-26 PROCEDURE — 92610 EVALUATE SWALLOWING FUNCTION: CPT

## 2019-09-26 RX ORDER — HYDRALAZINE HYDROCHLORIDE 10 MG/1
10 TABLET, FILM COATED ORAL EVERY 8 HOURS SCHEDULED
Status: DISCONTINUED | OUTPATIENT
Start: 2019-09-26 | End: 2019-10-01 | Stop reason: HOSPADM

## 2019-09-26 RX ORDER — QUETIAPINE FUMARATE 25 MG/1
12.5 TABLET, FILM COATED ORAL EVERY 12 HOURS SCHEDULED
Status: DISCONTINUED | OUTPATIENT
Start: 2019-09-26 | End: 2019-09-29

## 2019-09-26 RX ADMIN — TAZOBACTAM SODIUM AND PIPERACILLIN SODIUM 3.38 G: 375; 3 INJECTION, SOLUTION INTRAVENOUS at 16:21

## 2019-09-26 RX ADMIN — TAZOBACTAM SODIUM AND PIPERACILLIN SODIUM 3.38 G: 375; 3 INJECTION, SOLUTION INTRAVENOUS at 08:51

## 2019-09-26 RX ADMIN — IPRATROPIUM BROMIDE AND ALBUTEROL SULFATE 3 ML: 2.5; .5 SOLUTION RESPIRATORY (INHALATION) at 19:59

## 2019-09-26 RX ADMIN — INSULIN DETEMIR 5 UNITS: 100 INJECTION, SOLUTION SUBCUTANEOUS at 11:14

## 2019-09-26 RX ADMIN — APIXABAN 5 MG: 5 TABLET, FILM COATED ORAL at 11:12

## 2019-09-26 RX ADMIN — QUETIAPINE FUMARATE 12.5 MG: 25 TABLET ORAL at 21:00

## 2019-09-26 RX ADMIN — METOPROLOL TARTRATE 100 MG: 100 TABLET ORAL at 21:00

## 2019-09-26 RX ADMIN — INSULIN HUMAN 3 UNITS: 100 INJECTION, SOLUTION PARENTERAL at 00:28

## 2019-09-26 RX ADMIN — INSULIN HUMAN 2 UNITS: 100 INJECTION, SOLUTION PARENTERAL at 06:45

## 2019-09-26 RX ADMIN — TAZOBACTAM SODIUM AND PIPERACILLIN SODIUM 3.38 G: 375; 3 INJECTION, SOLUTION INTRAVENOUS at 00:28

## 2019-09-26 RX ADMIN — MAGNESIUM SULFATE 2 G: 2 INJECTION INTRAVENOUS at 08:52

## 2019-09-26 RX ADMIN — IPRATROPIUM BROMIDE AND ALBUTEROL SULFATE 3 ML: 2.5; .5 SOLUTION RESPIRATORY (INHALATION) at 12:44

## 2019-09-26 RX ADMIN — AMLODIPINE BESYLATE 5 MG: 5 TABLET ORAL at 08:45

## 2019-09-26 RX ADMIN — IPRATROPIUM BROMIDE AND ALBUTEROL SULFATE 3 ML: 2.5; .5 SOLUTION RESPIRATORY (INHALATION) at 07:46

## 2019-09-26 RX ADMIN — APIXABAN 5 MG: 5 TABLET, FILM COATED ORAL at 21:00

## 2019-09-26 RX ADMIN — PANTOPRAZOLE SODIUM 40 MG: 40 INJECTION, POWDER, FOR SOLUTION INTRAVENOUS at 08:46

## 2019-09-26 RX ADMIN — HYDRALAZINE HYDROCHLORIDE 10 MG: 10 TABLET ORAL at 13:46

## 2019-09-26 RX ADMIN — SODIUM CHLORIDE 75 ML/HR: 9 INJECTION, SOLUTION INTRAVENOUS at 04:26

## 2019-09-26 RX ADMIN — PANTOPRAZOLE SODIUM 40 MG: 40 INJECTION, POWDER, FOR SOLUTION INTRAVENOUS at 21:00

## 2019-09-26 RX ADMIN — HYDRALAZINE HYDROCHLORIDE 10 MG: 10 TABLET ORAL at 21:03

## 2019-09-26 RX ADMIN — INSULIN HUMAN 2 UNITS: 100 INJECTION, SOLUTION PARENTERAL at 13:47

## 2019-09-26 RX ADMIN — METOPROLOL TARTRATE 100 MG: 100 TABLET ORAL at 08:44

## 2019-09-26 RX ADMIN — NICARDIPINE HYDROCHLORIDE 5 MG/HR: 0.2 INJECTION, SOLUTION INTRAVENOUS at 04:15

## 2019-09-27 LAB
ANION GAP SERPL CALCULATED.3IONS-SCNC: 18 MMOL/L (ref 5–15)
BACTERIA SPEC AEROBE CULT: NORMAL
BACTERIA SPEC RESP CULT: ABNORMAL
BACTERIA SPEC RESP CULT: ABNORMAL
BASOPHILS # BLD AUTO: 0.03 10*3/MM3 (ref 0–0.2)
BASOPHILS NFR BLD AUTO: 0.5 % (ref 0–1.5)
BUN BLD-MCNC: 13 MG/DL (ref 8–23)
BUN/CREAT SERPL: 10.7 (ref 7–25)
CALCIUM SPEC-SCNC: 8.2 MG/DL (ref 8.6–10.5)
CHLORIDE SERPL-SCNC: 110 MMOL/L (ref 98–107)
CO2 SERPL-SCNC: 17 MMOL/L (ref 22–29)
CREAT BLD-MCNC: 1.21 MG/DL (ref 0.57–1)
DEPRECATED RDW RBC AUTO: 47.6 FL (ref 37–54)
EOSINOPHIL # BLD AUTO: 0.35 10*3/MM3 (ref 0–0.4)
EOSINOPHIL NFR BLD AUTO: 5.4 % (ref 0.3–6.2)
ERYTHROCYTE [DISTWIDTH] IN BLOOD BY AUTOMATED COUNT: 15 % (ref 12.3–15.4)
GFR SERPL CREATININE-BSD FRML MDRD: 43 ML/MIN/1.73
GLUCOSE BLD-MCNC: 168 MG/DL (ref 65–99)
GLUCOSE BLDC GLUCOMTR-MCNC: 108 MG/DL (ref 70–130)
GLUCOSE BLDC GLUCOMTR-MCNC: 204 MG/DL (ref 70–130)
GLUCOSE BLDC GLUCOMTR-MCNC: 305 MG/DL (ref 70–130)
GLUCOSE BLDC GLUCOMTR-MCNC: 74 MG/DL (ref 70–130)
GRAM STN SPEC: ABNORMAL
HCT VFR BLD AUTO: 35.3 % (ref 34–46.6)
HGB BLD-MCNC: 11.4 G/DL (ref 12–15.9)
IMM GRANULOCYTES # BLD AUTO: 0.03 10*3/MM3 (ref 0–0.05)
IMM GRANULOCYTES NFR BLD AUTO: 0.5 % (ref 0–0.5)
LYMPHOCYTES # BLD AUTO: 1.25 10*3/MM3 (ref 0.7–3.1)
LYMPHOCYTES NFR BLD AUTO: 19.2 % (ref 19.6–45.3)
MAGNESIUM SERPL-MCNC: 1.7 MG/DL (ref 1.6–2.4)
MCH RBC QN AUTO: 27.5 PG (ref 26.6–33)
MCHC RBC AUTO-ENTMCNC: 32.3 G/DL (ref 31.5–35.7)
MCV RBC AUTO: 85.3 FL (ref 79–97)
MONOCYTES # BLD AUTO: 0.54 10*3/MM3 (ref 0.1–0.9)
MONOCYTES NFR BLD AUTO: 8.3 % (ref 5–12)
NEUTROPHILS # BLD AUTO: 4.31 10*3/MM3 (ref 1.7–7)
NEUTROPHILS NFR BLD AUTO: 66.1 % (ref 42.7–76)
NRBC BLD AUTO-RTO: 0 /100 WBC (ref 0–0.2)
PLATELET # BLD AUTO: 197 10*3/MM3 (ref 140–450)
PMV BLD AUTO: 11.6 FL (ref 6–12)
POTASSIUM BLD-SCNC: 3.7 MMOL/L (ref 3.5–5.2)
RBC # BLD AUTO: 4.14 10*6/MM3 (ref 3.77–5.28)
SODIUM BLD-SCNC: 145 MMOL/L (ref 136–145)
WBC NRBC COR # BLD: 6.51 10*3/MM3 (ref 3.4–10.8)

## 2019-09-27 PROCEDURE — 99233 SBSQ HOSP IP/OBS HIGH 50: CPT | Performed by: INTERNAL MEDICINE

## 2019-09-27 PROCEDURE — 99231 SBSQ HOSP IP/OBS SF/LOW 25: CPT | Performed by: PSYCHIATRY & NEUROLOGY

## 2019-09-27 PROCEDURE — 80048 BASIC METABOLIC PNL TOTAL CA: CPT | Performed by: INTERNAL MEDICINE

## 2019-09-27 PROCEDURE — 63710000001 INSULIN DETEMIR PER 5 UNITS: Performed by: INTERNAL MEDICINE

## 2019-09-27 PROCEDURE — 25010000002 PIPERACILLIN SOD-TAZOBACTAM PER 1 G: Performed by: INTERNAL MEDICINE

## 2019-09-27 PROCEDURE — 83735 ASSAY OF MAGNESIUM: CPT | Performed by: INTERNAL MEDICINE

## 2019-09-27 PROCEDURE — 25010000002 MAGNESIUM SULFATE 2 GM/50ML SOLUTION: Performed by: INTERNAL MEDICINE

## 2019-09-27 PROCEDURE — 97530 THERAPEUTIC ACTIVITIES: CPT

## 2019-09-27 PROCEDURE — 82962 GLUCOSE BLOOD TEST: CPT

## 2019-09-27 PROCEDURE — 97162 PT EVAL MOD COMPLEX 30 MIN: CPT

## 2019-09-27 PROCEDURE — 85025 COMPLETE CBC W/AUTO DIFF WBC: CPT | Performed by: INTERNAL MEDICINE

## 2019-09-27 RX ORDER — LEVOFLOXACIN 750 MG/1
750 TABLET ORAL
Status: COMPLETED | OUTPATIENT
Start: 2019-09-27 | End: 2019-09-29

## 2019-09-27 RX ORDER — PANTOPRAZOLE SODIUM 40 MG/1
40 TABLET, DELAYED RELEASE ORAL
Status: DISCONTINUED | OUTPATIENT
Start: 2019-09-27 | End: 2019-10-01 | Stop reason: HOSPADM

## 2019-09-27 RX ORDER — IPRATROPIUM BROMIDE AND ALBUTEROL SULFATE 2.5; .5 MG/3ML; MG/3ML
3 SOLUTION RESPIRATORY (INHALATION) EVERY 4 HOURS PRN
Status: DISCONTINUED | OUTPATIENT
Start: 2019-09-27 | End: 2019-10-01 | Stop reason: HOSPADM

## 2019-09-27 RX ADMIN — METOPROLOL TARTRATE 100 MG: 100 TABLET ORAL at 08:17

## 2019-09-27 RX ADMIN — APIXABAN 5 MG: 5 TABLET, FILM COATED ORAL at 21:39

## 2019-09-27 RX ADMIN — APIXABAN 5 MG: 5 TABLET, FILM COATED ORAL at 08:16

## 2019-09-27 RX ADMIN — QUETIAPINE FUMARATE 12.5 MG: 25 TABLET ORAL at 21:39

## 2019-09-27 RX ADMIN — SODIUM CHLORIDE, PRESERVATIVE FREE 10 ML: 5 INJECTION INTRAVENOUS at 20:28

## 2019-09-27 RX ADMIN — AMLODIPINE BESYLATE 5 MG: 5 TABLET ORAL at 08:17

## 2019-09-27 RX ADMIN — PANTOPRAZOLE SODIUM 40 MG: 40 TABLET, DELAYED RELEASE ORAL at 18:53

## 2019-09-27 RX ADMIN — QUETIAPINE FUMARATE 12.5 MG: 25 TABLET ORAL at 08:16

## 2019-09-27 RX ADMIN — HYDRALAZINE HYDROCHLORIDE 10 MG: 10 TABLET ORAL at 13:10

## 2019-09-27 RX ADMIN — PIPERACILLIN AND TAZOBACTAM 3.38 G: 3; .375 INJECTION, POWDER, FOR SOLUTION INTRAVENOUS at 00:37

## 2019-09-27 RX ADMIN — SODIUM CHLORIDE, PRESERVATIVE FREE 10 ML: 5 INJECTION INTRAVENOUS at 08:19

## 2019-09-27 RX ADMIN — MAGNESIUM SULFATE 2 G: 2 INJECTION INTRAVENOUS at 06:10

## 2019-09-27 RX ADMIN — HYDRALAZINE HYDROCHLORIDE 10 MG: 10 TABLET ORAL at 21:40

## 2019-09-27 RX ADMIN — HYDRALAZINE HYDROCHLORIDE 10 MG: 10 TABLET ORAL at 05:46

## 2019-09-27 RX ADMIN — PIPERACILLIN AND TAZOBACTAM 3.38 G: 3; .375 INJECTION, POWDER, FOR SOLUTION INTRAVENOUS at 08:18

## 2019-09-27 RX ADMIN — METOPROLOL TARTRATE 100 MG: 100 TABLET ORAL at 21:39

## 2019-09-27 RX ADMIN — INSULIN DETEMIR 5 UNITS: 100 INJECTION, SOLUTION SUBCUTANEOUS at 08:28

## 2019-09-27 RX ADMIN — LEVOFLOXACIN 750 MG: 750 TABLET, FILM COATED ORAL at 11:01

## 2019-09-27 RX ADMIN — PANTOPRAZOLE SODIUM 40 MG: 40 INJECTION, POWDER, FOR SOLUTION INTRAVENOUS at 08:17

## 2019-09-28 LAB
ANION GAP SERPL CALCULATED.3IONS-SCNC: 17 MMOL/L (ref 5–15)
BASOPHILS # BLD AUTO: 0.04 10*3/MM3 (ref 0–0.2)
BASOPHILS NFR BLD AUTO: 0.7 % (ref 0–1.5)
BUN BLD-MCNC: 18 MG/DL (ref 8–23)
BUN/CREAT SERPL: 9.7 (ref 7–25)
CALCIUM SPEC-SCNC: 7.8 MG/DL (ref 8.6–10.5)
CHLORIDE SERPL-SCNC: 110 MMOL/L (ref 98–107)
CO2 SERPL-SCNC: 15 MMOL/L (ref 22–29)
CREAT BLD-MCNC: 1.86 MG/DL (ref 0.57–1)
DEPRECATED RDW RBC AUTO: 49.8 FL (ref 37–54)
EOSINOPHIL # BLD AUTO: 0.36 10*3/MM3 (ref 0–0.4)
EOSINOPHIL NFR BLD AUTO: 6.2 % (ref 0.3–6.2)
ERYTHROCYTE [DISTWIDTH] IN BLOOD BY AUTOMATED COUNT: 15.4 % (ref 12.3–15.4)
GFR SERPL CREATININE-BSD FRML MDRD: 26 ML/MIN/1.73
GLUCOSE BLD-MCNC: 176 MG/DL (ref 65–99)
GLUCOSE BLDC GLUCOMTR-MCNC: 197 MG/DL (ref 70–130)
GLUCOSE BLDC GLUCOMTR-MCNC: 224 MG/DL (ref 70–130)
GLUCOSE BLDC GLUCOMTR-MCNC: 315 MG/DL (ref 70–130)
GLUCOSE BLDC GLUCOMTR-MCNC: 347 MG/DL (ref 70–130)
HCT VFR BLD AUTO: 36.4 % (ref 34–46.6)
HGB BLD-MCNC: 11.4 G/DL (ref 12–15.9)
IMM GRANULOCYTES # BLD AUTO: 0.05 10*3/MM3 (ref 0–0.05)
IMM GRANULOCYTES NFR BLD AUTO: 0.9 % (ref 0–0.5)
LYMPHOCYTES # BLD AUTO: 1.43 10*3/MM3 (ref 0.7–3.1)
LYMPHOCYTES NFR BLD AUTO: 24.6 % (ref 19.6–45.3)
MAGNESIUM SERPL-MCNC: 2.1 MG/DL (ref 1.6–2.4)
MCH RBC QN AUTO: 27.7 PG (ref 26.6–33)
MCHC RBC AUTO-ENTMCNC: 31.3 G/DL (ref 31.5–35.7)
MCV RBC AUTO: 88.3 FL (ref 79–97)
MONOCYTES # BLD AUTO: 0.65 10*3/MM3 (ref 0.1–0.9)
MONOCYTES NFR BLD AUTO: 11.2 % (ref 5–12)
NEUTROPHILS # BLD AUTO: 3.28 10*3/MM3 (ref 1.7–7)
NEUTROPHILS NFR BLD AUTO: 56.4 % (ref 42.7–76)
NRBC BLD AUTO-RTO: 0 /100 WBC (ref 0–0.2)
PLATELET # BLD AUTO: 214 10*3/MM3 (ref 140–450)
PMV BLD AUTO: 12 FL (ref 6–12)
POTASSIUM BLD-SCNC: 4.6 MMOL/L (ref 3.5–5.2)
RBC # BLD AUTO: 4.12 10*6/MM3 (ref 3.77–5.28)
SODIUM BLD-SCNC: 142 MMOL/L (ref 136–145)
WBC NRBC COR # BLD: 5.81 10*3/MM3 (ref 3.4–10.8)

## 2019-09-28 PROCEDURE — 99232 SBSQ HOSP IP/OBS MODERATE 35: CPT | Performed by: INTERNAL MEDICINE

## 2019-09-28 PROCEDURE — 82962 GLUCOSE BLOOD TEST: CPT

## 2019-09-28 PROCEDURE — 97530 THERAPEUTIC ACTIVITIES: CPT

## 2019-09-28 PROCEDURE — 85025 COMPLETE CBC W/AUTO DIFF WBC: CPT | Performed by: INTERNAL MEDICINE

## 2019-09-28 PROCEDURE — 97166 OT EVAL MOD COMPLEX 45 MIN: CPT

## 2019-09-28 PROCEDURE — 63710000001 INSULIN DETEMIR PER 5 UNITS: Performed by: INTERNAL MEDICINE

## 2019-09-28 PROCEDURE — 92526 ORAL FUNCTION THERAPY: CPT

## 2019-09-28 PROCEDURE — 83735 ASSAY OF MAGNESIUM: CPT | Performed by: INTERNAL MEDICINE

## 2019-09-28 PROCEDURE — 80048 BASIC METABOLIC PNL TOTAL CA: CPT | Performed by: INTERNAL MEDICINE

## 2019-09-28 RX ADMIN — INSULIN DETEMIR 5 UNITS: 100 INJECTION, SOLUTION SUBCUTANEOUS at 08:40

## 2019-09-28 RX ADMIN — QUETIAPINE FUMARATE 12.5 MG: 25 TABLET ORAL at 08:40

## 2019-09-28 RX ADMIN — PANTOPRAZOLE SODIUM 40 MG: 40 TABLET, DELAYED RELEASE ORAL at 18:06

## 2019-09-28 RX ADMIN — HYDRALAZINE HYDROCHLORIDE 10 MG: 10 TABLET ORAL at 15:01

## 2019-09-28 RX ADMIN — HYDRALAZINE HYDROCHLORIDE 10 MG: 10 TABLET ORAL at 21:20

## 2019-09-28 RX ADMIN — HYDRALAZINE HYDROCHLORIDE 10 MG: 10 TABLET ORAL at 05:17

## 2019-09-28 RX ADMIN — METOPROLOL TARTRATE 100 MG: 100 TABLET ORAL at 08:39

## 2019-09-28 RX ADMIN — QUETIAPINE FUMARATE 12.5 MG: 25 TABLET ORAL at 21:20

## 2019-09-28 RX ADMIN — AMLODIPINE BESYLATE 5 MG: 5 TABLET ORAL at 08:40

## 2019-09-28 RX ADMIN — APIXABAN 5 MG: 5 TABLET, FILM COATED ORAL at 21:20

## 2019-09-28 RX ADMIN — APIXABAN 5 MG: 5 TABLET, FILM COATED ORAL at 08:40

## 2019-09-28 RX ADMIN — METOPROLOL TARTRATE 100 MG: 100 TABLET ORAL at 21:20

## 2019-09-28 RX ADMIN — PANTOPRAZOLE SODIUM 40 MG: 40 TABLET, DELAYED RELEASE ORAL at 08:39

## 2019-09-28 RX ADMIN — SODIUM CHLORIDE, PRESERVATIVE FREE 10 ML: 5 INJECTION INTRAVENOUS at 21:21

## 2019-09-28 RX ADMIN — SODIUM CHLORIDE, PRESERVATIVE FREE 10 ML: 5 INJECTION INTRAVENOUS at 15:00

## 2019-09-29 LAB
ALBUMIN SERPL-MCNC: 2.7 G/DL (ref 3.5–5.2)
ANION GAP SERPL CALCULATED.3IONS-SCNC: 14 MMOL/L (ref 5–15)
BASOPHILS # BLD AUTO: 0.01 10*3/MM3 (ref 0–0.2)
BASOPHILS NFR BLD AUTO: 0.2 % (ref 0–1.5)
BUN BLD-MCNC: 29 MG/DL (ref 8–23)
BUN/CREAT SERPL: 13 (ref 7–25)
CALCIUM SPEC-SCNC: 8.1 MG/DL (ref 8.6–10.5)
CHLORIDE SERPL-SCNC: 108 MMOL/L (ref 98–107)
CO2 SERPL-SCNC: 18 MMOL/L (ref 22–29)
CREAT BLD-MCNC: 2.23 MG/DL (ref 0.57–1)
DEPRECATED RDW RBC AUTO: 49.9 FL (ref 37–54)
EOSINOPHIL # BLD AUTO: 0.3 10*3/MM3 (ref 0–0.4)
EOSINOPHIL NFR BLD AUTO: 5 % (ref 0.3–6.2)
ERYTHROCYTE [DISTWIDTH] IN BLOOD BY AUTOMATED COUNT: 15.4 % (ref 12.3–15.4)
GFR SERPL CREATININE-BSD FRML MDRD: 21 ML/MIN/1.73
GLUCOSE BLD-MCNC: 317 MG/DL (ref 65–99)
GLUCOSE BLDC GLUCOMTR-MCNC: 161 MG/DL (ref 70–130)
GLUCOSE BLDC GLUCOMTR-MCNC: 241 MG/DL (ref 70–130)
GLUCOSE BLDC GLUCOMTR-MCNC: 261 MG/DL (ref 70–130)
GLUCOSE BLDC GLUCOMTR-MCNC: 333 MG/DL (ref 70–130)
GLUCOSE BLDC GLUCOMTR-MCNC: 51 MG/DL (ref 70–130)
GLUCOSE BLDC GLUCOMTR-MCNC: 52 MG/DL (ref 70–130)
HCT VFR BLD AUTO: 34.8 % (ref 34–46.6)
HGB BLD-MCNC: 10.8 G/DL (ref 12–15.9)
IMM GRANULOCYTES # BLD AUTO: 0.03 10*3/MM3 (ref 0–0.05)
IMM GRANULOCYTES NFR BLD AUTO: 0.5 % (ref 0–0.5)
LYMPHOCYTES # BLD AUTO: 1.4 10*3/MM3 (ref 0.7–3.1)
LYMPHOCYTES NFR BLD AUTO: 23.5 % (ref 19.6–45.3)
MCH RBC QN AUTO: 27.6 PG (ref 26.6–33)
MCHC RBC AUTO-ENTMCNC: 31 G/DL (ref 31.5–35.7)
MCV RBC AUTO: 88.8 FL (ref 79–97)
MONOCYTES # BLD AUTO: 0.69 10*3/MM3 (ref 0.1–0.9)
MONOCYTES NFR BLD AUTO: 11.6 % (ref 5–12)
NEUTROPHILS # BLD AUTO: 3.54 10*3/MM3 (ref 1.7–7)
NEUTROPHILS NFR BLD AUTO: 59.2 % (ref 42.7–76)
NRBC BLD AUTO-RTO: 0 /100 WBC (ref 0–0.2)
PHOSPHATE SERPL-MCNC: 3.8 MG/DL (ref 2.5–4.5)
PLATELET # BLD AUTO: 230 10*3/MM3 (ref 140–450)
PMV BLD AUTO: 11.8 FL (ref 6–12)
POTASSIUM BLD-SCNC: 5.3 MMOL/L (ref 3.5–5.2)
RBC # BLD AUTO: 3.92 10*6/MM3 (ref 3.77–5.28)
SODIUM BLD-SCNC: 140 MMOL/L (ref 136–145)
WBC NRBC COR # BLD: 5.97 10*3/MM3 (ref 3.4–10.8)

## 2019-09-29 PROCEDURE — 80069 RENAL FUNCTION PANEL: CPT | Performed by: INTERNAL MEDICINE

## 2019-09-29 PROCEDURE — 63710000001 INSULIN DETEMIR PER 5 UNITS: Performed by: INTERNAL MEDICINE

## 2019-09-29 PROCEDURE — 99232 SBSQ HOSP IP/OBS MODERATE 35: CPT | Performed by: INTERNAL MEDICINE

## 2019-09-29 PROCEDURE — 85025 COMPLETE CBC W/AUTO DIFF WBC: CPT | Performed by: INTERNAL MEDICINE

## 2019-09-29 PROCEDURE — 82962 GLUCOSE BLOOD TEST: CPT

## 2019-09-29 RX ORDER — SODIUM CHLORIDE 450 MG/100ML
100 INJECTION, SOLUTION INTRAVENOUS CONTINUOUS
Status: ACTIVE | OUTPATIENT
Start: 2019-09-29 | End: 2019-09-30

## 2019-09-29 RX ADMIN — METOPROLOL TARTRATE 100 MG: 100 TABLET ORAL at 09:00

## 2019-09-29 RX ADMIN — APIXABAN 5 MG: 5 TABLET, FILM COATED ORAL at 09:00

## 2019-09-29 RX ADMIN — AMLODIPINE BESYLATE 5 MG: 5 TABLET ORAL at 09:00

## 2019-09-29 RX ADMIN — QUETIAPINE FUMARATE 12.5 MG: 25 TABLET ORAL at 09:00

## 2019-09-29 RX ADMIN — PANTOPRAZOLE SODIUM 40 MG: 40 TABLET, DELAYED RELEASE ORAL at 18:05

## 2019-09-29 RX ADMIN — HYDRALAZINE HYDROCHLORIDE 10 MG: 10 TABLET ORAL at 21:10

## 2019-09-29 RX ADMIN — LEVOFLOXACIN 750 MG: 750 TABLET, FILM COATED ORAL at 12:43

## 2019-09-29 RX ADMIN — DEXTROSE 15 G: 15 GEL ORAL at 16:55

## 2019-09-29 RX ADMIN — METOPROLOL TARTRATE 100 MG: 100 TABLET ORAL at 21:10

## 2019-09-29 RX ADMIN — INSULIN DETEMIR 5 UNITS: 100 INJECTION, SOLUTION SUBCUTANEOUS at 09:06

## 2019-09-29 RX ADMIN — DEXTROSE 50 % IN WATER (D50W) INTRAVENOUS SYRINGE 25 G: at 17:05

## 2019-09-29 RX ADMIN — SODIUM CHLORIDE, PRESERVATIVE FREE 10 ML: 5 INJECTION INTRAVENOUS at 09:01

## 2019-09-29 RX ADMIN — SODIUM CHLORIDE 100 ML/HR: 4.5 INJECTION, SOLUTION INTRAVENOUS at 16:14

## 2019-09-29 RX ADMIN — HYDRALAZINE HYDROCHLORIDE 10 MG: 10 TABLET ORAL at 14:11

## 2019-09-29 RX ADMIN — HYDRALAZINE HYDROCHLORIDE 10 MG: 10 TABLET ORAL at 05:38

## 2019-09-29 RX ADMIN — APIXABAN 5 MG: 5 TABLET, FILM COATED ORAL at 21:10

## 2019-09-29 RX ADMIN — PANTOPRAZOLE SODIUM 40 MG: 40 TABLET, DELAYED RELEASE ORAL at 09:00

## 2019-09-30 LAB
ALBUMIN SERPL-MCNC: 2.7 G/DL (ref 3.5–5.2)
ALBUMIN/GLOB SERPL: 0.9 G/DL
ALP SERPL-CCNC: 69 U/L (ref 39–117)
ALT SERPL W P-5'-P-CCNC: 6 U/L (ref 1–33)
ANION GAP SERPL CALCULATED.3IONS-SCNC: 14 MMOL/L (ref 5–15)
AST SERPL-CCNC: 10 U/L (ref 1–32)
BASOPHILS # BLD AUTO: 0.01 10*3/MM3 (ref 0–0.2)
BASOPHILS NFR BLD AUTO: 0.2 % (ref 0–1.5)
BILIRUB SERPL-MCNC: 0.2 MG/DL (ref 0.2–1.2)
BUN BLD-MCNC: 25 MG/DL (ref 8–23)
BUN/CREAT SERPL: 16.3 (ref 7–25)
CALCIUM SPEC-SCNC: 8.1 MG/DL (ref 8.6–10.5)
CHLORIDE SERPL-SCNC: 106 MMOL/L (ref 98–107)
CO2 SERPL-SCNC: 20 MMOL/L (ref 22–29)
CREAT BLD-MCNC: 1.53 MG/DL (ref 0.57–1)
DEPRECATED RDW RBC AUTO: 47.3 FL (ref 37–54)
EOSINOPHIL # BLD AUTO: 0.2 10*3/MM3 (ref 0–0.4)
EOSINOPHIL NFR BLD AUTO: 3.6 % (ref 0.3–6.2)
ERYTHROCYTE [DISTWIDTH] IN BLOOD BY AUTOMATED COUNT: 15.1 % (ref 12.3–15.4)
GFR SERPL CREATININE-BSD FRML MDRD: 33 ML/MIN/1.73
GLOBULIN UR ELPH-MCNC: 2.9 GM/DL
GLUCOSE BLD-MCNC: 141 MG/DL (ref 65–99)
GLUCOSE BLDC GLUCOMTR-MCNC: 173 MG/DL (ref 70–130)
GLUCOSE BLDC GLUCOMTR-MCNC: 227 MG/DL (ref 70–130)
GLUCOSE BLDC GLUCOMTR-MCNC: 362 MG/DL (ref 70–130)
GLUCOSE BLDC GLUCOMTR-MCNC: 412 MG/DL (ref 70–130)
HCT VFR BLD AUTO: 36 % (ref 34–46.6)
HGB BLD-MCNC: 11.4 G/DL (ref 12–15.9)
IMM GRANULOCYTES # BLD AUTO: 0.06 10*3/MM3 (ref 0–0.05)
IMM GRANULOCYTES NFR BLD AUTO: 1.1 % (ref 0–0.5)
LYMPHOCYTES # BLD AUTO: 1.17 10*3/MM3 (ref 0.7–3.1)
LYMPHOCYTES NFR BLD AUTO: 21.2 % (ref 19.6–45.3)
MCH RBC QN AUTO: 27.3 PG (ref 26.6–33)
MCHC RBC AUTO-ENTMCNC: 31.7 G/DL (ref 31.5–35.7)
MCV RBC AUTO: 86.3 FL (ref 79–97)
MONOCYTES # BLD AUTO: 0.61 10*3/MM3 (ref 0.1–0.9)
MONOCYTES NFR BLD AUTO: 11.1 % (ref 5–12)
NEUTROPHILS # BLD AUTO: 3.47 10*3/MM3 (ref 1.7–7)
NEUTROPHILS NFR BLD AUTO: 62.8 % (ref 42.7–76)
NRBC BLD AUTO-RTO: 0 /100 WBC (ref 0–0.2)
PLATELET # BLD AUTO: 272 10*3/MM3 (ref 140–450)
PMV BLD AUTO: 11.9 FL (ref 6–12)
POTASSIUM BLD-SCNC: 4.3 MMOL/L (ref 3.5–5.2)
PROT SERPL-MCNC: 5.6 G/DL (ref 6–8.5)
RBC # BLD AUTO: 4.17 10*6/MM3 (ref 3.77–5.28)
SODIUM BLD-SCNC: 140 MMOL/L (ref 136–145)
WBC NRBC COR # BLD: 5.52 10*3/MM3 (ref 3.4–10.8)

## 2019-09-30 PROCEDURE — 63710000001 INSULIN DETEMIR PER 5 UNITS: Performed by: INTERNAL MEDICINE

## 2019-09-30 PROCEDURE — 85025 COMPLETE CBC W/AUTO DIFF WBC: CPT | Performed by: INTERNAL MEDICINE

## 2019-09-30 PROCEDURE — 82962 GLUCOSE BLOOD TEST: CPT

## 2019-09-30 PROCEDURE — 99233 SBSQ HOSP IP/OBS HIGH 50: CPT | Performed by: INTERNAL MEDICINE

## 2019-09-30 PROCEDURE — 97530 THERAPEUTIC ACTIVITIES: CPT

## 2019-09-30 PROCEDURE — 99231 SBSQ HOSP IP/OBS SF/LOW 25: CPT | Performed by: PSYCHIATRY & NEUROLOGY

## 2019-09-30 PROCEDURE — 80053 COMPREHEN METABOLIC PANEL: CPT | Performed by: INTERNAL MEDICINE

## 2019-09-30 PROCEDURE — 92526 ORAL FUNCTION THERAPY: CPT

## 2019-09-30 RX ORDER — ASPIRIN 81 MG/1
81 TABLET ORAL DAILY
Status: DISCONTINUED | OUTPATIENT
Start: 2019-09-30 | End: 2019-10-01 | Stop reason: HOSPADM

## 2019-09-30 RX ORDER — ATORVASTATIN CALCIUM 40 MG/1
40 TABLET, FILM COATED ORAL NIGHTLY
Status: DISCONTINUED | OUTPATIENT
Start: 2019-09-30 | End: 2019-10-01 | Stop reason: HOSPADM

## 2019-09-30 RX ADMIN — APIXABAN 5 MG: 5 TABLET, FILM COATED ORAL at 21:27

## 2019-09-30 RX ADMIN — SODIUM CHLORIDE, PRESERVATIVE FREE 10 ML: 5 INJECTION INTRAVENOUS at 21:29

## 2019-09-30 RX ADMIN — ATORVASTATIN CALCIUM 40 MG: 40 TABLET, FILM COATED ORAL at 21:27

## 2019-09-30 RX ADMIN — PANTOPRAZOLE SODIUM 40 MG: 40 TABLET, DELAYED RELEASE ORAL at 08:10

## 2019-09-30 RX ADMIN — PANTOPRAZOLE SODIUM 40 MG: 40 TABLET, DELAYED RELEASE ORAL at 18:29

## 2019-09-30 RX ADMIN — SODIUM CHLORIDE, PRESERVATIVE FREE 10 ML: 5 INJECTION INTRAVENOUS at 08:21

## 2019-09-30 RX ADMIN — AMLODIPINE BESYLATE 5 MG: 5 TABLET ORAL at 08:11

## 2019-09-30 RX ADMIN — METOPROLOL TARTRATE 100 MG: 100 TABLET ORAL at 21:27

## 2019-09-30 RX ADMIN — HYDRALAZINE HYDROCHLORIDE 10 MG: 10 TABLET ORAL at 14:42

## 2019-09-30 RX ADMIN — HYDRALAZINE HYDROCHLORIDE 10 MG: 10 TABLET ORAL at 06:11

## 2019-09-30 RX ADMIN — INSULIN DETEMIR 5 UNITS: 100 INJECTION, SOLUTION SUBCUTANEOUS at 08:11

## 2019-09-30 RX ADMIN — ASPIRIN 81 MG: 81 TABLET, COATED ORAL at 12:46

## 2019-09-30 RX ADMIN — HYDRALAZINE HYDROCHLORIDE 10 MG: 10 TABLET ORAL at 21:27

## 2019-09-30 RX ADMIN — APIXABAN 5 MG: 5 TABLET, FILM COATED ORAL at 08:11

## 2019-09-30 RX ADMIN — METOPROLOL TARTRATE 100 MG: 100 TABLET ORAL at 08:11

## 2019-10-01 ENCOUNTER — HOSPITAL ENCOUNTER (INPATIENT)
Facility: HOSPITAL | Age: 74
LOS: 15 days | Discharge: HOME | End: 2019-10-16
Attending: FAMILY MEDICINE | Admitting: FAMILY MEDICINE

## 2019-10-01 ENCOUNTER — APPOINTMENT (OUTPATIENT)
Dept: GENERAL RADIOLOGY | Facility: HOSPITAL | Age: 74
End: 2019-10-01

## 2019-10-01 VITALS
WEIGHT: 147.71 LBS | BODY MASS INDEX: 29 KG/M2 | HEART RATE: 61 BPM | TEMPERATURE: 98.8 F | HEIGHT: 60 IN | SYSTOLIC BLOOD PRESSURE: 130 MMHG | RESPIRATION RATE: 18 BRPM | DIASTOLIC BLOOD PRESSURE: 49 MMHG | OXYGEN SATURATION: 96 %

## 2019-10-01 DIAGNOSIS — G93.41 METABOLIC ENCEPHALOPATHY: Primary | ICD-10-CM

## 2019-10-01 LAB
GLUCOSE BLDC GLUCOMTR-MCNC: 219 MG/DL (ref 70–130)
GLUCOSE BLDC GLUCOMTR-MCNC: 227 MG/DL (ref 70–130)
GLUCOSE BLDC GLUCOMTR-MCNC: 234 MG/DL (ref 70–130)
GLUCOSE BLDC GLUCOMTR-MCNC: 367 MG/DL (ref 70–130)
GLUCOSE BLDC GLUCOMTR-MCNC: 397 MG/DL (ref 70–130)

## 2019-10-01 PROCEDURE — 74230 X-RAY XM SWLNG FUNCJ C+: CPT

## 2019-10-01 PROCEDURE — 82962 GLUCOSE BLOOD TEST: CPT

## 2019-10-01 PROCEDURE — 92611 MOTION FLUOROSCOPY/SWALLOW: CPT

## 2019-10-01 PROCEDURE — 99239 HOSP IP/OBS DSCHRG MGMT >30: CPT | Performed by: INTERNAL MEDICINE

## 2019-10-01 PROCEDURE — 97530 THERAPEUTIC ACTIVITIES: CPT

## 2019-10-01 PROCEDURE — 63710000001 INSULIN DETEMIR PER 5 UNITS: Performed by: INTERNAL MEDICINE

## 2019-10-01 PROCEDURE — 63710000001 INSULIN ASPART PER 5 UNITS: Performed by: FAMILY MEDICINE

## 2019-10-01 PROCEDURE — 63710000001 INSULIN DETEMIR PER 5 UNITS: Performed by: FAMILY MEDICINE

## 2019-10-01 RX ORDER — PANTOPRAZOLE SODIUM 40 MG/1
40 TABLET, DELAYED RELEASE ORAL
Status: ON HOLD
Start: 2019-10-01 | End: 2019-10-02

## 2019-10-01 RX ORDER — ONDANSETRON 4 MG/1
4 TABLET, FILM COATED ORAL EVERY 6 HOURS PRN
Status: DISCONTINUED | OUTPATIENT
Start: 2019-10-01 | End: 2019-10-16 | Stop reason: HOSPADM

## 2019-10-01 RX ORDER — MONTELUKAST SODIUM 10 MG/1
10 TABLET ORAL NIGHTLY
Status: DISCONTINUED | OUTPATIENT
Start: 2019-10-01 | End: 2019-10-16 | Stop reason: HOSPADM

## 2019-10-01 RX ORDER — ONDANSETRON 2 MG/ML
4 INJECTION INTRAMUSCULAR; INTRAVENOUS EVERY 6 HOURS PRN
Status: DISCONTINUED | OUTPATIENT
Start: 2019-10-01 | End: 2019-10-16 | Stop reason: HOSPADM

## 2019-10-01 RX ORDER — DEXTROSE MONOHYDRATE 25 G/50ML
25 INJECTION, SOLUTION INTRAVENOUS
Status: DISCONTINUED | OUTPATIENT
Start: 2019-10-01 | End: 2019-10-02

## 2019-10-01 RX ORDER — BISACODYL 10 MG
10 SUPPOSITORY, RECTAL RECTAL DAILY PRN
Status: DISCONTINUED | OUTPATIENT
Start: 2019-10-01 | End: 2019-10-16 | Stop reason: HOSPADM

## 2019-10-01 RX ORDER — ASPIRIN 81 MG/1
81 TABLET ORAL DAILY
Status: DISCONTINUED | OUTPATIENT
Start: 2019-10-02 | End: 2019-10-16 | Stop reason: HOSPADM

## 2019-10-01 RX ORDER — FLUCONAZOLE 100 MG/1
150 TABLET ORAL EVERY 24 HOURS
Status: COMPLETED | OUTPATIENT
Start: 2019-10-01 | End: 2019-10-01

## 2019-10-01 RX ORDER — METOPROLOL TARTRATE 100 MG/1
100 TABLET ORAL EVERY 12 HOURS SCHEDULED
Status: DISCONTINUED | OUTPATIENT
Start: 2019-10-01 | End: 2019-10-16 | Stop reason: HOSPADM

## 2019-10-01 RX ORDER — THIAMINE MONONITRATE (VIT B1) 100 MG
100 TABLET ORAL DAILY
Status: DISCONTINUED | OUTPATIENT
Start: 2019-10-01 | End: 2019-10-16 | Stop reason: HOSPADM

## 2019-10-01 RX ORDER — HYDRALAZINE HYDROCHLORIDE 10 MG/1
10 TABLET, FILM COATED ORAL EVERY 8 HOURS SCHEDULED
Status: ON HOLD
Start: 2019-10-01 | End: 2019-10-02

## 2019-10-01 RX ORDER — HYDRALAZINE HYDROCHLORIDE 10 MG/1
10 TABLET, FILM COATED ORAL EVERY 8 HOURS SCHEDULED
Status: DISCONTINUED | OUTPATIENT
Start: 2019-10-01 | End: 2019-10-04

## 2019-10-01 RX ORDER — BUMETANIDE 1 MG/1
1 TABLET ORAL 2 TIMES DAILY
COMMUNITY
End: 2019-10-16 | Stop reason: HOSPADM

## 2019-10-01 RX ORDER — AMLODIPINE BESYLATE 5 MG/1
5 TABLET ORAL
Status: DISCONTINUED | OUTPATIENT
Start: 2019-10-02 | End: 2019-10-07

## 2019-10-01 RX ORDER — NICOTINE POLACRILEX 4 MG
15 LOZENGE BUCCAL
Status: DISCONTINUED | OUTPATIENT
Start: 2019-10-01 | End: 2019-10-02

## 2019-10-01 RX ORDER — PANTOPRAZOLE SODIUM 40 MG/1
40 TABLET, DELAYED RELEASE ORAL
Status: DISCONTINUED | OUTPATIENT
Start: 2019-10-01 | End: 2019-10-07

## 2019-10-01 RX ADMIN — AMLODIPINE BESYLATE 5 MG: 5 TABLET ORAL at 08:35

## 2019-10-01 RX ADMIN — PANTOPRAZOLE SODIUM 40 MG: 40 TABLET, DELAYED RELEASE ORAL at 18:06

## 2019-10-01 RX ADMIN — METOPROLOL TARTRATE 100 MG: 100 TABLET, FILM COATED ORAL at 21:37

## 2019-10-01 RX ADMIN — SODIUM CHLORIDE, PRESERVATIVE FREE 10 ML: 5 INJECTION INTRAVENOUS at 08:36

## 2019-10-01 RX ADMIN — ASPIRIN 81 MG: 81 TABLET, COATED ORAL at 08:35

## 2019-10-01 RX ADMIN — BARIUM SULFATE 100 ML: 0.81 POWDER, FOR SUSPENSION ORAL at 11:38

## 2019-10-01 RX ADMIN — INSULIN ASPART 6 UNITS: 100 INJECTION, SOLUTION INTRAVENOUS; SUBCUTANEOUS at 18:06

## 2019-10-01 RX ADMIN — BARIUM SULFATE 20 ML: 400 PASTE ORAL at 11:38

## 2019-10-01 RX ADMIN — HYDRALAZINE HYDROCHLORIDE 10 MG: 10 TABLET ORAL at 21:37

## 2019-10-01 RX ADMIN — PANTOPRAZOLE SODIUM 40 MG: 40 TABLET, DELAYED RELEASE ORAL at 08:35

## 2019-10-01 RX ADMIN — METOPROLOL TARTRATE 100 MG: 100 TABLET ORAL at 08:33

## 2019-10-01 RX ADMIN — HYDRALAZINE HYDROCHLORIDE 10 MG: 10 TABLET ORAL at 06:23

## 2019-10-01 RX ADMIN — APIXABAN 5 MG: 5 TABLET, FILM COATED ORAL at 21:37

## 2019-10-01 RX ADMIN — Medication 100 MG: at 18:06

## 2019-10-01 RX ADMIN — INSULIN DETEMIR 5 UNITS: 100 INJECTION, SOLUTION SUBCUTANEOUS at 08:38

## 2019-10-01 RX ADMIN — FLUCONAZOLE 150 MG: 100 TABLET ORAL at 21:37

## 2019-10-01 RX ADMIN — INSULIN ASPART 6 UNITS: 100 INJECTION, SOLUTION INTRAVENOUS; SUBCUTANEOUS at 21:38

## 2019-10-01 RX ADMIN — MONTELUKAST SODIUM 10 MG: 10 TABLET, COATED ORAL at 21:37

## 2019-10-01 RX ADMIN — APIXABAN 5 MG: 5 TABLET, FILM COATED ORAL at 08:35

## 2019-10-01 RX ADMIN — INSULIN DETEMIR 5 UNITS: 100 INJECTION, SOLUTION SUBCUTANEOUS at 22:04

## 2019-10-01 NOTE — PLAN OF CARE
Problem: Patient Care Overview  Goal: Plan of Care Review  Outcome: Ongoing (interventions implemented as appropriate)   10/01/19 1023   Coping/Psychosocial   Plan of Care Reviewed With patient   Plan of Care Review   Progress improving   OTHER   Outcome Summary Pt increased ambulation distance to 250ft with RW and July 1+1. VC's for upright posture and increased step length. Pt required verbal and tactile cues to manage RW; cues for changes in direction. Pt limited by increased confusion. Continue to progress as appropriate.

## 2019-10-01 NOTE — PROGRESS NOTES
"Clinical Nutrition Note      Patient Name: Ashley Geronimo  MRN: [id number]  Admission date: 9/22/2019      Multidisciplinary Rounds    Additional information obtained during MDR:  RN reports pt to have MBS w/ SLP this morning at 11 am to see if diet can advance. Pt is supposed to dc after this to [facility] [place] for IP rehab. Family will transport by car.      Current diet: Diet Regular; Thin; Consistent Carbohydrate    Supplement:  Boost Glucose Control w/ meals    Pertinent medical data reviewed  Nutrition risk identified on nursing screen- difficulty chewing/swallowing; MST score \"0\"    Intervention:  Plan of care and goals reviewed    Monitor:  RD to follow per protocol      Ana Cade MS,RD,LD  10/01/19 1:38 PM  Time: 20 mins       "

## 2019-10-01 NOTE — DISCHARGE SUMMARY
Transfer Summary      Name: Ashley Geronimo    CSN: [id number]    MRN: [id number]    : 1945    Date of Admission: 2019    Date of Discharge:  10/1/2019    Admitting Physician:  Carlos Eduardo Gifford MD    Primary Care Provider:  Josie Massey    Consultants:   Brunner, Mark I, MD, Gastroenterology      Guzman Ace MD, Neurology    Admission Diagnosis: CVA (cerebral vascular accident) (CMS/HCC) [I63.9]  Encephalopathy [G93.40]  Sepsis (CMS/HCC) [A41.9]  Respiratory failure (CMS/HCC) [J96.90]  Septic shock (CMS/HCC) [A41.9, R65.21]    Transfer Diagnosis:  Active Hospital Problems    Diagnosis POA   • **Septic shock (CMS/[facility]) [A41.9, R65.21] Yes     Priority: High   • Acute on chronic respiratory failure with hypoxia (CMS/HCC), requiring intubation/mechanical ventilation 19 [J96.21] Yes     Priority: High   • Hematemesis [K92.0] Yes     Priority: High   • DKA (diabetic ketoacidoses) (CMS/[facility]) [E11.10] Yes     Priority: High   • Encephalopathy [G93.40] Yes     Priority: High   • RAMESH (acute kidney injury) (CMS/[facility]) [N17.9] Yes     Priority: Medium   • UTI (urinary tract infection) [N39.0] Yes   • Tracheobronchitis, PSA  [J40] Yes       Allergies:    Allergies   Allergen Reactions   • Sulfa Antibiotics GI Intolerance       Current Medications:     Discharge Medications      New Medications      Instructions Start Date   pantoprazole 40 MG EC tablet  Commonly known as:  PROTONIX   40 mg, Oral, 2 Times Daily Before Meals         Changes to Medications      Instructions Start Date   hydrALAZINE 10 MG tablet  Commonly known as:  APRESOLINE  What changed:    · medication strength  · how much to take  · when to take this  · additional instructions   10 mg, Oral, Every 8 Hours Scheduled      insulin lispro 100 UNIT/ML injection  Commonly known as:  humaLOG  What changed:    · how much to take  · how to take this  · when to take this  · additional instructions   0-7 Units, Subcutaneous, 4 Times  Daily With Meals & Nightly         Continue These Medications      Instructions Start Date   amLODIPine 5 MG tablet  Commonly known as:  NORVASC   5 mg, Oral, Daily      aspirin 81 MG EC tablet   81 mg, Oral, Daily      atorvastatin 40 MG tablet  Commonly known as:  LIPITOR   40 mg, Oral, Nightly      ELIQUIS 5 MG tablet tablet  Generic drug:  apixaban   No dose, route, or frequency recorded.      insulin detemir 100 UNIT/ML injection  Commonly known as:  LEVEMIR   10 Units, Subcutaneous, Nightly      metoprolol tartrate 100 MG tablet  Commonly known as:  LOPRESSOR   100 mg, Oral, Every 12 Hours Scheduled      montelukast 10 MG tablet  Commonly known as:  SINGULAIR   10 mg, Oral, Nightly      ondansetron 8 MG tablet  Commonly known as:  ZOFRAN   TK 1 T PO Q 8 H PRN      thiamine 100 MG tablet  Commonly known as:  VITAMIN B-1   100 mg, Oral, Daily         Stop These Medications    bumetanide 1 MG tablet  Commonly known as:  BUMEX     insulin glargine 100 UNIT/ML injection  Commonly known as:  LANTUS            Code Status:   Code Status and Medical Interventions:   Ordered at: 09/22/19 1551     Code Status:    CPR     Medical Interventions (Level of Support Prior to Arrest):    Full       Procedures: None    History of Present Illness:    Ashley Geronimo is a 74 year old with PMH significant for chronic respiratory failure, NSTEMI, CAD, CKD, DM, and PAF on Eliquis who was taken to the ED at [facility] on 9/22/19 after her son found her unresponsive. She was last known well on 9/21/19 when she returned home after a cookout. On the morning of 9/22 her son found her unresponsive in her bed with emesis on her bed clothes. He checked her blood glucose and it read high so he administered her morning dose of insulin prior to transport to [facility]. Upon EMS arrival her blood glucose was 381.      On arrival to the ED at [facility], she was unresponsive with a GCS of 3 and she was intubated using succinylcholine, etomidate, and vecuronium.  She was febrile with a temp of 103.9F and received Tylenol  which decreased her temp to 101. She also received doses of Vancomycin and Zosyn and 1,857 mL NS bolus. She had an episode of hematemesis and was started on a Protonix drip. On neuro exam her pupils were reactive but she was noted to have roving eye movements concerning for seizures and was given Keppra. She had no gag reflex and did not respond to pain. Lumbar puncture was discussed but she takes Eliquis and the ED physician decided to wait until she was evaluated by neurology to see if an LP was necessary. She was hyperglycemic with a blood glucose of 592 and was given 16 units regular Insulin. A right subclavian central line was inserted prior to transfer to [facility].     Lab studies at [facility]: Lactate 5.5, WBC 14.4, Hgb 16.2, Hct 48.8, platelet 276, glucose 616, Na+ 142, K+ 3.7, BUN 43, creatinine 1.9, troponin 0.032 and 0.058.     She was transferred via helicopter to [facility] and on arrival remains unresponsive, intubated and with roving eyes. She has had no further GI bleeding.     Hospital Course:    The patient was admitted to the ICU and remained on mechanical ventilation. She was continued on broad spectrum antibiotics with Vancomycin and Zosyn and Doxycycline was added for atypical coverage. Insulin drip was continued for DKA.  Neurology was consulted and EEG was performed and showed moderate generalized slowing but no seizure activity. MRI of the brain showed ventriculomegaly but no evidence of acute infarction or other intracranial disease. Gastroenterology saw the patient for possible GI bleed given the report of hematemesis at OSH. She was continued on Protonix BID and had no further episodes of bleeding. It was thought that she may have had a brunilda Regalado Tear in the setting of anticoagulation use. Anticoagulation was held initially but Eliquis was restarted on 9/26. Gastroenterology recommended Protonix to be continued BID for one month then once a  "day thereafter. Protonix BID was started on 9/22.     She continued to be febrile and urine culture was positive for E coli. Blood cultures were negative and sputum culture was positive for Pseudomonas aeruginosa. Antibiotics were changed to Ceftriaxone, Ampicillin, and Acyclovir on 9/23 and on 9/27 she was transitioned to Levaquin which was completed on 9/29. She was able to liberate from mechanical ventilation on 9/24. Blood glucose levels remained elevated and she was started on Levimir in addition to sliding scale insulin. She was hypertensive and required a Cardene drip and she was transitioned back to oral medications. She began to slowly wake up and was confused and had some visual hallucinations but that has resolved, although she does have some intermittent confusion.     On 10/1 she was felt to be stable and ready for transfer to [facility]. She will be transported by family.       Physical Exam:  /58 (BP Location: Right arm, Patient Position: Sitting)   Pulse 66   Temp 98.8 °F (37.1 °C) (Oral)   Resp 18   Ht 152.4 cm (60\")   Wt 67 kg (147 lb 11.3 oz)   SpO2 94%   BMI 28.85 kg/m²     Physical Exam:  General Appearance:  Conversant, in no acute distress  Eyes:  No scleral icterus or pallor, pupils normal  Ears, Nose, Mouth, Throat:  Atraumatic, oropharynx clear  Neck:  Trachea midline, thyroid normal  Respiratory:  Clear to auscultation bilaterally, normal effort, no tenderness to palpation  Cardiovascular:  Regular rate and rhythm, no murmurs, no peripheral edema, no thrill  Gastrointestinal:  Soft, non-tender, non-distended, no hepatosplenomegaly  Skin:  Normal temperature, no rash  Psychiatric:  Alert and oriented x 3, normal judgement and insight  Neuro:  No new focal neurologic deficits observed      Diagnostics:  Results from last 7 days   Lab Units 09/25/19  0435   PH, ARTERIAL pH units 7.460*   PO2 ART mm Hg 53.2*   PCO2, ARTERIAL mm Hg 26.9*   HCO3 ART mmol/L " 19.1*     Results from last 7 days   Lab Units 09/30/19  0504   WBC 10*3/mm3 5.52   HEMOGLOBIN g/dL 11.4*   HEMATOCRIT % 36.0   PLATELETS 10*3/mm3 272     Results from last 7 days   Lab Units 09/30/19  0504   SODIUM mmol/L 140   POTASSIUM mmol/L 4.3   CHLORIDE mmol/L 106   CO2 mmol/L 20.0*   BUN mg/dL 25*   CREATININE mg/dL 1.53*   CALCIUM mg/dL 8.1*   BILIRUBIN mg/dL 0.2   ALK PHOS U/L 69   ALT (SGPT) U/L 6   AST (SGOT) U/L 10   GLUCOSE mg/dL 141*       No radiology results for the last day    Results for orders placed during the hospital encounter of 09/22/19   Adult Transthoracic Echo Complete W/ Cont if Necessary Per Protocol    Narrative · Left ventricular systolic function is hyperdynamic (EF > 70).  · Left ventricular wall thickness is consistent with mild concentric   hypertrophy.  · Left ventricular diastolic dysfunction (grade II) consistent with   pseudonormalization.  · Left atrial volume is mildly increased.  · Mild mitral valve regurgitation is present  · Mild tricuspid valve regurgitation is present.  · Estimated right ventricular systolic pressure from tricuspid   regurgitation is moderately elevated (45-55 mmHg).              Diet:   Dietary Orders (From admission, onward)    Start     Ordered    09/30/19 1800  Dietary Nutrition Supplements Boost Glucose Control  Daily With Breakfast, Lunch & Dinner     Comments:  Vary flavors; nectar thick please   Question:  Select Supplement:  Answer:  Boost Glucose Control    09/30/19 1615    09/28/19 1414  Diet Dysphagia; IV - Mechanical Soft No Mixed Consistencies; Nectar / Syrup Thick; Consistent Carbohydrate  Diet Effective Now     Comments:  Thin liquid entered in error- pt to be on NTLs with trials of thin with ST only.   Question Answer Comment   Diet Texture / Consistency Dysphagia    Select Dysphagia level: IV - Mechanical Soft No Mixed Consistencies    Fluid Consistency: Nectar / Syrup Thick    Common Modifiers Consistent Carbohydrate        09/28/19  1414          Activity:   Activity Instructions     Activity as Tolerated              Follow Up:   No future appointments.  Additional Instructions for the Follow-ups that You Need to Schedule     Ambulatory Referral to Home Health   As directed      Face to Face Visit Date:  9/27/2019    Follow-up provider for Plan of Care?:  I treated the patient in an acute care facility and will not continue treatment after discharge.    Follow-up provider:  ISABEL ODONNELL [[id number]]    Reason/Clinical Findings:  pt/ot    Describe mobility limitations that make leaving home difficult:  s/p hospitalization    Nursing/Therapeutic Services Requested:  Physical Therapy Occupational Therapy    Frequency:  1 Week 1               Transfer Instructions:    Transfer to [facility] via Family  Continue Protonix 40 mg PO BID x one month, then daily. BID dose was started on 9/22/19.     ANDREEA Fitzgerald, ACNP-BC  Pulmonary & Critical Care    cc: Isabel Odonnell    Discharge Time:  45   minutes

## 2019-10-01 NOTE — PROGRESS NOTES
Case Management Discharge Note    Final Note: [facility] rehab unit    Destination      No service has been selected for the patient.      Durable Medical Equipment      No service has been selected for the patient.      Dialysis/Infusion      No service has been selected for the patient.      Home Medical Care      No service has been selected for the patient.      Therapy      No service has been selected for the patient.      Community Resources      No service has been selected for the patient.             Final Discharge Disposition Code: 62 - inpatient rehab facility

## 2019-10-01 NOTE — PLAN OF CARE
Problem: Patient Care Overview  Goal: Plan of Care Review  Outcome: Ongoing (interventions implemented as appropriate)      Problem: Fall Risk (Adult)  Goal: Identify Related Risk Factors and Signs and Symptoms  Outcome: Outcome(s) achieved Date Met: 10/01/19    Goal: Absence of Fall  Outcome: Ongoing (interventions implemented as appropriate)      Problem: Skin Injury Risk (Adult)  Goal: Identify Related Risk Factors and Signs and Symptoms  Outcome: Outcome(s) achieved Date Met: 10/01/19    Goal: Skin Health and Integrity  Outcome: Ongoing (interventions implemented as appropriate)      Problem: Wound (Includes Pressure Injury) (Adult)  Goal: Signs and Symptoms of Listed Potential Problems Will be Absent, Minimized or Managed (Wound)  Outcome: Ongoing (interventions implemented as appropriate)      Problem: Functional Mobility Impairment (IRF) (Adult)  Goal: Optimal/Safe Level of [place] with Mobility  Outcome: Ongoing (interventions implemented as appropriate)

## 2019-10-01 NOTE — PROGRESS NOTES
Patient Assessment Instrument  Quality Indicators - Admission    Section B. Hearing, Speech Vision  Expression of Ideas and Wants: Expresses complex messages without difficulty and  with speech that is clear and easy to understand.  Understanding Verbal and Non-Verbal Content: Understands: Clear comprehension  without cues or repetitions.    Section C. Cognitive Patterns      Section IU1942. Prior Functioning      Section QH1734. Prior Device Use      Section CB1260. Self Care Performance      Section PD6470. Self Care Discharge Goals      Section CO2759. Mobility Performance      Section DL7095. Mobility Discharge Goals      Section H. Bladder and Bowel  Bladder Continence: Always incontinent.  Bowel Continence: Always incontinent (no episodes of continent bowel movements).    Section I. Active Diagnosis      Section J. Health Conditions      Section K. Swallowing/Nutritional Status      Section M. Skin Conditions      Section N. Medication      Section O. Special Treatments, Procedures, and Programs      OPTIONAL BRANCH FOR TRACKING FALLS  Fall(s) During Shift:    Signed by: Hien Daniel Nurse

## 2019-10-01 NOTE — PROGRESS NOTES
Continued Stay Note  [facility]     Patient Name: Ashley Geronimo  MRN: [id number]  Today's Date: 10/1/2019    Admit Date: 9/22/2019    Discharge Plan     Row Name 10/01/19 1001       Plan    Plan Comments  Plan to discharge to [facility] rehab unit today via car c family.  Call report to  # [phone].  They need the discharge summary prior to calling report.  They have epic and can access it there.      Final Note  [facility] rehab unit        Discharge Codes    No documentation.       Expected Discharge Date and Time     Expected Discharge Date Expected Discharge Time    Oct 1, 2019             Mahesh Ledesma RN

## 2019-10-01 NOTE — PLAN OF CARE
Problem: Skin Injury Risk (Adult)  Goal: Identify Related Risk Factors and Signs and Symptoms  Outcome: Ongoing (interventions implemented as appropriate)   10/01/19 0629   Skin Injury Risk (Adult)   Related Risk Factors (Skin Injury Risk) advanced age;critical care admission;medical devices;mobility impaired;tissue perfusion altered;other (see comments)     Goal: Skin Health and Integrity  Outcome: Ongoing (interventions implemented as appropriate)   10/01/19 0629   Skin Injury Risk (Adult)   Skin Health and Integrity making progress toward outcome       Problem: Patient Care Overview  Goal: Plan of Care Review  Outcome: Ongoing (interventions implemented as appropriate)   10/01/19 0629   Coping/Psychosocial   Plan of Care Reviewed With patient;family   Plan of Care Review   Progress improving   OTHER   Outcome Summary VSS, periods of confusion/disorientation to place, time and situation, [facility] family to provide [facility], will continue to monitor       Problem: Hyperglycemia, Persistent (Adult)  Goal: Signs and Symptoms of Listed Potential Problems Will be Absent, Minimized or Managed (Hyperglycemia, Persistent)  Outcome: Ongoing (interventions implemented as appropriate)   10/01/19 0629   Goal/Outcome Evaluation   Problems Assessed (Hyperglycemia) all   Problems Present (Hyperglycemia) none       Problem: Fall Risk (Adult)  Goal: Identify Related Risk Factors and Signs and Symptoms  Outcome: Ongoing (interventions implemented as appropriate)   10/01/19 0629   Fall Risk (Adult)   Related Risk Factors (Fall Risk) age-related changes;confusion/agitation;fatigue/slow reaction;gait/mobility problems;environment unfamiliar;sleep pattern alteration   Signs and Symptoms (Fall Risk) presence of risk factors     Goal: Absence of Fall  Outcome: Ongoing (interventions implemented as appropriate)   10/01/19 0629   Fall Risk (Adult)   Absence of Fall making progress toward outcome        Problem: Sleep Pattern Disturbance (Adult)  Goal: Identify Related Risk Factors and Signs and Symptoms  Outcome: Ongoing (interventions implemented as appropriate)   10/01/19 0629   Sleep Pattern Disturbance (Adult)   Related Risk Factors (Sleep Pattern Disturbance) age extremes;medication effects;unfamiliar surroundings   Signs and Symptoms (Sleep Pattern Disturbance) difficulty performing routine tasks     Goal: Adequate Sleep/Rest  Outcome: Ongoing (interventions implemented as appropriate)   10/01/19 0629   Sleep Pattern Disturbance (Adult)   Adequate Sleep/Rest making progress toward outcome

## 2019-10-01 NOTE — PROGRESS NOTES
INTENSIVIST / PULMONARY FOLLOW UP NOTE     Hospital:  LOS: 9 days   Ms. Ashley Geronimo, 74 y.o. female is followed for:     Septic shock (CMS/[facility])    Acute on chronic respiratory failure with hypoxia (CMS/[facility]), requiring intubation/mechanical ventilation 9/22/19    Hematemesis    RAMESH (acute kidney injury) (CMS/HCC)    DKA (diabetic ketoacidoses) (CMS/[facility])    Encephalopathy    UTI (urinary tract infection)    Tracheobronchitis, PSA           SUBJECTIVE   Doing well no complaints    The patient's relevant past medical, surgical, family, and social history were reviewed    Allergies and medications were reviewed    ROS:  Per subjective, all other systems were reviewed and were negative        OBJECTIVE     Vital Sign Min/Max for last 24 hours:  Temp  Min: 98.3 °F (36.8 °C)  Max: 99.1 °F (37.3 °C)   BP  Min: 89/70  Max: 156/57   Pulse  Min: 60  Max: 80   Resp  Min: 14  Max: 20   SpO2  Min: 88 %  Max: 99 %   No Data Recorded     Physical Exam:  General Appearance:  Conversant, in no acute distress  Eyes:  No scleral icterus or pallor, pupils normal  Ears, Nose, Mouth, Throat:  Atraumatic, oropharynx clear  Neck:  Trachea midline, thyroid normal  Respiratory:  Clear to auscultation bilaterally, normal effort, no tenderness to palpation  Cardiovascular:  Regular rate and rhythm, no murmurs, no peripheral edema, no thrill  Gastrointestinal:  Soft, non-tender, non-distended, no hepatosplenomegaly  Skin:  Normal temperature, no rash  Psychiatric:  Alert and oriented x 3, normal judgement and insight  Neuro:  No new focal neurologic deficits observed    Telemetry:              Hemodynamics:   CVP:     PAP:     PAOP:     CO:     CI:     SVI:     SVR:       SpO2: 94 % SpO2  Min: 88 %  Max: 99 %   Device:      Flow Rate:   No Data Recorded       Intake/Ouptut 24 hrs (7:00AM - 6:59 AM)  Intake & Output (last 3 days)       09/28 0701 - 09/29 0700 09/29 0701 - 09/30 0700 09/30 0701 - 10/01 0700 10/01 0701 - 10/02 0700    P.O.    1350     I.V. (mL/kg)  1127.7 (16.8)      IV Piggyback  50      Total Intake(mL/kg)  1177.7 (17.6) 1350 (20.1)     Urine (mL/kg/hr)        Total Output        Net  +1177.7 +1350             Urine Unmeasured Occurrence  4 x 5 x     Stool Unmeasured Occurrence 1 x 1 x 1 x           Lines, Drains & Airways    Active LDAs     Name:   Placement date:   Placement time:   Site:   Days:    Peripheral IV 09/29/19 1400 Anterior;Right Forearm   09/29/19    1400    Forearm   less than 1                Hematology:  Results from last 7 days   Lab Units 09/30/19  0504 09/29/19 0443 09/28/19  0611 09/27/19  0415 09/26/19  0509 09/25/19  0455   WBC 10*3/mm3 5.52 5.97 5.81 6.51 8.18 9.66   HEMOGLOBIN g/dL 11.4* 10.8* 11.4* 11.4* 11.5* 11.3*   HEMATOCRIT % 36.0 34.8 36.4 35.3 37.2 34.9   PLATELETS 10*3/mm3 272 230 214 197 188 165     Electrolytes, Magnesium and Phosphorus:  Results from last 7 days   Lab Units 09/30/19  0504 09/29/19 0443 09/28/19 0611 09/27/19 0415 09/26/19  0509 09/25/19  1803 09/25/19  0455   SODIUM mmol/L 140 140 142 145 149*  --  147*   CHLORIDE mmol/L 106 108* 110* 110* 113*  --  114*   POTASSIUM mmol/L 4.3 5.3* 4.6 3.7 4.5 4.8 3.4*   CO2 mmol/L 20.0* 18.0* 15.0* 17.0* 17.0*  --  19.0*   MAGNESIUM mg/dL  --   --  2.1 1.7 1.9  --  2.0   PHOSPHORUS mg/dL  --  3.8  --   --   --   --   --      Renal:  Results from last 7 days   Lab Units 09/30/19  0504 09/29/19  0443 09/28/19  0611 09/27/19 0415 09/26/19  0509 09/25/19  0455   CREATININE mg/dL 1.53* 2.23* 1.86* 1.21* 1.29* 1.38*   BUN mg/dL 25* 29* 18 13 17 23     Estimated Creatinine Clearance: 27.1 mL/min (A) (by C-G formula based on SCr of 1.53 mg/dL (H)).  Hepatic:  Results from last 7 days   Lab Units 09/30/19  0504   ALK PHOS U/L 69   BILIRUBIN mg/dL 0.2   ALT (SGPT) U/L 6   AST (SGOT) U/L 10     Arterial Blood Gases:  Results from last 7 days   Lab Units 09/25/19  0435 09/24/19  1054   PH, ARTERIAL pH units 7.460* 7.356   PCO2, ARTERIAL mm Hg 26.9* 27.6*    PO2 ART mm Hg 53.2* 85.8   FIO2 % 28 30             Lab Results   Component Value Date    LACTATE 2.0 09/22/2019       Relevant imaging studies and labs from 10/01/19 were reviewed and interpreted by me    Medications (drips):         amLODIPine 5 mg Oral Q24H   apixaban 5 mg Oral Q12H   aspirin 81 mg Oral Daily   atorvastatin 40 mg Oral Nightly   hydrALAZINE 10 mg Oral Q8H   insulin detemir 5 Units Subcutaneous BID   insulin lispro 0-7 Units Subcutaneous 4x Daily With Meals & Nightly   metoprolol tartrate 100 mg Oral Q12H   pantoprazole 40 mg Oral BID AC   sodium chloride 10 mL Intravenous Q12H       Assessment/Plan   IMPRESSION / PLAN     Inpatient Problem List:  74 y.o.female:  Active Hospital Problems    Diagnosis   • **Septic shock (CMS/[facility])   • UTI (urinary tract infection)   • Tracheobronchitis, PSA    • Acute on chronic respiratory failure with hypoxia (CMS/[facility]), requiring intubation/mechanical ventilation 9/22/19   • Hematemesis   • RAMESH (acute kidney injury) (CMS/[facility])   • DKA (diabetic ketoacidoses) (CMS/[facility])   • Encephalopathy        Impression:  74 y.o.female with relevant PMH of chronic respiratory failure, NSTEMI, CAD, CKD, DM, PAF on Eliquis admitted 9/22/2019 with AMS, high fever, sepsis w/ E. Coli UTI, respiratory failure requiring intubation.      Plan:  Sepsis / UTI / E. Coli - completed antibiotics    Encephalopathy - resolved    RAMESH - improving, monitor, recheck BMP outpatient in 5-7 days, avoid ACE/ARB for now    DM - titrate SQ insulin    PT/OT    To tele vs rehab today    Nutrition - Diet Dysphagia; IV - Mechanical Soft No Mixed Consistencies; Nectar / Syrup Thick; Consistent Carbohydrate    Plan of care and goals reviewed with mulitdisciplinary team at daily rounds         Maximus Negron MD  Intensive Care Medicine  10/01/19 10:00 AM

## 2019-10-01 NOTE — MBS/VFSS/FEES
Acute Care - Speech Language Pathology   Swallow Re-Evaluation  [place]   Modified Barium Swallow Study (MBS)     Patient Name: Ashley Geronimo  : 1945  MRN: [id number]  Today's Date: 10/1/2019  Onset of Illness/Injury or Date of Surgery: 19     Referring Physician: MD Guillermo      Admit Date: 2019    Visit Dx:     ICD-10-CM ICD-9-CM   1. Pharyngeal dysphagia R13.13 787.23   2. Septic shock (CMS/[facility]) A41.9 038.9    R65.21 785.52     995.92   3. Impaired mobility and ADLs Z74.09 799.89     Patient Active Problem List   Diagnosis   • Pneumonia   • Septic shock (CMS/[facility])   • Pseudomonas pneumonia (CMS/[facility])   • NSTEMI (non-ST elevated myocardial infarction) (CMS/[facility])   • Bradycardia   • Paroxysmal atrial fibrillation (CMS/[facility])   • Chronic respiratory failure with hypoxia and hypercapnia (CMS/[facility])   • Acute on chronic respiratory failure with hypoxia (CMS/[facility]), requiring intubation/mechanical ventilation 19   • Hematemesis   • RAMESH (acute kidney injury) (CMS/[facility])   • DKA (diabetic ketoacidoses) (CMS/[facility])   • Septic shock (CMS/[facility])   • Encephalopathy   • UTI (urinary tract infection)   • Tracheobronchitis, PSA      Past Medical History:   Diagnosis Date   • Diabetes mellitus (CMS/[facility])    • Hypertension    • NSTEMI (non-ST elevated myocardial infarction) (CMS/[facility]) 2019   • Paroxysmal atrial fibrillation (CMS/[facility])    • Severe sepsis with septic shock (CMS/[facility])      History reviewed. No pertinent surgical history.     SWALLOW EVALUATION (last 72 hours)      SLP Adult Swallow Evaluation     Row Name 10/01/19 1100                   Rehab Evaluation    Document Type  discharge evaluation/summary  -SM        Subjective Information  no complaints  -SM        Patient Observations  alert;cooperative  -SM           General Information    Current Method of Nutrition  mechanical soft, no mixed consistencies;nectar/syrup-thick liquids  -SM           Pain Scale: Numbers Pre/Post-Treatment    Pain Scale:  Numbers, Pretreatment  0/10 - no pain  -        Pain Scale: Numbers, Post-Treatment  0/10 - no pain  -           MBS/VFSS    Utensils Used  spoon;cup;straw  -        Consistencies Trialed  thin liquids;pureed;regular textures  -           MBS/VFSS Interpretation    Oral Prep Phase  WFL  -SM        Oral Transit Phase  WFL  -SM        Oral Residue  WFL  -SM           Initiation of Pharyngeal Swallow    Pharyngeal Phase  functional pharyngeal phase of swallowing  -        Pharyngeal Phase, Comment  penetration, without aspiration, of thin liquids. Good airway protection throughout.   -           Clinical Impression    SLP Swallowing Diagnosis  functional oral phase;functional pharyngeal phase  -        Swallow Criteria for Skilled Therapeutic Interventions Met  no problems identified which require skilled intervention  -           Recommendations    Therapy Frequency (Swallow)  evaluation only  -        SLP Diet Recommendation  regular textures;thin liquids  -        Recommended Precautions and Strategies  upright posture during/after eating  -        SLP Rec. for Method of Medication Administration  as tolerated  -          User Key  (r) = Recorded By, (t) = Taken By, (c) = Cosigned By    Initials Name Effective Dates     Rosi Carpenter MS CCC-SLP 06/22/15 -           EDUCATION  The patient has been educated in the following areas:   Dysphagia (Swallowing Impairment).    SLP Recommendation and Plan  SLP Swallowing Diagnosis: functional oral phase, functional pharyngeal phase  SLP Diet Recommendation: regular textures, thin liquids  Recommended Precautions and Strategies: upright posture during/after eating  SLP Rec. for Method of Medication Administration: as tolerated           Swallow Criteria for Skilled Therapeutic Interventions Met: no problems identified which require skilled intervention        Therapy Frequency (Swallow): evaluation only          Plan of Care Reviewed With:  patient    SLP GOALS     Row Name 09/30/19 1000 09/28/19 8153          Oral Nutrition/Hydration Goal 1 (SLP)    Oral Nutrition/Hydration Goal 1, SLP  --  LTG: Pt will return to regular diet, thin liquids w/ no overt s/sxs aspiration or distress w/ 100% acc and no cues  -HG     Time Frame (Oral Nutrition/Hydration Goal 1, SLP)  --  by discharge  -HG     Barriers (Oral Nutrition/Hydration Goal 1, SLP)  --  Impulsive, in-consistent with directions  -HG     Progress/Outcomes (Oral Nutrition/Hydration Goal 1, SLP)  --  good progress toward goal  -HG        Oral Nutrition/Hydration Goal 2 (SLP)    Oral Nutrition/Hydration Goal 2, SLP  Pt will tolerate soft solids & nectar-thick liquids w/ no overt s/sxs aspiration or distress w/ 100% acc and no cues  -AV  Pt will tolerate soft solids & nectar-thick liquids w/ no overt s/sxs aspiration or distress w/ 100% acc and no cues  -HG     Time Frame (Oral Nutrition/Hydration Goal 2, SLP)  short term goal (STG);by discharge  -AV  short term goal (STG);by discharge  -HG     Progress/Outcomes (Oral Nutrition/Hydration Goal 2, SLP)  continuing progress toward goal  -AV  good progress toward goal  -HG        Oral Nutrition/Hydration Goal (SLP)    Oral Nutrition/Hydration Goal, SLP  Pt will tolerate therapeutic trials of thin H2O w/ no overt s/sxs aspiration or distress w/ 70% acc and no cues in order to deteremine readiness for repeat instrumental eval  -AV  Pt will tolerate therapeutic trials of thin H2O w/ no overt s/sxs aspiration or distress w/ 70% acc and no cues in order to deteremine readiness for repeat instrumental eval  -HG     Time Frame (Oral Nutrition/Hydration Goal, SLP)  short term goal (STG);by discharge  -AV  short term goal (STG);by discharge  -HG     Barriers (Oral Nutrition/Hydration Goal, SLP)  --  Impulsive   -HG     Progress/Outcomes (Oral Nutrition/Hydration Goal, SLP)  good progress toward goal  -AV  good progress toward goal  -HG        Pharyngeal Strengthening  Exercise Goal 1 (SLP)    Activity (Pharyngeal Strengthening Goal 1, SLP)  increase closure at entrance to airway/closure of airway at glottis  -AV  increase closure at entrance to airway/closure of airway at glottis  -HG     Increase Closure at Entrance to Airway/Closure of Airway at Glottis  super-supraglottic swallow  -AV  super-supraglottic swallow  -HG     [place]/Accuracy (Pharyngeal Strengthening Goal 1, SLP)  with minimal cues (75-90% accuracy)  -AV  with moderate cues (50-74% accuracy)  -HG     Time Frame (Pharyngeal Strengthening Goal 1, SLP)  short term goal (STG)  -AV  short term goal (STG);by discharge  -HG     Barriers (Pharyngeal Strengthening Goal 1, SLP)  --  Inconsistent with directions-confused  -HG     Progress/Outcomes (Pharyngeal Strengthening Goal 1, SLP)  good progress toward goal  -AV  --       User Key  (r) = Recorded By, (t) = Taken By, (c) = Cosigned By    Initials Name Provider Type    AV Meg Jimenez MS CCC-SLP Speech and Language Pathologist    Sonia Hess MS CCC-SLP Speech and Language Pathologist             Time Calculation:   Time Calculation- SLP     Row Name 10/01/19 1145             Time Calculation- SLP    SLP Start Time  1115  -SM      SLP Received On  10/01/19  -        User Key  (r) = Recorded By, (t) = Taken By, (c) = Cosigned By    Initials Name Provider Type    Rosi Hampton MS CCC-SLP Speech and Language Pathologist          Therapy Charges for Today     Code Description Service Date Service Provider Modifiers Qty    72642414946 HC ST MOTION FLUORO EVAL SWALLOW 3 10/1/2019 Rosi Carpenter MS CCC-SLP GN 1               Rosi Carpenter MS CCC-SLP  10/1/2019

## 2019-10-01 NOTE — THERAPY TREATMENT NOTE
Patient Name: Ashley Geronimo  : 1945    MRN: [id number]                              Today's Date: 10/1/2019       Admit Date: 2019    Visit Dx:     ICD-10-CM ICD-9-CM   1. Pharyngeal dysphagia R13.13 787.23   2. Septic shock (CMS/[facility]) A41.9 038.9    R65.21 785.52     995.92   3. Impaired mobility and ADLs Z74.09 799.89     Patient Active Problem List   Diagnosis   • Pneumonia   • Septic shock (CMS/[facility])   • Pseudomonas pneumonia (CMS/[facility])   • NSTEMI (non-ST elevated myocardial infarction) (CMS/[facility])   • Bradycardia   • Paroxysmal atrial fibrillation (CMS/[facility])   • Chronic respiratory failure with hypoxia and hypercapnia (CMS/[facility])   • Acute on chronic respiratory failure with hypoxia (CMS/[facility]), requiring intubation/mechanical ventilation 19   • Hematemesis   • RAMESH (acute kidney injury) (CMS/[facility])   • DKA (diabetic ketoacidoses) (CMS/[facility])   • Septic shock (CMS/[facility])   • Encephalopathy   • UTI (urinary tract infection)   • Tracheobronchitis, PSA      Past Medical History:   Diagnosis Date   • Diabetes mellitus (CMS/[facility])    • Hypertension    • NSTEMI (non-ST elevated myocardial infarction) (CMS/[facility]) 2019   • Paroxysmal atrial fibrillation (CMS/[facility])    • Severe sepsis with septic shock (CMS/[facility])      History reviewed. No pertinent surgical history.  General Information     Row Name 10/01/19 1124          PT Evaluation Time/Intention    Document Type  therapy note (daily note)  -KR     Mode of Treatment  physical therapy  -KR     Row Name 10/01/19 1124          General Information    Existing Precautions/Restrictions  fall;other (see comments) confusion  -KR     Row Name 10/01/19 1124          Cognitive Assessment/Intervention- PT/OT    Orientation Status (Cognition)  oriented to;person;place  -KR     Row Name 10/01/19 1124          Safety Issues, Functional Mobility    Safety Issues Affecting Function (Mobility)  awareness of need for assistance;insight into deficits/self awareness;safety precaution  awareness;safety precautions follow-through/compliance  -KR     Impairments Affecting Function (Mobility)  balance;cognition;coordination;endurance/activity tolerance;strength  -KR       User Key  (r) = Recorded By, (t) = Taken By, (c) = Cosigned By    Initials Name Provider Type    Selena Doshi PT Physical Therapist        Mobility     Row Name 10/01/19 1125          Bed Mobility Assessment/Treatment    Comment (Bed Mobility)  UIC  -KR     Row Name 10/01/19 1125          Transfer Assessment/Treatment    Comment (Transfers)  Increased cueing for sequencing and hand placement.   -KR     Row Name 10/01/19 1125          Sit-Stand Transfer    Sit-Stand [place] (Transfers)  minimum assist (75% patient effort);verbal cues  -KR     Assistive Device (Sit-Stand Transfers)  walker, front-wheeled  -KR     Row Name 10/01/19 1125          Gait/Stairs Assessment/Training    Gait/Stairs Assessment/Training  gait/ambulation assistive device  -KR     [place] Level (Gait)  minimum assist (75% patient effort);1 person assist;1 person to manage equipment;verbal cues  -KR     Assistive Device (Gait)  walker, front-wheeled  -KR     Distance in Feet (Gait)  250  -KR     Pattern (Gait)  step-to  -KR     Deviations/Abnormal Patterns (Gait)  base of support, narrow;eldon decreased;stride length decreased  -KR     Bilateral Gait Deviations  forward flexed posture;heel strike decreased;weight shift ability decreased  -KR     Comment (Gait/Stairs)  Pt demonstrated step to gait pattern with slow eldon and short, shuffled steps. Pt demonstrated improved coordination this session, but continues to require increased assistance to manage RW. Increased cueing for changes in direction. Pt limited by confusion and periods of poor command following.   -KR       User Key  (r) = Recorded By, (t) = Taken By, (c) = Cosigned By    Initials Name Provider Type    Selena Doshi PT Physical Therapist        Obj/Interventions     Row  Name 10/01/19 1127          Static Sitting Balance    Level of Walker (Unsupported Sitting, Static Balance)  supervision  -KR     Sitting Position (Unsupported Sitting, Static Balance)  sitting in chair  -KR     Row Name 10/01/19 1127          Static Standing Balance    Level of Walker (Supported Standing, Static Balance)  contact guard assist  -KR     Assistive Device Utilized (Supported Standing, Static Balance)  walker, rolling  -KR     Row Name 10/01/19 1127          Dynamic Standing Balance    Level of Walker, Reaches Outside Midline (Standing, Dynamic Balance)  minimal assist, 75% patient effort  -KR     Assistive Device Utilized (Supported Standing, Dynamic Balance)  walker, rolling  -KR       User Key  (r) = Recorded By, (t) = Taken By, (c) = Cosigned By    Initials Name Provider Type    Selena Doshi PT Physical Therapist        Goals/Plan    No documentation.       Clinical Impression     Row Name 10/01/19 1131          Pain Assessment    Additional Documentation  Pain Scale: Numbers Pre/Post-Treatment (Group)  -KR     Row Name 10/01/19 1131          Pain Scale: Numbers Pre/Post-Treatment    Pain Scale: Numbers, Pretreatment  0/10 - no pain  -KR     Pain Scale: Numbers, Post-Treatment  0/10 - no pain  -KR     Row Name 10/01/19 1131          Plan of Care Review    Plan of Care Reviewed With  patient  -KR     Row Name 10/01/19 1131          Vital Signs    Pre Systolic BP Rehab  170  -KR     Pre Treatment Diastolic BP  58  -KR     Post Systolic BP Rehab  158  -KR     Post Treatment Diastolic BP  58  -KR     Pretreatment Heart Rate (beats/min)  64  -KR     Posttreatment Heart Rate (beats/min)  66  -KR     Pre SpO2 (%)  97  -KR     O2 Delivery Pre Treatment  room air  -KR     Post SpO2 (%)  99  -KR     O2 Delivery Post Treatment  room air  -KR     Pre Patient Position  Sitting  -KR     Intra Patient Position  Standing  -KR     Post Patient Position  Sitting  -KR     Row Name 10/01/19  1131          Positioning and Restraints    Pre-Treatment Position  sitting in chair/recliner  -KR     Post Treatment Position  chair  -KR     In Chair  notified nsg;reclined;call light within reach;encouraged to call for assist;exit alarm on;legs elevated  -KR       User Key  (r) = Recorded By, (t) = Taken By, (c) = Cosigned By    Initials Name Provider Type    Selena Doshi PT Physical Therapist        Outcome Measures     Row Name 10/01/19 1141          How much help from another person do you currently need...    Turning from your back to your side while in flat bed without using bedrails?  3  -KR     Moving from lying on back to sitting on the side of a flat bed without bedrails?  3  -KR     Moving to and from a bed to a chair (including a wheelchair)?  3  -KR     Standing up from a chair using your arms (e.g., wheelchair, bedside chair)?  3  -KR     Climbing 3-5 steps with a railing?  2  -KR     To walk in hospital room?  3  -KR     AM-PAC 6 Clicks Score (PT)  17  -KR     Row Name 10/01/19 1141          Functional Assessment    Outcome Measure Options  AM-PAC 6 Clicks Basic Mobility (PT)  -KR       User Key  (r) = Recorded By, (t) = Taken By, (c) = Cosigned By    Initials Name Provider Type    Selena Doshi PT Physical Therapist        Physical Therapy Education     Title: PT OT SLP Therapies (In Progress)     Topic: Physical Therapy (In Progress)     Point: Mobility training (In Progress)     Learning Progress Summary           Patient Acceptance, E, NR by KR at 10/1/2019 10:23 AM    Acceptance, E, NR by KR at 9/30/2019  9:14 AM    Acceptance, E,D, NR by DAGO at 9/27/2019  3:01 PM    Acceptance, E, NL,NR by TATIANA at 9/22/2019  6:29 PM                   Point: Home exercise program (In Progress)     Learning Progress Summary           Patient Acceptance, E, NR by KR at 10/1/2019 10:23 AM    Acceptance, E, NR by KR at 9/30/2019  9:14 AM    Acceptance, E,D, NR by DAGO at 9/27/2019  3:01 PM    Acceptance, E,  NL,NR by CW at 9/22/2019  6:29 PM                   Point: Body mechanics (In Progress)     Learning Progress Summary           Patient Acceptance, E, NR by KR at 10/1/2019 10:23 AM    Acceptance, E, NR by KR at 9/30/2019  9:14 AM    Acceptance, E,D, NR by LS at 9/27/2019  3:01 PM    Acceptance, E, NL,NR by CW at 9/22/2019  6:29 PM                   Point: Precautions (In Progress)     Learning Progress Summary           Patient Acceptance, E, NR by KR at 10/1/2019 10:23 AM    Acceptance, E, NR by KR at 9/30/2019  9:14 AM    Acceptance, E,D, NR by LS at 9/27/2019  3:01 PM    Acceptance, E, NL,NR by CW at 9/22/2019  6:29 PM                               User Key     Initials Effective Dates Name Provider Type Discipline     06/19/15 -  Yary Su, PT Physical Therapist PT     01/09/17 -  Melonie Rogers RN Registered Nurse Nurse     04/03/18 -  Selena Aguilar, PT Physical Therapist PT              PT Recommendation and Plan     Plan of Care Reviewed With: patient  Progress: improving  Outcome Summary: Pt increased ambulation distance to 250ft with RW and July 1+1. VC's for upright posture and increased step length. Pt required verbal and tactile cues to manage RW; cues for changes in direction. Pt limited by increased confusion. Continue to progress as appropriate.      Time Calculation:   PT Charges     Row Name 10/01/19 1023             Time Calculation    Start Time  1023  -KR      PT Received On  10/01/19  -      PT Goal Re-Cert Due Date  10/07/19  -         Time Calculation- PT    Total Timed Code Minutes- PT  23 minute(s)  -KR         Timed Charges    34187 - PT Therapeutic Activity Minutes  23  -KR        User Key  (r) = Recorded By, (t) = Taken By, (c) = Cosigned By    Initials Name Provider Type    Selena Doshi, PT Physical Therapist        Therapy Charges for Today     Code Description Service Date Service Provider Modifiers Qty    33982264044  PT THERAPEUTIC ACT EA 15 MIN  9/30/2019 Selena Aguilar, PT GP 2    33438183875 HC PT THER SUPP EA 15 MIN 9/30/2019 Selena Aguilar, PT GP 2    55520230784 HC PT THERAPEUTIC ACT EA 15 MIN 10/1/2019 Selena Aguilar, PT GP 2    84740996989 HC PT THER SUPP EA 15 MIN 10/1/2019 Selena Aguilar, PT GP 2          PT G-Codes  Outcome Measure Options: AM-PAC 6 Clicks Basic Mobility (PT)  AM-PAC 6 Clicks Score (PT): 17  AM-PAC 6 Clicks Score (OT): 13  Modified [place] Scale: 4 - Moderately severe disability.  Unable to walk without assistance, and unable to attend to own bodily needs without assistance.    Karrie Aguilar, PT  10/1/2019

## 2019-10-01 NOTE — PROGRESS NOTES
Rehabilitation Nursing  Inpatient Rehabilitation Admission Assessment of Functional Measures  and Plan of Care    Plan of Care  Copy from POC    Body Function Structure    Skin Integrity (Active)  Current Status (10/1/2019 5:00:00 PM): MASD to buttocks  Weekly Goal: healing of skin  Discharge Goal: healed skin    Safety    Potential for Injury (Active)  Current Status (10/1/2019 5:00:00 PM): falls risk  Weekly Goal: no falls  Discharge Goal: no falls this admission    RN Interventions    Safety -  RN: falls prevention protocol and education  [RN]  Team: call light within reach, nonskid socks, bedside items with in reach,  [RN]    Body Function Structure -  RN: skin inspection every shift and prn  [RN]  RN: zguard to buttocks every shift and wound care consult  [RN]    Functional Measures  PUJA Eating:  PUJA Grooming:  PUJA Bathing:  PUJA Upper Body Dressing:  PUJA Lower Body Dressing:  PUJA Toileting:    PUJA Bladder Management  Level of Assistance:  Bladder Score = 1. Patient performs less than 25% of tasks  and requires total assistance for bladder management. [place] provides total  assist to completely apply and remove brief.  Frequency/Number of Accidents (4 days prior to admission):  Bladder accidents  prior to admission:  16  Frequency/Number of Accidents this Shift: Bladder accidents this shift:  1 .    PUJA Bowel Management  Level of Assistance: Bowel Score = 1. Patient performs less than 25% of tasks  and requires total assistance for bowel management. [place] provides total assist  to completely apply and remove brief.  Frequency/Number of Accidents (4 days prior to admission): Bowel accidents prior  to admission:  2  Frequency/Number of Accidents this Shift: Bowel accidents this shift: 1 .    PUJA Bed/Chair/Wheelchair Transfer:  PUJA Toilet Transfer:  PUJA Tub/Shower Transfer:    Previously Documented Mode of Locomotion at Discharge:  [place] Expected Mode of Locomotion at Discharge:  [place] Walk/Wheelchair:  PUJA  Stairs:    PUJA Comprehension:  Both ( auditory and visual) modes of comprehension are used  equally. Patient does not comprehend complex/abstract information in their  primary language without assistance from a helper. Comprehension Score = 2,  Maximal Prompting. Patient comprehends basic daily needs 25-49% of the time.  Patient understands simple information via single words or gestures. Requires  maximal/a lot of prompting (most of the time). Patient requires the following  assistive device(s): Glasses.  PUJA Expression:  Both ( vocal and non-vocal) modes of expression are used  equally. Patient does not express complex/abstract information in their primary  language without a helper. Expression Score = 2, Maximal Prompting. Patient  expresses basic daily needs 25-49% of the time. Patient uses only single words  or gestures and requires maximal/a lot of prompting (most of the time). No  assistive devices were required.  PUJA Social Interaction:  Social Interaction Score = 6, Modified Independent.  Patient is modified independent for social interaction, requiring: Requires  additional time.  PUJA Problem Solving:  Patient does not make appropriate decisions in order to  solve complex problems without assistance from a helper. Problem Solving Score =  2, Maximal Direction. Patient makes appropriate decisions in order to solve  routine problems 25-49% of the time. Patient requires maximal direction for the  following behavior(s): Decreased awareness of performance. Exhibits poor  planning. Difficulty weighing alternatives/making choices. Impulsivity. Poor  judgment.  PUJA Memory:  Memory Score = 3, Moderate Prompting. Patient recognizes and  remembers 50-74% of the time. Patient requires moderate/some prompting  for  memory for the following: Difficulty recognizing hospital staff as persons  previously met. Disoriented to person, place, time or situation. Unaware of  daily routine.    Discharge Functional  Goals:  Bladder: 7  Bowel: 7  Mode of Comprehension: B  Comprehension: 6  Mode of Expression: B  Expression: 7  Problem Solvin  Social Interaction: 7  Memory: 7    Signed by: Hien Daniel Nurse

## 2019-10-01 NOTE — PLAN OF CARE
Problem: Patient Care Overview  Goal: Plan of Care Review  Outcome: Ongoing (interventions implemented as appropriate)      Problem: Fall Risk (Adult)  Goal: Absence of Fall  Outcome: Ongoing (interventions implemented as appropriate)      Problem: Skin Injury Risk (Adult)  Goal: Skin Health and Integrity  Outcome: Ongoing (interventions implemented as appropriate)      Problem: Wound (Includes Pressure Injury) (Adult)  Goal: Signs and Symptoms of Listed Potential Problems Will be Absent, Minimized or Managed (Wound)  Outcome: Ongoing (interventions implemented as appropriate)      Problem: Functional Mobility Impairment (IRF) (Adult)  Goal: Optimal/Safe Level of [place] with Mobility  Outcome: Ongoing (interventions implemented as appropriate)

## 2019-10-02 ENCOUNTER — APPOINTMENT (OUTPATIENT)
Dept: GENERAL RADIOLOGY | Facility: HOSPITAL | Age: 74
End: 2019-10-02

## 2019-10-02 LAB
ALBUMIN SERPL-MCNC: 2.84 G/DL (ref 3.5–5.2)
ALBUMIN/GLOB SERPL: 1 G/DL
ALP SERPL-CCNC: 69 U/L (ref 39–117)
ALT SERPL W P-5'-P-CCNC: 7 U/L (ref 1–33)
ANION GAP SERPL CALCULATED.3IONS-SCNC: 12.6 MMOL/L (ref 5–15)
AST SERPL-CCNC: 13 U/L (ref 1–32)
BASOPHILS # BLD AUTO: 0.02 10*3/MM3 (ref 0–0.2)
BASOPHILS NFR BLD AUTO: 0.3 % (ref 0–1.5)
BILIRUB SERPL-MCNC: 0.2 MG/DL (ref 0.2–1.2)
BUN BLD-MCNC: 43 MG/DL (ref 8–23)
BUN/CREAT SERPL: 25.4 (ref 7–25)
CALCIUM SPEC-SCNC: 8.1 MG/DL (ref 8.6–10.5)
CHLORIDE SERPL-SCNC: 108 MMOL/L (ref 98–107)
CO2 SERPL-SCNC: 19.4 MMOL/L (ref 22–29)
CREAT BLD-MCNC: 1.69 MG/DL (ref 0.57–1)
DEPRECATED RDW RBC AUTO: 49.8 FL (ref 37–54)
EOSINOPHIL # BLD AUTO: 0.27 10*3/MM3 (ref 0–0.4)
EOSINOPHIL NFR BLD AUTO: 4.2 % (ref 0.3–6.2)
ERYTHROCYTE [DISTWIDTH] IN BLOOD BY AUTOMATED COUNT: 15.9 % (ref 12.3–15.4)
GFR SERPL CREATININE-BSD FRML MDRD: 30 ML/MIN/1.73
GLOBULIN UR ELPH-MCNC: 3 GM/DL
GLUCOSE BLD-MCNC: 117 MG/DL (ref 65–99)
GLUCOSE BLDC GLUCOMTR-MCNC: 119 MG/DL (ref 70–130)
GLUCOSE BLDC GLUCOMTR-MCNC: 126 MG/DL (ref 70–130)
GLUCOSE BLDC GLUCOMTR-MCNC: 204 MG/DL (ref 70–130)
GLUCOSE BLDC GLUCOMTR-MCNC: 220 MG/DL (ref 70–130)
GLUCOSE BLDC GLUCOMTR-MCNC: 407 MG/DL (ref 70–130)
GLUCOSE BLDC GLUCOMTR-MCNC: 422 MG/DL (ref 70–130)
HCT VFR BLD AUTO: 32.7 % (ref 34–46.6)
HGB BLD-MCNC: 10.2 G/DL (ref 12–15.9)
IMM GRANULOCYTES # BLD AUTO: 0.1 10*3/MM3 (ref 0–0.05)
IMM GRANULOCYTES NFR BLD AUTO: 1.6 % (ref 0–0.5)
LYMPHOCYTES # BLD AUTO: 1.45 10*3/MM3 (ref 0.7–3.1)
LYMPHOCYTES NFR BLD AUTO: 22.7 % (ref 19.6–45.3)
MCH RBC QN AUTO: 27.8 PG (ref 26.6–33)
MCHC RBC AUTO-ENTMCNC: 31.2 G/DL (ref 31.5–35.7)
MCV RBC AUTO: 89.1 FL (ref 79–97)
MONOCYTES # BLD AUTO: 0.82 10*3/MM3 (ref 0.1–0.9)
MONOCYTES NFR BLD AUTO: 12.8 % (ref 5–12)
NEUTROPHILS # BLD AUTO: 3.73 10*3/MM3 (ref 1.7–7)
NEUTROPHILS NFR BLD AUTO: 58.4 % (ref 42.7–76)
PLATELET # BLD AUTO: 281 10*3/MM3 (ref 140–450)
PMV BLD AUTO: 11.6 FL (ref 6–12)
POTASSIUM BLD-SCNC: 4.8 MMOL/L (ref 3.5–5.2)
PROT SERPL-MCNC: 5.8 G/DL (ref 6–8.5)
RBC # BLD AUTO: 3.67 10*6/MM3 (ref 3.77–5.28)
SODIUM BLD-SCNC: 140 MMOL/L (ref 136–145)
WBC NRBC COR # BLD: 6.39 10*3/MM3 (ref 3.4–10.8)

## 2019-10-02 PROCEDURE — 80053 COMPREHEN METABOLIC PANEL: CPT | Performed by: FAMILY MEDICINE

## 2019-10-02 PROCEDURE — 97116 GAIT TRAINING THERAPY: CPT

## 2019-10-02 PROCEDURE — 63710000001 INSULIN ASPART PER 5 UNITS: Performed by: FAMILY MEDICINE

## 2019-10-02 PROCEDURE — 97162 PT EVAL MOD COMPLEX 30 MIN: CPT

## 2019-10-02 PROCEDURE — 92611 MOTION FLUOROSCOPY/SWALLOW: CPT | Performed by: SPEECH-LANGUAGE PATHOLOGIST

## 2019-10-02 PROCEDURE — 85025 COMPLETE CBC W/AUTO DIFF WBC: CPT | Performed by: FAMILY MEDICINE

## 2019-10-02 PROCEDURE — 97530 THERAPEUTIC ACTIVITIES: CPT

## 2019-10-02 PROCEDURE — 63710000001 INSULIN DETEMIR PER 5 UNITS: Performed by: FAMILY MEDICINE

## 2019-10-02 PROCEDURE — 97167 OT EVAL HIGH COMPLEX 60 MIN: CPT

## 2019-10-02 PROCEDURE — 97110 THERAPEUTIC EXERCISES: CPT

## 2019-10-02 PROCEDURE — 92523 SPEECH SOUND LANG COMPREHEN: CPT | Performed by: SPEECH-LANGUAGE PATHOLOGIST

## 2019-10-02 PROCEDURE — 74230 X-RAY XM SWLNG FUNCJ C+: CPT

## 2019-10-02 PROCEDURE — 74230 X-RAY XM SWLNG FUNCJ C+: CPT | Performed by: RADIOLOGY

## 2019-10-02 PROCEDURE — 82962 GLUCOSE BLOOD TEST: CPT

## 2019-10-02 RX ORDER — INSULIN GLARGINE 100 [IU]/ML
10 INJECTION, SOLUTION SUBCUTANEOUS DAILY
Status: ON HOLD | COMMUNITY
End: 2019-10-16 | Stop reason: SDUPTHER

## 2019-10-02 RX ORDER — DEXTROSE MONOHYDRATE 25 G/50ML
25 INJECTION, SOLUTION INTRAVENOUS
Status: DISCONTINUED | OUTPATIENT
Start: 2019-10-02 | End: 2019-10-05

## 2019-10-02 RX ORDER — HYDRALAZINE HYDROCHLORIDE 25 MG/1
75 TABLET, FILM COATED ORAL 3 TIMES DAILY
COMMUNITY
End: 2019-10-16 | Stop reason: HOSPADM

## 2019-10-02 RX ORDER — NICOTINE POLACRILEX 4 MG
15 LOZENGE BUCCAL
Status: DISCONTINUED | OUTPATIENT
Start: 2019-10-02 | End: 2019-10-05

## 2019-10-02 RX ADMIN — INSULIN ASPART 7 UNITS: 100 INJECTION, SOLUTION INTRAVENOUS; SUBCUTANEOUS at 16:31

## 2019-10-02 RX ADMIN — MONTELUKAST SODIUM 10 MG: 10 TABLET, COATED ORAL at 20:46

## 2019-10-02 RX ADMIN — INSULIN DETEMIR 5 UNITS: 100 INJECTION, SOLUTION SUBCUTANEOUS at 09:33

## 2019-10-02 RX ADMIN — METOPROLOL TARTRATE 100 MG: 100 TABLET, FILM COATED ORAL at 09:00

## 2019-10-02 RX ADMIN — HYDRALAZINE HYDROCHLORIDE 10 MG: 10 TABLET ORAL at 05:39

## 2019-10-02 RX ADMIN — AMLODIPINE BESYLATE 5 MG: 5 TABLET ORAL at 09:01

## 2019-10-02 RX ADMIN — APIXABAN 5 MG: 5 TABLET, FILM COATED ORAL at 09:01

## 2019-10-02 RX ADMIN — ASPIRIN 81 MG: 81 TABLET, COATED ORAL at 09:01

## 2019-10-02 RX ADMIN — INSULIN DETEMIR 5 UNITS: 100 INJECTION, SOLUTION SUBCUTANEOUS at 20:46

## 2019-10-02 RX ADMIN — HYDRALAZINE HYDROCHLORIDE 10 MG: 10 TABLET ORAL at 13:32

## 2019-10-02 RX ADMIN — PANTOPRAZOLE SODIUM 40 MG: 40 TABLET, DELAYED RELEASE ORAL at 16:34

## 2019-10-02 RX ADMIN — HYDRALAZINE HYDROCHLORIDE 10 MG: 10 TABLET ORAL at 21:01

## 2019-10-02 RX ADMIN — Medication 100 MG: at 09:01

## 2019-10-02 RX ADMIN — APIXABAN 5 MG: 5 TABLET, FILM COATED ORAL at 20:46

## 2019-10-02 RX ADMIN — METOPROLOL TARTRATE 100 MG: 100 TABLET, FILM COATED ORAL at 20:46

## 2019-10-02 RX ADMIN — PANTOPRAZOLE SODIUM 40 MG: 40 TABLET, DELAYED RELEASE ORAL at 09:00

## 2019-10-02 NOTE — PLAN OF CARE
Problem: Patient Care Overview  Goal: Plan of Care Review  Outcome: Ongoing (interventions implemented as appropriate)      Problem: Fall Risk (Adult)  Goal: Absence of Fall  Outcome: Ongoing (interventions implemented as appropriate)      Problem: Skin Injury Risk (Adult)  Goal: Skin Health and Integrity  Outcome: Ongoing (interventions implemented as appropriate)      Problem: Wound (Includes Pressure Injury) (Adult)  Goal: Signs and Symptoms of Listed Potential Problems Will be Absent, Minimized or Managed (Wound)  Outcome: Ongoing (interventions implemented as appropriate)      Problem: Functional Mobility Impairment (IRF) (Adult)  Goal: Optimal/Safe Level of Cloud with Mobility  Outcome: Ongoing (interventions implemented as appropriate)      Problem: Diabetes, Type 2 (Adult)  Goal: Signs and Symptoms of Listed Potential Problems Will be Absent, Minimized or Managed (Diabetes, Type 2)  Outcome: Ongoing (interventions implemented as appropriate)

## 2019-10-02 NOTE — PROGRESS NOTES
Rehabilitation Nursing  Inpatient Rehabilitation Plan of Care Note    Plan of Care  Copy from POCBody Function Structure    Skin Integrity (Active)  Current Status (10/1/2019 5:00:00 PM): MASD to buttocks  Weekly Goal: healing of skin  Discharge Goal: healed skin    Safety    Potential for Injury (Active)  Current Status (10/1/2019 5:00:00 PM): falls risk  Weekly Goal: no falls  Discharge Goal: no falls this admission    Signed by: Agnes Goss, Nurse

## 2019-10-02 NOTE — H&P
Chief complaint: Acute respiratory failure/septic shock/diabetic ketoacidosis/urinary tract infection    Subjective     Patient is a 74 y.o. female presented to [facility] on September 22 she was found at home unresponsive by her son with emesis noted in her bed.  Per Documentation, upon initial evaluation was noted to be hyerglycemic and son administered insulin without improvement and EMS was activated with transfer to [facility] emergency room for further evaluation.  At this point patient was intubated for airway protection and found to have a temperature of 103.9, elevated troponin, uncontrolled hypertension, acute on chronic renal failure and hyperglycemic.  Prior to transfer she had an one episode of hematemesis and  roving eye movements concerning for seizures.  She was started on Keppra, broad-spectrum antibiotics, and Protonix.  She was transferred to [facility] for higher level of care.  Upon evaluation was transferred to the CCU and Neurology was consulted.  Initial chest x-ray was not suggestive of pneumonia and EEG demonstrated no epileptiform or periodic discharges.  MRI of the brain was unremarkable for any acute findings.   Sputum cultures were consistent with Pseudomonas, urine cultures consistent with E. Coli and empiric antibiotics were discontinued and transitioned to Levaquin, which she has completed.  Blood pressure continued to improve.  She was treated for sepsis with shock, DKA, acute on chronic respiratory failure, and metabolic encephalopathy and has improved ( BG this AM noted at 119).  She was also evaluated by GI for episode of hematemesis, yet no further episodes.  She was able to be successfully extubated on September 24.  Speech therapy did evaluate her with FEES on September 26 with noted mild, moderate pharyngeal dysfunction with recommendations for mechanical soft diet with mixed consistencies, nectar thick liquids, and cognitive therapy.   Renal function remains at baseline.  She has had no further episodes of emesis, no abdominal pain.  She has been participating with physical and occupational therapy and recommended for inpatient rehabilitation.    Prior to hospitalization, patient was living at home the primary caregiver to 2 grandchildren.  She was independent with all activities of daily living as well as personal care needs.  She was dependent on her 18-year-old grandson for transportation needs.  She was utilizing no assistance devices. She is amnesic to the events preceding her hospitalization.    Prior to admit  in physical rehab patient was graded on a FIM score. Eating-2, Grooming-2, Bathing-2, Dressing upper body-2, Dressing lower body-2, Toileting-2, Transfers to bed/chair/wheelchair-1 with RW x 2 person assist. Transfers to toilet-1 with RW x 2 person assist.Locomotion-1 with RW x 2 person assist, able to ambulate-200 ft, gait-unsteady/weak. Bladder management-1, Bowel management-1, Comprehension-2,Expression-2, Social Interaction-3, Problem Solving-2, Memory-2.    Currently, patient is resting comfortably.  She continues to have some cognitive delay but is aware to person, place, and time.  No acute complaints this morning, continues to have some generalized weakness in the upper and lower extremities.    Review of Systems   On review of systems the patient denies the following unless noted above:     Constitutional:  Fevers, chills, weight change, fatigue     Eyes: Vision changes, headache, double vision, loss of vision     ENT: Runny nose, nose bleeds, ringing in ears, pain with swallowing, sore throat     Cardiovascular: Chest pains, palpitations, PND, orthopnea     Respiratory: Cough, wheezing, SOA, hemoptysis     GI:  Abdominal pain, diarrhea, constipation, change in stool caliber,    Rectal bleeding, vomiting or nausea     : Difficulty voiding, dysuria, hematuria     Musculoskeletal: Changes of any chronic joint pain,  swelling     Skin: Rash, itching, easy bruisability     Neurological: Unilateral weakness, new onset numbness, speech difficulties     Psychiatric: Sadness, tearfulness, feelings of hopelessness, racing thoughts     Endocrine:  Heat or cold intolerance, mood swings, polydipsia, polyphagia,    recent hypoglycemia      History  Past Medical History:   Diagnosis Date   • RAMESH (acute kidney injury) (CMS/[facility])    • Atrial fibrillation (CMS/[facility])    • Bradycardia    • Chronic respiratory failure with hypoxia and hypercapnia (CMS/HCC)    • Diabetes mellitus (CMS/HCC)    • Hypertension    • NSTEMI (non-ST elevated myocardial infarction) (CMS/HCC) 01/2019   • Paroxysmal atrial fibrillation (CMS/HCC)    • Pneumonia    • Severe sepsis with septic shock (CMS/HCC)      History reviewed. No pertinent surgical history.  History reviewed. No pertinent family history.  Social History     Tobacco Use   • Smoking status: Current Every Day Smoker     Packs/day: 0.25   • Smokeless tobacco: Never Used   Substance Use Topics   • Alcohol use: No     Frequency: Never   • Drug use: No     Medications Prior to Admission   Medication Sig Dispense Refill Last Dose   • amLODIPine (NORVASC) 5 MG tablet Take 5 mg by mouth Daily.      • aspirin 81 MG EC tablet Take 1 tablet by mouth Daily.   Taking   • atorvastatin (LIPITOR) 40 MG tablet Take 1 tablet by mouth Every Night. 30 tablet 0    • bumetanide (BUMEX) 1 MG tablet Take 1 mg by mouth 2 (Two) Times a Day.      • ELIQUIS 5 MG tablet tablet       • hydrALAZINE (APRESOLINE) 10 MG tablet Take 1 tablet by mouth Every 8 (Eight) Hours.      • insulin detemir (LEVEMIR) 100 UNIT/ML injection Inject 10 Units under the skin into the appropriate area as directed Every Night.  12 Taking   • insulin lispro (humaLOG) 100 UNIT/ML injection Inject 0-7 Units under the skin into the appropriate area as directed 4 (Four) Times a Day With Meals & at Bedtime.  12    • metoprolol tartrate (LOPRESSOR) 100 MG tablet Take  "1 tablet by mouth Every 12 (Twelve) Hours. 60 tablet 0 Taking   • montelukast (SINGULAIR) 10 MG tablet Take 10 mg by mouth Every Night.   Taking   • ondansetron (ZOFRAN) 8 MG tablet TK 1 T PO Q 8 H PRN  2    • pantoprazole (PROTONIX) 40 MG EC tablet Take 1 tablet by mouth 2 (Two) Times a Day Before Meals.      • thiamine (VITAMIN B-1) 100 MG tablet Take 100 mg by mouth Daily.        Allergies:  Sulfa antibiotics    Scheduled Meds:  amLODIPine 5 mg Oral Q24H   apixaban 5 mg Oral Q12H   aspirin 81 mg Oral Daily   hydrALAZINE 10 mg Oral Q8H   insulin aspart 0-7 Units Subcutaneous 4x Daily AC & at Bedtime   insulin aspart 0-7 Units Subcutaneous 4x Daily AC & at Bedtime   insulin detemir 5 Units Subcutaneous BID   metoprolol tartrate 100 mg Oral Q12H   montelukast 10 mg Oral Nightly   pantoprazole 40 mg Oral BID AC   thiamine 100 mg Oral Daily     Continuous Infusions:   PRN Meds:.bisacodyl  •  dextrose  •  dextrose  •  glucagon (human recombinant)  •  magnesium hydroxide  •  ondansetron **OR** ondansetron          Objective     Vital Signs    /60   Pulse 72   Temp 98.2 °F (36.8 °C) (Oral)   Resp 18   Ht 152.4 cm (60\")   Wt 67 kg (147 lb 11.3 oz)   SpO2 99%   BMI 28.85 kg/m²          Physical Exam:   General Appearance: alert, pleasant, appears stated age, interactive and   cooperative   Head: normocephalic, without obvious abnormality and atraumatic   Eyes: lids and lashes normal, conjunctivae and sclerae normal, no icterus, no   pallor, corneas clear and PERRLA   Ears: ears appear intact with no abnormalities noted   Nose: nares normal, septum midline, mucosa normal and no drainage   Throat: no oral lesions, no thrush, oral mucosa moist and oopharynx normal   Neck: no adenopathy, supple, trachea midline, no thyromegaly, no carotid bruit   and no JVD   Back: no kyphosis present, no scoliosis present, no skin lesions, erythema, or   scars, no tenderness to percussion or palpation and range of motion " normal   Lungs: clear to auscultation, respirations regular, respirations even and    respirations unlabored. No accessory muscle use.    Heart:: Irregularly irregular rhythm & normal rate, normal S1, S2, no murmur, no gallop, no   rub and no click.  Chest wall with no abnormalities observed. PMI nondisplaced   Abdomen: normal bowel sounds in all quadrants, no masses, no hepatomegaly,   no splenomegaly, soft non-tender, no guarding and no rebound tenderness   Extremities: no edema, no cyanosis, no redness, no tenderness, no clubbing   Musculoskeletal: joints with full range of motion and joints, no effusion.  Pedal   pulses palpable and equal bilaterally   Skin: no bleeding, bruising or rash and no lesions noted   Lymph Nodes: no palpable adenopathy   Neurologic: Mental Status orientated to person, place, time. Disoriented to situation.    Speech is intelligible, slow to respond to questioning and Non- abored.  Alertness alert and awake and mood/affect   normal, Cranial Nerves 2 - 12 grossly intact as examined   Sensory intact in BLE and BUE.   Motor strength  LUE is 4/5 proximally, 4/5 distally      RUE is 4/5 proximally, 4/5 distally      LLE is 4/5 @ hip flexors, quads as well as       dorsiflexion / plantar flexion      RLE is 4/5 @ hip flexors, quads as well as        dosriflexion / plantar flexion   Reflexes: Right:  2+ biceps, 2+ brachioradialis      2+ patella, 2+ achilles     Left: 2+ biceps, 2+ brachioradialis      2+ patella, 2+ achilles    Results Review:   Lab Results (last 24 hours)     Procedure Component Value Units Date/Time    Comprehensive Metabolic Panel [[id number]]  (Abnormal) Collected:  10/02/19 0548    Specimen:  Blood Updated:  10/02/19 0654     Glucose 117 mg/dL      BUN 43 mg/dL      Creatinine 1.69 mg/dL      Sodium 140 mmol/L      Potassium 4.8 mmol/L      Chloride 108 mmol/L      CO2 19.4 mmol/L      Calcium 8.1 mg/dL      Total Protein 5.8 g/dL      Albumin 2.84 g/dL      ALT (SGPT) 7  U/L      AST (SGOT) 13 U/L      Alkaline Phosphatase 69 U/L      Total Bilirubin 0.2 mg/dL      eGFR Non African Amer 30 mL/min/1.73      Globulin 3.0 gm/dL      A/G Ratio 1.0 g/dL      BUN/Creatinine Ratio 25.4     Anion Gap 12.6 mmol/L     Narrative:       GFR Normal >60  Chronic Kidney Disease <60  Kidney Failure <15    CBC Auto Differential [[id number]]  (Abnormal) Collected:  10/02/19 0548    Specimen:  Blood Updated:  10/02/19 0628     WBC 6.39 10*3/mm3      RBC 3.67 10*6/mm3      Hemoglobin 10.2 g/dL      Hematocrit 32.7 %      MCV 89.1 fL      MCH 27.8 pg      MCHC 31.2 g/dL      RDW 15.9 %      RDW-SD 49.8 fl      MPV 11.6 fL      Platelets 281 10*3/mm3      Neutrophil % 58.4 %      Lymphocyte % 22.7 %      Monocyte % 12.8 %      Eosinophil % 4.2 %      Basophil % 0.3 %      Immature Grans % 1.6 %      Neutrophils, Absolute 3.73 10*3/mm3      Lymphocytes, Absolute 1.45 10*3/mm3      Monocytes, Absolute 0.82 10*3/mm3      Eosinophils, Absolute 0.27 10*3/mm3      Basophils, Absolute 0.02 10*3/mm3      Immature Grans, Absolute 0.10 10*3/mm3     POC Glucose Once [[id number]]  (Normal) Collected:  10/02/19 0605    Specimen:  Blood Updated:  10/02/19 0624     Glucose 119 mg/dL     POC Glucose Once [[id number]]  (Abnormal) Collected:  10/01/19 2006    Specimen:  Blood Updated:  10/01/19 2014     Glucose 367 mg/dL     POC Glucose Once [[id number]]  (Abnormal) Collected:  10/01/19 1749    Specimen:  Blood Updated:  10/01/19 1755     Glucose 397 mg/dL         Imaging Results (last 24 hours)     ** No results found for the last 24 hours. **          Assessment/Plan   Metabolic encephalopathy  Sepsis with shock  UTI  Acute on chronic respiratory failure  Acute on chronic kidney disease  Atrial fibrillation  Diabetic ketoacidosis  CAD with history of NSTEMI  Debility    This patient will be admitted to acute inpatient rehabilitation.  The patient is going to require intensive treatment with physical therapy and occupational  therapy.  The patient will require PT and OT treatments at least 90 minutes daily per modality 5 days per week.  Patient will require physical therapy for lower extremity strengthening, balance, endurance, progressing gait, functional mobility and improving safety.  The patient will require occupational therapy treatment for ADL retraining, functional transfers, functional mobility and self-care training.      Eliquis 5 mg every 12 hours for DVT prophylaxis/full dose anticoagulation    Amlodipine 5 mg + hydralazine 10 mg every 8 hours + metoprolol 100 mg every 12 hours for blood pressure regulation    Levemir 5 units subcutaneously every 12 hours + low-dose sliding scale insulin for blood glucose regulation    Protonix 40 mg twice daily for GI prophylaxis    Speech therapy evaluation for swallowing evaluation/cognitive assessment    Estimated length of stay 7 to 10 days    NATE Rodriguez  10/02/19  6:29 AM    This patient is seen this morning by me for post admission physician evaluation to the inpatient rehabilitation facility.  She is seen and examined by me and I agree with the above note.  This patient was found down at her home unresponsive with emesis on her bed and significant hyperglycemia.  She was transferred to [facility] noted to be febrile with elevated troponin, uncontrolled hypertension, acute on chronic renal failure.  She had a possible seizure with hematemesis and rolling eye movements.  She was started on Keppra and broad-spectrum antibiotics.  EEG did not reveal epileptiform discharge.  She required intubation and mechanical ventilation for several days.  Cultures were negative other than sputum culture showing Pseudomonas and E. coli.  The patient was treated for shock with DKA, respiratory failure and encephalopathy.  She was evaluated for gastroenterology but did not have endoscopy.  No recurrent hematemesis.  The patient underwent swallowing evaluation with speech therapy and  was recommended for nectar thick liquids with soft diet which was upgraded upon discharge.  She tells me this morning that she knows she is in [place] for rehab.  She lives at home with 2 grandchildren.  She is independent with all ADL and self-care at baseline.  She requires no assistive device at baseline.  She continues to have some cognitive related deficits with her baseline cognitive status being pretty much normal.  Mini FIM preadmission screening assessment based on PT and OT evaluations at the referring facility reveal the following preadmission FIM scores: Eating 2, grooming 2, bathing 2, upper body dressing 2, lower body dressing 2, toileting 2, transfers to bed chair and wheelchair score 1 due to 2 person assistance required.  Toilet transfers require 2 persons giving a score of 1.  Locomotion score is 1 due to 2 person requirement ambulating up to 200 feet with unsteady balance.  Bladder management score is 1, bowel management 1, comprehension 2, expression 2, social interaction 3, problem solving 3, memory 3.  She did have neuroimaging that revealed no acute stroke.  On examination she is thin and frail.  Alert pleasant interactive.  Conversant.  Lungs reveal diminished breath sounds throughout but no rales or rhonchi.  Cardiac exam reveals an irregularly irregular rhythm with no murmur, rub or gallop.  Abdominal exam is benign.  Extremities no cyanosis, clubbing or edema.  Her neurologic examination reveals no focal neurologic deficits.  She is able to answer questions though is somewhat disoriented.  Her strength throughout rates 4 out of 5 with no focal deficits just some generalized weakness.  Her pedal pulses are normal and intact and sensation in both feet are intact.  Labs show a hemoglobin of 10.2.  BMP shows a creatinine of 1.69 which is worse from 1.53 on September 30.    Impression:  Debility secondary to what seems most consistent with hypoxic encephalopathy  Type 2 diabetes mellitus with  recent history of DKA  Known coronary artery disease with history of non-ST elevation acute myocardial infarction  Chronic hypoxic respiratory failure  Urinary tract infection status post treatment  Chronic atrial fibrillation  Hypertension  Stage III chronic kidney disease    Plan:  Admit to acute inpatient rehab.  She will require PT, OT and speech therapy.  PT and OT each 90 minutes daily for modality 5 days/week with PT to work with strengthening, balance, functional transfers, gait, functional ability, home safety.  OT to work with ADL retraining, functional transfers, functional safety and mobility as well as self-care.  Speech therapy for cognitive, swallowing and language assessment    Eliquis twice daily due to full dose anticoagulation due to A. fib    Levemir 5 units twice daily and sliding scale insulin.  Adjust as tolerated    Hypertensive control with amlodipine 5 mg daily, hydralazine 10 mg 3 times daily metoprolol 100 mg twice daily    Protonix twice daily for now secondary to recent hematemesis.    Expected length of stay is approximately 2 weeks with a plan to disposition to home with family at a supervision level of functioning      Samuel Duane Kreis, MD  10/02/19  8:52 AM

## 2019-10-02 NOTE — THERAPY EVALUATION
"Inpatient Rehabilitation - Speech Language Pathology Initial Evaluation  [facility]   SPEECH/LANGUAGE/COGNITIVE EVALUATION     Patient Name: Ashley Geronimo  : 1945  MRN: [id number]  Today's Date: 10/2/2019      Admit Date: 10/1/2019     Ashley Geronimo  is seen this pm in speech therapy office on  108/1S to participate in a formal s/l and cognitive evaluation to assess safety/function in adls. Pt is positioned upright and centered in wheelchair w/ gait belt and bilateral foot rests attached to cooperatively participate. All results and observations of this evaluation are felt to be representative of pt's current functional status. It is noted that pt's admitting diagnosis to inpatient rehab unit is metabolic encephalopathy.     Social History     Socioeconomic History   • Marital status:      Spouse name: Not on file   • Number of children: Not on file   • Years of education: Not on file   • Highest education level: Not on file   Tobacco Use   • Smoking status: Current Every Day Smoker     Packs/day: 0.25   • Smokeless tobacco: Never Used   Substance and Sexual Activity   • Alcohol use: No     Frequency: Never   • Drug use: No   • Sexual activity: Defer        Diet Orders (active) (From admission, onward)    Start     Ordered    10/01/19 1731  Diet Regular; Consistent Carbohydrate  Diet Effective Now      10/01/19 1730          She is observed on ra w/o complications, tolerating well.     Receptive language skills are intact. Pt is able to id common objects and personal body parts, follow simple and multi-step directives, understand complex \"wh\" questions and participate in conversational exchange w/o difficulties.    Expressive language skills are intact. Pt is able to complete automatic speech tasks, confrontation naming tasks, complete complex oe sentences, respond to complet oe \"wh\" questions, repeat at word/sentence and multiple digit levels, as well as participate in complex conversational " exchange. No aphasia, anomia or verbal apraxia evidenced.    Pt is independently oriented to person, place and time. She is intermittently disoriented to situation and SHIRA, however able to recall w/ min cues. Immediate, STM and LTM are mildly impaired w/ pt recalling recent adls and providing personal information w/o delays. Pt is able to complete these tasks w/ min cues from SLP. These deficits are felt to be 2/2 documented metabolic encephalopathy. Thought organization, processing and problem solving are wfl w/ pt demonstrating appropriate comparing/contrasting, understanding of idiomatic language, convergent and divergent thinking skills.    Facial/oral structures are symmetrical upon observation. Oral mucosa are moist, pink and clean. Secretions are clear, thin and well controlled. OROM/JULIA is wfl w/o lingual deviation upon protrusion. Speech intensity, clarity and intelligibility are wfl w/o dysarthria or oral apraxia noted.    Graphic and reading skills are intact, premorbid baseline status. Pt is able to id isolated letters, simple and moderate words, as well as sentences and small paragraphs. Signature is legible.    Pragmatic skills are wfl w/ appropriate eye gaze patterns and visual tracking. Language is appropriate in context w/ topic initiation and maintenance independently. No left neglect evidenced. Humor response is intact w/ appropriate affect.    Impression: Ms. Geronimo does present w/ mild disorientation as well as intermittent memory deficits, however these are felt to be 2/2 documented metabolic encephalopathy. These deficits are felt to be 2/2 metabolic encephalopathy and not a true cognitive deficit. No further SLP f/u warranted at this time.     Recommendations: No further formal SLP f/u recommended for s/l and cognitive skills. Pt will participate in further dysphagia evaluation. Please see full dysphagia note for details.    D/w pt results and recommendations w/ verbal understanding and  agreement.    D/w RN results and recommendations w/ verbal understanding and agreement.     Thank you for allowing me to participate in the care of your patient-  Estrellita Adler M.S., CCC-SLP  Visit Dx:  No diagnosis found.  Patient Active Problem List   Diagnosis   • Pneumonia   • Septic shock (CMS/HCC)   • Pseudomonas pneumonia (CMS/HCC)   • NSTEMI (non-ST elevated myocardial infarction) (CMS/[facility])   • Bradycardia   • Paroxysmal atrial fibrillation (CMS/[facility])   • Chronic respiratory failure with hypoxia and hypercapnia (CMS/HCC)   • Acute on chronic respiratory failure with hypoxia (CMS/HCC), requiring intubation/mechanical ventilation 9/22/19   • Hematemesis   • RAMESH (acute kidney injury) (CMS/[facility])   • DKA (diabetic ketoacidoses) (CMS/[facility])   • Septic shock (CMS/[facility])   • Encephalopathy   • UTI (urinary tract infection)   • Tracheobronchitis, PSA    • Metabolic encephalopathy     Past Medical History:   Diagnosis Date   • RAMESH (acute kidney injury) (CMS/[facility])    • Atrial fibrillation (CMS/[facility])    • Bradycardia    • Chronic respiratory failure with hypoxia and hypercapnia (CMS/[facility])    • Diabetes mellitus (CMS/[facility])    • Hypertension    • NSTEMI (non-ST elevated myocardial infarction) (CMS/[facility]) 01/2019   • Paroxysmal atrial fibrillation (CMS/[facility])    • Pneumonia    • Severe sepsis with septic shock (CMS/[facility])      History reviewed. No pertinent surgical history.         EDUCATION  The patient has been educated in the following areas:     Cognitive Impairment Communication Impairment.    SLP Recommendation and Plan    No further SLP f/u warranted at this time.  Plan of Care Reviewed With: patient      SLP GOALS     Row Name 09/30/19 1000             Oral Nutrition/Hydration Goal 2 (SLP)    Oral Nutrition/Hydration Goal 2, SLP  Pt will tolerate soft solids & nectar-thick liquids w/ no overt s/sxs aspiration or distress w/ 100% acc and no cues  -AV      Time Frame (Oral Nutrition/Hydration Goal 2, SLP)  short term goal (STG);by  discharge  -AV      Progress/Outcomes (Oral Nutrition/Hydration Goal 2, SLP)  continuing progress toward goal  -AV         Oral Nutrition/Hydration Goal (SLP)    Oral Nutrition/Hydration Goal, SLP  Pt will tolerate therapeutic trials of thin H2O w/ no overt s/sxs aspiration or distress w/ 70% acc and no cues in order to deteremine readiness for repeat instrumental eval  -AV      Time Frame (Oral Nutrition/Hydration Goal, SLP)  short term goal (STG);by discharge  -AV      Progress/Outcomes (Oral Nutrition/Hydration Goal, SLP)  good progress toward goal  -AV         Pharyngeal Strengthening Exercise Goal 1 (SLP)    Activity (Pharyngeal Strengthening Goal 1, SLP)  increase closure at entrance to airway/closure of airway at glottis  -AV      Increase Closure at Entrance to Airway/Closure of Airway at Glottis  super-supraglottic swallow  -AV      [place]/Accuracy (Pharyngeal Strengthening Goal 1, SLP)  with minimal cues (75-90% accuracy)  -AV      Time Frame (Pharyngeal Strengthening Goal 1, SLP)  short term goal (STG)  -AV      Progress/Outcomes (Pharyngeal Strengthening Goal 1, SLP)  good progress toward goal  -AV        User Key  (r) = Recorded By, (t) = Taken By, (c) = Cosigned By    Initials Name Provider Type    AV Meg Jimenez MS CCC-SLP Speech and Language Pathologist                  Time Calculation:     Time Calculation- SLP     Row Name 10/02/19 1607             Time Calculation- [facility]    SLP Start Time  1415  -      SLP Stop Time  1530  -      SLP Time Calculation (min)  75 min  -        User Key  (r) = Recorded By, (t) = Taken By, (c) = Cosigned By    Initials Name Provider Type     Estrellita Adler MS CCC-SLP Speech and Language Pathologist          Therapy Charges for Today     Code Description Service Date Service Provider Modifiers Qty    [id number]  ST MOTION FLUORO EVAL SWALLOW 3 10/2/2019 Estrellita Adler MS CCC-SLP GN 1    [id number] HC ST EVAL SPEECH AND PROD W LANG  2  10/2/2019 Estrellita Adler, MS CCC-SLP GN 1                     Estrellita Adler, MS LARISSA-SLP  10/2/2019

## 2019-10-02 NOTE — PROGRESS NOTES
Patient Assessment Instrument  Quality Indicators - Admission    Section B. Hearing, Speech Vision      Section C. Cognitive Patterns      Section DC9970. Prior Functioning      Section CE2725. Prior Device Use      Section [id number]. Self Care Performance      Section [id number]. Self Care Discharge Goals      Section XU3235. Mobility Performance     Roll Left and Right: [place] provides verbal cues and/or touching/steadying  and/or contact guard assistance as patient completes activity. Assistance may be  provided throughout the activity or intermittently.   Sit to Lying: [place] provides verbal cues and/or touching/steadying and/or  contact guard assistance as patient completes activity. Assistance may be  provided throughout the activity or intermittently.   Lying to Sitting on Side of Bed: [place] provides verbal cues and/or  touching/steadying and/or contact guard assistance as patient completes  activity. Assistance may be provided throughout the activity or intermittently.   Sit to Stand: [place] provides verbal cues and/or touching/steadying and/or  contact guard assistance as patient completes activity. Assistance may be  provided throughout the activity or intermittently.   Chair/Bed to Chair Transfer: [place] provides verbal cues and/or  touching/steadying and/or contact guard assistance as patient completes  activity. Assistance may be provided throughout the activity or intermittently.   Toilet Transfer [place] provides verbal cues and/or touching/steadying and/or  contact guard assistance as patient completes activity. Assistance may be  provided throughout the activity or intermittently.   Car Transfer: Not attempted due to medical or safety concerns.   Walk 10 Feet:   [place] provides verbal cues and/or touching/steadying and/or  contact guard assistance as patient completes activity. Assistance may be  provided throughout the activity or intermittently.  Walk 50 Feet with 2 Turns:   [place] provides verbal cues  and/or  touching/steadying and/or contact guard assistance as patient completes  activity. Assistance may be provided throughout the activity or intermittently.  Walk 150 Feet:   [place] provides verbal cues and/or touching/steadying and/or  contact guard assistance as patient completes activity. Assistance may be  provided throughout the activity or intermittently.  Walking 10 Feet on Uneven Surfaces:   Not attempted due to medical or safety  concerns.  1 Step Over Curb or Up/Down Stair:   Not attempted due to medical or safety  concerns.  Picking up an Object:   Not attempted due to medical or safety concerns.  Uses Wheelchair/Scooter: Yes  Wheel 50 Feet with Two Turns: Not attempted due to medical or safety concerns.  Type of Wheelchair or Scooter Used: Manual  Wheel 150 Feet: Not attempted due to medical or safety concerns.  Type of Wheelchair/Scooter Used: Manual    Section [id number]. Mobility Discharge Goals     Roll Left and Right: Patient completed the activities by him/herself with no  assistance from a helper.   Sit to Lying: Patient completed the activities by him/herself with no  assistance from a helper.   Lying to Sitting on Side of Bed: Patient completed the activities by  him/herself with no assistance from a helper.   Sit to Stand: Patient completed the activities by him/herself with no  assistance from a helper.   Chair/Bed to Chair Transfer: Patient completed the activities by him/herself  with no assistance from a helper.   Walk Discharge Goals:   Walk 150 Feet: Patient completed the activities by him/herself with no  assistance from a helper.   12 Steps Up and Down, With/Without Rail: [place] provides verbal cues or  touching/steadying assistance as patient completes activity.    Section H. Bladder and Bowel      Section I. Active Diagnosis      Section J. Health Conditions      Section K. Swallowing/Nutritional Status      Section M. Skin Conditions      Section N. Medication      Section O. Special  Treatments, Procedures, and Programs      OPTIONAL BRANCH FOR TRACKING FALLS  Fall(s) During Shift:    Signed by: Karyn Delgado, Physical Therapist

## 2019-10-02 NOTE — SIGNIFICANT NOTE
10/02/19 1603   Living Environment   Lives With grandchild(lyly)   Name(s) of Who Lives With Patient Valdez Fisher-grandson and Adela Fisher-granddaughter   Current Living Arrangements home/apartment/condo   Primary Care Provided by self   Provides Primary Care For grandchild(lyly)   Family Caregiver Names Caregiving to be determined based on how pt progresses in therapy.  Son Juan is pt's only living child and he works out-of-town.   Quality of Family Relationships helpful;involved;supportive   Able to Return to Prior Arrangements other (see comments)   Living Arrangement Comments SS spoke to pt who appeared to be confused.  RN recommended contacting pt's son Juan.  Contacted son Juan Geronimo [phone] who states pt is a .  Pt's grandchildren Valdez Fisher 18 Y/O and Adela Fisher, 13 Y/O (special needs) have lived with pt since their mother  in 2017.  Son is pt's only living child; two daughters are .  Pt receives [employer], Social Security income for grandchildren, and [employer].  Pt was independent with ADL's, mobility, housekeeping, laundry, shopping, and bill paying prior to hospital admission.  Pt does not have POA or advance directive.     Resource/Environmental Concerns   Resource/Environmental Concerns none   Transportation Concerns car, none   Transition Planning   Patient/Family Anticipates Transition to home with family   Patient/Family Anticipated Services at Transition durable medical equipment;home health care   Transportation Anticipated family or friend will provide   Discharge Needs Assessment   Concerns to be Addressed adjustment to diagnosis/illness;discharge planning   Equipment Currently Used at Home oxygen;cane, quad;commode;shower chair;glucometer  (Pt did not use any assistive device for ambulation prior to admission.  Pt received O2 at 2L NC at night from [employer].  Pt has digital B/P cuff at home.  )   Outpatient/Agency/Support Group  Needs (Pt did not receive home health or outpatient therapy prior to admission.  )   Current Discharge Risk cognitively impaired   Discharge Coordination/Progress Son says discharge plans to be determined based on how pt progresses in therapy.  Pt's adult grandson Addison Geronimo is staying with grandchildren Valdez and Adela Fisher.  Team conference will be held in am.  SS will follow and assist with discharge planning.

## 2019-10-02 NOTE — PROGRESS NOTES
Inpatient Rehabilitation Functional Measures Assessment    Functional Measures  PUJA Eating:  PUJA Grooming:  PUJA Bathing:  PUJA Upper Body Dressing:  PUJA Lower Body Dressing:  PUJA Toileting:    PUJA Bladder Management  Level of Assistance:  Frequency/Number of Accidents this Shift:    PUJA Bowel Management  Level of Assistance:  Frequency/Number of Accidents this Shift:    PUJA Bed/Chair/Wheelchair Transfer:  PUJA Toilet Transfer:  PUJA Tub/Shower Transfer:    Previously Documented Mode of Locomotion at Discharge:  PUJA Expected Mode of Locomotion at Discharge:  PUJA Walk/Wheelchair:  PUJA Stairs:    PUJA Comprehension:  Both ( auditory and visual) modes of comprehension are used  equally. Patient does not comprehend complex/abstract information in their  primary language without assistance from a helper. Comprehension Score = 3,  Moderate Prompting. Patient comprehends basic daily needs or ideas 50-74% of the  time. Patient requires moderate/some prompting. No assistive devices were  required.  PUJA Expression:  Vocal expression is the usual mode. Patient does not express  complex/abstract information in their primary language without a helper.  Expression Score = 3, Moderate Prompting. Patient expresses basic daily needs or  ideas 50-74% of the time. Patient requires moderate/some prompting. No assistive  devices were required.  PUJA Social Interaction:  Social Interaction Score = 7, Independent. Patient is  completely independent for social interaction.  There are no activity  limitations.  PUJA Problem Solving:  Patient does not make appropriate decisions in order to  solve complex problems without assistance from a helper. Problem Solving Score =  3, Moderate Direction. Patient makes appropriate decisions in order to solve  routine problems 50-74% of the time. Patient requires moderate/some direction  for the following behavior(s): Denies presence or extent of disability.  Difficulty weighing alternatives/making choices.  Difficulty with  self-correction. Difficulty with self-monitoring. Exhibits poor planning.  Impulsivity. Poor judgment.  PUJA Memory:  Memory Score = 3, Moderate Prompting. Patient recognizes and  remembers 50-74% of the time. Patient requires moderate/some prompting  for  memory for the following: Difficulty recognizing hospital staff as persons  previously met. Difficulty remembering verbal tasks. Difficulty remembering  visual tasks. Disoriented to person, place, time or situation.    Therapy Mode Minutes  Occupational Therapy:  Physical Therapy:  Speech Language Pathology:    Discharge Functional Goals:    Signed by: Nurse Adalgisa

## 2019-10-02 NOTE — THERAPY EVALUATION
Inpatient Rehabilitation - Physical Therapy Initial Evaluation        [facility]     Patient Name: Ashley Geronimo  : 1945  MRN: [id number]    Today's Date: 10/2/2019                    Admit Date: 10/1/2019      Visit Dx: No diagnosis found.    Patient Active Problem List   Diagnosis   • Pneumonia   • Septic shock (CMS/HCC)   • Pseudomonas pneumonia (CMS/HCC)   • NSTEMI (non-ST elevated myocardial infarction) (CMS/HCC)   • Bradycardia   • Paroxysmal atrial fibrillation (CMS/HCC)   • Chronic respiratory failure with hypoxia and hypercapnia (CMS/HCC)   • Acute on chronic respiratory failure with hypoxia (CMS/HCC), requiring intubation/mechanical ventilation 19   • Hematemesis   • RAMESH (acute kidney injury) (CMS/HCC)   • DKA (diabetic ketoacidoses) (CMS/HCC)   • Septic shock (CMS/HCC)   • Encephalopathy   • UTI (urinary tract infection)   • Tracheobronchitis, PSA    • Metabolic encephalopathy       Past Medical History:   Diagnosis Date   • RAMESH (acute kidney injury) (CMS/HCC)    • Atrial fibrillation (CMS/HCC)    • Bradycardia    • Chronic respiratory failure with hypoxia and hypercapnia (CMS/HCC)    • Diabetes mellitus (CMS/HCC)    • Hypertension    • NSTEMI (non-ST elevated myocardial infarction) (CMS/HCC) 2019   • Paroxysmal atrial fibrillation (CMS/HCC)    • Pneumonia    • Severe sepsis with septic shock (CMS/HCC)        History reviewed. No pertinent surgical history.       PT ASSESSMENT (last 12 hours)      IRF Physical Therapy Evaluation     Row Name 10/02/19 9935          Evaluation/Treatment Time and Intent    Subjective Information  no complaints  -AG     Existing Precautions/Restrictions  fall confusion; decr skin integrity  -AG     Document Type  evaluation;therapy note (daily note)  -AG     Mode of Treatment  individual therapy;physical therapy  -AG     Patient/Family Observations  pt. supine in bed; cooperative for participation.   -     Row Name 10/02/19 6417           Relationship/Environment    Primary Source of Support/Comfort  child(lyly)  -AG     Lives With  grandchild(lyly)  -AG     Row Name 10/02/19 1607          Resource/Environmental Concerns    Current Living Arrangements  home/apartment/condo  -AG     Row Name 10/02/19 1607          Living Environment    Living Arrangements  mobile home  -AG     Home Accessibility  not wheelchair accessible  -AG     Row Name 10/02/19 1607          Home Main Entrance    Number of Stairs, Main Entrance  -- 4 stairs to enter per patient report  -AG     Row Name 10/02/19 1607          Primary Caregiver Designee/Status    Designated Primary Caregiver  18 y o grandson  -AG     Row Name 10/02/19 1607          Cognition/Psychosocial- PT/OT    Affect/Mental Status (Cognitive)  confused;low arousal/lethargic  -AG     Orientation Status (Cognition)  oriented to;person oriented to month; not to place, situation.   -AG     Follows Commands (Cognition)  follows one step commands;delayed response/completion;increased processing time needed;initiation impaired;repetition of directions required;verbal cues/prompting required  -AG     Personal Safety Interventions  fall prevention program maintained;gait belt;nonskid shoes/slippers when out of bed;supervised activity  -AG     Attention Deficit (Cognitive)  requires cues/redirection to task  -AG     Safety Deficit (Cognitive)  ability to follow commands;insight into deficits/self awareness;safety precautions awareness;safety precautions follow-through/compliance  -AG     Row Name 10/02/19 1607          Mobility    Extremity Weight-bearing Status  left lower extremity;right lower extremity  -AG     Left Lower Extremity (Weight-bearing Status)  weight-bearing as tolerated (WBAT)  -AG     Right Lower Extremity (Weight-bearing Status)  weight-bearing as tolerated (WBAT)  -AG     Row Name 10/02/19 1607          Bed Mobility Assessment/Treatment    Bed Mobility Assessment/Treatment  scooting/bridging;supine-sit   -AG     Rolling Left [place] (Bed Mobility)  verbal cues;nonverbal cues (demo/gesture);contact guard  -AG     Rolling Right [place] (Bed Mobility)  verbal cues;nonverbal cues (demo/gesture);contact guard  -AG     Supine-Sit [place] (Bed Mobility)  verbal cues;nonverbal cues (demo/gesture);contact guard  -AG     Bed Mobility, Safety Issues  cognitive deficits limit understanding;decreased use of arms for pushing/pulling;decreased use of legs for bridging/pushing  -AG     Assistive Device (Bed Mobility)  bed rails  -AG     Row Name 10/02/19 1607          Transfer Assessment/Treatment    Transfer Assessment/Treatment  bed-chair transfer;chair-bed transfer;sit-stand transfer;stand-sit transfer;stand pivot/stand step transfer  -AG     Row Name 10/02/19 1607          Bed-Chair Transfer    Bed-Chair [place] (Transfers)  verbal cues;nonverbal cues (demo/gesture);contact guard  -AG     Assistive Device (Bed-Chair Transfers)  walker, front-wheeled  -AG     Row Name 10/02/19 1607          Chair-Bed Transfer    Chair-Bed [place] (Transfers)  verbal cues;nonverbal cues (demo/gesture);contact guard  -AG     Assistive Device (Chair-Bed Transfers)  walker, front-wheeled  -AG     Row Name 10/02/19 1607          Sit-Stand Transfer    Sit-Stand [place] (Transfers)  verbal cues;nonverbal cues (demo/gesture);contact guard  -AG     Assistive Device (Sit-Stand Transfers)  walker, front-wheeled  -AG     Row Name 10/02/19 1607          Stand-Sit Transfer    Stand-Sit [place] (Transfers)  verbal cues;nonverbal cues (demo/gesture);contact guard  -AG     Assistive Device (Stand-Sit Transfers)  walker, front-wheeled  -AG     Row Name 10/02/19 1607          Stand Pivot/Stand Step Transfer    Stand Pivot/Stand Step [place]  verbal cues;nonverbal cues (demo/gesture);contact guard  -AG     Assistive Device (Stand Pivot Stand Step Transfer)  walker, front-wheeled  -AG     Row Name 10/02/19 1607           Gait/Stairs Assessment/Training    Gait/Stairs Assessment/Training  gait/ambulation independence;gait/ambulation assistive device;distance ambulated;gait pattern;gait deviations  -     [place] Level (Gait)  verbal cues;nonverbal cues (demo/gesture);contact guard  -     Assistive Device (Gait)  walker, front-wheeled  -     Distance in Feet (Gait)  200 ft, 160 ft  -AG     Pattern (Gait)  step-through  -AG     Deviations/Abnormal Patterns (Gait)  base of support, narrow;[place] decreased;gait speed decreased;stride length decreased  -AG     Bilateral Gait Deviations  forward flexed posture;heel strike decreased  -[facility] Name 10/02/19 1607          Safety Issues, Functional Mobility    Safety Issues Affecting Function (Mobility)  awareness of need for assistance;insight into deficits/self awareness;safety precaution awareness;safety precautions follow-through/compliance  -     Impairments Affecting Function (Mobility)  balance;coordination;strength  -AG     Row Name 10/02/19 1607          General ROM    GENERAL ROM COMMENTS  AROM WFL all extremities  -AG     Row Name 10/02/19 1607          MMT (Manual Muscle Testing)    General MMT Comments  L quads 4-/5; R quads 3+/5; B hamstrings 4-/5; B psoas 3+ to 4-/5; ant tib 4-/5  -[facility] Name 10/02/19 1607          Sensory    Hearing Status  other (see comments) difficult to assess  -AG     Row Name 10/02/19 1607          Vision Assessment/Intervention    Visual Impairment/Limitations  unable/difficult to assess  -AG     Row Name 10/02/19 1607          Pain Scale: Numbers Pre/Post-Treatment    Pain Scale: Numbers, Pretreatment  0/10 - no pain  -AG     Pain Scale: Numbers, Post-Treatment  0/10 - no pain  -AG     Row Name 10/02/19 1607          Pain Scale: FACES Pre/Post-Treatment    Pain: FACES Scale, Pretreatment  0-->no hurt  -AG     Pain: FACES Scale, Post-Treatment  0-->no hurt  -AG     Row Name 10/02/19 1607          Balance    Balance  static sitting  balance;static standing balance;dynamic sitting balance;dynamic standing balance  -AG     Additional Documentation  Balance (Row)  -AG     Row Name 10/02/19 1607          Static Sitting Balance    Level of [place] (Unsupported Sitting, Static Balance)  standby assist  -AG     Sitting Position (Unsupported Sitting, Static Balance)  sitting on edge of bed;sitting in wheelchair  -AG     Time Able to Maintain Position (Unsupported Sitting, Static Balance)  more than 5 minutes  -AG     Row Name 10/02/19 1607          Static Standing Balance    Level of [place] (Supported Standing, Static Balance)  contact guard assist  -AG     Assistive Device Utilized (Supported Standing, Static Balance)  walker, rolling  -AG     Row Name             Wound 09/29/19 Right lateral clavicle Puncture    Wound - Properties Group Date first assessed: 09/29/19  -BS Side: Right  -BS Orientation: lateral  -BS Location: clavicle  -BS Primary Wound Type: Puncture  -BS    Row Name             Wound 10/01/19 1702 Bilateral other (see comments) coccyx MASD ([place] associated skin damage)    Wound - Properties Group Date first assessed: 10/01/19  -SP Time first assessed: 1702  -SP Present on Hospital Admission: Y  -SP Side: Bilateral  -SP Orientation: other (see comments)  -SP Location: coccyx  -SP Primary Wound Type: MASD  -SP Stage, Pressure Injury: other (see comments)  -SP    Row Name 10/02/19 1607          PT Clinical Impression    General Observations of Patient  diagnosis debility: metabolic brain disorder; recent PNA, septic shock requiring intubation on mech vent. PMH: a-fib  -AG     Patient's Goals For Discharge  return home  -AG     Plan For Care Reviewed: Physical Therapy  patient voices agreement with PT plan for care;patient feedback incorporated in PT plan for care;PT plan for care discussed with patient  -AG     Rehab Potential/Prognosis (PT Eval)  good, to achieve stated therapy goals  -AG     Frequency of Treatment (PT  Eval)  5 times per week  -AG     Estimated Length of Stay, Weeks (PT Eval)  2 weeks  -AG     Problem List (PT Eval)  strength decreased;impaired balance;impaired cognition;coordination impaired  -AG     Limitations/Impairments  safety/cognitive  -AG     Activity Limitations Related to Problem List (PT Eval)  ambulation not performed safely;BADL activities not performed adequately or safely  -AG     Equipment Currently Used at Home  none  -AG     Anticipated Equipment Needs at Discharge (PT Eval)  -- TBD  -AG     Expected Discharge Disposition (PT Eval)  home or self care;home with home health care  -AG     Planned Therapy Interventions (PT Eval)  activity tolerance training;functional balance retraining;neuromuscular control/coordination retraining;patient/caregiver education/training;ROM/therapeutic exercise;strengthening exercise;transfer/mobility retraining  -AG     Row Name 10/02/19 1607          IRF PT Goals    Bed Mobility Goal Selection (PT-IRF)  bed mobility, PT goal 1;bed mobility, PT goal 2  -AG     Transfer Goal Selection (PT-IRF)  transfers, PT goal 1;transfers, PT goal 2  -AG     Gait (Walking Locomotion) Goal Selection (PT-IRF)  gait, PT goal 1;gait, PT goal 2  -AG     Stairs Goal Selection (PT-IRF)  stairs, PT goal 1;stairs, PT goal 2  -AG     Row Name 10/02/19 4062          Bed Mobility Goal 1 (PT-IRF)    Activity/Assistive Device (Bed Mobility Goal 1, PT-IRF)  bed mobility activities, all  -AG     [place] Level (Bed Mobility Goal 1, PT-IRF)  supervision required  -AG     Time Frame (Bed Mobility Goal 1, PT-IRF)  short term goal (STG)  -AG     Row Name 10/02/19 7405          Bed Mobility Goal 2 (PT-IRF)    Activity/Assistive Device (Bed Mobility Goal 2, PT-IRF)  bed mobility activities, all  -AG     [place] Level (Bed Mobility Goal 2, PT-IRF)  conditional independence  -AG     Time Frame (Bed Mobility Goal 2, PT-IRF)  long term goal (LTG);14 days or less;by discharge  -AG     Row Name  10/02/19 1607          Transfer Goal 1 (PT-IRF)    Activity/Assistive Device (Transfer Goal 1, PT-IRF)  sit-to-stand/stand-to-sit;bed-to-chair/chair-to-bed;other (see comments) aad  -AG     [place] Level (Transfer Goal 1, PT-IRF)  standby assist  -AG     Time Frame (Transfer Goal 1, PT-IRF)  short term goal (STG)  -AG     Row Name 10/02/19 1607          Transfer Goal 2 (PT-IRF)    Activity/Assistive Device (Transfer Goal 2, PT-IRF)  sit-to-stand/stand-to-sit;bed-to-chair/chair-to-bed;other (see comments) aad  -AG     [place] Level (Transfer Goal 2, PT-IRF)  conditional independence  -AG     Time Frame (Transfer Goal 2, PT-IRF)  long term goal (LTG);14 days or less;by discharge  -AG     Row Name 10/02/19 1607          Gait/Walking Locomotion Goal 1 (PT-IRF)    Activity/Assistive Device (Gait/Walking Locomotion Goal 1, PT-IRF)  gait (walking locomotion);assistive device use;decrease fall risk;diminish gait deviation;improve balance and speed;increase endurance/gait distance;other (see comments) aad  -AG     Gait/Walking Locomotion Distance Goal 1 (PT-IRF)  300  -AG     [place] Level (Gait/Walking Locomotion Goal 1, PT-IRF)  standby assist  -AG     Time Frame (Gait/Walking Locomotion Goal 1, PT-IRF)  short term goal (STG)  -AG     Row Name 10/02/19 1600          Gait/Walking Locomotion Goal 2 (PT-IRF)    Activity/Assistive Device (Gait/Walking Locomotion Goal 2, PT-IRF)  gait (walking locomotion);assistive device use;decrease fall risk;diminish gait deviation;improve balance and speed;increase endurance/gait distance;other (see comments) aad  -AG     Gait/Walking Locomotion Distance Goal 2 (PT-IRF)  300  -AG     [place] Level (Gait/Walking Locomotion Goal 2, PT-IRF)  conditional independence  -AG     Time Frame (Gait/Walking Locomotion Goal 2, PT-IRF)  long term goal (LTG);14 days or less;by discharge  -AG     Row Name 10/02/19 1609          Stairs Goal 1 (PT-IRF)    Activity/Assistive Device  (Stairs Goal 1, PT-IRF)  ascending stairs;descending stairs;using handrail, left;using handrail, right;step-over step  -AG     Number of Stairs (Stairs Goal 1, PT-IRF)  10  -AG     [place] Level (Stairs Goal 1, PT-IRF)  standby assist  -AG     Time Frame (Stairs Goal 1, PT-IRF)  short term goal (STG)  -AG     Row Name 10/02/19 1607          Stairs Goal 2 (PT-IRF)    Activity/Assistive Device (Stairs Goal 2, PT-IRF)  ascending stairs;descending stairs;using handrail, left;using handrail, right;step-over step  -AG     Number of Stairs (Stairs Goal 2, PT-IRF)  15  -AG     [place] Level (Stairs Goal 2, PT-IRF)  standby assist  -AG     Time Frame (Stairs Goal 2, PT-IRF)  long term goal (LTG);14 days or less;by discharge  -AG     Row Name 10/02/19 1600          Positioning and Restraints    Pre-Treatment Position  in bed  -AG     Post Treatment Position  wheelchair  -AG     In Wheelchair  sitting;with OT;legs elevated following PM session  -AG       User Key  (r) = Recorded By, (t) = Taken By, (c) = Cosigned By    Initials Name Provider Type    Radha Slater, RN Registered Nurse    Karyn Carter, PT Physical Therapist    Al Sahu RN Registered Nurse          Physical Therapy Education     Title: PT OT SLP Therapies (Not Started)     Topic: Physical Therapy (In Progress)     Point: Mobility training (In Progress)     Learning Progress Summary           Patient Acceptance, E,D, NR by  at 10/2/2019  4:26 PM                   Point: Home exercise program (In Progress)     Learning Progress Summary           Patient Acceptance, E,D, NR by  at 10/2/2019  4:26 PM                   Point: Body mechanics (In Progress)     Learning Progress Summary           Patient Acceptance, E,D, NR by  at 10/2/2019  4:26 PM                   Point: Precautions (In Progress)     Learning Progress Summary           Patient Acceptance, E,D, NR by  at 10/2/2019  4:26 PM                               User  Key     Initials Effective Dates Name Provider Type Discipline    AG 04/03/18 -  Karyn Delgado, PT Physical Therapist PT                PT Recommendation and Plan    Planned Therapy Interventions (PT Eval): balance training, bed mobility training, gait training, home exercise program, motor coordination training, neuromuscular re-education, patient/family education, postural re-education, stair training, strengthening, transfer training  Frequency of Treatment (PT Eval): 5 times per week  Anticipated Equipment Needs at Discharge (PT Eval): (TBD)                  Time Calculation:     PT Charges     Row Name 10/02/19 1627 10/02/19 1626          Time Calculation    Start Time  1250  -AG  0830  -AG     Stop Time  1330  -AG  0915  -AG     Time Calculation (min)  40 min  -AG  45 min  -AG     PT Received On  --  10/02/19  -     PT - Next Appointment  --  10/03/19  -     PT Goal Re-Cert Due Date  --  10/09/19  -       User Key  (r) = Recorded By, (t) = Taken By, (c) = Cosigned By    Initials Name Provider Type    AG Karyn Delgado, PT Physical Therapist          Therapy Charges for Today     Code Description Service Date Service Provider Modifiers Qty    83250753897 HC GAIT TRAINING EA 15 MIN 10/2/2019 Karyn Delgado, PT GP 1    13195707982 HC PT THER PROC EA 15 MIN 10/2/2019 Karyn Delgado, PT GP 2    28145288673 HC PT EVAL MOD COMPLEXITY 3 10/2/2019 Karyn Delgado, PT GP 1                   Karyn Delgado PT  10/2/2019

## 2019-10-02 NOTE — PLAN OF CARE
Problem: Patient Care Overview  Goal: Plan of Care Review  Outcome: Ongoing (interventions implemented as appropriate)      Problem: Fall Risk (Adult)  Goal: Absence of Fall  Outcome: Ongoing (interventions implemented as appropriate)      Problem: Skin Injury Risk (Adult)  Goal: Skin Health and Integrity  Outcome: Ongoing (interventions implemented as appropriate)      Problem: Wound (Includes Pressure Injury) (Adult)  Goal: Signs and Symptoms of Listed Potential Problems Will be Absent, Minimized or Managed (Wound)  Outcome: Ongoing (interventions implemented as appropriate)      Problem: Functional Mobility Impairment (IRF) (Adult)  Goal: Optimal/Safe Level of [place] with Mobility  Outcome: Ongoing (interventions implemented as appropriate)

## 2019-10-02 NOTE — PROGRESS NOTES
Inpatient Rehabilitation Functional Measures Assessment and Plan of Care    Plan of Care      Functional Measures  PUJA Eating:  PUJA Grooming: Patient requires minimal assistance for washing, rinsing and  drying the face. Patient requires minimal assistance for washing, rinsing and  drying the hands. Brushing teeth was not observed for this patient. Patient  requires moderate assistance for brushing/combing hair. Shaving or applying  makeup was not observed for this patient. Patient performs 0 -  24% of grooming  tasks.  Grooming Score = 1, Total Assistance. No assistive devices were  required.  PUJA Bathing:  Patient bathed in bed. Patient requires total assistance for  washing, rinsing, or drying the right arm. Patient requires total assistance for  washing, rinsing, or drying the left arm. Patient requires total assistance for  washing, rinsing, or drying the chest. Patient requires total assistance for  washing, rinsing, or drying the abdomen. Patient requires total assistance for  washing, rinsing, or drying the perineal area. Patient requires total assistance  for washing, rinsing, or drying the buttocks. Patient requires total assistance  for washing, rinsing, or drying the right upper leg. Patient requires total  assistance for washing, rinsing, or drying the left upper leg. Patient requires  total assistance for washing, rinsing, or drying the right lower leg, including  the foot. Patient requires total assistance for washing, rinsing, or drying the  left lower leg, including the foot. Patient performs 0 -  24% of bathing tasks.  Bathing Score = 1, Total Assistance. No assistive devices were required.  PUJA Upper Body Dressing:  Upper Body Dressing was not observed this shift  because patient dressed/undressed in pajamas/gown only. .  PUJA Lower Body Dressing:  Lower Body Dressing was not observed this shift  because patient dressed/undressed in pajamas/gown only.  PUJA Toileting:  Patient requires moderate  assistance for adjusting clothing  before using a toilet, commode, bedpan, or urinal. Patient requires moderate  assistance for hygiene. Patient requires moderate assistance for adjusting  clothing after using a toilet, commode, bedpan, or urinal. Patient performs 0 -  24% of toileting tasks.  Toileting Score = 1, Total Assistance. Patient requires  the following assistive device(s): Grab bar.    PUJA Bladder Management  Level of Assistance:  Bladder Score = 5.  Patient is supervision/set-up for  bladder management, requiring: Emptying equipment. Setting out equipment. Stand  by assistance. Patient requires the following assistive device(s):  Adult brief.  pt was inc  Frequency/Number of Accidents this Shift:  Bladder accidents this shift:  2 .    PUJA Bowel Management  Level of Assistance: Bowel Score = 5.  Patient is supervision/set-up for bowel  management, requiring: Emptying equipment. Setting out equipment. Stand by  assistance. Verbal cuing, prompting, or instructing. Patient requires the  following assistive device(s):  Adult brief. pt  was inc  Frequency/Number of Accidents this Shift: Bowel accidents this shift: 1 .    PUJA Bed/Chair/Wheelchair Transfer:  Activity was not observed.  PUJA Toilet Transfer:  Toilet Transfer was not observed this shift because  patient used bedpan/urinal only.  PUJA Tub/Shower Transfer:    Previously Documented Mode of Locomotion at Discharge:  [facility] Expected Mode of Locomotion at Discharge:  [facility] Walk/Wheelchair:  [facility] Stairs:    [facility] Comprehension:  PUJA Expression:  PUJA Social Interaction:  PUJA Problem Solving:  PUJA Memory:    Therapy Mode Minutes  Occupational Therapy:  Physical Therapy:  Speech Language Pathology:    Discharge Functional Goals:    Signed by: JAI Guerrero

## 2019-10-02 NOTE — MBS/VFSS/FEES
Inpatient Rehabilitation - Speech Language Pathology   Swallow Initial Evaluation KOKO Hart   MODIFIED BARIUM SWALLOW STUDY     Patient Name: Ashley Geronimo  : 1945  MRN: [id number]  Today's Date: 10/2/2019      Admit Date: 10/1/2019    Ashley Geronimo  presents to the radiology suite this am from 108/1S to participate in an instrumental MBS to assess safety/efficacy of swallowing fnx, determine safest/least restrictive diet. She has significant h/o RAMESH, a-fib, bradycardia, chronic respiratory failure, diabetes, HTN, n-stemi, paroxysmal a-fib, PNA, severe sepsis. Pt is new admit to inpatient rehab unit w/ admitting diagnosis of metabolic encephalopathy. She is currently on regular diet w/ thin liquids.         Social History     Socioeconomic History   • Marital status:      Spouse name: Not on file   • Number of children: Not on file   • Years of education: Not on file   • Highest education level: Not on file   Tobacco Use   • Smoking status: Current Every Day Smoker     Packs/day: 0.25   • Smokeless tobacco: Never Used   Substance and Sexual Activity   • Alcohol use: No     Frequency: Never   • Drug use: No   • Sexual activity: Defer        Chest xray on 19 revealed mild prominence of perihilar lung markings, per radiology report.     Diet Orders (active) (From admission, onward)    Start     Ordered    10/01/19 1731  Diet Regular; Consistent Carbohydrate  Diet Effective Now      10/01/19 1730          Ms. Geronimo is observed on ra w/o complications tolerating well.     Risks and benefits of the procedure are explained w/ verbalizing understanding/agreement to participate proceed per protocol.     Pt is positioned upright and centered in a soft strap supportive wheel chair to accept multiple po presentations of solid cracker, puree, honey thick, nectar thick, and thin liquids via spoon, cup and straw, along w/ whole placebo pill in puree. Pt is able to self feed.     All views are from the  lateral plane.     Facial/oral structures are symmetrical upon observation w/o lingual deviation upon protrusion. Oral mucosa are moist, pink and clean. Secretions are clear, thin and well controlled. OROM/JULIA is wfl to imitate oral postures. Gag is not assessed. Volitional cough is adequate in intensity, clear in quality, nonproductive. Vocal quality is adequate in intensity, clear in quality w/ intelligible speech. Pt is a/a and cooperative to particpate.  Pt  is oriented to person, place, and time, follows simple directives, and participates in simple conversational exchanges.     Upon po presentations, adequate bolus anticipation w/ good labial seal for bolus clearance via spoon bowl, cup rim stability and suction via straw.  Bolus formation, manipulation,  and control are wfl w/ rotary mastication pattern. A-p transit is timely w/o oral residue. Tongue base retraction and linguavelar seal are adequate w/o premature spillage. No laryngeal penetration or aspiration is evidenced before the swallow.     Pharyngeal swallow is timely w/ adequate hyolaryngeal elevation and epiglottic inversion. Pharyngeal contraction is adequate w/o significant residue. Fleeting laryngeal penetration w/ thin liquids via cup and straw when pt is allowed to self present. However, clears upon completion of swallow w/o significant residue. Pt is noted w/ impulsive eating habits when allowed to self-present. No other laryngeal penetration or aspiration evidenced during or after the swallow.     Full esophageal sweep reveals no mucosal abnormalities. Motility is wfl w/o retrograde flow noted.     Pt is unable to manipulate and swallow whole barium pill, however pt masticates whole barium pill despite max cues and prompts from SLP.       Visit Dx:   No diagnosis found.  Patient Active Problem List   Diagnosis   • Pneumonia   • Septic shock (CMS/HCC)   • Pseudomonas pneumonia (CMS/HCC)   • NSTEMI (non-ST elevated myocardial infarction)  (CMS/[facility])   • Bradycardia   • Paroxysmal atrial fibrillation (CMS/HCC)   • Chronic respiratory failure with hypoxia and hypercapnia (CMS/HCC)   • Acute on chronic respiratory failure with hypoxia (CMS/HCC), requiring intubation/mechanical ventilation 9/22/19   • Hematemesis   • RAMESH (acute kidney injury) (CMS/HCC)   • DKA (diabetic ketoacidoses) (CMS/HCC)   • Septic shock (CMS/HCC)   • Encephalopathy   • UTI (urinary tract infection)   • Tracheobronchitis, PSA    • Metabolic encephalopathy     Past Medical History:   Diagnosis Date   • RAMESH (acute kidney injury) (CMS/HCC)    • Atrial fibrillation (CMS/HCC)    • Bradycardia    • Chronic respiratory failure with hypoxia and hypercapnia (CMS/HCC)    • Diabetes mellitus (CMS/[facility])    • Hypertension    • NSTEMI (non-ST elevated myocardial infarction) (CMS/[facility]) 01/2019   • Paroxysmal atrial fibrillation (CMS/[facility])    • Pneumonia    • Severe sepsis with septic shock (CMS/[facility])      History reviewed. No pertinent surgical history.     SWALLOW EVALUATION (last 72 hours)      [facility] Adult Swallow Evaluation     Row Name 10/01/19 [facility]                   Rehab Evaluation    Document Type  discharge evaluation/summary  -SM        Subjective Information  no complaints  -SM        Patient Observations  alert;cooperative  -SM           General Information    Current Method of Nutrition  mechanical soft, no mixed consistencies;nectar/syrup-thick liquids  -SM           Pain Scale: Numbers Pre/Post-Treatment    Pain Scale: Numbers, Pretreatment  0/10 - no pain  -SM        Pain Scale: Numbers, Post-Treatment  0/10 - no pain  -SM           MBS/VFSS    Utensils Used  spoon;cup;straw  -SM        Consistencies Trialed  thin liquids;pureed;regular textures  -           MBS/VFSS Interpretation    Oral Prep Phase  WFL  -SM        Oral Transit Phase  WFL  -SM        Oral Residue  WFL  -SM           Initiation of Pharyngeal Swallow    Pharyngeal Phase  functional pharyngeal phase of swallowing  -SM         Pharyngeal Phase, Comment  penetration, without aspiration, of thin liquids. Good airway protection throughout.   -           Clinical Impression    SLP Swallowing Diagnosis  functional oral phase;functional pharyngeal phase  -        Swallow Criteria for Skilled Therapeutic Interventions Met  no problems identified which require skilled intervention  -           Recommendations    Therapy Frequency (Swallow)  evaluation only  -        SLP Diet Recommendation  regular textures;thin liquids  -        Recommended Precautions and Strategies  upright posture during/after eating  -        SLP Rec. for Method of Medication Administration  as tolerated  -          User Key  (r) = Recorded By, (t) = Taken By, (c) = Cosigned By    Initials Name Effective Dates    Rosi Hampton, MS CCC-SLP 06/22/15 -           EDUCATION  The patient has been educated in the following areas:   Dysphagia (Swallowing Impairment) Oral Care/Hydration.    SLP Recommendation and Plan    Impression: Per these evaluation results, Ms. Geronimo presents w/ wfl oropharyngeal swallowing fnx. Fleeting laryngeal penetration w/ thin liquids via cup and straw when pt is allowed to self present. However, clears upon completion of swallow w/o significant residue. Pt is noted w/ impulsive eating habits when allowed to self-present. No other laryngeal penetration or aspiration. She is felt to most benefit from continued regular diet consistency w/ thin liquids. Meds crushed in puree. Upright and centered for all po intake.     Recommendations:   1. Regular diet consistency w/ thin liquids.  2. Meds crushed in puree.   3. JOEL precautions.   4. Oral care protocol.   5. Upright and centered for all po intake    No further SLP f/u warranted at this time.     D/w pt results and recommendations w/ verbal understanding and agreement.     D/w RN Agnes results and recommendations w/ verbal understanding and agreement.     Thank you for allowing  me to participate in the care of your patient-  Estrellita Adler M.S., CCC-SLP    Plan of Care Reviewed With: patient    SLP GOALS     Row Name 09/30/19 1000             Oral Nutrition/Hydration Goal 2 (SLP)    Oral Nutrition/Hydration Goal 2, SLP  Pt will tolerate soft solids & nectar-thick liquids w/ no overt s/sxs aspiration or distress w/ 100% acc and no cues  -AV      Time Frame (Oral Nutrition/Hydration Goal 2, SLP)  short term goal (STG);by discharge  -AV      Progress/Outcomes (Oral Nutrition/Hydration Goal 2, SLP)  continuing progress toward goal  -AV         Oral Nutrition/Hydration Goal (SLP)    Oral Nutrition/Hydration Goal, SLP  Pt will tolerate therapeutic trials of thin H2O w/ no overt s/sxs aspiration or distress w/ 70% acc and no cues in order to deteremine readiness for repeat instrumental eval  -AV      Time Frame (Oral Nutrition/Hydration Goal, SLP)  short term goal (STG);by discharge  -AV      Progress/Outcomes (Oral Nutrition/Hydration Goal, SLP)  good progress toward goal  -AV         Pharyngeal Strengthening Exercise Goal 1 (SLP)    Activity (Pharyngeal Strengthening Goal 1, SLP)  increase closure at entrance to airway/closure of airway at glottis  -AV      Increase Closure at Entrance to Airway/Closure of Airway at Glottis  super-supraglottic swallow  -AV      Lyon/Accuracy (Pharyngeal Strengthening Goal 1, SLP)  with minimal cues (75-90% accuracy)  -AV      Time Frame (Pharyngeal Strengthening Goal 1, SLP)  short term goal (STG)  -AV      Progress/Outcomes (Pharyngeal Strengthening Goal 1, SLP)  good progress toward goal  -AV        User Key  (r) = Recorded By, (t) = Taken By, (c) = Cosigned By    Initials Name Provider Type    AV Meg Jimenez, MS CCC-SLP Speech and Language Pathologist             Time Calculation:   Time Calculation- SLP     Row Name 10/02/19 1607             Time Calculation- SLP    SLP Start Time  1415  -      SLP Stop Time  1530  -CJ      SLP  Time Calculation (min)  75 min  -SHARON        User Key  (r) = Recorded By, (t) = Taken By, (c) = Cosigned By    Initials Name Provider Type    Estrellita Jones, MS CCC-SLP Speech and Language Pathologist          Therapy Charges for Today     Code Description Service Date Service Provider Modifiers Qty    33658549800 HC ST MOTION FLUORO EVAL SWALLOW 3 10/2/2019 Estrellita Adler MS CCC-SLP GN 1    15944221119 HC ST EVAL SPEECH AND PROD W LANG  2 10/2/2019 Estrellita Adler, MS CCC-SLP GN 1               Estrellita Adler MS CCC-SLP  10/2/2019

## 2019-10-02 NOTE — THERAPY EVALUATION
Inpatient Rehabilitation - Occupational Therapy Initial Evaluation     [place]     Patient Name: Ashley Geronimo  : 1945  MRN: [id number]    Today's Date: 10/2/2019                 Admit Date: 10/1/2019       No diagnosis found.    Patient Active Problem List   Diagnosis   • Pneumonia   • Septic shock (CMS/HCC)   • Pseudomonas pneumonia (CMS/HCC)   • NSTEMI (non-ST elevated myocardial infarction) (CMS/HCC)   • Bradycardia   • Paroxysmal atrial fibrillation (CMS/HCC)   • Chronic respiratory failure with hypoxia and hypercapnia (CMS/HCC)   • Acute on chronic respiratory failure with hypoxia (CMS/HCC), requiring intubation/mechanical ventilation 19   • Hematemesis   • RAMESH (acute kidney injury) (CMS/HCC)   • DKA (diabetic ketoacidoses) (CMS/HCC)   • Septic shock (CMS/HCC)   • Encephalopathy   • UTI (urinary tract infection)   • Tracheobronchitis, PSA    • Metabolic encephalopathy       Past Medical History:   Diagnosis Date   • RAMESH (acute kidney injury) (CMS/HCC)    • Atrial fibrillation (CMS/HCC)    • Bradycardia    • Chronic respiratory failure with hypoxia and hypercapnia (CMS/HCC)    • Diabetes mellitus (CMS/HCC)    • Hypertension    • NSTEMI (non-ST elevated myocardial infarction) (CMS/HCC) 2019   • Paroxysmal atrial fibrillation (CMS/HCC)    • Pneumonia    • Severe sepsis with septic shock (CMS/HCC)        History reviewed. No pertinent surgical history.         IRF OT ASSESSMENT FLOWSHEET (last 72 hours)      IRF Occupational Therapy Evaluation     Row Name 10/02/19 1521                   Evaluation/Treatment Time and Intent    Subjective Information  no complaints  -CJ        Existing Precautions/Restrictions  fall decreased skin integrity  -CJ        Document Type  evaluation;therapy note (daily note)  -CJ        Mode of Treatment  occupational therapy  -CJ        Row Name 10/02/19 1521                   Relationship/Environment    Lives With  grandchild(lyly) Pt states she lives with  grandchildren.  -        Row Name 10/02/19 1521                   Living Environment    Living Arrangements  mobile home per pt report  -        Home Accessibility  tub/shower is not walk in  -        Row Name 10/02/19 1521                   Cognition/Psychosocial- PT/OT    Affect/Mental Status (Cognitive)  confused  -        Orientation Status (Cognition)  oriented to;person;verbal cues/prompts needed for orientation  -        Follows Commands (Cognition)  verbal cues/prompting required;repetition of directions required;physical/tactile prompts required;initiation impaired;increased processing time needed  -        Cognitive Function (Cognitive)  --  -CJ        Attention Deficit (Cognitive)  requires cues/redirection to task  -        Safety Deficit (Cognitive)  insight into deficits/self awareness;safety precautions awareness  -        Row Name 10/02/19 1521                   Transfer Assessment/Treatment    Transfer Assessment/Treatment  --  -        Row Name 10/02/19 1521                   Toilet Transfer    [place] Level (Toilet Transfer)  minimum assist (75% patient effort);verbal cues  -        Assistive Device (Toilet Transfer)  grab bars/safety frame  -        Row Name 10/02/19 1521                   Bathing Assessment/Treatment    Comment (Bathing)  MaxA  -        Row Name 10/02/19 1521                   Upper Body Dressing Assessment/Treatment    Comment (Upper Body Dressing)  Bart  [facility]        Row Name 10/02/19 1521                   Lower Body Dressing Assessment/Treatment    Comment (Lower Body Dressing)  MaxA  -        Row Name 10/02/19 1521                   Grooming Assessment/Treatment    Comment (Grooming)  Bart  [facility]        Row Name 10/02/19 1521                   Toileting Assessment/Treatment    Comment (Toileting)  Max/Dependent  -        Row Name 10/02/19 1521                   Self-Feeding Assessment/Treatment    Comment (Self-Feeding)  Set up/ supervision  -         Row Name 10/02/19 1521                   General ROM    GENERAL ROM COMMENTS  BUE AROM- WFL, except L shld- 3/4 A/PROM d/t pain.  RN notified.  -        Row Name 10/02/19 1521                   MMT (Manual Muscle Testing)    General MMT Comments  BUE - 3+ to 4-/5  -        Row Name 10/02/19 1521                   Motor Assessment/Intervention    Additional Documentation  --  -        Row Name 10/02/19 1521                   Hand  Strength Testing    Right Hand, Setting 1 (Dynamometer Testing)  24 lbs  -        Left Hand, Setting 1 (Dynamometer Testing)  27 lbs  -        Row Name 10/02/19 1521                   Fine Motor Testing & Training    Comment, Fine Motor Coordination  BUE FMC -decreased  -        Row Name 10/02/19 1521                   Therapeutic Exercise    Therapeutic Exercise  --  -        Additional Documentation  --  -        Row Name 10/02/19 1521                   Upper Extremity Seated Therapeutic Exercise    Exercise Type, Seated Upper Extremity (Therapeutic Exercise)  AROM (active range of motion) BUE AROM ther ex/act, bilat coord ex, GMC/FMC  -        Expected Outcomes, Seated Upper Extremity (Therapeutic Exercise)  improve functional tolerance, self-care activity;improve performance, BADLs;improve performance, transfer skills;improve performance, fine motor coordination skills;improve performance, gross motor coordination skills  -        Row Name 10/02/19 1521                   OT Clinical Impression    General Observations of Patient  Diagnosis:  Debility- respiratory failure with hypoxia requiring mechanical ventilation, DKA, septic shock, urosepsis, encephalopathy  -        Patient's Goals For Discharge  return home  -        Rehab Potential/Prognosis: Occupational Therapy  adequate, monitor progress closely  -        Frequency of Treatment (OT Eval)  5 times per week  -        Estimated Length of Stay (OT Eval)  -- 10-14 days  -        Problem List:  Occupational Therapy  ROM decreased;strength decreased;coordination impaired impaired ADL's, fxal mobility, fxal activity tolerance  -        Anticipated Equipment Needs At Discharge (OT Eval)  -- TBD  -        Expected Discharge Disposition (OT Eval)  -- home with assistance or SNF  -        Planned Therapy Interventions (OT Eval)  activity tolerance training;BADL retraining;occupation/activity based interventions;ROM/therapeutic exercise;strengthening exercise;patient/caregiver education/training;transfer/mobility retraining  -        Row Name 10/02/19 1521                   IRF OT Goals    Bathing Goal Selection (OT-IRF)  bathing, OT goal 1  -        LB Dressing Goal Selection (OT-IRF)  LB dressing, OT goal 1;LB dressing, OT goal 2  -        Toileting Goal Selection (OT-IRF)  toileting, OT goal 1;toileting, OT goal 2  -        Additional Documentation  Toileting Goal Selection (OT-IRF) (Row)  -        Row Name 10/02/19 1521                   Bathing Goal 1 (OT-IRF)    [place] Level (Bathing Goal 1, OT-IRF)  minimum assist (75% or more patient effort)  -        Time Frame (Bathing Goal 1, OT-IRF)  long term goal (LTG);by discharge  -        Row Name 10/02/19 1521                   LB Dressing Goal 1 (OT-IRF)    [place] (LB Dressing Goal 1, OT-IRF)  moderate assist (50-74% patient effort)  -        Time Frame (LB Dressing Goal 1, OT-IRF)  short term goal (STG)  -        Row Name 10/02/19 1521                   LB Dressing Goal 2 (OT-IRF)    [place] (LB Dressing Goal 2, OT-IRF)  minimum assist (75% or more patient effort)  -        Time Frame (LB Dressing Goal 2, OT-IRF)  long term goal (LTG);by discharge  -        Row Name 10/02/19 1521                   Toileting Goal 1 (OT-IRF)    [place] Level (Toileting Goal 1, OT-IRF)  maximum assist (25-49% patient effort)  -        Time Frame (Toileting Goal 1, OT-IRF)  short term goal (STG)  -        Row Name 10/02/19 1521                    Toileting Goal 2 (OT-IRF)    [place] Level (Toileting Goal 2, OT-IRF)  moderate assist (50-74% patient effort)  -        Time Frame (Toileting Goal 2, OT-IRF)  long term goal (LTG);by discharge  -        Row Name 10/02/19 1521                   Positioning and Restraints    Post Treatment Position  wheelchair  -        In Wheelchair  sitting;with nsg;patient within staff view;with SLP with nsg- post am tx;  with SLP- pm tx    -          User Key  (r) = Recorded By, (t) = Taken By, (c) = Cosigned By    Initials Name Effective Dates     Laya Harrison, OT 04/03/18 -            Occupational Therapy Education     Title: PT OT SLP Therapies (Not Started)     Topic: Occupational Therapy (In Progress)     Point: ADL training (In Progress)     Description: Instruct learner(s) on proper safety adaptation and remediation techniques during self care or transfers.   Instruct in proper use of assistive devices.    Learning Progress Summary           Patient Acceptance, E,D, NR by  at 10/2/2019  3:17 PM                               User Key     Initials Effective Dates Name Provider Type Discipline     04/03/18 -  Laya Harrison, OT Occupational Therapist OT                    OT Recommendation and Plan    Planned Therapy Interventions (OT Eval): activity tolerance training, BADL retraining, occupation/activity based interventions, ROM/therapeutic exercise, strengthening exercise, patient/caregiver education/training, transfer/mobility retraining                    Time Calculation:     OT Start Time: 1330  OT Stop Time: 1410  OT Time Calculation (min): 40 min  OT Non-Billable Time (min): 40 min  Therapy Charges for Today     Code Description Service Date Service Provider Modifiers Qty    78472476488  OT EVAL HIGH COMPLEXITY 3 10/2/2019 Laya Harrison, OT GO 1    [id number]  OT THERAPEUTIC ACT EA 15 MIN 10/2/2019 Laya Harrison OT GO 3                   Laya Harrison  OT  10/2/2019 and Inpatient Rehabilitation - Occupational Therapy Treatment Note    [facility] [place]     Patient Name: Ashley Geronimo  : 1945  MRN: [id number]    Today's Date: 10/2/2019                 Admit Date: 10/1/2019      Visit Dx:  No diagnosis found.    Patient Active Problem List   Diagnosis   • Pneumonia   • Septic shock (CMS/[facility])   • Pseudomonas pneumonia (CMS/[facility])   • NSTEMI (non-ST elevated myocardial infarction) (CMS/[facility])   • Bradycardia   • Paroxysmal atrial fibrillation (CMS/HCC)   • Chronic respiratory failure with hypoxia and hypercapnia (CMS/[facility])   • Acute on chronic respiratory failure with hypoxia (CMS/[facility]), requiring intubation/mechanical ventilation 19   • Hematemesis   • RAMESH (acute kidney injury) (CMS/[facility])   • DKA (diabetic ketoacidoses) (CMS/[facility])   • Septic shock (CMS/[facility])   • Encephalopathy   • UTI (urinary tract infection)   • Tracheobronchitis, PSA    • Metabolic encephalopathy         Therapy Treatment        Wound 19 Right lateral clavicle Puncture (Active)   Drainage Amount none 10/1/2019  5:02 PM       Wound 10/01/19 1702 Bilateral other (see comments) coccyx MASD ([place] associated skin damage) (Active)   Wound Image   10/1/2019  5:02 PM   Dressing Appearance open to air 10/2/2019  7:30 AM   Periwound yeast;blanchable;excoriated 10/1/2019  8:07 PM   Periwound Temperature warm 10/1/2019  8:07 PM   Periwound Skin Turgor soft 10/1/2019  8:07 PM   Edges irregular 10/1/2019  8:07 PM   Wound Length (cm) 11 cm 10/1/2019  5:02 PM   Wound Width (cm) 6 cm 10/1/2019  5:02 PM   Dressing Care, Wound skin barrier agent applied 10/1/2019  8:07 PM         OT Recommendation and Plan    Anticipated Equipment Needs At Discharge (OT Eval): (TBD)  Planned Therapy Interventions (OT Eval): activity tolerance training, BADL retraining, occupation/activity based interventions, ROM/therapeutic exercise, strengthening exercise, patient/caregiver education/training, transfer/mobility  retraining            OT IRF GOALS     Row Name 10/02/19 1528             Bathing Goal 1 (OT-IRF)    [place] Level (Bathing Goal 1, OT-IRF)  minimum assist (75% or more patient effort)  -      Time Frame (Bathing Goal 1, OT-IRF)  long term goal (LTG);by discharge  -         LB Dressing Goal 1 (OT-IRF)    [place] (LB Dressing Goal 1, OT-IRF)  moderate assist (50-74% patient effort)  -      Time Frame (LB Dressing Goal 1, OT-IRF)  short term goal (STG)  -         LB Dressing Goal 2 (OT-IRF)    [place] (LB Dressing Goal 2, OT-IRF)  minimum assist (75% or more patient effort)  -      Time Frame (LB Dressing Goal 2, OT-IRF)  long term goal (LTG);by discharge  -         Toileting Goal 1 (OT-IRF)    [place] Level (Toileting Goal 1, OT-IRF)  maximum assist (25-49% patient effort)  -      Time Frame (Toileting Goal 1, OT-IRF)  short term goal (STG)  -         Toileting Goal 2 (OT-IRF)    [place] Level (Toileting Goal 2, OT-IRF)  moderate assist (50-74% patient effort)  -      Time Frame (Toileting Goal 2, OT-IRF)  long term goal (LTG);by discharge  -        User Key  (r) = Recorded By, (t) = Taken By, (c) = Cosigned By    Initials Name Provider Type     Laya Harrison OT Occupational Therapist          Occupational Therapy Education     Title: PT OT SLP Therapies (Not Started)     Topic: Occupational Therapy (In Progress)     Point: ADL training (In Progress)     Description: Instruct learner(s) on proper safety adaptation and remediation techniques during self care or transfers.   Instruct in proper use of assistive devices.    Learning Progress Summary           Patient Acceptance, E,D, NR by  at 10/2/2019  3:17 PM                               User Key     Initials Effective Dates Name Provider Type [facility] 04/03/18 -  Laya Harrison OT Occupational Therapist OT                       Time Calculation:     Time Calculation- OT     Row Name 10/02/19 6516  10/02/19 1100          Time Calculation- OT    OT Start Time  1330  -  1100  -     OT Stop Time  1410  -  1145  -     OT Time Calculation (min)  40 min  -  45 min  -     Total Timed Code Minutes- OT  40 minute(s)  -  45 minute(s)  -     OT Non-Billable Time (min)  --  40 min  -       User Key  (r) = Recorded By, (t) = Taken By, (c) = Cosigned By    Initials Name Provider Type     Laya Harrison OT Occupational Therapist          Therapy Charges for Today     Code Description Service Date Service Provider Modifiers Qty    99597796041  OT EVAL HIGH COMPLEXITY 3 10/2/2019 Laya Harrison OT GO 1    [id number]  OT THERAPEUTIC ACT EA 15 MIN 10/2/2019 Laya Harrison OT GO 3                   Laya Harrison OT  10/2/2019

## 2019-10-02 NOTE — PROGRESS NOTES
Inpatient Rehabilitation Functional Measures Assessment and Plan of Care    Plan of Care   Self Care    Dressing (Lower) (Active)  Current Status (10/2/2019 11:00:00 AM): MaxA  Weekly Goal: ModA  Discharge Goal: Bart    Toileting (Active)  Current Status (10/2/2019 11:00:00 AM): TotalA/MaxA  Weekly Goal: MaxA  Discharge Goal: ModA    Functional Measures  PUJA Eating:  Eating Score = 5. Patient is supervision/set-up for eating,  requiring: Opening containers. Stand by assistance. No assistive devices were  required.  PUJA Grooming: Grooming Score = 4.  Patient requires minimal assistance  for  grooming, requiring  incidental help only. No assistive devices were required.  PUJA Bathing:  Patient took sponge bath. Patient requires no physical assistance  for washing, rinsing, and drying the right arm. Patient requires no physical  assistance for washing, rinsing, and drying the left arm. Patient requires no  physical assistance for washing, rinsing, and drying the chest. Patient requires  no physical assistance for washing, rinsing, and drying the abdomen. Patient  requires moderate assistance for washing, rinsing, or drying the perineal area.  Patient requires maximal assistance for washing, rinsing, or drying the  buttocks. Patient requires moderate assistance for washing, rinsing, or drying  the right upper leg. Patient requires moderate assistance for washing, rinsing,  or drying the left upper leg. Patient requires maximal assistance for washing,  rinsing, or drying the right lower leg, including the foot. Patient requires  maximal assistance for washing, rinsing, or drying the left lower leg, including  the foot. Patient performs 40 % of bathing tasks. Bathing Score = 2, Maximal  Assistance. No assistive devices were required.  PUJA Upper Body Dressing:  Gathering clothes was not observed for this patient.  Wearing a bra or undershirt was not observed for this patient. Patient requires  no physical assistance for  threading the right arm through the garment  (shirt/sweater).  Patient requires no physical assistance for threading the left  arm through the garment (shirt/sweater).  Patient requires moderate/considerable  physical assistance for pulling an over-head-garment over head or pulling  front-fastening-garment around back.  Patient requires moderate/considerable  physical assistance for pulling an over-head-garment down the trunk or  adjusting/fastening together a front-fastening-garment. Patient performs 80 % of  upper body dressing tasks. Upper Body Dressing Score = 4, Minimal Assistance. No  assistive devices were required.  PUJA Lower Body Dressing:  Gathering clothes was not observed for this patient.  Wearing underwear or an undergarment was not observed for this patient. Patient  requires moderate/considerable physical assistance for threading the right leg  through the pants/skirt. Patient requires moderate/considerable physical  assistance for threading the left leg through the pants/skirt. Patient requires  maximal/significant physical assistance for holding clothing and/or pulling  pants/skirt over hips and adjusting fasteners. Patient requires  moderate/considerable physical assistance for donning and/or doffing right sock.  Patient requires moderate/considerable physical assistance for donning and/or  doffing left sock. Donning and/or doffing right shoe was not observed for this  patient. Donning and/or doffing left shoe was not observed for this patient.  Patient performs 45 % of lower body dressing tasks. Lower Body Dressing Score =  2, Maximal Assistance. No assistive devices were required.  PUJA Toileting:  Patient requires minimal assistance for adjusting clothing  before using a toilet, commode, bedpan, or urinal. Patient requires moderate  assistance for hygiene. Patient requires maximal assistance for adjusting  clothing after using a toilet, commode, bedpan, or urinal. Patient performs 0 -  24% of  toileting tasks.  Toileting Score = 1, Total Assistance. Patient requires  the following assistive device(s): Arm rest of a specialized seat. Grab bar.    PUJA Bladder Management  Level of Assistance:  Frequency/Number of Accidents this Shift:    PUJA Bowel Management  Level of Assistance:  Frequency/Number of Accidents this Shift:    PUJA Bed/Chair/Wheelchair Transfer:  PUJA Toilet Transfer:  Toilet Transfer Score = 4.  Patient performs 75% or more  of effort and minimal assistance (little/incidental help/steadying) for  transferring to and from the toilet/commode, requiring: Patient requires the  following assistive device(s): Safety frame/over the toilet. Grab bars.  PUJA Tub/Shower Transfer:  Activity was not observed.    Previously Documented Mode of Locomotion at Discharge:  [facility] Expected Mode of Locomotion at Discharge:  PUJA Walk/Wheelchair:  PUJA Stairs:    PUJA Comprehension:  PUJA Expression:  PUJA Social Interaction:  [facility] Problem Solving:  PUJA Memory:    Therapy Mode Minutes  Occupational Therapy: Individual: 85 minutes.  Physical Therapy:  Speech Language Pathology:    Discharge Functional Goals:  Eatin  Groomin  Bathing; 4  Upper Body Dressin  Lower Body Dressin  Toileting: 3  Tub/Shower: 4  Toilet Transfers: 5    Signed by: Laya Harrison Occupational Therapist

## 2019-10-02 NOTE — PROGRESS NOTES
Inpatient Rehabilitation Functional Measures Assessment    Functional Measures  PUJA Eating:  Eating Score = 5. Patient is supervision/set-up for eating,  requiring: Opening containers. Cutting meat. Verbal cuing, prompting, or  instructing. No assistive devices were required.  PUJA Grooming: Grooming Score = 5. Patient is supervision/set-up for grooming,  requiring: Initial preparation. Opening containers. No assistive devices were  required.  PUJA Bathing:  PUJA Upper Body Dressing:  Patient requires total assistance for gathering  clothes. Wearing a bra or undershirt was not applicable for this patient.  Patient requires moderate/considerable physical assistance for threading the  right arm through the garment (shirt/sweater). Patient requires  moderate/considerable physical assistance for threading the left arm through the  garment (shirt/sweater). Patient requires moderate/considerable physical  assistance for pulling an over-head-garment over head or pulling  front-fastening-garment around back. Patient requires moderate/considerable  physical assistance for pulling an over-head-garment down the trunk or  adjusting/fastening together a front-fastening-garment. Patient performs 60 % of  upper body dressing tasks. Upper Body Dressing Score = 3, Moderate Assistance.  No assistive devices were required.  PUJA Lower Body Dressing:  Patient requires total assistance for gathering  clothes. Patient requires moderate/considerable physical assistance for  threading the right leg through the undergarment. Patient requires  moderate/considerable physical assistance for threading the left leg through the  undergarment. Patient requires moderate/considerable physical assistance for  pulling undergarment over hips and adjusting fasteners. Patient requires  moderate/considerable physical assistance for threading the right leg through  the pants/skirt. Patient requires moderate/considerable physical assistance for  threading the  left leg through the pants/skirt. Patient requires  moderate/considerable physical assistance for pulling pants/skirt over hips and  adjusting fasteners. Patient requires total assistance for holding clothing  and/or donning and/or doffing right sock. Patient requires total assistance for  holding clothing and/or donning and/or doffing left sock. Donning and/or doffing  right shoe is not applicable for this patient. Donning and/or doffing left shoe  is not applicable for this patient. Patient performs 33.33 % of lower body  dressing tasks. Lower Body Dressing Score = 2, Maximal Assistance. No assistive  devices were required.  PUJA Toileting:  Patient requires maximal assistance for adjusting clothing  before using a toilet, commode, bedpan, or urinal. Patient requires maximal  assistance for hygiene. Patient requires maximal assistance for adjusting  clothing after using a toilet, commode, bedpan, or urinal. Patient performs 0 -  24% of toileting tasks.  Toileting Score = 1, Total Assistance. Patient requires  the following assistive device(s): Grab bar.    PUJA Bladder Management  Level of Assistance:  Bladder Score = 2. Patient performs 25-49% of tasks and  requires maximal assistance for bladder management. [place] provides most of the  assist to apply AND remove brief.  Frequency/Number of Accidents this Shift:  Bladder accidents this shift:  4 .    PUJA Bowel Management  Level of Assistance: Bowel Score = 2. Patient performs 25-49% of tasks and  requires maximal assistance for bowel management. [place] provides most of the  assist to apply AND remove brief.  Frequency/Number of Accidents this Shift: Bowel accidents this shift: 1 .    PUJA Bed/Chair/Wheelchair Transfer:  Bed/chair/wheelchair Transfer Score = 3.  Patient performs 50-74% of effort and requires moderate assistance (some  lifting) for transferring to and from the bed/chair/wheelchair. Patient requires  the following assistive device(s): Bed rails. Arm  rest. Seating system of  wheelchair.  PUJA Toilet Transfer:  PUJA Tub/Shower Transfer:    Previously Documented Mode of Locomotion at Discharge:  [facility] Expected Mode of Locomotion at Discharge:  [facility] Walk/Wheelchair:  [facility] Stairs:    [facility] Comprehension:  [facility] Expression:  [facility] Social Interaction:  [facility] Problem Solving:  [facility] Memory:    Therapy Mode Minutes  Occupational Therapy:  Physical Therapy:  Speech Language Pathology:    Discharge Functional Goals:    Signed by: JAI Wilcox

## 2019-10-02 NOTE — PROGRESS NOTES
Patient Assessment Instrument  Quality Indicators - Admission    Section B. Hearing, Speech Vision      Section C. Cognitive Patterns      Section [id number]. Prior Functioning      Section [id number]. Prior Device Use      Section [id number]. Self Care Performance     Eating: [place] provides verbal cues and/or touching/steadying and/or contact  guard assistance as patient completes activity.   Oral Hygiene: [place] provides verbal cues and/or touching/steadying and/or  contact guard assistance as patient completes activity.   Toileting Hygiene: [place] does all of the effort. Patient does none of the  effort to complete the activity. Or, the assistance of 2 or more helpers is  required for the patient to complete the activity.   Shower/Bathe Self: [place] does more than half the effort. [place] lifts or holds  trunk or limbs and provides more than half the effort.   Upper Body Dressing: [place] does less than half the effort. [place] lifts, holds  or supports trunk or limbs but provides less than half the effort.   Lower Body Dressing: [place] does more than half the effort. [place] lifts or  holds trunk or limbs and provides more than half the effort.   Putting On/Taking Off Footwear: [place] does more than half the effort. [place]  lifts or holds trunk or limbs and provides more than half the effort.    Section [id number]. Self Care Discharge Goals     Eating: Patient completed the activities by him/herself with no assistance from  a helper.   Oral Hygiene: [place] sets up or cleans up; patient completes activity. [place]  assists only prior to or following the activity.   Toileting Hygiene: [place] does less than half the effort. [place] lifts, holds  or supports trunk or limbs but provides less than half the effort.   Shower/Bathe Self: [place] provides verbal cues or touching/steadying assistance  as patient completes activity.   Upper Body Dressing: [place] provides verbal cues or touching/steadying  assistance as patient completes  activity.   Lower Body Dressing: [place] provides verbal cues or touching/steadying  assistance as patient completes activity.   Putting On/Taking Off Footwear: [place] provides verbal cues or  touching/steadying assistance as patient completes activity.    Section [id number]. Mobility Performance      Section [id number]. Mobility Discharge Goals      Section H. Bladder and Bowel      Section I. Active Diagnosis      Section J. Health Conditions      Section K. Swallowing/Nutritional Status      Section M. Skin Conditions      Section N. Medication      Section O. Special Treatments, Procedures, and Programs      OPTIONAL BRANCH FOR TRACKING FALLS  Fall(s) During Shift:    Signed by: Laya Harrison, Occupational Therapist

## 2019-10-02 NOTE — PLAN OF CARE
Problem: Patient Care Overview  Goal: Plan of Care Review  Outcome: Ongoing (interventions implemented as appropriate)   10/02/19 2815   Patient Care Overview   IRF Plan of Care Review progress ongoing, continue   Progress, Functional Goals demonstrating adequate progress   Coping/Psychosocial   Plan of Care Reviewed With patient   OTHER   Outcome Summary MBS completed this pm. Fleeting laryngeal penetration w/ thin liquids via cup and straw w/ large bolus size, however clears upon completion of swallow. No other laryngeal penetration or aspiration evidenced. She is felt to most benefit from continued regular diet consistency w/ thin liquids. S/L/Cog eval completed. Pt is noted w/ metabolic encephalopathy per chart review, MD and Nurse Practioner note. Deficits observed including increased processing time, disorientation and memory difficulties are felt to be metabolic in nature and not representative of a true cognitive deficit. No further SLP f/u warranted at this time.

## 2019-10-02 NOTE — PROGRESS NOTES
Inpatient Rehabilitation Functional Measures Assessment and Plan of Care    Plan of Care  Mobility    [PT] Bed/Chair/Wheelchair(Active)  Current Status(10/02/2019): CGA with RW  Weekly Goal(10/09/2019): SBA with AAD  Discharge Goal: mod indep w/ AAD    [PT] Walk(Active)  Current Status(10/02/2019): 200 ft, CGA, RW  Weekly Goal(10/09/2019): 300 ft, SBA,AAD  Discharge Goal: 300 ft, Mod independent, AAD    [PT] Stairs(Active)  Current Status(10/02/2019): TBA  Weekly Goal(10/09/2019): 10 stairs, SBA, 2 handrails  Discharge Goal: 15 stairs, 2 handrails, SBA    Functional Measures  PUJA Eating:  PUJA Grooming:  PUJA Bathing:  PUJA Upper Body Dressing:  PUJA Lower Body Dressing:  PUJA Toileting:    PUJA Bladder Management  Level of Assistance:  Frequency/Number of Accidents this Shift:    PUJA Bowel Management  Level of Assistance:  Frequency/Number of Accidents this Shift:    PUJA Bed/Chair/Wheelchair Transfer:  Bed/chair/wheelchair Transfer Score = 4.  Patient performs 75% or more of effort and minimal assistance (little/incidental  help/lifting of one limb/steadying) for transferring to and from the  bed/chair/wheelchair, requiring: Contact guard. Patient requires the following  assistive device(s): Walker.  PUJA Toilet Transfer:  Toilet Transfer did not occur. .  PUJA Tub/Shower Transfer:  Activity was not observed.    Previously Documented Mode of Locomotion at Discharge:  [facility] Expected Mode of Locomotion at Discharge: The expected mode of most  frequently used locomotion, at discharge, is expected to be walking.  PUJA Walk/Wheelchair:  WHEELCHAIR OBSERVATION   Wheelchair did not occur.    WALK OBSERVATION   Walk Distance Scale = 3.  Distance walked is greater than 150 feet. Walk Score  = 4.  Patient performs 75% or more of effort and requires minimal assistance.  Incidental help/contact guard/steadying was provided. Patient walked a distance  of  200 feet. Patient requires the following assistive device(s): Rolling  walker.  PUJA  Stairs:  Stairs did not occur.    PUJA Comprehension:  PUJA Expression:  PUJA Social Interaction:  PUJA Problem Solving:  PUJA Memory:    Therapy Mode Minutes  Occupational Therapy:  Physical Therapy: Individual: 85 minutes.  Speech Language Pathology:    Discharge Functional Goals:  Bed, Chair, Wheelchair Transfers: 6  Mode of Locomotion: W  Walk: 6  Wheelchair: 1  Stairs: 5    Signed by: Karyn Delgado, Physical Therapist

## 2019-10-02 NOTE — THERAPY EVALUATION
Inpatient Rehabilitation - Occupational Therapy Initial Evaluation     [place]     Patient Name: Ashley Geronimo  : 1945  MRN: [id number]    Today's Date: 10/2/2019                 Admit Date: 10/1/2019       No diagnosis found.    Patient Active Problem List   Diagnosis   • Pneumonia   • Septic shock (CMS/HCC)   • Pseudomonas pneumonia (CMS/HCC)   • NSTEMI (non-ST elevated myocardial infarction) (CMS/HCC)   • Bradycardia   • Paroxysmal atrial fibrillation (CMS/HCC)   • Chronic respiratory failure with hypoxia and hypercapnia (CMS/HCC)   • Acute on chronic respiratory failure with hypoxia (CMS/HCC), requiring intubation/mechanical ventilation 19   • Hematemesis   • RAMESH (acute kidney injury) (CMS/HCC)   • DKA (diabetic ketoacidoses) (CMS/HCC)   • Septic shock (CMS/HCC)   • Encephalopathy   • UTI (urinary tract infection)   • Tracheobronchitis, PSA    • Metabolic encephalopathy       Past Medical History:   Diagnosis Date   • RAMESH (acute kidney injury) (CMS/HCC)    • Atrial fibrillation (CMS/HCC)    • Bradycardia    • Chronic respiratory failure with hypoxia and hypercapnia (CMS/HCC)    • Diabetes mellitus (CMS/HCC)    • Hypertension    • NSTEMI (non-ST elevated myocardial infarction) (CMS/HCC) 2019   • Paroxysmal atrial fibrillation (CMS/HCC)    • Pneumonia    • Severe sepsis with septic shock (CMS/HCC)        History reviewed. No pertinent surgical history.         IRF OT ASSESSMENT FLOWSHEET (last 72 hours)      IRF Occupational Therapy Evaluation     Row Name 10/02/19 1521                   Evaluation/Treatment Time and Intent    Subjective Information  no complaints  -CJ        Existing Precautions/Restrictions  fall  - decreased skin integrity -CJ        Document Type  evaluation;therapy note (daily note)  -CJ        Mode of Treatment  occupational therapy  -CJ        Row Name 10/02/19 1521                   Relationship/Environment    Lives With  grandchild(lyly) Pt states she lives with  grandchildren.  -        Row Name 10/02/19 1521                   Living Environment    Living Arrangements  mobile home per pt report  -CJ        Home Accessibility  tub/shower is not walk in  -        Row Name 10/02/19 1521                   Cognition/Psychosocial- PT/OT    Affect/Mental Status (Cognitive)  confused  -CJ        Orientation Status (Cognition)  oriented to;person;verbal cues/prompts needed for orientation  -CJ        Follows Commands (Cognition)  verbal cues/prompting required;repetition of directions required;physical/tactile prompts required;initiation impaired;increased processing time needed  -        Cognitive Function (Cognitive)  —  -CJ        Attention Deficit (Cognitive)  requires cues/redirection to task  -CJ        Safety Deficit (Cognitive)  insight into deficits/self awareness;safety precautions awareness  -        Row Name 10/02/19 1521                   Transfer Assessment/Treatment    Transfer Assessment/Treatment  —  -        Row Name 10/02/19 1521                   Toilet Transfer    [place] Level (Toilet Transfer)  minimum assist (75% patient effort);verbal cues  -        Assistive Device (Toilet Transfer)  grab bars/safety frame  -        Row Name 10/02/19 1521                   General ROM    GENERAL ROM COMMENTS  BUE AROM- WFL, except L shld- 3/4 A/PROM d/t pain.  RN notified.  -        Row Name 10/02/19 1521                   MMT (Manual Muscle Testing)    General MMT Comments  BUE - 3+ to 4-/5  -        Row Name 10/02/19 1521                   Motor Assessment/Intervention          Hand  Strength Testing    Right Hand, Setting 1 (Dynamometer Testing)  24 lbs  -        Left Hand, Setting 1 (Dynamometer Testing)  27 lbs  -        Row Name 10/02/19 1521                   Fine Motor Testing & Training    Comment, Fine Motor Coordination  BUE FMC -decreased  -        Row Name 10/02/19 1521                   Therapeutic Exercise          Upper  Extremity Seated Therapeutic Exercise    Exercise Type, Seated Upper Extremity (Therapeutic Exercise)  AROM (active range of motion) BUE AROM ther ex/act, bilat coord ex, GMC/FMC  -        Expected Outcomes, Seated Upper Extremity (Therapeutic Exercise)  improve functional tolerance, self-care activity;improve performance, BADLs;improve performance, transfer skills;improve performance, fine motor coordination skills;improve performance, gross motor coordination skills  -        Row Name 10/02/19 1521                   OT Clinical Impression    General Observations of Patient  Diagnosis:  Debility- respiratory failure with hypoxia requiring mechanical ventilation, DKA, septic shock, urosepsis, encephalopathy  -        Patient's Goals For Discharge  return home  -        Rehab Potential/Prognosis: Occupational Therapy  adequate, monitor progress closely  -        Frequency of Treatment (OT Eval)  5 times per week  -        Estimated Length of Stay (OT Eval)  — 10-14 days  -        Problem List: Occupational Therapy  ROM decreased;strength decreased;coordination impaired impaired ADL's, fxal mobility, fxal activity tolerance  -        Anticipated Equipment Needs At Discharge (OT Eval)  — TBD  -        Expected Discharge Disposition (OT Eval)  — home with assistance or SNF  -        Planned Therapy Interventions (OT Eval)  activity tolerance training;BADL retraining;occupation/activity based interventions;ROM/therapeutic exercise;strengthening exercise;patient/caregiver education/training;transfer/mobility retraining  -        Row Name 10/02/19 1521                   IRF OT Goals    Bathing Goal Selection (OT-IRF)  bathing, OT goal 1  -CJ        LB Dressing Goal Selection (OT-IRF)  LB dressing, OT goal 1;LB dressing, OT goal 2  -CJ        Toileting Goal Selection (OT-IRF)  toileting, OT goal 1;toileting, OT goal 2  -        Additional Documentation  Toileting Goal Selection (OT-IRF) (Row)  -         Row Name 10/02/19 1521                   Bathing Goal 1 (OT-IRF)    [place] Level (Bathing Goal 1, OT-IRF)  minimum assist (75% or more patient effort)  -        Time Frame (Bathing Goal 1, OT-IRF)  long term goal (LTG);by discharge  -        Row Name 10/02/19 1521                   LB Dressing Goal 1 (OT-IRF)    [place] (LB Dressing Goal 1, OT-IRF)  moderate assist (50-74% patient effort)  -        Time Frame (LB Dressing Goal 1, OT-IRF)  short term goal (STG)  -        Row Name 10/02/19 1521                   LB Dressing Goal 2 (OT-IRF)    [place] (LB Dressing Goal 2, OT-IRF)  minimum assist (75% or more patient effort)  -        Time Frame (LB Dressing Goal 2, OT-IRF)  long term goal (LTG);by discharge  -        Row Name 10/02/19 1521                   Toileting Goal 1 (OT-IRF)    [place] Level (Toileting Goal 1, OT-IRF)  maximum assist (25-49% patient effort)  -        Time Frame (Toileting Goal 1, OT-IRF)  short term goal (STG)  -        Row Name 10/02/19 1521                   Toileting Goal 2 (OT-IRF)    [place] Level (Toileting Goal 2, OT-IRF)  moderate assist (50-74% patient effort)  -        Time Frame (Toileting Goal 2, OT-IRF)  long term goal (LTG);by discharge  -        Row Name 10/02/19 1521                   Positioning and Restraints    Post Treatment Position  wheelchair  -        In Wheelchair  sitting;with nsg;patient within staff view;with SLP with nsg- post am tx;  with SLP- pm tx    -          User Key  (r) = Recorded By, (t) = Taken By, (c) = Cosigned By    Initials Name Effective Dates     Laya Harrison, OT 04/03/18 -            Occupational Therapy Education     Title: PT OT SLP Therapies (Not Started)     Topic: Occupational Therapy (In Progress)     Point: ADL training (In Progress)     Description: Instruct learner(s) on proper safety adaptation and remediation techniques during self care or transfers.   Instruct in proper use of  assistive devices.    Learning Progress Summary           Patient Acceptance, E,D, NR by SHARON at 10/2/2019  3:17 PM                               User Key     Initials Effective Dates Name Provider Type Discipline    SHARON 18 -  Laya Harrison, OT Occupational Therapist OT                    OT Recommendation and Plan    Planned Therapy Interventions (OT Eval): activity tolerance training, BADL retraining, occupation/activity based interventions, ROM/therapeutic exercise, strengthening exercise, patient/caregiver education/training, transfer/mobility retraining                    Time Calculation:     OT Start Time: 1330  OT Stop Time: 1410  OT Time Calculation (min): 40 min  OT Non-Billable Time (min): 45 min  Therapy Charges for Today     Code Description Service Date Service Provider Modifiers Qty    75717023412 HC OT EVAL HIGH COMPLEXITY 3 10/2/2019 Laya Harrison, OT GO 1    [id number]  OT THERAPEUTIC ACT EA 15 MIN 10/2/2019 Laya Harrison OT GO 3                   Laya Harrison OT  10/2/2019 and Inpatient Rehabilitation - Occupational Therapy Treatment Note    [place]     Patient Name: Ashley Geronimo  : 1945  MRN: [id number]    Today's Date: 10/2/2019                 Admit Date: 10/1/2019      Visit Dx:  No diagnosis found.    Patient Active Problem List   Diagnosis   • Pneumonia   • Septic shock (CMS/[facility])   • Pseudomonas pneumonia (CMS/[facility])   • NSTEMI (non-ST elevated myocardial infarction) (CMS/[facility])   • Bradycardia   • Paroxysmal atrial fibrillation (CMS/[facility])   • Chronic respiratory failure with hypoxia and hypercapnia (CMS/[facility])   • Acute on chronic respiratory failure with hypoxia (CMS/[facility]), requiring intubation/mechanical ventilation 19   • Hematemesis   • RAMESH (acute kidney injury) (CMS/[facility])   • DKA (diabetic ketoacidoses) (CMS/[facility])   • Septic shock (CMS/[facility])   • Encephalopathy   • UTI (urinary tract infection)   • Tracheobronchitis, PSA    • Metabolic encephalopathy                  Therapy Treatment        Wound 09/29/19 Right lateral clavicle Puncture (Active)   Drainage Amount none 10/1/2019  5:02 PM       Wound 10/01/19 1702 Bilateral other (see comments) coccyx MASD ([place] associated skin damage) (Active)   Wound Image   10/1/2019  5:02 PM   Dressing Appearance open to air 10/2/2019  7:30 AM   Periwound yeast;blanchable;excoriated 10/1/2019  8:07 PM   Periwound Temperature warm 10/1/2019  8:07 PM   Periwound Skin Turgor soft 10/1/2019  8:07 PM   Edges irregular 10/1/2019  8:07 PM   Wound Length (cm) 11 cm 10/1/2019  5:02 PM   Wound Width (cm) 6 cm 10/1/2019  5:02 PM   Dressing Care, Wound skin barrier agent applied 10/1/2019  8:07 PM         OT Recommendation and Plan    Anticipated Equipment Needs At Discharge (OT Eval): (TBD)  Planned Therapy Interventions (OT Eval): activity tolerance training, BADL retraining, occupation/activity based interventions, ROM/therapeutic exercise, strengthening exercise, patient/caregiver education/training, transfer/mobility retraining            OT IRF GOALS     Row Name 10/02/19 1521             Bathing Goal 1 (OT-IRF)    [place] Level (Bathing Goal 1, OT-IRF)  minimum assist (75% or more patient effort)  -CJ      Time Frame (Bathing Goal 1, OT-IRF)  long term goal (LTG);by discharge  -         LB Dressing Goal 1 (OT-IRF)    [place] (LB Dressing Goal 1, OT-IRF)  moderate assist (50-74% patient effort)  -CJ      Time Frame (LB Dressing Goal 1, OT-IRF)  short term goal (STG)  -CJ         LB Dressing Goal 2 (OT-IRF)    [place] (LB Dressing Goal 2, OT-IRF)  minimum assist (75% or more patient effort)  -CJ      Time Frame (LB Dressing Goal 2, OT-IRF)  long term goal (LTG);by discharge  -CJ         Toileting Goal 1 (OT-IRF)    [place] Level (Toileting Goal 1, OT-IRF)  maximum assist (25-49% patient effort)  -CJ      Time Frame (Toileting Goal 1, OT-IRF)  short term goal (STG)  -         Toileting Goal 2 (OT-IRF)     [place] Level (Toileting Goal 2, OT-IRF)  moderate assist (50-74% patient effort)  -      Time Frame (Toileting Goal 2, OT-IRF)  long term goal (LTG);by discharge  -        User Key  (r) = Recorded By, (t) = Taken By, (c) = Cosigned By    Initials Name Provider Type     Laya Harrison OT Occupational Therapist          Occupational Therapy Education     Title: PT OT SLP Therapies (Not Started)     Topic: Occupational Therapy (In Progress)     Point: ADL training (In Progress)     Description: Instruct learner(s) on proper safety adaptation and remediation techniques during self care or transfers.   Instruct in proper use of assistive devices.    Learning Progress Summary           Patient Acceptance, E,D, NR by  at 10/2/2019  3:17 PM                               User Key     Initials Effective Dates Name Provider Type [facility] 04/03/18 -  Laya Harrison OT Occupational Therapist OT                       Time Calculation:     Time Calculation- OT     Row Name 10/02/19 1552 10/02/19 1100          Time Calculation- OT    OT Start Time  1330  -  1100  -     OT Stop Time  1410  -  1145  -     OT Time Calculation (min)  40 min  -  45 min  -     Total Timed Code Minutes- OT  40 minute(s)  -  45 minute(s)  -     OT Non-Billable Time (min)  —  40 min  -       User Key  (r) = Recorded By, (t) = Taken By, (c) = Cosigned By    Initials Name Provider Type     Laya Harrison OT Occupational Therapist          Therapy Charges for Today     Code Description Service Date Service Provider Modifiers Qty    32267859716  OT EVAL HIGH COMPLEXITY 3 10/2/2019 Laya Harrison OT GO 1    [id number]  OT THERAPEUTIC ACT EA 15 MIN 10/2/2019 Laya Harrison OT GO 3                   Laya Harrison OT  10/2/2019

## 2019-10-02 NOTE — PHARMACY PATIENT ASSISTANCE
Pharmacy has confirmed patient's co-pay for Eliquis as $42.00. Patient states that this is not affordable at this time. Patient has trial card profiled for first fill. Patient states she can only continue on medications if she can get samples from physician, which she plans to pursue when she follows-up after discharge. No further needs identified at this time.    Thank you,    Tommy Wall, Pharmacy Intern  10/02/19  10:52 AM

## 2019-10-02 NOTE — PROGRESS NOTES
Rehabilitation Nursing  Inpatient Rehabilitation Functional Measures Assessment and Plan of Care    Plan of Care/Interventions  Copy from POC    Functional Measures  PUJA Eating:  PUJA Grooming:  PUJA Bathing:  PUJA Upper Body Dressing:  PUJA Lower Body Dressing:  PUJA Toileting:    PUJA Bladder Management  Level of Assistance:  Frequency/Number of Accidents this Shift:    PUJA Bowel Management  Level of Assistance:  Frequency/Number of Accidents this Shift:    PUJA Bed/Chair/Wheelchair Transfer:  PUJA Toilet Transfer:  PUJA Tub/Shower Transfer:    Previously Documented Mode of Locomotion at Discharge:  PUJA Expected Mode of Locomotion at Discharge:  PUJA Walk/Wheelchair:  PUJA Stairs:    PUJA Comprehension:  Auditory comprehension is the usual mode. Comprehension  Score = 6, Modified [place].  Patient comprehends complex/abstract  information in their primary language, requiring: Additional time.  PUJA Expression:  Vocal expression is the usual mode. Expression Score = 6,  Modified Independent.  Patient expresses complex/abstract information in their  primary language, requiring:  PUJA Social Interaction:  Social Interaction Score = 6, Modified Independent.  Patient is modified independent for social interaction, requiring:  PUJA Problem Solving:  Problem Solving Score = 6, Modified [place].  Patient  makes appropriate decisions in order to solve complex problems, but requires  extra time.  PUJA Memory:  Memory Score = 6, Modified [place].  Patient is modified  independent for memory, requiring:    Signed by: Agnes Spencer Nurse

## 2019-10-02 NOTE — PROGRESS NOTES
Case Management  Inpatient Rehabilitation Plan of Care and Discharge Plan Note    Rehabilitation Diagnosis:  s/p debility  Date of Onset:  9-22-19    Medical Summary:  s/p debility, acute on chronic respiratory failure with  hypoxia requiring intubation/mechanical ventilation, DKA, septic shock,  urosepsis, mixed encephalopathy/coma, uncontrolled hypertension, acute renal  failure, hematemesis, E coli UTI, dysphagia  PMH:  Chronic respiratory failure, NSTEMI, CAD, CKD, DM and atrial fibrillation  on Eliquis at home.    Plan of Care      Expected Intensity:  Average of 3 hours of therapy 5 days/week.  Interdisciplinary Team:  Interdisciplinary Team: Medical Supervision and 24 Hour Rehabilitation Nursing.,  Physical Therapy:, Occupational Therapy:, Speech and Language Therapy:, Social  Work, Therapeutic Recreation., Respiratory Therapy.  Physical Therapy Intensity/Duration: PT 1-1.5 hours per day/5 days per week  Occupational Therapy Intensity/Duration: OT 1-1.5 hours per day/5 days per week  Speech Language Pathology  Intensity/Duration: SLP 30 mins-90 mins per day/5  days per week  Estimated Length of Stay/Anticipated Discharge Date: 7-10 days  Anticipated Discharge Destination:  Anticipated discharge destination from inpatient rehabilitation is community  discharge with assistance. Discharge plans to be determined based on how pt  progresses in therapy.  Son wants pt to be able to return home at discharge if  possible.      Based on the patient's medical and functional status, their prognosis and  expected level of functional improvement is:  fair-good    Signed by: ARLYN Sears

## 2019-10-02 NOTE — PROGRESS NOTES
Inpatient Rehabilitation Functional Measures Assessment    Functional Measures  PUJA Eating:  PUJA Grooming:  PUJA Bathing:  PUJA Upper Body Dressing:  PUJA Lower Body Dressing:  PUJA Toileting:    PUJA Bladder Management  Level of Assistance:  Frequency/Number of Accidents this Shift:    PUJA Bowel Management  Level of Assistance:  Frequency/Number of Accidents this Shift:    PUJA Bed/Chair/Wheelchair Transfer:  PUJA Toilet Transfer:  PUJA Tub/Shower Transfer:    Previously Documented Mode of Locomotion at Discharge:  PUJA Expected Mode of Locomotion at Discharge:  PUJA Walk/Wheelchair:  PUJA Stairs:    PUJA Comprehension:  Auditory comprehension is the usual mode. Patient does not  comprehend complex/abstract information in their primary language without  assistance from a helper. Comprehension Score = 4, Minimal Prompting. Patient  comprehends basic daily needs or ideas 75-90% of the time. Patient requires  minimal/occasional prompting. No assistive devices were required.  PUJA Expression:  Vocal expression is the usual mode. Patient does not express  complex/abstract information in their primary language without a helper.  Expression Score = 3, Moderate Prompting. Patient expresses basic daily needs or  ideas 50-74% of the time. Patient requires moderate/some prompting. No assistive  devices were required.  PUJA Social Interaction:  Social Interaction Score = 6, Modified Independent.  Patient is modified independent for social interaction, requiring:  PUJA Problem Solving:  Patient does not make appropriate decisions in order to  solve complex problems without assistance from a helper. Problem Solving Score =  3, Moderate Direction. Patient makes appropriate decisions in order to solve  routine problems 50-74% of the time. Patient requires moderate/some direction  for the following behavior(s):  PUJA Memory:  Memory Score = 3, Moderate Prompting. Patient recognizes and  remembers 50-74% of the time. Patient requires moderate/some  prompting  for  memory for the following:    Therapy Mode Minutes  Occupational Therapy:  Physical Therapy:  Speech Language Pathology:    Discharge Functional Goals:    Signed by: Nurse Maral

## 2019-10-03 LAB
GLUCOSE BLDC GLUCOMTR-MCNC: 114 MG/DL (ref 70–130)
GLUCOSE BLDC GLUCOMTR-MCNC: 124 MG/DL (ref 70–130)
GLUCOSE BLDC GLUCOMTR-MCNC: 253 MG/DL (ref 70–130)
GLUCOSE BLDC GLUCOMTR-MCNC: 348 MG/DL (ref 70–130)

## 2019-10-03 PROCEDURE — 97535 SELF CARE MNGMENT TRAINING: CPT

## 2019-10-03 PROCEDURE — 63710000001 INSULIN ASPART PER 5 UNITS: Performed by: FAMILY MEDICINE

## 2019-10-03 PROCEDURE — 97530 THERAPEUTIC ACTIVITIES: CPT

## 2019-10-03 PROCEDURE — 82962 GLUCOSE BLOOD TEST: CPT

## 2019-10-03 PROCEDURE — 63710000001 INSULIN DETEMIR PER 5 UNITS: Performed by: FAMILY MEDICINE

## 2019-10-03 PROCEDURE — 97110 THERAPEUTIC EXERCISES: CPT

## 2019-10-03 PROCEDURE — 97116 GAIT TRAINING THERAPY: CPT

## 2019-10-03 RX ADMIN — INSULIN ASPART 6 UNITS: 100 INJECTION, SOLUTION INTRAVENOUS; SUBCUTANEOUS at 12:00

## 2019-10-03 RX ADMIN — Medication 100 MG: at 08:51

## 2019-10-03 RX ADMIN — PANTOPRAZOLE SODIUM 40 MG: 40 TABLET, DELAYED RELEASE ORAL at 08:51

## 2019-10-03 RX ADMIN — METOPROLOL TARTRATE 100 MG: 100 TABLET, FILM COATED ORAL at 08:51

## 2019-10-03 RX ADMIN — INSULIN DETEMIR 5 UNITS: 100 INJECTION, SOLUTION SUBCUTANEOUS at 20:56

## 2019-10-03 RX ADMIN — HYDRALAZINE HYDROCHLORIDE 10 MG: 10 TABLET ORAL at 05:17

## 2019-10-03 RX ADMIN — APIXABAN 5 MG: 5 TABLET, FILM COATED ORAL at 08:51

## 2019-10-03 RX ADMIN — AMLODIPINE BESYLATE 5 MG: 5 TABLET ORAL at 08:51

## 2019-10-03 RX ADMIN — MONTELUKAST SODIUM 10 MG: 10 TABLET, COATED ORAL at 20:56

## 2019-10-03 RX ADMIN — ASPIRIN 81 MG: 81 TABLET, COATED ORAL at 08:51

## 2019-10-03 RX ADMIN — HYDRALAZINE HYDROCHLORIDE 10 MG: 10 TABLET ORAL at 14:30

## 2019-10-03 RX ADMIN — INSULIN ASPART 7 UNITS: 100 INJECTION, SOLUTION INTRAVENOUS; SUBCUTANEOUS at 17:23

## 2019-10-03 RX ADMIN — HYDRALAZINE HYDROCHLORIDE 10 MG: 10 TABLET ORAL at 21:02

## 2019-10-03 RX ADMIN — INSULIN DETEMIR 5 UNITS: 100 INJECTION, SOLUTION SUBCUTANEOUS at 09:19

## 2019-10-03 RX ADMIN — PANTOPRAZOLE SODIUM 40 MG: 40 TABLET, DELAYED RELEASE ORAL at 17:24

## 2019-10-03 RX ADMIN — APIXABAN 5 MG: 5 TABLET, FILM COATED ORAL at 20:56

## 2019-10-03 RX ADMIN — METOPROLOL TARTRATE 100 MG: 100 TABLET, FILM COATED ORAL at 20:56

## 2019-10-03 NOTE — PROGRESS NOTES
Inpatient Rehabilitation Functional Measures Assessment    Functional Measures  PUJA Eating:  PUJA Grooming:  PUJA Bathing:  PUJA Upper Body Dressing:  PUJA Lower Body Dressing:  PUJA Toileting:    PUJA Bladder Management  Level of Assistance:  Frequency/Number of Accidents this Shift:    PUAJ Bowel Management  Level of Assistance:  Frequency/Number of Accidents this Shift:    PUJA Bed/Chair/Wheelchair Transfer:  PUJA Toilet Transfer:  PUJA Tub/Shower Transfer:    Previously Documented Mode of Locomotion at Discharge:  PUJA Expected Mode of Locomotion at Discharge:  PUJA Walk/Wheelchair:  PUJA Stairs:    PUJA Comprehension:  Auditory comprehension is the usual mode. Comprehension  Score = 6, Modified [place].  Patient comprehends complex/abstract  information in their primary language, requiring:  PUJA Expression:  Vocal expression is the usual mode. Expression Score = 6,  Modified Independent.  Patient expresses complex/abstract information in their  primary language, requiring: Additional time.  PUJA Social Interaction:  Social Interaction Score = 6, Modified Independent.  Patient is modified independent for social interaction, requiring:  PUJA Problem Solving:  Problem Solving Score = 6, Modified [place].  Patient  makes appropriate decisions in order to solve complex problems, but requires  extra time.  PUJA Memory:  Memory Score = 6, Modified [place].  Patient is modified  independent for memory, requiring: Requires additional time.    Therapy Mode Minutes  Occupational Therapy:  Physical Therapy:  Speech Language Pathology:    Discharge Functional Goals:    Signed by: Nurse Yady

## 2019-10-03 NOTE — PLAN OF CARE
Problem: Patient Care Overview  Goal: Plan of Care Review  Outcome: Ongoing (interventions implemented as appropriate)      Problem: Fall Risk (Adult)  Goal: Absence of Fall  Outcome: Ongoing (interventions implemented as appropriate)      Problem: Skin Injury Risk (Adult)  Goal: Skin Health and Integrity  Outcome: Ongoing (interventions implemented as appropriate)      Problem: Wound (Includes Pressure Injury) (Adult)  Goal: Signs and Symptoms of Listed Potential Problems Will be Absent, Minimized or Managed (Wound)  Outcome: Ongoing (interventions implemented as appropriate)      Problem: Functional Mobility Impairment (IRF) (Adult)  Goal: Optimal/Safe Level of [place] with Mobility  Outcome: Ongoing (interventions implemented as appropriate)      Problem: Diabetes, Type 2 (Adult)  Goal: Signs and Symptoms of Listed Potential Problems Will be Absent, Minimized or Managed (Diabetes, Type 2)  Outcome: Ongoing (interventions implemented as appropriate)

## 2019-10-03 NOTE — PROGRESS NOTES
Inpatient Rehabilitation Functional Measures Assessment    Functional Measures  PUJA Eating:  Eating Score = 5. Patient is supervision/set-up for eating,  requiring: Opening containers. [place] bread. Pouring liquids. Verbal cuing,  prompting, or instructing. No assistive devices were required.  PUJA Grooming: Grooming Score = 5. Patient is supervision/set-up for grooming,  requiring: Opening containers. Applying toothpaste to toothbrush. Initial  preparation. Setting out grooming equipment. Verbal cuing, prompting, or  instructing. Stand by assistance. No assistive devices were required.  PUJA Bathing:  PUJA Upper Body Dressing:  Patient requires total assistance for gathering  clothes. Wearing a bra or undershirt was not applicable for this patient.  Patient requires minimal/incidental assistance for threading the right arm  through the garment (shirt/sweater). Patient requires minimal/incidental  assistance for threading the left arm through the garment (shirt/sweater).  Patient requires minimal/incidental physical assistance for pulling an  over-head-garment over head or pulling front-fastening-garment around back.  Patient requires minimal/incidental physical assistance for pulling an  over-head-garment down the trunk or adjusting/fastening together a  front-fastening-garment. Patient performs 80 % of upper body dressing tasks.  Upper Body Dressing Score = 4, Minimal Assistance. No assistive devices were  required.  PUJA Lower Body Dressing:  Patient requires maximal/significant physical  assistance for gathering clothes. Wearing underwear or an undergarment is not  applicable for this patient. Patient requires moderate/considerable physical  assistance for threading the right leg through the pants/skirt. Patient requires  moderate/considerable physical assistance for threading the left leg through the  pants/skirt. Patient requires total assistance for holding clothing and/or  pulling pants/skirt over hips and  adjusting fasteners. Patient requires total  assistance for holding clothing and/or donning and/or doffing right sock.  Patient requires total assistance for holding clothing and/or donning and/or  doffing left sock. Donning and/or doffing right shoe is not applicable for this  patient. Donning and/or doffing left shoe is not applicable for this patient.  Patient performs 20.83 -  24% of lower body dressing tasks. Lower Body Dressing  Score = 1, Total Assistance. No assistive devices were required.  PUJA Toileting:    PUJA Bladder Management  Level of Assistance:  Bladder Score = 1. Patient performs less than 25% of tasks  and requires total assistance for bladder management. Witt provides total  assist to completely apply and remove brief.  Frequency/Number of Accidents this Shift:  Bladder accidents this shift:  4 .    PUJA Bowel Management  Level of Assistance: Bowel Score = 1. Patient performs less than 25% of tasks  and requires total assistance for bowel management. [place] provides total assist  to completely apply and remove brief.  Frequency/Number of Accidents this Shift: Bowel accidents this shift: 1 .    PUJA Bed/Chair/Wheelchair Transfer:  Bed/chair/wheelchair Transfer Score = 3.  Patient performs 50-74% of effort and requires moderate assistance (some  lifting) for transferring to and from the bed/chair/wheelchair. Patient requires  the following assistive device(s): Bed rails. Arm rest. Grab bars. Seating  system of wheelchair.  PUJA Toilet Transfer:  PUJA Tub/Shower Transfer:    Previously Documented Mode of Locomotion at Discharge:  [facility] Expected Mode of Locomotion at Discharge:  [facility] Walk/Wheelchair:  [facility] Stairs:    [facility] Comprehension:  [facility] Expression:  [facility] Social Interaction:  [facility] Problem Solving:  [facility] Memory:    Therapy Mode Minutes  Occupational Therapy:  Physical Therapy:  Speech Language Pathology:    Discharge Functional Goals:    Signed by: JAI Wilcox

## 2019-10-03 NOTE — PROGRESS NOTES
"Patient Assessment Instrument  Quality Indicators - Admission    Section B. Hearing, Speech Vision      Section C. Cognitive Patterns  Brief Interview for Mental Status (BIMS) was conducted.  Repetition of Three Words: Three words  Able to report correct year: Correct  Able to report correct month: Accurate within 5 days  Able to report correct day of the week: Correct  Able to recall \"sock\": Yes, no cue required  Able to recall \"blue\": Yes, no cue required  Able to recall \"bed\": Yes, no cue required    BIMS SUMMARY SCORE: 15 Cognitively intact Patient was able to complete the Brief  Interview for Mental Status    Section [id number]. Prior Functioning    Self Care: Patient completed the activities by him/herself, with or without an  assistive device, with no assistance from a helper.  Indoor Mobility: Patient completed the activities by him/herself, with or  without an assistive device, with no assistance from a helper.  Stairs: Patient completed the activities by him/herself, with or without an  assistive device, with no assistance from a helper.  Functional Cognition: Patient completed the activities by him/herself, with or  without an assistive device, with no assistance from a helper.    Section OR0153. Prior Device Use      Section GW1653. Self Care Performance      Section [id number]. Self Care Discharge Goals      Section EM0284. Mobility Performance      Section FY5043. Mobility Discharge Goals      Section H. Bladder and Bowel      Section I. Active Diagnosis  Comorbidities and Co-existing Conditions:   Diabetes Mellitus (DM) - e.g.,  diabetic retinopathy, nephropathy, and neuropathy).    Section J. Health Conditions  Patient has not had any falls in the past year. Patient has not had major  surgery during the 100 days prior to admission.    Section K. Swallowing/Nutritional Status  Regular food (solids and liquids swallowed safely without supervision or  modified food or liquid consistency).    Section M. Skin " Conditions  Unhealed Pressure Ulcer/Injuries at Stage 1 or Higher on Admission:  No.    Section N. Medication    Potential Clinically Significant Medication Issues: No issues found during  review    Section O. Special Treatments, Procedures, and Programs  Patient did not receive total parenteral nutrition treatment at the time of  admission.    OPTIONAL BRANCH FOR TRACKING FALLS  Fall(s) During Shift: No falls.    Signed by: Coral Saleh, Supervisor

## 2019-10-03 NOTE — SIGNIFICANT NOTE
10/03/19 4289   Plan   Plan SS received call from son Juan.  Informed him how pt is doing in therapy and recommendation for discharge on 10-16-19.  Son says he will make decision about discharge plans and inform SS.  Will follow.   Patient/Family in Agreement with Plan yes

## 2019-10-03 NOTE — PROGRESS NOTES
Rehabilitation Nursing  Inpatient Rehabilitation Functional Measures Assessment and Plan of Care    Plan of Care/Interventions  Body Function Structure    Skin Integrity (Active)  Current Status (10/1/2019 5:00:00 PM): MASD to buttocks  Weekly Goal: healing of skin  Discharge Goal: healed skin    Safety    Potential for Injury (Active)  Current Status (10/1/2019 5:00:00 PM): falls risk  Weekly Goal: no falls  Discharge Goal: no falls this admission    Functional Measures  PUJA Eating:  PUJA Grooming:  PUJA Bathing:  PUJA Upper Body Dressing:  PUJA Lower Body Dressing:  PUJA Toileting:    PUJA Bladder Management  Level of Assistance:  Frequency/Number of Accidents this Shift:    PUJA Bowel Management  Level of Assistance:  Frequency/Number of Accidents this Shift:    PUJA Bed/Chair/Wheelchair Transfer:  PUJA Toilet Transfer:  PUJA Tub/Shower Transfer:    Previously Documented Mode of Locomotion at Discharge:  PUJA Expected Mode of Locomotion at Discharge:  PUJA Walk/Wheelchair:  PUJA Stairs:    PUJA Comprehension:  Both ( auditory and visual) modes of comprehension are used  equally. Patient does not comprehend complex/abstract information in their  primary language without assistance from a helper. Comprehension Score = 3,  Moderate Prompting. Patient comprehends basic daily needs or ideas 50-74% of the  time. Patient requires moderate/some prompting. No assistive devices were  required.  PUJA Expression:  Both ( vocal and non-vocal) modes of expression are used  equally. Patient does not express complex/abstract information in their primary  language without a helper. Expression Score = 3, Moderate Prompting. Patient  expresses basic daily needs or ideas 50-74% of the time. Patient requires  moderate/some prompting. No assistive devices were required.  PUJA Social Interaction:  Social Interaction Score = 6, Modified Independent.  Patient is modified independent for social interaction, requiring: Requires  additional time.  PUJA  Problem Solving:  Patient does not make appropriate decisions in order to  solve complex problems without assistance from a helper. Problem Solving Score =  3, Moderate Direction. Patient makes appropriate decisions in order to solve  routine problems 50-74% of the time. Patient requires moderate/some direction  for the following behavior(s):  PUJA Memory:  Memory Score = 3, Moderate Prompting. Patient recognizes and  remembers 50-74% of the time. Patient requires moderate/some prompting  for  memory for the following:    Signed by: Nurse Roland

## 2019-10-03 NOTE — PROGRESS NOTES
Inpatient Rehabilitation Functional Measures Assessment    Functional Measures  PUJA Eating:  PUJA Grooming: Patient requires total assistance for washing, rinsing and drying  the face. Patient requires total assistance for washing, rinsing and drying the  hands. Patient requires total assistance for brushing teeth. Patient requires  total assistance for brushing/combing hair. Shaving or applying makeup was not  observed for this patient. Patient performs 0 -  24% of grooming tasks.  Grooming Score = 1, Total Assistance. No assistive devices were required.  PUJA Bathing:  Patient bathed in bed. Patient requires total assistance for  washing, rinsing, or drying the right arm. Patient requires total assistance for  washing, rinsing, or drying the left arm. Patient requires total assistance for  washing, rinsing, or drying the chest. Patient requires total assistance for  washing, rinsing, or drying the abdomen. Patient requires total assistance for  washing, rinsing, or drying the perineal area. Patient requires total assistance  for washing, rinsing, or drying the buttocks. Patient requires total assistance  for washing, rinsing, or drying the right upper leg. Patient requires total  assistance for washing, rinsing, or drying the left upper leg. Patient requires  total assistance for washing, rinsing, or drying the right lower leg, including  the foot. Patient requires total assistance for washing, rinsing, or drying the  left lower leg, including the foot. Patient performs 0 -  24% of bathing tasks.  Bathing Score = 1, Total Assistance. No assistive devices were required.  PUJA Upper Body Dressing:  Upper Body Dressing was not observed this shift  because patient dressed/undressed in pajamas/gown only. .  PUJA Lower Body Dressing:  Lower Body Dressing was not observed this shift  because patient dressed/undressed in pajamas/gown only.  PUJA Toileting:  Patient requires total assistance for adjusting clothing before  using  a toilet, commode, bedpan, or urinal. Patient requires total assistance  for hygiene. Patient requires total assistance for adjusting clothing after  using a toilet, commode, bedpan, or urinal. Patient performs 0 -  24% of  toileting tasks.  Toileting Score = 1, Total Assistance. Patient requires the  following assistive device(s): Grab bar. Arm rest of a specialized seat.    PUJA Bladder Management  Level of Assistance:  Bladder Score = 5.  Patient is supervision/set-up for  bladder management, requiring: Emptying equipment. Setting out equipment. Stand  by assistance. Patient requires the following assistive device(s):  Absorbent  pads.  Frequency/Number of Accidents this Shift:  Bladder accidents this shift:  3 . pt  is inc    PUJA Bowel Management  Level of Assistance: Bowel Score = 5.  Patient is supervision/set-up for bowel  management, requiring: Emptying equipment. Setting out equipment. Stand by  assistance. No assistive devices were required.  Frequency/Number of Accidents this Shift: Bowel accidents this shift: 2 .    PUJA Bed/Chair/Wheelchair Transfer:  Activity was not observed.  PUJA Toilet Transfer:  Activity was not observed. pt is inc  PUJA Tub/Shower Transfer:    Previously Documented Mode of Locomotion at Discharge:  PUJA Expected Mode of Locomotion at Discharge:  PUJA Walk/Wheelchair:  PUJA Stairs:    PUJA Comprehension:  PUJA Expression:  PUJA Social Interaction:  PUJA Problem Solving:  PUJA Memory:    Therapy Mode Minutes  Occupational Therapy:  Physical Therapy:  Speech Language Pathology:    Discharge Functional Goals:    Signed by: JAI Guerrero

## 2019-10-03 NOTE — PROGRESS NOTES
Case Management  Inpatient Rehabilitation Team Conference    Conference Date/Time: 10/3/2019 6:04:00 AM    Team Conference Attendees:  MD Lennie Clancy, ARLYN Saleh RN,   ALDO Conteh, PT  Laya Harrison OT    Demographics            Age: 74Y            Gender: Female    Admission Date: 10/1/2019 4:18:00 PM  Rehabilitation Diagnosis:  s/p debility  Comorbidities:      Plan of Care  Anticipated Discharge Date/Estimated Length of Stay: 7-10 days  Anticipated Discharge Destination: Community discharge with assistance  Discharge Plan : Discharge plans to be determined based on how pt progresses in  therapy.  Son wants pt to be able to return home at discharge if possible.  Medical Necessity Expected Level Rationale: fair-good  Intensity and Duration: an average of 3 hours/5 days per week  Medical Supervision and 24 Hour Rehab Nursing: x  Physical Therapy: x  PT Intensity/Duration: PT 1-1.5 hours per day/5 days per week  Occupational Therapy: x  OT Intensity/Duration: OT 1-1.5 hours per day/5 days per week  Speech and Language Therapy: x  SLP Intensity/Duration: SLP 30 mins-90 mins per day/5 days per week  Social Work: x  Therapeutic Recreation: x  Respiratory Therapy: x  Updated (if changes indicated)    Anticipated Discharge Date/Estimated Length of Stay:   10-16-19      Discharge Plan of Care:    Based on the patient's medical and functional status, their prognosis and  expected level of functional improvement is: good      Interdisciplinary Problem/Goals/Status  Body Function Structure    [RN] Skin Integrity(Active)  Current Status(10/01/2019): MASD to buttocks  Weekly Goal(10/31/2019): healing of skin  Discharge Goal: healed skin        Mobility    [PT] Bed/Chair/Wheelchair(Active)  Current Status(10/02/2019): CGA with RW  Weekly Goal(10/09/2019): SBA with AAD  Discharge Goal: mod indep w/ AAD    [PT] Walk(Active)  Current Status(10/02/2019): 200 ft, CGA,  RW  Weekly Goal(10/09/2019): 300 ft, SBA,AAD  Discharge Goal: 300 ft, Mod independent, AAD    [PT] Stairs(Active)  Current Status(10/02/2019): TBA  Weekly Goal(10/09/2019): 10 stairs, SBA, 2 handrails  Discharge Goal: 15 stairs, 2 handrails, SBA        Safety    [RN] Potential for Injury(Active)  Current Status(10/01/2019): falls risk  Weekly Goal(10/31/2019): no falls  Discharge Goal: no falls this admission        Self Care    [OT] Dressing (Lower)(Active)  Current Status(10/02/2019): MaxA  Weekly Goal(10/08/2019): ModA  Discharge Goal: [place]    [OT] Toileting(Active)  Current Status(10/02/2019): TotalA/MaxA  Weekly Goal(10/08/2019): MaxA  Discharge Goal: ModA    Comments: Pt's discharge plans to be determined based on how she progresses in  therapy.  Pt lives at home with two teenage grandchildren.    Signed by: ARLYN Sears    Physician CoSigned By: Edward Austin 10/03/2019 17:54:34

## 2019-10-03 NOTE — THERAPY TREATMENT NOTE
Inpatient Rehabilitation - Occupational Therapy Treatment Note     [place]     Patient Name: Ashley Geronimo  : 1945  MRN: [id number]    Today's Date: 10/3/2019                 Admit Date: 10/1/2019      Visit Dx:  No diagnosis found.    Patient Active Problem List   Diagnosis   • Pneumonia   • Septic shock (CMS/[facility])   • Pseudomonas pneumonia (CMS/[facility])   • NSTEMI (non-ST elevated myocardial infarction) (CMS/[facility])   • Bradycardia   • Paroxysmal atrial fibrillation (CMS/HCC)   • Chronic respiratory failure with hypoxia and hypercapnia (CMS/HCC)   • Acute on chronic respiratory failure with hypoxia (CMS/HCC), requiring intubation/mechanical ventilation 19   • Hematemesis   • RAMESH (acute kidney injury) (CMS/[facility])   • DKA (diabetic ketoacidoses) (CMS/[facility])   • Septic shock (CMS/[facility])   • Encephalopathy   • UTI (urinary tract infection)   • Tracheobronchitis, PSA    • Metabolic encephalopathy         Therapy Treatment    IRF Treatment Summary     Row Name 10/03/19 1455             Evaluation/Treatment Time and Intent    Subjective Information  no complaints  -CJ      Existing Precautions/Restrictions  fall decreased skin integrity  -CJ      Document Type  therapy note (daily note)  -CJ      Mode of Treatment  occupational therapy  -CJ      Recorded by [CJ] Laya Harrison OT      Row Name 10/03/19 1455             Cognition/Psychosocial- PT/OT    Affect/Mental Status (Cognitive)  confused  -CJ      Follows Commands (Cognition)  verbal cues/prompting required;repetition of directions required;physical/tactile prompts required;initiation impaired;increased processing time needed  -CJ      Attention Deficit (Cognitive)  requires cues/redirection to task  -CJ      Recorded by [CJ] Laya Harrison OT      Row Name 10/03/19 1455             Bed-Chair Transfer    Bed-Chair [place] (Transfers)  contact guard;minimum assist (75% patient effort);verbal cues  -CJ      Assistive Device (Bed-Chair Transfers)   wheelchair  -CJ      Recorded by [CJ] Laya Harrison, OT      Row Name 10/03/19 1455             Toilet Transfer    [place] Level (Toilet Transfer)  contact guard;minimum assist (75% patient effort);verbal cues  -CJ      Assistive Device (Toilet Transfer)  grab bars/safety frame  -CJ      Recorded by [CJ] Laya Harrison, OT      Row Name 10/03/19 1455             Grooming Assessment/Treatment    Grooming [place] Level  minimum assist (75% patient effort);verbal cues  -CJ      Recorded by [CJ] Laya Harrison, OT      Row Name 10/03/19 1455             Toileting Assessment/Treatment    Toileting [place] Level  maximum assist (25% patient effort);verbal cues urinary incontinence  -CJ      Assistive Device Use (Toileting)  grab bar/safety frame  -CJ      Recorded by [CJ] Laya Harrison, LESLYE      Row Name 10/03/19 1455             Upper Extremity Seated Therapeutic Exercise    Exercise Type, Seated Upper Extremity (Therapeutic Exercise)  AROM (active range of motion) BUE ther ex/act, bilat coord ex, GMC/FMC, hand exerciser  -CJ      Expected Outcomes, Seated Upper Extremity (Therapeutic Exercise)  improve functional tolerance, self-care activity;improve performance, BADLs;improve performance, transfer skills  -CJ      Recorded by [CJ] Laya Harrison, LESLEY      Row Name 10/03/19 1455             Positioning and Restraints    Post Treatment Position  wheelchair  -CJ      In Wheelchair  sitting;call light within reach;encouraged to call for assist;notified nsg;with PT;patient within staff view in room -post am tx;  sitting in PT gym- post pm tx  -CJ      Recorded by [CJ] Laya Harrison OT        User Key  (r) = Recorded By, (t) = Taken By, (c) = Cosigned By    Initials Name Effective Dates    CJ Laya Harrison OT 04/03/18 -           Wound 10/01/19 1702 Bilateral other (see comments) coccyx MASD ([place] associated skin damage) (Active)   Dressing Appearance open to air 10/3/2019  7:35 AM    Closure Open to air 10/3/2019  7:35 AM   Base red 10/2/2019  7:20 PM   Periwound redness;excoriated 10/2/2019  7:20 PM   Care, Wound other (see comments) 10/2/2019  7:20 PM   Dressing Care, Wound open to air 10/3/2019  7:35 AM         OT Recommendation and Plan    Anticipated Equipment Needs At Discharge (OT Eval): (TBD)  Planned Therapy Interventions (OT Eval): activity tolerance training, BADL retraining, occupation/activity based interventions, ROM/therapeutic exercise, strengthening exercise, patient/caregiver education/training, transfer/mobility retraining    Plan of Care Review  Plan of Care Reviewed With: patient  Plan of Care Reviewed With: patient  IRF Plan of Care Review: progress ongoing, continue    OT IRF GOALS     Row Name 10/02/19 1521             Bathing Goal 1 (OT-IRF)    [place] Level (Bathing Goal 1, OT-IRF)  minimum assist (75% or more patient effort)  -CJ      Time Frame (Bathing Goal 1, OT-IRF)  long term goal (LTG);by discharge  -         LB Dressing Goal 1 (OT-IRF)    [place] (LB Dressing Goal 1, OT-IRF)  moderate assist (50-74% patient effort)  -CJ      Time Frame (LB Dressing Goal 1, OT-IRF)  short term goal (STG)  -CJ         LB Dressing Goal 2 (OT-IRF)    [place] (LB Dressing Goal 2, OT-IRF)  minimum assist (75% or more patient effort)  -CJ      Time Frame (LB Dressing Goal 2, OT-IRF)  long term goal (LTG);by discharge  -         Toileting Goal 1 (OT-IRF)    [place] Level (Toileting Goal 1, OT-IRF)  maximum assist (25-49% patient effort)  -CJ      Time Frame (Toileting Goal 1, OT-IRF)  short term goal (STG)  -CJ         Toileting Goal 2 (OT-IRF)    [place] Level (Toileting Goal 2, OT-IRF)  moderate assist (50-74% patient effort)  -CJ      Time Frame (Toileting Goal 2, OT-IRF)  long term goal (LTG);by discharge  -        User Key  (r) = Recorded By, (t) = Taken By, (c) = Cosigned By    Initials Name Provider Type    Laya Yusuf OT Occupational  Therapist          Occupational Therapy Education     Title: PT OT SLP Therapies (Not Started)     Topic: Occupational Therapy (In Progress)     Point: ADL training (In Progress)     Description: Instruct learner(s) on proper safety adaptation and remediation techniques during self care or transfers.   Instruct in proper use of assistive devices.    Learning Progress Summary           Patient Acceptance, E,D, NR by  at 10/3/2019  3:08 PM    Acceptance, E,D, NR by  at 10/2/2019  3:17 PM                               User Key     Initials Effective Dates Name Provider Type [facility] 04/03/18 -  Laya Harrison OT Occupational Therapist OT                       Time Calculation:     Time Calculation- OT     Row Name 10/03/19 1510 10/03/19 1115          Time Calculation- OT    OT Start Time  1330  -  1115  -     OT Stop Time  1415  -  1200  -     OT Time Calculation (min)  45 min  -  45 min  -     Total Timed Code Minutes- OT  45 minute(s)  -  45 minute(s)  -     OT Non-Billable Time (min)  --  15 min  -       User Key  (r) = Recorded By, (t) = Taken By, (c) = Cosigned By    Initials Name Provider Type     Laya Harrison OT Occupational Therapist          Therapy Charges for Today     Code Description Service Date Service Provider Modifiers Qty    94241459783 HC OT EVAL HIGH COMPLEXITY 3 10/2/2019 Laya Harrison OT GO 1    00201258104 HC OT THERAPEUTIC ACT EA 15 MIN 10/2/2019 Laya Harrison OT GO 3    17033760880 HC OT SELF CARE/MGMT/TRAIN EA 15 MIN 10/3/2019 Laya Harrison OT GO 2    25350034259 HC OT THERAPEUTIC ACT EA 15 MIN 10/3/2019 Laya Harrison OT GO 4                   Laya Harrison OT  10/3/2019

## 2019-10-03 NOTE — THERAPY TREATMENT NOTE
Inpatient Rehabilitation - Physical Therapy Treatment Note  KOKO Hart     Patient Name: Ashley Geronimo  : 1945  MRN: [id number]    Today's Date: 10/3/2019                 Admit Date: 10/1/2019      Visit Dx:    No diagnosis found.    Patient Active Problem List   Diagnosis   • Pneumonia   • Septic shock (CMS/[facility])   • Pseudomonas pneumonia (CMS/[facility])   • NSTEMI (non-ST elevated myocardial infarction) (CMS/[facility])   • Bradycardia   • Paroxysmal atrial fibrillation (CMS/HCC)   • Chronic respiratory failure with hypoxia and hypercapnia (CMS/HCC)   • Acute on chronic respiratory failure with hypoxia (CMS/HCC), requiring intubation/mechanical ventilation 19   • Hematemesis   • RAMESH (acute kidney injury) (CMS/[facility])   • DKA (diabetic ketoacidoses) (CMS/[facility])   • Septic shock (CMS/[facility])   • Encephalopathy   • UTI (urinary tract infection)   • Tracheobronchitis, PSA    • Metabolic encephalopathy       Therapy Treatment    IRF Treatment Summary     Row Name 10/03/19 1600 10/03/19 1455          Evaluation/Treatment Time and Intent    Subjective Information  no complaints  -AG  no complaints  -CJ     Existing Precautions/Restrictions  fall  -AG  fall decreased skin integrity  -     Document Type  therapy note (daily note)  -AG  therapy note (daily note)  -     Mode of Treatment  physical therapy  -AG  occupational therapy  -CJ     Patient/Family Observations  pt. seated in w/c; no apparent distress noted.  Pt. agreeable to participation.   -AG  --     Recorded by [AG] Karyn Delgado, PT [CJ] Laya Harrison OT     Row Name 10/03/19 1600 10/03/19 1450          Cognition/Psychosocial- PT/OT    Affect/Mental Status (Cognitive)  confused  -AG  confused  -CJ     Orientation Status (Cognition)  oriented to;person  -AG  --     Follows Commands (Cognition)  follows one step commands;delayed response/completion;increased processing time needed;initiation impaired;repetition of directions required;verbal cues/prompting  required  -AG  verbal cues/prompting required;repetition of directions required;physical/tactile prompts required;initiation impaired;increased processing time needed  -CJ     Personal Safety Interventions  fall prevention program maintained;gait belt;nonskid shoes/slippers when out of bed;supervised activity  -AG  --     Attention Deficit (Cognitive)  --  requires cues/redirection to task  -CJ     Safety Deficit (Cognitive)  ability to follow commands;insight into deficits/self awareness;safety precautions awareness;safety precautions follow-through/compliance  -AG  --     Recorded by [AG] Karyn Delgado, PT [CJ] Laya Harrison, OT     Row Name 10/03/19 1600             Mobility    Left Lower Extremity (Weight-bearing Status)  weight-bearing as tolerated (WBAT)  -AG      Right Lower Extremity (Weight-bearing Status)  weight-bearing as tolerated (WBAT)  -AG      Recorded by [AG] Karyn Delgado, PT      Row Name 10/03/19 1600             Bed Mobility Assessment/Treatment    Comment (Bed Mobility)  not observed   -AG      Recorded by [AG] Karyn Delgado, PT      Row Name 10/03/19 1600             Transfer Assessment/Treatment    Transfer Assessment/Treatment  sit-stand transfer;stand-sit transfer;stand pivot/stand step transfer;toilet transfer  -AG      Comment (Transfers)  constant cues necessary for initiation, sequencing, completion of all transfers, tasks.   -AG      Recorded by [AG] Karyn Delgado, PT      Row Name 10/03/19 1455             Bed-Chair Transfer    Bed-Chair [place] (Transfers)  contact guard;minimum assist (75% patient effort);verbal cues  -CJ      Assistive Device (Bed-Chair Transfers)  wheelchair  -CJ      Recorded by [CJ] Laya Harrison, OT      Row Name 10/03/19 1600             Sit-Stand Transfer    Sit-Stand [place] (Transfers)  verbal cues;nonverbal cues (demo/gesture);contact guard  -AG      Assistive Device (Sit-Stand Transfers)  walker, front-wheeled  -AG      Recorded by  [AG] Karyn Delgado, PT      Row Name 10/03/19 1600             Stand-Sit Transfer    Stand-Sit [place] (Transfers)  verbal cues;nonverbal cues (demo/gesture);contact guard  -AG      Assistive Device (Stand-Sit Transfers)  walker, front-wheeled  -AG      Recorded by [AG] Karyn Delgado PT      Row Name 10/03/19 1600             Stand Pivot/Stand Step Transfer    Stand Pivot/Stand Step [place]  verbal cues;nonverbal cues (demo/gesture);contact guard  -AG      Assistive Device (Stand Pivot Stand Step Transfer)  walker, front-wheeled  -AG      Recorded by [AG] Karyn Delgado, PT      Row Name 10/03/19 1600 10/03/19 1455          Toilet Transfer    Type (Toilet Transfer)  stand pivot/stand step  -AG  --     [place] Level (Toilet Transfer)  verbal cues;nonverbal cues (demo/gesture);contact guard  -AG  contact guard;minimum assist (75% patient effort);verbal cues  -     Assistive Device (Toilet Transfer)  grab bars/safety frame;raised toilet seat;walker, front-wheeled  -AG  grab bars/safety frame  -CJ     Recorded by [AG] Karyn Delgado, PT [CJ] Laya Harrison OT     Row Name 10/03/19 1600             Gait/Stairs Assessment/Training    Gait/Stairs Assessment/Training  gait/ambulation independence;gait/ambulation assistive device;distance ambulated;gait pattern;gait deviations;maintains weight-bearing status  -AG      [place] Level (Gait)  verbal cues;nonverbal cues (demo/gesture);contact guard  -AG      Assistive Device (Gait)  walker, front-wheeled  -AG      Distance in Feet (Gait)  150 ft x 3  -AG      Pattern (Gait)  step-through  -AG      Deviations/Abnormal Patterns (Gait)  base of support, narrow;[place] decreased;gait speed decreased;stride length decreased  -AG      Bilateral Gait Deviations  forward flexed posture;heel strike decreased  -AG      Recorded by [AG] Karyn Delgado, PT      Row Name 10/03/19 1600             Safety Issues, Functional Mobility    Safety Issues Affecting  Function (Mobility)  awareness of need for assistance;insight into deficits/self awareness;safety precaution awareness;safety precautions follow-through/compliance  -AG      Impairments Affecting Function (Mobility)  balance;cognition;endurance/activity tolerance;strength  -AG      Recorded by [AG] Karyn Delgado PT      Row Name 10/03/19 1455             Grooming Assessment/Treatment    Grooming [place] Level  minimum assist (75% patient effort);verbal cues  -CJ      Recorded by [CJ] Laya Harrison OT      Row Name 10/03/19 1455             Toileting Assessment/Treatment    Toileting [place] Level  maximum assist (25% patient effort);verbal cues urinary incontinence  -CJ      Assistive Device Use (Toileting)  grab bar/safety frame  -CJ      Recorded by [CJ] Laya Harrison OT      Row Name 10/03/19 1600             Pain Scale: Numbers Pre/Post-Treatment    Pain Scale: Numbers, Pretreatment  0/10 - no pain  -AG      Pain Scale: Numbers, Post-Treatment  0/10 - no pain  -AG      Recorded by [AG] Karyn Delgado PT      Row Name 10/03/19 1600             Pain Scale: FACES Pre/Post-Treatment    Pain: FACES Scale, Pretreatment  0-->no hurt  -AG      Pain: FACES Scale, Post-Treatment  0-->no hurt  -AG      Recorded by [AG] Karyn Delgado, CHICA      Row Name 10/03/19 1600             Balance    Balance  static sitting balance;static standing balance;dynamic sitting balance;dynamic standing balance  -AG      Additional Documentation  Balance (Row)  -AG      Recorded by [AG] Karyn Delgado PT      Row Name 10/03/19 1600             Static Sitting Balance    Level of [place] (Unsupported Sitting, Static Balance)  conditional independence  -AG      Sitting Position (Unsupported Sitting, Static Balance)  sitting edge of mat;sitting in wheelchair  -AG      Time Able to Maintain Position (Unsupported Sitting, Static Balance)  more than 5 minutes  -AG      Recorded by [AG] Karyn Delgado, PT      Row Name  10/03/19 1600             Dynamic Sitting Balance    Level of [place], Reaches Outside Midline (Sitting, Dynamic Balance)  contact guard assist  -AG      Recorded by [AG] Karyn Delgado, PT      Row Name 10/03/19 1600             Static Standing Balance    Level of [place] (Supported Standing, Static Balance)  contact guard assist  -AG      Time Able to Maintain Position (Supported Standing, Static Balance)  1 to 2 minutes  -AG      Assistive Device Utilized (Supported Standing, Static Balance)  -- no UE support  -AG      Recorded by [AG] Karyn Delgado, PT      Row Name 10/03/19 1600             Therapeutic Exercise    Therapeutic Exercise  seated, lower extremities  -AG      Additional Documentation  Therapeutic Exercise (Row)  -AG      Recorded by [AG] Karyn Delgado, PT      Row Name 10/03/19 1455             Upper Extremity Seated Therapeutic Exercise    Exercise Type, Seated Upper Extremity (Therapeutic Exercise)  AROM (active range of motion) BUE ther ex/act, bilat coord ex, GMC/FMC, hand exerciser  -CJ      Expected Outcomes, Seated Upper Extremity (Therapeutic Exercise)  improve functional tolerance, self-care activity;improve performance, BADLs;improve performance, transfer skills  -CJ      Recorded by [CJ] Laya Harrison OT      Row Name 10/03/19 1600             Lower Extremity Seated Therapeutic Exercise    Performed, Seated Lower Extremity (Therapeutic Exercise)  hip flexion/extension;hip abduction/adduction;knee flexion/extension;ankle dorsiflexion/plantarflexion;LAQ (long arc quad), knee extension  -AG      Device, Seated Lower Extremity (Therapeutic Exercise)  elastic bands/tubing;small ball  -AG      Exercise Type, Seated Lower Extremity (Therapeutic Exercise)  AROM (active range of motion)  -AG      Comment, Seated Lower Extremity (Therapeutic Exercise)  repetitive verbal, visual cues required to complete ther ex.   -AG      Recorded by [AG] Karyn Delgado, PT      Row Name 10/03/19  1600 10/03/19 1455          Positioning and Restraints    Pre-Treatment Position  sitting in chair/recliner  -AG  --     Post Treatment Position  wheelchair  -AG  wheelchair  -CJ     In Wheelchair  notified nsg;sitting;with nsg;legs elevated;patient within staff view  -AG  sitting;call light within reach;encouraged to call for assist;notified nsg;with PT;patient within staff view in room -post am tx;  sitting in PT gym- post pm tx  -CJ     Recorded by [AG] Karyn Delgado, PT [CJ] Laya Harrison OT     Row Name 10/03/19 1600             Daily Summary of Progress (PT)    Functional Goal Overall Progress: Physical Therapy  progressing toward functional goals as expected  -AG      Impairments Continuing to Limit Function: Physical Therapy  strength decreased;impaired balance;impaired cognition;coordination impaired  -AG      Recommendations: Physical Therapy  continue POC  -AG      Recorded by [AG] Karyn Delgado, PT        User Key  (r) = Recorded By, (t) = Taken By, (c) = Cosigned By    Initials Name Effective Dates     Karyn Delgado, PT 04/03/18 -     CJ Laya Hrarison OT 04/03/18 -         Wound 10/01/19 1702 Bilateral other (see comments) coccyx MASD ([place] associated skin damage) (Active)   Dressing Appearance open to air 10/3/2019  7:35 AM   Closure Open to air 10/3/2019  7:35 AM   Base red 10/2/2019  7:20 PM   Periwound redness;excoriated 10/2/2019  7:20 PM   Care, Wound other (see comments) 10/2/2019  7:20 PM   Dressing Care, Wound open to air 10/3/2019  7:35 AM     Physical Therapy Education     Title: PT OT SLP Therapies (Not Started)     Topic: Physical Therapy (In Progress)     Point: Mobility training (In Progress)     Learning Progress Summary           Patient Acceptance, E,D, NR by AG at 10/3/2019  4:10 PM    Acceptance, E,D, NR by AG at 10/2/2019  4:26 PM                   Point: Home exercise program (In Progress)     Learning Progress Summary           Patient Acceptance, E,D, NR by AG at  10/3/2019  4:10 PM    Acceptance, E,D, NR by AG at 10/2/2019  4:26 PM                   Point: Body mechanics (In Progress)     Learning Progress Summary           Patient Acceptance, E,D, NR by AG at 10/3/2019  4:10 PM    Acceptance, E,D, NR by AG at 10/2/2019  4:26 PM                   Point: Precautions (In Progress)     Learning Progress Summary           Patient Acceptance, E,D, NR by AG at 10/3/2019  4:10 PM    Acceptance, E,D, NR by AG at 10/2/2019  4:26 PM                               User Key     Initials Effective Dates Name Provider Type [facility] 04/03/18 -  Karyn Delgado, PT Physical Therapist PT                  PT Recommendation and Plan  Anticipated Equipment Needs at Discharge (PT Eval): (TBD)  Planned Therapy Interventions (PT Eval): balance training, bed mobility training, gait training, home exercise program, motor coordination training, neuromuscular re-education, patient/family education, postural re-education, stair training, strengthening, transfer training  Frequency of Treatment (PT Eval): 5 times per week     Daily Summary of Progress (PT)  Functional Goal Overall Progress: Physical Therapy: progressing toward functional goals as expected  Impairments Continuing to Limit Function: Physical Therapy: strength decreased, impaired balance, impaired cognition, coordination impaired  Recommendations: Physical Therapy: continue POC               Time Calculation:     PT Charges     Row Name 10/03/19 1612 10/03/19 1611          Time Calculation    Start Time  1500  -AG  1255  -AG     Stop Time  1555  -AG  1330  -AG     Time Calculation (min)  55 min  -AG  35 min  -AG     PT Received On  --  10/03/19  -     PT - Next Appointment  --  10/04/19  -     PT Goal Re-Cert Due Date  --  10/09/19  -       User Key  (r) = Recorded By, (t) = Taken By, (c) = Cosigned By    Initials Name Provider Type    AG Karyn Delgado, PT Physical Therapist          Therapy Charges for Today     Code  Description Service Date Service Provider Modifiers Qty    24171159468 HC GAIT TRAINING EA 15 MIN 10/2/2019 Karyn Delgado, PT GP 1    47977167861 HC PT THER PROC EA 15 MIN 10/2/2019 Karyn Delgado, PT GP 2    40118270497 HC PT EVAL MOD COMPLEXITY 3 10/2/2019 Karyn Delgado, PT GP 1    98598664104 HC GAIT TRAINING EA 15 MIN 10/3/2019 Karyn Delgado, PT GP 1    44212301538 HC PT THER PROC EA 15 MIN 10/3/2019 Karyn Delgado, PT GP 4    17135166585 HC PT THERAPEUTIC ACT EA 15 MIN 10/3/2019 Karyn Delgado, PT GP 1                   Karyn Delgado, PT  10/3/2019

## 2019-10-03 NOTE — PLAN OF CARE
Problem: Patient Care Overview  Goal: Plan of Care Review   10/03/19 1508   Patient Care Overview   IRF Plan of Care Review progress ongoing, continue   Coping/Psychosocial   Plan of Care Reviewed With patient

## 2019-10-03 NOTE — PROGRESS NOTES
Inpatient Rehabilitation Functional Measures Assessment    Functional Measures  PUJA Eating:  PUJA Grooming: Grooming Score = 4.  Patient requires minimal assistance  for  grooming, requiring  incidental help only. No assistive devices were required.  PUJA Bathing:  PUJA Upper Body Dressing:  PUJA Lower Body Dressing:  PUJA Toileting:  Patient requires no physical assistance for adjusting clothing  before using a toilet, commode, bedpan, or urinal. Patient requires moderate  assistance for hygiene. Patient requires moderate assistance for adjusting  clothing after using a toilet, commode, bedpan, or urinal. Patient performs  33.33 % of toileting tasks. Toileting Score = 2. Maximal Assistance. Patient  requires the following assistive device(s): Arm rest of a specialized seat. Grab  bar.    PUJA Bladder Management  Level of Assistance:  Frequency/Number of Accidents this Shift:    PUJA Bowel Management  Level of Assistance:  Frequency/Number of Accidents this Shift:    PUJA Bed/Chair/Wheelchair Transfer:  Bed/chair/wheelchair Transfer Score = 4.  Patient performs 75% or more of effort and minimal assistance (little/incidental  help/lifting of one limb/steadying) for transferring to and from the  bed/chair/wheelchair, requiring: Contact guard. No assistive devices were  required.  PUJA Toilet Transfer:  Toilet Transfer Score = 4.  Patient performs 75% or more  of effort and minimal assistance (little/incidental help/steadying) for  transferring to and from the toilet/commode, requiring: Contact guard. Patient  requires the following assistive device(s): Safety frame/over the toilet. Grab  bars.  PUJA Tub/Shower Transfer:    Previously Documented Mode of Locomotion at Discharge:  PUJA Expected Mode of Locomotion at Discharge:  PUJA Walk/Wheelchair:  PUJA Stairs:    PUJA Comprehension:  PUJA Expression:  PUJA Social Interaction:  PUJA Problem Solving:  PUJA Memory:    Therapy Mode Minutes  Occupational Therapy: Individual: 90  minutes.  Physical Therapy:  Speech Language Pathology:    Discharge Functional Goals:    Signed by: Laya Harrison, Occupational Therapist

## 2019-10-03 NOTE — PROGRESS NOTES
PPS CMG Coordinator  Inpatient Rehabilitation Admission    Ethnic Group: White.  Marital Status:  Marital Status: .    IRF Admission Date:  10/01/2019  Admission Class: Initial Rehab.  Admit From:  [facility]    Pre-Hospital Living: Home. Pre-Hospital Living  With: (2) Family/Relatives.    Payment Sources: Primary: Medicare Fee for Service  Secondary: Not Listed.  Impairment Group: 16 Debility (non-cardiac, non-pulmonary)  Date of Onset of Impairment: 09/22/2019    Etiologic Diagnosis Code(s):  Rank Code      Description  1    A41.9     Sepsis, unspecified organism    Comorbidities:      Height on Admission: 60 inches.  Weight on Admission: 148 pounds.    Are there any arthritis conditions recorded for Impairment Group, Etiologic  Diagnosis, or Comorbid Conditions that meet all of the regulatory requirements  for IRF classification (in 42 .29(b)(2)(x), (xi), and xii))?    PUJA Bladder Accidents:  26 - Accidents.  Bladder Score = 1.  Five (5) or more  bladder accidents.  PUJA Bowel Accident: 7 -Accidents.  Bowel Score = 1.  Five (5) or more  bowel accidents.    Signed by: Coral Saleh, Supervisor

## 2019-10-03 NOTE — PROGRESS NOTES
Physical Medicine and Rehabilitation  Inpatient Rehabilitation Interdisciplinary Plan of Care    Demographics            Age: 74Y            Gender: Female    Admission Date: 10/1/2019 4:18:00 PM  Rehabilitation Diagnosis:  s/p debility    Plan of Care  Anticipated Discharge Date/Estimated Length of Stay: 10-16-19  Anticipated Discharge Destination: Community discharge with assistance  Discharge Plan : Discharge plans to be determined based on how pt progresses in  therapy.  Son wants pt to be able to return home at discharge if possible.  Medical Necessity Expected Level Rationale: good  Intensity and Duration: an average of 3 hours/5 days per week  Medical Supervision and 24 Hour Rehab Nursing: x  Physical Therapy: x  PT Intensity/Duration: PT 1-1.5 hours per day/5 days per week  Occupational Therapy: x  OT Intensity/Duration: OT 1-1.5 hours per day/5 days per week  Speech and Language Therapy: x  SLP Intensity/Duration: SLP 30 mins-90 mins per day/5 days per week  Social Work: x  Therapeutic Recreation: x  Respiratory Therapy: x  Updated (if changes indicated)  No changes to plan.    Based on the patient's medical and functional status, their prognosis and  expected level of functional improvement is: good    Interdisciplinary Problem/Goals/Status  Mobility    [PT] Bed/Chair/Wheelchair (Active)  Current Status (10/2/2019 12:00:00 AM): CGA with RW  Weekly Goal: SBA with AAD  Discharge Goal: mod indep w/ AAD    [PT] Stairs (Active)  Current Status (10/2/2019 12:00:00 AM): TBA  Weekly Goal: 10 stairs, SBA, 2 handrails  Discharge Goal: 15 stairs, 2 handrails, SBA    [PT] Walk (Active)  Current Status (10/2/2019 12:00:00 AM): 200 ft, CGA, RW  Weekly Goal: 300 ft, SBA,AAD  Discharge Goal: 300 ft, Mod independent, AAD    Self Care    [OT] Dressing (Lower) (Active)  Current Status (10/2/2019 11:00:00 AM): MaxA  Weekly Goal: ModA  Discharge Goal: [place]    [OT] Toileting (Active)  Current Status (10/2/2019 11:00:00 AM):  TotalA/MaxA  Weekly Goal: MaxA  Discharge Goal: ModA    Body Function Structure    [RN] Skin Integrity (Active)  Current Status (10/1/2019 5:00:00 PM): MASD to buttocks  Weekly Goal: healing of skin  Discharge Goal: healed skin    Safety    [RN] Potential for Injury (Active)  Current Status (10/1/2019 5:00:00 PM): falls risk  Weekly Goal: no falls  Discharge Goal: no falls this admission    Medical Problems    Debility secondary to what seems most consistent with hypoxic encephalopathy  Type 2 diabetes mellitus with recent history of DKA  Known coronary artery disease with history of non-ST elevation acute myocardial  infarction  Chronic hypoxic respiratory failure  Urinary tract infection status post treatment  Chronic atrial fibrillation  Hypertension  Stage III chronic kidney disease  ?  ?  PT and OT  ?  Eliquis twice daily due to full dose anticoagulation due to A. fib  ?  Levemir 5 units twice daily and sliding scale insulin. ?Adjust as tolerated  ?  Hypertensive control with amlodipine 5 mg daily, hydralazine 10 mg 3 times daily  metoprolol 100 mg twice daily. Somewhat erratic. Monitor.  ?  Protonix twice daily for now secondary to recent hematemesis.    Comments: Pt's discharge plans to be determined based on how she progresses in  therapy.  Pt lives at home with two teenage grandchildren.    Signed by: Edward Austin, Physician

## 2019-10-03 NOTE — PROGRESS NOTES
Inpatient Rehabilitation Functional Measures Assessment    Functional Measures  PUJA Eating:  PUJA Grooming:  PUJA Bathing:  PUJA Upper Body Dressing:  PUJA Lower Body Dressing:  PUJA Toileting:    PUJA Bladder Management  Level of Assistance:  Frequency/Number of Accidents this Shift:    PUJA Bowel Management  Level of Assistance:  Frequency/Number of Accidents this Shift:    PUJA Bed/Chair/Wheelchair Transfer:  Bed/chair/wheelchair Transfer Score = 4.  Patient performs 75% or more of effort and minimal assistance (little/incidental  help/lifting of one limb/steadying) for transferring to and from the  bed/chair/wheelchair, requiring: Contact guard. Patient requires the following  assistive device(s): Walker.  PUJA Toilet Transfer:  Toilet Transfer Score = 4.  Patient performs 75% or more  of effort and minimal assistance (little/incidental help/steadying) for  transferring to and from the toilet/commode, requiring: Contact guard. Patient  requires the following assistive device(s): Safety frame/over the toilet. Grab  bars. Walker.  PUJA Tub/Shower Transfer:  Activity was not observed.    Previously Documented Mode of Locomotion at Discharge: Walk  PUJA Expected Mode of Locomotion at Discharge: No change to the current  documented expected, most frequently used mode of locomotion at discharge  PUJA Walk/Wheelchair:  WHEELCHAIR OBSERVATION   Wheelchair did not occur.    WALK OBSERVATION   Walk Distance Scale = 3.  Distance walked is greater than 150 feet. Walk Score  = 4.  Patient performs 75% or more of effort and requires minimal assistance.  Incidental help/contact guard/steadying was provided. Patient walked a distance  of  150 feet. Patient requires the following assistive device(s): Rolling  walker.  PUJA Stairs:  Stairs did not occur.    PUJA Comprehension:  PUJA Expression:  PUJA Social Interaction:  PUJA Problem Solving:  PUJA Memory:    Therapy Mode Minutes  Occupational Therapy:  Physical Therapy: Individual: 90  minutes.  Speech Language Pathology:    Discharge Functional Goals:    Signed by: Karyn Delgado, Physical Therapist

## 2019-10-03 NOTE — PROGRESS NOTES
"     LOS: 2 days     Chief Complaint:  Debility    Subjective     Interval History:     She has no cough, fevers, chills.  She states she's doing much better in all ways.  She recognizes me this morning. Sugar up to 400 yesterday      Objective     Vital Signs  /60   Pulse 66   Temp 98.3 °F (36.8 °C) (Oral)   Resp 18   Ht 152.4 cm (60\")   Wt 67 kg (147 lb 11.3 oz)   SpO2 97%   BMI 28.85 kg/m²   Intake & Output (last day)       10/02 0701 - 10/03 0700 10/03 0701 - 10/04 0700    P.O. 600 360    Total Intake(mL/kg) 600 (9) 360 (5.4)    Net +600 +360          Urine Unmeasured Occurrence 7 x 1 x    Stool Unmeasured Occurrence 1 x 1 x            Physical Exam:     General Appearance:    Alert, cooperative, in no acute distress. Thin, frail and interactive   Head:    Normocephalic, without obvious abnormality, atraumatic   Eyes:            Lids and lashes normal, conjunctivae and sclerae normal, no   icterus, no pallor, corneas clear, PERRLA   Ears:    Ears appear intact with no abnormalities noted       Neck:   No adenopathy, supple, trachea midline, no thyromegaly, no   carotid bruit, no JVD   Lungs:     Clear to auscultation,respirations regular, even and                  unlabored    Heart:    Irreg irreg rhythm and normal rate, normal S1 and S2, no            murmur, no gallop, no rub, no click   Chest Wall:    No abnormalities observed   Abdomen:     Normal bowel sounds, no masses, no organomegaly, soft        non-tender, non-distended, no guarding, no rebound                tenderness   Extremities:   Moves all extremities well, no edema, no cyanosis, no             redness                        Results Review:    Lab Results   Component Value Date    WBC 6.39 10/02/2019    HGB 10.2 (L) 10/02/2019    HCT 32.7 (L) 10/02/2019    MCV 89.1 10/02/2019     10/02/2019       Lab Results   Component Value Date    GLUCOSE 117 (H) 10/02/2019    BUN 43 (H) 10/02/2019    CREATININE 1.69 (H) 10/02/2019    " EGFRIFNONA 30 (L) 10/02/2019    BCR 25.4 (H) 10/02/2019     10/02/2019    K 4.8 10/02/2019     (H) 10/02/2019    CO2 19.4 (L) 10/02/2019    CALCIUM 8.1 (L) 10/02/2019    PROTENTOTREF 5.2 (L) 02/16/2019    ALBUMIN 2.84 (L) 10/02/2019    LABIL2 1.1 02/16/2019    AST 13 10/02/2019    ALT 7 10/02/2019     Lab Results   Component Value Date    INR 0.95 09/22/2019    INR 0.98 02/20/2019    INR 0.95 02/19/2019       Glucose   Date Value Ref Range Status   10/03/2019 253 (H) 70 - 130 mg/dL Final   10/03/2019 114 70 - 130 mg/dL Final   10/02/2019 126 70 - 130 mg/dL Final   10/02/2019 220 (H) 70 - 130 mg/dL Final   10/02/2019 422 (C) 70 - 130 mg/dL Final          Medication Review:     Current Facility-Administered Medications:   •  amLODIPine (NORVASC) tablet 5 mg, 5 mg, Oral, Q24H, Kreis, Samuel Duane, MD, 5 mg at 10/03/19 0851  •  apixaban (ELIQUIS) tablet 5 mg, 5 mg, Oral, Q12H, Kreis, Samuel Duane, MD, 5 mg at 10/03/19 0851  •  aspirin EC tablet 81 mg, 81 mg, Oral, Daily, Kreis, Samuel Duane, MD, 81 mg at 10/03/19 0851  •  bisacodyl (DULCOLAX) suppository 10 mg, 10 mg, Rectal, Daily PRN, Kreis, Samuel Duane, MD  •  dextrose (D50W) 25 g/ 50mL Intravenous Solution 25 g, 25 g, Intravenous, Q15 Min PRN, Kreis, Samuel Duane, MD  •  dextrose (GLUTOSE) oral gel 15 g, 15 g, Oral, Q15 Min PRN, Kreis, Samuel Duane, MD  •  glucagon (human recombinant) (GLUCAGEN DIAGNOSTIC) injection 1 mg, 1 mg, Subcutaneous, Q15 Min PRN, Kreis, Samuel Duane, MD  •  hydrALAZINE (APRESOLINE) tablet 10 mg, 10 mg, Oral, Q8H, Kreis, Samuel Duane, MD, 10 mg at 10/03/19 0517  •  insulin aspart (novoLOG) injection 0-9 Units, 0-9 Units, Subcutaneous, 4x Daily AC & at Bedtime, Kreis, Samuel Duane, MD, 6 Units at 10/03/19 1200  •  insulin detemir (LEVEMIR) injection 5 Units, 5 Units, Subcutaneous, BID, Kreis, Samuel Duane, MD, 5 Units at 10/03/19 0919  •  magnesium hydroxide (MILK OF MAGNESIA) suspension 2400 mg/10mL 10 mL, 10 mL, Oral, Daily PRN,  Kreis, Samuel Duane, MD  •  metoprolol tartrate (LOPRESSOR) tablet 100 mg, 100 mg, Oral, Q12H, Kreis, Samuel Duane, MD, 100 mg at 10/03/19 0851  •  montelukast (SINGULAIR) tablet 10 mg, 10 mg, Oral, Nightly, Kreis, Samuel Duane, MD, 10 mg at 10/02/19 2046  •  ondansetron (ZOFRAN) tablet 4 mg, 4 mg, Oral, Q6H PRN **OR** ondansetron (ZOFRAN) injection 4 mg, 4 mg, Intravenous, Q6H PRN, Kreis, Samuel Duane, MD  •  pantoprazole (PROTONIX) EC tablet 40 mg, 40 mg, Oral, BID AC, Kreis, Samuel Duane, MD, 40 mg at 10/03/19 0851  •  thiamine (VITAMIN B-1) tablet 100 mg, 100 mg, Oral, Daily, Kreis, Samuel Duane, MD, 100 mg at 10/03/19 0851      Assessment/Plan     Debility secondary to what seems most consistent with hypoxic encephalopathy  Type 2 diabetes mellitus with recent history of DKA  Known coronary artery disease with history of non-ST elevation acute myocardial infarction  Chronic hypoxic respiratory failure  Urinary tract infection status post treatment  Chronic atrial fibrillation  Hypertension  Stage III chronic kidney disease       PT and OT     Eliquis twice daily due to full dose anticoagulation due to A. fib     Levemir 5 units twice daily and sliding scale insulin.  Adjust as tolerated     Hypertensive control with amlodipine 5 mg daily, hydralazine 10 mg 3 times daily metoprolol 100 mg twice daily. Somewhat erratic. Monitor.      Protonix twice daily for now secondary to recent hematemesis.      Samuel Duane Kreis, MD  10/03/19  2:06 PM

## 2019-10-03 NOTE — SIGNIFICANT NOTE
10/03/19 1030   Plan   Plan Team conference held today.  Pt's discharge date is planned for 10-16-19.  Left message for eugenia Martinez [phone].

## 2019-10-04 LAB
GLUCOSE BLDC GLUCOMTR-MCNC: 291 MG/DL (ref 70–130)
GLUCOSE BLDC GLUCOMTR-MCNC: 366 MG/DL (ref 70–130)
GLUCOSE BLDC GLUCOMTR-MCNC: 65 MG/DL (ref 70–130)
GLUCOSE BLDC GLUCOMTR-MCNC: 70 MG/DL (ref 70–130)
GLUCOSE BLDC GLUCOMTR-MCNC: 79 MG/DL (ref 70–130)

## 2019-10-04 PROCEDURE — 63710000001 INSULIN ASPART PER 5 UNITS: Performed by: FAMILY MEDICINE

## 2019-10-04 PROCEDURE — 97116 GAIT TRAINING THERAPY: CPT

## 2019-10-04 PROCEDURE — 97535 SELF CARE MNGMENT TRAINING: CPT

## 2019-10-04 PROCEDURE — 97110 THERAPEUTIC EXERCISES: CPT

## 2019-10-04 PROCEDURE — 82962 GLUCOSE BLOOD TEST: CPT

## 2019-10-04 PROCEDURE — 97530 THERAPEUTIC ACTIVITIES: CPT

## 2019-10-04 RX ORDER — HYDRALAZINE HYDROCHLORIDE 25 MG/1
25 TABLET, FILM COATED ORAL EVERY 8 HOURS SCHEDULED
Status: DISCONTINUED | OUTPATIENT
Start: 2019-10-04 | End: 2019-10-07

## 2019-10-04 RX ADMIN — APIXABAN 5 MG: 5 TABLET, FILM COATED ORAL at 09:49

## 2019-10-04 RX ADMIN — INSULIN ASPART 6 UNITS: 100 INJECTION, SOLUTION INTRAVENOUS; SUBCUTANEOUS at 12:30

## 2019-10-04 RX ADMIN — AMLODIPINE BESYLATE 5 MG: 5 TABLET ORAL at 09:49

## 2019-10-04 RX ADMIN — INSULIN ASPART 8 UNITS: 100 INJECTION, SOLUTION INTRAVENOUS; SUBCUTANEOUS at 17:01

## 2019-10-04 RX ADMIN — APIXABAN 5 MG: 5 TABLET, FILM COATED ORAL at 21:04

## 2019-10-04 RX ADMIN — ASPIRIN 81 MG: 81 TABLET, COATED ORAL at 09:48

## 2019-10-04 RX ADMIN — Medication 100 MG: at 09:49

## 2019-10-04 RX ADMIN — PANTOPRAZOLE SODIUM 40 MG: 40 TABLET, DELAYED RELEASE ORAL at 09:49

## 2019-10-04 RX ADMIN — HYDRALAZINE HYDROCHLORIDE 25 MG: 25 TABLET ORAL at 21:04

## 2019-10-04 RX ADMIN — HYDRALAZINE HYDROCHLORIDE 25 MG: 25 TABLET ORAL at 14:31

## 2019-10-04 RX ADMIN — MONTELUKAST SODIUM 10 MG: 10 TABLET, COATED ORAL at 21:04

## 2019-10-04 RX ADMIN — METOPROLOL TARTRATE 100 MG: 100 TABLET, FILM COATED ORAL at 21:04

## 2019-10-04 RX ADMIN — HYDRALAZINE HYDROCHLORIDE 10 MG: 10 TABLET ORAL at 06:01

## 2019-10-04 RX ADMIN — METOPROLOL TARTRATE 100 MG: 100 TABLET, FILM COATED ORAL at 09:48

## 2019-10-04 RX ADMIN — PANTOPRAZOLE SODIUM 40 MG: 40 TABLET, DELAYED RELEASE ORAL at 17:01

## 2019-10-04 NOTE — THERAPY TREATMENT NOTE
Inpatient Rehabilitation - Physical Therapy Treatment Note  [facility]     Patient Name: Ashley Geronimo  : 1945  MRN: [id number]    Today's Date: 10/4/2019                 Admit Date: 10/1/2019      Visit Dx:    No diagnosis found.    Patient Active Problem List   Diagnosis   • Pneumonia   • Septic shock (CMS/[facility])   • Pseudomonas pneumonia (CMS/[facility])   • NSTEMI (non-ST elevated myocardial infarction) (CMS/[facility])   • Bradycardia   • Paroxysmal atrial fibrillation (CMS/[facility])   • Chronic respiratory failure with hypoxia and hypercapnia (CMS/[facility])   • Acute on chronic respiratory failure with hypoxia (CMS/[facility]), requiring intubation/mechanical ventilation 19   • Hematemesis   • RAMESH (acute kidney injury) (CMS/[facility])   • DKA (diabetic ketoacidoses) (CMS/[facility])   • Septic shock (CMS/[facility])   • Encephalopathy   • UTI (urinary tract infection)   • Tracheobronchitis, PSA    • Metabolic encephalopathy       Therapy Treatment    IRF Treatment Summary     Row Name 10/04/19 1533 10/04/19 1516          Evaluation/Treatment Time and Intent    Subjective Information  no complaints  -LL  no complaints  -CJ     Existing Precautions/Restrictions  fall decreased skin integrity  -LL  fall decreased skin integrity  -CJ     Document Type  therapy note (daily note) BID PT session  -LL  therapy note (daily note)  -CJ     Mode of Treatment  physical therapy;individual therapy  -LL  occupational therapy  -CJ     Patient/Family Observations  Patient agreeable to BID PT session this date.  -LL  --     Recorded by [LL] Bita Greene PTA [CJ] Laya Harrison OT     Row Name 10/04/19 1533 10/04/19 1516          Cognition/Psychosocial- PT/OT    Affect/Mental Status (Cognitive)  confused  -LL  confused  -CJ     Orientation Status (Cognition)  oriented to;person  -LL  --     Follows Commands (Cognition)  follows one step commands;delayed response/completion;increased processing time needed;initiation impaired;repetition of directions  required;verbal cues/prompting required  -  repetition of directions required;physical/tactile prompts required;verbal cues/prompting required;initiation impaired;increased processing time needed;delayed response/completion  -     Personal Safety Interventions  fall prevention program maintained;gait belt;nonskid shoes/slippers when out of bed;supervised activity  -  --     Attention Deficit (Cognitive)  requires cues/redirection to task  -LL  requires cues/redirection to task  -CJ     Recorded by [LL] Bita Greene PTA [CJ] Laya Harrison OT     Row Name 10/04/19 1533             Mobility    Left Lower Extremity (Weight-bearing Status)  weight-bearing as tolerated (WBAT)  -LL      Right Lower Extremity (Weight-bearing Status)  weight-bearing as tolerated (WBAT)  -LL      Recorded by [LL] Bita Greene PTA      Row Name 10/04/19 1533             Transfer Assessment/Treatment    Transfer Assessment/Treatment  sit-stand transfer;stand-sit transfer;stand pivot/stand step transfer;toilet transfer  -LL      Recorded by [LL] Bita Greene PTA      Row Name 10/04/19 1533             Sit-Stand Transfer    Sit-Stand [place] (Transfers)  verbal cues;nonverbal cues (demo/gesture);contact guard  -LL      Assistive Device (Sit-Stand Transfers)  walker, front-wheeled  -LL      Recorded by [LL] Bita Greene PTA      Row Name 10/04/19 1533             Stand-Sit Transfer    Stand-Sit [place] (Transfers)  verbal cues;nonverbal cues (demo/gesture);contact guard  -LL      Assistive Device (Stand-Sit Transfers)  walker, front-wheeled  -LL      Recorded by [LL] Bita Greene PTA      Row Name 10/04/19 1533             Stand Pivot/Stand Step Transfer    Stand Pivot/Stand Step [place]  verbal cues;nonverbal cues (demo/gesture);contact guard  -LL      Assistive Device (Stand Pivot Stand Step Transfer)  walker, front-wheeled  -LL      Recorded by [LL] Bita Greene PTA      Row Name 10/04/19 1533              Toilet Transfer    Type (Toilet Transfer)  stand pivot/stand step  -LL      [place] (Toilet Transfer)  verbal cues;nonverbal cues (demo/gesture);contact guard  -LL      Assistive Device (Toilet Transfer)  grab bars/safety frame;raised toilet seat;walker, front-wheeled  -LL      Recorded by [LL] Bita Greene PTA      Row Name 10/04/19 1533             Gait/Stairs Assessment/Training    Gait/Stairs Assessment/Training  gait/ambulation independence;gait/ambulation assistive device;distance ambulated;gait pattern;gait deviations;maintains weight-bearing status  -LL      [place] (Gait)  verbal cues;nonverbal cues (demo/gesture);contact guard  -LL      Assistive Device (Gait)  walker, front-wheeled  -LL      Distance in Feet (Gait)  220, 80 in am; 100 in pm  -LL      Pattern (Gait)  step-through  -LL      Deviations/Abnormal Patterns (Gait)  base of support, narrow;eldon decreased;gait speed decreased;stride length decreased  -LL      Bilateral Gait Deviations  forward flexed posture;heel strike decreased  -LL      Recorded by [LL] Bita Greene PTA      Row Name 10/04/19 1533             Safety Issues, Functional Mobility    Impairments Affecting Function (Mobility)  balance;cognition;endurance/activity tolerance;strength  -LL      Recorded by [LL] Bita Greene PTA      Row Name 10/04/19 1516             Grooming Assessment/Treatment    Grooming [place]  set up;supervision;verbal cues  -CJ      Recorded by [CJ] Laya Harrison OT      Row Name 10/04/19 1533             Pain Scale: Numbers Pre/Post-Treatment    Pain Scale: Numbers, Pretreatment  0/10 - no pain  -LL      Pain Scale: Numbers, Post-Treatment  0/10 - no pain  -LL      Recorded by [LL] Bita Greene PTA      Row Name 10/04/19 1533             Pain Scale: FACES Pre/Post-Treatment    Pain: FACES Scale, Pretreatment  0-->no hurt  -LL      Pain: FACES Scale, Post-Treatment  0-->no hurt  -LL      Recorded  by [LL] Bita Greene PTA      Row Name 10/04/19 1533             Balance    Balance  static sitting balance;static standing balance;dynamic sitting balance;dynamic standing balance  -LL      Recorded by [LL] Bita Greene PTA      Row Name 10/04/19 1516             Therapeutic Exercise    Therapeutic Exercise  --  -CJ      Recorded by [CJ] Laya Harrison OT      Row Name 10/04/19 1516             Upper Extremity Seated Therapeutic Exercise    Device, Seated Upper Extremity (Therapeutic Exercise)  restorator/arm bike 2 mins X 3, 0 resistance  -CJ      Exercise Type, Seated Upper Extremity (Therapeutic Exercise)  AROM (active range of motion) BUE ther ex/act, bilat coord ex, GMC/FMC, fxal act tolerance  -CJ      Expected Outcomes, Seated Upper Extremity (Therapeutic Exercise)  improve functional tolerance, self-care activity;improve performance, BADLs;improve performance, transfer skills  -CJ      Recorded by [CJ] Laya Harrison OT      Row Name 10/04/19 1533             Lower Extremity Seated Therapeutic Exercise    Performed, Seated Lower Extremity (Therapeutic Exercise)  hip flexion/extension;hip abduction/adduction;ankle dorsiflexion/plantarflexion;LAQ (long arc quad), knee extension Ball suqeeze  -LL      Device, Seated Lower Extremity (Therapeutic Exercise)  elastic bands/tubing;small ball  -LL      Comment, Seated Lower Extremity (Therapeutic Exercise)  repetiive verbal & visual cues to complete ex's  -LL      Recorded by [LL] Bita Greene PTA      Row Name 10/04/19 1533 10/04/19 1516          Positioning and Restraints    Pre-Treatment Position  in bed in am; WC in room n pm  -LL  --     Post Treatment Position  wheelchair in am & pm  -LL  wheelchair  -CJ     In Wheelchair  sitting;legs elevated;notified nsg in hallway in am; in room w/ family in pm  -LL  sitting;call light within reach;encouraged to call for assist;notified nsg post both sessions  -CJ     Recorded by [LL] Bita Greene PTA  [CJ] Laya Harrison, OT     Row Name 10/04/19 1533             Daily Summary of Progress (PT)    Impairments Continuing to Limit Function: Physical Therapy  strength decreased;impaired balance;impaired cognition;coordination impaired  -LL      Recorded by [LL] Bita Greene PTA        User Key  (r) = Recorded By, (t) = Taken By, (c) = Cosigned By    Initials Name Effective Dates    CJ Laya Harrison, OT 04/03/18 -     LL Bita Greene PTA 05/02/16 -         Wound 10/01/19 1702 Bilateral other (see comments) coccyx MASD ([place] associated skin damage) (Active)   Dressing Appearance open to air 10/4/2019  7:40 AM   Closure Open to air 10/4/2019  7:40 AM   Periwound redness;excoriated 10/3/2019  7:30 PM   Care, Wound barrier applied 10/4/2019  7:40 AM   Dressing Care, Wound open to air 10/4/2019  7:40 AM     Physical Therapy Education     Title: PT OT SLP Therapies (Not Started)     Topic: Physical Therapy (In Progress)     Point: Mobility training (In Progress)     Learning Progress Summary           Patient Acceptance, E,D, NR by LL at 10/4/2019  3:45 PM    Acceptance, E,D, NR by AG at 10/3/2019  4:10 PM    Acceptance, E,D, NR by AG at 10/2/2019  4:26 PM                   Point: Home exercise program (In Progress)     Learning Progress Summary           Patient Acceptance, E,D, NR by LL at 10/4/2019  3:45 PM    Acceptance, E,D, NR by AG at 10/3/2019  4:10 PM    Acceptance, E,D, NR by AG at 10/2/2019  4:26 PM                   Point: Body mechanics (In Progress)     Learning Progress Summary           Patient Acceptance, E,D, NR by LL at 10/4/2019  3:45 PM    Acceptance, E,D, NR by AG at 10/3/2019  4:10 PM    Acceptance, E,D, NR by AG at 10/2/2019  4:26 PM                   Point: Precautions (In Progress)     Learning Progress Summary           Patient Acceptance, E,D, NR by LL at 10/4/2019  3:45 PM    Acceptance, E,D, NR by AG at 10/3/2019  4:10 PM    Acceptance, E,D, NR by AG at 10/2/2019  4:26 PM                                User Key     Initials Effective Dates Name Provider Type Discipline    AG 04/03/18 -  Karyn Delgado, PT Physical Therapist PT     05/02/16 -  Bita Greene PTA Physical Therapy Assistant PT                  PT Recommendation and Plan     Plan of Care Reviewed With: patient  Daily Summary of Progress (PT)  Impairments Continuing to Limit Function: Physical Therapy: strength decreased, impaired balance, impaired cognition, coordination impaired  IRF Plan of Care Review: progress ongoing, continue  Progress, Functional Goals: demonstrating adequate progress  Outcome Summary: Patient tolerated PT session fairly well with adequate rest breaks & with verbal & visual cues.  Continue w/ current POC.               Time Calculation:     PT Charges     Row Name 10/04/19 1547 10/04/19 1546          Time Calculation    Start Time  0925  -LL  1235  -LL     Stop Time  1010  -LL  1330  -LL     Time Calculation (min)  45 min  -LL  55 min  -LL     PT Received On  --  10/04/19  -LL        Time Calculation- PT    Total Timed Code Minutes- PT  45 minute(s)  -LL  55 minute(s)  -LL       User Key  (r) = Recorded By, (t) = Taken By, (c) = Cosigned By    Initials Name Provider Type    LL Bita Greene PTA Physical Therapy Assistant          Therapy Charges for Today     Code Description Service Date Service Provider Modifiers Qty    22700254747 HC GAIT TRAINING EA 15 MIN 10/4/2019 Bita Greene PTA GP 3    22395075375 HC PT THERAPEUTIC ACT EA 15 MIN 10/4/2019 Bita Greene PTA GP 2    55821825600 HC PT THER PROC EA 15 MIN 10/4/2019 Bita Greene PTA GP 2                   Bita. MARY ANN Greene  10/4/2019

## 2019-10-04 NOTE — PROGRESS NOTES
Inpatient Rehabilitation Functional Measures Assessment    Functional Measures  PUJA Eating:  Eating Score = 5. Patient is supervision/set-up for eating,  requiring: Opening containers. Buttering bread. Verbal cuing, prompting, or  instructing. No assistive devices were required.  PUJA Grooming:  PUJA Bathing:  PUJA Upper Body Dressing:  Patient requires maximal/significant physical  assistance for gathering clothes. Wearing a bra or undershirt was not applicable  for this patient. Patient requires minimal/incidental assistance for threading  the right arm through the garment (shirt/sweater). Patient requires  minimal/incidental assistance for threading the left arm through the garment  (shirt/sweater). Patient requires minimal/incidental physical assistance for  pulling an over-head-garment over head or pulling front-fastening-garment around  back. Patient requires minimal/incidental physical assistance for pulling an  over-head-garment down the trunk or adjusting/fastening together a  front-fastening-garment. Patient performs 80 % of upper body dressing tasks.  Upper Body Dressing Score = 4, Minimal Assistance. No assistive devices were  required.  PUJA Lower Body Dressing:  Patient requires total assistance for gathering  clothes. Wearing underwear or an undergarment is not applicable for this  patient. Patient requires maximal/significant physical assistance for holding  clothing and/or threading the right leg through the pants/skirt. Patient  requires maximal/significant physical assistance for holding clothing and/or  threading the left leg through the pants/skirt. Patient requires  maximal/significant physical assistance for holding clothing and/or pulling  pants/skirt over hips and adjusting fasteners. Patient requires  maximal/significant physical assistance for holding clothing and/or donning  and/or doffing right sock. Patient requires maximal/significant physical  assistance for holding clothing and/or  donning and/or doffing left sock. Donning  and/or doffing right shoe is not applicable for this patient. Donning and/or  doffing left shoe is not applicable for this patient. Patient performs 20.83 -  24% of lower body dressing tasks. Lower Body Dressing  Score = 1, Total  Assistance. No assistive devices were required.  PUJA Toileting:  Patient requires maximal assistance for adjusting clothing  before using a toilet, commode, bedpan, or urinal. Patient requires maximal  assistance for hygiene. Patient requires maximal assistance for adjusting  clothing after using a toilet, commode, bedpan, or urinal. Patient performs 0 -  24% of toileting tasks.  Toileting Score = 1, Total Assistance. Patient requires  the following assistive device(s): Grab bar. Adaptive device to maintain  balance.    [facility] Bladder Management  Level of Assistance:  Bladder Score = 2. Patient performs 25-49% of tasks and  requires maximal assistance for bladder management. Norman provides most of the  assist to apply AND remove brief.  Frequency/Number of Accidents this Shift:  Bladder accidents this shift:  0 .  Patient has not had an accident, but used a device/medication this shift  requiring: breif and over toilet seat .    [facility] Bowel Management  Level of Assistance: Bowel Score = 2. Patient performs 25-49% of tasks and  requires maximal assistance for bowel management. [place] provides most of the  assist to apply AND remove brief.  Frequency/Number of Accidents this Shift: Bowel accidents this shift: 1 . breif  and over toilet seat    [facility] Bed/Chair/Wheelchair Transfer:  Bed/chair/wheelchair Transfer Score = 3.  Patient performs 50-74% of effort and requires moderate assistance (some  lifting) for transferring to and from the bed/chair/wheelchair. Patient requires  the following assistive device(s): Bed rails. Arm rest. Seating system of  wheelchair.  PUJA Toilet Transfer:  Toilet Transfer Score = 3.  Patient performs 50-74% of  effort and  requires moderate assistance (some lifting) for transferring to and  from the toilet/commode. Patient requires the following assistive device(s):  Safety frame/over the toilet. Grab bars.  PUJA Tub/Shower Transfer:    Previously Documented Mode of Locomotion at Discharge:  [facility] Expected Mode of Locomotion at Discharge:  [facility] Walk/Wheelchair:  PUJA Stairs:    PUAJ Comprehension:  PUJA Expression:  [facility] Social Interaction:  [facility] Problem Solving:  [facility] Memory:    Therapy Mode Minutes  Occupational Therapy:  Physical Therapy:  Speech Language Pathology:    Discharge Functional Goals:    Signed by: JAI Wilcox

## 2019-10-04 NOTE — PROGRESS NOTES
Inpatient Rehabilitation Functional Measures Assessment    Functional Measures  PUJA Eating:  PUJA Grooming: Patient requires moderate assistance for washing, rinsing and  drying the face. Patient requires moderate assistance for washing, rinsing and  drying the hands. Patient requires moderate assistance for brushing teeth.  Patient requires total assistance for brushing/combing hair. Shaving or applying  makeup was not observed for this patient. Patient performs 0 -  24% of grooming  tasks.  Grooming Score = 1, Total Assistance. No assistive devices were  required.  PUJA Bathing:  Patient bathed in bed. Patient requires moderate assistance for  washing, rinsing, or drying the right arm. Patient requires moderate assistance  for washing, rinsing, or drying the left arm. Patient requires moderate  assistance for washing, rinsing, or drying the chest. Patient requires total  assistance for washing, rinsing, or drying the abdomen. Patient requires total  assistance for washing, rinsing, or drying the perineal area. Patient requires  total assistance for washing, rinsing, or drying the buttocks. Patient requires  total assistance for washing, rinsing, or drying the right upper leg. Patient  requires total assistance for washing, rinsing, or drying the left upper leg.  Patient requires total assistance for washing, rinsing, or drying the right  lower leg, including the foot. Patient requires total assistance for washing,  rinsing, or drying the left lower leg, including the foot. Patient performs 0 -  24% of bathing tasks.  Bathing Score = 1, Total Assistance. No assistive devices  were required.  PUJA Upper Body Dressing:  Upper Body Dressing was not observed this shift  because patient dressed/undressed in pajamas/gown only. .  PUJA Lower Body Dressing:  Lower Body Dressing was not observed this shift  because patient dressed/undressed in pajamas/gown only.  PUJA Toileting:    PUJA Bladder Management  Level of Assistance:   Bladder Score = 1. Patient performs less than 25% of tasks  and requires total assistance for bladder management. [place] provides total  assist to completely apply and remove brief.  Frequency/Number of Accidents this Shift:    [facility] Bowel Management  Level of Assistance: Bowel Score = 7.  Patient is completely independent for  bowel management.  Patient did not have bowel movement.  No  medication/intervention was provided.  Frequency/Number of Accidents this Shift:    [facility] Bed/Chair/Wheelchair Transfer:  Bed/chair/wheelchair Transfer Score = 3.  Patient performs 50-74% of effort and requires moderate assistance (some  lifting)  for transferring to and from the bed/chair/wheelchair, including  assist lifting both legs. Patient requires the following assistive device(s):  Seating system of wheelchair. Grab bars.  PUJA Toilet Transfer:  PUJA Tub/Shower Transfer:    Previously Documented Mode of Locomotion at Discharge:  [facility] Expected Mode of Locomotion at Discharge:  PUJA Walk/Wheelchair:  PUJA Stairs:    PUJA Comprehension:  PUJA Expression:  UPJA Social Interaction:  PUJA Problem Solving:  PUJA Memory:    Therapy Mode Minutes  Occupational Therapy:  Physical Therapy:  Speech Language Pathology:    Discharge Functional Goals:    Signed by: JAI Kunz

## 2019-10-04 NOTE — PLAN OF CARE
Problem: Patient Care Overview  Goal: Plan of Care Review  Outcome: Ongoing (interventions implemented as appropriate)      Problem: Fall Risk (Adult)  Goal: Absence of Fall  Outcome: Ongoing (interventions implemented as appropriate)      Problem: Skin Injury Risk (Adult)  Goal: Skin Health and Integrity  Outcome: Ongoing (interventions implemented as appropriate)      Problem: Functional Mobility Impairment (IRF) (Adult)  Goal: Optimal/Safe Level of [place] with Mobility  Outcome: Ongoing (interventions implemented as appropriate)

## 2019-10-04 NOTE — PROGRESS NOTES
Inpatient Rehabilitation Functional Measures Assessment    Functional Measures  PUJA Eating:  PUJA Grooming: Grooming Score = 5. Patient is supervision/set-up for grooming,  requiring: Initial preparation. Stand by assistance. Verbal cuing, prompting, or  instructing. No assistive devices were required.  PUJA Bathing:  PUJA Upper Body Dressing:  PUJA Lower Body Dressing:  PUJA Toileting:    PUJA Bladder Management  Level of Assistance:  Frequency/Number of Accidents this Shift:    PUJA Bowel Management  Level of Assistance:  Frequency/Number of Accidents this Shift:    [facility] Bed/Chair/Wheelchair Transfer:  [facility] Toilet Transfer:  [facility] Tub/Shower Transfer:    Previously Documented Mode of Locomotion at Discharge:  [facility] Expected Mode of Locomotion at Discharge:  [facility] Walk/Wheelchair:  PUJA Stairs:    [facility] Comprehension:  [facility] Expression:  [facility] Social Interaction:  [facility] Problem Solving:  [facility] Memory:    Therapy Mode Minutes  Occupational Therapy: Individual: 90 minutes.  Physical Therapy:  Speech Language Pathology:    Discharge Functional Goals:    Signed by: Laya Harrison Occupational Therapist

## 2019-10-04 NOTE — PROGRESS NOTES
"     LOS: 3 days     Chief Complaint:  Debility    Subjective     Interval History:     Systolic blood pressure running 130s to 160s.  No nausea or vomiting.  Oxygen saturations in the mid 90s on 2 L.  Blood sugar this morning is 70.  Yesterday she peaked at 348.  She has no nausea, vomiting, diarrhea.  She requires min assist with bed chair and wheelchair transfers.  Ambulating 150 feet with min assist.      Objective     Vital Signs  /57 (BP Location: Right arm, Patient Position: Lying)   Pulse 65   Temp 98.6 °F (37 °C)   Resp 18   Ht 152.4 cm (60\")   Wt 67 kg (147 lb 11.3 oz)   SpO2 93%   BMI 28.85 kg/m²    Intake & Output (last day)       10/03 0701 - 10/04 0700 10/04 0701 - 10/05 0700    P.O. 1320     Total Intake(mL/kg) 1320 (19.7)     Net +1320           Urine Unmeasured Occurrence 7 x     Stool Unmeasured Occurrence 1 x             Physical Exam:     General Appearance:    Alert, cooperative, in no acute distress. Thin, frail and interactive   Head:    Normocephalic, without obvious abnormality, atraumatic   Eyes:            Lids and lashes normal, conjunctivae and sclerae normal, no   icterus, no pallor, corneas clear, PERRLA   Ears:    Ears appear intact with no abnormalities noted       Neck:   No adenopathy, supple, trachea midline, no thyromegaly, no   carotid bruit, no JVD   Lungs:     Clear to auscultation,respirations regular, even and                  unlabored    Heart:    Irreg irreg rhythm and normal rate, normal S1 and S2, no            murmur, no gallop, no rub, no click   Chest Wall:    No abnormalities observed   Abdomen:     Normal bowel sounds, no masses, no organomegaly, soft        non-tender, non-distended, no guarding, no rebound                tenderness   Extremities:   Moves all extremities well, no edema, no cyanosis, no             redness                        Results Review:    Lab Results   Component Value Date    WBC 6.39 10/02/2019    HGB 10.2 (L) 10/02/2019    " HCT 32.7 (L) 10/02/2019    MCV 89.1 10/02/2019     10/02/2019       Lab Results   Component Value Date    GLUCOSE 117 (H) 10/02/2019    BUN 43 (H) 10/02/2019    CREATININE 1.69 (H) 10/02/2019    EGFRIFNONA 30 (L) 10/02/2019    BCR 25.4 (H) 10/02/2019     10/02/2019    K 4.8 10/02/2019     (H) 10/02/2019    CO2 19.4 (L) 10/02/2019    CALCIUM 8.1 (L) 10/02/2019    PROTENTOTREF 5.2 (L) 02/16/2019    ALBUMIN 2.84 (L) 10/02/2019    LABIL2 1.1 02/16/2019    AST 13 10/02/2019    ALT 7 10/02/2019     Lab Results   Component Value Date    INR 0.95 09/22/2019    INR 0.98 02/20/2019    INR 0.95 02/19/2019       Glucose   Date Value Ref Range Status   10/04/2019 70 70 - 130 mg/dL Final   10/03/2019 124 70 - 130 mg/dL Final   10/03/2019 348 (H) 70 - 130 mg/dL Final   10/03/2019 253 (H) 70 - 130 mg/dL Final   10/03/2019 114 70 - 130 mg/dL Final          Medication Review:     Current Facility-Administered Medications:   •  amLODIPine (NORVASC) tablet 5 mg, 5 mg, Oral, Q24H, Kreis, Samuel Duane, MD, 5 mg at 10/03/19 0851  •  apixaban (ELIQUIS) tablet 5 mg, 5 mg, Oral, Q12H, Kreis, Samuel Duane, MD, 5 mg at 10/03/19 2056  •  aspirin EC tablet 81 mg, 81 mg, Oral, Daily, Kreis, Samuel Duane, MD, 81 mg at 10/03/19 0851  •  bisacodyl (DULCOLAX) suppository 10 mg, 10 mg, Rectal, Daily PRN, Kreis, Samuel Duane, MD  •  dextrose (D50W) 25 g/ 50mL Intravenous Solution 25 g, 25 g, Intravenous, Q15 Min PRN, Kreis, Samuel Duane, MD  •  dextrose (GLUTOSE) oral gel 15 g, 15 g, Oral, Q15 Min PRN, Kreis, Samuel Duane, MD  •  glucagon (human recombinant) (GLUCAGEN DIAGNOSTIC) injection 1 mg, 1 mg, Subcutaneous, Q15 Min PRN, Kreis, Samuel Duane, MD  •  hydrALAZINE (APRESOLINE) tablet 10 mg, 10 mg, Oral, Q8H, Kreis, Samuel Duane, MD, 10 mg at 10/04/19 0601  •  insulin aspart (novoLOG) injection 0-9 Units, 0-9 Units, Subcutaneous, 4x Daily AC & at Bedtime, Kreis, Samuel Duane, MD, 7 Units at 10/03/19 1723  •  insulin detemir  (LEVEMIR) injection 5 Units, 5 Units, Subcutaneous, BID, Kreis, Samuel Duane, MD, 5 Units at 10/03/19 2056  •  magnesium hydroxide (MILK OF MAGNESIA) suspension 2400 mg/10mL 10 mL, 10 mL, Oral, Daily PRN, Kreis, Samuel Duane, MD  •  metoprolol tartrate (LOPRESSOR) tablet 100 mg, 100 mg, Oral, Q12H, Kreis, Samuel Duane, MD, 100 mg at 10/03/19 2056  •  montelukast (SINGULAIR) tablet 10 mg, 10 mg, Oral, Nightly, Kreis, Samuel Duane, MD, 10 mg at 10/03/19 2056  •  ondansetron (ZOFRAN) tablet 4 mg, 4 mg, Oral, Q6H PRN **OR** ondansetron (ZOFRAN) injection 4 mg, 4 mg, Intravenous, Q6H PRN, Kreis, Samuel Duane, MD  •  pantoprazole (PROTONIX) EC tablet 40 mg, 40 mg, Oral, BID AC, Kreis, Samuel Duane, MD, 40 mg at 10/03/19 1724  •  thiamine (VITAMIN B-1) tablet 100 mg, 100 mg, Oral, Daily, Kreis, Samuel Duane, MD, 100 mg at 10/03/19 0851      Assessment/Plan     Debility secondary to what seems most consistent with hypoxic encephalopathy  Type 2 diabetes mellitus with recent history of DKA  Known coronary artery disease with history of non-ST elevation acute myocardial infarction  Chronic hypoxic respiratory failure  Urinary tract infection status post treatment  Chronic atrial fibrillation  Hypertension  Stage III chronic kidney disease       PT and OT     Eliquis twice daily due to full dose anticoagulation due to A. fib     Levemir changed to 10 units nightly.  Continue sliding scale     Hypertensive control with amlodipine 5 mg daily, hydralazine increased to 25 mg 3 times daily metoprolol 100 mg twice daily. Somewhat erratic. Monitor.      Protonix twice daily for now secondary to recent hematemesis.    CBC and BMP tomorrow      Samuel Duane Kreis, MD  10/04/19  8:38 AM

## 2019-10-04 NOTE — PROGRESS NOTES
Inpatient Rehabilitation Functional Measures Assessment    Functional Measures  PUJA Eating:  PUJA Grooming:  PUJA Bathing:  PUJA Upper Body Dressing:  PUJA Lower Body Dressing:  PUJA Toileting:    PUJA Bladder Management  Level of Assistance:  Frequency/Number of Accidents this Shift:    PUJA Bowel Management  Level of Assistance:  Frequency/Number of Accidents this Shift:    PUJA Bed/Chair/Wheelchair Transfer:  Bed/chair/wheelchair Transfer Score = 4.  Patient performs 75% or more of effort and minimal assistance (little/incidental  help/lifting of one limb/steadying) for transferring to and from the  bed/chair/wheelchair, requiring: Contact guard. Patient requires the following  assistive device(s): Arm rest. Bed rails.  PUJA Toilet Transfer:  Toilet Transfer Score = 4.  Patient performs 75% or more  of effort and minimal assistance (little/incidental help/steadying) for  transferring to and from the toilet/commode, requiring: Contact guard. Patient  requires the following assistive device(s): Safety frame/over the toilet. Grab  bars.  PUJA Tub/Shower Transfer:    Previously Documented Mode of Locomotion at Discharge:  PUJA Expected Mode of Locomotion at Discharge:  PUJA Walk/Wheelchair:  WHEELCHAIR OBSERVATION   Wheelchair did not occur.    WALK OBSERVATION   Walk Distance Scale = 2.  Distance walked is 50 -149 feet. Walk Score = 2.  Patient performs 75% or more of effort and requires minimal assistance.  Incidental assistance, contact guard or steadying was provided. Patient walked a  distance of 100 feet. Patient requires the following assistive device(s):  Rolling walker.  PUJA Stairs:  Stairs did not occur.    PUJA Comprehension:  PUJA Expression:  PUJA Social Interaction:  PUJA Problem Solving:  PUJA Memory:    Therapy Mode Minutes  Occupational Therapy:  Physical Therapy: Individual: 100 minutes.  Speech Language Pathology:    Discharge Functional Goals:    Signed by: Bita Greene PTA

## 2019-10-04 NOTE — THERAPY TREATMENT NOTE
Inpatient Rehabilitation - Occupational Therapy Treatment Note     [place]     Patient Name: Ashley Geronimo  : 1945  MRN: [id number]    Today's Date: 10/4/2019                 Admit Date: 10/1/2019      Visit Dx:  No diagnosis found.    Patient Active Problem List   Diagnosis   • Pneumonia   • Septic shock (CMS/HCC)   • Pseudomonas pneumonia (CMS/HCC)   • NSTEMI (non-ST elevated myocardial infarction) (CMS/[facility])   • Bradycardia   • Paroxysmal atrial fibrillation (CMS/HCC)   • Chronic respiratory failure with hypoxia and hypercapnia (CMS/HCC)   • Acute on chronic respiratory failure with hypoxia (CMS/HCC), requiring intubation/mechanical ventilation 19   • Hematemesis   • RAMESH (acute kidney injury) (CMS/[facility])   • DKA (diabetic ketoacidoses) (CMS/[facility])   • Septic shock (CMS/[facility])   • Encephalopathy   • UTI (urinary tract infection)   • Tracheobronchitis, PSA    • Metabolic encephalopathy         Therapy Treatment    IRF Treatment Summary     Row Name 10/04/19 151             Evaluation/Treatment Time and Intent    Subjective Information  no complaints  -CJ      Existing Precautions/Restrictions  fall decreased skin integrity  -CJ      Document Type  therapy note (daily note)  -CJ      Mode of Treatment  occupational therapy  -CJ      Recorded by [CJ] Laya Harrison OT      Row Name 10/04/19 151             Cognition/Psychosocial- PT/OT    Affect/Mental Status (Cognitive)  confused  -CJ      Follows Commands (Cognition)  repetition of directions required;physical/tactile prompts required;verbal cues/prompting required;initiation impaired;increased processing time needed;delayed response/completion  -CJ      Attention Deficit (Cognitive)  requires cues/redirection to task  -CJ      Recorded by [CJ] Laya Harrison OT      Row Name 10/04/19 151             Grooming Assessment/Treatment    Grooming [place] Level  set up;supervision;verbal cues  -CJ      Recorded by [CJ] Laya Harrison OT       Row Name 10/04/19 1516             Therapeutic Exercise          Upper Extremity Seated Therapeutic Exercise    Device, Seated Upper Extremity (Therapeutic Exercise)  restorator/arm bike 2 mins X 3, 0 resistance  -      Exercise Type, Seated Upper Extremity (Therapeutic Exercise)  AROM (active range of motion) BUE ther ex/act, bilat coord ex, GMC/FMC, fxal activity tolerance  -      Expected Outcomes, Seated Upper Extremity (Therapeutic Exercise)  improve functional tolerance, self-care activity;improve performance, BADLs;improve performance, transfer skills  -      Recorded by [CJ] Laya Harrison OT      Row Name 10/04/19 1516             Positioning and Restraints    Post Treatment Position  wheelchair  -CJ      In Wheelchair  sitting;call light within reach;encouraged to call for assist;notified nsg post both sessions  -      Recorded by [CJ] Laya Harrison OT        User Key  (r) = Recorded By, (t) = Taken By, (c) = Cosigned By    Initials Name Effective Dates    CJ Laya Harrison OT 04/03/18 -           Wound 10/01/19 1702 Bilateral other (see comments) coccyx MASD ([place] associated skin damage) (Active)   Dressing Appearance open to air 10/4/2019  7:40 AM   Closure Open to air 10/4/2019  7:40 AM   Periwound redness;excoriated 10/3/2019  7:30 PM   Care, Wound barrier applied 10/4/2019  7:40 AM   Dressing Care, Wound open to air 10/4/2019  7:40 AM         OT Recommendation and Plan    Anticipated Equipment Needs At Discharge (OT Eval): (TBD)  Planned Therapy Interventions (OT Eval): activity tolerance training, BADL retraining, occupation/activity based interventions, ROM/therapeutic exercise, strengthening exercise, patient/caregiver education/training, transfer/mobility retraining    Plan of Care Review  Plan of Care Reviewed With: patient  Plan of Care Reviewed With: patient  IRF Plan of Care Review: progress ongoing, continue    OT IRF GOALS     Row Name 10/02/19 1521             Bathing  Goal 1 (OT-IRF)    [place] Level (Bathing Goal 1, OT-IRF)  minimum assist (75% or more patient effort)  -CJ      Time Frame (Bathing Goal 1, OT-IRF)  long term goal (LTG);by discharge  -         LB Dressing Goal 1 (OT-IRF)    [place] (LB Dressing Goal 1, OT-IRF)  moderate assist (50-74% patient effort)  -CJ      Time Frame (LB Dressing Goal 1, OT-IRF)  short term goal (STG)  -CJ         LB Dressing Goal 2 (OT-IRF)    [place] (LB Dressing Goal 2, OT-IRF)  minimum assist (75% or more patient effort)  -CJ      Time Frame (LB Dressing Goal 2, OT-IRF)  long term goal (LTG);by discharge  -CJ         Toileting Goal 1 (OT-IRF)    [place] Level (Toileting Goal 1, OT-IRF)  maximum assist (25-49% patient effort)  -CJ      Time Frame (Toileting Goal 1, OT-IRF)  short term goal (STG)  -CJ         Toileting Goal 2 (OT-IRF)    [place] Level (Toileting Goal 2, OT-IRF)  moderate assist (50-74% patient effort)  -CJ      Time Frame (Toileting Goal 2, OT-IRF)  long term goal (LTG);by discharge  -        User Key  (r) = Recorded By, (t) = Taken By, (c) = Cosigned By    Initials Name Provider Type     Laya Harrison OT Occupational Therapist          Occupational Therapy Education     Title: PT OT SLP Therapies (Not Started)     Topic: Occupational Therapy (In Progress)     Point: ADL training (In Progress)     Description: Instruct learner(s) on proper safety adaptation and remediation techniques during self care or transfers.   Instruct in proper use of assistive devices.    Learning Progress Summary           Patient Acceptance, E,D, NR by SHARON at 10/4/2019  3:24 PM    Acceptance, E,D, NR by SHARON at 10/3/2019  3:08 PM    Acceptance, E,D, NR by SHARON at 10/2/2019  3:17 PM                               User Key     Initials Effective Dates Name Provider Type [facility] 04/03/18 -  Laya Harrison OT Occupational Therapist OT                       Time Calculation:     Time Calculation- OT     Row Name  10/04/19 1525 10/04/19 1050          Time Calculation- OT    OT Start Time  1410  -  1050  -     OT Stop Time  1435  -  1155  -     OT Time Calculation (min)  25 min  -  65 min  -     Total Timed Code Minutes- OT  25 minute(s)  -  65 minute(s)  -     OT Non-Billable Time (min)  --  15 min  -       User Key  (r) = Recorded By, (t) = Taken By, (c) = Cosigned By    Initials Name Provider Type     Laya Harrison OT Occupational Therapist          Therapy Charges for Today     Code Description Service Date Service Provider Modifiers Qty    42779629721 HC OT SELF CARE/MGMT/TRAIN EA 15 MIN 10/3/2019 Laya Harrison OT GO 2    47946187147 HC OT THERAPEUTIC ACT EA 15 MIN 10/3/2019 Laya Harrison OT GO 4    75512223446 HC OT SELF CARE/MGMT/TRAIN EA 15 MIN 10/4/2019 Laya Harrison OT GO 1    37053268041 HC OT THERAPEUTIC ACT EA 15 MIN 10/4/2019 Laya Harrison OT GO 5                   Laya Harrison OT  10/4/2019

## 2019-10-04 NOTE — PLAN OF CARE
Problem: Patient Care Overview  Goal: Plan of Care Review   10/04/19 1525   Patient Care Overview   IRF Plan of Care Review progress ongoing, continue   Coping/Psychosocial   Plan of Care Reviewed With patient

## 2019-10-04 NOTE — PLAN OF CARE
Problem: Patient Care Overview  Goal: Plan of Care Review  Outcome: Ongoing (interventions implemented as appropriate)      Problem: Fall Risk (Adult)  Goal: Absence of Fall  Outcome: Ongoing (interventions implemented as appropriate)      Problem: Skin Injury Risk (Adult)  Goal: Skin Health and Integrity  Outcome: Ongoing (interventions implemented as appropriate)      Problem: Wound (Includes Pressure Injury) (Adult)  Goal: Signs and Symptoms of Listed Potential Problems Will be Absent, Minimized or Managed (Wound)  Outcome: Ongoing (interventions implemented as appropriate)      Problem: Functional Mobility Impairment (IRF) (Adult)  Goal: Optimal/Safe Level of [place] with Mobility  Outcome: Ongoing (interventions implemented as appropriate)      Problem: Diabetes, Type 2 (Adult)  Goal: Signs and Symptoms of Listed Potential Problems Will be Absent, Minimized or Managed (Diabetes, Type 2)  Outcome: Ongoing (interventions implemented as appropriate)

## 2019-10-04 NOTE — PROGRESS NOTES
Rehabilitation Nursing  Inpatient Rehabilitation Functional Measures Assessment and Plan of Care    Plan of Care/Interventions  Copy from POC    Functional Measures  PUJA Eating:  PUJA Grooming:  PUJA Bathing:  PUJA Upper Body Dressing:  PUJA Lower Body Dressing:  PUJA Toileting:    PUJA Bladder Management  Level of Assistance:  Frequency/Number of Accidents this Shift:    PUJA Bowel Management  Level of Assistance:  Frequency/Number of Accidents this Shift:    PUJA Bed/Chair/Wheelchair Transfer:  PUJA Toilet Transfer:  PUJA Tub/Shower Transfer:    Previously Documented Mode of Locomotion at Discharge:  [facility] Expected Mode of Locomotion at Discharge:  [facility] Walk/Wheelchair:  PUJA Stairs:    PUJA Comprehension:  Auditory comprehension is the usual mode. Comprehension  Score = 6, Modified [place].  Patient comprehends complex/abstract  information in their primary language, requiring:  PUJA Expression:  Vocal expression is the usual mode. Expression Score = 6,  Modified Independent.  Patient expresses complex/abstract information in their  primary language, requiring:  PUJA Social Interaction:  Social Interaction Score = 6, Modified Independent.  Patient is modified independent for social interaction, requiring:  PUJA Problem Solving:  Problem Solving Score = 6, Modified [place].  Patient  makes appropriate decisions in order to solve complex problems, but requires  extra time.  PUJA Memory:  Memory Score = 6, Modified [place].  Patient is modified  independent for memory, requiring:    Signed by: Agnes Spencer Nurse

## 2019-10-04 NOTE — PLAN OF CARE
Problem: Patient Care Overview  Goal: Plan of Care Review  Outcome: Ongoing (interventions implemented as appropriate)   10/04/19 0302   Patient Care Overview   IRF Plan of Care Review progress ongoing, continue   Progress, Functional Goals demonstrating adequate progress   Coping/Psychosocial   Plan of Care Reviewed With patient   OTHER   Outcome Summary Patient tolerated PT session fairly well with adequate rest breaks & with verbal & visual cues. Continue w/ current POC.

## 2019-10-05 LAB
ANION GAP SERPL CALCULATED.3IONS-SCNC: 14.6 MMOL/L (ref 5–15)
BUN BLD-MCNC: 45 MG/DL (ref 8–23)
BUN/CREAT SERPL: 30.6 (ref 7–25)
CALCIUM SPEC-SCNC: 8 MG/DL (ref 8.6–10.5)
CHLORIDE SERPL-SCNC: 103 MMOL/L (ref 98–107)
CO2 SERPL-SCNC: 17.4 MMOL/L (ref 22–29)
CREAT BLD-MCNC: 1.47 MG/DL (ref 0.57–1)
DEPRECATED RDW RBC AUTO: 50.8 FL (ref 37–54)
ERYTHROCYTE [DISTWIDTH] IN BLOOD BY AUTOMATED COUNT: 16 % (ref 12.3–15.4)
GFR SERPL CREATININE-BSD FRML MDRD: 35 ML/MIN/1.73
GLUCOSE BLD-MCNC: 367 MG/DL (ref 65–99)
GLUCOSE BLDC GLUCOMTR-MCNC: 183 MG/DL (ref 70–130)
GLUCOSE BLDC GLUCOMTR-MCNC: 238 MG/DL (ref 70–130)
GLUCOSE BLDC GLUCOMTR-MCNC: 329 MG/DL (ref 70–130)
GLUCOSE BLDC GLUCOMTR-MCNC: 435 MG/DL (ref 70–130)
GLUCOSE BLDC GLUCOMTR-MCNC: 457 MG/DL (ref 70–130)
GLUCOSE BLDC GLUCOMTR-MCNC: 83 MG/DL (ref 70–130)
HCT VFR BLD AUTO: 32.7 % (ref 34–46.6)
HGB BLD-MCNC: 10.2 G/DL (ref 12–15.9)
MCH RBC QN AUTO: 27.9 PG (ref 26.6–33)
MCHC RBC AUTO-ENTMCNC: 31.2 G/DL (ref 31.5–35.7)
MCV RBC AUTO: 89.6 FL (ref 79–97)
PLATELET # BLD AUTO: 284 10*3/MM3 (ref 140–450)
PMV BLD AUTO: 12.3 FL (ref 6–12)
POTASSIUM BLD-SCNC: 5 MMOL/L (ref 3.5–5.2)
RBC # BLD AUTO: 3.65 10*6/MM3 (ref 3.77–5.28)
SODIUM BLD-SCNC: 135 MMOL/L (ref 136–145)
WBC NRBC COR # BLD: 6.52 10*3/MM3 (ref 3.4–10.8)

## 2019-10-05 PROCEDURE — 85027 COMPLETE CBC AUTOMATED: CPT | Performed by: FAMILY MEDICINE

## 2019-10-05 PROCEDURE — 97116 GAIT TRAINING THERAPY: CPT

## 2019-10-05 PROCEDURE — 97110 THERAPEUTIC EXERCISES: CPT

## 2019-10-05 PROCEDURE — 82962 GLUCOSE BLOOD TEST: CPT

## 2019-10-05 PROCEDURE — 63710000001 INSULIN ASPART PER 5 UNITS: Performed by: FAMILY MEDICINE

## 2019-10-05 PROCEDURE — 63710000001 INSULIN DETEMIR PER 5 UNITS: Performed by: FAMILY MEDICINE

## 2019-10-05 PROCEDURE — 80048 BASIC METABOLIC PNL TOTAL CA: CPT | Performed by: FAMILY MEDICINE

## 2019-10-05 PROCEDURE — 97530 THERAPEUTIC ACTIVITIES: CPT

## 2019-10-05 RX ORDER — NICOTINE POLACRILEX 4 MG
15 LOZENGE BUCCAL
Status: DISCONTINUED | OUTPATIENT
Start: 2019-10-05 | End: 2019-10-16 | Stop reason: HOSPADM

## 2019-10-05 RX ORDER — DEXTROSE MONOHYDRATE 25 G/50ML
25 INJECTION, SOLUTION INTRAVENOUS
Status: DISCONTINUED | OUTPATIENT
Start: 2019-10-05 | End: 2019-10-16 | Stop reason: HOSPADM

## 2019-10-05 RX ADMIN — APIXABAN 5 MG: 5 TABLET, FILM COATED ORAL at 21:21

## 2019-10-05 RX ADMIN — PANTOPRAZOLE SODIUM 40 MG: 40 TABLET, DELAYED RELEASE ORAL at 07:30

## 2019-10-05 RX ADMIN — INSULIN DETEMIR 10 UNITS: 100 INJECTION, SOLUTION SUBCUTANEOUS at 21:21

## 2019-10-05 RX ADMIN — LINAGLIPTIN 5 MG: 5 TABLET, FILM COATED ORAL at 09:41

## 2019-10-05 RX ADMIN — INSULIN ASPART 7 UNITS: 100 INJECTION, SOLUTION INTRAVENOUS; SUBCUTANEOUS at 07:30

## 2019-10-05 RX ADMIN — APIXABAN 5 MG: 5 TABLET, FILM COATED ORAL at 09:05

## 2019-10-05 RX ADMIN — PANTOPRAZOLE SODIUM 40 MG: 40 TABLET, DELAYED RELEASE ORAL at 16:56

## 2019-10-05 RX ADMIN — HYDRALAZINE HYDROCHLORIDE 25 MG: 25 TABLET ORAL at 05:25

## 2019-10-05 RX ADMIN — INSULIN ASPART 7 UNITS: 100 INJECTION, SOLUTION INTRAVENOUS; SUBCUTANEOUS at 16:56

## 2019-10-05 RX ADMIN — ASPIRIN 81 MG: 81 TABLET, COATED ORAL at 09:05

## 2019-10-05 RX ADMIN — MONTELUKAST SODIUM 10 MG: 10 TABLET, COATED ORAL at 21:21

## 2019-10-05 RX ADMIN — METOPROLOL TARTRATE 100 MG: 100 TABLET, FILM COATED ORAL at 09:04

## 2019-10-05 RX ADMIN — AMLODIPINE BESYLATE 5 MG: 5 TABLET ORAL at 09:05

## 2019-10-05 RX ADMIN — HYDRALAZINE HYDROCHLORIDE 25 MG: 25 TABLET ORAL at 14:27

## 2019-10-05 RX ADMIN — Medication 100 MG: at 09:05

## 2019-10-05 NOTE — PROGRESS NOTES
Inpatient Rehabilitation Functional Measures Assessment    Functional Measures  PUJA Eating:  PUJA Grooming:  PUJA Bathing:  PUJA Upper Body Dressing:  PUJA Lower Body Dressing:  PUJA Toileting:    PUJA Bladder Management  Level of Assistance:  Frequency/Number of Accidents this Shift:    PUJA Bowel Management  Level of Assistance:  Frequency/Number of Accidents this Shift:    PUJA Bed/Chair/Wheelchair Transfer:  PUJA Toilet Transfer:  PUJA Tub/Shower Transfer:    Previously Documented Mode of Locomotion at Discharge:  PUJA Expected Mode of Locomotion at Discharge:  PUJA Walk/Wheelchair:  PUJA Stairs:    PUJA Comprehension:  Auditory comprehension is the usual mode. Patient does not  comprehend complex/abstract information in their primary language without  assistance from a helper. Comprehension Score = 3, Moderate Prompting. Patient  comprehends basic daily needs or ideas 50-74% of the time. Patient requires  moderate/some prompting. No assistive devices were required.  PUJA Expression:  Vocal expression is the usual mode. Patient does not express  complex/abstract information in their primary language without a helper.  Expression Score = 3, Moderate Prompting. Patient expresses basic daily needs or  ideas 50-74% of the time. Patient requires moderate/some prompting. No assistive  devices were required.  PUJA Social Interaction:  Social Interaction Score = 6, Modified Independent.  Patient is modified independent for social interaction, requiring: Requires  additional time.  PUJA Problem Solving:  Patient does not make appropriate decisions in order to  solve complex problems without assistance from a helper. Problem Solving Score =  2, Maximal Direction. Patient makes appropriate decisions in order to solve  routine problems 25-49% of the time. Patient requires maximal direction for the  following behavior(s):  PUJA Memory:  Memory Score = 3, Moderate Prompting. Patient recognizes and  remembers 50-74% of the time. Patient  requires moderate/some prompting  for  memory for the following:    Therapy Mode Minutes  Occupational Therapy:  Physical Therapy:  Speech Language Pathology:    Discharge Functional Goals:    Signed by: Samira Lawson RN

## 2019-10-05 NOTE — PROGRESS NOTES
Inpatient Rehabilitation Functional Measures Assessment    Functional Measures  PUJA Eating:  PUJA Grooming:  PUJA Bathing:  PUJA Upper Body Dressing:  PUJA Lower Body Dressing:  PUJA Toileting:    PUJA Bladder Management  Level of Assistance:  Frequency/Number of Accidents this Shift:    PUJA Bowel Management  Level of Assistance:  Frequency/Number of Accidents this Shift:    PUJA Bed/Chair/Wheelchair Transfer:  PUJA Toilet Transfer:  PUJA Tub/Shower Transfer:    Previously Documented Mode of Locomotion at Discharge:  PUJA Expected Mode of Locomotion at Discharge:  PUJA Walk/Wheelchair:  PUJA Stairs:    PUJA Comprehension:  Both ( auditory and visual) modes of comprehension are used  equally. Patient does not comprehend complex/abstract information in their  primary language without assistance from a helper. Comprehension Score = 3,  Moderate Prompting. Patient comprehends basic daily needs or ideas 50-74% of the  time. Patient requires moderate/some prompting. No assistive devices were  required.  PUJA Expression:  Both ( vocal and non-vocal) modes of expression are used  equally. Patient does not express complex/abstract information in their primary  language without a helper. Expression Score = 3, Moderate Prompting. Patient  expresses basic daily needs or ideas 50-74% of the time. Patient requires  moderate/some prompting. No assistive devices were required.  PUJA Social Interaction:  Social Interaction Score = 6, Modified Independent.  Patient is modified independent for social interaction, requiring: Requires  additional time.  PUJA Problem Solving:  Patient does not make appropriate decisions in order to  solve complex problems without assistance from a helper. Problem Solving Score =  3, Moderate Direction. Patient makes appropriate decisions in order to solve  routine problems 50-74% of the time. Patient requires moderate/some direction  for the following behavior(s):  PUJA Memory:  Memory Score = 3, Moderate Prompting.  Patient recognizes and  remembers 50-74% of the time. Patient requires moderate/some prompting  for  memory for the following:    Therapy Mode Minutes  Occupational Therapy:  Physical Therapy:  Speech Language Pathology:    Discharge Functional Goals:    Signed by: Nurse Roland

## 2019-10-05 NOTE — PROGRESS NOTES
Inpatient Rehabilitation Functional Measures Assessment    Functional Measures  PUJA Eating:  PUJA Grooming:  UPJA Bathing:  PUJA Upper Body Dressing:  PUJA Lower Body Dressing:  PUJA Toileting:    PUJA Bladder Management  Level of Assistance:  Frequency/Number of Accidents this Shift:    PUJA Bowel Management  Level of Assistance:  Frequency/Number of Accidents this Shift:    PUJA Bed/Chair/Wheelchair Transfer:  Bed/chair/wheelchair Transfer Score = 4.  Patient performs 75% or more of effort and minimal assistance (little/incidental  help/lifting of one limb/steadying) for transferring to and from the  bed/chair/wheelchair, requiring: Contact guard. Patient requires the following  assistive device(s): Walker.  PUJA Toilet Transfer:  PUJA Tub/Shower Transfer:    Previously Documented Mode of Locomotion at Discharge:  PUJA Expected Mode of Locomotion at Discharge:  PUJA Walk/Wheelchair:  WHEELCHAIR OBSERVATION   Activity was not observed.    WALK OBSERVATION   Walk Distance Scale = 3.  Distance walked is greater than 150 feet. Walk Score  = 4.  Patient performs 75% or more of effort and requires minimal assistance.  Incidental help/contact guard/steadying was provided. Patient walked a distance  of  150 feet. Patient requires the following assistive device(s): Rolling  walker.  PUJA Stairs:  Activity was not observed.    PUJA Comprehension:  PUJA Expression:  PUJA Social Interaction:  PUJA Problem Solving:  PUJA Memory:    Therapy Mode Minutes  Occupational Therapy:  Physical Therapy: Individual: 90 minutes.  Speech Language Pathology:    Discharge Functional Goals:    Signed by: Drois Calixto, Physical Therapy Assistant

## 2019-10-05 NOTE — PLAN OF CARE
Problem: Patient Care Overview  Goal: Plan of Care Review   10/05/19 1344   Patient Care Overview   IRF Plan of Care Review progress ongoing, continue   Progress, Functional Goals demonstrating adequate progress   Coping/Psychosocial   Plan of Care Reviewed With patient   OTHER   Outcome Summary Patient tolerated session well w/ rest breaks provided as needed. Continue w/ PT's POC and progress as tolerated.

## 2019-10-05 NOTE — PLAN OF CARE
Problem: Patient Care Overview  Goal: Plan of Care Review  Outcome: Ongoing (interventions implemented as appropriate)      Problem: Fall Risk (Adult)  Goal: Absence of Fall  Outcome: Ongoing (interventions implemented as appropriate)      Problem: Skin Injury Risk (Adult)  Goal: Skin Health and Integrity  Outcome: Ongoing (interventions implemented as appropriate)      Problem: Functional Mobility Impairment (IRF) (Adult)  Goal: Optimal/Safe Level of [place] with Mobility  Outcome: Ongoing (interventions implemented as appropriate)

## 2019-10-05 NOTE — PROGRESS NOTES
Inpatient Rehabilitation Functional Measures Assessment    Functional Measures  PUJA Eating:  PUJA Grooming:  PUJA Bathing:  PUJA Upper Body Dressing:  PUJA Lower Body Dressing:  PUJA Toileting:    PUJA Bladder Management  Level of Assistance:  Frequency/Number of Accidents this Shift:    PUJA Bowel Management  Level of Assistance:  Frequency/Number of Accidents this Shift:    PUJA Bed/Chair/Wheelchair Transfer:  Bed/chair/wheelchair Transfer Score = 4.  Patient performs 75% or more of effort and minimal assistance (little/incidental  help/lifting of one limb/steadying) for transferring to and from the  bed/chair/wheelchair, requiring: No assistive devices were required.  [facility] Toilet Transfer:  PUJA Tub/Shower Transfer:    Previously Documented Mode of Locomotion at Discharge:  [facility] Expected Mode of Locomotion at Discharge:  [facility] Walk/Wheelchair:  [facility] Stairs:    [facility] Comprehension:  [facility] Expression:  [facility] Social Interaction:  [facility] Problem Solving:  [facility] Memory:    Therapy Mode Minutes  Occupational Therapy: Individual: 90 minutes.  Physical Therapy:  Speech Language Pathology:    Discharge Functional Goals:    Signed by: Justine Alejandre, Occupational Therapist

## 2019-10-05 NOTE — PROGRESS NOTES
Inpatient Rehabilitation Functional Measures Assessment    Functional Measures  PUJA Eating:  PUJA Grooming: Patient requires total assistance for washing, rinsing and drying  the face. Patient requires total assistance for washing, rinsing and drying the  hands. Patient requires total assistance for brushing teeth. Patient requires  total assistance for brushing/combing hair. Patient requires total assistance  for shaving or applying makeup. Patient performs 0 -  24% of grooming tasks.  Grooming Score = 1, Total Assistance. No assistive devices were required.  PUJA Bathing:  Patient bathed in bed. Patient requires total assistance for  washing, rinsing, or drying the right arm. Patient requires total assistance for  washing, rinsing, or drying the left arm. Patient requires total assistance for  washing, rinsing, or drying the chest. Patient requires total assistance for  washing, rinsing, or drying the abdomen. Patient requires total assistance for  washing, rinsing, or drying the perineal area. Patient requires total assistance  for washing, rinsing, or drying the buttocks. Patient requires total assistance  for washing, rinsing, or drying the right upper leg. Patient requires total  assistance for washing, rinsing, or drying the left upper leg. Patient requires  no physical assistance for washing, rinsing, and drying the right lower leg,  including the foot. Patient requires total assistance for washing, rinsing, or  drying the left lower leg, including the foot. Patient performs 10 -  24% of  bathing tasks.  Bathing Score = 1, Total Assistance. No assistive devices were  required.  PUJA Upper Body Dressing:  Upper Body Dressing was not observed this shift  because patient dressed/undressed in pajamas/gown only. .  PUJA Lower Body Dressing:  Lower Body Dressing was not observed this shift  because patient dressed/undressed in pajamas/gown only.  PUJA Toileting:    PUJA Bladder Management  Level of Assistance:  Bladder  Score = 1. Patient performs less than 25% of tasks  and requires total assistance for bladder management. [place] provides total  assist to completely apply and remove brief.  Frequency/Number of Accidents this Shift:    [facility] Bowel Management  Level of Assistance:  Frequency/Number of Accidents this Shift:    [facility] Bed/Chair/Wheelchair Transfer:  [facility] Toilet Transfer:  [facility] Tub/Shower Transfer:    Previously Documented Mode of Locomotion at Discharge:  [facility] Expected Mode of Locomotion at Discharge:  [facility] Walk/Wheelchair:  [facility] Stairs:    [facility] Comprehension:  [facility] Expression:  [facility] Social Interaction:  [facility] Problem Solving:  [facility] Memory:    Therapy Mode Minutes  Occupational Therapy:  Physical Therapy:  Speech Language Pathology:    Discharge Functional Goals:    Signed by: JAI Kunz

## 2019-10-05 NOTE — THERAPY TREATMENT NOTE
Inpatient Rehabilitation - Physical Therapy Treatment Note   [place]     Patient Name: Ashley Geronimo  : 1945  MRN: [id number]    Today's Date: 10/5/2019                 Admit Date: 10/1/2019      Visit Dx:    No diagnosis found.    Patient Active Problem List   Diagnosis   • Pneumonia   • Septic shock (CMS/[facility])   • Pseudomonas pneumonia (CMS/[facility])   • NSTEMI (non-ST elevated myocardial infarction) (CMS/[facility])   • Bradycardia   • Paroxysmal atrial fibrillation (CMS/[facility])   • Chronic respiratory failure with hypoxia and hypercapnia (CMS/[facility])   • Acute on chronic respiratory failure with hypoxia (CMS/[facility]), requiring intubation/mechanical ventilation 19   • Hematemesis   • RAMESH (acute kidney injury) (CMS/[facility])   • DKA (diabetic ketoacidoses) (CMS/[facility])   • Septic shock (CMS/[facility])   • Encephalopathy   • UTI (urinary tract infection)   • Tracheobronchitis, PSA    • Metabolic encephalopathy       Therapy Treatment    IRF Treatment Summary     Row Name 10/05/19 1300 10/05/19 1200          Evaluation/Treatment Time and Intent    Subjective Information  no complaints  -KADE  complains of;fatigue  -     Existing Precautions/Restrictions  fall  -KADE  fall  -AH     Document Type  therapy note (daily note)  -KADE  therapy note (daily note)  -AH     Mode of Treatment  physical therapy;individual therapy  -KADE  occupational therapy;individual therapy  -     Patient/Family Observations  Pt agreeable to therapy session.  No signs of distress observed.   -KADE  patient agreeable to therapy today  -AH     Recorded by [KADE] Doris Calixto PTA [AH] Ritika Alejandre OT     Row Name 10/05/19 1300             Cognition/Psychosocial- PT/OT    Affect/Mental Status (Cognitive)  confused  -KADE      Orientation Status (Cognition)  oriented to;person  -KADE      Follows Commands (Cognition)  follows one step commands;delayed response/completion;increased processing time needed;initiation impaired;repetition of directions  required;verbal cues/prompting required  -KADE      Personal Safety Interventions  fall prevention program maintained;gait belt;nonskid shoes/slippers when out of bed;supervised activity  -KADE      Attention Deficit (Cognitive)  requires cues/redirection to task  -KADE      Recorded by [KADE] Doris Calixto PTA      Row Name 10/05/19 1300             Mobility    Left Lower Extremity (Weight-bearing Status)  weight-bearing as tolerated (WBAT)  -KADE      Right Lower Extremity (Weight-bearing Status)  weight-bearing as tolerated (WBAT)  -KADE      Recorded by [KADE] Doris Calixto PTA      Row Name 10/05/19 1300             Bed Mobility Assessment/Treatment    Supine-Sit [place] (Bed Mobility)  verbal cues;nonverbal cues (demo/gesture);contact guard  -KADE      Sit-Supine [place] (Bed Mobility)  verbal cues;nonverbal cues (demo/gesture);contact guard  -KADE      Bed Mobility, Safety Issues  cognitive deficits limit understanding  -KADE      Assistive Device (Bed Mobility)  bed rails  -KADE      Recorded by [KADE] Doris Calixto PTA      Row Name 10/05/19 1300             Transfer Assessment/Treatment    Transfer Assessment/Treatment  sit-stand transfer;stand-sit transfer;stand pivot/stand step transfer;toilet transfer  -KADE      Recorded by [KAED] Doris Calixto PTA      Row Name 10/05/19 1300             Bed-Chair Transfer    Bed-Chair [place] (Transfers)  contact guard;minimum assist (75% patient effort);verbal cues  -KADE      Assistive Device (Bed-Chair Transfers)  wheelchair  -KADE      Recorded by [KADE] Doris Calixto PTA      Row Name 10/05/19 1300 10/05/19 1200          Chair-Bed Transfer    Chair-Bed [place] (Transfers)  verbal cues;contact guard  -KADE  verbal cues;contact guard  -     Assistive Device (Chair-Bed Transfers)  walker, front-wheeled  -KADE  --     Recorded by [KADE] Doris Calixto PTA [] Ritika Alejandre, OT     Row Name 10/05/19 1300              Sit-Stand Transfer    Sit-Stand [place] (Transfers)  verbal cues;nonverbal cues (demo/gesture);contact guard  -KADE      Assistive Device (Sit-Stand Transfers)  walker, front-wheeled  -KADE      Recorded by [KADE] Doris Calixto PTA      Row Name 10/05/19 1300             Stand-Sit Transfer    Stand-Sit [place] (Transfers)  verbal cues;nonverbal cues (demo/gesture);contact guard  -KADE      Assistive Device (Stand-Sit Transfers)  walker, front-wheeled  -KADE      Recorded by [KADE] Doris Calixto PTA      Row Name 10/05/19 1300             Stand Pivot/Stand Step Transfer    Stand Pivot/Stand Step [place]  verbal cues;nonverbal cues (demo/gesture);contact guard  -KADE      Assistive Device (Stand Pivot Stand Step Transfer)  walker, front-wheeled  -KADE      Recorded by [KADE] Doris Calixto PTA      Row Name 10/05/19 1300             Toilet Transfer    Type (Toilet Transfer)  stand pivot/stand step  -KADE      Media Level (Toilet Transfer)  verbal cues;nonverbal cues (demo/gesture);contact guard  -KADE      Assistive Device (Toilet Transfer)  grab bars/safety frame;raised toilet seat;walker, front-wheeled  -KADE      Recorded by [KADE] Doris Calixto PTA      Row Name 10/05/19 1300             Gait/Stairs Assessment/Training    Gait/Stairs Assessment/Training  gait/ambulation independence;gait/ambulation assistive device;distance ambulated;gait pattern;gait deviations;maintains weight-bearing status  -KADE      Media Level (Gait)  verbal cues;nonverbal cues (demo/gesture);contact guard  -KADE      Assistive Device (Gait)  walker, front-wheeled  -KADE      Distance in Feet (Gait)  150 150' x2 +110'   -KADE      Pattern (Gait)  step-through  -KADE      Deviations/Abnormal Patterns (Gait)  base of support, narrow;[place] decreased;gait speed decreased;stride length decreased  -KADE      Bilateral Gait Deviations  forward flexed posture;heel strike decreased  -KADE      Recorded by [KADE] [place]  Doris Juarez PTA      Row Name 10/05/19 1300             Safety Issues, Functional Mobility    Safety Issues Affecting Function (Mobility)  awareness of need for assistance;insight into deficits/self awareness;safety precautions follow-through/compliance  -KADE      Impairments Affecting Function (Mobility)  balance;cognition;endurance/activity tolerance;strength  -KADE      Recorded by [KADE] Doris Calixto PTA      Row Name 10/05/19 1300             Pain Scale: Numbers Pre/Post-Treatment    Pain Scale: Numbers, Pretreatment  0/10 - no pain  -KADE      Pain Scale: Numbers, Post-Treatment  0/10 - no pain  -KADE      Recorded by [KADE] Doris Calixto PTA      Row Name 10/05/19 1300             Balance    Balance  static sitting balance;static standing balance;dynamic sitting balance;dynamic standing balance  -KADE      Recorded by [KADE] Doris Calixto PTA      Row Name 10/05/19 1300             Therapeutic Exercise    Therapeutic Exercise  seated, lower extremities;supine, lower extremities  -KADE      Additional Documentation  Therapeutic Exercise (Row)  -KADE      Recorded by [KADE] Doris Calixto PTA      Row Name 10/05/19 1200             Upper Extremity Seated Therapeutic Exercise    Exercise Type, Seated Upper Extremity (Therapeutic Exercise)  AROM (active range of motion) B gross motor, functional activity tolerance  -AH      Recorded by [AH] Ritika Alejandre OT      Row Name 10/05/19 1300             Lower Extremity Seated Therapeutic Exercise    Performed, Seated Lower Extremity (Therapeutic Exercise)  hip flexion/extension;hip abduction/adduction;knee flexion/extension;ankle dorsiflexion/plantarflexion;LAQ (long arc quad), knee extension ball squeeze  -KADE      Device, Seated Lower Extremity (Therapeutic Exercise)  elastic bands/tubing;small ball  -KADE      Comment, Seated Lower Extremity (Therapeutic Exercise)  repetitive cues to complete task  -KADE      Recorded by [KADE] Doris Calixto  JunaidMARY ANN roca      Row Name 10/05/19 1300             Lower Extremity Supine Therapeutic Exercise    Performed, Supine Lower Extremity (Therapeutic Exercise)  hip abduction/adduction;SAQ (short arc quad) over bolster;quadriceps sets;gluteal sets;heel slides;ankle pumps  -KADE      Recorded by [KADE] Doris Calixto PTA      Row Name 10/05/19 1300             Positioning and Restraints    Pre-Treatment Position  in bed  -KADE      Post Treatment Position  wheelchair  -KADE      In Wheelchair  with OT;legs elevated  -KADE      Recorded by [KADE] Doris Calixto PTA        User Key  (r) = Recorded By, (t) = Taken By, (c) = Cosigned By    Initials Name Effective Dates    Doris Mcghee PTA 05/02/16 -     AH Alejandre, Ritika Fletcher, OT 09/16/19 -         Wound 10/01/19 1702 Bilateral other (see comments) coccyx MASD ([place] associated skin damage) (Active)   Dressing Appearance open to air 10/5/2019  9:02 AM   Closure None 10/4/2019  7:35 PM   Base red 10/5/2019  9:02 AM   Periwound redness;excoriated 10/4/2019  7:35 PM   Care, Wound barrier applied 10/5/2019  9:02 AM   Dressing Care, Wound open to air 10/5/2019  9:02 AM     Physical Therapy Education     Title: PT OT SLP Therapies (Not Started)     Topic: Physical Therapy (Done)     Point: Mobility training (Done)     Learning Progress Summary           Patient Acceptance, E,D, VU,NR by KADE at 10/5/2019  1:43 PM    Acceptance, E,D, NR by LL at 10/4/2019  3:45 PM    Acceptance, E,D, NR by AG at 10/3/2019  4:10 PM    Acceptance, E,D, NR by AG at 10/2/2019  4:26 PM                   Point: Home exercise program (Done)     Learning Progress Summary           Patient Acceptance, E,D, VU,NR by KADE at 10/5/2019  1:43 PM    Acceptance, E,D, NR by LL at 10/4/2019  3:45 PM    Acceptance, E,D, NR by AG at 10/3/2019  4:10 PM    Acceptance, E,D, NR by AG at 10/2/2019  4:26 PM                   Point: Body mechanics (Done)     Learning Progress Summary           Patient  Acceptance, E,D, VU,NR by KADE at 10/5/2019  1:43 PM    Acceptance, E,D, NR by LL at 10/4/2019  3:45 PM    Acceptance, E,D, NR by AG at 10/3/2019  4:10 PM    Acceptance, E,D, NR by AG at 10/2/2019  4:26 PM                   Point: Precautions (Done)     Learning Progress Summary           Patient Acceptance, E,D, VU,NR by KADE at 10/5/2019  1:43 PM    Acceptance, E,D, NR by LL at 10/4/2019  3:45 PM    Acceptance, E,D, NR by AG at 10/3/2019  4:10 PM    Acceptance, E,D, NR by AG at 10/2/2019  4:26 PM                               User Key     Initials Effective Dates Name Provider Type Discipline     04/03/18 -  Karyn Delgado, PT Physical Therapist PT     05/02/16 -  Bita Greene PTA Physical Therapy Assistant PT    KADE 05/02/16 -  Doris Calixto PTA Physical Therapy Assistant PT                  PT Recommendation and Plan     Plan of Care Reviewed With: patient  IRF Plan of Care Review: progress ongoing, continue  Progress, Functional Goals: demonstrating adequate progress  Outcome Summary: Patient tolerated session well w/ rest breaks provided as needed.  Continue w/ PT's POC and progress as tolerated.                Time Calculation:     PT Charges     Row Name 10/05/19 1343 10/05/19 1342          Time Calculation    Start Time  0830  -KADE  0745  -KADE     Stop Time  0915  -KADE  0830  -KADE     Time Calculation (min)  45 min  -KADE  45 min  -KADE     PT Received On  10/05/19  -KADE  10/05/19  -KADE     PT - Next Appointment  10/07/19  -KADE  10/07/19  -KADE     PT Goal Re-Cert Due Date  10/09/19  -KADE  10/09/19  -KADE       User Key  (r) = Recorded By, (t) = Taken By, (c) = Cosigned By    Initials Name Provider Type    Doris Mcghee PTA Physical Therapy Assistant          Therapy Charges for Today     Code Description Service Date Service Provider Modifiers Qty    87506785372 HC GAIT TRAINING EA 15 MIN 10/5/2019 Doris Calixto PTA GP 2    69349787017 HC PT THERAPEUTIC ACT EA 15 MIN 10/5/2019 [place]  Doris Juarez, PTA GP 1    20567435741 HC PT THER PROC EA 15 MIN 10/5/2019 Doris Calixto, MARY ANN GP 3                   Doris Juarez. MARY ANN Calixto  10/5/2019

## 2019-10-05 NOTE — PROGRESS NOTES
"     LOS: 4 days     Chief Complaint:  Debility    Subjective     Interval History:     She tells me that she just wants to go home.Her blood sugar this morning is 329.  She was as low as 79 yesterday.  Creatinine today is 1.57. 's to 150's      Objective     Vital Signs  /56 (BP Location: Right arm, Patient Position: Lying)   Pulse 68   Temp 98.1 °F (36.7 °C)   Resp 18   Ht 152.4 cm (60\")   Wt 67 kg (147 lb 11.3 oz)   SpO2 96%   BMI 28.85 kg/m²    Intake & Output (last day)       10/04 0701 - 10/05 0700 10/05 0701 - 10/06 0700    P.O. 840     Total Intake(mL/kg) 840 (12.5)     Net +840           Urine Unmeasured Occurrence 5 x 1 x            Physical Exam:     General Appearance:    Alert, cooperative, in no acute distress. Thin, frail and interactive   Head:    Normocephalic, without obvious abnormality, atraumatic   Eyes:            Lids and lashes normal, conjunctivae and sclerae normal, no   icterus, no pallor, corneas clear, PERRLA   Ears:    Ears appear intact with no abnormalities noted       Neck:   No adenopathy, supple, trachea midline, no thyromegaly, no   carotid bruit, no JVD   Lungs:     Clear to auscultation,respirations regular, even and                  unlabored    Heart:    Irreg irreg rhythm and normal rate, normal S1 and S2, no            murmur, no gallop, no rub, no click   Chest Wall:    No abnormalities observed   Abdomen:     Normal bowel sounds, no masses, no organomegaly, soft        non-tender, non-distended, no guarding, no rebound                tenderness   Extremities:   Moves all extremities well, no edema, no cyanosis, no             redness                        Results Review:    Lab Results   Component Value Date    WBC 6.52 10/05/2019    HGB 10.2 (L) 10/05/2019    HCT 32.7 (L) 10/05/2019    MCV 89.6 10/05/2019     10/05/2019       Lab Results   Component Value Date    GLUCOSE 367 (H) 10/05/2019    BUN 45 (H) 10/05/2019    CREATININE 1.47 (H) " 10/05/2019    EGFRIFNONA 35 (L) 10/05/2019    BCR 30.6 (H) 10/05/2019     (L) 10/05/2019    K 5.0 10/05/2019     10/05/2019    CO2 17.4 (L) 10/05/2019    CALCIUM 8.0 (L) 10/05/2019    PROTENTOTREF 5.2 (L) 02/16/2019    ALBUMIN 2.84 (L) 10/02/2019    LABIL2 1.1 02/16/2019    AST 13 10/02/2019    ALT 7 10/02/2019     Lab Results   Component Value Date    INR 0.95 09/22/2019    INR 0.98 02/20/2019    INR 0.95 02/19/2019       Glucose   Date Value Ref Range Status   10/05/2019 329 (H) 70 - 130 mg/dL Final   10/04/2019 79 70 - 130 mg/dL Final   10/04/2019 65 (L) 70 - 130 mg/dL Final   10/04/2019 366 (H) 70 - 130 mg/dL Final   10/04/2019 291 (H) 70 - 130 mg/dL Final          Medication Review:     Current Facility-Administered Medications:   •  amLODIPine (NORVASC) tablet 5 mg, 5 mg, Oral, Q24H, Kreis, Samuel Duane, MD, 5 mg at 10/04/19 0949  •  apixaban (ELIQUIS) tablet 5 mg, 5 mg, Oral, Q12H, Kreis, Samuel Duane, MD, 5 mg at 10/04/19 2104  •  aspirin EC tablet 81 mg, 81 mg, Oral, Daily, Kreis, Samuel Duane, MD, 81 mg at 10/04/19 0948  •  bisacodyl (DULCOLAX) suppository 10 mg, 10 mg, Rectal, Daily PRN, Kreis, Samuel Duane, MD  •  dextrose (D50W) 25 g/ 50mL Intravenous Solution 25 g, 25 g, Intravenous, Q15 Min PRN, Kreis, Samuel Duane, MD  •  dextrose (GLUTOSE) oral gel 15 g, 15 g, Oral, Q15 Min PRN, Kreis, Samuel Duane, MD  •  glucagon (human recombinant) (GLUCAGEN DIAGNOSTIC) injection 1 mg, 1 mg, Subcutaneous, Q15 Min PRN, Kreis, Samuel Duane, MD  •  hydrALAZINE (APRESOLINE) tablet 25 mg, 25 mg, Oral, Q8H, Kreis, Samuel Duane, MD, 25 mg at 10/05/19 0525  •  insulin aspart (novoLOG) injection 0-9 Units, 0-9 Units, Subcutaneous, 4x Daily AC & at Bedtime, Kreis, Samuel Duane, MD, 7 Units at 10/05/19 0730  •  insulin detemir (LEVEMIR) injection 10 Units, 10 Units, Subcutaneous, Nightly, Kreis, Samuel Duane, MD  •  magnesium hydroxide (MILK OF MAGNESIA) suspension 2400 mg/10mL 10 mL, 10 mL, Oral, Daily PRN,  Kreis, Samuel Duane, MD  •  metoprolol tartrate (LOPRESSOR) tablet 100 mg, 100 mg, Oral, Q12H, Kreis, Samuel Duane, MD, 100 mg at 10/04/19 2104  •  montelukast (SINGULAIR) tablet 10 mg, 10 mg, Oral, Nightly, Kreis, Samuel Duane, MD, 10 mg at 10/04/19 2104  •  ondansetron (ZOFRAN) tablet 4 mg, 4 mg, Oral, Q6H PRN **OR** ondansetron (ZOFRAN) injection 4 mg, 4 mg, Intravenous, Q6H PRN, Kreis, Samuel Duane, MD  •  pantoprazole (PROTONIX) EC tablet 40 mg, 40 mg, Oral, BID AC, Kreis, Samuel Duane, MD, 40 mg at 10/05/19 0730  •  thiamine (VITAMIN B-1) tablet 100 mg, 100 mg, Oral, Daily, Kreis, Samuel Duane, MD, 100 mg at 10/04/19 0949      Assessment/Plan     Debility secondary to what seems most consistent with hypoxic encephalopathy  Type 2 diabetes mellitus with recent history of DKA with hyperglycemia  Known coronary artery disease with history of non-ST elevation acute myocardial infarction  Chronic hypoxic respiratory failure  Urinary tract infection status post treatment  Chronic atrial fibrillation  Hypertension  Stage III chronic kidney disease       PT and OT     Eliquis twice daily due to full dose anticoagulation due to A. fib     Levemir 10 units nightly.  Continue sliding scale but decreased to low dose.  Start Tradjenta 5 mg daily      Hypertensive control with amlodipine 5 mg daily, hydralazine 25 mg 3 times daily metoprolol 100 mg twice daily. Somewhat erratic. Monitor.      Protonix twice daily for now secondary to recent hematemesis.    CBC and BMP tomorrow      Samuel Duane Kreis, MD  10/05/19  7:56 AM

## 2019-10-05 NOTE — THERAPY TREATMENT NOTE
Inpatient Rehabilitation - Occupational Therapy Treatment Note     [place]     Patient Name: Ashley Geronimo  : 1945  MRN: [id number]    Today's Date: 10/5/2019                 Admit Date: 10/1/2019      Visit Dx:  No diagnosis found.    Patient Active Problem List   Diagnosis   • Pneumonia   • Septic shock (CMS/[facility])   • Pseudomonas pneumonia (CMS/[facility])   • NSTEMI (non-ST elevated myocardial infarction) (CMS/[facility])   • Bradycardia   • Paroxysmal atrial fibrillation (CMS/[facility])   • Chronic respiratory failure with hypoxia and hypercapnia (CMS/[facility])   • Acute on chronic respiratory failure with hypoxia (CMS/[facility]), requiring intubation/mechanical ventilation 19   • Hematemesis   • RAMESH (acute kidney injury) (CMS/[facility])   • DKA (diabetic ketoacidoses) (CMS/[facility])   • Septic shock (CMS/[facility])   • Encephalopathy   • UTI (urinary tract infection)   • Tracheobronchitis, PSA    • Metabolic encephalopathy         Therapy Treatment    IRF Treatment Summary     Row Name 10/05/19 1200             Evaluation/Treatment Time and Intent    Subjective Information  complains of;fatigue  -      Existing Precautions/Restrictions  fall  -      Document Type  therapy note (daily note)  -      Mode of Treatment  occupational therapy;individual therapy  -      Patient/Family Observations  patient agreeable to therapy today  -      Recorded by [] Ritika Alejandre OT      Row Name 10/05/19 1200             Chair-Bed Transfer    Chair-Bed [place] (Transfers)  verbal cues;contact guard  -      Recorded by [] Ritika Alejandre OT      Row Name 10/05/19 1200             Upper Extremity Seated Therapeutic Exercise    Exercise Type, Seated Upper Extremity (Therapeutic Exercise)  AROM (active range of motion) B gross motor, functional activity tolerance  -      Recorded by [] Ritika Alejandre OT        User Key  (r) = Recorded By, (t) = Taken By, (c) = Cosigned By    Initials Name Effective Dates      Ritika Alejandre, OT 09/16/19 -           Wound 10/01/19 1702 Bilateral other (see comments) coccyx MASD ([place] associated skin damage) (Active)   Dressing Appearance open to air 10/5/2019  9:02 AM   Closure None 10/4/2019  7:35 PM   Base red 10/5/2019  9:02 AM   Periwound redness;excoriated 10/4/2019  7:35 PM   Care, Wound barrier applied 10/5/2019  9:02 AM   Dressing Care, Wound open to air 10/5/2019  9:02 AM         OT Recommendation and Plan         Plan of Care Review  Plan of Care Reviewed With: patient  Plan of Care Reviewed With: patient  IRF Plan of Care Review: progress ongoing, continue  Progress, Functional Goals: demonstrating adequate progress  Outcome Summary: patient tolerated sessio well with rest breaks and verbal cues    OT IRF GOALS     Row Name 10/02/19 1521             Bathing Goal 1 (OT-IRF)    [place] Level (Bathing Goal 1, OT-IRF)  minimum assist (75% or more patient effort)  -CJ      Time Frame (Bathing Goal 1, OT-IRF)  long term goal (LTG);by discharge  -CJ         LB Dressing Goal 1 (OT-IRF)    [place] (LB Dressing Goal 1, OT-IRF)  moderate assist (50-74% patient effort)  -CJ      Time Frame (LB Dressing Goal 1, OT-IRF)  short term goal (STG)  -CJ         LB Dressing Goal 2 (OT-IRF)    [place] (LB Dressing Goal 2, OT-IRF)  minimum assist (75% or more patient effort)  -CJ      Time Frame (LB Dressing Goal 2, OT-IRF)  long term goal (LTG);by discharge  -         Toileting Goal 1 (OT-IRF)    [place] Level (Toileting Goal 1, OT-IRF)  maximum assist (25-49% patient effort)  -CJ      Time Frame (Toileting Goal 1, OT-IRF)  short term goal (STG)  -CJ         Toileting Goal 2 (OT-IRF)    [place] Level (Toileting Goal 2, OT-IRF)  moderate assist (50-74% patient effort)  -CJ      Time Frame (Toileting Goal 2, OT-IRF)  long term goal (LTG);by discharge  -        User Key  (r) = Recorded By, (t) = Taken By, (c) = Cosigned By    Initials Name Provider Type    CJ  Laya Harrison OT Occupational Therapist          Occupational Therapy Education     Title: PT OT SLP Therapies (Not Started)     Topic: Occupational Therapy (In Progress)     Point: ADL training (In Progress)     Description: Instruct learner(s) on proper safety adaptation and remediation techniques during self care or transfers.   Instruct in proper use of assistive devices.    Learning Progress Summary           Patient Acceptance, E,D, NR by  at 10/4/2019  3:24 PM    Acceptance, E,D, NR by  at 10/3/2019  3:08 PM    Acceptance, E,D, NR by  at 10/2/2019  3:17 PM                               User Key     Initials Effective Dates Name Provider Type [facility] 04/03/18 -  Laya Harrison OT Occupational Therapist OT                       Time Calculation:     Time Calculation- OT     Row Name 10/05/19 1211             Time Calculation-     OT Start Time  0915  -      OT Stop Time  1045  -      OT Time Calculation (min)  90 min  -        User Key  (r) = Recorded By, (t) = Taken By, (c) = Cosigned By    Initials Name Provider Type     Ritika Alejandre OT Occupational Therapist          Therapy Charges for Today     Code Description Service Date Service Provider Modifiers Qty    46592 NE THERAPEUT ACTVITY DIRECT PT CONTACT EACH 15 MIN 10/5/2019 Ritika Alejandre OT GO 5    06100 NE SELF-CARE/HOME MGMT TRAINING EACH 15 MINUTES 10/5/2019 Ritika Alejandre OT GO 1                   Ritika Alejandre OT  10/5/2019

## 2019-10-05 NOTE — PROGRESS NOTES
Inpatient Rehabilitation Functional Measures Assessment    Functional Measures  PUJA Eating:  Eating Score = 5. Patient is supervision/set-up for eating,  requiring: Opening containers. No assistive devices were required.  PUJA Grooming:  PUJA Bathing:  PUJA Upper Body Dressing:  Patient requires moderate/considerable physical  assistance for gathering clothes. Wearing a bra or undershirt was not applicable  for this patient. Patient requires moderate/considerable physical assistance for  threading the right arm through the garment (shirt/sweater). Patient requires  moderate/considerable physical assistance for threading the left arm through the  garment (shirt/sweater). Patient requires moderate/considerable physical  assistance for pulling an over-head-garment over head or pulling  front-fastening-garment around back. Patient requires moderate/considerable  physical assistance for pulling an over-head-garment down the trunk or  adjusting/fastening together a front-fastening-garment. Patient performs 60 % of  upper body dressing tasks. Upper Body Dressing Score = 3, Moderate Assistance.  No assistive devices were required.  PUJA Lower Body Dressing:  Patient requires total assistance for gathering  clothes. Wearing underwear or an undergarment is not applicable for this  patient. Patient requires moderate/considerable physical assistance for  threading the right leg through the pants/skirt. Patient requires  moderate/considerable physical assistance for threading the left leg through the  pants/skirt. Patient requires moderate/considerable physical assistance for  pulling pants/skirt over hips and adjusting fasteners. Patient requires  maximal/significant physical assistance for holding clothing and/or donning  and/or doffing right sock. Patient requires maximal/significant physical  assistance for holding clothing and/or donning and/or doffing left sock. Donning  and/or doffing right shoe is not applicable for this  patient. Donning and/or  doffing left shoe is not applicable for this patient. Patient performs 33.33 %  of lower body dressing tasks. Lower Body Dressing Score = 2, Maximal Assistance.  No assistive devices were required.  PUJA Toileting:  Activity was not observed.    PUJA Bladder Management  Level of Assistance:  Bladder Score = 2. Patient performs 25-49% of tasks and  requires maximal assistance for bladder management. [place] provides most of the  assist to roll or bridge to place and remove bedpan.  Frequency/Number of Accidents this Shift:  Bladder accidents this shift:  0 .  Patient has not had an accident, but used a device/medication this shift  requiring: bedpan .    PUJA Bowel Management  Level of Assistance: Bowel Score = 2. Patient performs 25-49% of tasks and  requires maximal assistance for bowel management. [place] provides most of the  assist to roll or bridge to place AND remove bedpan.  Frequency/Number of Accidents this Shift: Bowel accidents this shift: 0 .  Patient has not had an accident, but used a device/medication this shift  requiring: bedpan .    PUJA Bed/Chair/Wheelchair Transfer:  Bed/chair/wheelchair Transfer Score = 1.  Patient performs 50-74% of effort and requires moderate assistance (some  lifting) of two or more people for transferring to and from the  bed/chair/wheelchair. safety . No assistive devices were required.  PUJA Toilet Transfer:  Activity was not observed.  PUJA Tub/Shower Transfer:  Activity was not observed.    Previously Documented Mode of Locomotion at Discharge:  PUJA Expected Mode of Locomotion at Discharge:  PUJA Walk/Wheelchair:  PUJA Stairs:    PUJA Comprehension:  PUJA Expression:  PUJA Social Interaction:  PUJA Problem Solving:  PUJA Memory:    Therapy Mode Minutes  Occupational Therapy:  Physical Therapy:  Speech Language Pathology:    Discharge Functional Goals:    Signed by: JAI Jefferson

## 2019-10-05 NOTE — PLAN OF CARE
Problem: Patient Care Overview  Goal: Plan of Care Review   10/05/19 1211   Patient Care Overview   IRF Plan of Care Review progress ongoing, continue   Progress, Functional Goals demonstrating adequate progress   Coping/Psychosocial   Plan of Care Reviewed With patient   OTHER   Outcome Summary patient tolerated sessio well with rest breaks and verbal cues

## 2019-10-05 NOTE — PLAN OF CARE
Problem: Patient Care Overview  Goal: Plan of Care Review  Outcome: Ongoing (interventions implemented as appropriate)      Problem: Fall Risk (Adult)  Goal: Absence of Fall  Outcome: Ongoing (interventions implemented as appropriate)      Problem: Skin Injury Risk (Adult)  Goal: Skin Health and Integrity  Outcome: Ongoing (interventions implemented as appropriate)      Problem: Wound (Includes Pressure Injury) (Adult)  Goal: Signs and Symptoms of Listed Potential Problems Will be Absent, Minimized or Managed (Wound)  Outcome: Ongoing (interventions implemented as appropriate)      Problem: Functional Mobility Impairment (IRF) (Adult)  Goal: Optimal/Safe Level of [place] with Mobility  Outcome: Ongoing (interventions implemented as appropriate)

## 2019-10-06 LAB
GLUCOSE BLDC GLUCOMTR-MCNC: 182 MG/DL (ref 70–130)
GLUCOSE BLDC GLUCOMTR-MCNC: 195 MG/DL (ref 70–130)
GLUCOSE BLDC GLUCOMTR-MCNC: 397 MG/DL (ref 70–130)
GLUCOSE BLDC GLUCOMTR-MCNC: 68 MG/DL (ref 70–130)
GLUCOSE BLDC GLUCOMTR-MCNC: 70 MG/DL (ref 70–130)

## 2019-10-06 PROCEDURE — 82962 GLUCOSE BLOOD TEST: CPT

## 2019-10-06 PROCEDURE — 63710000001 INSULIN ASPART PER 5 UNITS: Performed by: FAMILY MEDICINE

## 2019-10-06 PROCEDURE — 63710000001 INSULIN DETEMIR PER 5 UNITS: Performed by: FAMILY MEDICINE

## 2019-10-06 RX ADMIN — LINAGLIPTIN 5 MG: 5 TABLET, FILM COATED ORAL at 09:32

## 2019-10-06 RX ADMIN — INSULIN ASPART 2 UNITS: 100 INJECTION, SOLUTION INTRAVENOUS; SUBCUTANEOUS at 17:41

## 2019-10-06 RX ADMIN — ASPIRIN 81 MG: 81 TABLET, COATED ORAL at 09:32

## 2019-10-06 RX ADMIN — INSULIN ASPART 6 UNITS: 100 INJECTION, SOLUTION INTRAVENOUS; SUBCUTANEOUS at 12:42

## 2019-10-06 RX ADMIN — HYDRALAZINE HYDROCHLORIDE 25 MG: 25 TABLET ORAL at 05:58

## 2019-10-06 RX ADMIN — Medication 100 MG: at 09:32

## 2019-10-06 RX ADMIN — APIXABAN 5 MG: 5 TABLET, FILM COATED ORAL at 21:19

## 2019-10-06 RX ADMIN — HYDRALAZINE HYDROCHLORIDE 25 MG: 25 TABLET ORAL at 21:18

## 2019-10-06 RX ADMIN — HYDRALAZINE HYDROCHLORIDE 25 MG: 25 TABLET ORAL at 14:51

## 2019-10-06 RX ADMIN — APIXABAN 5 MG: 5 TABLET, FILM COATED ORAL at 09:32

## 2019-10-06 RX ADMIN — PANTOPRAZOLE SODIUM 40 MG: 40 TABLET, DELAYED RELEASE ORAL at 17:41

## 2019-10-06 RX ADMIN — METOPROLOL TARTRATE 100 MG: 100 TABLET, FILM COATED ORAL at 09:32

## 2019-10-06 RX ADMIN — PANTOPRAZOLE SODIUM 40 MG: 40 TABLET, DELAYED RELEASE ORAL at 09:32

## 2019-10-06 RX ADMIN — AMLODIPINE BESYLATE 5 MG: 5 TABLET ORAL at 09:32

## 2019-10-06 RX ADMIN — METOPROLOL TARTRATE 100 MG: 100 TABLET, FILM COATED ORAL at 21:18

## 2019-10-06 RX ADMIN — MONTELUKAST SODIUM 10 MG: 10 TABLET, COATED ORAL at 21:18

## 2019-10-06 RX ADMIN — INSULIN DETEMIR 10 UNITS: 100 INJECTION, SOLUTION SUBCUTANEOUS at 21:19

## 2019-10-06 NOTE — PROGRESS NOTES
Inpatient Rehabilitation Functional Measures Assessment    Functional Measures  PUJA Eating:  PUJA Grooming:  PUJA Bathing:  PUJA Upper Body Dressing:  PUJA Lower Body Dressing:  PUJA Toileting:    PUJA Bladder Management  Level of Assistance:  Frequency/Number of Accidents this Shift:    PUJA Bowel Management  Level of Assistance:  Frequency/Number of Accidents this Shift:    PUJA Bed/Chair/Wheelchair Transfer:  PUJA Toilet Transfer:  PUJA Tub/Shower Transfer:    Previously Documented Mode of Locomotion at Discharge:  PUJA Expected Mode of Locomotion at Discharge:  PUJA Walk/Wheelchair:  PUJA Stairs:    PUJA Comprehension:  Both ( auditory and visual) modes of comprehension are used  equally. Patient does not comprehend complex/abstract information in their  primary language without assistance from a helper. Comprehension Score = 3,  Moderate Prompting. Patient comprehends basic daily needs or ideas 50-74% of the  time. Patient requires moderate/some prompting. No assistive devices were  required. can be confused at times  PUJA Expression:  Both ( vocal and non-vocal) modes of expression are used  equally. Patient does not express complex/abstract information in their primary  language without a helper. Expression Score = 3, Moderate Prompting. Patient  expresses basic daily needs or ideas 50-74% of the time. Patient requires  moderate/some prompting. No assistive devices were required.  PUJA Social Interaction:  Social Interaction Score = 3, Moderate Direction.  Patient interacts appropriately 50-74% of the time.  Patient requires  moderate/some direction for the following behavior(s):  PUJA Problem Solving:  Patient does not make appropriate decisions in order to  solve complex problems without assistance from a helper. Problem Solving Score =  3, Moderate Direction. Patient makes appropriate decisions in order to solve  routine problems 50-74% of the time. Patient requires moderate/some direction  for the following behavior(s):  Decreased awareness of performance.  PUJA Memory:  Memory Score = 3, Moderate Prompting. Patient recognizes and  remembers 50-74% of the time. Patient requires moderate/some prompting  for  memory for the following: Disoriented to person, place, time or situation.    Therapy Mode Minutes  Occupational Therapy:  Physical Therapy:  Speech Language Pathology:    Discharge Functional Goals:    Signed by: Radha Pritchett Nurse

## 2019-10-06 NOTE — PLAN OF CARE
Problem: Patient Care Overview  Goal: Plan of Care Review  Outcome: Ongoing (interventions implemented as appropriate)      Problem: Fall Risk (Adult)  Goal: Absence of Fall  Outcome: Ongoing (interventions implemented as appropriate)      Problem: Skin Injury Risk (Adult)  Goal: Skin Health and Integrity  Outcome: Ongoing (interventions implemented as appropriate)      Problem: Functional Mobility Impairment (IRF) (Adult)  Goal: Optimal/Safe Level of [place] with Mobility  Outcome: Ongoing (interventions implemented as appropriate)

## 2019-10-06 NOTE — PROGRESS NOTES
Rehabilitation Nursing  Inpatient Rehabilitation Plan of Care Note    Plan of Care  Copy from POCBody Function Structure    Skin Integrity (Active)  Current Status (10/1/2019 5:00:00 PM): MASD to buttocks  Weekly Goal: healing of skin  Discharge Goal: healed skin    Safety    Potential for Injury (Active)  Current Status (10/1/2019 5:00:00 PM): falls risk  Weekly Goal: no falls  Discharge Goal: no falls this admission    Signed by: Radha Pritchett, Nurse

## 2019-10-06 NOTE — PROGRESS NOTES
Inpatient Rehabilitation Functional Measures Assessment    Functional Measures  PUJA Eating:  PUJA Grooming: Patient requires total assistance for washing, rinsing and drying  the face. Patient requires total assistance for washing, rinsing and drying the  hands. Patient requires total assistance for brushing teeth. Patient requires  total assistance for brushing/combing hair. Patient requires total assistance  for shaving or applying makeup. Patient performs 0 -  24% of grooming tasks.  Grooming Score = 1, Total Assistance. No assistive devices were required.  PUJA Bathing:  Patient bathed in bed. Patient requires total assistance for  washing, rinsing, or drying the right arm. Patient requires total assistance for  washing, rinsing, or drying the left arm. Patient requires total assistance for  washing, rinsing, or drying the chest. Patient requires total assistance for  washing, rinsing, or drying the abdomen. Patient requires total assistance for  washing, rinsing, or drying the perineal area. Patient requires total assistance  for washing, rinsing, or drying the buttocks. Patient requires total assistance  for washing, rinsing, or drying the right upper leg. Patient requires total  assistance for washing, rinsing, or drying the left upper leg. Patient requires  total assistance for washing, rinsing, or drying the right lower leg, including  the foot. Patient requires total assistance for washing, rinsing, or drying the  left lower leg, including the foot. Patient performs 0 -  24% of bathing tasks.  Bathing Score = 1, Total Assistance. No assistive devices were required.  PUJA Upper Body Dressing:  Upper Body Dressing was not observed this shift  because patient dressed/undressed in pajamas/gown only. .  PUJA Lower Body Dressing:  Lower Body Dressing was not observed this shift  because patient dressed/undressed in pajamas/gown only.  PUJA Toileting:    PUJA Bladder Management  Level of Assistance:  Bladder Score = 1.  Patient performs less than 25% of tasks  and requires total assistance for bladder management. [place] provides total  assist to completely apply and remove brief.  Frequency/Number of Accidents this Shift:    [facility] Bowel Management  Level of Assistance: Bowel Score = 1. Patient performs less than 25% of tasks  and requires total assist or assist of two for use of bedpan in bowel  management.  Frequency/Number of Accidents this Shift:    [facility] Bed/Chair/Wheelchair Transfer:  [facility] Toilet Transfer:  Toilet Transfer was not observed this shift because  patient used bedpan/urinal only.  PUJA Tub/Shower Transfer:    Previously Documented Mode of Locomotion at Discharge:  [facility] Expected Mode of Locomotion at Discharge:  [facility] Walk/Wheelchair:  [facility] Stairs:    [facility] Comprehension:  [facility] Expression:  [facility] Social Interaction:  [facility] Problem Solving:  [facility] Memory:    Therapy Mode Minutes  Occupational Therapy:  Physical Therapy:  Speech Language Pathology:    Discharge Functional Goals:    Signed by: JAI Kunz

## 2019-10-06 NOTE — PROGRESS NOTES
Rehabilitation Nursing  Inpatient Rehabilitation Functional Measures Assessment and Plan of Care    Plan of Care/Interventions  Body Function Structure    Skin Integrity (Active)  Current Status (10/1/2019 5:00:00 PM): MASD to buttocks  Weekly Goal: healing of skin  Discharge Goal: healed skin    Safety    Potential for Injury (Active)  Current Status (10/1/2019 5:00:00 PM): falls risk  Weekly Goal: no falls  Discharge Goal: no falls this admission    Functional Measures  PUJA Eating:  PUJA Grooming:  PUJA Bathing:  PUJA Upper Body Dressing:  PUJA Lower Body Dressing:  PUJA Toileting:    PUJA Bladder Management  Level of Assistance:  Frequency/Number of Accidents this Shift:    PUJA Bowel Management  Level of Assistance:  Frequency/Number of Accidents this Shift:    PUJA Bed/Chair/Wheelchair Transfer:  PUJA Toilet Transfer:  PUJA Tub/Shower Transfer:    Previously Documented Mode of Locomotion at Discharge:  PUJA Expected Mode of Locomotion at Discharge:  PUJA Walk/Wheelchair:  PUJA Stairs:    PUJA Comprehension:  Both ( auditory and visual) modes of comprehension are used  equally. Patient does not comprehend complex/abstract information in their  primary language without assistance from a helper. Comprehension Score = 3,  Moderate Prompting. Patient comprehends basic daily needs or ideas 50-74% of the  time. Patient requires moderate/some prompting. No assistive devices were  required.  PUJA Expression:  Both ( vocal and non-vocal) modes of expression are used  equally. Patient does not express complex/abstract information in their primary  language without a helper. Expression Score = 3, Moderate Prompting. Patient  expresses basic daily needs or ideas 50-74% of the time. Patient requires  moderate/some prompting. No assistive devices were required.  PUJA Social Interaction:  Social Interaction Score = 6, Modified Independent.  Patient is modified independent for social interaction, requiring: Requires  additional time.  PUJA  Problem Solving:  Patient does not make appropriate decisions in order to  solve complex problems without assistance from a helper. Problem Solving Score =  3, Moderate Direction. Patient makes appropriate decisions in order to solve  routine problems 50-74% of the time. Patient requires moderate/some direction  for the following behavior(s):  PUJA Memory:  Memory Score = 4, Minimal Prompting. Patient recognizes and  remembers 75-90% of the time. Patient requires minimal/occasional prompting for  memory for the following:    Signed by: Nurse Roland

## 2019-10-06 NOTE — PLAN OF CARE
Problem: Skin Injury Risk (Adult)  Goal: Skin Health and Integrity  Outcome: Ongoing (interventions implemented as appropriate)      Problem: Wound (Includes Pressure Injury) (Adult)  Goal: Signs and Symptoms of Listed Potential Problems Will be Absent, Minimized or Managed (Wound)  Outcome: Ongoing (interventions implemented as appropriate)   10/06/19 1142   Goal/Outcome Evaluation   Problems Assessed (Wound) all   Problems Present (Wound) none

## 2019-10-06 NOTE — PROGRESS NOTES
Inpatient Rehabilitation Functional Measures Assessment    Functional Measures  PUJA Eating:  Eating Score = 5. Patient is supervision/set-up for eating,  requiring: Opening containers. No assistive devices were required.  PUJA Grooming:  PUJA Bathing:  PUJA Upper Body Dressing:  PUJA Lower Body Dressing:  PUJA Toileting:  Activity was not observed.    PUJA Bladder Management  Level of Assistance:  Bladder Score = 2. Patient performs 25-49% of tasks and  requires maximal assistance for bladder management. [place] provides most of the  assist to apply AND remove brief.  Frequency/Number of Accidents this Shift:  Bladder accidents this shift:  1 .    PUJA Bowel Management  Level of Assistance: Bowel Score = 7.  Patient is completely independent for  bowel management.  Patient did not have bowel movement.  No  medication/intervention was provided.  Frequency/Number of Accidents this Shift: Bowel accidents this shift: 0 .  Patient has not had an accident, but used a device/medication this shift  requiring: brief .    PUJA Bed/Chair/Wheelchair Transfer:  Activity was not observed.  PUJA Toilet Transfer:  Activity was not observed.  PUJA Tub/Shower Transfer:  Activity was not observed.    Previously Documented Mode of Locomotion at Discharge:  [facility] Expected Mode of Locomotion at Discharge:  PUJA Walk/Wheelchair:  PUJA Stairs:    PUJA Comprehension:  PUJA Expression:  PUJA Social Interaction:  PUJA Problem Solving:  PUJA Memory:    Therapy Mode Minutes  Occupational Therapy:  Physical Therapy:  Speech Language Pathology:    Discharge Functional Goals:    Signed by: JAI Jefferson

## 2019-10-07 LAB
GLUCOSE BLDC GLUCOMTR-MCNC: 166 MG/DL (ref 70–130)
GLUCOSE BLDC GLUCOMTR-MCNC: 205 MG/DL (ref 70–130)
GLUCOSE BLDC GLUCOMTR-MCNC: 239 MG/DL (ref 70–130)
GLUCOSE BLDC GLUCOMTR-MCNC: 73 MG/DL (ref 70–130)

## 2019-10-07 PROCEDURE — 82962 GLUCOSE BLOOD TEST: CPT

## 2019-10-07 PROCEDURE — 63710000001 INSULIN DETEMIR PER 5 UNITS: Performed by: FAMILY MEDICINE

## 2019-10-07 PROCEDURE — 63710000001 INSULIN ASPART PER 5 UNITS: Performed by: FAMILY MEDICINE

## 2019-10-07 PROCEDURE — 97535 SELF CARE MNGMENT TRAINING: CPT

## 2019-10-07 PROCEDURE — 97110 THERAPEUTIC EXERCISES: CPT

## 2019-10-07 PROCEDURE — 97530 THERAPEUTIC ACTIVITIES: CPT

## 2019-10-07 PROCEDURE — 97116 GAIT TRAINING THERAPY: CPT

## 2019-10-07 RX ORDER — PANTOPRAZOLE SODIUM 40 MG/1
40 TABLET, DELAYED RELEASE ORAL
Status: DISCONTINUED | OUTPATIENT
Start: 2019-10-08 | End: 2019-10-16 | Stop reason: HOSPADM

## 2019-10-07 RX ORDER — AMLODIPINE BESYLATE 10 MG/1
10 TABLET ORAL
Status: DISCONTINUED | OUTPATIENT
Start: 2019-10-08 | End: 2019-10-16 | Stop reason: HOSPADM

## 2019-10-07 RX ADMIN — Medication 100 MG: at 08:50

## 2019-10-07 RX ADMIN — MONTELUKAST SODIUM 10 MG: 10 TABLET, COATED ORAL at 21:04

## 2019-10-07 RX ADMIN — METOPROLOL TARTRATE 100 MG: 100 TABLET, FILM COATED ORAL at 08:50

## 2019-10-07 RX ADMIN — LINAGLIPTIN 5 MG: 5 TABLET, FILM COATED ORAL at 08:50

## 2019-10-07 RX ADMIN — AMLODIPINE BESYLATE 5 MG: 5 TABLET ORAL at 08:49

## 2019-10-07 RX ADMIN — APIXABAN 5 MG: 5 TABLET, FILM COATED ORAL at 21:04

## 2019-10-07 RX ADMIN — HYDRALAZINE HYDROCHLORIDE 25 MG: 25 TABLET ORAL at 05:54

## 2019-10-07 RX ADMIN — PANTOPRAZOLE SODIUM 40 MG: 40 TABLET, DELAYED RELEASE ORAL at 08:50

## 2019-10-07 RX ADMIN — ASPIRIN 81 MG: 81 TABLET, COATED ORAL at 08:50

## 2019-10-07 RX ADMIN — INSULIN DETEMIR 10 UNITS: 100 INJECTION, SOLUTION SUBCUTANEOUS at 21:04

## 2019-10-07 RX ADMIN — INSULIN ASPART 3 UNITS: 100 INJECTION, SOLUTION INTRAVENOUS; SUBCUTANEOUS at 17:08

## 2019-10-07 RX ADMIN — METOPROLOL TARTRATE 100 MG: 100 TABLET, FILM COATED ORAL at 21:04

## 2019-10-07 RX ADMIN — APIXABAN 5 MG: 5 TABLET, FILM COATED ORAL at 08:50

## 2019-10-07 RX ADMIN — HYDRALAZINE HYDROCHLORIDE 25 MG: 25 TABLET ORAL at 14:19

## 2019-10-07 RX ADMIN — INSULIN ASPART 3 UNITS: 100 INJECTION, SOLUTION INTRAVENOUS; SUBCUTANEOUS at 12:28

## 2019-10-07 NOTE — PROGRESS NOTES
Inpatient Rehabilitation Plan of Care Note    Plan of Care  Mobility    [PT] Bed/Chair/Wheelchair(Active)  Current Status(10/07/2019): CGA  Weekly Goal(10/14/2019): SBA with AAD  Discharge Goal: mod indep w/ AAD    [PT] Walk(Active)  Current Status(10/07/2019): 300' RW CGA  Weekly Goal(10/14/2019): 300' SBA AAD  Discharge Goal: 300' Mod independent AAD    [PT] Stairs(Active)  Current Status(10/07/2019): TBA  Weekly Goal(10/14/2019): 10 stairs, SBA, 2 handrails  Discharge Goal: 15 stairs, 2 handrails, SBA    Signed by: Anjelica Bertrand, Physical Therapist

## 2019-10-07 NOTE — PROGRESS NOTES
Inpatient Rehabilitation Functional Measures Assessment    Functional Measures  PUJA Eating:  Eating Score = 7. Patient is completely independent for eating.  There are no activity limitations.  PUJA Grooming:  PUJA Bathing:  PUJA Upper Body Dressing:  PUJA Lower Body Dressing:  PUJA Toileting:    PUJA Bladder Management  Level of Assistance:  Frequency/Number of Accidents this Shift:    PUJA Bowel Management  Level of Assistance:  Frequency/Number of Accidents this Shift:    PUJA Bed/Chair/Wheelchair Transfer:  PUJA Toilet Transfer:  PUJA Tub/Shower Transfer:    Previously Documented Mode of Locomotion at Discharge:  [facility] Expected Mode of Locomotion at Discharge:  [facility] Walk/Wheelchair:  PUJA Stairs:    [facility] Comprehension:  Auditory comprehension is the usual mode. Comprehension  Score = 6, Modified [place].  Patient comprehends complex/abstract  information in their primary language with only mild difficulty.      PUJA Expression:  Vocal expression is the usual mode. Expression Score = 6,  Modified Independent.  Patient expresses complex/abstract information in their  primary language, requiring:  PUJA Social Interaction:  Social Interaction Score = 6, Modified Independent.  Patient is modified independent for social interaction, requiring:  PUJA Problem Solving:  Patient does not make appropriate decisions in order to  solve complex problems without assistance from a helper. Problem Solving Score =  5, Supervision.  Patient makes appropriate decisions in order to solve routine  problems with directing only under stressful or unfamiliar conditions, but no  more than 10% of the time, for the following behavior(s):  PUJA Memory:  Memory Score = 5, Supervision.  Patient recognizes and remembers  with prompting only under stressful or unfamiliar conditions, but no more than  10% of the time, for the following behavior(s): metabolic encephalopathy per MD  note .    Therapy Mode Minutes  Occupational Therapy:  Physical  Therapy:  Speech Language Pathology: Individual: 75 minutes.    Discharge Functional Goals:    Signed by: Estrellita Adler Speech therapist

## 2019-10-07 NOTE — PROGRESS NOTES
Inpatient Rehabilitation Functional Measures Assessment and Plan of Care    Plan of Care      Functional Measures  PUJA Eating:  PUJA Grooming:  PUJA Bathing:  PUJA Upper Body Dressing:  PUJA Lower Body Dressing:  PUJA Toileting:  Patient requires maximal assistance for adjusting clothing  before using a toilet, commode, bedpan, or urinal. Patient requires maximal  assistance for hygiene. Patient requires maximal assistance for adjusting  clothing after using a toilet, commode, bedpan, or urinal. Patient performs 0 -  24% of toileting tasks.  Toileting Score = 1, Total Assistance. Patient requires  the following assistive device(s): Grab bar. Arm rest of a specialized seat.    PUJA Bladder Management  Level of Assistance:  Bladder Score = 5.  Patient is supervision/set-up for  bladder management, requiring: Emptying equipment. Setting out equipment. Stand  by assistance. Patient requires the following assistive device(s):  Adult brief.  pt is inc, she wears briefs  Frequency/Number of Accidents this Shift:  Bladder accidents this shift:  3 .    PUJA Bowel Management  Level of Assistance: Bowel Score = 7.  Patient is completely independent for  bowel management.  Patient did not have bowel movement.  No  medication/intervention was provided.  Frequency/Number of Accidents this Shift: Bowel accidents this shift: 0 .  Patient has not had an accident this shift.    PUJA Bed/Chair/Wheelchair Transfer:  Activity was not observed.  PUJA Toilet Transfer:  Activity was not observed. pt is inc she wears briefs  PUJA Tub/Shower Transfer:    Previously Documented Mode of Locomotion at Discharge:  [facility] Expected Mode of Locomotion at Discharge:  PUJA Walk/Wheelchair:  PUJA Stairs:    PUJA Comprehension:  PUJA Expression:  PUJA Social Interaction:  [facility] Problem Solving:  PUJA Memory:    Therapy Mode Minutes  Occupational Therapy:  Physical Therapy:  Speech Language Pathology:    Discharge Functional Goals:    Signed by: JAI Guerrero

## 2019-10-07 NOTE — PLAN OF CARE
Problem: Patient Care Overview  Goal: Plan of Care Review  Outcome: Ongoing (interventions implemented as appropriate)    Goal: Individualization and Mutuality  Outcome: Ongoing (interventions implemented as appropriate)      Problem: Fall Risk (Adult)  Goal: Absence of Fall  Outcome: Ongoing (interventions implemented as appropriate)      Problem: Skin Injury Risk (Adult)  Goal: Skin Health and Integrity  Outcome: Ongoing (interventions implemented as appropriate)      Problem: Wound (Includes Pressure Injury) (Adult)  Goal: Signs and Symptoms of Listed Potential Problems Will be Absent, Minimized or Managed (Wound)  Outcome: Ongoing (interventions implemented as appropriate)      Problem: Functional Mobility Impairment (IRF) (Adult)  Goal: Optimal/Safe Level of [place] with Mobility  Outcome: Ongoing (interventions implemented as appropriate)      Problem: Diabetes, Type 2 (Adult)  Goal: Signs and Symptoms of Listed Potential Problems Will be Absent, Minimized or Managed (Diabetes, Type 2)  Outcome: Ongoing (interventions implemented as appropriate)

## 2019-10-07 NOTE — PROGRESS NOTES
"     LOS: 6 days     Chief Complaint:  Debility    Subjective     Interval History:     She has a rash for about a month on her trunk.  Is been itching.  It seems to slowly be getting better.  No fevers or chills.  She feels like she is doing better from a mobility related standpoint.  Blood glucose this morning is 73.  Peaked at 205 today.      Objective     Vital Signs  /88   Pulse 56   Temp 98.2 °F (36.8 °C) (Oral)   Resp 17   Ht 152.4 cm (60\")   Wt 67 kg (147 lb 11.3 oz)   SpO2 93%   BMI 28.85 kg/m²    Intake & Output (last day)       10/06 0701 - 10/07 0700 10/07 0701 - 10/08 0700    P.O. 960 240    Total Intake(mL/kg) 960 (14.3) 240 (3.6)    Net +960 +240          Urine Unmeasured Occurrence 11 x 1 x    Stool Unmeasured Occurrence 2 x             Physical Exam:     General Appearance:    Alert, cooperative, in no acute distress. Thin, frail and interactive   Head:    Normocephalic, without obvious abnormality, atraumatic   Eyes:            Lids and lashes normal, conjunctivae and sclerae normal, no   icterus, no pallor, corneas clear, PERRLA   Ears:    Ears appear intact with no abnormalities noted       Neck:   No adenopathy, supple, trachea midline, no thyromegaly, no   carotid bruit, no JVD   Lungs:     Clear to auscultation,respirations regular, even and                  unlabored    Heart:    Irreg irreg rhythm and normal rate, normal S1 and S2, no            murmur, no gallop, no rub, no click   Chest Wall:    No abnormalities observed   Abdomen:     Normal bowel sounds, no masses, no organomegaly, soft        non-tender, non-distended, no guarding, no rebound                tenderness   Extremities:   Moves all extremities well, no edema, no cyanosis, no             Redness    Is a faint rash on her trunk that is macular in appearance                        Results Review:    Lab Results   Component Value Date    WBC 6.52 10/05/2019    HGB 10.2 (L) 10/05/2019    HCT 32.7 (L) 10/05/2019    " MCV 89.6 10/05/2019     10/05/2019       Lab Results   Component Value Date    GLUCOSE 367 (H) 10/05/2019    BUN 45 (H) 10/05/2019    CREATININE 1.47 (H) 10/05/2019    EGFRIFNONA 35 (L) 10/05/2019    BCR 30.6 (H) 10/05/2019     (L) 10/05/2019    K 5.0 10/05/2019     10/05/2019    CO2 17.4 (L) 10/05/2019    CALCIUM 8.0 (L) 10/05/2019    PROTENTOTREF 5.2 (L) 02/16/2019    ALBUMIN 2.84 (L) 10/02/2019    LABIL2 1.1 02/16/2019    AST 13 10/02/2019    ALT 7 10/02/2019     Lab Results   Component Value Date    INR 0.95 09/22/2019    INR 0.98 02/20/2019    INR 0.95 02/19/2019       Glucose   Date Value Ref Range Status   10/07/2019 205 (H) 70 - 130 mg/dL Final   10/07/2019 73 70 - 130 mg/dL Final   10/06/2019 182 (H) 70 - 130 mg/dL Final   10/06/2019 195 (H) 70 - 130 mg/dL Final   10/06/2019 397 (H) 70 - 130 mg/dL Final          Medication Review:     Current Facility-Administered Medications:   •  amLODIPine (NORVASC) tablet 5 mg, 5 mg, Oral, Q24H, Kreis, Samuel Duane, MD, 5 mg at 10/07/19 0849  •  apixaban (ELIQUIS) tablet 5 mg, 5 mg, Oral, Q12H, Kreis, Samuel Duane, MD, 5 mg at 10/07/19 0850  •  aspirin EC tablet 81 mg, 81 mg, Oral, Daily, Kreis, Samuel Duane, MD, 81 mg at 10/07/19 0850  •  bisacodyl (DULCOLAX) suppository 10 mg, 10 mg, Rectal, Daily PRN, Kreis, Samuel Duane, MD  •  dextrose (D50W) 25 g/ 50mL Intravenous Solution 25 g, 25 g, Intravenous, Q15 Min PRN, Kreis, Samuel Duane, MD  •  dextrose (GLUTOSE) oral gel 15 g, 15 g, Oral, Q15 Min PRN, Kreis, Samuel Duane, MD  •  glucagon (human recombinant) (GLUCAGEN DIAGNOSTIC) injection 1 mg, 1 mg, Subcutaneous, Q15 Min PRN, Kreis, Samuel Duane, MD  •  hydrALAZINE (APRESOLINE) tablet 25 mg, 25 mg, Oral, Q8H, Kreis, Samuel Duane, MD, 25 mg at 10/07/19 1419  •  insulin aspart (novoLOG) injection 0-7 Units, 0-7 Units, Subcutaneous, 4x Daily AC & at Bedtime, Kreis, Samuel Duane, MD, 3 Units at 10/07/19 1228  •  insulin detemir (LEVEMIR) injection 10  Units, 10 Units, Subcutaneous, Nightly, Kreis, Samuel Duane, MD, 10 Units at 10/06/19 2119  •  linagliptin (TRADJENTA) tablet 5 mg, 5 mg, Oral, Daily, Kreis, Samuel Duane, MD, 5 mg at 10/07/19 0850  •  magnesium hydroxide (MILK OF MAGNESIA) suspension 2400 mg/10mL 10 mL, 10 mL, Oral, Daily PRN, Kreis, Samuel Duane, MD  •  metoprolol tartrate (LOPRESSOR) tablet 100 mg, 100 mg, Oral, Q12H, Kreis, Samuel Duane, MD, 100 mg at 10/07/19 0850  •  montelukast (SINGULAIR) tablet 10 mg, 10 mg, Oral, Nightly, Kreis, Samuel Duane, MD, 10 mg at 10/06/19 2118  •  ondansetron (ZOFRAN) tablet 4 mg, 4 mg, Oral, Q6H PRN **OR** ondansetron (ZOFRAN) injection 4 mg, 4 mg, Intravenous, Q6H PRN, Kreis, Samuel Duane, MD  •  pantoprazole (PROTONIX) EC tablet 40 mg, 40 mg, Oral, BID AC, Kreis, Samuel Duane, MD, 40 mg at 10/07/19 0850  •  thiamine (VITAMIN B-1) tablet 100 mg, 100 mg, Oral, Daily, Kreis, Samuel Duane, MD, 100 mg at 10/07/19 0850      Assessment/Plan     Debility secondary to what seems most consistent with hypoxic encephalopathy  Type 2 diabetes mellitus with recent history of DKA with hyperglycemia  Known coronary artery disease with history of non-ST elevation acute myocardial infarction  Chronic hypoxic respiratory failure  Urinary tract infection status post treatment  Chronic atrial fibrillation  Hypertension  Stage III chronic kidney disease       PT and OT     Eliquis twice daily due to full dose anticoagulation due to A. fib     Levemir 10 units nightly.  Continue Tradjenta 5 mg daily     Continue amlodipine increased to 10 mg daily, discontinue hydralazine 25 mg 3 times daily and continue metoprolol 100 mg twice daily.      Protonix decreased to once daily      Samuel Duane Kreis, MD  10/07/19  3:57 PM

## 2019-10-07 NOTE — PROGRESS NOTES
Inpatient Rehabilitation Functional Measures Assessment    Functional Measures  PUJA Eating:  PUJA Grooming:  PUJA Bathing:  PUJA Upper Body Dressing:  PUJA Lower Body Dressing:  PUJA Toileting:    PUJA Bladder Management  Level of Assistance:  Frequency/Number of Accidents this Shift:    PUJA Bowel Management  Level of Assistance:  Frequency/Number of Accidents this Shift:    PUJA Bed/Chair/Wheelchair Transfer:  Bed/chair/wheelchair Transfer Score = 4.  Patient performs 75% or more of effort and minimal assistance (little/incidental  help/lifting of one limb/steadying) for transferring to and from the  bed/chair/wheelchair, requiring: Contact guard. Patient requires the following  assistive device(s): Arm rest. Bed rails.  PUJA Toilet Transfer:  Toilet Transfer Score = 4.  Patient performs 75% or more  of effort and minimal assistance (little/incidental help/steadying) for  transferring to and from the toilet/commode, requiring: Contact guard. Patient  requires the following assistive device(s): Safety frame/over the toilet. Grab  bars.  PUJA Tub/Shower Transfer:    Previously Documented Mode of Locomotion at Discharge:  PUJA Expected Mode of Locomotion at Discharge:  PUJA Walk/Wheelchair:  WHEELCHAIR OBSERVATION   Wheelchair did not occur.    WALK OBSERVATION   Walk Distance Scale = 3.  Distance walked is greater than 150 feet. Walk Score  = 4.  Patient performs 75% or more of effort and requires minimal assistance.  Incidental help/contact guard/steadying was provided. Patient walked a distance  of  300 feet. Patient requires the following assistive device(s): Rolling  walker.  PUJA Stairs:  Stairs did not occur.    PUJA Comprehension:  PUJA Expression:  PUJA Social Interaction:  PUJA Problem Solving:  PUJA Memory:    Therapy Mode Minutes  Occupational Therapy:  Physical Therapy: Individual: 90 minutes.  Speech Language Pathology:    Discharge Functional Goals:    Signed by: Bita Greene PTA

## 2019-10-07 NOTE — PROGRESS NOTES
Inpatient Rehabilitation Functional Measures Assessment    Functional Measures  PUJA Eating:  PUJA Grooming:  PUJA Bathing:  PUJA Upper Body Dressing:  PUJA Lower Body Dressing:  PUJA Toileting:    PUJA Bladder Management  Level of Assistance:  Frequency/Number of Accidents this Shift:    PUJA Bowel Management  Level of Assistance:  Frequency/Number of Accidents this Shift:    PUJA Bed/Chair/Wheelchair Transfer:  PUJA Toilet Transfer:  PUJA Tub/Shower Transfer:    Previously Documented Mode of Locomotion at Discharge:  PUJA Expected Mode of Locomotion at Discharge:  PUJA Walk/Wheelchair:  PUJA Stairs:    PUJA Comprehension:  Both ( auditory and visual) modes of comprehension are used  equally. Patient does not comprehend complex/abstract information in their  primary language without assistance from a helper. Comprehension Score = 3,  Moderate Prompting. Patient comprehends basic daily needs or ideas 50-74% of the  time. Patient requires moderate/some prompting. No assistive devices were  required.  PJUA Expression:  Both ( vocal and non-vocal) modes of expression are used  equally. Patient does not express complex/abstract information in their primary  language without a helper. Expression Score = 3, Moderate Prompting. Patient  expresses basic daily needs or ideas 50-74% of the time. Patient requires  moderate/some prompting. No assistive devices were required.  PUJA Social Interaction:  Social Interaction Score = 6, Modified Independent.  Patient is modified independent for social interaction, requiring: Requires  additional time.  PUJA Problem Solving:  Patient does not make appropriate decisions in order to  solve complex problems without assistance from a helper. Problem Solving Score =  3, Moderate Direction. Patient makes appropriate decisions in order to solve  routine problems 50-74% of the time. Patient requires moderate/some direction  for the following behavior(s):  PUJA Memory:  Memory Score = 3, Moderate Prompting.  Patient recognizes and  remembers 50-74% of the time. Patient requires moderate/some prompting  for  memory for the following:    Therapy Mode Minutes  Occupational Therapy:  Physical Therapy:  Speech Language Pathology:    Discharge Functional Goals:    Signed by: Nurse Roland

## 2019-10-07 NOTE — PROGRESS NOTES
Inpatient Rehabilitation Functional Measures Assessment    Functional Measures  PUJA Eating:  Eating Score = 5. Patient is supervision/set-up for eating,  requiring: Opening containers. No assistive devices were required.  PUJA Grooming:  PUJA Bathing:  PUJA Upper Body Dressing:  PUJA Lower Body Dressing:  PUJA Toileting:  Patient requires moderate assistance for adjusting clothing  before using a toilet, commode, bedpan, or urinal. Patient requires total  assistance for hygiene. Patient requires moderate assistance for adjusting  clothing after using a toilet, commode, bedpan, or urinal. Patient performs 0 -  24% of toileting tasks.  Toileting Score = 1, Total Assistance. Patient requires  the following assistive device(s): Grab bar. Adaptive device to maintain  balance.    PUJA Bladder Management  Level of Assistance:  Bladder Score = 1. Patient performs less than 25% of tasks  and requires total assistance for bladder management. [place] provides total  assist to completely apply and remove brief.  Frequency/Number of Accidents this Shift:  Bladder accidents this shift:  3 .  breif and over toilet seat    PUJA Bowel Management  Level of Assistance: Bowel Score = 1. Patient performs less than 25% of tasks  and requires total assistance for bowel management. [place] provides total assist  to completely apply and remove brief.  Frequency/Number of Accidents this Shift: Bowel accidents this shift: 1 .    PUJA Bed/Chair/Wheelchair Transfer:  Bed/chair/wheelchair Transfer Score = 3.  Patient performs 50-74% of effort and requires moderate assistance (some  lifting) for transferring to and from the bed/chair/wheelchair. Patient requires  the following assistive device(s): Arm rest. Bed rails.  PUJA Toilet Transfer:  Toilet Transfer Score = 3.  Patient performs 50-74% of  effort and requires moderate assistance (some lifting) for transferring to and  from the toilet/commode. Patient requires the following assistive  device(s):  Safety frame/over the toilet. Grab bars.  PUJA Tub/Shower Transfer:    Previously Documented Mode of Locomotion at Discharge:  PUJA Expected Mode of Locomotion at Discharge:  PUJA Walk/Wheelchair:  PUJA Stairs:    PUJA Comprehension:  PUJA Expression:  PUJA Social Interaction:  [facility] Problem Solving:  PUJA Memory:    Therapy Mode Minutes  Occupational Therapy:  Physical Therapy:  Speech Language Pathology:    Discharge Functional Goals:    Signed by: JAI Wilcox

## 2019-10-07 NOTE — PROGRESS NOTES
Inpatient Rehabilitation Functional Measures Assessment    Functional Measures  PUJA Eating:  PUJA Grooming:  PUJA Bathing:  PUJA Upper Body Dressing:  PUJA Lower Body Dressing:  PUJA Toileting:    PUJA Bladder Management  Level of Assistance:  Frequency/Number of Accidents this Shift:    PUJA Bowel Management  Level of Assistance:  Frequency/Number of Accidents this Shift:    PUJA Bed/Chair/Wheelchair Transfer:  PUJA Toilet Transfer:  PUJA Tub/Shower Transfer:    Previously Documented Mode of Locomotion at Discharge:  PUJA Expected Mode of Locomotion at Discharge:  PUJA Walk/Wheelchair:  PUJA Stairs:    PUJA Comprehension:  Both ( auditory and visual) modes of comprehension are used  equally. Patient does not comprehend complex/abstract information in their  primary language without assistance from a helper. Comprehension Score = 3,  Moderate Prompting. Patient comprehends basic daily needs or ideas 50-74% of the  time. Patient requires moderate/some prompting. Patient requires the following  assistive device(s): Glasses.  PUJA Expression:  Both ( vocal and non-vocal) modes of expression are used  equally. Patient does not express complex/abstract information in their primary  language without a helper. Expression Score = 3, Moderate Prompting. Patient  expresses basic daily needs or ideas 50-74% of the time. Patient requires  moderate/some prompting. No assistive devices were required.  PUJA Social Interaction:  Social Interaction Score = 3, Moderate Direction.  Patient interacts appropriately 50-74% of the time.  Patient requires  moderate/some direction for the following behavior(s):  PUJA Problem Solving:  Patient does not make appropriate decisions in order to  solve complex problems without assistance from a helper. Problem Solving Score =  3, Moderate Direction. Patient makes appropriate decisions in order to solve  routine problems 50-74% of the time. Patient requires moderate/some direction  for the following behavior(s):  Decreased awareness of performance.  PUJA Memory:  Memory Score = 3, Moderate Prompting. Patient recognizes and  remembers 50-74% of the time. Patient requires moderate/some prompting  for  memory for the following:    Therapy Mode Minutes  Occupational Therapy:  Physical Therapy:  Speech Language Pathology:    Discharge Functional Goals:    Signed by: Radha Pritchett Nurse

## 2019-10-07 NOTE — PLAN OF CARE
Problem: Patient Care Overview  Goal: Plan of Care Review  Outcome: Ongoing (interventions implemented as appropriate)      Problem: Fall Risk (Adult)  Goal: Absence of Fall  Outcome: Ongoing (interventions implemented as appropriate)      Problem: Skin Injury Risk (Adult)  Goal: Skin Health and Integrity  Outcome: Ongoing (interventions implemented as appropriate)      Problem: Wound (Includes Pressure Injury) (Adult)  Goal: Signs and Symptoms of Listed Potential Problems Will be Absent, Minimized or Managed (Wound)  Outcome: Ongoing (interventions implemented as appropriate)      Problem: Functional Mobility Impairment (IRF) (Adult)  Goal: Optimal/Safe Level of [place] with Mobility  Outcome: Ongoing (interventions implemented as appropriate)

## 2019-10-07 NOTE — PLAN OF CARE
Problem: Patient Care Overview  Goal: Plan of Care Review   10/07/19 1223   Patient Care Overview   IRF Plan of Care Review progress ongoing, continue   Coping/Psychosocial   Plan of Care Reviewed With patient

## 2019-10-07 NOTE — THERAPY TREATMENT NOTE
Inpatient Rehabilitation - Physical Therapy Treatment Note  [facility]     Patient Name: Ashley Geronimo  : 1945  MRN: [id number]    Today's Date: 10/7/2019                 Admit Date: 10/1/2019      Visit Dx:    No diagnosis found.    Patient Active Problem List   Diagnosis   • Pneumonia   • Septic shock (CMS/[facility])   • Pseudomonas pneumonia (CMS/[facility])   • NSTEMI (non-ST elevated myocardial infarction) (CMS/[facility])   • Bradycardia   • Paroxysmal atrial fibrillation (CMS/[facility])   • Chronic respiratory failure with hypoxia and hypercapnia (CMS/[facility])   • Acute on chronic respiratory failure with hypoxia (CMS/[facility]), requiring intubation/mechanical ventilation 19   • Hematemesis   • RAMEHS (acute kidney injury) (CMS/[facility])   • DKA (diabetic ketoacidoses) (CMS/[facility])   • Septic shock (CMS/[facility])   • Encephalopathy   • UTI (urinary tract infection)   • Tracheobronchitis, PSA    • Metabolic encephalopathy       Therapy Treatment    IRF Treatment Summary     Row Name 10/07/19 1519 10/07/19 1216          Evaluation/Treatment Time and Intent    Subjective Information  no complaints  -LL  no complaints  -CJ     Existing Precautions/Restrictions  fall decreased skin integrity  -LL  fall decreased skin integrity  -CJ     Document Type  therapy note (daily note) BID PT session  -LL  therapy note (daily note)  -CJ     Mode of Treatment  physical therapy;individual therapy  -LL  occupational therapy  -CJ     Patient/Family Observations  Patient agreeable to BID PT session this date.  -LL  --     Recorded by [LL] Bita Greene PTA [CJ] Laya Harrison OT     Row Name 10/07/19 1519 10/07/19 1216          Cognition/Psychosocial- PT/OT    Affect/Mental Status (Cognitive)  confused  -LL  confused  -CJ     Orientation Status (Cognition)  oriented to;person  -LL  --     Follows Commands (Cognition)  follows one step commands;delayed response/completion;increased processing time needed;initiation impaired;repetition of directions  required;verbal cues/prompting required  -  verbal cues/prompting required;repetition of directions required;initiation impaired;increased processing time needed  -     Personal Safety Interventions  fall prevention program maintained;gait belt;nonskid shoes/slippers when out of bed;supervised activity  -  --     Attention Deficit (Cognitive)  requires cues/redirection to task  -  --     Recorded by [LL] Bita Greene PTA [CJ] Laya Harrison OT     Row Name 10/07/19 1519             Mobility    Left Lower Extremity (Weight-bearing Status)  weight-bearing as tolerated (WBAT)  -LL      Right Lower Extremity (Weight-bearing Status)  weight-bearing as tolerated (WBAT)  -LL      Recorded by [LL] Bita Greene PTA      Row Name 10/07/19 1519             Transfer Assessment/Treatment    Transfer Assessment/Treatment  sit-stand transfer;stand-sit transfer;stand pivot/stand step transfer;toilet transfer;car transfer  -      Recorded by [LL] Bita Greene PTA      Row Name 10/07/19 1216             Bed-Chair Transfer    Bed-Chair [place] (Transfers)  contact guard;minimum assist (75% patient effort);verbal cues  -      Assistive Device (Bed-Chair Transfers)  wheelchair  -      Recorded by [CJ] Laya Harrison OT      Row Name 10/07/19 1519             Sit-Stand Transfer    Sit-Stand [place] (Transfers)  verbal cues;nonverbal cues (demo/gesture);contact guard  -LL      Assistive Device (Sit-Stand Transfers)  walker, front-wheeled  -LL      Recorded by [LL] Bita Greene PTA      Row Name 10/07/19 1519             Stand-Sit Transfer    Stand-Sit [place] (Transfers)  verbal cues;nonverbal cues (demo/gesture);contact guard  -LL      Assistive Device (Stand-Sit Transfers)  walker, front-wheeled  -LL      Recorded by [LL] Bita Greene PTA      Row Name 10/07/19 1519             Stand Pivot/Stand Step Transfer    Stand Pivot/Stand Step [place]  verbal cues;nonverbal cues  (demo/gesture);contact guard  -LL      Assistive Device (Stand Pivot Stand Step Transfer)  walker, front-wheeled  -LL      Recorded by [LL] Bita Greene PTA      Row Name 10/07/19 1519 10/07/19 1216          Toilet Transfer    Type (Toilet Transfer)  stand pivot/stand step  -LL  --     [place] Level (Toilet Transfer)  verbal cues;nonverbal cues (demo/gesture);contact guard  -LL  contact guard;verbal cues  -CJ     Assistive Device (Toilet Transfer)  grab bars/safety frame;raised toilet seat;walker, front-wheeled  -LL  grab bars/safety frame  -CJ     Recorded by [LL] Bita Greene PTA [CJ] Laya Harrison OT     Row Name 10/07/19 1519             Car Transfer    Type (Car Transfer)  stand pivot/stand step  -LL      [place] Level (Car Transfer)  contact guard  -LL      Recorded by [LL] Bita Greene PTA      Row Name 10/07/19 1519             Gait/Stairs Assessment/Training    Gait/Stairs Assessment/Training  gait/ambulation independence;gait/ambulation assistive device;distance ambulated;gait pattern;gait deviations;maintains weight-bearing status  -LL      [place] Level (Gait)  verbal cues;nonverbal cues (demo/gesture);contact guard  -LL      Assistive Device (Gait)  walker, front-wheeled  -LL      Distance in Feet (Gait)  300 w/ RW, 80 w/o AD  -LL      Pattern (Gait)  step-through  -LL      Deviations/Abnormal Patterns (Gait)  base of support, narrow;eldon decreased;gait speed decreased;stride length decreased  -LL      Bilateral Gait Deviations  forward flexed posture;heel strike decreased  -LL      Recorded by [LL] Bita Greene PTA      Row Name 10/07/19 1519             Safety Issues, Functional Mobility    Impairments Affecting Function (Mobility)  balance;cognition;endurance/activity tolerance;strength  -LL      Recorded by [LL] Bita Greene PTA      Row Name 10/07/19 1216             Bathing Assessment/Treatment    Bathing [place] Level  minimum assist (75% patient  effort);verbal cues  -CJ      Recorded by [CJ] Laya Harrison, OT      Row Name 10/07/19 1216             Upper Body Dressing Assessment/Treatment    Upper Body Dressing Task  set up assistance;supervision;verbal cues  -CJ      Recorded by [CJ] Laya Harrison, OT      Row Name 10/07/19 1216             Lower Body Dressing Assessment/Treatment    Lower Body Dressing [place] Level  minimum assist (75% patient effort);verbal cues  -CJ      Recorded by [CJ] Laya Harrison, OT      Row Name 10/07/19 1216             Grooming Assessment/Treatment    Grooming [place] Level  set up;supervision  -CJ      Recorded by [CJ] Laya Harrison, OT      Row Name 10/07/19 1216             Toileting Assessment/Treatment    Toileting [place] Level  moderate assist (50% patient effort);verbal cues urinary incontinence  -CJ      Assistive Device Use (Toileting)  grab bar/safety frame  -CJ      Recorded by [CJ] Laya Harrison, OT      Row Name 10/07/19 1519             Pain Scale: Numbers Pre/Post-Treatment    Pain Scale: Numbers, Pretreatment  0/10 - no pain  -LL      Pain Scale: Numbers, Post-Treatment  0/10 - no pain  -LL      Recorded by [LL] Bita Greene PTA      Row Name 10/07/19 1519             Pain Scale: FACES Pre/Post-Treatment    Pain: FACES Scale, Pretreatment  0-->no hurt  -LL      Pain: FACES Scale, Post-Treatment  0-->no hurt  -LL      Recorded by [LL] Bita Greene, MARY ANN      Row Name 10/07/19 1519             Balance    Balance  static sitting balance;static standing balance;dynamic sitting balance;dynamic standing balance  -LL      Recorded by [LL] Bita Greene PTA      Row Name 10/07/19 1519             Therapeutic Exercise    Therapeutic Exercise  standing, lower extremities  -LL      Recorded by [LL] Bita Greene PTA      Row Name 10/07/19 1216             Upper Extremity Seated Therapeutic Exercise    Device, Seated Upper Extremity (Therapeutic Exercise)  restorator/arm bike 2  mins X 4, 0 resistance  -CJ      Exercise Type, Seated Upper Extremity (Therapeutic Exercise)  AROM (active range of motion) BUE ther ex/act, bilat coord ex, hand exerciser  -CJ      Expected Outcomes, Seated Upper Extremity (Therapeutic Exercise)  improve functional tolerance, self-care activity;improve performance, BADLs;improve performance, transfer skills  -CJ      Recorded by [CJ] Laya Harrison OT      Row Name 10/07/19 1519             Lower Extremity Standing Therapeutic Exercise    Performed, Standing Lower Extremity (Therapeutic Exercise)  hip/knee flexion/extension;hip abduction/adduction;heel raises  -      Device, Standing Lower Extremity (Therapeutic Exercise)  parallel bars  -      Sets/Reps Detail, Standing Lower Extremity (Therapeutic Exercise)  2 x 10  -LL      Recorded by [LL] Bita Greene PTA      Row Name 10/07/19 1519             Lower Extremity Seated Therapeutic Exercise    Performed, Seated Lower Extremity (Therapeutic Exercise)  hip flexion/extension;hip abduction/adduction;ankle dorsiflexion/plantarflexion;LAQ (long arc quad), knee extension Ball squeeze  -      Device, Seated Lower Extremity (Therapeutic Exercise)  elastic bands/tubing;small ball;free weights, cuff 1#  -LL      Sets/Reps Detail, Seated Lower Extremity (Therapeutic Exercise)  2 x 10  -LL      Recorded by [LL] Bita Greene PTA      Row Name 10/07/19 1519 10/07/19 1216          Positioning and Restraints    Pre-Treatment Position  -- WC in room in am & pm  -LL  --     Post Treatment Position  wheelchair  -LL  wheelchair  -CJ     In Wheelchair  sitting;call light within reach;encouraged to call for assist;legs elevated in room in am & pm with tray table in front of her  -LL  with PT;with other staff with PT-post first tx;  w/activities staff-second tx  -CJ     Recorded by [LL] Bita Greene PTA [CJ] Laya Harrison OT     Row Name 10/07/19 1519             Daily Summary of Progress (PT)    Impairments  Continuing to Limit Function: Physical Therapy  strength decreased;impaired balance;impaired cognition;coordination impaired  -LL      Recorded by [LL] Bita Greene PTA        User Key  (r) = Recorded By, (t) = Taken By, (c) = Cosigned By    Initials Name Effective Dates    CJ Laya Harrison, OT 04/03/18 -     LL Bita Greene, MARY ANN 05/02/16 -         Wound 10/01/19 1702 Bilateral other (see comments) coccyx MASD ([place] associated skin damage) (Active)   Dressing Appearance open to air 10/7/2019 11:06 AM   Closure None 10/6/2019  7:30 PM   Base red 10/7/2019 11:06 AM   Periwound redness;excoriated 10/7/2019 11:06 AM   Periwound Temperature warm 10/7/2019 11:06 AM   Periwound Skin Turgor soft 10/7/2019 11:06 AM   Edges irregular 10/7/2019 11:06 AM   Care, Wound barrier applied 10/7/2019 11:06 AM   Dressing Care, Wound open to air 10/7/2019 11:06 AM     Physical Therapy Education     Title: PT OT SLP Therapies (Not Started)     Topic: Physical Therapy (Done)     Point: Mobility training (Done)     Learning Progress Summary           Patient Acceptance, E,D, VU,NR by LL at 10/7/2019  3:34 PM    Acceptance, E,D, VU,NR by KADE at 10/5/2019  1:43 PM    Acceptance, E,D, NR by LL at 10/4/2019  3:45 PM    Acceptance, E,D, NR by AG at 10/3/2019  4:10 PM    Acceptance, E,D, NR by AG at 10/2/2019  4:26 PM                   Point: Home exercise program (Done)     Learning Progress Summary           Patient Acceptance, E,D, VU,NR by LL at 10/7/2019  3:34 PM    Acceptance, E,D, VU,NR by KADE at 10/5/2019  1:43 PM    Acceptance, E,D, NR by LL at 10/4/2019  3:45 PM    Acceptance, E,D, NR by AG at 10/3/2019  4:10 PM    Acceptance, E,D, NR by AG at 10/2/2019  4:26 PM                   Point: Body mechanics (Done)     Learning Progress Summary           Patient Acceptance, E,D, VU,NR by LL at 10/7/2019  3:34 PM    Acceptance, VICKI DAVIS VU,NR by KADE at 10/5/2019  1:43 PM    Acceptance, VICKI DAVIS NR by EVE at 10/4/2019  3:45 PM    Acceptance,  E,D, NR by AG at 10/3/2019  4:10 PM    Acceptance, E,D, NR by AG at 10/2/2019  4:26 PM                   Point: Precautions (Done)     Learning Progress Summary           Patient Acceptance, E,D, VU,NR by LL at 10/7/2019  3:34 PM    Acceptance, E,D, VU,NR by KADE at 10/5/2019  1:43 PM    Acceptance, E,D, NR by LL at 10/4/2019  3:45 PM    Acceptance, E,D, NR by AG at 10/3/2019  4:10 PM    Acceptance, E,D, NR by AG at 10/2/2019  4:26 PM                               User Key     Initials Effective Dates Name Provider Type Discipline     04/03/18 -  Karyn Delgado, PT Physical Therapist PT    LL 05/02/16 -  Bita Greene PTA Physical Therapy Assistant PT    KADE 05/02/16 -  Doris Calixto PTA Physical Therapy Assistant PT                  PT Recommendation and Plan     Plan of Care Reviewed With: patient  Daily Summary of Progress (PT)  Impairments Continuing to Limit Function: Physical Therapy: strength decreased, impaired balance, impaired cognition, coordination impaired  IRF Plan of Care Review: progress ongoing, continue  Progress, Functional Goals: demonstrating adequate progress  Outcome Summary: Patient tolerated PT session well with adequate rest breaks.  Continue w/ current POC.               Time Calculation:     PT Charges     Row Name 10/07/19 1536 10/07/19 1535          Time Calculation    Start Time  1410  -LL  0915  -LL     Stop Time  1455  -LL  1000  -LL     Time Calculation (min)  45 min  -LL  45 min  -LL     PT Received On  --  10/07/19  -LL        Time Calculation- PT    Total Timed Code Minutes- PT  45 minute(s)  -LL  45 minute(s)  -LL       User Key  (r) = Recorded By, (t) = Taken By, (c) = Cosigned By    Initials Name Provider Type    LL Bita Greene PTA Physical Therapy Assistant          Therapy Charges for Today     Code Description Service Date Service Provider Modifiers Qty    87085614730 HC GAIT TRAINING EA 15 MIN 10/7/2019 Bita Greene PTA GP 2    11786208098 HC PT  THERAPEUTIC ACT EA 15 MIN 10/7/2019 Bita Greene, MARY ANN GP 2    [id number]  PT THER PROC EA 15 MIN 10/7/2019 Bita Greene, MARY ANN GP 2                   Bita. MARY ANN Greene  10/7/2019

## 2019-10-07 NOTE — PLAN OF CARE
Problem: Patient Care Overview  Goal: Plan of Care Review  Outcome: Ongoing (interventions implemented as appropriate)   10/07/19 1122   Patient Care Overview   IRF Plan of Care Review progress ongoing, continue   Progress, Functional Goals demonstrating adequate progress   Coping/Psychosocial   Plan of Care Reviewed With patient       Problem: Fall Risk (Adult)  Goal: Absence of Fall  Outcome: Ongoing (interventions implemented as appropriate)      Problem: Skin Injury Risk (Adult)  Goal: Skin Health and Integrity  Outcome: Ongoing (interventions implemented as appropriate)

## 2019-10-07 NOTE — PROGRESS NOTES
Inpatient Rehabilitation Functional Measures Assessment and Plan of Care    Plan of Care  Self Care    [OT] Dressing (Lower)(Active)  Current Status(10/07/2019): Bart  Weekly Goal(10/15/2019): met  Discharge Goal: CGA    [OT] Toileting(Active)  Current Status(10/07/2019): ModA  Weekly Goal(10/15/2019): met  Discharge Goal: Bart    Performed Intervention(s)  ADL re-trng, AE trng, ther ex/act, pt/family education    Functional Measures  PUJA Eating:  PUJA Grooming: Grooming Score = 5. Patient is supervision/set-up for grooming,  requiring: Initial preparation. Stand by assistance. Verbal cuing, prompting, or  instructing. No assistive devices were required.  PUJA Bathing:  Patient took sponge bath. Patient requires no physical assistance  for washing, rinsing, and drying the right arm. Patient requires no physical  assistance for washing, rinsing, and drying the left arm. Patient requires no  physical assistance for washing, rinsing, and drying the chest. Patient requires  no physical assistance for washing, rinsing, and drying the abdomen. Patient  requires no physical assistance for washing, rinsing, and drying the perineal  area. Patient requires minimal assistance for washing, rinsing, or drying the  buttocks. Patient requires no physical assistance for washing, rinsing, and  drying the right upper leg. Patient requires no physical assistance for washing,  rinsing, and drying the left upper leg. Patient requires minimal assistance for  washing, rinsing, or drying the right lower leg, including the foot. Patient  requires no physical assistance for washing, rinsing, and drying the left lower  leg, including the foot. Patient performs 80 % of bathing tasks. Bathing Score =  4, Minimal Assistance. Patient requires the following assistive device(s): Grab  bar/arm rest to maintain balance.  PUJA Upper Body Dressing:  Upper Body Dressing Score = 5. Patient is supervision  for upper body dressing, requiring:  Gathering/setting out clothes. Stand by  assistance. Verbal cuing, prompting, or instructing. No assistive devices were  required.  PUJA Lower Body Dressing:  Gathering clothes was not observed for this patient.  Wearing underwear or an undergarment was not observed for this patient. Patient  requires minimal/incidental assistance for threading the right leg through the  pants/skirt. Patient requires minimal/incidental assistance for threading the  left leg through the pants/skirt. Patient requires minimal/incidental physical  assistance for pulling pants/skirt over hips and adjusting fasteners. Patient  requires no physical assistance for donning and/or doffing right sock. Patient  requires no physical assistance for donning and/or doffing left sock. Donning  and/or doffing right shoe was not observed for this patient. Donning and/or  doffing left shoe was not observed for this patient. Patient performs 85 % of  lower body dressing tasks. Lower Body Dressing Score = 4, Minimal Assistance. No  assistive devices were required.  PUJA Toileting:  Patient requires no physical assistance for adjusting clothing  before using a toilet, commode, bedpan, or urinal. Patient requires minimal  assistance for hygiene. Patient requires minimal assistance for adjusting  clothing after using a toilet, commode, bedpan, or urinal. Patient performs 50 %  of toileting tasks. Toileting Score = 3, Moderate Assistance. Patient requires  the following assistive device(s): Arm rest of a specialized seat. Grab bar.    PUJA Bladder Management  Level of Assistance:  Frequency/Number of Accidents this Shift:    PUJA Bowel Management  Level of Assistance:  Frequency/Number of Accidents this Shift:    PUJA Bed/Chair/Wheelchair Transfer:  Bed/chair/wheelchair Transfer Score = 4.  Patient performs 75% or more of effort and minimal assistance (little/incidental  help/lifting of one limb/steadying) for transferring to and from the  bed/chair/wheelchair,  requiring: Contact guard. No assistive devices were  required.  PUJA Toilet Transfer:  Toilet Transfer Score = 4.  Patient performs 75% or more  of effort and minimal assistance (little/incidental help/steadying) for  transferring to and from the toilet/commode, requiring: Contact guard. Patient  requires the following assistive device(s): Safety frame/over the toilet. Grab  bars.  PUJA Tub/Shower Transfer:    Previously Documented Mode of Locomotion at Discharge:  [facility] Expected Mode of Locomotion at Discharge:  [facility] Walk/Wheelchair:  [facility] Stairs:    [facility] Comprehension:  [facility] Expression:  [facility] Social Interaction:  [facility] Problem Solving:  PUJA Memory:    Therapy Mode Minutes  Occupational Therapy: Individual: 90 minutes.  Physical Therapy:  Speech Language Pathology:    Discharge Functional Goals:    Signed by: Laya Harrison Occupational Therapist

## 2019-10-07 NOTE — THERAPY TREATMENT NOTE
Inpatient Rehabilitation - Occupational Therapy Treatment Note     [facility]     Patient Name: Ashley Geronimo  : 1945  MRN: [id number]    Today's Date: 10/7/2019                 Admit Date: 10/1/2019      Visit Dx:  No diagnosis found.    Patient Active Problem List   Diagnosis   • Pneumonia   • Septic shock (CMS/[facility])   • Pseudomonas pneumonia (CMS/[facility])   • NSTEMI (non-ST elevated myocardial infarction) (CMS/[facility])   • Bradycardia   • Paroxysmal atrial fibrillation (CMS/[facility])   • Chronic respiratory failure with hypoxia and hypercapnia (CMS/[facility])   • Acute on chronic respiratory failure with hypoxia (CMS/[facility]), requiring intubation/mechanical ventilation 19   • Hematemesis   • RAMESH (acute kidney injury) (CMS/[facility])   • DKA (diabetic ketoacidoses) (CMS/[facility])   • Septic shock (CMS/[facility])   • Encephalopathy   • UTI (urinary tract infection)   • Tracheobronchitis, PSA    • Metabolic encephalopathy         Therapy Treatment    IRF Treatment Summary     Row Name 10/07/19 1216             Evaluation/Treatment Time and Intent    Subjective Information  no complaints  -CJ      Existing Precautions/Restrictions  fall decreased skin integrity  -CJ      Document Type  therapy note (daily note)  -CJ      Mode of Treatment  occupational therapy  -CJ      Recorded by [CJ] Laya Harrison OT      Row Name 10/07/19 1216             Cognition/Psychosocial- PT/OT    Affect/Mental Status (Cognitive)  confused  -CJ      Follows Commands (Cognition)  verbal cues/prompting required;repetition of directions required;initiation impaired;increased processing time needed  -CJ      Recorded by [CJ] Laya Harrison OT      Row Name 10/07/19 1216             Bed-Chair Transfer    Bed-Chair [place] (Transfers)  contact guard;minimum assist (75% patient effort);verbal cues  -CJ      Assistive Device (Bed-Chair Transfers)  wheelchair  -CJ      Recorded by [CJ] Laya Harrison OT      Row Name 10/07/19 1216             Toilet Transfer     [place] Level (Toilet Transfer)  contact guard;verbal cues  -CJ      Assistive Device (Toilet Transfer)  grab bars/safety frame  -CJ      Recorded by [CJ] Laya Harrison, OT      Row Name 10/07/19 1216             Bathing Assessment/Treatment    Bathing [place] Level  minimum assist (75% patient effort);verbal cues  -CJ      Recorded by [CJ] Laya Harrison, OT      Row Name 10/07/19 1216             Upper Body Dressing Assessment/Treatment    Upper Body Dressing Task  set up assistance;supervision;verbal cues  -CJ      Recorded by [CJ] Laya Harrison, OT      Row Name 10/07/19 1216             Lower Body Dressing Assessment/Treatment    Lower Body Dressing [place] Level  minimum assist (75% patient effort);verbal cues  -CJ      Recorded by [CJ] Laya Harrison, OT      Row Name 10/07/19 1216             Grooming Assessment/Treatment    Grooming [place] Level  set up;supervision  -CJ      Recorded by [CJ] Laya Harrison, OT      Row Name 10/07/19 1216             Toileting Assessment/Treatment    Toileting [place] Level  moderate assist (50% patient effort);verbal cues urinary incontinence  -CJ      Assistive Device Use (Toileting)  grab bar/safety frame  -CJ      Recorded by [CJ] Laya Harrison, OT      Row Name 10/07/19 1216             Upper Extremity Seated Therapeutic Exercise    Device, Seated Upper Extremity (Therapeutic Exercise)  restorator/arm bike 2 mins X 4, 0 resistance  -CJ      Exercise Type, Seated Upper Extremity (Therapeutic Exercise)  AROM (active range of motion) BUE ther ex/act, bilat coord ex, hand exerciser  -CJ      Expected Outcomes, Seated Upper Extremity (Therapeutic Exercise)  improve functional tolerance, self-care activity;improve performance, BADLs;improve performance, transfer skills  -CJ      Recorded by [CJ] Laya Harrison, OT      Row Name 10/07/19 1216             Positioning and Restraints    Post Treatment Position  wheelchair  -CJ      In  Wheelchair  with PT;with other staff with PT-post first tx;  w/activities staff-second tx  -CJ      Recorded by [CJ] Laya Harrison OT        User Key  (r) = Recorded By, (t) = Taken By, (c) = Cosigned By    Initials Name Effective Dates    CJ Laya Harrison OT 04/03/18 -           Wound 10/01/19 1702 Bilateral other (see comments) coccyx MASD (Moisutre associated skin damage) (Active)   Dressing Appearance open to air 10/7/2019 11:06 AM   Closure None 10/6/2019  7:30 PM   Base red 10/7/2019 11:06 AM   Periwound redness;excoriated 10/7/2019 11:06 AM   Periwound Temperature warm 10/7/2019 11:06 AM   Periwound Skin Turgor soft 10/7/2019 11:06 AM   Edges irregular 10/7/2019 11:06 AM   Care, Wound barrier applied 10/7/2019 11:06 AM   Dressing Care, Wound open to air 10/7/2019 11:06 AM         OT Recommendation and Plan    Anticipated Equipment Needs At Discharge (OT Eval): (TBD)  Planned Therapy Interventions (OT Eval): activity tolerance training, BADL retraining, occupation/activity based interventions, ROM/therapeutic exercise, strengthening exercise, patient/caregiver education/training, transfer/mobility retraining    Plan of Care Review  Plan of Care Reviewed With: patient  Plan of Care Reviewed With: patient  IRF Plan of Care Review: progress ongoing, continue    OT IRF GOALS     Row Name 10/02/19 1521             Bathing Goal 1 (OT-IRF)    [place] Level (Bathing Goal 1, OT-IRF)  minimum assist (75% or more patient effort)  -      Time Frame (Bathing Goal 1, OT-IRF)  long term goal (LTG);by discharge  -         LB Dressing Goal 1 (OT-IRF)    [place] (LB Dressing Goal 1, OT-IRF)  moderate assist (50-74% patient effort)  -      Time Frame (LB Dressing Goal 1, OT-IRF)  short term goal (STG)  -         LB Dressing Goal 2 (OT-IRF)    [place] (LB Dressing Goal 2, OT-IRF)  minimum assist (75% or more patient effort)  -      Time Frame (LB Dressing Goal 2, OT-IRF)  long term goal (LTG);by  discharge  -         Toileting Goal 1 (OT-IRF)    [place] Level (Toileting Goal 1, OT-IRF)  maximum assist (25-49% patient effort)  -      Time Frame (Toileting Goal 1, OT-IRF)  short term goal (STG)  -         Toileting Goal 2 (OT-IRF)    [place] Level (Toileting Goal 2, OT-IRF)  moderate assist (50-74% patient effort)  -      Time Frame (Toileting Goal 2, OT-IRF)  long term goal (LTG);by discharge  -        User Key  (r) = Recorded By, (t) = Taken By, (c) = Cosigned By    Initials Name Provider Type     Laya Harrison OT Occupational Therapist          Occupational Therapy Education     Title: PT OT SLP Therapies (Not Started)     Topic: Occupational Therapy (In Progress)     Point: ADL training (In Progress)     Description: Instruct learner(s) on proper safety adaptation and remediation techniques during self care or transfers.   Instruct in proper use of assistive devices.    Learning Progress Summary           Patient Acceptance, E,D, NR by  at 10/7/2019 12:22 PM    Acceptance, E,D, NR by  at 10/4/2019  3:24 PM    Acceptance, E,D, NR by  at 10/3/2019  3:08 PM    Acceptance, E,D, NR by  at 10/2/2019  3:17 PM                               User Key     Initials Effective Dates Name Provider Type [facility] 04/03/18 -  Laya Harrison OT Occupational Therapist OT                       Time Calculation:     Time Calculation- OT     Row Name 10/07/19 1223 10/07/19 0805          Time Calculation- OT    OT Start Time  1045  -  0805  -     OT Stop Time  1105  -  0915  -     OT Time Calculation (min)  20 min  -  70 min  -     Total Timed Code Minutes- OT  20 minute(s)  -  70 minute(s)  -     OT Non-Billable Time (min)  --  20 min  -       User Key  (r) = Recorded By, (t) = Taken By, (c) = Cosigned By    Initials Name Provider Type     Laya Harrison OT Occupational Therapist          Therapy Charges for Today     Code Description Service Date Service  Provider Modifiers Qty    75317659285 HC OT SELF CARE/MGMT/TRAIN EA 15 MIN 10/7/2019 Laya Harrison, OT GO 4    41932911437 HC OT THERAPEUTIC ACT EA 15 MIN 10/7/2019 Laya Harrison OT GO 2                   Laya Harrison OT  10/7/2019

## 2019-10-07 NOTE — PLAN OF CARE
Problem: Patient Care Overview  Goal: Plan of Care Review  Outcome: Ongoing (interventions implemented as appropriate)   10/07/19 1534   Patient Care Overview   IRF Plan of Care Review progress ongoing, continue   Progress, Functional Goals demonstrating adequate progress   Coping/Psychosocial   Plan of Care Reviewed With patient   OTHER   Outcome Summary Patient tolerated PT session well with adequate rest breaks. Continue w/ current POC.

## 2019-10-07 NOTE — PROGRESS NOTES
Rehabilitation Nursing  Inpatient Rehabilitation Functional Measures Assessment and Plan of Care    Plan of Care/Interventions  Copy frBody Function Structure    Skin Integrity (Active)  Current Status (10/1/2019 5:00:00 PM): MASD to buttocks  Weekly Goal: healing of skin  Discharge Goal: healed skin    Safety    Potential for Injury (Active)  Current Status (10/1/2019 5:00:00 PM): falls risk  Weekly Goal: no falls  Discharge Goal: no falls this admissionom POC    Functional Measures  PUJA Eating:  PUJA Grooming:  PUJA Bathing:  PUJA Upper Body Dressing:  PUJA Lower Body Dressing:  PUJA Toileting:    PUJA Bladder Management  Level of Assistance:  Frequency/Number of Accidents this Shift:    PUJA Bowel Management  Level of Assistance:  Frequency/Number of Accidents this Shift:    PUJA Bed/Chair/Wheelchair Transfer:  PUJA Toilet Transfer:  PUJA Tub/Shower Transfer:    Previously Documented Mode of Locomotion at Discharge:  PUJA Expected Mode of Locomotion at Discharge:  PUJA Walk/Wheelchair:  PUJA Stairs:    PUJA Comprehension:  Both ( auditory and visual) modes of comprehension are used  equally. Patient does not comprehend complex/abstract information in their  primary language without assistance from a helper. Comprehension Score = 3,  Moderate Prompting. Patient comprehends basic daily needs or ideas 50-74% of the  time. Patient requires moderate/some prompting. No assistive devices were  required.  PUJA Expression:  Vocal expression is the usual mode. Patient does not express  complex/abstract information in their primary language without a helper.  Expression Score = 3, Moderate Prompting. Patient expresses basic daily needs or  ideas 50-74% of the time. Patient requires moderate/some prompting. No assistive  devices were required.  PUJA Social Interaction:  Social Interaction Score = 7, Independent. Patient is  completely independent for social interaction.  There are no activity  limitations.  PUJA Problem Solving:  Patient  does not make appropriate decisions in order to  solve complex problems without assistance from a helper. Problem Solving Score =  3, Moderate Direction. Patient makes appropriate decisions in order to solve  routine problems 50-74% of the time. Patient requires moderate/some direction  for the following behavior(s): Denies presence or extent of disability.  Decreased awareness of performance. Difficulty weighing alternatives/making  choices. Difficulty with self-correction. Difficulty with self-monitoring.  Exhibits poor planning. Impulsivity. Poor judgment. Difficulty initiating tasks.    PUJA Memory:  Memory Score = 3, Moderate Prompting. Patient recognizes and  remembers 50-74% of the time. Patient requires moderate/some prompting  for  memory for the following: Difficulty recognizing hospital staff as persons  previously met. Difficulty remembering verbal tasks. Difficulty remembering  visual tasks. Disoriented to person, place, time or situation.    Signed by: Johana Hurtado Nurse

## 2019-10-08 LAB
GLUCOSE BLDC GLUCOMTR-MCNC: 107 MG/DL (ref 70–130)
GLUCOSE BLDC GLUCOMTR-MCNC: 337 MG/DL (ref 70–130)
GLUCOSE BLDC GLUCOMTR-MCNC: 347 MG/DL (ref 70–130)
GLUCOSE BLDC GLUCOMTR-MCNC: 54 MG/DL (ref 70–130)

## 2019-10-08 PROCEDURE — 97530 THERAPEUTIC ACTIVITIES: CPT

## 2019-10-08 PROCEDURE — 63710000001 INSULIN DETEMIR PER 5 UNITS: Performed by: FAMILY MEDICINE

## 2019-10-08 PROCEDURE — 63710000001 DIPHENHYDRAMINE PER 50 MG: Performed by: FAMILY MEDICINE

## 2019-10-08 PROCEDURE — 97110 THERAPEUTIC EXERCISES: CPT

## 2019-10-08 PROCEDURE — 97116 GAIT TRAINING THERAPY: CPT

## 2019-10-08 PROCEDURE — 82962 GLUCOSE BLOOD TEST: CPT

## 2019-10-08 PROCEDURE — 97535 SELF CARE MNGMENT TRAINING: CPT

## 2019-10-08 PROCEDURE — 63710000001 INSULIN ASPART PER 5 UNITS: Performed by: FAMILY MEDICINE

## 2019-10-08 RX ORDER — DIPHENHYDRAMINE HCL 25 MG
25 CAPSULE ORAL EVERY 6 HOURS PRN
Status: DISCONTINUED | OUTPATIENT
Start: 2019-10-08 | End: 2019-10-16 | Stop reason: HOSPADM

## 2019-10-08 RX ADMIN — AMLODIPINE BESYLATE 10 MG: 10 TABLET ORAL at 08:33

## 2019-10-08 RX ADMIN — INSULIN DETEMIR 10 UNITS: 100 INJECTION, SOLUTION SUBCUTANEOUS at 21:34

## 2019-10-08 RX ADMIN — INSULIN ASPART 5 UNITS: 100 INJECTION, SOLUTION INTRAVENOUS; SUBCUTANEOUS at 12:07

## 2019-10-08 RX ADMIN — LINAGLIPTIN 5 MG: 5 TABLET, FILM COATED ORAL at 08:33

## 2019-10-08 RX ADMIN — DIPHENHYDRAMINE HYDROCHLORIDE 25 MG: 25 CAPSULE ORAL at 21:33

## 2019-10-08 RX ADMIN — METOPROLOL TARTRATE 100 MG: 100 TABLET, FILM COATED ORAL at 21:33

## 2019-10-08 RX ADMIN — APIXABAN 5 MG: 5 TABLET, FILM COATED ORAL at 21:33

## 2019-10-08 RX ADMIN — METOPROLOL TARTRATE 100 MG: 100 TABLET, FILM COATED ORAL at 08:33

## 2019-10-08 RX ADMIN — PANTOPRAZOLE SODIUM 40 MG: 40 TABLET, DELAYED RELEASE ORAL at 05:36

## 2019-10-08 RX ADMIN — ASPIRIN 81 MG: 81 TABLET, COATED ORAL at 08:33

## 2019-10-08 RX ADMIN — Medication 100 MG: at 08:33

## 2019-10-08 RX ADMIN — APIXABAN 5 MG: 5 TABLET, FILM COATED ORAL at 08:33

## 2019-10-08 RX ADMIN — MONTELUKAST SODIUM 10 MG: 10 TABLET, COATED ORAL at 21:33

## 2019-10-08 NOTE — PLAN OF CARE
Problem: Patient Care Overview  Goal: Plan of Care Review   10/08/19 1010   Patient Care Overview   IRF Plan of Care Review progress ongoing, continue   Coping/Psychosocial   Plan of Care Reviewed With patient

## 2019-10-08 NOTE — PLAN OF CARE
Problem: Patient Care Overview  Goal: Plan of Care Review  Outcome: Ongoing (interventions implemented as appropriate)   10/08/19 1036   Patient Care Overview   IRF Plan of Care Review progress ongoing, continue   Progress, Functional Goals demonstrating adequate progress   Coping/Psychosocial   Plan of Care Reviewed With patient       Problem: Fall Risk (Adult)  Goal: Absence of Fall  Outcome: Ongoing (interventions implemented as appropriate)      Problem: Skin Injury Risk (Adult)  Goal: Skin Health and Integrity  Outcome: Ongoing (interventions implemented as appropriate)

## 2019-10-08 NOTE — THERAPY TREATMENT NOTE
Inpatient Rehabilitation - Occupational Therapy Treatment Note     [facility]     Patient Name: Ashley Geronimo  : 1945  MRN: [id number]    Today's Date: 10/8/2019                 Admit Date: 10/1/2019      Visit Dx:  No diagnosis found.    Patient Active Problem List   Diagnosis   • Pneumonia   • Septic shock (CMS/[facility])   • Pseudomonas pneumonia (CMS/[facility])   • NSTEMI (non-ST elevated myocardial infarction) (CMS/[facility])   • Bradycardia   • Paroxysmal atrial fibrillation (CMS/[facility])   • Chronic respiratory failure with hypoxia and hypercapnia (CMS/[facility])   • Acute on chronic respiratory failure with hypoxia (CMS/[facility]), requiring intubation/mechanical ventilation 19   • Hematemesis   • RAMESH (acute kidney injury) (CMS/[facility])   • DKA (diabetic ketoacidoses) (CMS/[facility])   • Septic shock (CMS/[facility])   • Encephalopathy   • UTI (urinary tract infection)   • Tracheobronchitis, PSA    • Metabolic encephalopathy         Therapy Treatment    IRF Treatment Summary     Row Name 10/08/19 1005             Evaluation/Treatment Time and Intent    Subjective Information  no complaints  -JW      Existing Precautions/Restrictions  fall decreased skin integrity  -      Document Type  therapy note (daily note)  -JW      Mode of Treatment  occupational therapy  -JW      Recorded by [JW] Conrado James OTR      Row Name 10/08/19 1005             Cognition/Psychosocial- PT/OT    Affect/Mental Status (Cognitive)  confused  -JW      Follows Commands (Cognition)  verbal cues/prompting required;repetition of directions required;initiation impaired;increased processing time needed  -JW      Recorded by [JW] Conrado James OTR      Row Name 10/08/19 1005             Bed-Chair Transfer    Bed-Chair [place] (Transfers)  contact guard;verbal cues  -JW      Assistive Device (Bed-Chair Transfers)  wheelchair  -JW      Recorded by [JW] Conrado James OTR      Row Name 10/08/19 1005             Toilet Transfer    [place] Level (Toilet Transfer)  contact  guard;verbal cues  -JONATHAN      Assistive Device (Toilet Transfer)  grab bars/safety frame  -JW      Recorded by [JW] Conrado James OTR      Row Name 10/08/19 1005             Upper Body Dressing Assessment/Treatment    Upper Body Dressing Task  set up assistance;supervision;verbal cues  -JW      Recorded by [JONATHAN] Conrado James OTR      Row Name 10/08/19 1005             Grooming Assessment/Treatment    Grooming [place] Level  set up;supervision;verbal cues  -JW      Recorded by [JONATHAN] Conrado James OTR      Row Name 10/08/19 1005             Toileting Assessment/Treatment    Toileting [place] Level  minimum assist (75% patient effort);verbal cues  -JONATHAN      Assistive Device Use (Toileting)  grab bar/safety frame  -JW      Recorded by [JONATHAN] Conrado James OTR      Row Name 10/08/19 1005             Upper Extremity Seated Therapeutic Exercise    Exercise Type, Seated Upper Extremity (Therapeutic Exercise)  AROM (active range of motion) BUE ther ex/act, bilat coord ex, GMC/FMC, hand exerciser  -JW      Expected Outcomes, Seated Upper Extremity (Therapeutic Exercise)  improve functional tolerance, self-care activity;improve performance, BADLs;improve performance, transfer skills  -JW      Recorded by [JW] Conrado James OTR      Row Name 10/08/19 1005             Positioning and Restraints    Post Treatment Position  wheelchair  -JW      In Wheelchair  sitting;call light within reach;encouraged to call for assist;notified nsg  -JW      Recorded by [JW] Conrado James OTR        User Key  (r) = Recorded By, (t) = Taken By, (c) = Cosigned By    Initials Name Effective Dates    Conrado Moser OTR 03/07/18 -           Wound 09/29/19 Right lateral clavicle Puncture (Active)   Dressing Appearance other (see comments) 10/7/2019  7:30 PM       Wound 10/01/19 1702 Bilateral other (see comments) coccyx MASD (Moisutre associated skin damage) (Active)   Dressing Appearance open to air 10/7/2019  7:30 PM   Closure None 10/7/2019  7:30 PM    Base red 10/7/2019  7:30 PM   Periwound excoriated 10/7/2019  7:30 PM   Periwound Temperature warm 10/7/2019 11:06 AM   Periwound Skin Turgor soft 10/7/2019 11:06 AM   Edges irregular 10/7/2019 11:06 AM   Drainage Amount none 10/7/2019  7:30 PM   Care, Wound other (see comments) 10/7/2019  7:30 PM   Dressing Care, Wound open to air 10/7/2019  7:30 PM         OT Recommendation and Plan         Plan of Care Review  Plan of Care Reviewed With: patient  Plan of Care Reviewed With: patient  IRF Plan of Care Review: progress ongoing, continue    OT IRF GOALS     Row Name 10/02/19 1521             Bathing Goal 1 (OT-IRF)    [place] Level (Bathing Goal 1, OT-IRF)  minimum assist (75% or more patient effort)  -CJ      Time Frame (Bathing Goal 1, OT-IRF)  long term goal (LTG);by discharge  -         LB Dressing Goal 1 (OT-IRF)    [place] (LB Dressing Goal 1, OT-IRF)  moderate assist (50-74% patient effort)  -CJ      Time Frame (LB Dressing Goal 1, OT-IRF)  short term goal (STG)  -         LB Dressing Goal 2 (OT-IRF)    [place] (LB Dressing Goal 2, OT-IRF)  minimum assist (75% or more patient effort)  -      Time Frame (LB Dressing Goal 2, OT-IRF)  long term goal (LTG);by discharge  -         Toileting Goal 1 (OT-IRF)    [place] Level (Toileting Goal 1, OT-IRF)  maximum assist (25-49% patient effort)  -      Time Frame (Toileting Goal 1, OT-IRF)  short term goal (STG)  -         Toileting Goal 2 (OT-IRF)    [place] Level (Toileting Goal 2, OT-IRF)  moderate assist (50-74% patient effort)  -      Time Frame (Toileting Goal 2, OT-IRF)  long term goal (LTG);by discharge  -        User Key  (r) = Recorded By, (t) = Taken By, (c) = Cosigned By    Initials Name Provider Type    Laya Yusuf OT Occupational Therapist          Occupational Therapy Education     Title: PT OT SLP Therapies (Not Started)     Topic: Occupational Therapy (In Progress)     Point: ADL training (In Progress)      Description: Instruct learner(s) on proper safety adaptation and remediation techniques during self care or transfers.   Instruct in proper use of assistive devices.    Learning Progress Summary           Patient Acceptance, E,D, NR by  at 10/8/2019 10:10 AM    Acceptance, E,D, NR by  at 10/7/2019 12:22 PM    Acceptance, E,D, NR by  at 10/4/2019  3:24 PM    Acceptance, E,D, NR by  at 10/3/2019  3:08 PM    Acceptance, E,D, NR by  at 10/2/2019  3:17 PM                               User Key     Initials Effective Dates Name Provider Type Discipline     04/03/18 -  Laya Harrison, OT Occupational Therapist OT     03/07/18 -  Conrado James OTR Occupational Therapist OT                       Time Calculation:     Time Calculation- OT     Row Name 10/08/19 1010             Time Calculation-     OT Start Time  0745  -      OT Stop Time  0915  -      OT Time Calculation (min)  90 min  -      Total Timed Code Minutes- OT  90 minute(s)  -      OT Non-Billable Time (min)  15 min  -        User Key  (r) = Recorded By, (t) = Taken By, (c) = Cosigned By    Initials Name Provider Type     Conrado James OTR Occupational Therapist          Therapy Charges for Today     Code Description Service Date Service Provider Modifiers Qty    18513110682 HC OT SELF CARE/MGMT/TRAIN EA 15 MIN 10/8/2019 Conrado James OTR GO 2    66719895339 HC OT THERAPEUTIC ACT EA 15 MIN 10/8/2019 Conrado James OTR GO 4                   AXEL Rowan  10/8/2019

## 2019-10-08 NOTE — PROGRESS NOTES
"     LOS: 7 days     Chief Complaint:  Debility    Subjective     Interval History:     She wants to go home as soon as possible.  She thinks she is doing better with mobility.  She continues to have some cognitive related issues.  Her blood sugar this morning is 54.  She peaked at 239 yesterday.      Objective     Vital Signs  /50   Pulse 61   Temp 97.9 °F (36.6 °C) (Oral)   Resp 16   Ht 152.4 cm (60\")   Wt 67 kg (147 lb 11.3 oz)   SpO2 97%   BMI 28.85 kg/m²    Intake & Output (last day)       10/07 0701 - 10/08 0700 10/08 0701 - 10/09 0700    P.O. 600     Total Intake(mL/kg) 600 (9)     Net +600           Urine Unmeasured Occurrence 6 x             Physical Exam:     General Appearance:    Alert, cooperative, in no acute distress. Thin, frail and interactive   Head:    Normocephalic, without obvious abnormality, atraumatic   Eyes:            Lids and lashes normal, conjunctivae and sclerae normal, no   icterus, no pallor, corneas clear, PERRLA   Ears:    Ears appear intact with no abnormalities noted       Neck:   No adenopathy, supple, trachea midline, no thyromegaly, no   carotid bruit, no JVD   Lungs:     Clear to auscultation,respirations regular, even and                  unlabored    Heart:    Irreg irreg rhythm and normal rate, normal S1 and S2, no            murmur, no gallop, no rub, no click   Chest Wall:    No abnormalities observed   Abdomen:     Normal bowel sounds, no masses, no organomegaly, soft        non-tender, non-distended, no guarding, no rebound                tenderness   Extremities:   Moves all extremities well, no edema, no cyanosis, no             Redness    Is a faint rash on her trunk that is macular in appearance                        Results Review:    Lab Results   Component Value Date    WBC 6.52 10/05/2019    HGB 10.2 (L) 10/05/2019    HCT 32.7 (L) 10/05/2019    MCV 89.6 10/05/2019     10/05/2019       Lab Results   Component Value Date    GLUCOSE 367 (H) " 10/05/2019    BUN 45 (H) 10/05/2019    CREATININE 1.47 (H) 10/05/2019    EGFRIFNONA 35 (L) 10/05/2019    BCR 30.6 (H) 10/05/2019     (L) 10/05/2019    K 5.0 10/05/2019     10/05/2019    CO2 17.4 (L) 10/05/2019    CALCIUM 8.0 (L) 10/05/2019    PROTENTOTREF 5.2 (L) 02/16/2019    ALBUMIN 2.84 (L) 10/02/2019    LABIL2 1.1 02/16/2019    AST 13 10/02/2019    ALT 7 10/02/2019     Lab Results   Component Value Date    INR 0.95 09/22/2019    INR 0.98 02/20/2019    INR 0.95 02/19/2019       Glucose   Date Value Ref Range Status   10/08/2019 54 (L) 70 - 130 mg/dL Final   10/07/2019 166 (H) 70 - 130 mg/dL Final   10/07/2019 239 (H) 70 - 130 mg/dL Final   10/07/2019 205 (H) 70 - 130 mg/dL Final   10/07/2019 73 70 - 130 mg/dL Final          Medication Review:     Current Facility-Administered Medications:   •  amLODIPine (NORVASC) tablet 10 mg, 10 mg, Oral, Q24H, Kreis, Samuel Duane, MD, 10 mg at 10/08/19 0833  •  apixaban (ELIQUIS) tablet 5 mg, 5 mg, Oral, Q12H, Kreis, Samuel Duane, MD, 5 mg at 10/08/19 0833  •  aspirin EC tablet 81 mg, 81 mg, Oral, Daily, Kreis, Samuel Duane, MD, 81 mg at 10/08/19 0833  •  bisacodyl (DULCOLAX) suppository 10 mg, 10 mg, Rectal, Daily PRN, Kreis, Samuel Duane, MD  •  dextrose (D50W) 25 g/ 50mL Intravenous Solution 25 g, 25 g, Intravenous, Q15 Min PRN, Kreis, Samuel Duane, MD  •  dextrose (GLUTOSE) oral gel 15 g, 15 g, Oral, Q15 Min PRN, Kreis, Samuel Duane, MD  •  glucagon (human recombinant) (GLUCAGEN DIAGNOSTIC) injection 1 mg, 1 mg, Subcutaneous, Q15 Min PRN, Kreis, Samuel Duane, MD  •  insulin aspart (novoLOG) injection 0-7 Units, 0-7 Units, Subcutaneous, 4x Daily AC & at Bedtime, Kreis, Samuel Duane, MD, 3 Units at 10/07/19 1708  •  insulin detemir (LEVEMIR) injection 10 Units, 10 Units, Subcutaneous, Nightly, Kreis, Samuel Duane, MD, 10 Units at 10/07/19 2104  •  linagliptin (TRADJENTA) tablet 5 mg, 5 mg, Oral, Daily, Kreis, Samuel Duane, MD, 5 mg at 10/08/19 0833  •  magnesium  hydroxide (MILK OF MAGNESIA) suspension 2400 mg/10mL 10 mL, 10 mL, Oral, Daily PRN, Kreis, Samuel Duane, MD  •  metoprolol tartrate (LOPRESSOR) tablet 100 mg, 100 mg, Oral, Q12H, Kreis, Samuel Duane, MD, 100 mg at 10/08/19 0833  •  montelukast (SINGULAIR) tablet 10 mg, 10 mg, Oral, Nightly, Kreis, Samuel Duane, MD, 10 mg at 10/07/19 2104  •  ondansetron (ZOFRAN) tablet 4 mg, 4 mg, Oral, Q6H PRN **OR** ondansetron (ZOFRAN) injection 4 mg, 4 mg, Intravenous, Q6H PRN, Kreis, Samuel Duane, MD  •  pantoprazole (PROTONIX) EC tablet 40 mg, 40 mg, Oral, Q AM, Kreis, Samuel Duane, MD, 40 mg at 10/08/19 0536  •  thiamine (VITAMIN B-1) tablet 100 mg, 100 mg, Oral, Daily, Kreis, Samuel Duane, MD, 100 mg at 10/08/19 0833      Assessment/Plan     Debility secondary to what seems most consistent with hypoxic encephalopathy  Type 2 diabetes mellitus with recent history of DKA with hyperglycemia  Known coronary artery disease with history of non-ST elevation acute myocardial infarction  Chronic hypoxic respiratory failure  Urinary tract infection status post treatment  Chronic atrial fibrillation  Hypertension  Stage III chronic kidney disease       PT and OT     Eliquis twice daily due to full dose anticoagulation due to A. fib     Levemir 10 units nightly.  Continue Tradjenta 5 mg daily     Continue amlodipine increased to 10 mg daily and continue metoprolol 100 mg twice daily.      Protonix once daily    Refer to team conference note      Samuel Duane Kreis, MD  10/08/19  9:19 AM

## 2019-10-08 NOTE — SIGNIFICANT NOTE
10/08/19 1417   Plan   Plan Team conference held today.  Left message for eugenia Martinez [phone].

## 2019-10-08 NOTE — PROGRESS NOTES
Inpatient Rehabilitation Functional Measures Assessment    Functional Measures  PUJA Eating:  Eating Score = 5. Patient is supervision/set-up for eating,  requiring: Opening containers. No assistive devices were required.  PUJA Grooming:  PUJA Bathing:  PUJA Upper Body Dressing:  Patient requires total assistance for gathering  clothes. Wearing a bra or undershirt was not applicable for this patient.  Patient requires moderate/considerable physical assistance for threading the  right arm through the garment (shirt/sweater). Patient requires  moderate/considerable physical assistance for threading the left arm through the  garment (shirt/sweater). Patient requires moderate/considerable physical  assistance for pulling an over-head-garment over head or pulling  front-fastening-garment around back. Patient requires moderate/considerable  physical assistance for pulling an over-head-garment down the trunk or  adjusting/fastening together a front-fastening-garment. Patient performs 60 % of  upper body dressing tasks. Upper Body Dressing Score = 3, Moderate Assistance.  No assistive devices were required.  PUJA Lower Body Dressing:  Patient requires total assistance for gathering  clothes. Patient requires maximal/significant physical assistance for holding  clothing and/or threading the right leg through the undergarment. Patient  requires maximal/significant physical assistance for holding clothing and/or  threading the left leg through the undergarment. Patient requires  maximal/significant physical assistance for holding clothing and/or pulling  undergarment over hips and adjusting fasteners. Patient requires  maximal/significant physical assistance for holding clothing and/or threading  the right leg through the pants/skirt. Patient requires maximal/significant  physical assistance for holding clothing and/or threading the left leg through  the pants/skirt. Patient requires maximal/significant physical assistance  for  holding clothing and/or pulling pants/skirt over hips and adjusting fasteners.  Patient requires maximal/significant physical assistance for holding clothing  and/or donning and/or doffing right sock. Patient requires maximal/significant  physical assistance for holding clothing and/or donning and/or doffing left  sock. Donning and/or doffing right shoe is not applicable for this patient.  Donning and/or doffing left shoe is not applicable for this patient. Patient  performs 22.22 -  24% of lower body dressing tasks. Lower Body Dressing  Score =  1, Total Assistance. No assistive devices were required.  PUJA Toileting:  Patient requires total assistance for adjusting clothing before  using a toilet, commode, bedpan, or urinal. Patient requires maximal assistance  for hygiene. Patient requires maximal assistance for adjusting clothing after  using a toilet, commode, bedpan, or urinal. Patient performs 0 -  24% of  toileting tasks.  Toileting Score = 1, Total Assistance. Patient requires the  following assistive device(s): Grab bar. Adaptive device to maintain balance.    PUJA Bladder Management  Level of Assistance:  Bladder Score = 2. Patient performs 25-49% of tasks and  requires maximal assistance for bladder management. [place] provides most of the  assist to apply AND remove brief.  Frequency/Number of Accidents this Shift:  Bladder accidents this shift:  4 .    PUJA Bowel Management  Level of Assistance: Bowel Score = 1. Patient performs less than 25% of tasks  and requires total assistance for bowel management. [place] provides total assist  to completely apply and remove brief.  Frequency/Number of Accidents this Shift: Bowel accidents this shift: 1 .    PUJA Bed/Chair/Wheelchair Transfer:  Bed/chair/wheelchair Transfer Score = 3.  Patient performs 50-74% of effort and requires moderate assistance (some  lifting) for transferring to and from the bed/chair/wheelchair. Patient requires  the following assistive  device(s): Arm rest. Seating system of wheelchair. Bed  rails.  PUJA Toilet Transfer:  Toilet Transfer Score = 3.  Patient performs 50-74% of  effort and requires moderate assistance (some lifting) for transferring to and  from the toilet/commode. Patient requires the following assistive device(s):  Safety frame/over the toilet. Grab bars.  PUJA Tub/Shower Transfer:    Previously Documented Mode of Locomotion at Discharge:  [facility] Expected Mode of Locomotion at Discharge:  [facility] Walk/Wheelchair:  [facility] Stairs:    [facility] Comprehension:  [facility] Expression:  [facility] Social Interaction:  PUJA Problem Solving:  [facility] Memory:    Therapy Mode Minutes  Occupational Therapy:  Physical Therapy:  Speech Language Pathology:    Discharge Functional Goals:    Signed by: JAI Wilcox

## 2019-10-08 NOTE — PROGRESS NOTES
Inpatient Rehabilitation Functional Measures Assessment    Functional Measures  PUJA Eating:  PUJA Grooming: Grooming Score = 5. Patient is supervision/set-up for grooming,  requiring: Initial preparation. Stand by assistance. Verbal cuing, prompting, or  instructing. No assistive devices were required.  PUJA Bathing:  PUJA Upper Body Dressing:  Upper Body Dressing Score = 5. Patient is supervision  for upper body dressing, requiring: Gathering/setting out clothes. Stand by  assistance. Verbal cuing, prompting, or instructing. No assistive devices were  required.  PUJA Lower Body Dressing:  PUJA Toileting:  Toileting Score = 4.  Patient requires minimal assistance for  toileting, such as steadying for balance while cleansing or adjusting clothes.  Patient requires the following assistive device(s): Arm rest of a specialized  seat. Grab bar.    PUJA Bladder Management  Level of Assistance:  Frequency/Number of Accidents this Shift:    PUJA Bowel Management  Level of Assistance:  Frequency/Number of Accidents this Shift:    PUJA Bed/Chair/Wheelchair Transfer:  Bed/chair/wheelchair Transfer Score = 4.  Patient performs 75% or more of effort and minimal assistance (little/incidental  help/lifting of one limb/steadying) for transferring to and from the  bed/chair/wheelchair, requiring: Contact guard. No assistive devices were  required.  PUJA Toilet Transfer:  Toilet Transfer Score = 4.  Patient performs 75% or more  of effort and minimal assistance (little/incidental help/steadying) for  transferring to and from the toilet/commode, requiring: Contact guard. Patient  requires the following assistive device(s): Safety frame/over the toilet. Grab  bars.  PUJA Tub/Shower Transfer:    Previously Documented Mode of Locomotion at Discharge:  [facility] Expected Mode of Locomotion at Discharge:  PUJA Walk/Wheelchair:  PUJA Stairs:    PUJA Comprehension:  PUJA Expression:  PUJA Social Interaction:  PUJA Problem Solving:  PUJA Memory:    Therapy Mode  Minutes  Occupational Therapy: Individual: 90 minutes.  Physical Therapy:  Speech Language Pathology:    Discharge Functional Goals:    Signed by: Conrado James, Therapy Coordinator

## 2019-10-08 NOTE — THERAPY TREATMENT NOTE
Inpatient Rehabilitation - Physical Therapy Treatment Note   [facility]     Patient Name: Ashley Geronimo  : 1945  MRN: [id number]    Today's Date: 10/8/2019                 Admit Date: 10/1/2019      Visit Dx:    No diagnosis found.    Patient Active Problem List   Diagnosis   • Pneumonia   • Septic shock (CMS/[facility])   • Pseudomonas pneumonia (CMS/[facility])   • NSTEMI (non-ST elevated myocardial infarction) (CMS/[facility])   • Bradycardia   • Paroxysmal atrial fibrillation (CMS/[facility])   • Chronic respiratory failure with hypoxia and hypercapnia (CMS/[facility])   • Acute on chronic respiratory failure with hypoxia (CMS/[facility]), requiring intubation/mechanical ventilation 19   • Hematemesis   • RAMESH (acute kidney injury) (CMS/[facility])   • DKA (diabetic ketoacidoses) (CMS/[facility])   • Septic shock (CMS/[facility])   • Encephalopathy   • UTI (urinary tract infection)   • Tracheobronchitis, PSA    • Metabolic encephalopathy       Therapy Treatment    IRF Treatment Summary     Row Name 10/08/19 1559 10/08/19 1009          Evaluation/Treatment Time and Intent    Subjective Information  no complaints  -AG  no complaints  -JW     Existing Precautions/Restrictions  fall confusion; itching/ rash/ decr skin integrity today  -AG  fall decreased skin integrity  -     Document Type  therapy note (daily note)  -AG  therapy note (daily note)  -     Mode of Treatment  physical therapy  -AG  occupational therapy  -JW     Patient/Family Observations  pt. seated in w/c prior to AM session; supine in bed prior to PM session.  Pt. is confused with intermittent agitation.    -AG  --     Recorded by [AG] Karyn Delgado, PT [JW] Conrado James OTR     Row Name 10/08/19 1559 10/08/19 1005          Cognition/Psychosocial- PT/OT    Affect/Mental Status (Cognitive)  confused  -AG  confused  -JW     Orientation Status (Cognition)  oriented to;person  -AG  --     Follows Commands (Cognition)  follows one step commands;delayed response/completion;increased processing time  needed;initiation impaired;repetition of directions required;verbal cues/prompting required;visual queue  -AG  verbal cues/prompting required;repetition of directions required;initiation impaired;increased processing time needed  -JW     Personal Safety Interventions  fall prevention program maintained;gait belt;nonskid shoes/slippers when out of bed;supervised activity  -AG  --     Attention Deficit (Cognitive)  requires cues/redirection to task  -AG  --     Safety Deficit (Cognitive)  ability to follow commands;insight into deficits/self awareness;safety precautions awareness;safety precautions follow-through/compliance  -AG  --     Recorded by [AG] Karyn Delgado, PT [JW] Conrado James, LESLEYR     Row Name 10/08/19 1559             Bed Mobility Assessment/Treatment    Scooting/Bridging [place] (Bed Mobility)  supervision  -AG      Supine-Sit [place] (Bed Mobility)  supervision;verbal cues  -AG      Sit-Supine [place] (Bed Mobility)  verbal cues;nonverbal cues (demo/gesture);contact guard  -AG      Bed Mobility, Safety Issues  cognitive deficits limit understanding  -AG      Assistive Device (Bed Mobility)  bed rails  -AG      Recorded by [AG] Karyn Delgado, PT      Row Name 10/08/19 1559             Transfer Assessment/Treatment    Transfer Assessment/Treatment  sit-stand transfer;stand-sit transfer;stand pivot/stand step transfer;toilet transfer;car transfer  -AG      Comment (Transfers)  constant cueing necessary for initiation, sequencing and completion of all tasks.    -AG      Recorded by [AG] Karyn Delgado, PT      Row Name 10/08/19 1559 10/08/19 1005          Bed-Chair Transfer    Bed-Chair [place] (Transfers)  contact guard;stand by assist  -AG  contact guard;verbal cues  -JW     Assistive Device (Bed-Chair Transfers)  wheelchair  -AG  wheelchair  -JW     Recorded by [AG] Karyn Delgado, PT [JW] Conrado James, AXEL     Row Name 10/08/19 1550             Chair-Bed Transfer    Chair-Bed  [place] (Transfers)  contact guard;stand by assist  -AG      Assistive Device (Chair-Bed Transfers)  wheelchair  -AG      Recorded by [AG] Karyn Delgado PT      Row Name 10/08/19 1559             Sit-Stand Transfer    Sit-Stand [place] (Transfers)  verbal cues;nonverbal cues (demo/gesture);contact guard  -AG      Assistive Device (Sit-Stand Transfers)  walker, front-wheeled  -AG      Recorded by [AG] Karyn Delgado PT      Row Name 10/08/19 1559             Stand-Sit Transfer    Stand-Sit [place] (Transfers)  verbal cues;nonverbal cues (demo/gesture);contact guard  -AG      Assistive Device (Stand-Sit Transfers)  walker, front-wheeled  -AG      Recorded by [AG] Karyn Delgado PT      Row Name 10/08/19 1559             Stand Pivot/Stand Step Transfer    Stand Pivot/Stand Step [place]  verbal cues;nonverbal cues (demo/gesture);contact guard  -AG      Assistive Device (Stand Pivot Stand Step Transfer)  walker, front-wheeled  -AG      Recorded by [AG] Karyn Delgado PT      Row Name 10/08/19 1559 10/08/19 1005          Toilet Transfer    Type (Toilet Transfer)  stand pivot/stand step  -AG  --     [place] Level (Toilet Transfer)  verbal cues;nonverbal cues (demo/gesture);contact guard  -AG  contact guard;verbal cues  -JW     Assistive Device (Toilet Transfer)  commode;grab bars/safety frame;raised toilet seat;walker, front-wheeled  -AG  grab bars/safety frame  -JW     Recorded by [AG] Karyn Delgado, PT [JW] Conrado James OTR     Row Name 10/08/19 1559             Car Transfer    Type (Car Transfer)  stand pivot/stand step  -AG      [place] Level (Car Transfer)  verbal cues;nonverbal cues (demo/gesture);contact guard;minimum assist (75% patient effort)  -AG      Assistive Device (Car Transfer)  walker, front-wheeled  -AG      Recorded by [AG] Karyn Delgado PT      Row Name 10/08/19 1559             Gait/Stairs Assessment/Training    [place] Level (Gait)  verbal cues;nonverbal cues  (demo/gesture);contact guard  -AG      Assistive Device (Gait)  walker, front-wheeled  -AG      Distance in Feet (Gait)  300 ft BID  -AG      Pattern (Gait)  step-through  -AG      Deviations/Abnormal Patterns (Gait)  base of support, narrow;gait speed decreased;stride length decreased  -AG      Bilateral Gait Deviations  forward flexed posture;weight shift ability decreased  -AG      Comment (Gait/Stairs)  pt. requires constant cueing to maintain path, avoid obstacles.   -AG      Recorded by [AG] Karyn Delgado, PT      Row Name 10/08/19 1751             Safety Issues, Functional Mobility    Safety Issues Affecting Function (Mobility)  awareness of need for assistance;insight into deficits/self awareness;safety precaution awareness;safety precautions follow-through/compliance  -AG      Impairments Affecting Function (Mobility)  balance;cognition;coordination;endurance/activity tolerance;motor planning;strength  -AG      Recorded by [AG] Karyn Delgado, PT      Row Name 10/08/19 1005             Upper Body Dressing Assessment/Treatment    Upper Body Dressing Task  set up assistance;supervision;verbal cues  -JW      Recorded by [JW] Conrado James, LESLEYR      Row Name 10/08/19 1005             Grooming Assessment/Treatment    Grooming [place] Level  set up;supervision;verbal cues  -JW      Recorded by [JW] Conrado James, R      Row Name 10/08/19 1005             Toileting Assessment/Treatment    Toileting [place] Level  minimum assist (75% patient effort);verbal cues  -JW      Assistive Device Use (Toileting)  grab bar/safety frame  -JW      Recorded by [JW] Conrado James, R      Row Name 10/08/19 4739             Pain Scale: Numbers Pre/Post-Treatment    Pain Scale: Numbers, Pretreatment  0/10 - no pain  -AG      Pain Scale: Numbers, Post-Treatment  0/10 - no pain  -AG      Recorded by [AG] Karyn Delgado, PT      Row Name 10/08/19 1488             Static Sitting Balance    Level of [place] (Unsupported  Sitting, Static Balance)  supervision  -AG      Sitting Position (Unsupported Sitting, Static Balance)  sitting on edge of bed;sitting in wheelchair  -AG      Time Able to Maintain Position (Unsupported Sitting, Static Balance)  more than 5 minutes  -AG      Recorded by [AG] Karyn Delgado, PT      Row Name 10/08/19 1559             Dynamic Sitting Balance    Level of [place], Reaches Outside Midline (Sitting, Dynamic Balance)  standby assist  -AG      Sitting Position, Reaches Outside Midline (Sitting, Dynamic Balance)  sitting on edge of bed  -AG      Recorded by [AG] Karyn Delgado, PT      Row Name 10/08/19 1559             Static Standing Balance    Level of [place] (Supported Standing, Static Balance)  contact guard assist;standby assist  -AG      Assistive Device Utilized (Supported Standing, Static Balance)  walker, rolling  -AG      Comment (Unsupported Standing, Static Balance)  pt. able to briefly stand with 1 UE support long enough to perform hand hygiene at sink.   -AG      Recorded by [AG] Karyn Delgado, PT      Row Name 10/08/19 4771             Therapeutic Exercise    Therapeutic Exercise  seated, lower extremities  -AG      Additional Documentation  Therapeutic Exercise (Row)  -AG      Recorded by [AG] Karyn Delgado, PT      Row Name 10/08/19 1005             Upper Extremity Seated Therapeutic Exercise    Exercise Type, Seated Upper Extremity (Therapeutic Exercise)  AROM (active range of motion) BUE ther ex/act, bilat coord ex, GMC/FMC, hand exerciser  -JW      Expected Outcomes, Seated Upper Extremity (Therapeutic Exercise)  improve functional tolerance, self-care activity;improve performance, BADLs;improve performance, transfer skills  -JW      Recorded by [JW] Conrado James OTR      Row Name 10/08/19 1551             Lower Extremity Seated Therapeutic Exercise    Performed, Seated Lower Extremity (Therapeutic Exercise)  hip abduction/adduction;ankle dorsiflexion/plantarflexion;LAQ  (long arc quad), knee extension  -AG      Device, Seated Lower Extremity (Therapeutic Exercise)  elastic bands/tubing;free weights, cuff;small ball  -AG      Exercise Type, Seated Lower Extremity (Therapeutic Exercise)  AROM (active range of motion)  -AG      Sets/Reps Detail, Seated Lower Extremity (Therapeutic Exercise)  2 x 10  -AG      Comment, Seated Lower Extremity (Therapeutic Exercise)  Max cueing required to initiate and complete all ther ex, tasks.   -AG      Recorded by [AG] Karyn Delgado, PT      Row Name 10/08/19 1559 10/08/19 1005          Positioning and Restraints    Pre-Treatment Position  sitting in chair/recliner  -AG  --     Post Treatment Position  wheelchair  -AG  wheelchair  -JW     In Wheelchair  notified nsg;sitting;call light within reach;encouraged to call for assist following AM session  -AG  sitting;call light within reach;encouraged to call for assist;notified nsg  -JW     Recorded by [AG] Karyn Delgado, PT [JW] Conrado James, OTR     Row Name 10/08/19 7519             Daily Summary of Progress (PT)    Functional Goal Overall Progress: Physical Therapy  progressing towards functional goals slower than expected  -AG      Impairments Continuing to Limit Function: Physical Therapy  strength decreased;impaired balance;impaired cognition;coordination impaired  -AG      Recommendations: Physical Therapy  continue POC  -AG      Recorded by [AG] Karyn Delgado, PT        User Key  (r) = Recorded By, (t) = Taken By, (c) = Cosigned By    Initials Name Effective Dates    AG Karyn Delgado, PT 04/03/18 -     JW Conrado James OTR 03/07/18 -         Wound 09/29/19 Right lateral clavicle Puncture (Active)   Dressing Appearance other (see comments) 10/7/2019  7:30 PM       Wound 10/01/19 1702 Bilateral other (see comments) coccyx MASD ([place] associated skin damage) (Active)   Dressing Appearance open to air 10/8/2019  8:33 AM   Closure None 10/7/2019  7:30 PM   Base red 10/8/2019  8:33 AM   Periwound  excoriated 10/8/2019  8:33 AM   Periwound Temperature warm 10/8/2019  8:33 AM   [place]iwound Skin Turgor soft 10/8/2019  8:33 AM   Edges irregular 10/8/2019  8:33 AM   Drainage Amount none 10/7/2019  7:30 PM   Care, Wound other (see comments) 10/7/2019  7:30 PM   Dressing Care, Wound open to air 10/8/2019  8:33 AM     Physical Therapy Education     Title: PT OT SLP Therapies (Not Started)     Topic: Physical Therapy (In Progress)     Point: Mobility training (In Progress)     Learning Progress Summary           Patient Acceptance, E,D, NL by AG at 10/8/2019  4:10 PM    Acceptance, E,D, VU,NR by LL at 10/7/2019  3:34 PM    Acceptance, E,D, VU,NR by KADE at 10/5/2019  1:43 PM    Acceptance, E,D, NR by LL at 10/4/2019  3:45 PM    Acceptance, E,D, NR by AG at 10/3/2019  4:10 PM    Acceptance, E,D, NR by AG at 10/2/2019  4:26 PM                   Point: Home exercise program (In Progress)     Learning Progress Summary           Patient Acceptance, E,D, NL by AG at 10/8/2019  4:10 PM    Acceptance, E,D, VU,NR by LL at 10/7/2019  3:34 PM    Acceptance, E,D, VU,NR by KADE at 10/5/2019  1:43 PM    Acceptance, E,D, NR by LL at 10/4/2019  3:45 PM    Acceptance, E,D, NR by AG at 10/3/2019  4:10 PM    Acceptance, E,D, NR by AG at 10/2/2019  4:26 PM                   Point: Body mechanics (In Progress)     Learning Progress Summary           Patient Acceptance, E,D, NL by AG at 10/8/2019  4:10 PM    Acceptance, E,D, VU,NR by LL at 10/7/2019  3:34 PM    Acceptance, E,D, VU,NR by KADE at 10/5/2019  1:43 PM    Acceptance, E,D, NR by LL at 10/4/2019  3:45 PM    Acceptance, E,D, NR by AG at 10/3/2019  4:10 PM    Acceptance, E,D, NR by AG at 10/2/2019  4:26 PM                   Point: Precautions (In Progress)     Learning Progress Summary           Patient Acceptance, E,D, NL by FRANCK at 10/8/2019  4:10 PM    Acceptance, E,D, VU,NR by LL at 10/7/2019  3:34 PM    Acceptance, E,D, VU,NR by KADE at 10/5/2019  1:43 PM    Acceptance, E,D, NR by LL at  10/4/2019  3:45 PM    Acceptance, E,D, NR by  at 10/3/2019  4:10 PM    Acceptance, E,D, NR by  at 10/2/2019  4:26 PM                               User Key     Initials Effective Dates Name Provider Type Discipline     04/03/18 -  Karyn Delgado, PT Physical Therapist PT    LL 05/02/16 -  Bita Greene, PTA Physical Therapy Assistant PT    KADE 05/02/16 -  Doris Calixto PTA Physical Therapy Assistant PT                  PT Recommendation and Plan  Anticipated Equipment Needs at Discharge (PT Eval): (TBD)  Planned Therapy Interventions (PT Eval): balance training, bed mobility training, gait training, home exercise program, motor coordination training, neuromuscular re-education, patient/family education, postural re-education, stair training, strengthening, transfer training  Frequency of Treatment (PT Eval): 5 times per week     Daily Summary of Progress (PT)  Functional Goal Overall Progress: Physical Therapy: progressing towards functional goals slower than expected  Impairments Continuing to Limit Function: Physical Therapy: strength decreased, impaired balance, impaired cognition, coordination impaired  Recommendations: Physical Therapy: continue POC               Time Calculation:     PT Charges     Row Name 10/08/19 1611 10/08/19 1610          Time Calculation    Start Time  1510  -AG  1100  -AG     Stop Time  1540  -AG  1200  -AG     Time Calculation (min)  30 min  -AG  60 min  -AG     PT Received On  --  10/08/19  -     PT - Next Appointment  --  10/09/19  -     PT Goal Re-Cert Due Date  --  10/09/19  -       User Key  (r) = Recorded By, (t) = Taken By, (c) = Cosigned By    Initials Name Provider Type     Karyn Delgado, PT Physical Therapist          Therapy Charges for Today     Code Description Service Date Service Provider Modifiers Qty    13830904234 HC GAIT TRAINING EA 15 MIN 10/8/2019 Karyn Delgado, PT GP 3    09406590750 HC PT THER PROC EA 15 MIN 10/8/2019 Karyn Delgado, PT  GP 1    [id number]  PT THERAPEUTIC ACT EA 15 MIN 10/8/2019 Karyn Delgado, PT GP 2                   Karyn Delgado, PT  10/8/2019

## 2019-10-08 NOTE — PROGRESS NOTES
Inpatient Rehabilitation Functional Measures Assessment    Functional Measures  PUJA Eating:  PUJA Grooming:  PUJA Bathing:  PUJA Upper Body Dressing:  PUJA Lower Body Dressing:  PUJA Toileting:    PUJA Bladder Management  Level of Assistance:  Frequency/Number of Accidents this Shift:    PUJA Bowel Management  Level of Assistance:  Frequency/Number of Accidents this Shift:    PUJA Bed/Chair/Wheelchair Transfer:  Bed/chair/wheelchair Transfer Score = 4.  Patient performs 75% or more of effort and minimal assistance (little/incidental  help/lifting of one limb/steadying) for transferring to and from the  bed/chair/wheelchair, requiring: Contact guard. No assistive devices were  required.  PUJA Toilet Transfer:  Toilet Transfer Score = 4.  Patient performs 75% or more  of effort and minimal assistance (little/incidental help/steadying) for  transferring to and from the toilet/commode, requiring: Contact guard. Patient  requires the following assistive device(s): Walker. Grab bars. Safety frame/over  the toilet.  PUJA Tub/Shower Transfer:  Activity was not observed.    Previously Documented Mode of Locomotion at Discharge: Walk  PUJA Expected Mode of Locomotion at Discharge: No change to the current  documented expected, most frequently used mode of locomotion at discharge  PUJA Walk/Wheelchair:  WHEELCHAIR OBSERVATION   Wheelchair did not occur.    WALK OBSERVATION   Walk Distance Scale = 3.  Distance walked is greater than 150 feet. Walk Score  = 4.  Patient performs 75% or more of effort and requires minimal assistance.  Incidental help/contact guard/steadying was provided. Patient walked a distance  of  300 feet. Patient requires the following assistive device(s): Rolling  walker.  PUJA Stairs:  Stairs did not occur.    PUJA Comprehension:  PUJA Expression:  PUJA Social Interaction:  PUJA Problem Solving:  PUJA Memory:    Therapy Mode Minutes  Occupational Therapy:  Physical Therapy: Individual: 90 minutes.  Speech Language  Pathology:    Discharge Functional Goals:    Signed by: Karyn Delgado, Physical Therapist

## 2019-10-08 NOTE — SIGNIFICANT NOTE
10/08/19 1610   Plan   Plan Spoke to son Juan [phone] who says pt will return home with grandchildren Katelyn Christianson, Addison Geronimo and son providing 24 hour assistance.  Son or granddaughter Katelyn will provide transportation at discharge.  Will follow.   Patient/Family in Agreement with Plan yes

## 2019-10-08 NOTE — PROGRESS NOTES
Inpatient Rehabilitation Functional Measures Assessment    Functional Measures  PUJA Eating:  PUJA Grooming:  PUJA Bathing:  PUJA Upper Body Dressing:  PUJA Lower Body Dressing:  PUJA Toileting:    PUJA Bladder Management  Level of Assistance:  Frequency/Number of Accidents this Shift:    PUJA Bowel Management  Level of Assistance:  Frequency/Number of Accidents this Shift:    PUJA Bed/Chair/Wheelchair Transfer:  PUJA Toilet Transfer:  PUJA Tub/Shower Transfer:    Previously Documented Mode of Locomotion at Discharge:  PUJA Expected Mode of Locomotion at Discharge:  PUJA Walk/Wheelchair:  PUJA Stairs:    PUJA Comprehension:  Both ( auditory and visual) modes of comprehension are used  equally. Patient does not comprehend complex/abstract information in their  primary language without assistance from a helper. Comprehension Score = 3,  Moderate Prompting. Patient comprehends basic daily needs or ideas 50-74% of the  time. Patient requires moderate/some prompting. No assistive devices were  required. confused at times  PUJA Expression:  Both ( vocal and non-vocal) modes of expression are used  equally. Patient does not express complex/abstract information in their primary  language without a helper. Expression Score = 3, Moderate Prompting. Patient  expresses basic daily needs or ideas 50-74% of the time. Patient requires  moderate/some prompting. No assistive devices were required.  PUJA Social Interaction:  Social Interaction Score = 3, Moderate Direction.  Patient interacts appropriately 50-74% of the time.  Patient requires  moderate/some direction for the following behavior(s): confused at times  PUJA Problem Solving:  Patient does not make appropriate decisions in order to  solve complex problems without assistance from a helper. Problem Solving Score =  3, Moderate Direction. Patient makes appropriate decisions in order to solve  routine problems 50-74% of the time. Patient requires moderate/some direction  for the following  behavior(s): Decreased awareness of performance. confused  PUJA Memory:  Memory Score = 3, Moderate Prompting. Patient recognizes and  remembers 50-74% of the time. Patient requires moderate/some prompting  for  memory for the following: confused at times    Therapy Mode Minutes  Occupational Therapy:  Physical Therapy:  Speech Language Pathology:    Discharge Functional Goals:    Signed by: Radha Pritchett Nurse

## 2019-10-08 NOTE — PROGRESS NOTES
Inpatient Rehabilitation Functional Measures Assessment    Functional Measures  PUJA Eating:  PUJA Grooming: Patient requires minimal assistance for washing, rinsing and  drying the face. Patient requires minimal assistance for washing, rinsing and  drying the hands. Brushing teeth was not observed for this patient. Patient  requires minimal assistance for brushing/combing hair. Shaving or applying  makeup was not observed for this patient. Patient performs 0 -  24% of grooming  tasks.  Grooming Score = 1, Total Assistance. No assistive devices were  required.  PUJA Bathing:  Patient bathed in bed. Patient requires minimal assistance for  washing, rinsing, or drying the right arm. Patient requires minimal assistance  for washing, rinsing, or drying the left arm. Patient requires minimal  assistance for washing, rinsing, or drying the chest. Patient requires minimal  assistance for washing, rinsing, or drying the abdomen. Patient requires minimal  assistance for washing, rinsing, or drying the perineal area. Patient requires  total assistance for washing, rinsing, or drying the buttocks. Patient requires  total assistance for washing, rinsing, or drying the right upper leg. Patient  requires total assistance for washing, rinsing, or drying the left upper leg.  Patient requires total assistance for washing, rinsing, or drying the right  lower leg, including the foot. Patient requires total assistance for washing,  rinsing, or drying the left lower leg, including the foot. Patient performs 0 -  24% of bathing tasks.  Bathing Score = 1, Total Assistance. No assistive devices  were required.  PUJA Upper Body Dressing:  Upper Body Dressing was not observed this shift  because patient dressed/undressed in pajamas/gown only. .  PUJA Lower Body Dressing:  Lower Body Dressing was not observed this shift  because patient dressed/undressed in pajamas/gown only.  PUJA Toileting:  Patient requires maximal assistance for adjusting  clothing  before using a toilet, commode, bedpan, or urinal. Patient requires maximal  assistance for hygiene. Patient requires maximal assistance for adjusting  clothing after using a toilet, commode, bedpan, or urinal. Patient performs 0 -  24% of toileting tasks.  Toileting Score = 1, Total Assistance. Patient requires  the following assistive device(s): Arm rest of a specialized seat. Grab bar.    PUJA Bladder Management  Level of Assistance:  Bladder Score = 5.  Patient is supervision/set-up for  bladder management, requiring: Emptying equipment. Setting out equipment. Stand  by assistance. Verbal cuing, prompting, or instructing. Patient requires the  following assistive device(s):  Adult brief. Absorbent pads. pt is inc she wears  briefs  Frequency/Number of Accidents this Shift:  Bladder accidents this shift:  3 .    PUJA Bowel Management  Level of Assistance: Bowel Score = 7.  Patient is completely independent for  bowel management.  Patient did not have bowel movement.  No  medication/intervention was provided.  Frequency/Number of Accidents this Shift: Bowel accidents this shift: 0 .  Patient has not had an accident this shift.    PUJA Bed/Chair/Wheelchair Transfer:  Activity was not observed.  PUJA Toilet Transfer:  Activity was not observed. pt is inc wears briefs  PUJA Tub/Shower Transfer:    Previously Documented Mode of Locomotion at Discharge:  [place] [facility] Expected Mode of Locomotion at Discharge:  PUJA Walk/Wheelchair:  PUJA Stairs:    PUJA Comprehension:  PUJA Expression:  PUJA Social Interaction:  PUJA Problem Solving:  PUJA Memory:    Therapy Mode Minutes  Occupational Therapy:  Physical Therapy:  Speech Language Pathology:    Discharge Functional Goals:    Signed by: JAI Guerrero

## 2019-10-08 NOTE — SIGNIFICANT NOTE
10/08/19 1500   Plan   Plan Spoke to pt about plans for discharge on 10-16-19 if she has an adult caregiver to stay with her.  Explained to her grandchildren are under age and she would need an adult to stay with her for safety.  Informed her SS left message for son and will need to talk to him about discharge plans.

## 2019-10-09 LAB
GLUCOSE BLDC GLUCOMTR-MCNC: 209 MG/DL (ref 70–130)
GLUCOSE BLDC GLUCOMTR-MCNC: 215 MG/DL (ref 70–130)
GLUCOSE BLDC GLUCOMTR-MCNC: 272 MG/DL (ref 70–130)
GLUCOSE BLDC GLUCOMTR-MCNC: 59 MG/DL (ref 70–130)
GLUCOSE BLDC GLUCOMTR-MCNC: 85 MG/DL (ref 70–130)

## 2019-10-09 PROCEDURE — 97535 SELF CARE MNGMENT TRAINING: CPT

## 2019-10-09 PROCEDURE — 97530 THERAPEUTIC ACTIVITIES: CPT

## 2019-10-09 PROCEDURE — 63710000001 DIPHENHYDRAMINE PER 50 MG: Performed by: FAMILY MEDICINE

## 2019-10-09 PROCEDURE — 63710000001 INSULIN ASPART PER 5 UNITS: Performed by: FAMILY MEDICINE

## 2019-10-09 PROCEDURE — 82962 GLUCOSE BLOOD TEST: CPT

## 2019-10-09 PROCEDURE — 97110 THERAPEUTIC EXERCISES: CPT

## 2019-10-09 PROCEDURE — 97116 GAIT TRAINING THERAPY: CPT

## 2019-10-09 PROCEDURE — 63710000001 INSULIN DETEMIR PER 5 UNITS: Performed by: FAMILY MEDICINE

## 2019-10-09 RX ADMIN — METOPROLOL TARTRATE 100 MG: 100 TABLET, FILM COATED ORAL at 21:19

## 2019-10-09 RX ADMIN — ASPIRIN 81 MG: 81 TABLET, COATED ORAL at 08:50

## 2019-10-09 RX ADMIN — APIXABAN 5 MG: 5 TABLET, FILM COATED ORAL at 21:19

## 2019-10-09 RX ADMIN — AMLODIPINE BESYLATE 10 MG: 10 TABLET ORAL at 08:50

## 2019-10-09 RX ADMIN — DIPHENHYDRAMINE HYDROCHLORIDE 25 MG: 25 CAPSULE ORAL at 21:19

## 2019-10-09 RX ADMIN — INSULIN DETEMIR 8 UNITS: 100 INJECTION, SOLUTION SUBCUTANEOUS at 21:20

## 2019-10-09 RX ADMIN — Medication 100 MG: at 08:50

## 2019-10-09 RX ADMIN — INSULIN ASPART 3 UNITS: 100 INJECTION, SOLUTION INTRAVENOUS; SUBCUTANEOUS at 17:48

## 2019-10-09 RX ADMIN — APIXABAN 5 MG: 5 TABLET, FILM COATED ORAL at 08:50

## 2019-10-09 RX ADMIN — LINAGLIPTIN 5 MG: 5 TABLET, FILM COATED ORAL at 08:50

## 2019-10-09 RX ADMIN — PANTOPRAZOLE SODIUM 40 MG: 40 TABLET, DELAYED RELEASE ORAL at 05:15

## 2019-10-09 RX ADMIN — MONTELUKAST SODIUM 10 MG: 10 TABLET, COATED ORAL at 21:20

## 2019-10-09 RX ADMIN — INSULIN ASPART 4 UNITS: 100 INJECTION, SOLUTION INTRAVENOUS; SUBCUTANEOUS at 12:05

## 2019-10-09 RX ADMIN — MAGNESIUM HYDROXIDE 10 ML: 2400 SUSPENSION ORAL at 21:19

## 2019-10-09 RX ADMIN — METOPROLOL TARTRATE 100 MG: 100 TABLET, FILM COATED ORAL at 08:50

## 2019-10-09 NOTE — PLAN OF CARE
Problem: Fall Risk (Adult)  Goal: Absence of Fall  Outcome: Ongoing (interventions implemented as appropriate)   10/09/19 1137   Fall Risk (Adult)   Absence of Fall making progress toward outcome       Problem: Skin Injury Risk (Adult)  Goal: Skin Health and Integrity  Outcome: Outcome(s) achieved Date Met: 10/09/19      Problem: Functional Mobility Impairment (IRF) (Adult)  Goal: Optimal/Safe Level of [place] with Mobility  Outcome: Ongoing (interventions implemented as appropriate)      Problem: Diabetes, Type 2 (Adult)  Goal: Signs and Symptoms of Listed Potential Problems Will be Absent, Minimized or Managed (Diabetes, Type 2)  Outcome: Ongoing (interventions implemented as appropriate)      Problem: Safety Awareness Impairment (IRF) (Adult)  Goal: Optimal Level of Safety Awareness, All Environments  Outcome: Ongoing (interventions implemented as appropriate)

## 2019-10-09 NOTE — PROGRESS NOTES
"     LOS: 8 days     Chief Complaint:  Debility    Subjective     Interval History:     No SOA / chest pain or palpitations.  SBP running 59's to 330's.  No N/V      Objective     Vital Signs  /58 (BP Location: Right arm, Patient Position: Lying)   Pulse 60   Temp 97.5 °F (36.4 °C) (Oral)   Resp 18   Ht 152.4 cm (60\")   Wt 67 kg (147 lb 11.3 oz)   SpO2 93%   BMI 28.85 kg/m²   Intake & Output (last day)       10/08 0701 - 10/09 0700 10/09 0701 - 10/10 0700    P.O. 1080     Total Intake(mL/kg) 1080 (16.1)     Net +1080           Urine Unmeasured Occurrence 8 x 1 x            Physical Exam:     General Appearance:    Alert, cooperative, in no acute distress. Thin, frail and interactive   Head:    Normocephalic, without obvious abnormality, atraumatic   Eyes:            Lids and lashes normal, conjunctivae and sclerae normal, no   icterus, no pallor, corneas clear, PERRLA   Ears:    Ears appear intact with no abnormalities noted       Neck:   No adenopathy, supple, trachea midline, no thyromegaly, no   carotid bruit, no JVD   Lungs:     Clear to auscultation,respirations regular, even and                  unlabored    Heart:    Irreg irreg rhythm and normal rate, normal S1 and S2, no            murmur, no gallop, no rub, no click   Chest Wall:    No abnormalities observed   Abdomen:     Normal bowel sounds, no masses, no organomegaly, soft        non-tender, non-distended, no guarding, no rebound                tenderness   Extremities:   Moves all extremities well, no edema, no cyanosis, no             Redness    Is a faint rash on her trunk that is macular in appearance                        Results Review:    Lab Results   Component Value Date    WBC 6.52 10/05/2019    HGB 10.2 (L) 10/05/2019    HCT 32.7 (L) 10/05/2019    MCV 89.6 10/05/2019     10/05/2019       Lab Results   Component Value Date    GLUCOSE 367 (H) 10/05/2019    BUN 45 (H) 10/05/2019    CREATININE 1.47 (H) 10/05/2019    " EGFRIFNONA 35 (L) 10/05/2019    BCR 30.6 (H) 10/05/2019     (L) 10/05/2019    K 5.0 10/05/2019     10/05/2019    CO2 17.4 (L) 10/05/2019    CALCIUM 8.0 (L) 10/05/2019    PROTENTOTREF 5.2 (L) 02/16/2019    ALBUMIN 2.84 (L) 10/02/2019    LABIL2 1.1 02/16/2019    AST 13 10/02/2019    ALT 7 10/02/2019     Lab Results   Component Value Date    INR 0.95 09/22/2019    INR 0.98 02/20/2019    INR 0.95 02/19/2019       Glucose   Date Value Ref Range Status   10/09/2019 272 (H) 70 - 130 mg/dL Final   10/09/2019 85 70 - 130 mg/dL Final   10/09/2019 59 (L) 70 - 130 mg/dL Final   10/08/2019 337 (H) 70 - 130 mg/dL Final   10/08/2019 107 70 - 130 mg/dL Final          Medication Review:     Current Facility-Administered Medications:   •  amLODIPine (NORVASC) tablet 10 mg, 10 mg, Oral, Q24H, Kreis, Samuel Duane, MD, 10 mg at 10/09/19 0850  •  apixaban (ELIQUIS) tablet 5 mg, 5 mg, Oral, Q12H, Kreis, Samuel Duane, MD, 5 mg at 10/09/19 0850  •  aspirin EC tablet 81 mg, 81 mg, Oral, Daily, Kreis, Samuel Duane, MD, 81 mg at 10/09/19 0850  •  bisacodyl (DULCOLAX) suppository 10 mg, 10 mg, Rectal, Daily PRN, Kreis, Samuel Duane, MD  •  dextrose (D50W) 25 g/ 50mL Intravenous Solution 25 g, 25 g, Intravenous, Q15 Min PRN, Kreis, Samuel Duane, MD  •  dextrose (GLUTOSE) oral gel 15 g, 15 g, Oral, Q15 Min PRN, Kreis, Samuel Duane, MD  •  diphenhydrAMINE (BENADRYL) capsule 25 mg, 25 mg, Oral, Q6H PRN, Kreis, Samuel Duane, MD, 25 mg at 10/08/19 2133  •  glucagon (human recombinant) (GLUCAGEN DIAGNOSTIC) injection 1 mg, 1 mg, Subcutaneous, Q15 Min PRN, Kreis, Samuel Duane, MD  •  insulin aspart (novoLOG) injection 0-7 Units, 0-7 Units, Subcutaneous, TID With Meals, Kreis, Samuel Duane, MD, 4 Units at 10/09/19 1205  •  insulin detemir (LEVEMIR) injection 10 Units, 10 Units, Subcutaneous, Nightly, Kreis, Samuel Duane, MD, 10 Units at 10/08/19 2134  •  linagliptin (TRADJENTA) tablet 5 mg, 5 mg, Oral, Daily, Kreis, Samuel Duane, MD, 5 mg at  10/09/19 0850  •  magnesium hydroxide (MILK OF MAGNESIA) suspension 2400 mg/10mL 10 mL, 10 mL, Oral, Daily PRN, Kreis, Samuel Duane, MD  •  metoprolol tartrate (LOPRESSOR) tablet 100 mg, 100 mg, Oral, Q12H, Kreis, Samuel Duane, MD, 100 mg at 10/09/19 0850  •  montelukast (SINGULAIR) tablet 10 mg, 10 mg, Oral, Nightly, Kreis, Samuel Duane, MD, 10 mg at 10/08/19 2133  •  ondansetron (ZOFRAN) tablet 4 mg, 4 mg, Oral, Q6H PRN **OR** ondansetron (ZOFRAN) injection 4 mg, 4 mg, Intravenous, Q6H PRN, Kreis, Samuel Duane, MD  •  pantoprazole (PROTONIX) EC tablet 40 mg, 40 mg, Oral, Q AM, Kreis, Samuel Duane, MD, 40 mg at 10/09/19 0515  •  thiamine (VITAMIN B-1) tablet 100 mg, 100 mg, Oral, Daily, Kreis, Samuel Duane, MD, 100 mg at 10/09/19 0850      Assessment/Plan     Debility secondary to what seems most consistent with hypoxic encephalopathy  Type 2 diabetes mellitus with recent history of DKA with hyperglycemia  Known coronary artery disease with history of non-ST elevation acute myocardial infarction  Chronic hypoxic respiratory failure  Urinary tract infection status post treatment  Chronic atrial fibrillation  Hypertension  Stage III chronic kidney disease       PT and OT     Eliquis twice daily due to full dose anticoagulation due to A. fib     Levemir decrease to 8 units nightly.  Continue Tradjenta 5 mg daily. SS insulin tid with meals     Continue amlodipine 10 mg daily and continue metoprolol 100 mg twice daily.      Protonix once daily        Samuel Duane Kreis, MD  10/09/19  1:15 PM

## 2019-10-09 NOTE — PROGRESS NOTES
Inpatient Rehabilitation Functional Measures Assessment    Functional Measures  PUJA Eating:  Eating Score = 5. Patient is supervision/set-up for eating,  requiring: Opening containers. Verbal cuing, prompting, or instructing. No  assistive devices were required.  PUJA Grooming: Patient requires minimal assistance for washing, rinsing and  drying the face. Patient requires minimal assistance for washing, rinsing and  drying the hands. Patient requires minimal assistance for brushing teeth.  Patient requires minimal assistance for brushing/combing hair. Patient requires  no physical assistance for shaving or applying makeup. Patient performs 20 -  24% of grooming tasks.  Grooming Score = 1, Total Assistance. No assistive  devices were required.  PUJA Bathing:  Patient took sponge bath. Patient requires minimal assistance for  washing, rinsing, or drying the right arm. Patient requires minimal assistance  for washing, rinsing, or drying the left arm. Patient requires minimal  assistance for washing, rinsing, or drying the chest. Patient requires minimal  assistance for washing, rinsing, or drying the abdomen. Patient requires  moderate assistance for washing, rinsing, or drying the perineal area. Patient  requires total assistance for washing, rinsing, or drying the buttocks. Patient  requires total assistance for washing, rinsing, or drying the right upper leg.  Patient requires total assistance for washing, rinsing, or drying the left upper  leg. Patient requires total assistance for washing, rinsing, or drying the right  lower leg, including the foot. Patient requires total assistance for washing,  rinsing, or drying the left lower leg, including the foot. Patient performs 0 -  24% of bathing tasks.  Bathing Score = 1, Total Assistance. No assistive devices  were required.  PUJA Upper Body Dressing:  Patient requires total assistance for gathering  clothes. Wearing a bra or undershirt was not applicable for this  patient.  Patient requires minimal/incidental assistance for threading the right arm  through the garment (shirt/sweater). Patient requires minimal/incidental  assistance for threading the left arm through the garment (shirt/sweater).  Patient requires minimal/incidental physical assistance for pulling an  over-head-garment over head or pulling front-fastening-garment around back.  Patient requires maximal/significant physical assistance for holding clothing  and/or pulling an over-head-garment down the trunk or adjusting/fastening  together a front-fastening-garment. Patient performs 70 % of upper body dressing  tasks. Upper Body Dressing Score = 3, Moderate Assistance. No assistive devices  were required.  PUJA Lower Body Dressing:  Patient requires total assistance for gathering  clothes. Wearing underwear or an undergarment is not applicable for this  patient. Patient requires moderate/considerable physical assistance for  threading the right leg through the pants/skirt. Patient requires  moderate/considerable physical assistance for threading the left leg through the  pants/skirt. Patient requires moderate/considerable physical assistance for  pulling pants/skirt over hips and adjusting fasteners. Patient requires total  assistance for holding clothing and/or donning and/or doffing right sock.  Patient requires total assistance for holding clothing and/or donning and/or  doffing left sock. Donning and/or doffing right shoe is not applicable for this  patient. Donning and/or doffing left shoe is not applicable for this patient.  Patient performs 25 % of lower body dressing tasks. Lower Body Dressing Score =  2, Maximal Assistance. No assistive devices were required.  PUJA Toileting:  Patient requires moderate assistance for adjusting clothing  before using a toilet, commode, bedpan, or urinal. Patient requires moderate  assistance for hygiene. Patient requires moderate assistance for adjusting  clothing after  using a toilet, commode, bedpan, or urinal. Patient performs 0 -  24% of toileting tasks.  Toileting Score = 1, Total Assistance. Patient requires  the following assistive device(s): Grab bar. Adaptive device to maintain  balance.    PUJA Bladder Management  Level of Assistance:  Bladder Score = 2. Patient performs 25-49% of tasks and  requires maximal assistance for bladder management. [place] provides most of the  assist to apply AND remove brief.  Frequency/Number of Accidents this Shift:  Bladder accidents this shift:  4 .    PUJA Bowel Management  Level of Assistance: Bowel Score = 2. Patient performs 25-49% of tasks and  requires maximal assistance for bowel management. [place] provides most of the  assist to apply AND remove brief.  Frequency/Number of Accidents this Shift: Bowel accidents this shift: 0 .  Patient has not had an accident, but used a device/medication this shift  requiring: breif and over toilet seat .    PUJA Bed/Chair/Wheelchair Transfer:  Bed/chair/wheelchair Transfer Score = 3.  Patient performs 50-74% of effort and requires moderate assistance (some  lifting) for transferring to and from the bed/chair/wheelchair. Patient requires  the following assistive device(s): Grab bars. Bed rails. Walker.  PUJA Toilet Transfer:  Toilet Transfer Score = 3.  Patient performs 50-74% of  effort and requires moderate assistance (some lifting) for transferring to and  from the toilet/commode. Patient requires the following assistive device(s):  Safety frame/over the toilet. Grab bars.  PUJA Tub/Shower Transfer:    Previously Documented Mode of Locomotion at Discharge:  [facility] Expected Mode of Locomotion at Discharge:  [facility] Walk/Wheelchair:  PUJA Stairs:    PUJA Comprehension:  PUJA Expression:  PUJA Social Interaction:  PUJA Problem Solving:  PUJA Memory:    Therapy Mode Minutes  Occupational Therapy:  Physical Therapy:  Speech Language Pathology:    Discharge Functional Goals:    Signed by: JAI Wilcox

## 2019-10-09 NOTE — PLAN OF CARE
Problem: Patient Care Overview  Goal: Plan of Care Review  Outcome: Ongoing (interventions implemented as appropriate)   10/09/19 1529   Patient Care Overview   IRF Plan of Care Review progress ongoing, continue   Coping/Psychosocial   Plan of Care Reviewed With patient

## 2019-10-09 NOTE — THERAPY TREATMENT NOTE
Inpatient Rehabilitation - Occupational Therapy Treatment Note    KOKO Hart     Patient Name: Ashley Geronimo  : 1945  MRN: [id number]    Today's Date: 10/9/2019                 Admit Date: 10/1/2019      Visit Dx:  No diagnosis found.    Patient Active Problem List   Diagnosis   • Pneumonia   • Septic shock (CMS/[facility])   • Pseudomonas pneumonia (CMS/[facility])   • NSTEMI (non-ST elevated myocardial infarction) (CMS/[facility])   • Bradycardia   • Paroxysmal atrial fibrillation (CMS/H[facility])   • Chronic respiratory failure with hypoxia and hypercapnia (CMS/HCC)   • Acute on chronic respiratory failure with hypoxia (CMS/[facility]), requiring intubation/mechanical ventilation 19   • Hematemesis   • RAMESH (acute kidney injury) (CMS/[facility])   • DKA (diabetic ketoacidoses) (CMS/[facility])   • Septic shock (CMS/[facility])   • Encephalopathy   • UTI (urinary tract infection)   • Tracheobronchitis, PSA    • Metabolic encephalopathy         Therapy Treatment    IRF Treatment Summary     Row Name 10/09/19 1530 10/09/19 1225          Evaluation/Treatment Time and Intent    Subjective Information  no complaints  -AB  no complaints agreeable to therapy  -CJ     Existing Precautions/Restrictions  fall;other (see comments) decr. skin integrity  -AB  fall decreased skin integrity  -CJ     Document Type  therapy note (daily note)  -AB  progress note/recertification;therapy note (daily note)  -CJ     Mode of Treatment  occupational therapy  -AB  occupational therapy  -CJ     Recorded by [AB] Jennifer Smith OT [CJ] Laya Harrison OT     Row Name 10/09/19 1530 10/09/19 1225          Cognition/Psychosocial- PT/OT    Affect/Mental Status (Cognitive)  confused  -AB  confused  -CJ     Follows Commands (Cognition)  physical/tactile prompts required;verbal cues/prompting required  -AB  verbal cues/prompting required;repetition of directions required;initiation impaired;increased processing time needed  -CJ     Attention Deficit (Cognitive)  requires  cues/redirection to task  -AB  requires cues/redirection to task  -CJ     Recorded by [AB] Jennifer Smith OT [CJ] Laya Harrison, OT     Row Name 10/09/19 1225             Toilet Transfer    [place] Level (Toilet Transfer)  contact guard;verbal cues  -CJ      Assistive Device (Toilet Transfer)  grab bars/safety frame  -CJ      Recorded by [CJ] Laya Harrison, OT      Row Name 10/09/19 1225             Bathing Assessment/Treatment    Comment (Bathing)  Bart  -CJ      Recorded by [CJ] Laya Harrison, OT      Row Name 10/09/19 1225             Upper Body Dressing Assessment/Treatment    Comment (Upper Body Dressing)  Set up/ supervision  -CJ      Recorded by [CJ] Laya Harrison, OT      Row Name 10/09/19 1225             Lower Body Dressing Assessment/Treatment    Comment (Lower Body Dressing)  Bart  -CJ      Recorded by [CJ] Laya Harrison, OT      Row Name 10/09/19 1225             Grooming Assessment/Treatment    Comment (Grooming)  Set up/ supervision  -CJ      Recorded by [CJ] Laya Harrison, OT      Row Name 10/09/19 1225             Toileting Assessment/Treatment    Assistive Device Use (Toileting)  grab bar/safety frame  -CJ      Comment (Toileting)  Bart  -CJ      Recorded by [CJ] Laya Harrison, OT      Row Name 10/09/19 1225             Self-Feeding Assessment/Treatment    Comment (Self-Feeding)  Set up  -CJ      Recorded by [CJ] Laya Harrison, OT      Row Name 10/09/19 1530 10/09/19 1225          Upper Extremity Seated Therapeutic Exercise    Exercise Type, Seated Upper Extremity (Therapeutic Exercise)  AROM (active range of motion) BUE G/FMC TherEx/Act; fine grasp tasks; strengthening  -AB  AROM (active range of motion) BUE ther ex/act, bilat coord ex, GMC/FMC  -CJ     Expected Outcomes, Seated Upper Extremity (Therapeutic Exercise)  improve functional tolerance, self-care activity;improve performance, BADLs;improve performance, transfer skills;improve performance, fine  motor coordination skills  -AB  improve functional tolerance, self-care activity;improve performance, BADLs;improve performance, transfer skills  -CJ     Recorded by [AB] Jennifer Smith OT [CJ] Laya Harrison OT     Row Name 10/09/19 1530 10/09/19 1225          Positioning and Restraints    Post Treatment Position  wheelchair  -AB  wheelchair  -CJ     In Wheelchair  sitting;with PT PM  -AB  sitting;call light within reach;encouraged to call for assist;notified nsg  -CJ     Recorded by [AB] Jennifer Smith OT [CJ] Laya Harrison OT       User Key  (r) = Recorded By, (t) = Taken By, (c) = Cosigned By    Initials Name Effective Dates    AB Jennifer Smith OT 04/03/18 -     CJ Laya Harrison OT 04/03/18 -           Wound 09/29/19 Right lateral clavicle Puncture (Active)   Dressing Appearance other (see comments) 10/8/2019  7:17 PM       Wound 10/01/19 1702 Bilateral other (see comments) coccyx MASD (Moisutre associated skin damage) (Active)   Dressing Appearance open to air 10/9/2019  7:25 AM   Closure None 10/8/2019  7:17 PM   Base red 10/9/2019  7:25 AM   Periwound excoriated 10/8/2019  7:17 PM   Drainage Amount none 10/8/2019  7:17 PM   Care, Wound other (see comments) 10/8/2019  7:17 PM   Dressing Care, Wound open to air 10/9/2019  7:25 AM         OT Recommendation and Plan         Plan of Care Review  Plan of Care Reviewed With: patient  Plan of Care Reviewed With: patient  IRF Plan of Care Review: progress ongoing, continue    OT IRF GOALS     Row Name 10/09/19 1231 10/09/19 1230 10/02/19 1521       Bathing Goal 1 (OT-IRF)    [place] Level (Bathing Goal 1, OT-IRF)  --  --  minimum assist (75% or more patient effort)  -    Time Frame (Bathing Goal 1, OT-IRF)  --  --  long term goal (LTG);by discharge  -       LB Dressing Goal 1 (OT-IRF)    [place] (LB Dressing Goal 1, OT-IRF)  contact guard assist  -CJ  --  moderate assist (50-74% patient effort)  -CJ    Time Frame  (LB Dressing Goal 1, OT-IRF)  short term goal (STG)  -CJ  --  short term goal (STG)  -CJ    Progress/Outcomes (LB Dressing Goal 1, OT-IRF)  --  goal met  -CJ  --       LB Dressing Goal 2 (OT-IRF)    [place] (LB Dressing Goal 2, OT-IRF)  --  --  minimum assist (75% or more patient effort)  -    Time Frame (LB Dressing Goal 2, OT-IRF)  --  --  long term goal (LTG);by discharge  -       Toileting Goal 1 (OT-IRF)    [place] Level (Toileting Goal 1, OT-IRF)  contact guard assist  -  --  maximum assist (25-49% patient effort)  -    Time Frame (Toileting Goal 1, OT-IRF)  short term goal (STG)  -CJ  --  short term goal (STG)  -CJ    Progress/Outcomes (Toileting Goal 1, OT-IRF)  --  goal met  -CJ  --       Toileting Goal 2 (OT-IRF)    [place] Level (Toileting Goal 2, OT-IRF)  --  --  moderate assist (50-74% patient effort)  -    Time Frame (Toileting Goal 2, OT-IRF)  --  --  long term goal (LTG);by discharge  -      User Key  (r) = Recorded By, (t) = Taken By, (c) = Cosigned By    Initials Name Provider Type    Laya Yusuf OT Occupational Therapist          Occupational Therapy Education     Title: PT OT SLP Therapies (Not Started)     Topic: Occupational Therapy (In Progress)     Point: ADL training (In Progress)     Description: Instruct learner(s) on proper safety adaptation and remediation techniques during self care or transfers.   Instruct in proper use of assistive devices.    Learning Progress Summary           Patient Acceptance, E,D, NR by AB at 10/9/2019  3:29 PM    Acceptance, E,D, NR by SHARON at 10/9/2019 12:30 PM    Acceptance, E,D, NR by JONATHAN at 10/8/2019 10:10 AM    Acceptance, E,D, NR by SHARON at 10/7/2019 12:22 PM    Acceptance, E,D, NR by SHARON at 10/4/2019  3:24 PM    Acceptance, E,D, NR by SHARON at 10/3/2019  3:08 PM    Acceptance, E,D, NR by SHARON at 10/2/2019  3:17 PM                               User Key     Initials Effective Dates Name Provider Type Discipline    AB 04/03/18 -   Jennifer Smith, OT Occupational Therapist OT    SHARON 04/03/18 -  Laya Harrison OT Occupational Therapist OT    JW 03/07/18 -  Conrado James OTR Occupational Therapist OT                       Time Calculation:     Time Calculation- OT     Row Name 10/09/19 1530 10/09/19 1233          Time Calculation- OT    OT Start Time  1330  -AB  1045  -CJ     OT Stop Time  1415  -AB  1140  -CJ     OT Time Calculation (min)  45 min  -AB  55 min  -CJ     Total Timed Code Minutes- OT  45 minute(s)  -AB  55 minute(s)  -CJ     OT Non-Billable Time (min)  15 min  -AB  20 min  -       User Key  (r) = Recorded By, (t) = Taken By, (c) = Cosigned By    Initials Name Provider Type    AB Jennifer Smith, OT Occupational Therapist     Laya Harrison, LESLEY Occupational Therapist          Therapy Charges for Today     Code Description Service Date Service Provider Modifiers Qty    12736789286 HC OT THERAPEUTIC ACT EA 15 MIN 10/9/2019 Jennifer Smith OT GO 3                   Jennifer Smith OT  10/9/2019

## 2019-10-09 NOTE — PROGRESS NOTES
[facility] CMG Coordinator  Inpatient Rehabilitation Admission    Ethnic Group:  Marital Status:    IRF Admission Date:  10/01/2019  Admission Class:  Admit From:    Pre-Hospital Living:    Payment Sources:  Impairment Group:  Date of Onset of Impairment:    Etiologic Diagnosis Code(s):  Rank Code      Description  1    A41.9     Sepsis, unspecified organism    Comorbidities:  Rank Code      Description    1    E11.10    Type 2 diabetes mellitus with ketoacidosis                 without coma  2    J96.21    Acute and chronic respiratory failure with                 hypoxia  3    N39.0     Urinary tract infection, site not specified  4    I48.91    Unspecified atrial fibrillation  5    I25.10    Atherosclerotic heart disease of native                 coronary artery without angina pectoris  6    I12.9     Hypertensive chronic kidney disease with stage                 1 through stage 4 chronic kidney disease, or                 unspecified chronic kidney disease  7    E11.22    Type 2 diabetes mellitus with diabetic chronic                 kidney disease  8    N18.9     Chronic kidney disease, unspecified    Height on Admission:  Weight on Admission:    Are there any arthritis conditions recorded for Impairment Group, Etiologic  Diagnosis, or Comorbid Conditions that meet all of the regulatory requirements  for IRF classification (in 42 .29(b)(2)(x), (xi), and xii))?  No    PUJA Bladder Accidents:   - Accidents.    PUJA Bowel Accident:  -Accidents.    Signed by: Deena Urban, Supervisor

## 2019-10-09 NOTE — PLAN OF CARE
Problem: Patient Care Overview  Goal: Plan of Care Review   10/09/19 1233   Patient Care Overview   IRF Plan of Care Review progress ongoing, continue   Coping/Psychosocial   Plan of Care Reviewed With patient

## 2019-10-09 NOTE — PROGRESS NOTES
Inpatient Rehabilitation Functional Measures Assessment    Functional Measures  PUJA Eating:  PUJA Grooming:  PUJA Bathing:  PUJA Upper Body Dressing:  PUJA Lower Body Dressing:  PUJA Toileting:    PUJA Bladder Management  Level of Assistance:  Frequency/Number of Accidents this Shift:    PUJA Bowel Management  Level of Assistance:  Frequency/Number of Accidents this Shift:    PUJA Bed/Chair/Wheelchair Transfer:  Bed/chair/wheelchair Transfer Score = 4.  Patient performs 75% or more of effort and minimal assistance (little/incidental  help/lifting of one limb/steadying) for transferring to and from the  bed/chair/wheelchair, requiring: Contact guard. Patient requires the following  assistive device(s): Arm rest. Bed rails. Walker.  PUJA Toilet Transfer:  Toilet Transfer Score = 4.  Patient performs 75% or more  of effort and minimal assistance (little/incidental help/steadying) for  transferring to and from the toilet/commode, requiring: Patient requires the  following assistive device(s): Grab bars. Safety frame/over the toilet.  PUJA Tub/Shower Transfer:    Previously Documented Mode of Locomotion at Discharge:  PUJA Expected Mode of Locomotion at Discharge:  PUJA Walk/Wheelchair:  WHEELCHAIR OBSERVATION   Wheelchair did not occur.    WALK OBSERVATION   Walk Distance Scale = 3.  Distance walked is greater than 150 feet. Walk Score  = 4.  Patient performs 75% or more of effort and requires minimal assistance.  Incidental help/contact guard/steadying was provided. Patient walked a distance  of  300 feet. Patient requires the following assistive device(s): Rolling  walker.  PUJA Stairs:  Stairs did not occur.    PUJA Comprehension:  PUJA Expression:  PUJA Social Interaction:  PUJA Problem Solving:  PUJA Memory:    Therapy Mode Minutes  Occupational Therapy:  Physical Therapy: Individual: 90 minutes.  Speech Language Pathology:    Discharge Functional Goals:    Signed by: Kristen Carrera PTA

## 2019-10-09 NOTE — PROGRESS NOTES
Rehabilitation Nursing  Inpatient Rehabilitation Plan of Care Note    Plan of Care  Copy from POCBody Function Structure    Skin Integrity (Active)  Current Status (10/1/2019 5:00:00 PM): MASD to buttocks  Weekly Goal: healing of skin  Discharge Goal: healed skin    Safety    Potential for Injury (Active)  Current Status (10/1/2019 5:00:00 PM): falls risk  Weekly Goal: no falls  Discharge Goal: no falls this admission    Signed by: Johana Hurtado Nurse

## 2019-10-09 NOTE — THERAPY TREATMENT NOTE
Inpatient Rehabilitation - Occupational Therapy Progress Note     [place]     Patient Name: Ashley Geronimo  : 1945  MRN: [id number]    Today's Date: 10/9/2019                 Admit Date: 10/1/2019      Visit Dx:  No diagnosis found.    Patient Active Problem List   Diagnosis   • Pneumonia   • Septic shock (CMS/[facility])   • Pseudomonas pneumonia (CMS/[facility])   • NSTEMI (non-ST elevated myocardial infarction) (CMS/[facility])   • Bradycardia   • Paroxysmal atrial fibrillation (CMS/HCC)   • Chronic respiratory failure with hypoxia and hypercapnia (CMS/HCC)   • Acute on chronic respiratory failure with hypoxia (CMS/HCC), requiring intubation/mechanical ventilation 19   • Hematemesis   • RAMESH (acute kidney injury) (CMS/[facility])   • DKA (diabetic ketoacidoses) (CMS/[facility])   • Septic shock (CMS/[facility])   • Encephalopathy   • UTI (urinary tract infection)   • Tracheobronchitis, PSA    • Metabolic encephalopathy         Therapy Treatment    IRF Treatment Summary     Row Name 10/09/19 1225             Evaluation/Treatment Time and Intent    Subjective Information  no complaints agreeable to therapy  -CJ      Existing Precautions/Restrictions  fall decreased skin integrity  -      Document Type  progress note/recertification;therapy note (daily note)  -CJ      Mode of Treatment  occupational therapy  -CJ      Recorded by [CJ] Laya Harrison OT      Row Name 10/09/19 1225             Cognition/Psychosocial- PT/OT    Affect/Mental Status (Cognitive)  confused  -CJ      Follows Commands (Cognition)  verbal cues/prompting required;repetition of directions required;initiation impaired;increased processing time needed  -CJ      Attention Deficit (Cognitive)  requires cues/redirection to task  -CJ      Recorded by [CJ] Laya Harrison OT      Row Name 10/09/19 1225             Toilet Transfer    [place] Level (Toilet Transfer)  contact guard;verbal cues  -CJ      Assistive Device (Toilet Transfer)  grab bars/safety frame  -CJ       Recorded by [CJ] Laya Harrison, OT      Row Name 10/09/19 1225             Bathing Assessment/Treatment    Comment (Bathing)  Bart  -CJ      Recorded by [CJ] Laya Harrison, OT      Row Name 10/09/19 1225             Upper Body Dressing Assessment/Treatment    Comment (Upper Body Dressing)  Set up/ supervision  -CJ      Recorded by [CJ] Laya Harrison, OT      Row Name 10/09/19 1225             Lower Body Dressing Assessment/Treatment    Comment (Lower Body Dressing)  Bart  -CJ      Recorded by [CJ] Laya Harrison, OT      Row Name 10/09/19 1225             Grooming Assessment/Treatment    Comment (Grooming)  Set up/ supervision  -CJ      Recorded by [CJ] Laya Harrison, OT      Row Name 10/09/19 1225             Toileting Assessment/Treatment    Assistive Device Use (Toileting)  grab bar/safety frame  -CJ      Comment (Toileting)  Bart  -CJ      Recorded by [CJ] Laya Harrison, OT      Row Name 10/09/19 1225             Self-Feeding Assessment/Treatment    Comment (Self-Feeding)  Set up  -CJ      Recorded by [CJ] Laya Harrison, OT      Row Name 10/09/19 1225             Upper Extremity Seated Therapeutic Exercise    Exercise Type, Seated Upper Extremity (Therapeutic Exercise)  AROM (active range of motion) BUE ther ex/act, bilat coord ex, GMC/FMC  -CJ      Expected Outcomes, Seated Upper Extremity (Therapeutic Exercise)  improve functional tolerance, self-care activity;improve performance, BADLs;improve performance, transfer skills  -CJ      Recorded by [CJ] Laya Harrison OT      Row Name 10/09/19 1225             Positioning and Restraints    Post Treatment Position  wheelchair  -CJ      In Wheelchair  sitting;call light within reach;encouraged to call for assist;notified nsg  -CJ      Recorded by [CJ] Laya Harrison OT        User Key  (r) = Recorded By, (t) = Taken By, (c) = Cosigned By    Initials Name Effective Dates    CJ Laya Harrison OT 04/03/18 -           Wound 09/29/19 Right  lateral clavicle Puncture (Active)   Dressing Appearance other (see comments) 10/8/2019  7:17 PM       Wound 10/01/19 1702 Bilateral other (see comments) coccyx MASD (Moisutre associated skin damage) (Active)   Dressing Appearance open to air 10/9/2019  7:25 AM   Closure None 10/8/2019  7:17 PM   Base red 10/9/2019  7:25 AM   Periwound excoriated 10/8/2019  7:17 PM   Drainage Amount none 10/8/2019  7:17 PM   Care, Wound other (see comments) 10/8/2019  7:17 PM   Dressing Care, Wound open to air 10/9/2019  7:25 AM         OT Recommendation and Plan    Anticipated Equipment Needs At Discharge (OT Eval): (TBD)  Planned Therapy Interventions (OT Eval): activity tolerance training, BADL retraining, occupation/activity based interventions, ROM/therapeutic exercise, strengthening exercise, patient/caregiver education/training, transfer/mobility retraining    Plan of Care Review  Plan of Care Reviewed With: patient  Plan of Care Reviewed With: patient  IRF Plan of Care Review: progress ongoing, continue    OT IRF GOALS     Row Name 10/09/19 1231 10/09/19 1230 10/02/19 1521       Bathing Goal 1 (OT-IRF)    [place] Level (Bathing Goal 1, OT-IRF)  --  --  minimum assist (75% or more patient effort)  -CJ    Time Frame (Bathing Goal 1, OT-IRF)  --  --  long term goal (LTG);by discharge  -CJ       LB Dressing Goal 1 (OT-IRF)    [place] (LB Dressing Goal 1, OT-IRF)  contact guard assist  -CJ  --  moderate assist (50-74% patient effort)  -CJ    Time Frame (LB Dressing Goal 1, OT-IRF)  short term goal (STG)  -CJ  --  short term goal (STG)  -CJ    Progress/Outcomes (LB Dressing Goal 1, OT-IRF)  --  goal met  -CJ  --       LB Dressing Goal 2 (OT-IRF)    [place] (LB Dressing Goal 2, OT-IRF)  --  --  minimum assist (75% or more patient effort)  -CJ    Time Frame (LB Dressing Goal 2, OT-IRF)  --  --  long term goal (LTG);by discharge  -       Toileting Goal 1 (OT-IRF)    [place] Level (Toileting Goal 1, OT-IRF)   contact guard assist  -  --  maximum assist (25-49% patient effort)  -    Time Frame (Toileting Goal 1, OT-IRF)  short term goal (STG)  -  --  short term goal (STG)  -    Progress/Outcomes (Toileting Goal 1, OT-IRF)  --  goal met  -  --       Toileting Goal 2 (OT-IRF)    [place] Level (Toileting Goal 2, OT-IRF)  --  --  moderate assist (50-74% patient effort)  -    Time Frame (Toileting Goal 2, OT-IRF)  --  --  long term goal (LTG);by discharge  -      User Key  (r) = Recorded By, (t) = Taken By, (c) = Cosigned By    Initials Name Provider Type     Laya Harrison OT Occupational Therapist          Occupational Therapy Education     Title: PT OT SLP Therapies (Not Started)     Topic: Occupational Therapy (In Progress)     Point: ADL training (In Progress)     Description: Instruct learner(s) on proper safety adaptation and remediation techniques during self care or transfers.   Instruct in proper use of assistive devices.    Learning Progress Summary           Patient Acceptance, E,D, NR by  at 10/9/2019 12:30 PM    Acceptance, E,D, NR by  at 10/8/2019 10:10 AM    Acceptance, E,D, NR by  at 10/7/2019 12:22 PM    Acceptance, E,D, NR by  at 10/4/2019  3:24 PM    Acceptance, E,D, NR by  at 10/3/2019  3:08 PM    Acceptance, E,D, NR by  at 10/2/2019  3:17 PM                               User Key     Initials Effective Dates Name Provider Type Discipline     04/03/18 -  Laya Harrison OT Occupational Therapist OT    JW 03/07/18 -  Conrado James OTR Occupational Therapist OT                       Time Calculation:     Time Calculation- OT     Row Name 10/09/19 1233             Time Calculation-     OT Start Time  1045  -      OT Stop Time  1140  -      OT Time Calculation (min)  55 min  -      Total Timed Code Minutes- OT  55 minute(s)  -      OT Non-Billable Time (min)  20 min  -        User Key  (r) = Recorded By, (t) = Taken By, (c) = Cosigned By    Initials Name  Provider Type    CJ Laya Harrison, OT Occupational Therapist          Therapy Charges for Today     Code Description Service Date Service Provider Modifiers Qty    41582094073 HC OT SELF CARE/MGMT/TRAIN EA 15 MIN 10/9/2019 Laya Harrison, OT GO 2    13299272282 HC OT THERAPEUTIC ACT EA 15 MIN 10/9/2019 Laya Harrison, OT GO 2                   Laya Harrison OT  10/9/2019

## 2019-10-09 NOTE — PROGRESS NOTES
Inpatient Rehabilitation Functional Measures Assessment and Plan of Care    Plan of Care      Functional Measures  PUJA Eating:  PUJA Grooming:  PUJA Bathing:  PUJA Upper Body Dressing:  PUJA Lower Body Dressing:  PUJA Toileting:  Patient requires maximal assistance for adjusting clothing  before using a toilet, commode, bedpan, or urinal. Patient requires maximal  assistance for hygiene. Patient requires maximal assistance for adjusting  clothing after using a toilet, commode, bedpan, or urinal. Patient performs 0 -  24% of toileting tasks.  Toileting Score = 1, Total Assistance. Patient requires  the following assistive device(s): Grab bar. Arm rest of a specialized seat.    PUJA Bladder Management  Level of Assistance:  Bladder Score = 5.  Patient is supervision/set-up for  bladder management, requiring: Emptying equipment. Setting out equipment. Stand  by assistance. Patient requires the following assistive device(s):  Adult brief.  pt is inc. she wearfs briefs  Frequency/Number of Accidents this Shift:  Bladder accidents this shift:  3 .    PUJA Bowel Management  Level of Assistance: Bowel Score = 7.  Patient is completely independent for  bowel management.  Patient did not have bowel movement.  No  medication/intervention was provided.  Frequency/Number of Accidents this Shift: Bowel accidents this shift: 0 .  Patient has not had an accident this shift.    PUJA Bed/Chair/Wheelchair Transfer:  Activity was not observed.  PUJA Toilet Transfer:  Activity was not observed. pt is inc, she wears brfiefs  PUJA Tub/Shower Transfer:    Previously Documented Mode of Locomotion at Discharge:  [facility] Expected Mode of Locomotion at Discharge:  PUJA Walk/Wheelchair:  PUJA Stairs:    PUJA Comprehension:  PUJA Expression:  [facility] Social Interaction:  [facility] Problem Solving:  PUJA Memory:    Therapy Mode Minutes  Occupational Therapy:  Physical Therapy:  Speech Language Pathology:    Discharge Functional Goals:    Signed by: JAI Guerrero

## 2019-10-09 NOTE — PLAN OF CARE
Problem: Patient Care Overview  Goal: Plan of Care Review  Outcome: Ongoing (interventions implemented as appropriate)    Goal: Individualization and Mutuality  Outcome: Ongoing (interventions implemented as appropriate)      Problem: Fall Risk (Adult)  Goal: Absence of Fall  Outcome: Ongoing (interventions implemented as appropriate)      Problem: Wound (Includes Pressure Injury) (Adult)  Goal: Signs and Symptoms of Listed Potential Problems Will be Absent, Minimized or Managed (Wound)  Outcome: Ongoing (interventions implemented as appropriate)      Problem: Functional Mobility Impairment (IRF) (Adult)  Goal: Optimal/Safe Level of [place] with Mobility  Outcome: Ongoing (interventions implemented as appropriate)      Problem: Diabetes, Type 2 (Adult)  Goal: Signs and Symptoms of Listed Potential Problems Will be Absent, Minimized or Managed (Diabetes, Type 2)  Outcome: Ongoing (interventions implemented as appropriate)      Problem: Safety Awareness Impairment (IRF) (Adult)  Goal: Optimal Level of Safety Awareness, All Environments  Outcome: Ongoing (interventions implemented as appropriate)

## 2019-10-09 NOTE — PLAN OF CARE
Problem: Patient Care Overview  Goal: Plan of Care Review   10/09/19 0914   Patient Care Overview   IRF Plan of Care Review progress ongoing, continue   Progress, Functional Goals demonstrating adequate progress   Coping/Psychosocial   Plan of Care Reviewed With patient   OTHER   Outcome Summary Patient conitnues to require skilled care per POC for further improved functional mobility and strengthening to ensure maximum safe function upon D/C.

## 2019-10-09 NOTE — PLAN OF CARE
Problem: Patient Care Overview  Goal: Individualization and Mutuality  Outcome: Ongoing (interventions implemented as appropriate)      Problem: Fall Risk (Adult)  Goal: Absence of Fall  Outcome: Ongoing (interventions implemented as appropriate)      Problem: Skin Injury Risk (Adult)  Goal: Skin Health and Integrity  Outcome: Ongoing (interventions implemented as appropriate)      Problem: Wound (Includes Pressure Injury) (Adult)  Goal: Signs and Symptoms of Listed Potential Problems Will be Absent, Minimized or Managed (Wound)  Outcome: Ongoing (interventions implemented as appropriate)      Problem: Functional Mobility Impairment (IRF) (Adult)  Goal: Optimal/Safe Level of [place] with Mobility  Outcome: Ongoing (interventions implemented as appropriate)      Problem: Diabetes, Type 2 (Adult)  Goal: Signs and Symptoms of Listed Potential Problems Will be Absent, Minimized or Managed (Diabetes, Type 2)  Outcome: Ongoing (interventions implemented as appropriate)      Problem: Safety Awareness Impairment (IRF) (Adult)  Goal: Optimal Level of Safety Awareness, All Environments  Outcome: Ongoing (interventions implemented as appropriate)

## 2019-10-09 NOTE — PROGRESS NOTES
Inpatient Rehabilitation Functional Measures Assessment    Functional Measures  PUJA Eating:  PUJA Grooming:  PUJA Bathing:  PUJA Upper Body Dressing:  PUJA Lower Body Dressing:  PUJA Toileting:    PUJA Bladder Management  Level of Assistance:  Frequency/Number of Accidents this Shift:    PUJA Bowel Management  Level of Assistance:  Frequency/Number of Accidents this Shift:    PUJA Bed/Chair/Wheelchair Transfer:  PUJA Toilet Transfer:  PUJA Tub/Shower Transfer:    Previously Documented Mode of Locomotion at Discharge:  PUJA Expected Mode of Locomotion at Discharge:  PUJA Walk/Wheelchair:  PUJA Stairs:    PUJA Comprehension:  Both ( auditory and visual) modes of comprehension are used  equally. Patient does not comprehend complex/abstract information in their  primary language without assistance from a helper. Comprehension Score = 3,  Moderate Prompting. Patient comprehends basic daily needs or ideas 50-74% of the  time. Patient requires moderate/some prompting. Patient requires the following  assistive device(s): Glasses.  PUJA Expression:  Vocal expression is the usual mode. Patient does not express  complex/abstract information in their primary language without a helper.  Expression Score = 3, Moderate Prompting. Patient expresses basic daily needs or  ideas 50-74% of the time. Patient requires moderate/some prompting. No assistive  devices were required.  PUJA Social Interaction:  Social Interaction Score = 7, Independent. Patient is  completely independent for social interaction.  There are no activity  limitations.  PUJA Problem Solving:  Patient does not make appropriate decisions in order to  solve complex problems without assistance from a helper. Problem Solving Score =  3, Moderate Direction. Patient makes appropriate decisions in order to solve  routine problems 50-74% of the time. Patient requires moderate/some direction  for the following behavior(s): Denies presence or extent of disability.  Difficulty initiating  Patient: Maggie Wilkins                MRN: [id number]       SSN: [id number]  YOB: 1972        AGE: 54 y.o. SEX: female  Body mass index is 44.26 kg/(m^2). PCP: Reuben Mcintyre MD  11/10/17  HISTORY: Misti Muniz is here today in followup. She is losing weight. She is planning on surgery in April. She would like a cortisone injection today for the right knee for severe pain. She has pain at night and pain with activities of daily living. She denies fevers or chills. PHYSICAL EXAMINATION:  On examination today, her BMI is 44.3. The calf is nontender. There is varus malalignment. She has good sensation distally and good pulse present. PROCEDURE:  Under aseptic conditions and after informed, written consent with a time out, the right knee was injected with 1 cc of the Celestone preparation, i.e. 6 mg, which was well tolerated. PLAN:  She is going to return to see us in a week, and we can inject the left knee when she returns. REVIEW OF SYSTEMS:      CON: negative for weight loss, fever  EYE: negative for double vision  ENT: negative for hoarseness  RS:   negative for Tb  GI:    negative for blood in stool  :  negative for blood in urine  Other systems reviewed and noted below. Past Medical History:   Diagnosis Date    Diabetes ([facility] [address]     Graves disease     Tachycardia     due to Graves Disease, on Metoprolol       Family History   Problem Relation Age of Onset    No Known Problems Mother     No Known Problems Father        Current Outpatient Prescriptions   Medication Sig Dispense Refill    diclofenac (VOLTAREN) 1 % gel Apply  to affected area four (4) times daily.  ibuprofen (MOTRIN) 800 mg tablet Take 1 Tab by mouth every eight (8) hours as needed for Pain. 90 Tab 0    ibuprofen (MOTRIN) 800 mg tablet Take 1 Tab by mouth three (3) times daily (with meals). Do not take with Arthrotec 60 Tab 0    ibuprofen (MOTRIN) 800 mg tablet Take  by mouth. tasks. Difficulty weighing alternatives/making choices.  Difficulty with self-correction. Difficulty with self-monitoring. Exhibits poor  planning. Impulsivity. Poor judgment.  PUJA Memory:  Memory Score = 3, Moderate Prompting. Patient recognizes and  remembers 50-74% of the time. Patient requires moderate/some prompting  for  memory for the following: Difficulty recognizing hospital staff as persons  previously met. Difficulty remembering verbal tasks. Difficulty remembering  visual tasks. Disoriented to person, place, time or situation.    Therapy Mode Minutes  Occupational Therapy:  Physical Therapy:  Speech Language Pathology:    Discharge Functional Goals:    Signed by: Nurse Adalgisa      levonorgestrel (MIRENA) 20 mcg/24 hr (5 years) IUD 1 Each by IntraUTERine route once.  levothyroxine (SYNTHROID) 150 mcg tablet Take 150 mcg by mouth daily.  metoprolol tartrate (LOPRESSOR) 25 mg tablet Take 25 mg by mouth daily.  metFORMIN (GLUCOPHAGE) 1,000 mg tablet Take 1,000 mg by mouth daily.  calcium-vitamin D (OYSTER SHELL) 500 mg(1,250mg) -200 unit per tablet Take 1 Tab by mouth daily.  gabapentin (NEURONTIN) 100 mg capsule Take  by mouth three (3) times daily.  HYDROcodone-acetaminophen (NORCO) 7.5-325 mg per tablet Take 1 Tab by mouth every six (6) hours as needed for Pain. Max Daily Amount: 4 Tabs. 21 Tab 0    diclofenac-miSOPROStol (ARTHROTEC 75)  mg-mcg per tablet Take 1 Tab by mouth two (2) times a day. 60 Tab 0    HYDROcodone-acetaminophen (NORCO) 7.5-325 mg per tablet Take 1-2 Tabs by mouth every four (4) hours as needed for Pain. Max Daily Amount: 12 Tabs. Do not take until after surgery 40 Tab 0       No Known Allergies    Past Surgical History:   Procedure Laterality Date    HX KNEE ARTHROSCOPY      HX THYROIDECTOMY  05/13/2016       Social History     Social History    Marital status: SINGLE     Spouse name: N/A    Number of children: N/A    Years of education: N/A     Occupational History    Not on file. Social History Main Topics    Smoking status: Never Smoker    Smokeless tobacco: Never Used    Alcohol use No    Drug use: No    Sexual activity: Not on file     Other Topics Concern    Not on file     Social History Narrative       Visit Vitals    /66    Pulse 97    Temp 98.6 °F (37 °C) (Oral)    Resp 18    Ht 5' 2\" (1.575 m)    Wt 242 lb (109.8 kg)    SpO2 98%    BMI 44.26 kg/m2         PHYSICAL EXAMINATION:  GENERAL: Alert and oriented x3, in no acute distress, well-developed, well-nourished, afebrile. HEART: No JVD.   EYES: No scleral icterus   NECK: No significant lymphadenopathy   LUNGS: No respiratory compromise or indrawing  ABDOMEN: Soft, non-tender, non-distended. Electronically signed by:  Zoltan Spence MD

## 2019-10-09 NOTE — THERAPY TREATMENT NOTE
Inpatient Rehabilitation - Physical Therapy Treatment Note  KOKO Hart     Patient Name: Ashley Geronimo  : 1945  MRN: [id number]    Today's Date: 10/9/2019                 Admit Date: 10/1/2019      Visit Dx:    No diagnosis found.    Patient Active Problem List   Diagnosis   • Pneumonia   • Septic shock (CMS/Formerly [facility])   • Pseudomonas pneumonia (CMS/Formerly [facility])   • NSTEMI (non-ST elevated myocardial infarction) (CMS/Formerly [facility])   • Bradycardia   • Paroxysmal atrial fibrillation (CMS/Formerly [facility])   • Chronic respiratory failure with hypoxia and hypercapnia (CMS/Formerly [facility])   • Acute on chronic respiratory failure with hypoxia (CMS/Formerly [facility]), requiring intubation/mechanical ventilation 19   • Hematemesis   • RAMESH (acute kidney injury) (CMS/Formerly [facility])   • DKA (diabetic ketoacidoses) (CMS/Formerly [facility])   • Septic shock (CMS/[facility]ly [facility])   • Encephalopathy   • UTI (urinary tract infection)   • Tracheobronchitis, PSA    • Metabolic encephalopathy       Therapy Treatment    IRF Treatment Summary     Row Name 10/09/19 1642 10/09/19 1530 10/09/19 1225       Evaluation/Treatment Time and Intent    Subjective Information  complains of;pain L knee  -RF  no complaints  -AB  no complaints agreeable to therapy  -CJ    Existing Precautions/Restrictions  fall confusion; itching/ rash/ decr skin integrity today  -RF  fall;other (see comments) decr. skin integrity  -AB  fall decreased skin integrity  -CJ    Document Type  therapy note (daily note)  -RF  therapy note (daily note)  -AB  progress note/recertification;therapy note (daily note)  -CJ    Mode of Treatment  physical therapy  -RF  occupational therapy  -AB  occupational therapy  -CJ    Patient/Family Observations  Progress hindered due to poor processing and and cognition; increased cuing required for technique and participation. Good functional mobility and ambualtion quality noted.   -RF  --  --    Recorded by [RF] Kristen Carrera PTA [AB] Jennifer Smith OT [CJ] Laya Harrison OT    Row Name 10/09/19 2726  10/09/19 1530 10/09/19 1225       Cognition/Psychosocial- PT/OT    Affect/Mental Status (Cognitive)  confused  -RF  confused  -AB  confused  -CJ    Orientation Status (Cognition)  oriented to;person  -RF  --  --    Follows Commands (Cognition)  follows one step commands;delayed response/completion;increased processing time needed;initiation impaired;repetition of directions required;verbal cues/prompting required;visual queue  -RF  physical/tactile prompts required;verbal cues/prompting required  -AB  verbal cues/prompting required;repetition of directions required;initiation impaired;increased processing time needed  -CJ    Attention Deficit (Cognitive)  requires cues/redirection to task  -RF  requires cues/redirection to task  -AB  requires cues/redirection to task  -CJ    Recorded by [RF] Kristen Carrera PTA [AB] Jennifer Smith OT [CJ] Laya Harrison, OT    Row Name 10/09/19 1642             Bed Mobility Assessment/Treatment    Scooting/Bridging [place] (Bed Mobility)  supervision  -RF      Supine-Sit [place] (Bed Mobility)  supervision;verbal cues  -RF      Sit-Supine [place] (Bed Mobility)  verbal cues;nonverbal cues (demo/gesture);contact guard  -RF      Bed Mobility, Safety Issues  cognitive deficits limit understanding  -RF      Assistive Device (Bed Mobility)  bed rails  -RF      Recorded by [RF] Kristen Carrera PTA      Row Name 10/09/19 1642             Transfer Assessment/Treatment    Transfer Assessment/Treatment  sit-stand transfer;stand-sit transfer;stand pivot/stand step transfer;toilet transfer;car transfer  -RF      Recorded by [RF] Kristen Carrera PTA      Row Name 10/09/19 1642             Bed-Chair Transfer    Bed-Chair [place] (Transfers)  contact guard;stand by assist  -RF      Assistive Device (Bed-Chair Transfers)  wheelchair  -RF      Recorded by [RF] Kristen Carrera PTA      Row Name 10/09/19 1642             Chair-Bed Transfer    Chair-Bed  [place] (Transfers)  contact guard;stand by assist  -RF      Assistive Device (Chair-Bed Transfers)  wheelchair  -RF      Recorded by [RF] Kristen Carrera PTA      Row Name 10/09/19 1642             Sit-Stand Transfer    Sit-Stand [place] (Transfers)  verbal cues;nonverbal cues (demo/gesture);contact guard  -RF      Assistive Device (Sit-Stand Transfers)  walker, front-wheeled  -RF      Recorded by [RF] Kristen Carrera PTA      Row Name 10/09/19 1642             Stand-Sit Transfer    Stand-Sit [place] (Transfers)  verbal cues;nonverbal cues (demo/gesture);contact guard  -RF      Assistive Device (Stand-Sit Transfers)  walker, front-wheeled  -RF      Recorded by [RF] Kristen Carrera PTA      Row Name 10/09/19 1642             Stand Pivot/Stand Step Transfer    Stand Pivot/Stand Step [place]  verbal cues;nonverbal cues (demo/gesture);contact guard  -RF      Assistive Device (Stand Pivot Stand Step Transfer)  walker, front-wheeled  -RF      Recorded by [RF] Kristen Carrera PTA      Row Name 10/09/19 1642 10/09/19 1225          Toilet Transfer    Type (Toilet Transfer)  stand pivot/stand step  -RF  --     [place] Level (Toilet Transfer)  verbal cues;nonverbal cues (demo/gesture);contact guard  -  contact guard;verbal cues  -     Assistive Device (Toilet Transfer)  commode;grab bars/safety frame;raised toilet seat;walker, front-wheeled  -RF  grab bars/safety frame  -CJ     Recorded by [RF] Kristen Carrera PTA [CJ] Laya Harrison OT     Row Name 10/09/19 1642             Car Transfer    Type (Car Transfer)  stand pivot/stand step  -RF      [place] Level (Car Transfer)  verbal cues;nonverbal cues (demo/gesture);contact guard;minimum assist (75% patient effort)  -RF      Assistive Device (Car Transfer)  walker, front-wheeled  -RF      Recorded by [RF] Kristen Carrera PTA      Row Name 10/09/19 1642             Gait/Stairs Assessment/Training    [place] Level (Gait)   verbal cues;nonverbal cues (demo/gesture);contact guard  -RF      Assistive Device (Gait)  walker, front-wheeled  -RF      Distance in Feet (Gait)  300 in AM, 150 in PM  -RF      Pattern (Gait)  step-through  -RF      Deviations/Abnormal Patterns (Gait)  base of support, narrow;gait speed decreased;stride length decreased  -RF      Bilateral Gait Deviations  forward flexed posture;weight shift ability decreased  -RF      Comment (Gait/Stairs)  Pt demonstrates poor safety awareness and walker management.   -RF      Recorded by [RF] Kristen Carrera, PTA      Row Name 10/09/19 1642             Safety Issues, Functional Mobility    Impairments Affecting Function (Mobility)  balance;cognition;coordination;endurance/activity tolerance;motor planning;strength  -RF      Recorded by [RF] Kristen Carrera, PTA      Row Name 10/09/19 1225             Bathing Assessment/Treatment    Comment (Bathing)  Bart  -CJ      Recorded by [CJ] Laya Harrison, OT      Row Name 10/09/19 1225             Upper Body Dressing Assessment/Treatment    Comment (Upper Body Dressing)  Set up/ supervision  -CJ      Recorded by [CJ] Laya Harrison, OT      Row Name 10/09/19 1225             Lower Body Dressing Assessment/Treatment    Comment (Lower Body Dressing)  Bart  -CJ      Recorded by [CJ] Laya Harrison, OT      Row Name 10/09/19 1225             Grooming Assessment/Treatment    Comment (Grooming)  Set up/ supervision  -CJ      Recorded by [CJ] Laya Harrison, OT      Row Name 10/09/19 1225             Toileting Assessment/Treatment    Assistive Device Use (Toileting)  grab bar/safety frame  -CJ      Comment (Toileting)  Bart  -CJ      Recorded by [CJ] Laya Harrison, OT      Row Name 10/09/19 1225             Self-Feeding Assessment/Treatment    Comment (Self-Feeding)  Set up  -CJ      Recorded by [CJ] Laya Harrison, OT      Row Name 10/09/19 1642             Pain Scale: Numbers Pre/Post-Treatment    Pain Scale: Numbers,  Pretreatment  0/10 - no pain  -RF      Pain Scale: Numbers, Post-Treatment  0/10 - no pain  -RF      Recorded by [RF] Kristen Carrera PTA      Row Name 10/09/19 1530 10/09/19 1225          Upper Extremity Seated Therapeutic Exercise    Exercise Type, Seated Upper Extremity (Therapeutic Exercise)  AROM (active range of motion) BUE G/FMC TherEx/Act; fine grasp tasks; strengthening  -AB  AROM (active range of motion) BUE ther ex/act, bilat coord ex, GMC/FMC  -CJ     Expected Outcomes, Seated Upper Extremity (Therapeutic Exercise)  improve functional tolerance, self-care activity;improve performance, BADLs;improve performance, transfer skills;improve performance, fine motor coordination skills  -AB  improve functional tolerance, self-care activity;improve performance, BADLs;improve performance, transfer skills  -CJ     Recorded by [AB] Jennifer Smith, OT [CJ] Laya Harrison OT     Row Name 10/09/19 1642             Lower Extremity Seated Therapeutic Exercise    Performed, Seated Lower Extremity (Therapeutic Exercise)  hip flexion/extension;hip abduction/adduction;knee flexion/extension;ankle dorsiflexion/plantarflexion;LAQ (long arc quad), knee extension  -RF      Device, Seated Lower Extremity (Therapeutic Exercise)  elastic bands/tubing;small ball GTB  -RF      Exercise Type, Seated Lower Extremity (Therapeutic Exercise)  AROM (active range of motion);eccentric contraction;isotonic contraction, concentric  -RF      Expected Outcomes, Seated Lower Extremity (Therapeutic Exercise)  improve functional tolerance, community activity;improve functional tolerance, household activity;improve functional tolerance, self-care activity;improve performance, gait skills;strengthen normal movement patterns;strengthen, facilitate independent active range of motion  -RF      Sets/Reps Detail, Seated Lower Extremity (Therapeutic Exercise)  2X10  -RF      Comment, Seated Lower Extremity (Therapeutic Exercise)  Increased  cuing for participation and technique  -RF      Recorded by [RF] Kristen Carrera PTA      Row Name 10/09/19 1642             Lower Extremity Supine Therapeutic Exercise    Performed, Supine Lower Extremity (Therapeutic Exercise)  hip flexion/extension;hip abduction/adduction;knee flexion/extension;ankle dorsiflexion/plantarflexion;SAQ (short arc quad) over bolster;SLR (straight leg raise);quadriceps sets;gluteal sets;heel slides;bridging (bilateral w/bolster)  -RF      Device, Supine Lower Extremity (Therapeutic Exercise)  foam roll  -RF      Exercise Type, Supine Lower Extremity (Therapeutic Exercise)  AROM (active range of motion);eccentric contraction;isotonic contraction, concentric  -RF      Expected Outcomes, Supine Lower Extremity (Therapeutic Exercise)  improve functional tolerance, community activity;improve functional tolerance, household activity;improve functional tolerance, self-care activity;improve performance, gait skills;strengthen normal movement patterns;strengthen, facilitate independent active range of motion  -RF      Sets/Reps Detail, Supine Lower Extremity (Therapeutic Exercise)  2X10  -RF      Comment, Supine Lower Extremity (Therapeutic Exercise)  Increased cuing for participation and technique  -RF      Recorded by [RF] Kristen Carrera PTA      Row Name 10/09/19 1642 10/09/19 1530 10/09/19 1225       Positioning and Restraints    Pre-Treatment Position  in bed chair in PM  -RF  --  --    Post Treatment Position  bathroom  -RF  wheelchair  -AB  wheelchair  -CJ    In Wheelchair  sitting;call light within reach;encouraged to call for assist;exit alarm on AM  -RF  sitting;with PT PM  -AB  sitting;call light within reach;encouraged to call for assist;notified nsg  -    Bathroom  sitting;with other staff  -RF  --  --    Recorded by [RF] Kristen Carrera, PTA [AB] Jennifer Smith, OT [CJ] Laya Harrison, OT    Row Name 10/09/19 1642             Daily Summary of Progress (PT)     Impairments Continuing to Limit Function: Physical Therapy  strength decreased;impaired balance;impaired cognition;coordination impaired  -RF      Recorded by [RF] Kristen Carrera, MARY ANN        User Key  (r) = Recorded By, (t) = Taken By, (c) = Cosigned By    Initials Name Effective Dates    Jennifer Garduno, OT 04/03/18 -     CJ Laya Harrison, OT 04/03/18 -     RF Kristen Carrera PTA 03/07/18 -         Wound 09/29/19 Right lateral clavicle Puncture (Active)   Dressing Appearance other (see comments) 10/8/2019  7:17 PM       Wound 10/01/19 1702 Bilateral other (see comments) coccyx MASD ([place] associated skin damage) (Active)   Dressing Appearance open to air 10/9/2019  7:25 AM   Closure None 10/8/2019  7:17 PM   Base red 10/9/2019  7:25 AM   Periwound excoriated 10/8/2019  7:17 PM   Drainage Amount none 10/8/2019  7:17 PM   Care, Wound other (see comments) 10/8/2019  7:17 PM   Dressing Care, Wound open to air 10/9/2019  7:25 AM     Physical Therapy Education     Title: PT OT SLP Therapies (Not Started)     Topic: Physical Therapy (Done)     Point: Mobility training (Done)     Learning Progress Summary           Patient Acceptance, E,TB, VU by RF at 10/9/2019  4:49 PM    Acceptance, E,D, NL by AG at 10/8/2019  4:10 PM    Acceptance, E,D, VU,NR by LL at 10/7/2019  3:34 PM    Acceptance, E,D, VU,NR by KADE at 10/5/2019  1:43 PM    Acceptance, E,D, NR by LL at 10/4/2019  3:45 PM    Acceptance, E,D, NR by AG at 10/3/2019  4:10 PM    Acceptance, E,D, NR by AG at 10/2/2019  4:26 PM                   Point: Home exercise program (Done)     Learning Progress Summary           Patient Acceptance, E,TB, VU by RF at 10/9/2019  4:49 PM    Acceptance, E,D, NL by AG at 10/8/2019  4:10 PM    Acceptance, E,D, VU,NR by LL at 10/7/2019  3:34 PM    Acceptance, E,D, VU,NR by KADE at 10/5/2019  1:43 PM    Acceptance, VICKI DAVIS, NR by EVE at 10/4/2019  3:45 PM    Acceptance, VICKI DAVIS, NR by FRANCK at 10/3/2019  4:10 PM    Acceptance, VICKI DAVIS,  NR by AG at 10/2/2019  4:26 PM                   Point: Body mechanics (Done)     Learning Progress Summary           Patient Acceptance, E,TB, VU by RF at 10/9/2019  4:49 PM    Acceptance, E,D, NL by AG at 10/8/2019  4:10 PM    Acceptance, E,D, VU,NR by LL at 10/7/2019  3:34 PM    Acceptance, E,D, VU,NR by KADE at 10/5/2019  1:43 PM    Acceptance, E,D, NR by LL at 10/4/2019  3:45 PM    Acceptance, E,D, NR by AG at 10/3/2019  4:10 PM    Acceptance, E,D, NR by AG at 10/2/2019  4:26 PM                   Point: Precautions (Done)     Learning Progress Summary           Patient Acceptance, E,TB, VU by RF at 10/9/2019  4:49 PM    Acceptance, E,D, NL by AG at 10/8/2019  4:10 PM    Acceptance, E,D, VU,NR by LL at 10/7/2019  3:34 PM    Acceptance, E,D, VU,NR by KADE at 10/5/2019  1:43 PM    Acceptance, E,D, NR by LL at 10/4/2019  3:45 PM    Acceptance, E,D, NR by AG at 10/3/2019  4:10 PM    Acceptance, E,D, NR by AG at 10/2/2019  4:26 PM                               User Key     Initials Effective Dates Name Provider Type Discipline     04/03/18 -  Karyn Delgado, PT Physical Therapist PT    LL 05/02/16 -  Bita Greene, PTA Physical Therapy Assistant PT    KADE 05/02/16 -  Doris Calixto, PTA Physical Therapy Assistant PT    RF 03/07/18 -  Kristen Carrera PTA Physical Therapy Assistant PT                  PT Recommendation and Plan     Plan of Care Reviewed With: patient  Daily Summary of Progress (PT)  Impairments Continuing to Limit Function: Physical Therapy: strength decreased, impaired balance, impaired cognition, coordination impaired  IRF Plan of Care Review: progress ongoing, continue  Progress, Functional Goals: demonstrating adequate progress  Outcome Summary: Patient conitnues to require skilled care per POC for further improved functional mobility and strengthening to ensure maximum safe function upon D/C.       PT IRF GOALS     Row Name 10/09/19 1600             Bed Mobility Goal 1 (PT-IRF)     Progress/Outcomes (Bed Mobility Goal 1, PT-IRF)  goal met  -RF         Bed Mobility Goal 2 (PT-IRF)    Progress/Outcomes (Bed Mobility Goal 2, PT-IRF)  goal ongoing  -RF         Transfer Goal 1 (PT-IRF)    Progress/Outcomes (Transfer Goal 1, PT-IRF)  goal ongoing  -RF         Transfer Goal 2 (PT-IRF)    Progress/Outcomes (Transfer Goal 2, PT-IRF)  goal ongoing  -RF         Gait/Walking Locomotion Goal 1 (PT-IRF)    Progress/Outcomes (Gait/Walking Locomotion Goal 1, PT-IRF)  goal ongoing  -RF         Gait/Walking Locomotion Goal 2 (PT-IRF)    Progress/Outcomes (Gait/Walking Locomotion Goal 2, PT-IRF)  goal ongoing  -RF         Stairs Goal 2 (PT-IRF)    Progress/Outcomes (Stairs Goal 2, PT-IRF)  goal ongoing  -RF        User Key  (r) = Recorded By, (t) = Taken By, (c) = Cosigned By    Initials Name Provider Type    RF Kristen Carrera PTA Physical Therapy Assistant               Time Calculation:     PT Charges     Row Name 10/09/19 1652 10/09/19 1651          Time Calculation    Start Time  1415  -RF  1000  -RF     Stop Time  1500  -RF  1045  -RF     Time Calculation (min)  45 min  -RF  45 min  -RF     PT Received On  10/09/19  -RF  10/09/19  -RF     PT - Next Appointment  10/10/19  -RF  10/09/19  -RF     PT Goal Re-Cert Due Date  10/16/19  -RF  10/09/19  -RF        Time Calculation- PT    Total Timed Code Minutes- PT  45 minute(s)  -RF  45 minute(s)  -RF       User Key  (r) = Recorded By, (t) = Taken By, (c) = Cosigned By    Initials Name Provider Type    RF Kristen Carrera PTA Physical Therapy Assistant          Therapy Charges for Today     Code Description Service Date Service Provider Modifiers Qty    48664314370 HC GAIT TRAINING EA 15 MIN 10/9/2019 Kristen Carrera PTA GP 2    74849256604 HC PT THER PROC EA 15 MIN 10/9/2019 Kristen Carrera PTA GP 3    24864503247 HC PT THERAPEUTIC ACT EA 15 MIN 10/9/2019 Kristen Carrera PTA GP 1                   Kristen Carrera PTA  10/9/2019

## 2019-10-09 NOTE — PROGRESS NOTES
Inpatient Rehabilitation Functional Measures Assessment    Functional Measures  PUJA Eating:  PUJA Grooming: Grooming Score = 5. Patient is supervision/set-up for grooming,  requiring: Initial preparation. Stand by assistance. Verbal cuing, prompting, or  instructing. No assistive devices were required.  PUJA Bathing:  PUJA Upper Body Dressing:  PUJA Lower Body Dressing:  PUJA Toileting:    PUJA Bladder Management  Level of Assistance:  Frequency/Number of Accidents this Shift:    PUJA Bowel Management  Level of Assistance:  Frequency/Number of Accidents this Shift:    PUJA Bed/Chair/Wheelchair Transfer:  [facility] Toilet Transfer:  [facility] Tub/Shower Transfer:    Previously Documented Mode of Locomotion at Discharge:  [facility] Expected Mode of Locomotion at Discharge:  [facility] Walk/Wheelchair:  PUJA Stairs:    [facility] Comprehension:  [facility] Expression:  [facility] Social Interaction:  [facility] Problem Solving:  [facility] Memory:    Therapy Mode Minutes  Occupational Therapy: Individual: 100 minutes.  Physical Therapy:  Speech Language Pathology:    Discharge Functional Goals:    Signed by: Laya Harrison Occupational Therapist

## 2019-10-09 NOTE — PROGRESS NOTES
Inpatient Rehabilitation Functional Measures Assessment    Functional Measures  PUJA Eating:  PUJA Grooming:  PUJA Bathing:  PUJA Upper Body Dressing:  PUJA Lower Body Dressing:  PUJA Toileting:    PUJA Bladder Management  Level of Assistance:  Frequency/Number of Accidents this Shift:    PUJA Bowel Management  Level of Assistance:  Frequency/Number of Accidents this Shift:    PUJA Bed/Chair/Wheelchair Transfer:  PUJA Toilet Transfer:  PUJA Tub/Shower Transfer:    Previously Documented Mode of Locomotion at Discharge:  PUJA Expected Mode of Locomotion at Discharge:  PUJA Walk/Wheelchair:  PUJA Stairs:    PUJA Comprehension:  Both ( auditory and visual) modes of comprehension are used  equally. Comprehension Score = 6, Modified [place].  Patient comprehends  complex/abstract information in their primary language, requiring: Additional  time.  PUJA Expression:  Vocal expression is the usual mode. Expression Score = 6,  Modified Independent.  Patient expresses complex/abstract information in their  primary language, requiring: Additional time.  PUJA Social Interaction:  Social Interaction Score = 6, Modified Independent.  Patient is modified independent for social interaction, requiring: Requires  additional time.  PUJA Problem Solving:  Problem Solving Score = 6, Modified [place].  Patient  makes appropriate decisions in order to solve complex problems, but requires  extra time.  PUJA Memory:  Memory Score = 6, Modified [place].  Patient is modified  independent for memory, requiring: Requires additional time.    Therapy Mode Minutes  Occupational Therapy:  Physical Therapy:  Speech Language Pathology:    Discharge Functional Goals:    Signed by: Cecelia Hare, Nurse

## 2019-10-10 LAB
GLUCOSE BLDC GLUCOMTR-MCNC: 100 MG/DL (ref 70–130)
GLUCOSE BLDC GLUCOMTR-MCNC: 223 MG/DL (ref 70–130)
GLUCOSE BLDC GLUCOMTR-MCNC: 284 MG/DL (ref 70–130)

## 2019-10-10 PROCEDURE — 97116 GAIT TRAINING THERAPY: CPT

## 2019-10-10 PROCEDURE — 82962 GLUCOSE BLOOD TEST: CPT

## 2019-10-10 PROCEDURE — 63710000001 INSULIN ASPART PER 5 UNITS: Performed by: FAMILY MEDICINE

## 2019-10-10 PROCEDURE — 63710000001 DIPHENHYDRAMINE PER 50 MG: Performed by: FAMILY MEDICINE

## 2019-10-10 PROCEDURE — 97535 SELF CARE MNGMENT TRAINING: CPT

## 2019-10-10 PROCEDURE — 97530 THERAPEUTIC ACTIVITIES: CPT

## 2019-10-10 PROCEDURE — 97110 THERAPEUTIC EXERCISES: CPT

## 2019-10-10 RX ORDER — GLIPIZIDE 5 MG/1
2.5 TABLET ORAL
Status: DISCONTINUED | OUTPATIENT
Start: 2019-10-10 | End: 2019-10-11

## 2019-10-10 RX ADMIN — AMLODIPINE BESYLATE 10 MG: 10 TABLET ORAL at 08:28

## 2019-10-10 RX ADMIN — LINAGLIPTIN 5 MG: 5 TABLET, FILM COATED ORAL at 08:28

## 2019-10-10 RX ADMIN — GLIPIZIDE 2.5 MG: 5 TABLET ORAL at 12:56

## 2019-10-10 RX ADMIN — APIXABAN 5 MG: 5 TABLET, FILM COATED ORAL at 08:28

## 2019-10-10 RX ADMIN — MONTELUKAST SODIUM 10 MG: 10 TABLET, COATED ORAL at 21:58

## 2019-10-10 RX ADMIN — DIPHENHYDRAMINE HYDROCHLORIDE 25 MG: 25 CAPSULE ORAL at 08:28

## 2019-10-10 RX ADMIN — ASPIRIN 81 MG: 81 TABLET, COATED ORAL at 08:28

## 2019-10-10 RX ADMIN — METOPROLOL TARTRATE 100 MG: 100 TABLET, FILM COATED ORAL at 21:58

## 2019-10-10 RX ADMIN — INSULIN ASPART 3 UNITS: 100 INJECTION, SOLUTION INTRAVENOUS; SUBCUTANEOUS at 08:28

## 2019-10-10 RX ADMIN — DIPHENHYDRAMINE HYDROCHLORIDE 25 MG: 25 CAPSULE ORAL at 21:58

## 2019-10-10 RX ADMIN — INSULIN ASPART 4 UNITS: 100 INJECTION, SOLUTION INTRAVENOUS; SUBCUTANEOUS at 18:13

## 2019-10-10 RX ADMIN — APIXABAN 5 MG: 5 TABLET, FILM COATED ORAL at 21:58

## 2019-10-10 RX ADMIN — METOPROLOL TARTRATE 100 MG: 100 TABLET, FILM COATED ORAL at 08:28

## 2019-10-10 RX ADMIN — PANTOPRAZOLE SODIUM 40 MG: 40 TABLET, DELAYED RELEASE ORAL at 05:11

## 2019-10-10 RX ADMIN — Medication 100 MG: at 08:28

## 2019-10-10 NOTE — PLAN OF CARE
Problem: Patient Care Overview  Goal: Plan of Care Review   10/10/19 8773   Patient Care Overview   IRF Plan of Care Review progress ongoing, continue   Coping/Psychosocial   Plan of Care Reviewed With patient

## 2019-10-10 NOTE — PROGRESS NOTES
Case Management  Inpatient Rehabilitation Team Conference    Conference Date/Time: 10/8/2019 8:08:44 AM    Team Conference Attendees:  MD Lennie Clancy, ARLYN Saleh, RN,   ALDO Boateng, PT  Laya Harrison OT    Demographics            Age: 74Y            Gender: Female    Admission Date: 10/1/2019 4:18:00 PM  Rehabilitation Diagnosis:  s/p debility  Comorbidities:      Plan of Care  Anticipated Discharge Date/Estimated Length of Stay: 10-16-19  Anticipated Discharge Destination: Community discharge with assistance  Discharge Plan : Discharge plans to be determined based on how pt progresses in  therapy.  Son wants pt to be able to return home at discharge if possible.  Medical Necessity Expected Level Rationale: good  Intensity and Duration: an average of 3 hours/5 days per week  Medical Supervision and 24 Hour Rehab Nursing: x  Physical Therapy: x  PT Intensity/Duration: PT 1-1.5 hours per day/5 days per week  Occupational Therapy: x  OT Intensity/Duration: OT 1-1.5 hours per day/5 days per week  Speech and Language Therapy: x  SLP Intensity/Duration: SLP 30 mins-90 mins per day/5 days per week  Social Work: x  Therapeutic Recreation: x  Respiratory Therapy: x  Updated (if changes indicated)    Anticipated Discharge Date/Estimated Length of Stay:   10-16-19      Discharge Plan of Care:    Based on the patient's medical and functional status, their prognosis and  expected level of functional improvement is: good      Interdisciplinary Problem/Goals/Status  Body Function Structure    [RN] Skin Integrity(Active)  Current Status(10/01/2019): MASD to buttocks  Weekly Goal(10/31/2019): healing of skin  Discharge Goal: healed skin        Mobility    [PT] Bed/Chair/Wheelchair(Active)  Current Status(10/07/2019): CGA  Weekly Goal(10/14/2019): SBA with AAD  Discharge Goal: mod indep w/ AAD    [PT] Walk(Active)  Current Status(10/07/2019): 300' RW CGA  Weekly  Goal(10/14/2019): 300' SBA AAD  Discharge Goal: 300' Mod independent AAD    [PT] Stairs(Active)  Current Status(10/07/2019): TBA  Weekly Goal(10/14/2019): 10 stairs, SBA, 2 handrails  Discharge Goal: 15 stairs, 2 handrails, SBA        Safety    [RN] Potential for Injury(Active)  Current Status(10/01/2019): falls risk  Weekly Goal(10/31/2019): no falls  Discharge Goal: no falls this admission        Self Care    [OT] Dressing (Lower)(Active)  Current Status(10/07/2019): Bart  Weekly Goal(10/15/2019): met  Discharge Goal: CGA    [OT] Toileting(Active)  Current Status(10/07/2019): ModA  Weekly Goal(10/15/2019): met  Discharge Goal: Bart    Comments: Pt's discharge plans to be determined based on how she progresses in  therapy.  Pt lives at home with two teenage grandchildren.  Son is pt's only  living child and he will decide plans for discharge.    Signed by: ARLYN Sears    Physician CoSigned By: Edward Austin 10/10/2019 12:09:21

## 2019-10-10 NOTE — PROGRESS NOTES
Rehabilitation Nursing  Inpatient Rehabilitation Plan of Care Note    Plan of Care  Copy from POC  Body Function Structure    Skin Integrity (Active)  Current Status (10/1/2019 5:00:00 PM): MASD to buttocks  Weekly Goal: healing of skin  Discharge Goal: healed skin    Safety    Potential for Injury (Active)  Current Status (10/1/2019 5:00:00 PM): falls risk  Weekly Goal: no falls  Discharge Goal: no falls this admission    Signed by: Cecelia Hare, Nurse

## 2019-10-10 NOTE — THERAPY TREATMENT NOTE
Inpatient Rehabilitation - Physical Therapy Treatment Note  [facility]     Patient Name: Ashley Geronimo  : 1945  MRN: [id number]    Today's Date: 10/10/2019                 Admit Date: 10/1/2019      Visit Dx:    No diagnosis found.    Patient Active Problem List   Diagnosis   • Pneumonia   • Septic shock (CMS/[facility])   • Pseudomonas pneumonia (CMS/[facility]ly [facility])   • NSTEMI (non-ST elevated myocardial infarction) (CMS/Formerly [facility])   • Bradycardia   • Paroxysmal atrial fibrillation (CMS/HCC)   • Chronic respiratory failure with hypoxia and hypercapnia (CMS/HCC)   • Acute on chronic respiratory failure with hypoxia (CMS/HCC), requiring intubation/mechanical ventilation 19   • Hematemesis   • RAMESH (acute kidney injury) (CMS/Formerly [facility])   • DKA (diabetic ketoacidoses) (CMS/[facility]ly [facility])   • Septic shock (CMS/[facility]ly [facility])   • Encephalopathy   • UTI (urinary tract infection)   • Tracheobronchitis, PSA    • Metabolic encephalopathy       Therapy Treatment    IRF Treatment Summary     Row Name 10/10/19 1617 10/10/19 1425          Evaluation/Treatment Time and Intent    Subjective Information  no complaints  -AG  no complaints  -CJ     Existing Precautions/Restrictions  fall confusion  -AG  fall decreased skin integrity  -CJ     Document Type  therapy note (daily note)  -AG  therapy note (daily note)  -CJ     Mode of Treatment  physical therapy  -AG  occupational therapy  -CJ     Patient/Family Observations  pt. seated in w/c; cooperative for participation.   -AG  --     Recorded by [AG] Karyn Delgado, PT [CJ] Laya Harrison OT     Row Name 10/10/19 1617 10/10/19 1425          Cognition/Psychosocial- PT/OT    Affect/Mental Status (Cognitive)  confused  -AG  confused  -CJ     Orientation Status (Cognition)  oriented to;person  -AG  --     Follows Commands (Cognition)  --  verbal cues/prompting required;repetition of directions required;initiation impaired;increased processing time needed  -CJ     Personal Safety Interventions  fall prevention program  maintained;gait belt;nonskid shoes/slippers when out of bed;supervised activity  -AG  --     Cognitive Function (Cognitive)  attention deficit;memory deficit;safety deficit  -AG  --     Attention Deficit (Cognitive)  requires cues/redirection to task  -AG  --     Safety Deficit (Cognitive)  awareness of need for assistance;insight into deficits/self awareness;safety precautions awareness;safety precautions follow-through/compliance  -AG  --     Recorded by [AG] Karyn Delgado, PT [CJ] Laya Harrison OT     Row Name 10/10/19 1617             Bed Mobility Assessment/Treatment    Comment (Bed Mobility)  not observed  -AG      Recorded by [AG] Karyn Delgado PT      Row Name 10/10/19 1617             Transfer Assessment/Treatment    Transfer Assessment/Treatment  bed-chair transfer;chair-bed transfer;sit-stand transfer;stand-sit transfer;stand pivot/stand step transfer  -AG      Recorded by [AG] Karyn Delgado PT      Row Name 10/10/19 1617             Bed-Chair Transfer    Bed-Chair [place] (Transfers)  verbal cues;nonverbal cues (demo/gesture);stand by assist  -AG      Assistive Device (Bed-Chair Transfers)  walker, front-wheeled;wheelchair  -AG      Recorded by [AG] Karyn Delgado PT      Row Name 10/10/19 1617             Chair-Bed Transfer    Chair-Bed [place] (Transfers)  verbal cues;nonverbal cues (demo/gesture);stand by assist  -AG      Assistive Device (Chair-Bed Transfers)  walker, front-wheeled;wheelchair  -AG      Recorded by [AG] Karyn Delgado PT      Row Name 10/10/19 1617             Sit-Stand Transfer    Sit-Stand [place] (Transfers)  verbal cues;nonverbal cues (demo/gesture);stand by assist  -AG      Assistive Device (Sit-Stand Transfers)  walker, front-wheeled  -AG      Recorded by [AG] Karyn Delgado PT      Row Name 10/10/19 1617             Stand-Sit Transfer    Stand-Sit [place] (Transfers)  verbal cues;nonverbal cues (demo/gesture);stand by assist  -AG      Assistive  Device (Stand-Sit Transfers)  walker, front-wheeled  -AG      Recorded by [AG] Karyn Delgado, PT      Row Name 10/10/19 1612             Stand Pivot/Stand Step Transfer    Stand Pivot/Stand Step [place]  verbal cues;nonverbal cues (demo/gesture);stand by assist  -AG      Assistive Device (Stand Pivot Stand Step Transfer)  walker, front-wheeled  -AG      Recorded by [AG] Karyn Delgado, PT      Row Name 10/10/19 1420             Toilet Transfer    [place] Level (Toilet Transfer)  contact guard;verbal cues  -CJ      Assistive Device (Toilet Transfer)  grab bars/safety frame  -CJ      Recorded by [CJ] Laya Harrison OT      Row Name 10/10/19 1613             Gait/Stairs Assessment/Training    Gait/Stairs Assessment/Training  gait/ambulation independence;gait/ambulation assistive device;distance ambulated;gait pattern;gait deviations  -AG      [place] Level (Gait)  verbal cues;nonverbal cues (demo/gesture);contact guard;stand by assist  -AG      Assistive Device (Gait)  walker, front-wheeled  -AG      Distance in Feet (Gait)  300  -AG      Pattern (Gait)  step-through  -AG      Deviations/Abnormal Patterns (Gait)  base of support, narrow;[place] decreased;stride length decreased  -AG      Bilateral Gait Deviations  forward flexed posture  -AG      Comment (Gait/Stairs)  decr attention with ambulation and all tasks; decr auditory processing of instructions.   -AG      Recorded by [AG] Karyn Delgado, PT      Row Name 10/10/19 1619             Safety Issues, Functional Mobility    Safety Issues Affecting Function (Mobility)  awareness of need for assistance;insight into deficits/self awareness;safety precaution awareness;safety precautions follow-through/compliance  -AG      Impairments Affecting Function (Mobility)  balance;endurance/activity tolerance;strength  -AG      Recorded by [AG] Karyn Delgado, PT      Row Name 10/10/19 1422             Bathing Assessment/Treatment    Bathing [place]  Level  contact guard assist;minimum assist (75% patient effort);verbal cues  -CJ      Recorded by [CJ] Laya Harrison, OT      Row Name 10/10/19 1425             Upper Body Dressing Assessment/Treatment    Upper Body Dressing Task  set up assistance;supervision;verbal cues  -CJ      Recorded by [CJ] Laya Harrison, OT      Row Name 10/10/19 1425             Lower Body Dressing Assessment/Treatment    Lower Body Dressing [place] Level  contact guard assist;minimum assist (75% patient effort);verbal cues  -CJ      Recorded by [CJ] Laya Harrison, OT      Row Name 10/10/19 1425             Grooming Assessment/Treatment    Grooming [place] Level  set up;supervision;verbal cues  -CJ      Recorded by [CJ] Laya Harrison, OT      Row Name 10/10/19 1425             Toileting Assessment/Treatment    Toileting [place] Level  contact guard assist;minimum assist (75% patient effort);verbal cues  -CJ      Assistive Device Use (Toileting)  grab bar/safety frame  -CJ      Recorded by [CJ] Laya Harrison, OT      Row Name 10/10/19 1617             Pain Assessment    Additional Documentation  Pain Scale: FACES Pre/Post-Treatment (Group)  -AG      Recorded by [AG] Karyn Delgado, PT      Row Name 10/10/19 1617             Pain Scale: FACES Pre/Post-Treatment    Pain: FACES Scale, Pretreatment  0-->no hurt  -AG      Pain: FACES Scale, Post-Treatment  0-->no hurt  -AG      Recorded by [AG] Karyn Delgado, PT      Row Name 10/10/19 1617             Balance    Balance  static sitting balance;static standing balance;dynamic sitting balance;dynamic standing balance;sitting balance activity;standing balance activity  -AG      Additional Documentation  Balance (Row)  -AG      Recorded by [AG] Karyn Delgado, PT      Row Name 10/10/19 1617             Static Sitting Balance    Level of [place] (Unsupported Sitting, Static Balance)  supervision  -AG      Time Able to Maintain Position (Unsupported Sitting, Static  Balance)  more than 5 minutes  -AG      Recorded by [AG] Karyn Delgado, PT      Row Name 10/10/19 1617             Static Standing Balance    Level of [place] (Supported Standing, Static Balance)  standby assist  -AG      Time Able to Maintain Position (Supported Standing, Static Balance)  more than 5 minutes  -AG      Assistive Device Utilized (Supported Standing, Static Balance)  walker, rolling  -AG      Recorded by [AG] Karyn Delgado, PT      Row Name 10/10/19 1617             Sitting Balance Activity    Activities Performed (Sitting, Balance Training)  seated reaching outside base of support;seated while performing ball toss  -AG      Recorded by [AG] Karyn Delgado, PT      Row Name 10/10/19 1617             Standing Balance Activity    Activities Performed (Standing, Balance Training)  standing ball toss;standing reaching outside base of support retrieving items from floor; bean bag toss  -AG      Support Needed for Balance (Standing, Balance Training)  uses one upper extremity part time for support;SBA;CGA  -AG      Recorded by [AG] Karyn Delgado, PT      Row Name 10/10/19 1617             Therapeutic Exercise    Therapeutic Exercise  aerobic exercise;seated, lower extremities  -AG      Additional Documentation  Therapeutic Exercise (Row)  -AG      Recorded by [AG] Kayrn Delgado, PT      Row Name 10/10/19 1425             Upper Extremity Seated Therapeutic Exercise    Device, Seated Upper Extremity (Therapeutic Exercise)  restorator/arm bike 2 mins X 4, 0 resistance  -CJ      Exercise Type, Seated Upper Extremity (Therapeutic Exercise)  AROM (active range of motion) BUE ther ex/act, GMC/FMC, hand exerciser  -CJ      Expected Outcomes, Seated Upper Extremity (Therapeutic Exercise)  improve functional tolerance, self-care activity;improve performance, BADLs;improve performance, transfer skills  -CJ      Recorded by [CJ] Laya Harrison OT      Row Name 10/10/19 1617             Aerobic Exercise  Activity    Exercise Performed (Aerobic, Therapeutic Exercise)  recumbent stationary bike  -AG      Expected Outcome (Aerobic, Therapeutic Exercise)  improve functional tolerance, community activity;improve functional tolerance, household activity;improve functional tolerance, self-care activity;strengthen normal movement patterns  -AG      Time (Aerobic, Therapeutic Exercise)  10  -AG      Comment (Aerobic, Therapeutic Exercise)  level 2.0  -AG      Recorded by [AG] Karyn Delgado, PT      Row Name 10/10/19 1617             Lower Extremity Seated Therapeutic Exercise    Performed, Seated Lower Extremity (Therapeutic Exercise)  hip flexion/extension;knee flexion/extension;ankle dorsiflexion/plantarflexion;LAQ (long arc quad), knee extension  -AG      Device, Seated Lower Extremity (Therapeutic Exercise)  elastic bands/tubing;small ball  -AG      Exercise Type, Seated Lower Extremity (Therapeutic Exercise)  AROM (active range of motion)  -AG      Expected Outcomes, Seated Lower Extremity (Therapeutic Exercise)  improve functional tolerance, community activity;improve functional tolerance, household activity;improve functional tolerance, self-care activity;improve performance, gait skills;improve performance, transfer skills;strengthen normal movement patterns;strengthen, facilitate independent active range of motion  -AG      Sets/Reps Detail, Seated Lower Extremity (Therapeutic Exercise)  2 x 10  -AG      Recorded by [AG] Karyn Delgado, PT      Row Name 10/10/19 1617 10/10/19 142          Positioning and Restraints    Pre-Treatment Position  sitting in chair/recliner  -AG  --     Post Treatment Position  wheelchair  -AG  wheelchair  -CJ     In Wheelchair  sitting;call light within reach;encouraged to call for assist  -AG  sitting;call light within reach;encouraged to call for assist;with nsg post both sessions  -CJ     Recorded by [AG] Karyn Delgado, PT [CJ] Laya Harrison OT     Row Name 10/10/19 1612              Daily Summary of Progress (PT)    Functional Goal Overall Progress: Physical Therapy  progressing toward functional goals as expected  -AG      Impairments Continuing to Limit Function: Physical Therapy  strength decreased;impaired balance;impaired cognition;coordination impaired decr cognition  -AG      Recommendations: Physical Therapy  continue POC  -AG      Recorded by [AG] Karyn Delgado, PT        User Key  (r) = Recorded By, (t) = Taken By, (c) = Cosigned By    Initials Name Effective Dates    AG Karyn Delgado, PT 04/03/18 -     CJ Laya Harrison OT 04/03/18 -         Wound 09/29/19 Right lateral clavicle Puncture (Active)   Dressing Appearance other (see comments) 10/9/2019  7:30 PM       Wound 10/01/19 1702 Bilateral other (see comments) coccyx MASD ([place] associated skin damage) (Active)   Dressing Appearance open to air 10/10/2019  7:20 AM   Closure None 10/9/2019  7:30 PM   Base red 10/10/2019  7:20 AM   Periwound excoriated 10/9/2019  7:30 PM   Drainage Amount none 10/9/2019  7:30 PM   Care, Wound other (see comments) 10/9/2019  7:30 PM   Dressing Care, Wound open to air 10/10/2019  7:20 AM     Physical Therapy Education     Title: PT OT SLP Therapies (Not Started)     Topic: Physical Therapy (Done)     Point: Mobility training (Done)     Learning Progress Summary           Patient Acceptance, E,D, VU,NR by AG at 10/10/2019  4:33 PM    Acceptance, E,TB, VU by RF at 10/9/2019  4:49 PM    Acceptance, E,D, NL by AG at 10/8/2019  4:10 PM    Acceptance, E,D, VU,NR by LL at 10/7/2019  3:34 PM    Acceptance, E,D, VU,NR by KADE at 10/5/2019  1:43 PM    Acceptance, E,D, NR by LL at 10/4/2019  3:45 PM    Acceptance, E,D, NR by AG at 10/3/2019  4:10 PM    Acceptance, E,D, NR by AG at 10/2/2019  4:26 PM                   Point: Home exercise program (Done)     Learning Progress Summary           Patient Acceptance, E,D, VU,NR by AG at 10/10/2019  4:33 PM    Acceptance, E,TB, VU by RF at 10/9/2019  4:49  PM    Acceptance, E,D, NL by AG at 10/8/2019  4:10 PM    Acceptance, E,D, VU,NR by LL at 10/7/2019  3:34 PM    Acceptance, E,D, VU,NR by KADE at 10/5/2019  1:43 PM    Acceptance, E,D, NR by LL at 10/4/2019  3:45 PM    Acceptance, E,D, NR by AG at 10/3/2019  4:10 PM    Acceptance, E,D, NR by AG at 10/2/2019  4:26 PM                   Point: Body mechanics (Done)     Learning Progress Summary           Patient Acceptance, E,D, VU,NR by AG at 10/10/2019  4:33 PM    Acceptance, E,TB, VU by RF at 10/9/2019  4:49 PM    Acceptance, E,D, NL by AG at 10/8/2019  4:10 PM    Acceptance, E,D, VU,NR by LL at 10/7/2019  3:34 PM    Acceptance, E,D, VU,NR by KADE at 10/5/2019  1:43 PM    Acceptance, E,D, NR by LL at 10/4/2019  3:45 PM    Acceptance, E,D, NR by AG at 10/3/2019  4:10 PM    Acceptance, E,D, NR by AG at 10/2/2019  4:26 PM                   Point: Precautions (Done)     Learning Progress Summary           Patient Acceptance, E,D, VU,NR by AG at 10/10/2019  4:33 PM    Acceptance, E,TB, VU by RF at 10/9/2019  4:49 PM    Acceptance, E,D, NL by AG at 10/8/2019  4:10 PM    Acceptance, E,D, VU,NR by LL at 10/7/2019  3:34 PM    Acceptance, E,D, VU,NR by KADE at 10/5/2019  1:43 PM    Acceptance, E,D, NR by LL at 10/4/2019  3:45 PM    Acceptance, E,D, NR by AG at 10/3/2019  4:10 PM    Acceptance, E,D, NR by AG at 10/2/2019  4:26 PM                               User Key     Initials Effective Dates Name Provider Type Discipline     04/03/18 -  Karyn Delgado, PT Physical Therapist PT    LL 05/02/16 -  Bita Greene PTA Physical Therapy Assistant PT    KADE 05/02/16 -  Doris Calixto, PTA Physical Therapy Assistant PT    RF 03/07/18 -  Kristen Carrera, PTA Physical Therapy Assistant PT                  PT Recommendation and Plan  Anticipated Equipment Needs at Discharge (PT Eval): (TBD)  Planned Therapy Interventions (PT Eval): balance training, bed mobility training, gait training, home exercise program, motor coordination  training, neuromuscular re-education, patient/family education, postural re-education, stair training, strengthening, transfer training  Frequency of Treatment (PT Eval): 5 times per week     Daily Summary of Progress (PT)  Functional Goal Overall Progress: Physical Therapy: progressing toward functional goals as expected  Impairments Continuing to Limit Function: Physical Therapy: strength decreased, impaired balance, impaired cognition, coordination impaired(decr cognition)  Recommendations: Physical Therapy: continue POC               Time Calculation:     PT Charges     Row Name 10/10/19 1633             Time Calculation    Start Time  1430  -AG      Stop Time  1600  -AG      Time Calculation (min)  90 min  -AG      PT Received On  10/10/19  -AG      PT - Next Appointment  10/11/19  -      PT Goal Re-Cert Due Date  10/16/19  -AG        User Key  (r) = Recorded By, (t) = Taken By, (c) = Cosigned By    Initials Name Provider Type    AG Karyn Delgado, PT Physical Therapist          Therapy Charges for Today     Code Description Service Date Service Provider Modifiers Qty    91779222438 HC GAIT TRAINING EA 15 MIN 10/10/2019 Karyn Delgado, PT GP 2    64021122416 HC PT THER PROC EA 15 MIN 10/10/2019 Karyn Delgado, PT GP 4                   Karyn Delgado PT  10/10/2019

## 2019-10-10 NOTE — PROGRESS NOTES
Inpatient Rehabilitation Functional Measures Assessment    Functional Measures  PUJA Eating:  PUJA Grooming:  PUJA Bathing:  PUJA Upper Body Dressing:  PUJA Lower Body Dressing:  PUJA Toileting:    PUJA Bladder Management  Level of Assistance:  Frequency/Number of Accidents this Shift:    PUJA Bowel Management  Level of Assistance:  Frequency/Number of Accidents this Shift:    PUJA Bed/Chair/Wheelchair Transfer:  Bed/chair/wheelchair Transfer Score = 5.  Patient is supervision/set-up for transferring to and from the  bed/chair/wheelchair, requiring: Stand by assistance. Verbal cuing, prompting,  or instructing. Patient requires the following assistive device(s): Walker.  PUJA Toilet Transfer:  Toilet Transfer did not occur. .  PUJA Tub/Shower Transfer:  Activity was not observed.    Previously Documented Mode of Locomotion at Discharge: Walk  PUJA Expected Mode of Locomotion at Discharge: No change to the current  documented expected, most frequently used mode of locomotion at discharge  PUJA Walk/Wheelchair:  WHEELCHAIR OBSERVATION   Wheelchair did not occur.    WALK OBSERVATION   Walk Distance Scale = 3.  Distance walked is greater than 150 feet. Walk Score  = 4.  Patient performs 75% or more of effort and requires minimal assistance.  Incidental help/contact guard/steadying was provided. Patient walked a distance  of  300 feet. Patient requires the following assistive device(s): Rolling  walker.  PUJA Stairs:  Stairs did not occur.    PUJA Comprehension:  PUJA Expression:  PUJA Social Interaction:  PUJA Problem Solving:  PUJA Memory:    Therapy Mode Minutes  Occupational Therapy:  Physical Therapy: Individual: 90 minutes.  Speech Language Pathology:    Discharge Functional Goals:    Signed by: Karyn Delgado, Physical Therapist

## 2019-10-10 NOTE — THERAPY TREATMENT NOTE
Inpatient Rehabilitation - Occupational Therapy Treatment Note     [place]     Patient Name: Ashley Geronimo  : 1945  MRN: [id number]    Today's Date: 10/10/2019                 Admit Date: 10/1/2019      Visit Dx:  No diagnosis found.    Patient Active Problem List   Diagnosis   • Pneumonia   • Septic shock (CMS/[facility])   • Pseudomonas pneumonia (CMS/[facility])   • NSTEMI (non-ST elevated myocardial infarction) (CMS/[facility])   • Bradycardia   • Paroxysmal atrial fibrillation (CMS/[facility])   • Chronic respiratory failure with hypoxia and hypercapnia (CMS/[facility])   • Acute on chronic respiratory failure with hypoxia (CMS/[facility]), requiring intubation/mechanical ventilation 19   • Hematemesis   • RAMESH (acute kidney injury) (CMS/[facility])   • DKA (diabetic ketoacidoses) (CMS/[facility])   • Septic shock (CMS/[facility])   • Encephalopathy   • UTI (urinary tract infection)   • Tracheobronchitis, PSA    • Metabolic encephalopathy         Therapy Treatment    IRF Treatment Summary     Row Name 10/10/19 1422             Evaluation/Treatment Time and Intent    Subjective Information  no complaints  -CJ      Existing Precautions/Restrictions  fall decreased skin integrity  -CJ      Document Type  therapy note (daily note)  -CJ      Mode of Treatment  occupational therapy  -CJ      Recorded by [CJ] Laya Harrison OT      Row Name 10/10/19 1429             Cognition/Psychosocial- PT/OT    Affect/Mental Status (Cognitive)  confused  -CJ      Follows Commands (Cognition)  verbal cues/prompting required;repetition of directions required;initiation impaired;increased processing time needed  -CJ      Recorded by [CJ] Laya Harrison OT      Row Name 10/10/19 1423             Toilet Transfer    [place] Level (Toilet Transfer)  contact guard;verbal cues  -CJ      Assistive Device (Toilet Transfer)  grab bars/safety frame  -CJ      Recorded by [CJ] Laya Harrison OT      Row Name 10/10/19 1421             Bathing Assessment/Treatment    Bathing  [place] Level  contact guard assist;minimum assist (75% patient effort);verbal cues  -CJ      Recorded by [CJ] Laya Harrison, OT      Row Name 10/10/19 1425             Upper Body Dressing Assessment/Treatment    Upper Body Dressing Task  set up assistance;supervision;verbal cues  -CJ      Recorded by [CJ] Laya Harrison, OT      Row Name 10/10/19 1425             Lower Body Dressing Assessment/Treatment    Lower Body Dressing [place] Level  contact guard assist;minimum assist (75% patient effort);verbal cues  -CJ      Recorded by [CJ] Laya Harrison, LESLEY      Row Name 10/10/19 1425             Grooming Assessment/Treatment    Grooming [place] Level  set up;supervision;verbal cues  -CJ      Recorded by [CJ] Laya Harrison, OT      Row Name 10/10/19 1425             Toileting Assessment/Treatment    Toileting [place] Level  contact guard assist;minimum assist (75% patient effort);verbal cues  -CJ      Assistive Device Use (Toileting)  grab bar/safety frame  -CJ      Recorded by [CJ] Laya Harrison OT      Row Name 10/10/19 1425             Upper Extremity Seated Therapeutic Exercise    Device, Seated Upper Extremity (Therapeutic Exercise)  restorator/arm bike 2 mins X 4, 0 resistance  -CJ      Exercise Type, Seated Upper Extremity (Therapeutic Exercise)  AROM (active range of motion) BUE ther ex/act, GMC/FMC, hand exerciser  -CJ      Expected Outcomes, Seated Upper Extremity (Therapeutic Exercise)  improve functional tolerance, self-care activity;improve performance, BADLs;improve performance, transfer skills  -CJ      Recorded by [CJ] Laya Harrison, LESLEY      Row Name 10/10/19 1425             Positioning and Restraints    Post Treatment Position  wheelchair  -CJ      In Wheelchair  sitting;call light within reach;encouraged to call for assist;with nsg post both sessions  -CJ      Recorded by [CJ] Laya Harrison OT        User Key  (r) = Recorded By, (t) = Taken By, (c) = Cosigned  By    Initials Name Effective Dates    CJ Laya Harrison, OT 04/03/18 -           Wound 09/29/19 Right lateral clavicle Puncture (Active)   Dressing Appearance other (see comments) 10/9/2019  7:30 PM       Wound 10/01/19 1702 Bilateral other (see comments) coccyx MASD ([place] associated skin damage) (Active)   Dressing Appearance open to air 10/10/2019  7:20 AM   Closure None 10/9/2019  7:30 PM   Base red 10/10/2019  7:20 AM   Periwound excoriated 10/9/2019  7:30 PM   Drainage Amount none 10/9/2019  7:30 PM   Care, Wound other (see comments) 10/9/2019  7:30 PM   Dressing Care, Wound open to air 10/10/2019  7:20 AM         OT Recommendation and Plan    Anticipated Equipment Needs At Discharge (OT Eval): (TBD)  Planned Therapy Interventions (OT Eval): activity tolerance training, BADL retraining, occupation/activity based interventions, ROM/therapeutic exercise, strengthening exercise, patient/caregiver education/training, transfer/mobility retraining    Plan of Care Review  Plan of Care Reviewed With: patient  Plan of Care Reviewed With: patient  IRF Plan of Care Review: progress ongoing, continue    OT IRF GOALS     Row Name 10/09/19 1231 10/09/19 1230 10/02/19 1521       Bathing Goal 1 (OT-IRF)    [place] Level (Bathing Goal 1, OT-IRF)  --  --  minimum assist (75% or more patient effort)  -CJ    Time Frame (Bathing Goal 1, OT-IRF)  --  --  long term goal (LTG);by discharge  -       LB Dressing Goal 1 (OT-IRF)    [place] (LB Dressing Goal 1, OT-IRF)  contact guard assist  -  --  moderate assist (50-74% patient effort)  -    Time Frame (LB Dressing Goal 1, OT-IRF)  short term goal (STG)  -  --  short term goal (STG)  -    Progress/Outcomes (LB Dressing Goal 1, OT-IRF)  --  goal met  -  --       LB Dressing Goal 2 (OT-IRF)    [place] (LB Dressing Goal 2, OT-IRF)  --  --  minimum assist (75% or more patient effort)  -    Time Frame (LB Dressing Goal 2, OT-IRF)  --  --  long term goal  (LTG);by discharge  -       Toileting Goal 1 (OT-IRF)    [place] Level (Toileting Goal 1, OT-IRF)  contact guard assist  -  --  maximum assist (25-49% patient effort)  -    Time Frame (Toileting Goal 1, OT-IRF)  short term goal (STG)  -CJ  --  short term goal (STG)  -    Progress/Outcomes (Toileting Goal 1, OT-IRF)  --  goal met  -  --       Toileting Goal 2 (OT-IRF)    [place] Level (Toileting Goal 2, OT-IRF)  --  --  moderate assist (50-74% patient effort)  -    Time Frame (Toileting Goal 2, OT-IRF)  --  --  long term goal (LTG);by discharge  -      User Key  (r) = Recorded By, (t) = Taken By, (c) = Cosigned By    Initials Name Provider Type     Laya Harrison, OT Occupational Therapist          Occupational Therapy Education     Title: PT OT SLP Therapies (Not Started)     Topic: Occupational Therapy (In Progress)     Point: ADL training (In Progress)     Description: Instruct learner(s) on proper safety adaptation and remediation techniques during self care or transfers.   Instruct in proper use of assistive devices.    Learning Progress Summary           Patient Acceptance, E,D, NR by  at 10/10/2019  2:35 PM    Acceptance, E,D, NR by AB at 10/9/2019  3:29 PM    Acceptance, E,D, NR by  at 10/9/2019 12:30 PM    Acceptance, E,D, NR by JW at 10/8/2019 10:10 AM    Acceptance, E,D, NR by  at 10/7/2019 12:22 PM    Acceptance, E,D, NR by  at 10/4/2019  3:24 PM    Acceptance, E,D, NR by  at 10/3/2019  3:08 PM    Acceptance, E,D, NR by  at 10/2/2019  3:17 PM                               User Key     Initials Effective Dates Name Provider Type Discipline    AB 04/03/18 -  Jennifer Smith OT Occupational Therapist OT    CJ 04/03/18 -  Laya Harrison, OT Occupational Therapist OT     03/07/18 -  Conrado James OTR Occupational Therapist OT                       Time Calculation:     Time Calculation- OT     Row Name 10/10/19 1436 10/10/19 0800          Time Calculation- OT     OT Start Time  1150  -  0800  -     OT Stop Time  1205  -  0915  -     OT Time Calculation (min)  15 min  -  75 min  -     Total Timed Code Minutes- OT  15 minute(s)  -  75 minute(s)  -     OT Non-Billable Time (min)  --  15 min  -       User Key  (r) = Recorded By, (t) = Taken By, (c) = Cosigned By    Initials Name Provider Type     Laya Harrison OT Occupational Therapist          Therapy Charges for Today     Code Description Service Date Service Provider Modifiers Qty    03764532190 HC OT SELF CARE/MGMT/TRAIN EA 15 MIN 10/9/2019 Laya Harrison OT GO 2    47189304382 HC OT THERAPEUTIC ACT EA 15 MIN 10/9/2019 Laya Harrison OT GO 2    86030693236 HC OT SELF CARE/MGMT/TRAIN EA 15 MIN 10/10/2019 Laya Harrison OT GO 3    14903173209 HC OT THERAPEUTIC ACT EA 15 MIN 10/10/2019 Laya Harrison OT GO 3                   Laya Harrison OT  10/10/2019

## 2019-10-10 NOTE — PROGRESS NOTES
Inpatient Rehabilitation Functional Measures Assessment    Functional Measures  PUJA Eating:  PUJA Grooming:  PUJA Bathing:  PUJA Upper Body Dressing:  PUJA Lower Body Dressing:  PUJA Toileting:    PUJA Bladder Management  Level of Assistance:  Frequency/Number of Accidents this Shift:    PUJA Bowel Management  Level of Assistance:  Frequency/Number of Accidents this Shift:    PUJA Bed/Chair/Wheelchair Transfer:  PUJA Toilet Transfer:  PUJA Tub/Shower Transfer:    Previously Documented Mode of Locomotion at Discharge:  PUJA Expected Mode of Locomotion at Discharge:  PUJA Walk/Wheelchair:  PUJA Stairs:    PUJA Comprehension:  Both ( auditory and visual) modes of comprehension are used  equally. Patient does not comprehend complex/abstract information in their  primary language without assistance from a helper. Comprehension Score = 3,  Moderate Prompting. Patient comprehends basic daily needs or ideas 50-74% of the  time. Patient requires moderate/some prompting. Patient requires the following  assistive device(s): Glasses.  PUJA Expression:  Vocal expression is the usual mode. Patient does not express  complex/abstract information in their primary language without a helper.  Expression Score = 3, Moderate Prompting. Patient expresses basic daily needs or  ideas 50-74% of the time. Patient requires moderate/some prompting. No assistive  devices were required.  PUJA Social Interaction:  Social Interaction Score = 7, Independent. Patient is  completely independent for social interaction.  There are no activity  limitations.  PUJA Problem Solving:  Patient does not make appropriate decisions in order to  solve complex problems without assistance from a helper. Problem Solving Score =  3, Moderate Direction. Patient makes appropriate decisions in order to solve  routine problems 50-74% of the time. Patient requires moderate/some direction  for the following behavior(s): Decreased awareness of performance. Difficulty  initiating tasks.  Difficulty weighing alternatives/making choices. Difficulty  with self-correction. Difficulty with self-monitoring. Exhibits poor planning.  Impulsivity. Poor judgment.  PUJA Memory:  Memory Score = 3, Moderate Prompting. Patient recognizes and  remembers 50-74% of the time. Patient requires moderate/some prompting  for  memory for the following: Difficulty recognizing hospital staff as persons  previously met. Difficulty remembering verbal tasks. Difficulty remembering  visual tasks. Disoriented to person, place, time or situation.    Therapy Mode Minutes  Occupational Therapy:  Physical Therapy:  Speech Language Pathology:    Discharge Functional Goals:    Signed by: Nurse Adalgisa

## 2019-10-10 NOTE — PROGRESS NOTES
Inpatient Rehabilitation Functional Measures Assessment    Functional Measures  PUJA Eating:  PUJA Grooming:  PUJA Bathing:  PUJA Upper Body Dressing:  PUJA Lower Body Dressing:  PUJA Toileting:  Patient requires moderate assistance for adjusting clothing  before using a toilet, commode, bedpan, or urinal. Patient requires moderate  assistance for hygiene. Patient requires moderate assistance for adjusting  clothing after using a toilet, commode, bedpan, or urinal. Patient performs 0 -  24% of toileting tasks.  Toileting Score = 1, Total Assistance. Patient requires  the following assistive device(s): Grab bar.    PUJA Bladder Management  Level of Assistance:  Bladder Score = 5.  Patient is supervision/set-up for  bladder management, requiring: Emptying equipment. Setting out equipment. Stand  by assistance. Patient requires the following assistive device(s):  Adult brief.  Bedpan. pt is inc most of time  Frequency/Number of Accidents this Shift:  Bladder accidents this shift:  3 .    PUJA Bowel Management  Level of Assistance: Bowel Score = 5.  Patient is supervision/set-up for bowel  management, requiring: Emptying equipment. Setting out equipment. Stand by  assistance. Patient requires the following assistive device(s):  Bedpan.  Frequency/Number of Accidents this Shift: Bowel accidents this shift: 0 .  Patient has not had an accident this shift.    PUJA Bed/Chair/Wheelchair Transfer:  Activity was not observed.  PUJA Toilet Transfer:  Toilet Transfer was not observed this shift because  patient used bedpan/urinal only.  PUJA Tub/Shower Transfer:    Previously Documented Mode of Locomotion at Discharge:  [facility] Expected Mode of Locomotion at Discharge:  PUJA Walk/Wheelchair:  PUJA Stairs:    PUJA Comprehension:  PUJA Expression:  PUJA Social Interaction:  PUJA Problem Solving:  PUJA Memory:    Therapy Mode Minutes  Occupational Therapy:  Physical Therapy:  Speech Language Pathology:    Discharge Functional Goals:    Signed by:  Crystal Emilia, SRNA

## 2019-10-10 NOTE — PROGRESS NOTES
"     LOS: 9 days     Chief Complaint:  Debility    Subjective     Interval History:     Blood sugars continue to be erratic.  Blood sugar this morning was 223 and is 100 at lunch.  She peaked at 272 yesterday at lunchtime.  She states that she was not on insulin until just a few months ago.  She is only taking 8 units of Levemir daily plus sliding scale insulin.  Systolic blood pressure 130s to 160s.      Objective     Vital Signs  /53 (BP Location: Right arm, Patient Position: Lying)   Pulse 58   Temp 98.1 °F (36.7 °C) (Oral)   Resp 18   Ht 152.4 cm (60\")   Wt 67 kg (147 lb 11.3 oz)   SpO2 91%   BMI 28.85 kg/m²    Intake & Output (last day)       10/09 0701 - 10/10 0700 10/10 0701 - 10/11 0700    P.O. 720     Total Intake(mL/kg) 720 (10.7)     Net +720           Urine Unmeasured Occurrence 6 x     Stool Unmeasured Occurrence 3 x             Physical Exam:     General Appearance:    Alert, cooperative, in no acute distress. Thin, frail and interactive   Head:    Normocephalic, without obvious abnormality, atraumatic   Eyes:            Lids and lashes normal, conjunctivae and sclerae normal, no   icterus, no pallor, corneas clear, PERRLA   Ears:    Ears appear intact with no abnormalities noted       Neck:   No adenopathy, supple, trachea midline, no thyromegaly, no   carotid bruit, no JVD   Lungs:     Clear to auscultation,respirations regular, even and                  unlabored    Heart:    Irreg irreg rhythm and normal rate, normal S1 and S2, no            murmur, no gallop, no rub, no click   Chest Wall:    No abnormalities observed   Abdomen:     Normal bowel sounds, no masses, no organomegaly, soft        non-tender, non-distended, no guarding, no rebound                tenderness   Extremities:   Moves all extremities well, no edema, no cyanosis, no             Redness                        Results Review:    Lab Results   Component Value Date    WBC 6.52 10/05/2019    HGB 10.2 (L) 10/05/2019    " HCT 32.7 (L) 10/05/2019    MCV 89.6 10/05/2019     10/05/2019       Lab Results   Component Value Date    GLUCOSE 367 (H) 10/05/2019    BUN 45 (H) 10/05/2019    CREATININE 1.47 (H) 10/05/2019    EGFRIFNONA 35 (L) 10/05/2019    BCR 30.6 (H) 10/05/2019     (L) 10/05/2019    K 5.0 10/05/2019     10/05/2019    CO2 17.4 (L) 10/05/2019    CALCIUM 8.0 (L) 10/05/2019    PROTENTOTREF 5.2 (L) 02/16/2019    ALBUMIN 2.84 (L) 10/02/2019    LABIL2 1.1 02/16/2019    AST 13 10/02/2019    ALT 7 10/02/2019     Lab Results   Component Value Date    INR 0.95 09/22/2019    INR 0.98 02/20/2019    INR 0.95 02/19/2019       Glucose   Date Value Ref Range Status   10/10/2019 100 70 - 130 mg/dL Final   10/10/2019 223 (H) 70 - 130 mg/dL Final   10/09/2019 215 (H) 70 - 130 mg/dL Final   10/09/2019 209 (H) 70 - 130 mg/dL Final   10/09/2019 272 (H) 70 - 130 mg/dL Final          Medication Review:     Current Facility-Administered Medications:   •  amLODIPine (NORVASC) tablet 10 mg, 10 mg, Oral, Q24H, Kreis, Samuel Duane, MD, 10 mg at 10/10/19 0828  •  apixaban (ELIQUIS) tablet 5 mg, 5 mg, Oral, Q12H, Kreis, Samuel Duane, MD, 5 mg at 10/10/19 0828  •  aspirin EC tablet 81 mg, 81 mg, Oral, Daily, Kreis, Samuel Duane, MD, 81 mg at 10/10/19 0828  •  bisacodyl (DULCOLAX) suppository 10 mg, 10 mg, Rectal, Daily PRN, Kreis, Samuel Duane, MD  •  dextrose (D50W) 25 g/ 50mL Intravenous Solution 25 g, 25 g, Intravenous, Q15 Min PRN, Kreis, Samuel Duane, MD  •  dextrose (GLUTOSE) oral gel 15 g, 15 g, Oral, Q15 Min PRN, Kreis, Samuel Duane, MD  •  diphenhydrAMINE (BENADRYL) capsule 25 mg, 25 mg, Oral, Q6H PRN, Kreis, Samuel Duane, MD, 25 mg at 10/10/19 0828  •  glucagon (human recombinant) (GLUCAGEN DIAGNOSTIC) injection 1 mg, 1 mg, Subcutaneous, Q15 Min PRN, Kreis, Samuel Duane, MD  •  insulin aspart (novoLOG) injection 0-7 Units, 0-7 Units, Subcutaneous, TID With Meals, Kreis, Samuel Duane, MD, 3 Units at 10/10/19 0828  •  insulin  detemir (LEVEMIR) injection 8 Units, 8 Units, Subcutaneous, Nightly, Kreis, Samuel Duane, MD, 8 Units at 10/09/19 2120  •  linagliptin (TRADJENTA) tablet 5 mg, 5 mg, Oral, Daily, Kreis, Samuel Duane, MD, 5 mg at 10/10/19 0828  •  magnesium hydroxide (MILK OF MAGNESIA) suspension 2400 mg/10mL 10 mL, 10 mL, Oral, Daily PRN, Kreis, Samuel Duane, MD, 10 mL at 10/09/19 2119  •  metoprolol tartrate (LOPRESSOR) tablet 100 mg, 100 mg, Oral, Q12H, Kreis, Samuel Duane, MD, 100 mg at 10/10/19 0828  •  montelukast (SINGULAIR) tablet 10 mg, 10 mg, Oral, Nightly, Kreis, Samuel Duane, MD, 10 mg at 10/09/19 2120  •  ondansetron (ZOFRAN) tablet 4 mg, 4 mg, Oral, Q6H PRN **OR** ondansetron (ZOFRAN) injection 4 mg, 4 mg, Intravenous, Q6H PRN, Kreis, Samuel Duane, MD  •  pantoprazole (PROTONIX) EC tablet 40 mg, 40 mg, Oral, Q AM, Kreis, Samuel Duane, MD, 40 mg at 10/10/19 0511  •  thiamine (VITAMIN B-1) tablet 100 mg, 100 mg, Oral, Daily, Kreis, Samuel Duane, MD, 100 mg at 10/10/19 0828      Assessment/Plan     Debility secondary to what seems most consistent with hypoxic encephalopathy  Type 2 diabetes mellitus with recent history of DKA with hyperglycemia  Known coronary artery disease with history of non-ST elevation acute myocardial infarction  Chronic hypoxic respiratory failure  Urinary tract infection status post treatment  Chronic atrial fibrillation  Hypertension  Stage III chronic kidney disease       PT and OT     Eliquis twice daily due to full dose anticoagulation due to A. fib     Discontinue Levemir.  Initiate glipizide 2.5 mg daily.  Continue Tradjenta 5 mg daily. SS insulin tid with meals     Continue amlodipine 10 mg daily and continue metoprolol 100 mg twice daily.      Protonix once daily    CBC and BMP in the morning    Samuel Duane Kreis, MD  10/10/19  11:48 AM

## 2019-10-10 NOTE — PROGRESS NOTES
Inpatient Rehabilitation Functional Measures Assessment    Functional Measures  PUJA Eating:  PUJA Grooming: Grooming Score = 5. Patient is supervision/set-up for grooming,  requiring: Initial preparation. Stand by assistance. Verbal cuing, prompting, or  instructing. No assistive devices were required.  PUJA Bathing:  Patient took sponge bath. Bathing Score = 4.  Patient requires  minimal assistance for bathing, requiring steadying for balance only. Patient  requires the following assistive device(s): Grab bar/arm rest to maintain  balance.  PUJA Upper Body Dressing:  Upper Body Dressing Score = 5. Patient is supervision  for upper body dressing, requiring: Gathering/setting out clothes. Stand by  assistance. Verbal cuing, prompting, or instructing. No assistive devices were  required.  PUJA Lower Body Dressing:  Lower Body Dressing Score = 4. Patient requires  minimal assistance for lower body dressing, requiring incidental help only (such  as task initiation or assist with buttons/zips/snaps). No assistive devices were  required.  PUJA Toileting:  Toileting Score = 4.  Patient requires minimal assistance for  toileting, such as steadying for balance while cleansing or adjusting clothes.  Patient requires the following assistive device(s): Arm rest of a specialized  seat. Grab bar.    PUJA Bladder Management  Level of Assistance:  Frequency/Number of Accidents this Shift:    PUJA Bowel Management  Level of Assistance:  Frequency/Number of Accidents this Shift:    PUJA Bed/Chair/Wheelchair Transfer:  PUJA Toilet Transfer:  Toilet Transfer Score = 4.  Patient performs 75% or more  of effort and minimal assistance (little/incidental help/steadying) for  transferring to and from the toilet/commode, requiring: Contact guard. Patient  requires the following assistive device(s): Safety frame/over the toilet. Grab  bars.  PUJA Tub/Shower Transfer:    Previously Documented Mode of Locomotion at Discharge:  [facility] Expected Mode of  Locomotion at Discharge:  PUJA Walk/Wheelchair:  PUJA Stairs:    PUJA Comprehension:  PUJA Expression:  PUJA Social Interaction:  PUJA Problem Solving:  PUJA Memory:    Therapy Mode Minutes  Occupational Therapy: Individual: 90 minutes.  Physical Therapy:  Speech Language Pathology:    Discharge Functional Goals:    Signed by: Laya Harrison Occupational Therapist

## 2019-10-10 NOTE — PLAN OF CARE
Problem: Functional Mobility Impairment (IRF) (Adult)  Goal: Optimal/Safe Level of [place] with Mobility  Outcome: Ongoing (interventions implemented as appropriate)   10/10/19 0810   Functional Mobility Impairment (IRF) (Adult)   Optimal/Safe Level of [place] with Mobility progressing to functional independence       Problem: Diabetes, Type 2 (Adult)  Goal: Signs and Symptoms of Listed Potential Problems Will be Absent, Minimized or Managed (Diabetes, Type 2)  Outcome: Ongoing (interventions implemented as appropriate)      Problem: Safety Awareness Impairment (IRF) (Adult)  Goal: Optimal Level of Safety Awareness, All Environments  Outcome: Ongoing (interventions implemented as appropriate)

## 2019-10-10 NOTE — PROGRESS NOTES
Inpatient Rehabilitation Functional Measures Assessment    Functional Measures  PUJA Eating:  PUJA Grooming:  PUJA Bathing:  PUJA Upper Body Dressing:  PUJA Lower Body Dressing:  PUJA Toileting:    PUJA Bladder Management  Level of Assistance:  Frequency/Number of Accidents this Shift:    PUJA Bowel Management  Level of Assistance:  Frequency/Number of Accidents this Shift:    PUJA Bed/Chair/Wheelchair Transfer:  PUJA Toilet Transfer:  PUJA Tub/Shower Transfer:    Previously Documented Mode of Locomotion at Discharge:  PUJA Expected Mode of Locomotion at Discharge:  PUJA Walk/Wheelchair:  PUJA Stairs:    PUJA Comprehension:  Both ( auditory and visual) modes of comprehension are used  equally. Comprehension Score = 6, Modified [place].  Patient comprehends  complex/abstract information in their primary language, requiring: Additional  time.  PUJA Expression:  Both ( vocal and non-vocal) modes of expression are used  equally. Expression Score = 6,  Modified Independent. Patient expresses  complex/abstract information in their primary language, requiring: Additional  time.  PUJA Social Interaction:  Social Interaction Score = 6, Modified Independent.  Patient is modified independent for social interaction, requiring: Requires  additional time.  PUJA Problem Solving:  Patient does not make appropriate decisions in order to  solve complex problems without assistance from a helper. Problem Solving Score =  4, Minimal Direction. Patient makes appropriate decisions in order to solve  routine problems 75-90% of the time. Patient requires minimal/occasional  direction for the following behavior(s): Decreased awareness of performance.  PUJA Memory:  Memory Score = 3, Moderate Prompting. Patient recognizes and  remembers 50-74% of the time. Patient requires moderate/some prompting  for  memory for the following: Disoriented to person, place, time or situation.    Therapy Mode Minutes  Occupational Therapy:  Physical Therapy:  Speech  Language Pathology:    Discharge Functional Goals:    Signed by: Cecelia Hare, Nurse

## 2019-10-10 NOTE — PROGRESS NOTES
Inpatient Rehabilitation Functional Measures Assessment    Functional Measures  PUJA Eating:  Eating Score = 5. Patient is supervision/set-up for eating,  requiring: Opening containers. No assistive devices were required.  PUJA Grooming:  PUJA Bathing:  PUJA Upper Body Dressing:  PUJA Lower Body Dressing:  PUJA Toileting:  Activity was not observed.    PUJA Bladder Management  Level of Assistance:  Bladder Score = 2. Patient performs 25-49% of tasks and  requires maximal assistance for bladder management. [place] provides most of the  assist to roll or bridge to place and remove bedpan.  Frequency/Number of Accidents this Shift:  Bladder accidents this shift:  1 .    PUJA Bowel Management  Level of Assistance: Bowel Score = 7.  Patient is completely independent for  bowel management.  Patient did not have bowel movement.  No  medication/intervention was provided.  Frequency/Number of Accidents this Shift: Bowel accidents this shift: 2 .    PUJA Bed/Chair/Wheelchair Transfer:  Bed/chair/wheelchair Transfer Score = 3.  Patient performs 50-74% of effort and requires moderate assistance (some  lifting) for transferring to and from the bed/chair/wheelchair. No assistive  devices were required.  PUJA Toilet Transfer:  Activity was not observed.  PUJA Tub/Shower Transfer:  Activity was not observed.    Previously Documented Mode of Locomotion at Discharge:  PUJA Expected Mode of Locomotion at Discharge:  PUJA Walk/Wheelchair:  PUJA Stairs:    PUJA Comprehension:  PUJA Expression:  PUJA Social Interaction:  PUJA Problem Solving:  PUJA Memory:    Therapy Mode Minutes  Occupational Therapy:  Physical Therapy:  Speech Language Pathology:    Discharge Functional Goals:    Signed by: JAI Jefferson

## 2019-10-11 LAB
ANION GAP SERPL CALCULATED.3IONS-SCNC: 14.1 MMOL/L (ref 5–15)
BUN BLD-MCNC: 50 MG/DL (ref 8–23)
BUN/CREAT SERPL: 35.5 (ref 7–25)
CALCIUM SPEC-SCNC: 8.8 MG/DL (ref 8.6–10.5)
CHLORIDE SERPL-SCNC: 100 MMOL/L (ref 98–107)
CO2 SERPL-SCNC: 22.9 MMOL/L (ref 22–29)
CREAT BLD-MCNC: 1.41 MG/DL (ref 0.57–1)
DEPRECATED RDW RBC AUTO: 50.7 FL (ref 37–54)
ERYTHROCYTE [DISTWIDTH] IN BLOOD BY AUTOMATED COUNT: 15.7 % (ref 12.3–15.4)
GFR SERPL CREATININE-BSD FRML MDRD: 36 ML/MIN/1.73
GLUCOSE BLD-MCNC: 296 MG/DL (ref 65–99)
GLUCOSE BLDC GLUCOMTR-MCNC: 293 MG/DL (ref 70–130)
GLUCOSE BLDC GLUCOMTR-MCNC: 330 MG/DL (ref 70–130)
GLUCOSE BLDC GLUCOMTR-MCNC: 86 MG/DL (ref 70–130)
HCT VFR BLD AUTO: 35.6 % (ref 34–46.6)
HGB BLD-MCNC: 11 G/DL (ref 12–15.9)
MCH RBC QN AUTO: 28.1 PG (ref 26.6–33)
MCHC RBC AUTO-ENTMCNC: 30.9 G/DL (ref 31.5–35.7)
MCV RBC AUTO: 90.8 FL (ref 79–97)
PLATELET # BLD AUTO: 287 10*3/MM3 (ref 140–450)
PMV BLD AUTO: 11.3 FL (ref 6–12)
POTASSIUM BLD-SCNC: 5.3 MMOL/L (ref 3.5–5.2)
RBC # BLD AUTO: 3.92 10*6/MM3 (ref 3.77–5.28)
SODIUM BLD-SCNC: 137 MMOL/L (ref 136–145)
WBC NRBC COR # BLD: 8.29 10*3/MM3 (ref 3.4–10.8)

## 2019-10-11 PROCEDURE — 97535 SELF CARE MNGMENT TRAINING: CPT

## 2019-10-11 PROCEDURE — 97530 THERAPEUTIC ACTIVITIES: CPT

## 2019-10-11 PROCEDURE — 97116 GAIT TRAINING THERAPY: CPT

## 2019-10-11 PROCEDURE — 63710000001 INSULIN ASPART PER 5 UNITS: Performed by: FAMILY MEDICINE

## 2019-10-11 PROCEDURE — 85027 COMPLETE CBC AUTOMATED: CPT | Performed by: FAMILY MEDICINE

## 2019-10-11 PROCEDURE — 82962 GLUCOSE BLOOD TEST: CPT

## 2019-10-11 PROCEDURE — 63710000001 DIPHENHYDRAMINE PER 50 MG: Performed by: FAMILY MEDICINE

## 2019-10-11 PROCEDURE — 80048 BASIC METABOLIC PNL TOTAL CA: CPT | Performed by: FAMILY MEDICINE

## 2019-10-11 PROCEDURE — 97110 THERAPEUTIC EXERCISES: CPT

## 2019-10-11 RX ORDER — GLIPIZIDE 5 MG/1
5 TABLET ORAL
Status: DISCONTINUED | OUTPATIENT
Start: 2019-10-12 | End: 2019-10-14

## 2019-10-11 RX ORDER — DIVALPROEX SODIUM 250 MG/1
250 TABLET, EXTENDED RELEASE ORAL NIGHTLY
Status: DISCONTINUED | OUTPATIENT
Start: 2019-10-11 | End: 2019-10-14

## 2019-10-11 RX ADMIN — INSULIN ASPART 5 UNITS: 100 INJECTION, SOLUTION INTRAVENOUS; SUBCUTANEOUS at 17:11

## 2019-10-11 RX ADMIN — GLIPIZIDE 2.5 MG: 5 TABLET ORAL at 08:48

## 2019-10-11 RX ADMIN — METOPROLOL TARTRATE 100 MG: 100 TABLET, FILM COATED ORAL at 21:37

## 2019-10-11 RX ADMIN — DIPHENHYDRAMINE HYDROCHLORIDE 25 MG: 25 CAPSULE ORAL at 21:37

## 2019-10-11 RX ADMIN — AMLODIPINE BESYLATE 10 MG: 10 TABLET ORAL at 08:48

## 2019-10-11 RX ADMIN — INSULIN ASPART 4 UNITS: 100 INJECTION, SOLUTION INTRAVENOUS; SUBCUTANEOUS at 09:04

## 2019-10-11 RX ADMIN — LINAGLIPTIN 5 MG: 5 TABLET, FILM COATED ORAL at 08:48

## 2019-10-11 RX ADMIN — PANTOPRAZOLE SODIUM 40 MG: 40 TABLET, DELAYED RELEASE ORAL at 05:29

## 2019-10-11 RX ADMIN — DIVALPROEX SODIUM 250 MG: 250 TABLET, EXTENDED RELEASE ORAL at 21:37

## 2019-10-11 RX ADMIN — MONTELUKAST SODIUM 10 MG: 10 TABLET, COATED ORAL at 21:37

## 2019-10-11 RX ADMIN — Medication 100 MG: at 08:48

## 2019-10-11 RX ADMIN — ASPIRIN 81 MG: 81 TABLET, COATED ORAL at 08:48

## 2019-10-11 RX ADMIN — METOPROLOL TARTRATE 100 MG: 100 TABLET, FILM COATED ORAL at 08:48

## 2019-10-11 RX ADMIN — APIXABAN 5 MG: 5 TABLET, FILM COATED ORAL at 08:48

## 2019-10-11 RX ADMIN — APIXABAN 5 MG: 5 TABLET, FILM COATED ORAL at 21:37

## 2019-10-11 NOTE — THERAPY TREATMENT NOTE
Inpatient Rehabilitation - Occupational Therapy Treatment Note     [place]     Patient Name: Ashley Geronimo  : 1945  MRN: [id number]    Today's Date: 10/11/2019                 Admit Date: 10/1/2019      Visit Dx:  No diagnosis found.    Patient Active Problem List   Diagnosis   • Pneumonia   • Septic shock (CMS/HCC)   • Pseudomonas pneumonia (CMS/HCC)   • NSTEMI (non-ST elevated myocardial infarction) (CMS/[facility])   • Bradycardia   • Paroxysmal atrial fibrillation (CMS/HCC)   • Chronic respiratory failure with hypoxia and hypercapnia (CMS/HCC)   • Acute on chronic respiratory failure with hypoxia (CMS/HCC), requiring intubation/mechanical ventilation 19   • Hematemesis   • RAMESH (acute kidney injury) (CMS/[facility])   • DKA (diabetic ketoacidoses) (CMS/[facility]ly [facility])   • Septic shock (CMS/[facility])   • Encephalopathy   • UTI (urinary tract infection)   • Tracheobronchitis, PSA    • Metabolic encephalopathy         Therapy Treatment    IRF Treatment Summary     Row Name 10/11/19 1222             Evaluation/Treatment Time and Intent    Subjective Information  no complaints  -CJ      Existing Precautions/Restrictions  fall decreased skin integrity  -CJ      Document Type  therapy note (daily note)  -CJ      Mode of Treatment  occupational therapy  -CJ      Recorded by [CJ] Laya Harrison OT      Row Name 10/11/19 1222             Cognition/Psychosocial- PT/OT    Affect/Mental Status (Cognitive)  confused Pt presented with increased confusion today.  RN notified.  -CJ      Follows Commands (Cognition)  verbal cues/prompting required;repetition of directions required;physical/tactile prompts required;initiation impaired;increased processing time needed;delayed response/completion  -CJ      Attention Deficit (Cognitive)  requires cues/redirection to task  -CJ      Recorded by [CJ] Laya Harrison OT      Row Name 10/11/19 1222             Bed-Chair Transfer    Bed-Chair [place] (Transfers)  contact guard;verbal cues  -CJ       Assistive Device (Bed-Chair Transfers)  wheelchair  -CJ      Recorded by [CJ] Laya Harrison OT      Row Name 10/11/19 1222             Grooming Assessment/Treatment    Grooming [place] Level  set up;supervision;verbal cues  -CJ      Recorded by [CJ] Laya Harrison OT      Row Name 10/11/19 1222             Upper Extremity Seated Therapeutic Exercise    Exercise Type, Seated Upper Extremity (Therapeutic Exercise)  AROM (active range of motion) BUE ther ex/act, bilat coord ex, GMC/FMC, fxal act tolerance  -CJ      Expected Outcomes, Seated Upper Extremity (Therapeutic Exercise)  improve functional tolerance, self-care activity;improve performance, BADLs;improve performance, transfer skills  -CJ      Recorded by [CJ] Laya Harrison OT      Row Name 10/11/19 1222             Positioning and Restraints    Post Treatment Position  wheelchair  -CJ      In Wheelchair  sitting;with nsg;patient within staff view  -CJ      Recorded by [CJ] Laya Harrison OT        User Key  (r) = Recorded By, (t) = Taken By, (c) = Cosigned By    Initials Name Effective Dates     Laya Harrison OT 04/03/18 -           Wound 10/01/19 1702 Bilateral other (see comments) coccyx MASD ([place] associated skin damage) (Active)   Dressing Appearance open to air 10/11/2019  7:30 AM   Base red 10/11/2019  7:30 AM   Periwound excoriated 10/10/2019  7:20 PM   Drainage Amount none 10/10/2019  7:20 PM   Care, Wound barrier applied 10/10/2019  7:20 PM   Dressing Care, Wound open to air 10/11/2019  7:30 AM         OT Recommendation and Plan    Anticipated Equipment Needs At Discharge (OT Eval): (TBD)  Planned Therapy Interventions (OT Eval): activity tolerance training, BADL retraining, occupation/activity based interventions, ROM/therapeutic exercise, strengthening exercise, patient/caregiver education/training, transfer/mobility retraining    Plan of Care Review  Plan of Care Reviewed With: patient  Plan of Care Reviewed With:  patient  IRF Plan of Care Review: progress ongoing, continue    OT IRF GOALS     Row Name 10/09/19 1231 10/09/19 1230 10/02/19 1521       Bathing Goal 1 (OT-IRF)    [place] Level (Bathing Goal 1, OT-IRF)  --  --  minimum assist (75% or more patient effort)  -CJ    Time Frame (Bathing Goal 1, OT-IRF)  --  --  long term goal (LTG);by discharge  -       LB Dressing Goal 1 (OT-IRF)    [place] (LB Dressing Goal 1, OT-IRF)  contact guard assist  -CJ  --  moderate assist (50-74% patient effort)  -CJ    Time Frame (LB Dressing Goal 1, OT-IRF)  short term goal (STG)  -CJ  --  short term goal (STG)  -CJ    Progress/Outcomes (LB Dressing Goal 1, OT-IRF)  --  goal met  -CJ  --       LB Dressing Goal 2 (OT-IRF)    [place] (LB Dressing Goal 2, OT-IRF)  --  --  minimum assist (75% or more patient effort)  -CJ    Time Frame (LB Dressing Goal 2, OT-IRF)  --  --  long term goal (LTG);by discharge  -       Toileting Goal 1 (OT-IRF)    [place] Level (Toileting Goal 1, OT-IRF)  contact guard assist  -CJ  --  maximum assist (25-49% patient effort)  -CJ    Time Frame (Toileting Goal 1, OT-IRF)  short term goal (STG)  -CJ  --  short term goal (STG)  -CJ    Progress/Outcomes (Toileting Goal 1, OT-IRF)  --  goal met  -CJ  --       Toileting Goal 2 (OT-IRF)    [place] Level (Toileting Goal 2, OT-IRF)  --  --  moderate assist (50-74% patient effort)  -CJ    Time Frame (Toileting Goal 2, OT-IRF)  --  --  long term goal (LTG);by discharge  -      User Key  (r) = Recorded By, (t) = Taken By, (c) = Cosigned By    Initials Name Provider Type    Laya Yusuf OT Occupational Therapist          Occupational Therapy Education     Title: PT OT SLP Therapies (In Progress)     Topic: Occupational Therapy (In Progress)     Point: ADL training (In Progress)     Description: Instruct learner(s) on proper safety adaptation and remediation techniques during self care or transfers.   Instruct in proper use of assistive  devices.    Learning Progress Summary           Patient Acceptance, E,D, NR by  at 10/11/2019 12:27 PM    Acceptance, E, VU,NR by  at 10/11/2019  8:42 AM    Acceptance, E,TB, VU by  at 10/11/2019 12:42 AM    Acceptance, E,D, NR by  at 10/10/2019  2:35 PM    Acceptance, E,D, NR by AB at 10/9/2019  3:29 PM    Acceptance, E,D, NR by  at 10/9/2019 12:30 PM    Acceptance, E,D, NR by  at 10/8/2019 10:10 AM    Acceptance, E,D, NR by  at 10/7/2019 12:22 PM    Acceptance, E,D, NR by  at 10/4/2019  3:24 PM    Acceptance, E,D, NR by  at 10/3/2019  3:08 PM    Acceptance, E,D, NR by  at 10/2/2019  3:17 PM                   Point: Home exercise program (Done)     Description: Instruct learner(s) on appropriate technique for monitoring, assisting and/or progressing therapeutic exercises/activities.    Learning Progress Summary           Patient Acceptance, E, VU,NR by  at 10/11/2019  8:42 AM    Acceptance, E,TB, VU by MONICA at 10/11/2019 12:42 AM                               User Key     Initials Effective Dates Name Provider Type Discipline    AB 04/03/18 -  Jennifer Smith, OT Occupational Therapist OT     06/16/16 -  Cecelia Hare, RN Registered Nurse Nurse     06/16/16 -  Rut Perrin, RN Registered Nurse Nurse     04/03/18 -  Laya Harrison, OT Occupational Therapist OT     03/07/18 -  Conrado James OTR Occupational Therapist OT                       Time Calculation:     Time Calculation- OT     Row Name 10/11/19 1228             Time Calculation- OT    OT Start Time  0800  -      OT Stop Time  0930  -      OT Time Calculation (min)  90 min  -      Total Timed Code Minutes- OT  90 minute(s)  -      OT Non-Billable Time (min)  15 min  -        User Key  (r) = Recorded By, (t) = Taken By, (c) = Cosigned By    Initials Name Provider Type     Laya Harrison, OT Occupational Therapist          Therapy Charges for Today     Code Description Service Date Service Provider  Modifiers Qty    15332295498 HC OT SELF CARE/MGMT/TRAIN EA 15 MIN 10/10/2019 Laya Harrison, OT GO 3    97961608694 HC OT THERAPEUTIC ACT EA 15 MIN 10/10/2019 Laya Harrison OT GO 3    43681492812 HC OT SELF CARE/MGMT/TRAIN EA 15 MIN 10/11/2019 Laya Harrison, OT GO 1    55565032804 HC OT THERAPEUTIC ACT EA 15 MIN 10/11/2019 Laya Harrison, OT GO 5                   Laya Harrison OT  10/11/2019

## 2019-10-11 NOTE — PLAN OF CARE
Problem: Patient Care Overview  Goal: Plan of Care Review  Outcome: Ongoing (interventions implemented as appropriate)    Goal: Individualization and Mutuality  Outcome: Ongoing (interventions implemented as appropriate)    Goal: Discharge Needs Assessment  Outcome: Ongoing (interventions implemented as appropriate)    Goal: Home Safety Plan  Outcome: Ongoing (interventions implemented as appropriate)    Goal: Coping Plan  Outcome: Ongoing (interventions implemented as appropriate)    Goal: Community Reintegration Plan  Outcome: Ongoing (interventions implemented as appropriate)      Problem: Fall Risk (Adult)  Goal: Absence of Fall  Outcome: Ongoing (interventions implemented as appropriate)      Problem: Wound (Includes Pressure Injury) (Adult)  Goal: Signs and Symptoms of Listed Potential Problems Will be Absent, Minimized or Managed (Wound)  Outcome: Ongoing (interventions implemented as appropriate)      Problem: Functional Mobility Impairment (IRF) (Adult)  Goal: Optimal/Safe Level of [place] with Mobility  Outcome: Ongoing (interventions implemented as appropriate)      Problem: Diabetes, Type 2 (Adult)  Goal: Signs and Symptoms of Listed Potential Problems Will be Absent, Minimized or Managed (Diabetes, Type 2)  Outcome: Ongoing (interventions implemented as appropriate)      Problem: Safety Awareness Impairment (IRF) (Adult)  Goal: Optimal Level of Safety Awareness, All Environments  Outcome: Ongoing (interventions implemented as appropriate)

## 2019-10-11 NOTE — PROGRESS NOTES
"     LOS: 10 days     Chief Complaint:  Debility    Subjective     Interval History:     She was apparently pretty confused and agitated overnight.  She is a little bit rambunctious today.  Systolic blood pressure running 160s to 170s yesterday into today.  Oxygen saturation on room air in the low 90s.  Blood sugar this morning was 293 and at lunchtime is 86.  Creatinine is 1.4      Objective     Vital Signs  /64 (BP Location: Left arm, Patient Position: Lying)   Pulse 60   Temp 97.7 °F (36.5 °C)   Resp 18   Ht 152.4 cm (60\")   Wt 67 kg (147 lb 11.3 oz)   SpO2 93%   BMI 28.85 kg/m²    Intake & Output (last day)       10/10 0701 - 10/11 0700 10/11 0701 - 10/12 0700    P.O. 1080     Total Intake(mL/kg) 1080 (16.1)     Net +1080           Urine Unmeasured Occurrence 9 x             Physical Exam:     General Appearance:    Alert, cooperative, in no acute distress. Thin, frail and interactive.  A little bit confused and anxious   Head:    Normocephalic, without obvious abnormality, atraumatic   Eyes:            Lids and lashes normal, conjunctivae and sclerae normal, no   icterus, no pallor, corneas clear, PERRLA   Ears:    Ears appear intact with no abnormalities noted       Neck:   No adenopathy, supple, trachea midline, no thyromegaly, no   carotid bruit, no JVD   Lungs:     Clear to auscultation,respirations regular, even and                  unlabored    Heart:    Irreg irreg rhythm and normal rate, normal S1 and S2, no            murmur, no gallop, no rub, no click   Chest Wall:    No abnormalities observed   Abdomen:     Normal bowel sounds, no masses, no organomegaly, soft        non-tender, non-distended, no guarding, no rebound                tenderness   Extremities:   Moves all extremities well, no edema, no cyanosis, no             Redness                        Results Review:    Lab Results   Component Value Date    WBC 8.29 10/11/2019    HGB 11.0 (L) 10/11/2019    HCT 35.6 10/11/2019    MCV " 90.8 10/11/2019     10/11/2019       Lab Results   Component Value Date    GLUCOSE 296 (H) 10/11/2019    BUN 50 (H) 10/11/2019    CREATININE 1.41 (H) 10/11/2019    EGFRIFNONA 36 (L) 10/11/2019    BCR 35.5 (H) 10/11/2019     10/11/2019    K 5.3 (H) 10/11/2019     10/11/2019    CO2 22.9 10/11/2019    CALCIUM 8.8 10/11/2019    PROTENTOTREF 5.2 (L) 02/16/2019    ALBUMIN 2.84 (L) 10/02/2019    LABIL2 1.1 02/16/2019    AST 13 10/02/2019    ALT 7 10/02/2019     Lab Results   Component Value Date    INR 0.95 09/22/2019    INR 0.98 02/20/2019    INR 0.95 02/19/2019       Glucose   Date Value Ref Range Status   10/11/2019 86 70 - 130 mg/dL Final   10/11/2019 293 (H) 70 - 130 mg/dL Final   10/10/2019 284 (H) 70 - 130 mg/dL Final   10/10/2019 100 70 - 130 mg/dL Final   10/10/2019 223 (H) 70 - 130 mg/dL Final          Medication Review:     Current Facility-Administered Medications:   •  amLODIPine (NORVASC) tablet 10 mg, 10 mg, Oral, Q24H, Kreis, Samuel Duane, MD, 10 mg at 10/11/19 0848  •  apixaban (ELIQUIS) tablet 5 mg, 5 mg, Oral, Q12H, Kreis, Samuel Duane, MD, 5 mg at 10/11/19 0848  •  aspirin EC tablet 81 mg, 81 mg, Oral, Daily, Kreis, Samuel Duane, MD, 81 mg at 10/11/19 0848  •  bisacodyl (DULCOLAX) suppository 10 mg, 10 mg, Rectal, Daily PRN, Kreis, Samuel Duane, MD  •  dextrose (D50W) 25 g/ 50mL Intravenous Solution 25 g, 25 g, Intravenous, Q15 Min PRN, Kreis, Samuel Duane, MD  •  dextrose (GLUTOSE) oral gel 15 g, 15 g, Oral, Q15 Min PRN, Kreis, Samuel Duane, MD  •  diphenhydrAMINE (BENADRYL) capsule 25 mg, 25 mg, Oral, Q6H PRN, Kreis, Samuel Duane, MD, 25 mg at 10/10/19 2158  •  glipizide (GLUCOTROL) tablet 2.5 mg, 2.5 mg, Oral, QAM AC, Kreis, Samuel Duane, MD, 2.5 mg at 10/11/19 0848  •  glucagon (human recombinant) (GLUCAGEN DIAGNOSTIC) injection 1 mg, 1 mg, Subcutaneous, Q15 Min PRN, Kreis, Samuel Duane, MD  •  insulin aspart (novoLOG) injection 0-7 Units, 0-7 Units, Subcutaneous, TID With Meals,  Kreis, Samuel Duane, MD, 4 Units at 10/11/19 0904  •  linagliptin (TRADJENTA) tablet 5 mg, 5 mg, Oral, Daily, Kreis, Samuel Duane, MD, 5 mg at 10/11/19 0848  •  magnesium hydroxide (MILK OF MAGNESIA) suspension 2400 mg/10mL 10 mL, 10 mL, Oral, Daily PRN, Kreis, Samuel Duane, MD, 10 mL at 10/09/19 2119  •  metoprolol tartrate (LOPRESSOR) tablet 100 mg, 100 mg, Oral, Q12H, Kreis, Samuel Duane, MD, 100 mg at 10/11/19 0848  •  montelukast (SINGULAIR) tablet 10 mg, 10 mg, Oral, Nightly, Kreis, Samuel Duane, MD, 10 mg at 10/10/19 2158  •  ondansetron (ZOFRAN) tablet 4 mg, 4 mg, Oral, Q6H PRN **OR** ondansetron (ZOFRAN) injection 4 mg, 4 mg, Intravenous, Q6H PRN, Kreis, Samuel Duane, MD  •  pantoprazole (PROTONIX) EC tablet 40 mg, 40 mg, Oral, Q AM, Kreis, Samuel Duane, MD, 40 mg at 10/11/19 0529  •  thiamine (VITAMIN B-1) tablet 100 mg, 100 mg, Oral, Daily, Kreis, Samuel Duane, MD, 100 mg at 10/11/19 0848      Assessment/Plan     Debility secondary to what seems most consistent with hypoxic encephalopathy  Type 2 diabetes mellitus with recent history of DKA with hyperglycemia  Known coronary artery disease with history of non-ST elevation acute myocardial infarction  Chronic hypoxic respiratory failure  Urinary tract infection status post treatment  Chronic atrial fibrillation  Hypertension  Stage III chronic kidney disease         PT and OT     Eliquis twice daily due to full dose anticoagulation due to A. fib     Discontinue Levemir.  Increase glipizide to 5 mg daily.  Continue Tradjenta 5 mg daily. SS insulin tid with meals     Continue amlodipine 10 mg daily and continue metoprolol 100 mg twice daily.      Protonix once daily    Added Depakote 250 mg nightly    Samuel Duane Kreis, MD  10/11/19  12:51 PM

## 2019-10-11 NOTE — PROGRESS NOTES
Rehabilitation Nursing  Inpatient Rehabilitation Plan of Care Note    Plan of Care  Copy from POCBody Function Structure    Skin Integrity (Active)  Current Status (10/1/2019 5:00:00 PM): MASD to buttocks  Weekly Goal: healing of skin  Discharge Goal: healed skin    Safety    Potential for Injury (Active)  Current Status (10/1/2019 5:00:00 PM): falls risk  Weekly Goal: no falls  Discharge Goal: no falls this admission    Signed by: Rut Perrin, Nurse

## 2019-10-11 NOTE — PROGRESS NOTES
Inpatient Rehabilitation Functional Measures Assessment    Functional Measures  PUJA Eating:  PUJA Grooming: Grooming Score = 5. Patient is supervision/set-up for grooming,  requiring: Initial preparation. Stand by assistance. Verbal cuing, prompting, or  instructing. No assistive devices were required.  PUJA Bathing:  PUJA Upper Body Dressing:  PUJA Lower Body Dressing:  PUJA Toileting:    PUJA Bladder Management  Level of Assistance:  Frequency/Number of Accidents this Shift:    PUJA Bowel Management  Level of Assistance:  Frequency/Number of Accidents this Shift:    PUJA Bed/Chair/Wheelchair Transfer:  Bed/chair/wheelchair Transfer Score = 4.  Patient performs 75% or more of effort and minimal assistance (little/incidental  help/lifting of one limb/steadying) for transferring to and from the  bed/chair/wheelchair, requiring: Contact guard. No assistive devices were  required.  PUJA Toilet Transfer:  PUJA Tub/Shower Transfer:    Previously Documented Mode of Locomotion at Discharge:  [facility] Expected Mode of Locomotion at Discharge:  PUJA Walk/Wheelchair:  PUJA Stairs:    [facility] Comprehension:  [facility] Expression:  [facility] Social Interaction:  [facility] Problem Solving:  [facility] Memory:    Therapy Mode Minutes  Occupational Therapy: Individual: 90 minutes.  Physical Therapy:  Speech Language Pathology:    Discharge Functional Goals:    Signed by: Laya Harrison Occupational Therapist

## 2019-10-11 NOTE — PLAN OF CARE
Problem: Diabetes, Type 2 (Adult)  Goal: Signs and Symptoms of Listed Potential Problems Will be Absent, Minimized or Managed (Diabetes, Type 2)  Outcome: Ongoing (interventions implemented as appropriate)   10/11/19 0842   Goal/Outcome Evaluation   Problems Assessed (Type 2 Diabetes) all       Problem: Safety Awareness Impairment (IRF) (Adult)  Goal: Optimal Level of Safety Awareness, All Environments  Outcome: Ongoing (interventions implemented as appropriate)

## 2019-10-11 NOTE — THERAPY TREATMENT NOTE
Inpatient Rehabilitation - Physical Therapy Treatment Note   [place]     Patient Name: Ashley Geronimo  : 1945  MRN: [id number]    Today's Date: 10/11/2019                 Admit Date: 10/1/2019      Visit Dx:    No diagnosis found.    Patient Active Problem List   Diagnosis   • Pneumonia   • Septic shock (CMS/[facility])   • Pseudomonas pneumonia (CMS/[facility])   • NSTEMI (non-ST elevated myocardial infarction) (CMS/[facility])   • Bradycardia   • Paroxysmal atrial fibrillation (CMS/HCC)   • Chronic respiratory failure with hypoxia and hypercapnia (CMS/HCC)   • Acute on chronic respiratory failure with hypoxia (CMS/HCC), requiring intubation/mechanical ventilation 19   • Hematemesis   • RAMESH (acute kidney injury) (CMS/[facility])   • DKA (diabetic ketoacidoses) (CMS/[facility])   • Septic shock (CMS/[facility])   • Encephalopathy   • UTI (urinary tract infection)   • Tracheobronchitis, PSA    • Metabolic encephalopathy       Therapy Treatment    IRF Treatment Summary     Row Name 10/11/19 1622 10/11/19 1222          Evaluation/Treatment Time and Intent    Subjective Information  no complaints  -LL  no complaints  -CJ     Existing Precautions/Restrictions  fall confusion  -LL  fall decreased skin integrity  -CJ     Document Type  therapy note (daily note) BID treatment session  -LL  therapy note (daily note)  -CJ     Mode of Treatment  physical therapy;individual therapy  -LL  occupational therapy  -CJ     Patient/Family Observations  Patient agreeable to BID PT session this date.  -LL  --     Recorded by [LL] Bita Greene PTA [CJ] Laya Harrison OT     Row Name 10/11/19 1622 10/11/19 1222          Cognition/Psychosocial- PT/OT    Affect/Mental Status (Cognitive)  confused  -LL  confused Pt presented with increased confusion today.  RN notified.  -CJ     Orientation Status (Cognition)  oriented to;person  -LL  --     Follows Commands (Cognition)  --  verbal cues/prompting required;repetition of directions required;physical/tactile  prompts required;initiation impaired;increased processing time needed;delayed response/completion  -CJ     Personal Safety Interventions  fall prevention program maintained;gait belt;nonskid shoes/slippers when out of bed;supervised activity  -LL  --     Cognitive Function (Cognitive)  attention deficit;memory deficit;safety deficit  -LL  --     Attention Deficit (Cognitive)  requires cues/redirection to task  -LL  requires cues/redirection to task  -CJ     Recorded by [LL] Bita Greene PTA [CJ] Laya Harrison, OT     Row Name 10/11/19 1622             Transfer Assessment/Treatment    Transfer Assessment/Treatment  bed-chair transfer;chair-bed transfer;sit-stand transfer;stand-sit transfer;stand pivot/stand step transfer  -LL      Recorded by [LL] Bita Greene PTA      Row Name 10/11/19 1622 10/11/19 1222          Bed-Chair Transfer    Bed-Chair [place] (Transfers)  verbal cues;nonverbal cues (demo/gesture);stand by assist;contact guard  -LL  contact guard;verbal cues  -     Assistive Device (Bed-Chair Transfers)  walker, front-wheeled;wheelchair  -LL  wheelchair  -CJ     Recorded by [LL] Bita Greene PTA [CJ] Laya Harrison, OT     Row Name 10/11/19 1622             Chair-Bed Transfer    Chair-Bed [place] (Transfers)  verbal cues;nonverbal cues (demo/gesture);stand by assist;contact guard  -LL      Assistive Device (Chair-Bed Transfers)  walker, front-wheeled;wheelchair  -LL      Recorded by [LL] Bita Greene PTA      Row Name 10/11/19 1622             Sit-Stand Transfer    Sit-Stand [place] (Transfers)  verbal cues;nonverbal cues (demo/gesture);stand by assist;contact guard  -LL      Assistive Device (Sit-Stand Transfers)  walker, front-wheeled  -LL      Recorded by [LL] Bita Greene PTA      Row Name 10/11/19 1622             Stand-Sit Transfer    Stand-Sit [place] (Transfers)  verbal cues;nonverbal cues (demo/gesture);stand by assist;contact guard  -LL      Assistive  Device (Stand-Sit Transfers)  walker, front-wheeled  -LL      Recorded by [LL] Bita Greene PTA      Row Name 10/11/19 1622             Stand Pivot/Stand Step Transfer    Stand Pivot/Stand Step [place]  verbal cues;nonverbal cues (demo/gesture);stand by assist;contact guard  -LL      Assistive Device (Stand Pivot Stand Step Transfer)  walker, front-wheeled  -LL      Recorded by [LL] Bita Greene PTA      Row Name 10/11/19 1622             Toilet Transfer    Type (Toilet Transfer)  stand pivot/stand step  -LL      [place] Level (Toilet Transfer)  contact guard;verbal cues  -LL      Assistive Device (Toilet Transfer)  grab bars/safety frame  -LL      Recorded by [LL] Bita Greene PTA      Row Name 10/11/19 1622             Gait/Stairs Assessment/Training    Gait/Stairs Assessment/Training  gait/ambulation independence;gait/ambulation assistive device;distance ambulated;gait pattern;gait deviations  -LL      [place] Level (Gait)  verbal cues;nonverbal cues (demo/gesture);contact guard;stand by assist  -LL      Assistive Device (Gait)  walker, front-wheeled HHA  -LL      Distance in Feet (Gait)  160 x 2 in am; 170, 90 in pm  -LL      Pattern (Gait)  step-through  -LL      Deviations/Abnormal Patterns (Gait)  base of support, narrow;eldon decreased;stride length decreased  -LL      Bilateral Gait Deviations  forward flexed posture  -LL      Comment (Gait/Stairs)  decreased attention & processing with activities this date.  -LL      Recorded by [LL] Bita Greene PTA      Row Name 10/11/19 1622             Safety Issues, Functional Mobility    Impairments Affecting Function (Mobility)  balance;endurance/activity tolerance;strength  -LL      Recorded by [LL] Bita Greene PTA      Row Name 10/11/19 1222             Grooming Assessment/Treatment    Grooming [place] Level  set up;supervision;verbal cues  -CJ      Recorded by [CJ] Laya Harrison OT      Row Name 10/11/19 1622              Pain Scale: FACES Pre/Post-Treatment    Pain: FACES Scale, Pretreatment  0-->no hurt  -LL      Pain: FACES Scale, Post-Treatment  0-->no hurt  -LL      Recorded by [LL] Bita Greene PTA      Row Name 10/11/19 1622             Balance    Balance  static sitting balance;static standing balance;dynamic sitting balance;dynamic standing balance;sitting balance activity;standing balance activity  -LL      Recorded by [LL] Bita Greene PTA      Row Name 10/11/19 1622             Sitting Balance Activity    Activities Performed (Sitting, Balance Training)  seated reaching outside base of support Balloon tap  -LL      Support Needed (Sitting, Balance Training)  balances without upper extremity support  -LL      Progressive Balance Activities (Sitting, Balance Training)  speed of movements increased during activity;combined head and eye movements during activity;upper extremity activities added during activity  -LL      Recorded by [LL] Bita Greene PTA      Row Name 10/11/19 1222             Upper Extremity Seated Therapeutic Exercise    Exercise Type, Seated Upper Extremity (Therapeutic Exercise)  AROM (active range of motion) BUE ther ex/act, bilat coord ex, GMC/FMC, fxal act tolerance  -CJ      Expected Outcomes, Seated Upper Extremity (Therapeutic Exercise)  improve functional tolerance, self-care activity;improve performance, BADLs;improve performance, transfer skills  -CJ      Recorded by [CJ] Laya Harrison OT      Row Name 10/11/19 1622             Lower Extremity Seated Therapeutic Exercise    Performed, Seated Lower Extremity (Therapeutic Exercise)  hip flexion/extension;hip abduction/adduction;ankle dorsiflexion/plantarflexion;LAQ (long arc quad), knee extension Ball squeeze  -      Device, Seated Lower Extremity (Therapeutic Exercise)  elastic bands/tubing;small ball;free weights, cuff 1#  -LL      Sets/Reps Detail, Seated Lower Extremity (Therapeutic Exercise)  1 x 20  -LL      Comment,  Seated Lower Extremity (Therapeutic Exercise)  Increased cues required this date.  -LL      Recorded by [LL] Bita Greene PTA      Row Name 10/11/19 1622 10/11/19 1222          Positioning and Restraints    Pre-Treatment Position  -- WC in hallway in am & pm  -LL  --     Post Treatment Position  wheelchair  -LL  wheelchair  -CJ     In Wheelchair  sitting;notified nsg in hallway in front of nurses station in am & pm  -LL  sitting;with nsg;patient within staff view  -CJ     Recorded by [LL] Bita Greene PTA [CJ] Laya Harrison OT     Row Name 10/11/19 1622             Daily Summary of Progress (PT)    Impairments Continuing to Limit Function: Physical Therapy  strength decreased;impaired balance;impaired cognition;coordination impaired decr cognition  -LL      Recorded by [LL] Bita Greene PTA        User Key  (r) = Recorded By, (t) = Taken By, (c) = Cosigned By    Initials Name Effective Dates    CJ Laya Harrison OT 04/03/18 -     LL Bita Greene PTA 05/02/16 -         Wound 10/01/19 1702 Bilateral other (see comments) coccyx MASD ([place] associated skin damage) (Active)   Dressing Appearance open to air 10/11/2019  7:30 AM   Base red 10/11/2019  7:30 AM   Periwound excoriated 10/10/2019  7:20 PM   Drainage Amount none 10/10/2019  7:20 PM   Care, Wound barrier applied 10/10/2019  7:20 PM   Dressing Care, Wound open to air 10/11/2019  7:30 AM     Physical Therapy Education     Title: PT OT SLP Therapies (In Progress)     Topic: Physical Therapy (Done)     Point: Mobility training (Done)     Learning Progress Summary           Patient Acceptance, E,D, VU,NR by LL at 10/11/2019  4:30 PM    Acceptance, E, VU,NR by MC at 10/11/2019  8:42 AM    Acceptance, E,TB, VU by MONICA at 10/11/2019 12:42 AM    Acceptance, E,D, VU,NR by AG at 10/10/2019  4:33 PM    Acceptance, E,TB, VU by RF at 10/9/2019  4:49 PM    Acceptance, E,D, NL by AG at 10/8/2019  4:10 PM    Acceptance, E,D, VU,NR by LL at 10/7/2019  3:34  PM    Acceptance, E,D, VU,NR by KADE at 10/5/2019  1:43 PM    Acceptance, E,D, NR by LL at 10/4/2019  3:45 PM    Acceptance, E,D, NR by AG at 10/3/2019  4:10 PM    Acceptance, E,D, NR by AG at 10/2/2019  4:26 PM                   Point: Home exercise program (Done)     Learning Progress Summary           Patient Acceptance, E,D, VU,NR by LL at 10/11/2019  4:30 PM    Acceptance, E, VU,NR by MC at 10/11/2019  8:42 AM    Acceptance, E,TB, VU by DG at 10/11/2019 12:42 AM    Acceptance, E,D, VU,NR by AG at 10/10/2019  4:33 PM    Acceptance, E,TB, VU by RF at 10/9/2019  4:49 PM    Acceptance, E,D, NL by AG at 10/8/2019  4:10 PM    Acceptance, E,D, VU,NR by LL at 10/7/2019  3:34 PM    Acceptance, E,D, VU,NR by KADE at 10/5/2019  1:43 PM    Acceptance, E,D, NR by LL at 10/4/2019  3:45 PM    Acceptance, E,D, NR by AG at 10/3/2019  4:10 PM    Acceptance, E,D, NR by AG at 10/2/2019  4:26 PM                   Point: Body mechanics (Done)     Learning Progress Summary           Patient Acceptance, E,D, VU,NR by LL at 10/11/2019  4:30 PM    Acceptance, E, VU,NR by MC at 10/11/2019  8:42 AM    Acceptance, E,TB, VU by DG at 10/11/2019 12:42 AM    Acceptance, E,D, VU,NR by AG at 10/10/2019  4:33 PM    Acceptance, E,TB, VU by RF at 10/9/2019  4:49 PM    Acceptance, E,D, NL by AG at 10/8/2019  4:10 PM    Acceptance, E,D, VU,NR by LL at 10/7/2019  3:34 PM    Acceptance, E,D, VU,NR by KADE at 10/5/2019  1:43 PM    Acceptance, E,D, NR by LL at 10/4/2019  3:45 PM    Acceptance, E,D, NR by AG at 10/3/2019  4:10 PM    Acceptance, E,D, NR by AG at 10/2/2019  4:26 PM                   Point: Precautions (Done)     Learning Progress Summary           Patient Acceptance, E,D, VU,NR by LL at 10/11/2019  4:30 PM    Acceptance, E, VU,NR by MC at 10/11/2019  8:42 AM    Acceptance, E,TB, VU by DG at 10/11/2019 12:42 AM    Acceptance, E,D, VU,NR by AG at 10/10/2019  4:33 PM    Acceptance, E,TB, VU by RF at 10/9/2019  4:49 PM    Acceptance, E,D, NL by AG at  10/8/2019  4:10 PM    Acceptance, E,D, VU,NR by LL at 10/7/2019  3:34 PM    Acceptance, E,D, VU,NR by KADE at 10/5/2019  1:43 PM    Acceptance, E,D, NR by LL at 10/4/2019  3:45 PM    Acceptance, E,D, NR by AG at 10/3/2019  4:10 PM    Acceptance, E,D, NR by AG at 10/2/2019  4:26 PM                               User Key     Initials Effective Dates Name Provider Type Discipline     06/16/16 -  Cecelia Hare, RN Registered Nurse Nurse    DG 06/16/16 -  Rut Perrin, RN Registered Nurse Nurse    AG 04/03/18 -  Karyn Delgado, PT Physical Therapist PT    LL 05/02/16 -  Bita Greene, MARY ANN Physical Therapy Assistant PT    KADE 05/02/16 -  Doris Calixto, PTA Physical Therapy Assistant PT    RF 03/07/18 -  Kristen Carrera PTA Physical Therapy Assistant PT                  PT Recommendation and Plan     Plan of Care Reviewed With: patient  Daily Summary of Progress (PT)  Impairments Continuing to Limit Function: Physical Therapy: strength decreased, impaired balance, impaired cognition, coordination impaired(decr cognition)  IRF Plan of Care Review: progress ongoing, continue  Progress, Functional Goals: demonstrating adequate progress  Outcome Summary: Patient tolerated PT session well with adequate rest breaks.  Continue w/ current POC.               Time Calculation:     PT Charges     Row Name 10/11/19 1632 10/11/19 1631          Time Calculation    Start Time  1335  -LL  1055  -LL     Stop Time  1405  -LL  1155  -LL     Time Calculation (min)  30 min  -LL  60 min  -LL     PT Received On  --  10/11/19  -        Time Calculation- PT    Total Timed Code Minutes- PT  30 minute(s)  -LL  60 minute(s)  -LL       User Key  (r) = Recorded By, (t) = Taken By, (c) = Cosigned By    Initials Name Provider Type    LL Bita Greene, MARY ANN Physical Therapy Assistant          Therapy Charges for Today     Code Description Service Date Service Provider Modifiers Qty    19100678380 HC GAIT TRAINING EA 15 MIN  10/11/2019 Bita Greene, MARY ANN GP 2    59369293288 HC PT THERAPEUTIC ACT EA 15 MIN 10/11/2019 Bita Greene, MARY ANN GP 2    78659964955 HC PT THER PROC EA 15 MIN 10/11/2019 Bita Greene, MARY ANN GP 2                   Bita. MARY ANN Greene  10/11/2019

## 2019-10-11 NOTE — PROGRESS NOTES
Inpatient Rehabilitation Functional Measures Assessment    Functional Measures  PUJA Eating:  PUJA Grooming:  PUJA Bathing:  PUJA Upper Body Dressing:  PUJA Lower Body Dressing:  PUJA Toileting:    PUJA Bladder Management  Level of Assistance:  Frequency/Number of Accidents this Shift:    PUJA Bowel Management  Level of Assistance:  Frequency/Number of Accidents this Shift:    PUJA Bed/Chair/Wheelchair Transfer:  PUJA Toilet Transfer:  PUJA Tub/Shower Transfer:    Previously Documented Mode of Locomotion at Discharge:  PUJA Expected Mode of Locomotion at Discharge:  PUJA Walk/Wheelchair:  PUJA Stairs:    PUJA Comprehension:  Both ( auditory and visual) modes of comprehension are used  equally. Patient does not comprehend complex/abstract information in their  primary language without assistance from a helper. Comprehension Score = 3,  Moderate Prompting. Patient comprehends basic daily needs or ideas 50-74% of the  time. Patient requires moderate/some prompting. No assistive devices were  required.  PUJA Expression:  Both ( vocal and non-vocal) modes of expression are used  equally. Patient does not express complex/abstract information in their primary  language without a helper. Expression Score = 3, Moderate Prompting. Patient  expresses basic daily needs or ideas 50-74% of the time. Patient requires  moderate/some prompting. No assistive devices were required.  PUJA Social Interaction:  Social Interaction Score = 6, Modified Independent.  Patient is modified independent for social interaction, requiring: Requires  additional time.  PUJA Problem Solving:  Patient does not make appropriate decisions in order to  solve complex problems without assistance from a helper. Problem Solving Score =  3, Moderate Direction. Patient makes appropriate decisions in order to solve  routine problems 50-74% of the time. Patient requires moderate/some direction  for the following behavior(s): Decreased awareness of performance.  PUJA Memory:  Memory  Score = 3, Moderate Prompting. Patient recognizes and  remembers 50-74% of the time. Patient requires moderate/some prompting  for  memory for the following: Disoriented to person, place, time or situation.    Therapy Mode Minutes  Occupational Therapy:  Physical Therapy:  Speech Language Pathology:    Discharge Functional Goals:    Signed by: Nurse Grant

## 2019-10-11 NOTE — PROGRESS NOTES
Inpatient Rehabilitation Functional Measures Assessment    Functional Measures  PUJA Eating:  Eating Score = 5. Patient is supervision/set-up for eating,  requiring: Opening containers. [place]tering bread. No assistive devices were  required.  PUJA Grooming:  PUJA Bathing:  PUJA Upper Body Dressing:  Patient requires total assistance for gathering  clothes. Wearing a bra or undershirt was not applicable for this patient.  Patient requires moderate/considerable physical assistance for threading the  right arm through the garment (shirt/sweater). Patient requires  moderate/considerable physical assistance for threading the left arm through the  garment (shirt/sweater). Patient requires moderate/considerable physical  assistance for pulling an over-head-garment over head or pulling  front-fastening-garment around back. Patient requires moderate/considerable  physical assistance for pulling an over-head-garment down the trunk or  adjusting/fastening together a front-fastening-garment. Patient performs 60 % of  upper body dressing tasks. Upper Body Dressing Score = 3, Moderate Assistance.  No assistive devices were required.  PUJA Lower Body Dressing:  Patient requires total assistance for gathering  clothes. Wearing underwear or an undergarment is not applicable for this  patient. Patient requires moderate/considerable physical assistance for  threading the right leg through the pants/skirt. Patient requires  moderate/considerable physical assistance for threading the left leg through the  pants/skirt. Patient requires moderate/considerable physical assistance for  pulling pants/skirt over hips and adjusting fasteners. Patient requires  moderate/considerable physical assistance for donning and/or doffing right sock.  Patient requires moderate/considerable physical assistance for donning and/or  doffing left sock. Donning and/or doffing right shoe is not applicable for this  patient. Donning and/or doffing left shoe is not  applicable for this patient.  Patient performs 41.67 % of lower body dressing tasks. Lower Body Dressing Score  = 2, Maximal Assistance. No assistive devices were required.  PUJA Toileting:  Patient requires moderate assistance for adjusting clothing  before using a toilet, commode, bedpan, or urinal. Patient requires moderate  assistance for hygiene. Patient requires moderate assistance for adjusting  clothing after using a toilet, commode, bedpan, or urinal. Patient performs 0 -  24% of toileting tasks.  Toileting Score = 1, Total Assistance. Patient requires  the following assistive device(s): Grab bar.    [facility] Bladder Management  Level of Assistance:  Bladder Score = 5.  Patient is supervision/set-up for  bladder management, requiring: Setting out equipment. Emptying equipment.  Patient requires the following assistive device(s):  Adult brief.  Frequency/Number of Accidents this Shift:  Bladder accidents this shift:  1 .    [facility] Bowel Management  Level of Assistance: Bowel Score = 7.  Patient is completely independent for  bowel management.  Patient did not have bowel movement.  No  medication/intervention was provided.  Frequency/Number of Accidents this Shift: Bowel accidents this shift: 0 .  Patient has not had an accident, but used a device/medication this shift  requiring: brief .    PUJA Bed/Chair/Wheelchair Transfer:  Bed/chair/wheelchair Transfer Score = 3.  Patient performs 50-74% of effort and requires moderate assistance (some  lifting) for transferring to and from the bed/chair/wheelchair. No assistive  devices were required.  PUJA Toilet Transfer:  Toilet Transfer Score = 3.  Patient performs 50-74% of  effort and requires moderate assistance (some lifting) for transferring to and  from the toilet/commode. Patient requires the following assistive device(s):  Grab bars.  PUJA Tub/Shower Transfer:  Activity was not observed.    Previously Documented Mode of Locomotion at Discharge:  [facility] Expected Mode of  Locomotion at Discharge:  PUJA Walk/Wheelchair:  PUJA Stairs:    PUJA Comprehension:  PUJA Expression:  PUJA Social Interaction:  PUJA Problem Solving:  PUJA Memory:    Therapy Mode Minutes  Occupational Therapy:  Physical Therapy:  Speech Language Pathology:    Discharge Functional Goals:    Signed by: JAI Jefferson

## 2019-10-11 NOTE — PROGRESS NOTES
Inpatient Rehabilitation Functional Measures Assessment    Functional Measures  PUJA Eating:  PUJA Grooming:  PUJA Bathing:  PUJA Upper Body Dressing:  PUJA Lower Body Dressing:  PUJA Toileting:    PUJA Bladder Management  Level of Assistance:  Frequency/Number of Accidents this Shift:    PUJA Bowel Management  Level of Assistance:  Frequency/Number of Accidents this Shift:    PUJA Bed/Chair/Wheelchair Transfer:  Bed/chair/wheelchair Transfer Score = 4.  Patient performs 75% or more of effort and minimal assistance (little/incidental  help/lifting of one limb/steadying) for transferring to and from the  bed/chair/wheelchair, requiring: Contact guard. Patient requires the following  assistive device(s): Arm rest. Bed rails.  PUJA Toilet Transfer:  Toilet Transfer Score = 4.  Patient performs 75% or more  of effort and minimal assistance (little/incidental help/steadying) for  transferring to and from the toilet/commode, requiring: Contact guard. Patient  requires the following assistive device(s): Safety frame/over the toilet. Grab  bars.  PUJA Tub/Shower Transfer:    Previously Documented Mode of Locomotion at Discharge:  PUJA Expected Mode of Locomotion at Discharge:  PUJA Walk/Wheelchair:  WHEELCHAIR OBSERVATION   Wheelchair did not occur.    WALK OBSERVATION   Walk Distance Scale = 3.  Distance walked is greater than 150 feet. Walk Score  = 5.  Patient requires supervision or set up for walking. Stand by assistance.  Patient walked a distance of  160 feet. Patient requires the following assistive  device(s): Rolling walker.  PUJA Stairs:  Stairs did not occur.    PUJA Comprehension:  PUJA Expression:  PUJA Social Interaction:  PUJA Problem Solving:  PUJA Memory:    Therapy Mode Minutes  Occupational Therapy:  Physical Therapy: Individual: 90 minutes.  Speech Language Pathology:    Discharge Functional Goals:    Signed by: Bita Greene PTA

## 2019-10-11 NOTE — PLAN OF CARE
Problem: Patient Care Overview  Goal: Plan of Care Review  Outcome: Ongoing (interventions implemented as appropriate)   10/11/19 1630   Patient Care Overview   IRF Plan of Care Review progress ongoing, continue   Progress, Functional Goals demonstrating adequate progress   Coping/Psychosocial   Plan of Care Reviewed With patient   OTHER   Outcome Summary Patient tolerated PT session well with adequate rest breaks. Continue w/ current POC.

## 2019-10-11 NOTE — PLAN OF CARE
Problem: Patient Care Overview  Goal: Plan of Care Review   10/11/19 1228   Patient Care Overview   IRF Plan of Care Review progress ongoing, continue   Coping/Psychosocial   Plan of Care Reviewed With patient

## 2019-10-11 NOTE — PROGRESS NOTES
Inpatient Rehabilitation Functional Measures Assessment    Functional Measures  PUJA Eating:  PUJA Grooming: Patient requires moderate assistance for washing, rinsing and  drying the face. Patient requires moderate assistance for washing, rinsing and  drying the hands. Patient requires total assistance for brushing teeth. Patient  requires total assistance for brushing/combing hair. Patient requires total  assistance for shaving or applying makeup. Patient performs 0 -  24% of grooming  tasks.  Grooming Score = 1, Total Assistance. No assistive devices were  required.  PUJA Bathing:  Patient bathed in bed. Patient requires total assistance for  washing, rinsing, or drying the right arm. Patient requires total assistance for  washing, rinsing, or drying the left arm. Patient requires total assistance for  washing, rinsing, or drying the chest. Patient requires total assistance for  washing, rinsing, or drying the abdomen. Patient requires total assistance for  washing, rinsing, or drying the perineal area. Patient requires total assistance  for washing, rinsing, or drying the buttocks. Patient requires total assistance  for washing, rinsing, or drying the right upper leg. Patient requires total  assistance for washing, rinsing, or drying the left upper leg. Patient requires  total assistance for washing, rinsing, or drying the right lower leg, including  the foot. Patient requires total assistance for washing, rinsing, or drying the  left lower leg, including the foot. Patient performs 0 -  24% of bathing tasks.  Bathing Score = 1, Total Assistance. No assistive devices were required.  PUJA Upper Body Dressing:  Upper Body Dressing was not observed this shift  because patient dressed/undressed in pajamas/gown only. .  PUJA Lower Body Dressing:  Lower Body Dressing was not observed this shift  because patient dressed/undressed in pajamas/gown only.  PUJA Toileting:    PUJA Bladder Management  Level of Assistance:  Bladder  Score = 1. Patient performs less than 25% of tasks  and requires total assistance for bladder management. [place] provides total  assist to completely apply and remove brief.  Frequency/Number of Accidents this Shift:    [facility] Bowel Management  Level of Assistance: Bowel Score = 7.  Patient is completely independent for  bowel management.  Patient did not have bowel movement.  No  medication/intervention was provided.  Frequency/Number of Accidents this Shift:    [facility] Bed/Chair/Wheelchair Transfer:  [facility] Toilet Transfer:  [facility] Tub/Shower Transfer:    Previously Documented Mode of Locomotion at Discharge:  [facility] Expected Mode of Locomotion at Discharge:  [facility] Walk/Wheelchair:  [facility] Stairs:    [facility] Comprehension:  [facility] Expression:  [facility] Social Interaction:  [facility] Problem Solving:  [facility] Memory:    Therapy Mode Minutes  Occupational Therapy:  Physical Therapy:  Speech Language Pathology:    Discharge Functional Goals:    Signed by: JAI Kunz

## 2019-10-11 NOTE — PROGRESS NOTES
Inpatient Rehabilitation Functional Measures Assessment    Functional Measures  PUJA Eating:  PUJA Grooming:  PUJA Bathing:  PUJA Upper Body Dressing:  PUJA Lower Body Dressing:  PUJA Toileting:    PUJA Bladder Management  Level of Assistance:  Frequency/Number of Accidents this Shift:    PUJA Bowel Management  Level of Assistance:  Frequency/Number of Accidents this Shift:    PUJA Bed/Chair/Wheelchair Transfer:  PUJA Toilet Transfer:  PUJA Tub/Shower Transfer:    Previously Documented Mode of Locomotion at Discharge:  PUJA Expected Mode of Locomotion at Discharge:  PUJA Walk/Wheelchair:  PUJA Stairs:    PUJA Comprehension:  Both ( auditory and visual) modes of comprehension are used  equally. Patient does not comprehend complex/abstract information in their  primary language without assistance from a helper. Comprehension Score = 4,  Minimal Prompting. Patient comprehends basic daily needs or ideas 75-90% of the  time. Patient requires minimal/occasional prompting. No assistive devices were  required.  PUJA Expression:  Both ( vocal and non-vocal) modes of expression are used  equally. Patient does not express complex/abstract information in their primary  language without a helper. Expression Score = 3, Moderate Prompting. Patient  expresses basic daily needs or ideas 50-74% of the time. Patient requires  moderate/some prompting. No assistive devices were required.  PUJA Social Interaction:  Social Interaction Score = 3, Moderate Direction.  Patient interacts appropriately 50-74% of the time.  Patient requires  moderate/some direction for the following behavior(s):  PUJA Problem Solving:  Patient does not make appropriate decisions in order to  solve complex problems without assistance from a helper. Problem Solving Score =  3, Moderate Direction. Patient makes appropriate decisions in order to solve  routine problems 50-74% of the time. Patient requires moderate/some direction  for the following behavior(s): Impulsivity. Decreased  awareness of performance.  PUJA Memory:  Memory Score = 3, Moderate Prompting. Patient recognizes and  remembers 50-74% of the time. Patient requires moderate/some prompting  for  memory for the following: Disoriented to person, place, time or situation.  Limited recall of daily routine.    Therapy Mode Minutes  Occupational Therapy:  Physical Therapy:  Speech Language Pathology:    Discharge Functional Goals:    Signed by: Cecelia Hare, Nurse

## 2019-10-12 LAB
GLUCOSE BLDC GLUCOMTR-MCNC: 236 MG/DL (ref 70–130)
GLUCOSE BLDC GLUCOMTR-MCNC: 254 MG/DL (ref 70–130)
GLUCOSE BLDC GLUCOMTR-MCNC: 81 MG/DL (ref 70–130)

## 2019-10-12 PROCEDURE — 82962 GLUCOSE BLOOD TEST: CPT

## 2019-10-12 PROCEDURE — 63710000001 INSULIN ASPART PER 5 UNITS: Performed by: FAMILY MEDICINE

## 2019-10-12 PROCEDURE — 63710000001 DIPHENHYDRAMINE PER 50 MG: Performed by: FAMILY MEDICINE

## 2019-10-12 RX ORDER — HYDRALAZINE HYDROCHLORIDE 25 MG/1
25 TABLET, FILM COATED ORAL EVERY 12 HOURS SCHEDULED
Status: DISCONTINUED | OUTPATIENT
Start: 2019-10-12 | End: 2019-10-14

## 2019-10-12 RX ADMIN — INSULIN ASPART 4 UNITS: 100 INJECTION, SOLUTION INTRAVENOUS; SUBCUTANEOUS at 17:02

## 2019-10-12 RX ADMIN — PANTOPRAZOLE SODIUM 40 MG: 40 TABLET, DELAYED RELEASE ORAL at 05:30

## 2019-10-12 RX ADMIN — INSULIN ASPART 3 UNITS: 100 INJECTION, SOLUTION INTRAVENOUS; SUBCUTANEOUS at 08:00

## 2019-10-12 RX ADMIN — HYDRALAZINE HYDROCHLORIDE 25 MG: 25 TABLET ORAL at 10:03

## 2019-10-12 RX ADMIN — GLIPIZIDE 5 MG: 5 TABLET ORAL at 10:03

## 2019-10-12 RX ADMIN — APIXABAN 5 MG: 5 TABLET, FILM COATED ORAL at 20:35

## 2019-10-12 RX ADMIN — APIXABAN 5 MG: 5 TABLET, FILM COATED ORAL at 10:03

## 2019-10-12 RX ADMIN — METOPROLOL TARTRATE 100 MG: 100 TABLET, FILM COATED ORAL at 10:03

## 2019-10-12 RX ADMIN — MONTELUKAST SODIUM 10 MG: 10 TABLET, COATED ORAL at 20:35

## 2019-10-12 RX ADMIN — HYDRALAZINE HYDROCHLORIDE 25 MG: 25 TABLET ORAL at 20:35

## 2019-10-12 RX ADMIN — LINAGLIPTIN 5 MG: 5 TABLET, FILM COATED ORAL at 10:03

## 2019-10-12 RX ADMIN — ASPIRIN 81 MG: 81 TABLET, COATED ORAL at 10:03

## 2019-10-12 RX ADMIN — Medication 100 MG: at 10:03

## 2019-10-12 RX ADMIN — DIVALPROEX SODIUM 250 MG: 250 TABLET, EXTENDED RELEASE ORAL at 20:35

## 2019-10-12 RX ADMIN — DIPHENHYDRAMINE HYDROCHLORIDE 25 MG: 25 CAPSULE ORAL at 20:35

## 2019-10-12 RX ADMIN — AMLODIPINE BESYLATE 10 MG: 10 TABLET ORAL at 10:03

## 2019-10-12 RX ADMIN — METOPROLOL TARTRATE 100 MG: 100 TABLET, FILM COATED ORAL at 20:35

## 2019-10-12 NOTE — PLAN OF CARE
Problem: Wound (Includes Pressure Injury) (Adult)  Goal: Signs and Symptoms of Listed Potential Problems Will be Absent, Minimized or Managed (Wound)  Outcome: Ongoing (interventions implemented as appropriate)      Problem: Functional Mobility Impairment (IRF) (Adult)  Goal: Optimal/Safe Level of [place] with Mobility  Outcome: Ongoing (interventions implemented as appropriate)      Problem: Safety Awareness Impairment (IRF) (Adult)  Goal: Optimal Level of Safety Awareness, All Environments  Outcome: Ongoing (interventions implemented as appropriate)

## 2019-10-12 NOTE — PROGRESS NOTES
Inpatient Rehabilitation Functional Measures Assessment    Functional Measures  PUJA Eating:  PUJA Grooming:  PUJA Bathing:  PUJA Upper Body Dressing:  PUJA Lower Body Dressing:  PUJA Toileting:    PUJA Bladder Management  Level of Assistance:  Frequency/Number of Accidents this Shift:    [facility]JA Bowel Management  Level of Assistance:  Frequency/Number of Accidents this Shift:    PUJA Bed/Chair/Wheelchair Transfer:  PUJA Toilet Transfer:  PUJA Tub/Shower Transfer:    Previously Documented Mode of Locomotion at Discharge:  PUJA Expected Mode of Locomotion at Discharge:  PUJA Walk/Wheelchair:  PUJA Stairs:    PUJA Comprehension:  Both ( auditory and visual) modes of comprehension are used  equally. Comprehension Score = 6, Modified [place].  Patient comprehends  complex/abstract information in their primary language, requiring: Additional  time.  PUJA Expression:  Vocal expression is the usual mode. Expression Score = 6,  Modified Independent.  Patient expresses complex/abstract information in their  primary language, requiring: Additional time.  PUJA Social Interaction:  Social Interaction Score = 6, Modified Independent.  Patient is modified independent for social interaction, requiring: Requires  additional time.  PUJA Problem Solving:  Problem Solving Score = 6, Modified [place].  Patient  makes appropriate decisions in order to solve complex problems, but requires  extra time.  PUJA Memory:  Memory Score = 3, Moderate Prompting. Patient recognizes and  remembers 50-74% of the time. Patient requires moderate/some prompting  for  memory for the following: Disoriented to person, place, time or situation.    Therapy Mode Minutes  Occupational Therapy:  Physical Therapy:  Speech Language Pathology:    Discharge Functional Goals:    Signed by: Cecelia Hare, Nurse

## 2019-10-12 NOTE — PROGRESS NOTES
Inpatient Rehabilitation Functional Measures Assessment    Functional Measures  PUJA Eating:  PUJA Grooming:  PUJA Bathing:  PUJA Upper Body Dressing:  PUJA Lower Body Dressing:  PUJA Toileting:    PUJA Bladder Management  Level of Assistance:  Bladder Score = 1. Patient performs less than 25% of tasks  and requires total assistance for bladder management. [place] provides total  assist to completely apply and remove brief.  Frequency/Number of Accidents this Shift:    PUJA Bowel Management  Level of Assistance: Bowel Score = 7.  Patient is completely independent for  bowel management.  Patient did not have bowel movement.  No  medication/intervention was provided.  Frequency/Number of Accidents this Shift:    [facility] Bed/Chair/Wheelchair Transfer:  [facility] Toilet Transfer:  [facility] Tub/Shower Transfer:    Previously Documented Mode of Locomotion at Discharge:  [facility] Expected Mode of Locomotion at Discharge:  [facility] Walk/Wheelchair:  [facility] Stairs:    [facility] Comprehension:  [facility] Expression:  [facility] Social Interaction:  [facility] Problem Solving:  [facility] Memory:    Therapy Mode Minutes  Occupational Therapy:  Physical Therapy:  Speech Language Pathology:    Discharge Functional Goals:    Signed by: JAI Kunz

## 2019-10-12 NOTE — PLAN OF CARE
Problem: Patient Care Overview  Goal: Plan of Care Review  Outcome: Ongoing (interventions implemented as appropriate)    Goal: Individualization and Mutuality  Outcome: Ongoing (interventions implemented as appropriate)    Goal: Discharge Needs Assessment  Outcome: Ongoing (interventions implemented as appropriate)    Goal: Home Safety Plan  Outcome: Ongoing (interventions implemented as appropriate)    Goal: Coping Plan  Outcome: Ongoing (interventions implemented as appropriate)    Goal: Community Reintegration Plan  Outcome: Ongoing (interventions implemented as appropriate)      Problem: Fall Risk (Adult)  Goal: Absence of Fall  Outcome: Ongoing (interventions implemented as appropriate)      Problem: Wound (Includes Pressure Injury) (Adult)  Goal: Signs and Symptoms of Listed Potential Problems Will be Absent, Minimized or Managed (Wound)  Outcome: Ongoing (interventions implemented as appropriate)      Problem: Functional Mobility Impairment (IRF) (Adult)  Goal: Optimal/Safe Level of [place] with Mobility  Outcome: Ongoing (interventions implemented as appropriate)      Problem: Diabetes, Type 2 (Adult)  Goal: Signs and Symptoms of Listed Potential Problems Will be Absent, Minimized or Managed (Diabetes, Type 2)  Outcome: Ongoing (interventions implemented as appropriate)      Problem: Safety Awareness Impairment (IRF) (Adult)  Goal: Optimal Level of Safety Awareness, All Environments  Outcome: Ongoing (interventions implemented as appropriate)

## 2019-10-12 NOTE — PROGRESS NOTES
"     LOS: 11 days     Chief Complaint:  Debility    Subjective     Interval History:     Systolic blood pressure running 150-1 180s.  She was pretty agitated and confused yesterday afternoon and yesterday evening but this morning is alert and interactive.  Blood sugar this morning is 236.      Objective     Vital Signs  /50 (BP Location: Left arm, Patient Position: Lying)   Pulse 63   Temp 97.8 °F (36.6 °C)   Resp 18   Ht 152.4 cm (60\")   Wt 67 kg (147 lb 11.3 oz)   SpO2 91%   BMI 28.85 kg/m²    Intake & Output (last day)       10/11 0701 - 10/12 0700 10/12 0701 - 10/13 0700    P.O. 1320     Total Intake(mL/kg) 1320 (19.7)     Net +1320           Urine Unmeasured Occurrence 9 x             Physical Exam:     General Appearance:   A little bit somnolent but easily arousable, cooperative, in no acute distress. Thin, frail and interactive.   Head:    Normocephalic, without obvious abnormality, atraumatic   Eyes:            Lids and lashes normal, conjunctivae and sclerae normal, no   icterus, no pallor, corneas clear, PERRLA   Ears:    Ears appear intact with no abnormalities noted       Neck:   No adenopathy, supple, trachea midline, no thyromegaly, no   carotid bruit, no JVD   Lungs:     Clear to auscultation,respirations regular, even and                  unlabored    Heart:    Irreg irreg rhythm and normal rate, normal S1 and S2, no            murmur, no gallop, no rub, no click   Chest Wall:    No abnormalities observed   Abdomen:     Normal bowel sounds, no masses, no organomegaly, soft        non-tender, non-distended, no guarding, no rebound                tenderness   Extremities:   Moves all extremities well, no edema, no cyanosis, no             Redness                        Results Review:    Lab Results   Component Value Date    WBC 8.29 10/11/2019    HGB 11.0 (L) 10/11/2019    HCT 35.6 10/11/2019    MCV 90.8 10/11/2019     10/11/2019       Lab Results   Component Value Date    GLUCOSE " 296 (H) 10/11/2019    BUN 50 (H) 10/11/2019    CREATININE 1.41 (H) 10/11/2019    EGFRIFNONA 36 (L) 10/11/2019    BCR 35.5 (H) 10/11/2019     10/11/2019    K 5.3 (H) 10/11/2019     10/11/2019    CO2 22.9 10/11/2019    CALCIUM 8.8 10/11/2019    PROTENTOTREF 5.2 (L) 02/16/2019    ALBUMIN 2.84 (L) 10/02/2019    LABIL2 1.1 02/16/2019    AST 13 10/02/2019    ALT 7 10/02/2019     Lab Results   Component Value Date    INR 0.95 09/22/2019    INR 0.98 02/20/2019    INR 0.95 02/19/2019       Glucose   Date Value Ref Range Status   10/12/2019 236 (H) 70 - 130 mg/dL Final   10/11/2019 330 (H) 70 - 130 mg/dL Final   10/11/2019 86 70 - 130 mg/dL Final   10/11/2019 293 (H) 70 - 130 mg/dL Final   10/10/2019 284 (H) 70 - 130 mg/dL Final          Medication Review:     Current Facility-Administered Medications:   •  amLODIPine (NORVASC) tablet 10 mg, 10 mg, Oral, Q24H, Kreis, Samuel Duane, MD, 10 mg at 10/11/19 0848  •  apixaban (ELIQUIS) tablet 5 mg, 5 mg, Oral, Q12H, Kreis, Samuel Duane, MD, 5 mg at 10/11/19 2137  •  aspirin EC tablet 81 mg, 81 mg, Oral, Daily, Kreis, Samuel Duane, MD, 81 mg at 10/11/19 0848  •  bisacodyl (DULCOLAX) suppository 10 mg, 10 mg, Rectal, Daily PRN, Kreis, Samuel Duane, MD  •  dextrose (D50W) 25 g/ 50mL Intravenous Solution 25 g, 25 g, Intravenous, Q15 Min PRN, Kreis, Samuel Duane, MD  •  dextrose (GLUTOSE) oral gel 15 g, 15 g, Oral, Q15 Min PRN, Kreis, Samuel Duane, MD  •  diphenhydrAMINE (BENADRYL) capsule 25 mg, 25 mg, Oral, Q6H PRN, Kreis, Samuel Duane, MD, 25 mg at 10/11/19 2137  •  divalproex (DEPAKOTE) 24 hr tablet 250 mg, 250 mg, Oral, Nightly, Kreis, Samuel Duane, MD, 250 mg at 10/11/19 2137  •  glipizide (GLUCOTROL) tablet 5 mg, 5 mg, Oral, QAM AC, Kreis, Samuel Duane, MD  •  glucagon (human recombinant) (GLUCAGEN DIAGNOSTIC) injection 1 mg, 1 mg, Subcutaneous, Q15 Min PRN, Kreis, Samuel Duane, MD  •  insulin aspart (novoLOG) injection 0-7 Units, 0-7 Units, Subcutaneous, TID With  Meals, Kreis, Samuel Duane, MD, 5 Units at 10/11/19 1711  •  linagliptin (TRADJENTA) tablet 5 mg, 5 mg, Oral, Daily, Kreis, Samuel Duane, MD, 5 mg at 10/11/19 0848  •  magnesium hydroxide (MILK OF MAGNESIA) suspension 2400 mg/10mL 10 mL, 10 mL, Oral, Daily PRN, Kreis, Samuel Duane, MD, 10 mL at 10/09/19 2119  •  metoprolol tartrate (LOPRESSOR) tablet 100 mg, 100 mg, Oral, Q12H, Kreis, Samuel Duane, MD, 100 mg at 10/11/19 2137  •  montelukast (SINGULAIR) tablet 10 mg, 10 mg, Oral, Nightly, Kreis, Samuel Duane, MD, 10 mg at 10/11/19 2137  •  ondansetron (ZOFRAN) tablet 4 mg, 4 mg, Oral, Q6H PRN **OR** ondansetron (ZOFRAN) injection 4 mg, 4 mg, Intravenous, Q6H PRN, Kreis, Samuel Duane, MD  •  pantoprazole (PROTONIX) EC tablet 40 mg, 40 mg, Oral, Q AM, Kreis, Samuel Duane, MD, 40 mg at 10/12/19 0530  •  thiamine (VITAMIN B-1) tablet 100 mg, 100 mg, Oral, Daily, Kreis, Samuel Duane, MD, 100 mg at 10/11/19 0848      Assessment/Plan     Debility secondary to what seems most consistent with hypoxic encephalopathy  Type 2 diabetes mellitus with recent history of DKA with hyperglycemia  Known coronary artery disease with history of non-ST elevation acute myocardial infarction  Chronic hypoxic respiratory failure  Urinary tract infection status post treatment  Chronic atrial fibrillation  Hypertension  Stage III chronic kidney disease         PT and OT     Eliquis twice daily due to full dose anticoagulation due to A. fib     Glipizide increased to 5 mg daily as of this morning.  Continue Tradjenta 5 mg daily. SS insulin tid with meals     Continue amlodipine 10 mg daily and continue metoprolol 100 mg twice daily.  Add hydralazine 25 mg twice daily     Protonix once daily    Continue Depakote 250 mg nightly    Samuel Duane Kreis, MD  10/12/19  8:44 AM

## 2019-10-12 NOTE — PROGRESS NOTES
Inpatient Rehabilitation Functional Measures Assessment    Functional Measures  PUJA Eating:  Eating Score = 5. Patient is supervision/set-up for eating,  requiring: Opening containers. [place] bread. No assistive devices were  required.  PUJA Grooming: Grooming Score = 5. Patient is supervision/set-up for grooming,  requiring: No assistive devices were required.  PUJA Bathing:  Patient bathed in bed. Patient requires minimal assistance for  washing, rinsing, or drying the right arm. Patient requires minimal assistance  for washing, rinsing, or drying the left arm. Patient requires minimal  assistance for washing, rinsing, or drying the chest. Patient requires moderate  assistance for washing, rinsing, or drying the abdomen. Patient requires  moderate assistance for washing, rinsing, or drying the perineal area. Patient  requires moderate assistance for washing, rinsing, or drying the buttocks.  Patient requires moderate assistance for washing, rinsing, or drying the right  upper leg. Patient requires moderate assistance for washing, rinsing, or drying  the left upper leg. Patient requires maximal assistance for washing, rinsing, or  drying the right lower leg, including the foot. Patient requires maximal  assistance for washing, rinsing, or drying the left lower leg, including the  foot. Patient performs 0 -  24% of bathing tasks.  Bathing Score = 1, Total  Assistance. No assistive devices were required.  PUJA Upper Body Dressing:  Patient requires total assistance for gathering  clothes. Wearing a bra or undershirt was not applicable for this patient.  Patient requires moderate/considerable physical assistance for threading the  right arm through the garment (shirt/sweater). Patient requires  moderate/considerable physical assistance for threading the left arm through the  garment (shirt/sweater). Patient requires moderate/considerable physical  assistance for pulling an over-head-garment over head or  pulling  front-fastening-garment around back. Patient requires moderate/considerable  physical assistance for pulling an over-head-garment down the trunk or  adjusting/fastening together a front-fastening-garment. Patient performs 60 % of  upper body dressing tasks. Upper Body Dressing Score = 3, Moderate Assistance.  No assistive devices were required.  PUJA Lower Body Dressing:  Patient requires total assistance for gathering  clothes. Wearing underwear or an undergarment is not applicable for this  patient. Patient requires moderate/considerable physical assistance for  threading the right leg through the pants/skirt. Patient requires  moderate/considerable physical assistance for threading the left leg through the  pants/skirt. Patient requires moderate/considerable physical assistance for  pulling pants/skirt over hips and adjusting fasteners. Patient requires  moderate/considerable physical assistance for donning and/or doffing right sock.  Patient requires moderate/considerable physical assistance for donning and/or  doffing left sock. Donning and/or doffing right shoe is not applicable for this  patient. Donning and/or doffing left shoe is not applicable for this patient.  Patient performs 41.67 % of lower body dressing tasks. Lower Body Dressing Score  = 2, Maximal Assistance. No assistive devices were required.  PUJA Toileting:  Patient requires moderate assistance for adjusting clothing  before using a toilet, commode, bedpan, or urinal. Patient requires moderate  assistance for hygiene. Patient requires moderate assistance for adjusting  clothing after using a toilet, commode, bedpan, or urinal. Patient performs 0 -  24% of toileting tasks.  Toileting Score = 1, Total Assistance. Patient requires  the following assistive device(s): Grab bar.    PUJA Bladder Management  Level of Assistance:  Bladder Score = 5.  Patient is supervision/set-up for  bladder management, requiring: Patient requires the following  assistive  device(s):  Adult brief.  Frequency/Number of Accidents this Shift:  Bladder accidents this shift:  2 .    PUJA Bowel Management  Level of Assistance: Bowel Score = 7.  Patient is completely independent for  bowel management.  Patient did not have bowel movement.  No  medication/intervention was provided.  Frequency/Number of Accidents this Shift: Bowel accidents this shift: 0 .  Patient has not had an accident, but used a device/medication this shift  requiring: brief .    PUJA Bed/Chair/Wheelchair Transfer:  Bed/chair/wheelchair Transfer Score = 3.  Patient performs 50-74% of effort and requires moderate assistance (some  lifting) for transferring to and from the bed/chair/wheelchair. No assistive  devices were required.  PUJA Toilet Transfer:  Activity was not observed.  PUJA Tub/Shower Transfer:  Activity was not observed.    Previously Documented Mode of Locomotion at Discharge:  PUJA Expected Mode of Locomotion at Discharge:  [facility]JA Walk/Wheelchair:  PUJA Stairs:    PUJA Comprehension:  PUJA Expression:  PUJA Social Interaction:  PUJA Problem Solving:  PUJA Memory:    Therapy Mode Minutes  Occupational Therapy:  Physical Therapy:  Speech Language Pathology:    Discharge Functional Goals:    Signed by: JAI Jefferson

## 2019-10-13 LAB
GLUCOSE BLDC GLUCOMTR-MCNC: 211 MG/DL (ref 70–130)
GLUCOSE BLDC GLUCOMTR-MCNC: 297 MG/DL (ref 70–130)
GLUCOSE BLDC GLUCOMTR-MCNC: 308 MG/DL (ref 70–130)
GLUCOSE BLDC GLUCOMTR-MCNC: 70 MG/DL (ref 70–130)

## 2019-10-13 PROCEDURE — 63710000001 INSULIN ASPART PER 5 UNITS: Performed by: FAMILY MEDICINE

## 2019-10-13 PROCEDURE — 82962 GLUCOSE BLOOD TEST: CPT

## 2019-10-13 PROCEDURE — 63710000001 DIPHENHYDRAMINE PER 50 MG: Performed by: FAMILY MEDICINE

## 2019-10-13 RX ADMIN — APIXABAN 5 MG: 5 TABLET, FILM COATED ORAL at 08:26

## 2019-10-13 RX ADMIN — INSULIN ASPART 3 UNITS: 100 INJECTION, SOLUTION INTRAVENOUS; SUBCUTANEOUS at 12:51

## 2019-10-13 RX ADMIN — MAGNESIUM HYDROXIDE 10 ML: 2400 SUSPENSION ORAL at 05:34

## 2019-10-13 RX ADMIN — PANTOPRAZOLE SODIUM 40 MG: 40 TABLET, DELAYED RELEASE ORAL at 05:34

## 2019-10-13 RX ADMIN — ASPIRIN 81 MG: 81 TABLET, COATED ORAL at 08:26

## 2019-10-13 RX ADMIN — INSULIN ASPART 4 UNITS: 100 INJECTION, SOLUTION INTRAVENOUS; SUBCUTANEOUS at 08:28

## 2019-10-13 RX ADMIN — MONTELUKAST SODIUM 10 MG: 10 TABLET, COATED ORAL at 20:40

## 2019-10-13 RX ADMIN — GLIPIZIDE 5 MG: 5 TABLET ORAL at 08:26

## 2019-10-13 RX ADMIN — DIVALPROEX SODIUM 250 MG: 250 TABLET, EXTENDED RELEASE ORAL at 20:40

## 2019-10-13 RX ADMIN — HYDRALAZINE HYDROCHLORIDE 25 MG: 25 TABLET ORAL at 20:40

## 2019-10-13 RX ADMIN — LINAGLIPTIN 5 MG: 5 TABLET, FILM COATED ORAL at 08:26

## 2019-10-13 RX ADMIN — Medication 100 MG: at 08:26

## 2019-10-13 RX ADMIN — HYDRALAZINE HYDROCHLORIDE 25 MG: 25 TABLET ORAL at 08:26

## 2019-10-13 RX ADMIN — METOPROLOL TARTRATE 100 MG: 100 TABLET, FILM COATED ORAL at 20:40

## 2019-10-13 RX ADMIN — METOPROLOL TARTRATE 100 MG: 100 TABLET, FILM COATED ORAL at 08:26

## 2019-10-13 RX ADMIN — APIXABAN 5 MG: 5 TABLET, FILM COATED ORAL at 20:39

## 2019-10-13 RX ADMIN — DIPHENHYDRAMINE HYDROCHLORIDE 25 MG: 25 CAPSULE ORAL at 20:39

## 2019-10-13 RX ADMIN — AMLODIPINE BESYLATE 10 MG: 10 TABLET ORAL at 08:25

## 2019-10-13 NOTE — PROGRESS NOTES
Inpatient Rehabilitation Functional Measures Assessment    Functional Measures  PUJA Eating:  PUJA Grooming:  PUJA Bathing:  PUJA Upper Body Dressing:  PUJA Lower Body Dressing:  PUJA Toileting:    PUJA Bladder Management  Level of Assistance:  Frequency/Number of Accidents this Shift:    PUJA Bowel Management  Level of Assistance:  Frequency/Number of Accidents this Shift:    PUJA Bed/Chair/Wheelchair Transfer:  PUJA Toilet Transfer:  PUJA Tub/Shower Transfer:    Previously Documented Mode of Locomotion at Discharge:  PUJA Expected Mode of Locomotion at Discharge:  PUJA Walk/Wheelchair:  PUJA Stairs:    PUJA Comprehension:  Both ( auditory and visual) modes of comprehension are used  equally. Patient does not comprehend complex/abstract information in their  primary language without assistance from a helper. Comprehension Score = 3,  Moderate Prompting. Patient comprehends basic daily needs or ideas 50-74% of the  time. Patient requires moderate/some prompting. No assistive devices were  required. confused at times  PUJA Expression:  Both ( vocal and non-vocal) modes of expression are used  equally. Patient does not express complex/abstract information in their primary  language without a helper. Expression Score = 3, Moderate Prompting. Patient  expresses basic daily needs or ideas 50-74% of the time. Patient requires  moderate/some prompting. No assistive devices were required.  PUJA Social Interaction:  Social Interaction Score = 3, Moderate Direction.  Patient interacts appropriately 50-74% of the time.  Patient requires  moderate/some direction for the following behavior(s):  PUJA Problem Solving:  Patient does not make appropriate decisions in order to  solve complex problems without assistance from a helper. Problem Solving Score =  3, Moderate Direction. Patient makes appropriate decisions in order to solve  routine problems 50-74% of the time. Patient requires moderate/some direction  for the following behavior(s):  Decreased awareness of performance.  PUJA Memory:  Memory Score = 3, Moderate Prompting. Patient recognizes and  remembers 50-74% of the time. Patient requires moderate/some prompting  for  memory for the following:    Therapy Mode Minutes  Occupational Therapy:  Physical Therapy:  Speech Language Pathology:    Discharge Functional Goals:    Signed by: Radha Pritchett Nurse

## 2019-10-13 NOTE — PROGRESS NOTES
Inpatient Rehabilitation Functional Measures Assessment    Functional Measures  PUJA Eating:  PUJA Grooming: Patient requires moderate assistance for washing, rinsing and  drying the face. Patient requires moderate assistance for washing, rinsing and  drying the hands. Patient requires moderate assistance for brushing teeth.  Patient requires total assistance for brushing/combing hair. Shaving or applying  makeup was not observed for this patient. Patient performs 0 -  24% of grooming  tasks.  Grooming Score = 1, Total Assistance. No assistive devices were  required.  PUJA Bathing:  Patient bathed in bed. Patient requires total assistance for  washing, rinsing, or drying the right arm. Patient requires total assistance for  washing, rinsing, or drying the left arm. Patient requires total assistance for  washing, rinsing, or drying the chest. Patient requires total assistance for  washing, rinsing, or drying the abdomen. Patient requires total assistance for  washing, rinsing, or drying the perineal area. Patient requires total assistance  for washing, rinsing, or drying the buttocks. Patient requires total assistance  for washing, rinsing, or drying the right upper leg. Patient requires total  assistance for washing, rinsing, or drying the left upper leg. Patient requires  total assistance for washing, rinsing, or drying the right lower leg, including  the foot. Patient requires no physical assistance for washing, rinsing, and  drying the left lower leg, including the foot. Patient performs 10 -  24% of  bathing tasks.  Bathing Score = 1, Total Assistance. No assistive devices were  required.  PUJA Upper Body Dressing:  Patient requires total assistance for gathering  clothes. Wearing a bra or undershirt was not applicable for this patient.  Patient requires total assistance for holding clothing and/or threading the  right arm through the garment (shirt/sweater). Patient requires total assistance  for holding clothing  and/or threading the left arm through the garment  (shirt/sweater). Patient requires total assistance for holding clothing and/or  pulling an over-head-garment over head or pulling front-fastening-garment around  back. Patient requires no physical assistance for pulling an over-head-garment  down the trunk or adjusting/fastening together a front-fastening-garment.  Patient performs 40 % of upper body dressing tasks. Upper Body Dressing Score =  2, Maximal Assistance. No assistive devices were required.  PUJA Lower Body Dressing:  Patient requires total assistance for gathering  clothes. Patient requires total assistance for holding clothing and/or threading  the right leg through the undergarment. Patient requires total assistance for  holding clothing and/or threading the left leg through the undergarment. Patient  requires total assistance for holding clothing and/or pulling undergarment over  hips and adjusting fasteners. Patient requires total assistance for holding  clothing and/or threading the right leg through the pants/skirt. Patient  requires total assistance for holding clothing and/or threading the left leg  through the pants/skirt. Patient requires total assistance for holding clothing  and/or pulling pants/skirt over hips and adjusting fasteners. Patient requires  total assistance for holding clothing and/or donning and/or doffing right sock.  Patient requires no physical assistance for donning and/or doffing left sock.  Donning and/or doffing right shoe was not observed for this patient. Donning  and/or doffing left shoe was not observed for this patient. Patient performs  11.11 -  24% of lower body dressing tasks. Lower Body Dressing  Score = 1, Total  Assistance. No assistive devices were required.  PUJA Toileting:    PUJA Bladder Management  Level of Assistance:  Bladder Score = 1. Patient performs less than 25% of tasks  and requires total assistance for bladder management. [place] provides  total  assist to completely apply and remove brief.  Frequency/Number of Accidents this Shift:    [facility] Bowel Management  Level of Assistance: Bowel Score = 7.  Patient is completely independent for  bowel management.  Patient did not have bowel movement.  No  medication/intervention was provided.  Frequency/Number of Accidents this Shift:    [facility] Bed/Chair/Wheelchair Transfer:  Bed/chair/wheelchair Transfer Score = 3.  Patient performs 50-74% of effort and requires moderate assistance (some  lifting)  for transferring to and from the bed/chair/wheelchair, including  assist lifting both legs. Patient requires the following assistive device(s):  Seating system of wheelchair. Grab bars.  [facility] Toilet Transfer:  [facility] Tub/Shower Transfer:    Previously Documented Mode of Locomotion at Discharge:  [facility] Expected Mode of Locomotion at Discharge:  [facility] Walk/Wheelchair:  [facility] Stairs:    [facility] Comprehension:  [facility] Expression:  [facility] Social Interaction:  [facility] Problem Solving:  [facility] Memory:    Therapy Mode Minutes  Occupational Therapy:  Physical Therapy:  Speech Language Pathology:    Discharge Functional Goals:    Signed by: JAI Kunz

## 2019-10-13 NOTE — PLAN OF CARE
Problem: Patient Care Overview  Goal: Plan of Care Review  Outcome: Ongoing (interventions implemented as appropriate)   10/13/19 1030   Patient Care Overview   IRF Plan of Care Review progress ongoing, continue   Progress, Functional Goals demonstrating adequate progress   Coping/Psychosocial   Plan of Care Reviewed With patient       Problem: Safety Awareness Impairment (IRF) (Adult)  Goal: Optimal Level of Safety Awareness, All Environments  Outcome: Ongoing (interventions implemented as appropriate)

## 2019-10-13 NOTE — PROGRESS NOTES
Inpatient Rehabilitation Functional Measures Assessment    Functional Measures  PUJA Eating:  PUJA Grooming:  PUJA Bathing:  PUJA Upper Body Dressing:  PUJA Lower Body Dressing:  PUJA Toileting:    PUJA Bladder Management  Level of Assistance:  Frequency/Number of Accidents this Shift:    PUJA Bowel Management  Level of Assistance:  Frequency/Number of Accidents this Shift:    PUJA Bed/Chair/Wheelchair Transfer:  PUJA Toilet Transfer:  PUJA Tub/Shower Transfer:    Previously Documented Mode of Locomotion at Discharge:  PUJA Expected Mode of Locomotion at Discharge:  PUJA Walk/Wheelchair:  PUJA Stairs:    PUJA Comprehension:  Auditory comprehension is the usual mode. Patient does not  comprehend complex/abstract information in their primary language without  assistance from a helper. Comprehension Score = 3, Moderate Prompting. Patient  comprehends basic daily needs or ideas 50-74% of the time. Patient requires  moderate/some prompting. No assistive devices were required.  PUJA Expression:  Vocal expression is the usual mode. Patient does not express  complex/abstract information in their primary language without a helper.  Expression Score = 3, Moderate Prompting. Patient expresses basic daily needs or  ideas 50-74% of the time. Patient requires moderate/some prompting. No assistive  devices were required.  PUJA Social Interaction:  Social Interaction Score = 6, Modified Independent.  Patient is modified independent for social interaction, requiring: Requires  additional time.  PUJA Problem Solving:  Patient does not make appropriate decisions in order to  solve complex problems without assistance from a helper. Problem Solving Score =  3, Moderate Direction. Patient makes appropriate decisions in order to solve  routine problems 50-74% of the time. Patient requires moderate/some direction  for the following behavior(s): Decreased awareness of performance.  PUJA Memory:  Memory Score = 3, Moderate Prompting. Patient recognizes  and  remembers 50-74% of the time. Patient requires moderate/some prompting  for  memory for the following: Disoriented to person, place, time or situation.    Therapy Mode Minutes  Occupational Therapy:  Physical Therapy:  Speech Language Pathology:    Discharge Functional Goals:    Signed by: Rut Perrin Nurse

## 2019-10-13 NOTE — PROGRESS NOTES
Inpatient Rehabilitation Functional Measures Assessment    Functional Measures  PUJA Eating:  Eating Score = 5. Patient is supervision/set-up for eating,  requiring: Opening containers. No assistive devices were required.  PUJA Grooming: Activity was not observed.  PUJA Bathing:  Activity was not observed.  PUJA Upper Body Dressing:  Activity was not observed.  PUJA Lower Body Dressing:  Patient requires moderate/considerable physical  assistance for gathering clothes. Wearing underwear or an undergarment was not  observed for this patient. Patient requires moderate/considerable physical  assistance for threading the right leg through the pants/skirt. Patient requires  moderate/considerable physical assistance for threading the left leg through the  pants/skirt. Patient requires moderate/considerable physical assistance for  pulling pants/skirt over hips and adjusting fasteners. Patient requires  moderate/considerable physical assistance for donning and/or doffing right sock.  Patient requires moderate/considerable physical assistance for donning and/or  doffing left sock. Patient requires moderate/considerable physical assistance  for donning and/or doffing right shoe. Patient requires moderate/considerable  physical assistance for donning and/or doffing left shoe. Patient performs 50 %  of lower body dressing tasks. Lower Body Dressing Score = 3, Moderate  Assistance. No assistive devices were required.  PUJA Toileting:  Patient requires moderate assistance for adjusting clothing  before using a toilet, commode, bedpan, or urinal. Patient requires moderate  assistance for hygiene. Patient requires moderate assistance for adjusting  clothing after using a toilet, commode, bedpan, or urinal. Patient performs 0 -  24% of toileting tasks.  Toileting Score = 1, Total Assistance. No assistive  devices were required.    PUJA Bladder Management  Level of Assistance:  Bladder Score = 2. Patient performs 25-49% of tasks  and  requires maximal assistance for bladder management. [place] provides most of the  assist to apply AND remove brief.  Frequency/Number of Accidents this Shift:  Bladder accidents this shift:  0 .  Patient has not had an accident, but used a device/medication this shift  requiring:  breif .    PUJA Bowel Management  Level of Assistance: Activity was not observed.  Frequency/Number of Accidents this Shift: Bowel accidents this shift: 0 .  Patient has not had an accident, but used a device/medication this shift  requiring: breif .    PUJA Bed/Chair/Wheelchair Transfer:  Bed/chair/wheelchair Transfer Score = 3.  Patient performs 50-74% of effort and requires moderate assistance (some  lifting) for transferring to and from the bed/chair/wheelchair. Patient requires  the following assistive device(s): Elevated head of bed. Elevated bed/surface.    PUJA Toilet Transfer:  Toilet Transfer Score = 3.  Patient performs 50-74% of  effort and requires moderate assistance (some lifting) for transferring to and  from the toilet/commode. Patient requires the following assistive device(s):  Specialized seat. Safety frame/over the toilet. Grab bars.  PUJA Tub/Shower Transfer:  Activity was not observed.    Previously Documented Mode of Locomotion at Discharge:  PUJA Expected Mode of Locomotion at Discharge:  PUJA Walk/Wheelchair:  PUJA Stairs:    PUJA Comprehension:  PUJA Expression:  PUJA Social Interaction:  PUJA Problem Solving:  PUJA Memory:    Therapy Mode Minutes  Occupational Therapy:  Physical Therapy:  Speech Language Pathology:    Discharge Functional Goals:    Signed by: JAI Sanders

## 2019-10-13 NOTE — PLAN OF CARE
Problem: Patient Care Overview  Goal: Plan of Care Review  Outcome: Ongoing (interventions implemented as appropriate)    Goal: Individualization and Mutuality  Outcome: Ongoing (interventions implemented as appropriate)    Goal: Discharge Needs Assessment  Outcome: Ongoing (interventions implemented as appropriate)    Goal: Home Safety Plan  Outcome: Ongoing (interventions implemented as appropriate)    Goal: Coping Plan  Outcome: Ongoing (interventions implemented as appropriate)    Goal: Community Reintegration Plan  Outcome: Ongoing (interventions implemented as appropriate)      Problem: Fall Risk (Adult)  Goal: Absence of Fall  Outcome: Ongoing (interventions implemented as appropriate)      Problem: Wound (Includes Pressure Injury) (Adult)  Goal: Signs and Symptoms of Listed Potential Problems Will be Absent, Minimized or Managed (Wound)  Outcome: Ongoing (interventions implemented as appropriate)      Problem: Functional Mobility Impairment (IRF) (Adult)  Goal: Optimal/Safe Level of [place] with Mobility  Outcome: Ongoing (interventions implemented as appropriate)      Problem: Diabetes, Type 2 (Adult)  Goal: Signs and Symptoms of Listed Potential Problems Will be Absent, Minimized or Managed (Diabetes, Type 2)  Outcome: Ongoing (interventions implemented as appropriate)      Problem: Safety Awareness Impairment (IRF) (Adult)  Goal: Optimal Level of Safety Awareness, All Environments  Outcome: Ongoing (interventions implemented as appropriate)

## 2019-10-14 LAB
GLUCOSE BLDC GLUCOMTR-MCNC: 195 MG/DL (ref 70–130)
GLUCOSE BLDC GLUCOMTR-MCNC: 241 MG/DL (ref 70–130)
GLUCOSE BLDC GLUCOMTR-MCNC: 264 MG/DL (ref 70–130)

## 2019-10-14 PROCEDURE — 97116 GAIT TRAINING THERAPY: CPT

## 2019-10-14 PROCEDURE — 63710000001 DIPHENHYDRAMINE PER 50 MG: Performed by: FAMILY MEDICINE

## 2019-10-14 PROCEDURE — 97535 SELF CARE MNGMENT TRAINING: CPT

## 2019-10-14 PROCEDURE — 82962 GLUCOSE BLOOD TEST: CPT

## 2019-10-14 PROCEDURE — 97530 THERAPEUTIC ACTIVITIES: CPT

## 2019-10-14 PROCEDURE — 97110 THERAPEUTIC EXERCISES: CPT

## 2019-10-14 PROCEDURE — 63710000001 INSULIN ASPART PER 5 UNITS: Performed by: FAMILY MEDICINE

## 2019-10-14 RX ORDER — GLIPIZIDE 5 MG/1
5 TABLET ORAL
Status: DISCONTINUED | OUTPATIENT
Start: 2019-10-14 | End: 2019-10-15

## 2019-10-14 RX ORDER — HYDRALAZINE HYDROCHLORIDE 50 MG/1
50 TABLET, FILM COATED ORAL EVERY 12 HOURS SCHEDULED
Status: DISCONTINUED | OUTPATIENT
Start: 2019-10-14 | End: 2019-10-15

## 2019-10-14 RX ORDER — DIVALPROEX SODIUM 500 MG/1
500 TABLET, EXTENDED RELEASE ORAL NIGHTLY
Status: DISCONTINUED | OUTPATIENT
Start: 2019-10-14 | End: 2019-10-16 | Stop reason: HOSPADM

## 2019-10-14 RX ADMIN — INSULIN ASPART 3 UNITS: 100 INJECTION, SOLUTION INTRAVENOUS; SUBCUTANEOUS at 17:09

## 2019-10-14 RX ADMIN — DIVALPROEX SODIUM 500 MG: 500 TABLET, FILM COATED, EXTENDED RELEASE ORAL at 21:05

## 2019-10-14 RX ADMIN — GLIPIZIDE 5 MG: 5 TABLET ORAL at 08:54

## 2019-10-14 RX ADMIN — LINAGLIPTIN 5 MG: 5 TABLET, FILM COATED ORAL at 08:54

## 2019-10-14 RX ADMIN — INSULIN ASPART 5 UNITS: 100 INJECTION, SOLUTION INTRAVENOUS; SUBCUTANEOUS at 08:55

## 2019-10-14 RX ADMIN — METOPROLOL TARTRATE 100 MG: 100 TABLET, FILM COATED ORAL at 21:05

## 2019-10-14 RX ADMIN — APIXABAN 5 MG: 5 TABLET, FILM COATED ORAL at 21:05

## 2019-10-14 RX ADMIN — MAGNESIUM HYDROXIDE 10 ML: 2400 SUSPENSION ORAL at 21:04

## 2019-10-14 RX ADMIN — HYDRALAZINE HYDROCHLORIDE 50 MG: 50 TABLET ORAL at 21:05

## 2019-10-14 RX ADMIN — APIXABAN 5 MG: 5 TABLET, FILM COATED ORAL at 08:55

## 2019-10-14 RX ADMIN — MONTELUKAST SODIUM 10 MG: 10 TABLET, COATED ORAL at 21:05

## 2019-10-14 RX ADMIN — ASPIRIN 81 MG: 81 TABLET, COATED ORAL at 08:55

## 2019-10-14 RX ADMIN — DIPHENHYDRAMINE HYDROCHLORIDE 25 MG: 25 CAPSULE ORAL at 21:04

## 2019-10-14 RX ADMIN — Medication 100 MG: at 08:55

## 2019-10-14 RX ADMIN — AMLODIPINE BESYLATE 10 MG: 10 TABLET ORAL at 08:55

## 2019-10-14 RX ADMIN — GLIPIZIDE 5 MG: 5 TABLET ORAL at 17:11

## 2019-10-14 RX ADMIN — METOPROLOL TARTRATE 100 MG: 100 TABLET, FILM COATED ORAL at 08:55

## 2019-10-14 RX ADMIN — PANTOPRAZOLE SODIUM 40 MG: 40 TABLET, DELAYED RELEASE ORAL at 05:00

## 2019-10-14 RX ADMIN — HYDRALAZINE HYDROCHLORIDE 25 MG: 25 TABLET ORAL at 08:55

## 2019-10-14 RX ADMIN — INSULIN ASPART 2 UNITS: 100 INJECTION, SOLUTION INTRAVENOUS; SUBCUTANEOUS at 12:57

## 2019-10-14 NOTE — SIGNIFICANT NOTE
10/14/19 1220   Plan   Plan Spoke to son Juan [phone] about recommendation for home health PT, OT, aide, and nursing; PT to determine this pm if pt will need a cane or rolling walker.  Son says pt has a rolling walker and did have a quad cane but unsure where it is; son thinks it is more than 5 years old.  Granddaughter Katelyn will be present for discharge and transportation home.  Son is working out of town and will be home on 10-17-19.  Son prefers [facility]; pt has used them in the past.  Contacted [facility] [phone] per preference per Varsha who states Veronica in Intake is not available and says to fax referral to Veronica's attention.  HH referral with orders for PT 2-3 x wk x 8 wks for strengthening, endurance, gait training, transfer training, balance, therapeutic exercise, bed mobility, home safety, coordination, steps/stairs, OT 2-3 x wk x 8wks for ADL re-training, home safety, functional mobility, strengthening, coordination, aide evaluation for bathing/dressing, nursing evaluation for medication education, discharge on 10-16-19, face sheet, H&P, PT/OT notes, MD progress notes, and [place] video swallow report to  [phone].  HH to be contacted at discharge.   Patient/Family in Agreement with Plan yes

## 2019-10-14 NOTE — DISCHARGE INSTR - OTHER ORDERS
[facility] [phone] for PT/OT 2-3 x wk x 8 wks, aide and nursing evaluation.    Pt has DME at home.

## 2019-10-14 NOTE — PROGRESS NOTES
Rehabilitation Nursing  Inpatient Rehabilitation Functional Measures Assessment and Plan of Care    Plan of Care/Interventions  CopBody Function Structure    Skin Integrity (Active)  Current Status (10/1/2019 5:00:00 PM): MASD to buttocks  Weekly Goal: healing of skin  Discharge Goal: healed skin    Safety    Potential for Injury (Active)  Current Status (10/1/2019 5:00:00 PM): falls risk  Weekly Goal: no falls  Discharge Goal: no falls this admissiony from POC    Functional Measures  PUJA Eating:  PUJA Grooming:  PUJA Bathing:  PUJA Upper Body Dressing:  PUJA Lower Body Dressing:  PUJA Toileting:    PUJA Bladder Management  Level of Assistance:  Frequency/Number of Accidents this Shift:    PUJA Bowel Management  Level of Assistance:  Frequency/Number of Accidents this Shift:    PUJA Bed/Chair/Wheelchair Transfer:  PUJA Toilet Transfer:  PUJA Tub/Shower Transfer:    Previously Documented Mode of Locomotion at Discharge:  PUJA Expected Mode of Locomotion at Discharge:  PUJA Walk/Wheelchair:  PUJA Stairs:    PUJA Comprehension:  Both ( auditory and visual) modes of comprehension are used  equally. Patient does not comprehend complex/abstract information in their  primary language without assistance from a helper. Comprehension Score = 3,  Moderate Prompting. Patient comprehends basic daily needs or ideas 50-74% of the  time. Patient requires moderate/some prompting. Patient requires the following  assistive device(s): Glasses.  PUJA Expression:  Vocal expression is the usual mode. Patient does not express  complex/abstract information in their primary language without a helper.  Expression Score = 3, Moderate Prompting. Patient expresses basic daily needs or  ideas 50-74% of the time. Patient requires moderate/some prompting. No assistive  devices were required.  PUJA Social Interaction:  Social Interaction Score = 7, Independent. Patient is  completely independent for social interaction.  There are no activity  limitations.  PUJA  Problem Solving:  Patient does not make appropriate decisions in order to  solve complex problems without assistance from a helper. Problem Solving Score =  3, Moderate Direction. Patient makes appropriate decisions in order to solve  routine problems 50-74% of the time. Patient requires moderate/some direction  for the following behavior(s): Difficulty weighing alternatives/making choices.  Difficulty with self-correction. Difficulty with self-monitoring. Exhibits poor  planning. Impulsivity. Poor judgment.  PUJA Memory:  Memory Score = 3, Moderate Prompting. Patient recognizes and  remembers 50-74% of the time. Patient requires moderate/some prompting  for  memory for the following: Difficulty recognizing hospital staff as persons  previously met. Difficulty remembering verbal tasks. Difficulty remembering  visual tasks. Disoriented to person, place, time or situation.    Signed by: Nurse Adalgisa

## 2019-10-14 NOTE — PLAN OF CARE
Problem: Patient Care Overview  Goal: Plan of Care Review  Outcome: Ongoing (interventions implemented as appropriate)   10/14/19 2317   Patient Care Overview   IRF Plan of Care Review progress ongoing, continue   Progress, Functional Goals demonstrating adequate progress   Coping/Psychosocial   Plan of Care Reviewed With patient   OTHER   Outcome Summary Patient tolerated PT session well with adequate rest breaks. Continue w/ current POC.

## 2019-10-14 NOTE — PLAN OF CARE
Problem: Patient Care Overview  Goal: Plan of Care Review  Outcome: Ongoing (interventions implemented as appropriate)    Goal: Individualization and Mutuality  Outcome: Ongoing (interventions implemented as appropriate)      Problem: Fall Risk (Adult)  Goal: Absence of Fall  Outcome: Ongoing (interventions implemented as appropriate)      Problem: Wound (Includes Pressure Injury) (Adult)  Goal: Signs and Symptoms of Listed Potential Problems Will be Absent, Minimized or Managed (Wound)  Outcome: Ongoing (interventions implemented as appropriate)      Problem: Functional Mobility Impairment (IRF) (Adult)  Goal: Optimal/Safe Level of [place] with Mobility  Outcome: Ongoing (interventions implemented as appropriate)      Problem: Diabetes, Type 2 (Adult)  Goal: Signs and Symptoms of Listed Potential Problems Will be Absent, Minimized or Managed (Diabetes, Type 2)  Outcome: Ongoing (interventions implemented as appropriate)      Problem: Safety Awareness Impairment (IRF) (Adult)  Goal: Optimal Level of Safety Awareness, All Environments  Outcome: Ongoing (interventions implemented as appropriate)

## 2019-10-14 NOTE — PROGRESS NOTES
Inpatient Rehabilitation Functional Measures Assessment    Functional Measures  PUJA Eating:  Eating Score = 5. Patient is supervision/set-up for eating,  requiring: Opening containers. No assistive devices were required.  PUJA Grooming: Grooming Score = 5. Patient is supervision/set-up for grooming,  requiring: No assistive devices were required.  PUJA Bathing:  Patient bathed in bed. Patient requires moderate assistance for  washing, rinsing, or drying the right arm. Patient requires moderate assistance  for washing, rinsing, or drying the left arm. Patient requires moderate  assistance for washing, rinsing, or drying the chest. Patient requires moderate  assistance for washing, rinsing, or drying the abdomen. Patient requires maximal  assistance for washing, rinsing, or drying the perineal area. Patient requires  maximal assistance for washing, rinsing, or drying the buttocks. Patient  requires moderate assistance for washing, rinsing, or drying the right upper  leg. Patient requires moderate assistance for washing, rinsing, or drying the  left upper leg. Patient requires maximal assistance for washing, rinsing, or  drying the right lower leg, including the foot. Patient requires maximal  assistance for washing, rinsing, or drying the left lower leg, including the  foot. Patient performs 0 -  24% of bathing tasks.  Bathing Score = 1, Total  Assistance. No assistive devices were required.  PUJA Upper Body Dressing:  Patient requires maximal/significant physical  assistance for gathering clothes. Wearing a bra or undershirt was not applicable  for this patient. Patient requires moderate/considerable physical assistance for  threading the right arm through the garment (shirt/sweater). Patient requires  moderate/considerable physical assistance for threading the left arm through the  garment (shirt/sweater). Patient requires moderate/considerable physical  assistance for pulling an over-head-garment over head or  pulling  front-fastening-garment around back. Patient requires moderate/considerable  physical assistance for pulling an over-head-garment down the trunk or  adjusting/fastening together a front-fastening-garment. Patient performs 60 % of  upper body dressing tasks. Upper Body Dressing Score = 3, Moderate Assistance.  No assistive devices were required.  PUJA Lower Body Dressing:  Patient requires total assistance for gathering  clothes. Wearing underwear or an undergarment is not applicable for this  patient. Patient requires moderate/considerable physical assistance for  threading the right leg through the pants/skirt. Patient requires  moderate/considerable physical assistance for threading the left leg through the  pants/skirt. Patient requires moderate/considerable physical assistance for  pulling pants/skirt over hips and adjusting fasteners. Patient requires  maximal/significant physical assistance for holding clothing and/or donning  and/or doffing right sock. Patient requires maximal/significant physical  assistance for holding clothing and/or donning and/or doffing left sock. Patient  requires maximal/significant physical assistance for holding clothing and/or  donning and/or doffing right shoe. Patient requires maximal/significant physical  assistance for holding clothing and/or donning and/or doffing left shoe. Patient  performs 31.25 % of lower body dressing tasks. Lower Body Dressing Score = 2,  Maximal Assistance. No assistive devices were required.  PUJA Toileting:  Activity was not observed.    PUJA Bladder Management  Level of Assistance:  Bladder Score = 2. Patient performs 25-49% of tasks and  requires maximal assistance for bladder management. Kennan provides most of the  assist to apply AND remove brief.  Frequency/Number of Accidents this Shift:  Bladder accidents this shift:  1 .    PUJA Bowel Management  Level of Assistance: Bowel Score = 7.  Patient is completely independent for  bowel  management.  Patient did not have bowel movement.  No  medication/intervention was provided.  Frequency/Number of Accidents this Shift: Bowel accidents this shift: 0 .  Patient has not had an accident, but used a device/medication this shift  requiring: brief .    PUJA Bed/Chair/Wheelchair Transfer:  Bed/chair/wheelchair Transfer Score = 3.  Patient performs 50-74% of effort and requires moderate assistance (some  lifting) for transferring to and from the bed/chair/wheelchair. No assistive  devices were required.  PUJA Toilet Transfer:  Activity was not observed.  PUJA Tub/Shower Transfer:  Activity was not observed.    Previously Documented Mode of Locomotion at Discharge:  PUJA Expected Mode of Locomotion at Discharge:  PUJA Walk/Wheelchair:  PUJA Stairs:    PUJA Comprehension:  PUJA Expression:  PUJA Social Interaction:  PUJA Problem Solving:  [facility] Memory:    Therapy Mode Minutes  Occupational Therapy:  Physical Therapy:  Speech Language Pathology:    Discharge Functional Goals:    Signed by: JAI Jefferson

## 2019-10-14 NOTE — THERAPY TREATMENT NOTE
Inpatient Rehabilitation - Physical Therapy Treatment Note   [facility]     Patient Name: Ashley Geronimo  : 1945  MRN: [id number]    Today's Date: 10/14/2019                 Admit Date: 10/1/2019      Visit Dx:    No diagnosis found.    Patient Active Problem List   Diagnosis   • Pneumonia   • Septic shock (CMS/[facility])   • Pseudomonas pneumonia (CMS/[facility])   • NSTEMI (non-ST elevated myocardial infarction) (CMS/[facility])   • Bradycardia   • Paroxysmal atrial fibrillation (CMS/HCC)   • Chronic respiratory failure with hypoxia and hypercapnia (CMS/HCC)   • Acute on chronic respiratory failure with hypoxia (CMS/HCC), requiring intubation/mechanical ventilation 19   • Hematemesis   • RAMESH (acute kidney injury) (CMS/[facility])   • DKA (diabetic ketoacidoses) (CMS/[facility])   • Septic shock (CMS/[facility])   • Encephalopathy   • UTI (urinary tract infection)   • Tracheobronchitis, PSA    • Metabolic encephalopathy       Therapy Treatment    IRF Treatment Summary     Row Name 10/14/19 1627 10/14/19 1202          Evaluation/Treatment Time and Intent    Subjective Information  no complaints  -LL  no complaints  -JW     Existing Precautions/Restrictions  fall confusion  -LL  fall mild to mod. confusion  -JW     Document Type  therapy note (daily note)  -LL  therapy note (daily note)  -JW     Mode of Treatment  physical therapy;individual therapy  -LL  occupational therapy;individual therapy  -JW     Patient/Family Observations  Patient agreeable to PT session.  -LL  Pt was an active partic in OT tx  -JW     Recorded by [LL] Bita Greene PTA [JW] Conrado James OTR     Row Name 10/14/19 1627 10/14/19 1202          Cognition/Psychosocial- PT/OT    Affect/Mental Status (Cognitive)  confused  -LL  confused  -JW     Orientation Status (Cognition)  oriented to;person  -LL  person;situation  -JW     Follows Commands (Cognition)  follows two step commands;initiation impaired;repetition of directions required  -LL  follows two step  commands;initiation impaired;repetition of directions required  -JW     Personal Safety Interventions  fall prevention program maintained;gait belt;nonskid shoes/slippers when out of bed;supervised activity  -  fall prevention program maintained;gait belt  -JW     Cognitive Function (Cognitive)  attention deficit;memory deficit;safety deficit  -LL  attention deficit;safety deficit  -JW     Attention Deficit (Cognitive)  requires cues/redirection to task  -  mild deficit;moderate deficit;alternating attention;requires cues/redirection to task  -JW     Safety Deficit (Cognitive)  --  judgment;problem solving  -JW     Recorded by [LL] Bita Greene PTA [JW] Conrado James, LESLEYR     Row Name 10/14/19 1627             Transfer Assessment/Treatment    Transfer Assessment/Treatment  bed-chair transfer;chair-bed transfer;sit-stand transfer;stand-sit transfer;stand pivot/stand step transfer  -      Recorded by [LL] Bita Greene PTA      Row Name 10/14/19 1627 10/14/19 1205          Bed-Chair Transfer    Bed-Chair [place] (Transfers)  verbal cues;nonverbal cues (demo/gesture);stand by assist;contact guard  -LL  minimum assist (75% patient effort);verbal cues;nonverbal cues (demo/gesture);1 person assist  -     Assistive Device (Bed-Chair Transfers)  walker, front-wheeled;wheelchair  -  --     Recorded by [LL] Bita Greene PTA [JW] Conrado James, LESLEYR     Row Name 10/14/19 1627             Chair-Bed Transfer    Chair-Bed [place] (Transfers)  verbal cues;nonverbal cues (demo/gesture);stand by assist;contact guard  -LL      Assistive Device (Chair-Bed Transfers)  walker, front-wheeled;wheelchair  -LL      Recorded by [LL] Bita Greene PTA      Row Name 10/14/19 1627             Sit-Stand Transfer    Sit-Stand [place] (Transfers)  verbal cues;nonverbal cues (demo/gesture);stand by assist;contact guard  -LL      Assistive Device (Sit-Stand Transfers)  walker, front-wheeled  -LL      Recorded by  [LL] Bita Greene PTA      Row Name 10/14/19 1627             Stand-Sit Transfer    Stand-Sit [place] (Transfers)  verbal cues;nonverbal cues (demo/gesture);stand by assist;contact guard  -LL      Assistive Device (Stand-Sit Transfers)  walker, front-wheeled  -LL      Recorded by [LL] Bita Greene PTA      Row Name 10/14/19 1627             Stand Pivot/Stand Step Transfer    Stand Pivot/Stand Step [place]  verbal cues;nonverbal cues (demo/gesture);stand by assist;contact guard  -LL      Assistive Device (Stand Pivot Stand Step Transfer)  walker, front-wheeled  -LL      Recorded by [LL] Bita Greene PTA      Row Name 10/14/19 1627             Toilet Transfer    Type (Toilet Transfer)  stand pivot/stand step  -LL      [place] Level (Toilet Transfer)  verbal cues;supervision  -LL      Assistive Device (Toilet Transfer)  grab bars/safety frame  -LL      Recorded by [LL] Bita Greene PTA      Row Name 10/14/19 1627             Car Transfer    Type (Car Transfer)  stand pivot/stand step  -LL      [place] Level (Car Transfer)  verbal cues;nonverbal cues (demo/gesture);contact guard;minimum assist (75% patient effort)  -LL      Assistive Device (Car Transfer)  walker, front-wheeled  -LL      Recorded by [LL] Bita Greene PTA      Row Name 10/14/19 1627             Gait/Stairs Assessment/Training    Gait/Stairs Assessment/Training  gait/ambulation independence;gait/ambulation assistive device;distance ambulated;gait pattern;gait deviations  -LL      [place] Level (Gait)  verbal cues;nonverbal cues (demo/gesture);contact guard;stand by assist  -LL      Assistive Device (Gait)  walker, front-wheeled HHA  -LL      Distance in Feet (Gait)  300  -LL      Pattern (Gait)  step-through  -LL      Deviations/Abnormal Patterns (Gait)  base of support, narrow;[place] decreased;stride length decreased  -LL      Bilateral Gait Deviations  forward flexed posture  -LL      Negotiation (Stairs)   stairs independence;stairs assistive device;handrail location;number of steps;ascending technique;descending technique  -LL      [place] Level (Stairs)  minimum assist (75% patient effort);verbal cues;nonverbal cues (demo/gesture)  -LL      Handrail Location (Stairs)  both sides  -LL      Number of Steps (Stairs)  10  -LL      Ascending Technique (Stairs)  step-to-step  -LL      Descending Technique (Stairs)  step-to-step  -LL      Stairs, Safety Issues  balance decreased during turns;sequencing ability decreased;weight-shifting ability decreased  -LL      Recorded by [LL] Bita Greene PTA      Row Name 10/14/19 1627 10/14/19 1205          Safety Issues, Functional Mobility    Safety Issues Affecting Function (Mobility)  --  awareness of need for assistance;judgment;safety precaution awareness  -JW     Impairments Affecting Function (Mobility)  balance;endurance/activity tolerance;strength  -LL  --     Recorded by [LL] Bita Greene PTA [JW] Conrado James, OTR     Row Name 10/14/19 1205             Grooming Assessment/Treatment    Grooming [place] Level  set up;supervision;verbal cues  -JW      Recorded by [JW] Conrado James, OTR      Row Name 10/14/19 1205             BADL Safety/Performance    Impairments, BADL Safety/Performance  cognition;strength;endurance/activity tolerance  -JW      Recorded by [JW] Conrado James, OTR      Row Name 10/14/19 1626             Pain Scale: FACES Pre/Post-Treatment    Pain: FACES Scale, Pretreatment  0-->no hurt  -LL      Pain: FACES Scale, Post-Treatment  0-->no hurt  -LL      Recorded by [LL] Bita Greene PTA      Row Name 10/14/19 1627             Balance    Balance  static sitting balance;static standing balance;dynamic sitting balance;dynamic standing balance;sitting balance activity;standing balance activity  -LL      Recorded by [LL] Bita Greene PTA      Row Name 10/14/19 1208             Upper Extremity Seated Therapeutic Exercise    Exercise Type,  Seated Upper Extremity (Therapeutic Exercise)  AROM (active range of motion);wand exercises;eccentric contraction  -JW      Expected Outcomes, Seated Upper Extremity (Therapeutic Exercise)  improve functional tolerance, self-care activity;improve manipulation of objects, self care activity;improve performance, fine motor coordination skills;improve performance, reaching for objects  -JW      Recorded by [JW] Conrado James OTR      Row Name 10/14/19 1627             Lower Extremity Standing Therapeutic Exercise    Performed, Standing Lower Extremity (Therapeutic Exercise)  hip/knee flexion/extension;heel raises  -LL      Device, Standing Lower Extremity (Therapeutic Exercise)  parallel bars  -LL      Sets/Reps Detail, Standing Lower Extremity (Therapeutic Exercise)  1 x 15  -LL      Recorded by [LL] Bita Greene PTA      Row Name 10/14/19 1627             Lower Extremity Seated Therapeutic Exercise    Performed, Seated Lower Extremity (Therapeutic Exercise)  hip flexion/extension;hip abduction/adduction;ankle dorsiflexion/plantarflexion;LAQ (long arc quad), knee extension Ball squeeze  -LL      Device, Seated Lower Extremity (Therapeutic Exercise)  elastic bands/tubing;small ball;free weights, cuff 2#  -LL      Sets/Reps Detail, Seated Lower Extremity (Therapeutic Exercise)  1 x 20  -LL      Recorded by [LL] Bita Greene PTA      Row Name 10/14/19 1627             Vital Signs    Pre SpO2 (%)  95  -LL      O2 Delivery Pre Treatment  room air  -LL      Recorded by [LL] Bita Greene PTA      Row Name 10/14/19 1627 10/14/19 1205          Positioning and Restraints    Pre-Treatment Position  -- WC in hallway  -LL  --     Post Treatment Position  wheelchair  -LL  wheelchair  -JW     In Wheelchair  sitting;notified nsg;legs elevated in hallway in front of nurses station  -LL  with nsg  -JW     Recorded by [LL] Bita Greene PTA [JW] Conrado James OTR     Row Name 10/14/19 1627 10/14/19 1205          Daily  Summary of Progress (PT)    Functional Goal Overall Progress: Physical Therapy  --  progressing toward functional goals as expected  -     Impairments Continuing to Limit Function: Physical Therapy  strength decreased;impaired balance;impaired cognition;coordination impaired decr cognition  -  impaired balance;impaired cognition;strength decreased;coordination impaired;postural control impaired  -JW     Recorded by [LL] Bita Greene, MARY ANN [JW] Conrado James OTBUDDY       User Key  (r) = Recorded By, (t) = Taken By, (c) = Cosigned By    Initials Name Effective Dates     Bita Greene, MARY ANN 05/02/16 -     JW Conrado James OTR 03/07/18 -         Wound 09/29/19 Right lateral clavicle Puncture (Active)   Dressing Appearance open to air 10/14/2019  8:00 AM       Wound 10/01/19 1702 Bilateral other (see comments) coccyx MASD ([place] associated skin damage) (Active)   Dressing Appearance open to air 10/14/2019  8:00 AM   Closure None 10/13/2019  7:10 PM   Base red 10/13/2019  7:10 PM   Periwound excoriated;redness 10/13/2019  7:10 PM   Drainage Amount none 10/13/2019  7:10 PM   Care, Wound barrier applied 10/13/2019  7:10 PM   Dressing Care, Wound open to air 10/14/2019  8:00 AM     Physical Therapy Education     Title: PT OT SLP Therapies (In Progress)     Topic: Physical Therapy (Done)     Point: Mobility training (Done)     Learning Progress Summary           Patient Acceptance, E,D, VU,NR by EVE at 10/14/2019  4:34 PM    Acceptance, E,TB, VU by MONICA at 10/12/2019  9:42 PM    Acceptance, E,TB, VU by DG at 10/12/2019 12:08 AM    Acceptance, E,D, VU,NR by LL at 10/11/2019  4:30 PM    Acceptance, E, VU,NR by CRISTY at 10/11/2019  8:42 AM    Acceptance, E,TB, VU by DG at 10/11/2019 12:42 AM    Acceptance, E,D, VU,NR by AG at 10/10/2019  4:33 PM    Acceptance, E,TB, VU by RF at 10/9/2019  4:49 PM    Acceptance, E,D, NL by AG at 10/8/2019  4:10 PM    Acceptance, E,D, VU,NR by LL at 10/7/2019  3:34 PM    Acceptance, VICKI DAVIS VUNR by  KADE at 10/5/2019  1:43 PM    Acceptance, E,D, NR by LL at 10/4/2019  3:45 PM    Acceptance, E,D, NR by AG at 10/3/2019  4:10 PM    Acceptance, E,D, NR by AG at 10/2/2019  4:26 PM                   Point: Home exercise program (Done)     Learning Progress Summary           Patient Acceptance, E,D, VU,NR by LL at 10/14/2019  4:34 PM    Acceptance, E,TB, VU by DG at 10/12/2019  9:42 PM    Acceptance, E,TB, VU by DG at 10/12/2019 12:08 AM    Acceptance, E,D, VU,NR by LL at 10/11/2019  4:30 PM    Acceptance, E, VU,NR by MC at 10/11/2019  8:42 AM    Acceptance, E,TB, VU by DG at 10/11/2019 12:42 AM    Acceptance, E,D, VU,NR by AG at 10/10/2019  4:33 PM    Acceptance, E,TB, VU by RF at 10/9/2019  4:49 PM    Acceptance, E,D, NL by AG at 10/8/2019  4:10 PM    Acceptance, E,D, VU,NR by LL at 10/7/2019  3:34 PM    Acceptance, E,D, VU,NR by KADE at 10/5/2019  1:43 PM    Acceptance, E,D, NR by LL at 10/4/2019  3:45 PM    Acceptance, E,D, NR by AG at 10/3/2019  4:10 PM    Acceptance, E,D, NR by AG at 10/2/2019  4:26 PM                   Point: Body mechanics (Done)     Learning Progress Summary           Patient Acceptance, E,D, VU,NR by LL at 10/14/2019  4:34 PM    Acceptance, E,TB, VU by DG at 10/12/2019  9:42 PM    Acceptance, E,TB, VU by DG at 10/12/2019 12:08 AM    Acceptance, E,D, VU,NR by LL at 10/11/2019  4:30 PM    Acceptance, E, VU,NR by MC at 10/11/2019  8:42 AM    Acceptance, E,TB, VU by DG at 10/11/2019 12:42 AM    Acceptance, E,D, VU,NR by AG at 10/10/2019  4:33 PM    Acceptance, E,TB, VU by RF at 10/9/2019  4:49 PM    Acceptance, E,D, NL by AG at 10/8/2019  4:10 PM    Acceptance, E,D, VU,NR by LL at 10/7/2019  3:34 PM    Acceptance, E,D, VU,NR by KADE at 10/5/2019  1:43 PM    Acceptance, E,D, NR by LL at 10/4/2019  3:45 PM    Acceptance, E,D, NR by AG at 10/3/2019  4:10 PM    Acceptance, E,D, NR by AG at 10/2/2019  4:26 PM                   Point: Precautions (Done)     Learning Progress Summary           Patient  Acceptance, E,D, VU,NR by LL at 10/14/2019  4:34 PM    Acceptance, E,TB, VU by DG at 10/12/2019  9:42 PM    Acceptance, E,TB, VU by DG at 10/12/2019 12:08 AM    Acceptance, E,D, VU,NR by LL at 10/11/2019  4:30 PM    Acceptance, E, VU,NR by  at 10/11/2019  8:42 AM    Acceptance, E,TB, VU by DG at 10/11/2019 12:42 AM    Acceptance, E,D, VU,NR by AG at 10/10/2019  4:33 PM    Acceptance, E,TB, VU by  at 10/9/2019  4:49 PM    Acceptance, E,D, NL by AG at 10/8/2019  4:10 PM    Acceptance, E,D, VU,NR by LL at 10/7/2019  3:34 PM    Acceptance, E,D, VU,NR by KADE at 10/5/2019  1:43 PM    Acceptance, E,D, NR by LL at 10/4/2019  3:45 PM    Acceptance, E,D, NR by AG at 10/3/2019  4:10 PM    Acceptance, E,D, NR by AG at 10/2/2019  4:26 PM                               User Key     Initials Effective Dates Name Provider Type Discipline     06/16/16 -  Cecelia Hare, RN Registered Nurse Nurse     06/16/16 -  Rut Perrin RN Registered Nurse Nurse     04/03/18 -  Karyn Delgado, PT Physical Therapist PT     05/02/16 -  Bita Greene, MARY ANN Physical Therapy Assistant PT    KADE 05/02/16 -  Doris Calixto, PTA Physical Therapy Assistant PT    RF 03/07/18 -  Kristen Carrera PTA Physical Therapy Assistant PT                  PT Recommendation and Plan     Plan of Care Reviewed With: patient  Daily Summary of Progress (PT)  Impairments Continuing to Limit Function: Physical Therapy: strength decreased, impaired balance, impaired cognition, coordination impaired(decr cognition)  IRF Plan of Care Review: progress ongoing, continue  Progress, Functional Goals: demonstrating adequate progress  Outcome Summary: Patient tolerated PT session well with adequate rest breaks.  Continue w/ current POC.               Time Calculation:     PT Charges     Row Name 10/14/19 4404             Time Calculation    Start Time  1410  -LL      Stop Time  1550  -LL      Time Calculation (min)  100 min  -LL      PT Received On   10/14/19  -LL         Time Calculation- PT    Total Timed Code Minutes- PT  100 minute(s)  -LL        User Key  (r) = Recorded By, (t) = Taken By, (c) = Cosigned By    Initials Name Provider Type    Bita Chiu PTA Physical Therapy Assistant          Therapy Charges for Today     Code Description Service Date Service Provider Modifiers Qty    25046317804 HC GAIT TRAINING EA 15 MIN 10/14/2019 Bita Greene PTA GP 1    26503744030 HC PT THERAPEUTIC ACT EA 15 MIN 10/14/2019 Bita Greene PTA GP 2    99961818437 HC PT THER PROC EA 15 MIN 10/14/2019 Bita Greene PTA GP 4                   Bita. MARY ANN Greene  10/14/2019

## 2019-10-14 NOTE — PROGRESS NOTES
"     LOS: 13 days     Chief Complaint:  Debility    Subjective     Interval History:     Systolic blood pressure running 160s to 170s yesterday and today.  She has been somewhat anxious.  She continues to get pretty anxious.  Blood sugar this morning is 195.  Ranged yesterday from 70 up to 308.    Objective     Vital Signs  /60   Pulse 63   Temp 97.8 °F (36.6 °C) (Oral)   Resp 20   Ht 152.4 cm (60\")   Wt 67 kg (147 lb 11.3 oz)   SpO2 95%   BMI 28.85 kg/m²    Intake & Output (last day)       10/13 0701 - 10/14 0700 10/14 0701 - 10/15 0700    P.O. 1140 600    Total Intake(mL/kg) 1140 (17) 600 (9)    Net +1140 +600          Urine Unmeasured Occurrence 5 x 6 x            Physical Exam:     General Appearance:   Anxious, cooperative, in no acute distress. Thin, frail and interactive.   Head:    Normocephalic, without obvious abnormality, atraumatic   Eyes:            Lids and lashes normal, conjunctivae and sclerae normal, no   icterus, no pallor, corneas clear, PERRLA   Ears:    Ears appear intact with no abnormalities noted       Neck:   No adenopathy, supple, trachea midline, no thyromegaly, no   carotid bruit, no JVD   Lungs:     Clear to auscultation,respirations regular, even and                  unlabored    Heart:    Irreg irreg rhythm and normal rate, normal S1 and S2, no            murmur, no gallop, no rub, no click   Chest Wall:    No abnormalities observed   Abdomen:     Normal bowel sounds, no masses, no organomegaly, soft        non-tender, non-distended, no guarding, no rebound                tenderness   Extremities:   Moves all extremities well, no edema, no cyanosis, no             Redness                        Results Review:    Lab Results   Component Value Date    WBC 8.29 10/11/2019    HGB 11.0 (L) 10/11/2019    HCT 35.6 10/11/2019    MCV 90.8 10/11/2019     10/11/2019       Lab Results   Component Value Date    GLUCOSE 296 (H) 10/11/2019    BUN 50 (H) 10/11/2019    CREATININE " 1.41 (H) 10/11/2019    EGFRIFNONA 36 (L) 10/11/2019    BCR 35.5 (H) 10/11/2019     10/11/2019    K 5.3 (H) 10/11/2019     10/11/2019    CO2 22.9 10/11/2019    CALCIUM 8.8 10/11/2019    PROTENTOTREF 5.2 (L) 02/16/2019    ALBUMIN 2.84 (L) 10/02/2019    LABIL2 1.1 02/16/2019    AST 13 10/02/2019    ALT 7 10/02/2019     Lab Results   Component Value Date    INR 0.95 09/22/2019    INR 0.98 02/20/2019    INR 0.95 02/19/2019       Glucose   Date Value Ref Range Status   10/14/2019 195 (H) 70 - 130 mg/dL Final   10/13/2019 308 (H) 70 - 130 mg/dL Final   10/13/2019 70 70 - 130 mg/dL Final   10/13/2019 211 (H) 70 - 130 mg/dL Final   10/13/2019 297 (H) 70 - 130 mg/dL Final          Medication Review:     Current Facility-Administered Medications:   •  amLODIPine (NORVASC) tablet 10 mg, 10 mg, Oral, Q24H, Kreis, Samuel Duane, MD, 10 mg at 10/14/19 0855  •  apixaban (ELIQUIS) tablet 5 mg, 5 mg, Oral, Q12H, Kreis, Samuel Duane, MD, 5 mg at 10/14/19 0855  •  aspirin EC tablet 81 mg, 81 mg, Oral, Daily, Kreis, Samuel Duane, MD, 81 mg at 10/14/19 0855  •  bisacodyl (DULCOLAX) suppository 10 mg, 10 mg, Rectal, Daily PRN, Kreis, Samuel Duane, MD  •  dextrose (D50W) 25 g/ 50mL Intravenous Solution 25 g, 25 g, Intravenous, Q15 Min PRN, Kreis, Samuel Duane, MD  •  dextrose (GLUTOSE) oral gel 15 g, 15 g, Oral, Q15 Min PRN, Kreis, Samuel Duane, MD  •  diphenhydrAMINE (BENADRYL) capsule 25 mg, 25 mg, Oral, Q6H PRN, Kreis, Samuel Duane, MD, 25 mg at 10/13/19 2039  •  divalproex (DEPAKOTE) 24 hr tablet 250 mg, 250 mg, Oral, Nightly, Kreis, Samuel Duane, MD, 250 mg at 10/13/19 2040  •  glipizide (GLUCOTROL) tablet 5 mg, 5 mg, Oral, QAM AC, Kreis, Samuel Duane, MD, 5 mg at 10/14/19 0854  •  glucagon (human recombinant) (GLUCAGEN DIAGNOSTIC) injection 1 mg, 1 mg, Subcutaneous, Q15 Min PRN, Kreis, Samuel Duane, MD  •  hydrALAZINE (APRESOLINE) tablet 25 mg, 25 mg, Oral, Q12H, Kreis, Samuel Duane, MD, 25 mg at 10/14/19 0855  •  insulin  aspart (novoLOG) injection 0-7 Units, 0-7 Units, Subcutaneous, TID With Meals, Kreis, Samuel Duane, MD, 2 Units at 10/14/19 1257  •  linagliptin (TRADJENTA) tablet 5 mg, 5 mg, Oral, Daily, Kreis, Samuel Duane, MD, 5 mg at 10/14/19 0854  •  magnesium hydroxide (MILK OF MAGNESIA) suspension 2400 mg/10mL 10 mL, 10 mL, Oral, Daily PRN, Kreis, Samuel Duane, MD, 10 mL at 10/13/19 0534  •  metoprolol tartrate (LOPRESSOR) tablet 100 mg, 100 mg, Oral, Q12H, Kreis, Samuel Duane, MD, 100 mg at 10/14/19 0855  •  montelukast (SINGULAIR) tablet 10 mg, 10 mg, Oral, Nightly, Kreis, Samuel Duane, MD, 10 mg at 10/13/19 2040  •  ondansetron (ZOFRAN) tablet 4 mg, 4 mg, Oral, Q6H PRN **OR** ondansetron (ZOFRAN) injection 4 mg, 4 mg, Intravenous, Q6H PRN, Kreis, Samuel Duane, MD  •  pantoprazole (PROTONIX) EC tablet 40 mg, 40 mg, Oral, Q AM, Kreis, Samuel Duane, MD, 40 mg at 10/14/19 0500  •  thiamine (VITAMIN B-1) tablet 100 mg, 100 mg, Oral, Daily, Kreis, Samuel Duane, MD, 100 mg at 10/14/19 0855      Assessment/Plan     Debility secondary to what seems most consistent with hypoxic encephalopathy  Type 2 diabetes mellitus with recent history of DKA with hyperglycemia  Known coronary artery disease with history of non-ST elevation acute myocardial infarction  Chronic hypoxic respiratory failure  Urinary tract infection status post treatment  Chronic atrial fibrillation  Hypertension  Stage III chronic kidney disease         PT and OT     Eliquis twice daily due to full dose anticoagulation due to A. fib     Glipizide increased to 5 mg twice daily due to hyperglycemia.  Continue Tradjenta 5 mg daily. SS insulin tid with meals     Continue amlodipine 10 mg daily and continue metoprolol 100 mg twice daily.  Increase hydralazine to 50 mg twice daily     Protonix once daily    Continue Depakote but increase to 500 mg nightly    I discussed with the patient's daughter-in-law.  This patient is going to require 24-hour supervision at  discharge    CBC and BMP tomorrow morning    Samuel Duane Kreis, MD  10/14/19  3:07 PM

## 2019-10-14 NOTE — PLAN OF CARE
Problem: Patient Care Overview  Goal: Plan of Care Review  Outcome: Ongoing (interventions implemented as appropriate)   10/14/19 1223   Patient Care Overview   IRF Plan of Care Review progress ongoing, continue   Progress, Functional Goals demonstrating adequate progress   Coping/Psychosocial   Plan of Care Reviewed With patient

## 2019-10-14 NOTE — THERAPY TREATMENT NOTE
Inpatient Rehabilitation - Occupational Therapy Treatment Note     [place]     Patient Name: Ashley Geronimo  : 1945  MRN: [id number]    Today's Date: 10/14/2019                 Admit Date: 10/1/2019      Visit Dx:  No diagnosis found.    Patient Active Problem List   Diagnosis   • Pneumonia   • Septic shock (CMS/[facility])   • Pseudomonas pneumonia (CMS/[facility])   • NSTEMI (non-ST elevated myocardial infarction) (CMS/[facility])   • Bradycardia   • Paroxysmal atrial fibrillation (CMS/H[facility])   • Chronic respiratory failure with hypoxia and hypercapnia (CMS/[facility])   • Acute on chronic respiratory failure with hypoxia (CMS/[facility]), requiring intubation/mechanical ventilation 19   • Hematemesis   • RAMESH (acute kidney injury) (CMS/[facility])   • DKA (diabetic ketoacidoses) (CMS/[facility])   • Septic shock (CMS/[facility])   • Encephalopathy   • UTI (urinary tract infection)   • Tracheobronchitis, PSA    • Metabolic encephalopathy         Therapy Treatment    IRF Treatment Summary     Row Name 10/14/19 5586             Evaluation/Treatment Time and Intent    Subjective Information  no complaints  -JW      Existing Precautions/Restrictions  fall mild to mod. confusion  -JW      Document Type  therapy note (daily note)  -JW      Mode of Treatment  occupational therapy;individual therapy  -JW      Patient/Family Observations  Pt was an active partic in OT tx  -JW      Recorded by [JONATHAN] Conrado James OTR      Row Name 10/14/19 5090             Cognition/Psychosocial- PT/OT    Affect/Mental Status (Cognitive)  confused  -JW      Orientation Status (Cognition)  person;situation  -JW      Follows Commands (Cognition)  follows two step commands;initiation impaired;repetition of directions required  -      Personal Safety Interventions  fall prevention program maintained;gait belt  -      Cognitive Function (Cognitive)  attention deficit;safety deficit  -      Attention Deficit (Cognitive)  mild deficit;moderate deficit;alternating attention;requires  cues/redirection to task  -JW      Safety Deficit (Cognitive)  judgment;problem solving  -JW      Recorded by [JW] Conrado James, OTR      Row Name 10/14/19 1205             Bed-Chair Transfer    Bed-Chair [place] (Transfers)  minimum assist (75% patient effort);verbal cues;nonverbal cues (demo/gesture);1 person assist  -JW      Recorded by [JW] Conrado James, OTR      Row Name 10/14/19 1205             Safety Issues, Functional Mobility    Safety Issues Affecting Function (Mobility)  awareness of need for assistance;judgment;safety precaution awareness  -JW      Recorded by [JW] Conrado James, OTR      Row Name 10/14/19 1205             Grooming Assessment/Treatment    Grooming [place] Level  set up;supervision;verbal cues  -JW      Recorded by [JW] Conrado James, OTR      Row Name 10/14/19 1205             BADL Safety/Performance    Impairments, BADL Safety/Performance  cognition;strength;endurance/activity tolerance  -JW      Recorded by [JW] Conrado James, OTR      Row Name 10/14/19 1205             Upper Extremity Seated Therapeutic Exercise    Exercise Type, Seated Upper Extremity (Therapeutic Exercise)  AROM (active range of motion);wand exercises;eccentric contraction  -JW      Expected Outcomes, Seated Upper Extremity (Therapeutic Exercise)  improve functional tolerance, self-care activity;improve manipulation of objects, self care activity;improve performance, fine motor coordination skills;improve performance, reaching for objects  -JW      Recorded by [JW] Conrado James, OTR      Row Name 10/14/19 1205             Positioning and Restraints    Post Treatment Position  wheelchair  -JW      In Wheelchair  with nsg  -JW      Recorded by [JW] Conrado James, OTR      Row Name 10/14/19 1205             Daily Summary of Progress (PT)    Functional Goal Overall Progress: Physical Therapy  progressing toward functional goals as expected  -JW      Impairments Continuing to Limit Function: Physical Therapy  impaired  balance;impaired cognition;strength decreased;coordination impaired;postural control impaired  -JW      Recorded by [JW] Conrado James OTR        User Key  (r) = Recorded By, (t) = Taken By, (c) = Cosigned By    Initials Name Effective Dates    JW Conrado James OTR 03/07/18 -           Wound 09/29/19 Right lateral clavicle Puncture (Active)   Dressing Appearance open to air 10/14/2019  8:00 AM       Wound 10/01/19 1702 Bilateral other (see comments) coccyx MASD ([place] associated skin damage) (Active)   Dressing Appearance open to air 10/14/2019  8:00 AM   Closure None 10/13/2019  7:10 PM   Base red 10/13/2019  7:10 PM   Periwound excoriated;redness 10/13/2019  7:10 PM   Drainage Amount none 10/13/2019  7:10 PM   Care, Wound barrier applied 10/13/2019  7:10 PM   Dressing Care, Wound open to air 10/14/2019  8:00 AM         OT Recommendation and Plan         Plan of Care Review  Plan of Care Reviewed With: patient  Plan of Care Reviewed With: patient  IRF Plan of Care Review: progress ongoing, continue  Progress, Functional Goals: demonstrating adequate progress    OT IRF GOALS     Row Name 10/09/19 1231 10/09/19 1230 10/02/19 1521       Bathing Goal 1 (OT-IRF)    [place] Level (Bathing Goal 1, OT-IRF)  --  --  minimum assist (75% or more patient effort)  -CJ    Time Frame (Bathing Goal 1, OT-IRF)  --  --  long term goal (LTG);by discharge  -CJ       LB Dressing Goal 1 (OT-IRF)    [place] (LB Dressing Goal 1, OT-IRF)  contact guard assist  -CJ  --  moderate assist (50-74% patient effort)  -CJ    Time Frame (LB Dressing Goal 1, OT-IRF)  short term goal (STG)  -CJ  --  short term goal (STG)  -CJ    Progress/Outcomes (LB Dressing Goal 1, OT-IRF)  --  goal met  -CJ  --       LB Dressing Goal 2 (OT-IRF)    [place] (LB Dressing Goal 2, OT-IRF)  --  --  minimum assist (75% or more patient effort)  -CJ    Time Frame (LB Dressing Goal 2, OT-IRF)  --  --  long term goal (LTG);by discharge  -       Toileting  Goal 1 (OT-IRF)    [place] Level (Toileting Goal 1, OT-IRF)  contact guard assist  -CJ  --  maximum assist (25-49% patient effort)  -    Time Frame (Toileting Goal 1, OT-IRF)  short term goal (STG)  -CJ  --  short term goal (STG)  -    Progress/Outcomes (Toileting Goal 1, OT-IRF)  --  goal met  -CJ  --       Toileting Goal 2 (OT-IRF)    [place] Level (Toileting Goal 2, OT-IRF)  --  --  moderate assist (50-74% patient effort)  -    Time Frame (Toileting Goal 2, OT-IRF)  --  --  long term goal (LTG);by discharge  -      User Key  (r) = Recorded By, (t) = Taken By, (c) = Cosigned By    Initials Name Provider Type    Laya Yusuf OT Occupational Therapist          Occupational Therapy Education     Title: PT OT SLP Therapies (In Progress)     Topic: Occupational Therapy (In Progress)     Point: ADL training (In Progress)     Description: Instruct learner(s) on proper safety adaptation and remediation techniques during self care or transfers.   Instruct in proper use of assistive devices.    Learning Progress Summary           Patient Eager, E, NR by JONATHAN at 10/14/2019 12:22 PM    Acceptance, E,TB, VU by MONICA at 10/12/2019  9:42 PM    Acceptance, E,TB, VU by MONICA at 10/12/2019 12:08 AM    Acceptance, E,D, NR by SHARON at 10/11/2019 12:27 PM    Acceptance, E, VU,NR by CRISTY at 10/11/2019  8:42 AM    Acceptance, E,TB, VU by MONICA at 10/11/2019 12:42 AM    Acceptance, E,D, NR by SHARON at 10/10/2019  2:35 PM    Acceptance, E,D, NR by AB at 10/9/2019  3:29 PM    Acceptance, E,D, NR by SHARON at 10/9/2019 12:30 PM    Acceptance, E,D, NR by JONATHAN at 10/8/2019 10:10 AM    Acceptance, E,D, NR by SHARON at 10/7/2019 12:22 PM    Acceptance, E,D, NR by SHARON at 10/4/2019  3:24 PM    Acceptance, E,D, NR by SHARON at 10/3/2019  3:08 PM    Acceptance, E,D, NR by CJ at 10/2/2019  3:17 PM                   Point: Home exercise program (In Progress)     Description: Instruct learner(s) on appropriate technique for monitoring, assisting and/or  progressing therapeutic exercises/activities.    Learning Progress Summary           Patient Eager, E, NR by  at 10/14/2019 12:22 PM    Acceptance, E,TB, VU by  at 10/12/2019  9:42 PM    Acceptance, E,TB, VU by  at 10/12/2019 12:08 AM    Acceptance, E, VU,NR by  at 10/11/2019  8:42 AM    Acceptance, E,TB, VU by  at 10/11/2019 12:42 AM                               User Key     Initials Effective Dates Name Provider Type Discipline    AB 04/03/18 -  Jennifer Smith, OT Occupational Therapist OT     06/16/16 -  Cecelia Hare, RN Registered Nurse Nurse     06/16/16 -  Rut Perrin, RN Registered Nurse Nurse     04/03/18 -  Laya Harrison, OT Occupational Therapist OT     03/07/18 -  Conrado James OTR Occupational Therapist OT                       Time Calculation:     Time Calculation- OT     Row Name 10/14/19 1223             Time Calculation-     OT Start Time  1000  -      OT Stop Time  1130  -      OT Time Calculation (min)  90 min  -      Total Timed Code Minutes- OT  90 minute(s)  -      OT Non-Billable Time (min)  20 min  -        User Key  (r) = Recorded By, (t) = Taken By, (c) = Cosigned By    Initials Name Provider Type     Conrado James OTR Occupational Therapist          Therapy Charges for Today     Code Description Service Date Service Provider Modifiers Qty    93985301376 HC OT SELF CARE/MGMT/TRAIN EA 15 MIN 10/14/2019 Conrado James OTR GO 2    10209849886 HC OT THERAPEUTIC ACT EA 15 MIN 10/14/2019 Conrado James OTR GO 4                   AXEL Rowan  10/14/2019

## 2019-10-14 NOTE — PROGRESS NOTES
Inpatient Rehabilitation Functional Measures Assessment    Functional Measures  PUJA Eating:  PUJA Grooming:  PUJA Bathing:  PUJA Upper Body Dressing:  PUJA Lower Body Dressing:  PUJA Toileting:    PUJA Bladder Management  Level of Assistance:  Frequency/Number of Accidents this Shift:    PUJA Bowel Management  Level of Assistance:  Frequency/Number of Accidents this Shift:    PUJA Bed/Chair/Wheelchair Transfer:  Bed/chair/wheelchair Transfer Score = 4.  Patient performs 75% or more of effort and minimal assistance (little/incidental  help/lifting of one limb/steadying) for transferring to and from the  bed/chair/wheelchair, requiring: Contact guard. Patient requires the following  assistive device(s): Arm rest. Bed rails.  PUJA Toilet Transfer:  Toilet Transfer Score = 5.  Patient is supervision/set-up  for transferring to and from the toilet/commode, requiring: Stand by assistance.  Patient requires the following assistive device(s): Safety frame/over the  toilet. Grab bars.  PUJA Tub/Shower Transfer:    Previously Documented Mode of Locomotion at Discharge:  [facility] Expected Mode of Locomotion at Discharge:  PUJA Walk/Wheelchair:  WHEELCHAIR OBSERVATION   Wheelchair did not occur.    WALK OBSERVATION   Walk Distance Scale = 3.  Distance walked is greater than 150 feet. Walk Score  = 4.  Patient performs 75% or more of effort and requires minimal assistance.  Incidental help/contact guard/steadying was provided. Patient walked a distance  of  300 feet. Patient requires the following assistive device(s): Rolling  walker.  PUJA Stairs:  Stairs Score = 2.  Incidental assistance with lifting or lowering,  contact guard or steadying was provided. Patient performs 75% or more of effort  and requires minimal contact assistance. Patient negotiated  10 stairs. Patient  requires the following assistive device(s): Handrail(s).    PUJA Comprehension:  PUJA Expression:  PUJA Social Interaction:  PUJA Problem Solving:  PUJA Memory:    Therapy Mode  Minutes  Occupational Therapy:  Physical Therapy: Individual: 100 minutes.  Speech Language Pathology:    Discharge Functional Goals:    Signed by: Bita Greene PTA

## 2019-10-14 NOTE — PROGRESS NOTES
Inpatient Rehabilitation Functional Measures Assessment and Plan of Care    Plan of Care  Self Care    [OT] Dressing (Lower)(Active)  Current Status(10/14/2019): July  Weekly Goal(10/22/2019): CGA  Discharge Goal: CGA    [OT] Toileting(Active)  Current Status(10/14/2019): July/CGA  Weekly Goal(10/22/2019): CGA  Discharge Goal: CGA    Performed Intervention(s)  ADL re-trng, AE trng, ther ex/act, pt/family education    Functional Measures  PUJA Eating:  PUJA Grooming: Grooming Score = 5. Patient is supervision/set-up for grooming,  requiring: Verbal cuing, prompting, or instructing. Patient requires the  following assistive device(s): Adapted comb/brush.  PUJA Bathing:  PUJA Upper Body Dressing:  PUJA Lower Body Dressing:  PUJA Toileting:    PUJA Bladder Management  Level of Assistance:  Frequency/Number of Accidents this Shift:    PUJA Bowel Management  Level of Assistance:  Frequency/Number of Accidents this Shift:    PUJA Bed/Chair/Wheelchair Transfer:  Bed/chair/wheelchair Transfer Score = 4.  Patient performs 75% or more of effort and minimal assistance (little/incidental  help/lifting of one limb/steadying) for transferring to and from the  bed/chair/wheelchair, requiring: No assistive devices were required.  PUJA Toilet Transfer:  PUJA Tub/Shower Transfer:    Previously Documented Mode of Locomotion at Discharge:  [facility] Expected Mode of Locomotion at Discharge:  PUJA Walk/Wheelchair:  PUJA Stairs:    PUJA Comprehension:  PUJA Expression:  PUJA Social Interaction:  PUJA Problem Solving:  PUJA Memory:    Therapy Mode Minutes  Occupational Therapy: Individual: 90 minutes.  Physical Therapy:  Speech Language Pathology:    Discharge Functional Goals:    Signed by: Conrado James, Therapy Coordinator

## 2019-10-14 NOTE — SIGNIFICANT NOTE
10/14/19 1837   Plan   Plan PT informed SS pt will need rolling walker for ambulation.  Pt has this item at home.

## 2019-10-14 NOTE — PROGRESS NOTES
Inpatient Rehabilitation Functional Measures Assessment    Functional Measures  PUJA Eating:  PUJA Grooming:  PUJA Bathing:  PUJA Upper Body Dressing:  PUJA Lower Body Dressing:  PUJA Toileting:  Patient requires moderate assistance for adjusting clothing  before using a toilet, commode, bedpan, or urinal. Patient requires moderate  assistance for hygiene. Patient requires moderate assistance for adjusting  clothing after using a toilet, commode, bedpan, or urinal. Patient performs 0 -  24% of toileting tasks.  Toileting Score = 1, Total Assistance. Patient requires  the following assistive device(s): Grab bar. Arm rest of a specialized seat.    PUJA Bladder Management  Level of Assistance:  Bladder Score = 5.  Patient is supervision/set-up for  bladder management, requiring: Emptying equipment. Setting out equipment. Verbal  cuing, prompting, or instructing. Stand by assistance. Patient requires the  following assistive device(s):  Adult brief. Bedside Commode. pt is inc most of  times. she prefers to wear briefs  Frequency/Number of Accidents this Shift:  Bladder accidents this shift:  1 .    PUJA Bowel Management  Level of Assistance: Bowel Score = 7.  Patient is completely independent for  bowel management.  Patient did not have bowel movement.  No  medication/intervention was provided.  Frequency/Number of Accidents this Shift: Bowel accidents this shift: 0 .  Patient has not had an accident this shift.    PUJA Bed/Chair/Wheelchair Transfer:  Bed/chair/wheelchair Transfer Score = 1.  Patient performs 50-74% of effort and requires moderate assistance (some  lifting) of two or more people for transferring to and from the  bed/chair/wheelchair. safety . Patient requires the following assistive  device(s): Seating system of wheelchair.  PUJA Toilet Transfer:  Toilet Transfer Score = 1.  Patient performs 50-74% of  effort and requires moderate assistance (some lifting) of two or more people for  transferring to and from the  toilet/commode. safety . Patient requires the  following assistive device(s): Grab bars. Bedside Commode. Safety frame/over the  toilet.  PUJA Tub/Shower Transfer:    Previously Documented Mode of Locomotion at Discharge:  [facility] Expected Mode of Locomotion at Discharge:  [facility] Walk/Wheelchair:  [facility] Stairs:    PUJA Comprehension:  PUJA Expression:  PUJA Social Interaction:  [facility] Problem Solving:  [facility] Memory:    Therapy Mode Minutes  Occupational Therapy:  Physical Therapy:  Speech Language Pathology:    Discharge Functional Goals:    Signed by: JAI Guerrero

## 2019-10-14 NOTE — PROGRESS NOTES
Rehabilitation Nursing  Inpatient Rehabilitation Functional Measures Assessment and Plan of Care    Plan of Care/Interventions  Copy from POC    Functional Measures  PUJA Eating:  PUJA Grooming:  PUJA Bathing:  PUJA Upper Body Dressing:  PUJA Lower Body Dressing:  PUJA Toileting:    PUJA Bladder Management  Level of Assistance:  Frequency/Number of Accidents this Shift:    PUJA Bowel Management  Level of Assistance:  Frequency/Number of Accidents this Shift:    PUJA Bed/Chair/Wheelchair Transfer:  PUJA Toilet Transfer:  PUJA Tub/Shower Transfer:    Previously Documented Mode of Locomotion at Discharge:  [facility] Expected Mode of Locomotion at Discharge:  [facility] Walk/Wheelchair:  PUJA Stairs:    PUJA Comprehension:  Auditory comprehension is the usual mode. Comprehension  Score = 6, Modified [place].  Patient comprehends complex/abstract  information in their primary language, requiring:  PUJA Expression:  Vocal expression is the usual mode. Expression Score = 6,  Modified Independent.  Patient expresses complex/abstract information in their  primary language, requiring:  PUJA Social Interaction:  Social Interaction Score = 6, Modified Independent.  Patient is modified independent for social interaction, requiring:  PUJA Problem Solving:  Problem Solving Score = 6, Modified [place].  Patient  makes appropriate decisions in order to solve complex problems, but requires  extra time.  PUJA Memory:  Memory Score = 6, Modified [place].  Patient is modified  independent for memory, requiring:    Signed by: Agnes Spencer Nurse

## 2019-10-14 NOTE — PLAN OF CARE
Problem: Patient Care Overview  Goal: Plan of Care Review  Outcome: Ongoing (interventions implemented as appropriate)      Problem: Fall Risk (Adult)  Goal: Absence of Fall  Outcome: Ongoing (interventions implemented as appropriate)      Problem: Wound (Includes Pressure Injury) (Adult)  Goal: Signs and Symptoms of Listed Potential Problems Will be Absent, Minimized or Managed (Wound)  Outcome: Ongoing (interventions implemented as appropriate)      Problem: Functional Mobility Impairment (IRF) (Adult)  Goal: Optimal/Safe Level of Butts with Mobility  Outcome: Ongoing (interventions implemented as appropriate)      Problem: Diabetes, Type 2 (Adult)  Goal: Signs and Symptoms of Listed Potential Problems Will be Absent, Minimized or Managed (Diabetes, Type 2)  Outcome: Ongoing (interventions implemented as appropriate)      Problem: Safety Awareness Impairment (IRF) (Adult)  Goal: Optimal Level of Safety Awareness, All Environments  Outcome: Ongoing (interventions implemented as appropriate)

## 2019-10-14 NOTE — PROGRESS NOTES
Inpatient Rehabilitation Plan of Care Note    Plan of Care  Mobility    [PT] Bed/Chair/Wheelchair(Active)  Current Status(10/14/2019): CGA/SBA  Weekly Goal(10/21/2019): SBA with AAD  Discharge Goal: mod indep w/ AAD    [PT] Walk(Active)  Current Status(10/14/2019): 300' RW CGA/SBA  Weekly Goal(10/21/2019): 300' SBA AAD  Discharge Goal: 300' Mod independent AAD    [PT] Stairs(Active)  Current Status(10/14/2019): 10 steps  2 HR min A  Weekly Goal(10/21/2019): 10 stairs, SBA, 2 handrails  Discharge Goal: 15 stairs, 2 handrails, SBA    Signed by: Anjelica Bertrand, Physical Therapist

## 2019-10-15 LAB
ANION GAP SERPL CALCULATED.3IONS-SCNC: 15.9 MMOL/L (ref 5–15)
BUN BLD-MCNC: 49 MG/DL (ref 8–23)
BUN/CREAT SERPL: 34.3 (ref 7–25)
CALCIUM SPEC-SCNC: 9.1 MG/DL (ref 8.6–10.5)
CHLORIDE SERPL-SCNC: 97 MMOL/L (ref 98–107)
CO2 SERPL-SCNC: 24.1 MMOL/L (ref 22–29)
CREAT BLD-MCNC: 1.43 MG/DL (ref 0.57–1)
DEPRECATED RDW RBC AUTO: 49.3 FL (ref 37–54)
ERYTHROCYTE [DISTWIDTH] IN BLOOD BY AUTOMATED COUNT: 15.2 % (ref 12.3–15.4)
GFR SERPL CREATININE-BSD FRML MDRD: 36 ML/MIN/1.73
GLUCOSE BLD-MCNC: 282 MG/DL (ref 65–99)
GLUCOSE BLDC GLUCOMTR-MCNC: 173 MG/DL (ref 70–130)
GLUCOSE BLDC GLUCOMTR-MCNC: 250 MG/DL (ref 70–130)
GLUCOSE BLDC GLUCOMTR-MCNC: 298 MG/DL (ref 70–130)
GLUCOSE BLDC GLUCOMTR-MCNC: 300 MG/DL (ref 70–130)
HCT VFR BLD AUTO: 36.5 % (ref 34–46.6)
HGB BLD-MCNC: 11.4 G/DL (ref 12–15.9)
MCH RBC QN AUTO: 27.9 PG (ref 26.6–33)
MCHC RBC AUTO-ENTMCNC: 31.2 G/DL (ref 31.5–35.7)
MCV RBC AUTO: 89.5 FL (ref 79–97)
PLATELET # BLD AUTO: 303 10*3/MM3 (ref 140–450)
PMV BLD AUTO: 12.2 FL (ref 6–12)
POTASSIUM BLD-SCNC: 4.7 MMOL/L (ref 3.5–5.2)
RBC # BLD AUTO: 4.08 10*6/MM3 (ref 3.77–5.28)
SODIUM BLD-SCNC: 137 MMOL/L (ref 136–145)
WBC NRBC COR # BLD: 6.92 10*3/MM3 (ref 3.4–10.8)

## 2019-10-15 PROCEDURE — 97110 THERAPEUTIC EXERCISES: CPT

## 2019-10-15 PROCEDURE — 85027 COMPLETE CBC AUTOMATED: CPT | Performed by: FAMILY MEDICINE

## 2019-10-15 PROCEDURE — 97530 THERAPEUTIC ACTIVITIES: CPT

## 2019-10-15 PROCEDURE — 97535 SELF CARE MNGMENT TRAINING: CPT

## 2019-10-15 PROCEDURE — 63710000001 INSULIN ASPART PER 5 UNITS: Performed by: FAMILY MEDICINE

## 2019-10-15 PROCEDURE — 82962 GLUCOSE BLOOD TEST: CPT

## 2019-10-15 PROCEDURE — 80048 BASIC METABOLIC PNL TOTAL CA: CPT | Performed by: FAMILY MEDICINE

## 2019-10-15 PROCEDURE — 63710000001 INSULIN DETEMIR PER 5 UNITS: Performed by: FAMILY MEDICINE

## 2019-10-15 PROCEDURE — 97116 GAIT TRAINING THERAPY: CPT

## 2019-10-15 RX ORDER — HYDRALAZINE HYDROCHLORIDE 50 MG/1
50 TABLET, FILM COATED ORAL EVERY 8 HOURS SCHEDULED
Status: DISCONTINUED | OUTPATIENT
Start: 2019-10-15 | End: 2019-10-16 | Stop reason: HOSPADM

## 2019-10-15 RX ADMIN — DIVALPROEX SODIUM 500 MG: 500 TABLET, FILM COATED, EXTENDED RELEASE ORAL at 21:10

## 2019-10-15 RX ADMIN — HYDRALAZINE HYDROCHLORIDE 50 MG: 50 TABLET ORAL at 14:03

## 2019-10-15 RX ADMIN — INSULIN ASPART 5 UNITS: 100 INJECTION, SOLUTION INTRAVENOUS; SUBCUTANEOUS at 08:34

## 2019-10-15 RX ADMIN — AMLODIPINE BESYLATE 10 MG: 10 TABLET ORAL at 08:33

## 2019-10-15 RX ADMIN — MONTELUKAST SODIUM 10 MG: 10 TABLET, COATED ORAL at 21:10

## 2019-10-15 RX ADMIN — INSULIN ASPART 4 UNITS: 100 INJECTION, SOLUTION INTRAVENOUS; SUBCUTANEOUS at 17:22

## 2019-10-15 RX ADMIN — METOPROLOL TARTRATE 100 MG: 100 TABLET, FILM COATED ORAL at 21:10

## 2019-10-15 RX ADMIN — ASPIRIN 81 MG: 81 TABLET, COATED ORAL at 08:34

## 2019-10-15 RX ADMIN — METOPROLOL TARTRATE 100 MG: 100 TABLET, FILM COATED ORAL at 08:34

## 2019-10-15 RX ADMIN — PANTOPRAZOLE SODIUM 40 MG: 40 TABLET, DELAYED RELEASE ORAL at 05:27

## 2019-10-15 RX ADMIN — HYDRALAZINE HYDROCHLORIDE 50 MG: 50 TABLET ORAL at 21:11

## 2019-10-15 RX ADMIN — GLIPIZIDE 5 MG: 5 TABLET ORAL at 08:33

## 2019-10-15 RX ADMIN — APIXABAN 5 MG: 5 TABLET, FILM COATED ORAL at 08:33

## 2019-10-15 RX ADMIN — LINAGLIPTIN 5 MG: 5 TABLET, FILM COATED ORAL at 08:34

## 2019-10-15 RX ADMIN — Medication 100 MG: at 08:34

## 2019-10-15 RX ADMIN — INSULIN DETEMIR 8 UNITS: 100 INJECTION, SOLUTION SUBCUTANEOUS at 11:44

## 2019-10-15 RX ADMIN — HYDRALAZINE HYDROCHLORIDE 50 MG: 50 TABLET ORAL at 08:33

## 2019-10-15 RX ADMIN — APIXABAN 5 MG: 5 TABLET, FILM COATED ORAL at 21:10

## 2019-10-15 NOTE — SIGNIFICANT NOTE
10/14/19 1400   Attendance   Activity Hired shop   Participation Active participation   Therapeutic Recreation   Community Reintegration Demonstrates ability to complete ambulation goals   Leisure Attitude/Participation Participates in 1:1 structured activity   Time Spent With Patient   Minutes spent with patient 60

## 2019-10-15 NOTE — PLAN OF CARE
Problem: Patient Care Overview  Goal: Plan of Care Review  Outcome: Ongoing (interventions implemented as appropriate)   10/15/19 8733   Patient Care Overview   IRF Plan of Care Review progress ongoing, continue   Progress, Functional Goals demonstrating adequate progress   Coping/Psychosocial   Plan of Care Reviewed With patient   OTHER   Outcome Summary Patient tolerated PT session well with adequate rest breaks. Continue w/ current POC.

## 2019-10-15 NOTE — THERAPY TREATMENT NOTE
Inpatient Rehabilitation - Physical Therapy Treatment Note  K[facility]     Patient Name: Ashley Geronimo  : 1945  MRN: [id number]    Today's Date: 10/15/2019                 Admit Date: 10/1/2019      Visit Dx:    No diagnosis found.    Patient Active Problem List   Diagnosis   • Pneumonia   • Septic shock (CMS/[facility])   • Pseudomonas pneumonia (CMS/[facility])   • NSTEMI (non-ST elevated myocardial infarction) (CMS/[facility])   • Bradycardia   • Paroxysmal atrial fibrillation (CMS/[facility])   • Chronic respiratory failure with hypoxia and hypercapnia (CMS/[facility])   • Acute on chronic respiratory failure with hypoxia (CMS/[facility]), requiring intubation/mechanical ventilation 19   • Hematemesis   • RAMESH (acute kidney injury) (CMS/[facility])   • DKA (diabetic ketoacidoses) (CMS/[facility])   • Septic shock (CMS/[facility])   • Encephalopathy   • UTI (urinary tract infection)   • Tracheobronchitis, PSA    • Metabolic encephalopathy       Therapy Treatment    IRF Treatment Summary     Row Name 10/15/19 1600 10/15/19 0916          Evaluation/Treatment Time and Intent    Subjective Information  no complaints  -LL  no complaints  -JW     Existing Precautions/Restrictions  fall confusion  -LL  fall  -JW     Document Type  therapy note (daily note)  -LL  therapy note (daily note)  -JW     Mode of Treatment  physical therapy;individual therapy  -LL  occupational therapy;individual therapy  -JW     Patient/Family Observations  Patient agreeable to PT this date.  -LL  Pt. agreeable to OT treatment  -JW     Recorded by [LL] Bita Greene PTA [JW] Conrado James OTR     Row Name 10/15/19 0916             Safety Awareness/Health Promotion    Safety Awareness  problem solves related to home safety plans and new situations/issues  -JW      Recorded by [JW] Conrado James OTR      Row Name 10/15/19 1600 10/15/19 0916          Cognition/Psychosocial- PT/OT    Affect/Mental Status (Cognitive)  confused  -LL  confused  -JW     Orientation Status (Cognition)  oriented  to;person  -LL  oriented x 3  -JW     Follows Commands (Cognition)  follows two step commands;initiation impaired;repetition of directions required  -LL  follows three step commands  -JW     Personal Safety Interventions  fall prevention program maintained;gait belt;nonskid shoes/slippers when out of bed;supervised activity  -LL  fall prevention program maintained  -JW     Cognitive Function (Cognitive)  attention deficit;memory deficit;safety deficit  -LL  attention deficit  -JW     Attention Deficit (Cognitive)  requires cues/redirection to task  -LL  requires cues/redirection to task  -JW     Safety Deficit (Cognitive)  --  judgment  -JW     Recorded by [LL] Bita Greene PTA [JW] Conrado James, OTR     Row Name 10/15/19 0916             Bed Mobility Assessment/Treatment    Bed Mobility Assessment/Treatment  bed mobility (all) activities;supine-sit  -JW      [place] Level (Bed Mobility)  verbal cues  -JW      Scooting/Bridging [place] (Bed Mobility)  contact guard  -JW      Bed Mobility, Safety Issues  cognitive deficits limit understanding  -JW      Recorded by [JW] Conrado James, OTR      Row Name 10/15/19 0916             Functional Mobility    Functional Mobility- Safety Issues  balance decreased during turns;step length decreased  -JW      Recorded by [JW] Conrado James, OTR      Row Name 10/15/19 1600 10/15/19 0916          Transfer Assessment/Treatment    Transfer Assessment/Treatment  bed-chair transfer;chair-bed transfer;sit-stand transfer;stand-sit transfer;stand pivot/stand step transfer  -LL  bed-chair transfer  -JW     Comment (Transfers)  --  SBA - CGA  -JW     Recorded by [LL] Bita Greene PTA [JW] Conrado James, OTR     Row Name 10/15/19 1600 10/15/19 0916          Bed-Chair Transfer    Bed-Chair [place] (Transfers)  verbal cues;nonverbal cues (demo/gesture);stand by assist;contact guard  -  verbal cues  -JW     Assistive Device (Bed-Chair Transfers)  walker,  front-wheeled;wheelchair  -LL  --     Recorded by [LL] Bita Greene PTA [JW] Conrado James OTR     Row Name 10/15/19 1600             Chair-Bed Transfer    Chair-Bed [place] (Transfers)  verbal cues;nonverbal cues (demo/gesture);stand by assist;contact guard  -LL      Assistive Device (Chair-Bed Transfers)  walker, front-wheeled;wheelchair  -LL      Recorded by [LL] Bita Greene PTA      Row Name 10/15/19 1600             Sit-Stand Transfer    Sit-Stand [place] (Transfers)  verbal cues;nonverbal cues (demo/gesture);stand by assist;contact guard  -LL      Assistive Device (Sit-Stand Transfers)  walker, front-wheeled  -LL      Recorded by [LL] Bita Greene PTA      Row Name 10/15/19 1600             Stand-Sit Transfer    Stand-Sit [place] (Transfers)  verbal cues;nonverbal cues (demo/gesture);stand by assist;contact guard  -LL      Assistive Device (Stand-Sit Transfers)  walker, front-wheeled  -LL      Recorded by [LL] Bita Greene PTA      Row Name 10/15/19 1600             Stand Pivot/Stand Step Transfer    Stand Pivot/Stand Step [place]  verbal cues;nonverbal cues (demo/gesture);stand by assist;contact guard  -LL      Assistive Device (Stand Pivot Stand Step Transfer)  walker, front-wheeled  -LL      Recorded by [LL] Bita Greene PTA      Row Name 10/15/19 1600             Toilet Transfer    Type (Toilet Transfer)  stand pivot/stand step  -LL      [place] Level (Toilet Transfer)  verbal cues;supervision  -LL      Assistive Device (Toilet Transfer)  grab bars/safety frame  -LL      Recorded by [LL] Bita Greene PTA      Row Name 10/15/19 1600             Gait/Stairs Assessment/Training    Gait/Stairs Assessment/Training  gait/ambulation independence;gait/ambulation assistive device;distance ambulated;gait pattern;gait deviations  -LL      [place] Level (Gait)  verbal cues;nonverbal cues (demo/gesture);contact guard;stand by assist  -LL      Assistive Device (Gait)   walker, front-wheeled HHA  -LL      Distance in Feet (Gait)  300  -LL      Pattern (Gait)  step-through  -LL      Deviations/Abnormal Patterns (Gait)  base of support, narrow;eldon decreased;stride length decreased  -LL      Bilateral Gait Deviations  forward flexed posture  -LL      Recorded by [LL] Bita Greene PTA      Row Name 10/15/19 1600             Safety Issues, Functional Mobility    Impairments Affecting Function (Mobility)  balance;endurance/activity tolerance;strength  -LL      Recorded by [LL] Bita Greene PTA      Row Name 10/15/19 0916             Upper Body Dressing Assessment/Treatment    Upper Body Dressing Task  don;set up assistance;verbal cues  -JW      Recorded by [JW] Conrado James, BUDDY      Row Name 10/15/19 0916             Grooming Assessment/Treatment    Grooming [place] Level  grooming skills;set up;hair care, combing/brushing;wash face, hands  -JW      Recorded by [JW] Conrado James, OTBUDDY      Row Name 10/15/19 0916             Self-Feeding Assessment/Treatment    [place] Level (Self-Feeding)  set up;verbal cues  -JW      Recorded by [JW] Conrado James, [facility] Name 10/15/19 0916             BADL Safety/Performance    Impairments, BADL Safety/Performance  cognition;endurance/activity tolerance;strength  -JW      Recorded by [JW] Conrado James, BUDDY      [facility] Name 10/15/19 1600             Pain Scale: FACES Pre/Post-Treatment    Pain: FACES Scale, Pretreatment  0-->no hurt  -LL      Pain: FACES Scale, Post-Treatment  0-->no hurt  -LL      Recorded by [LL] Bita Greene PTA      Row Name 10/15/19 1600             Balance    Balance  static sitting balance;static standing balance;dynamic sitting balance;dynamic standing balance;sitting balance activity;standing balance activity;dynamic balance activity  -LL      Recorded by [LL] Bita Greene PTA      Row Name 10/15/19 1600             Dynamic Balance Activity    Therapeutic Training Performed (Dynamic Balance)  side  stepping  -LL      Support Needed for Balance (Dynamic Balance Training)  uses one upper extremity part time for support  -      Progressive Balance Activity (Dynamic Balance Training)  speed of movements increased during activity;combined head and eye movements during activity  -LL      Recorded by [LL] Bita Greene PTA      Row Name 10/15/19 0916             Therapeutic Exercise    Therapeutic Exercise  seated, upper extremities  -JW      Recorded by [JW] Conrado James, AXEL      Row Name 10/15/19 0916             Upper Extremity Seated Therapeutic Exercise    Device, Seated Upper Extremity (Therapeutic Exercise)  restorator/arm bike  -JW      Exercise Type, Seated Upper Extremity (Therapeutic Exercise)  AROM (active range of motion);resistive exercise;active stretching  -JW      Expected Outcomes, Seated Upper Extremity (Therapeutic Exercise)  improve functional tolerance, self-care activity;improve manipulation of objects, self care activity;improve performance, fine motor coordination skills  -JW      Recorded by [JW] Conrado James OTR      Row Name 10/15/19 1600             Lower Extremity Standing Therapeutic Exercise    Performed, Standing Lower Extremity (Therapeutic Exercise)  hip/knee flexion/extension;heel raises  -LL      Sets/Reps Detail, Standing Lower Extremity (Therapeutic Exercise)  1 x 15  -LL      Recorded by [LL] Bita Greene PTA      Row Name 10/15/19 1600             Lower Extremity Seated Therapeutic Exercise    Performed, Seated Lower Extremity (Therapeutic Exercise)  hip flexion/extension;hip abduction/adduction;ankle dorsiflexion/plantarflexion;LAQ (long arc quad), knee extension Ball squeeze  -      Device, Seated Lower Extremity (Therapeutic Exercise)  elastic bands/tubing;small ball;free weights, cuff 2#  -LL      Sets/Reps Detail, Seated Lower Extremity (Therapeutic Exercise)  1 x 20  -LL      Recorded by [LL] Bita Greene PTA      Row Name 10/15/19 1600              Positioning and Restraints    Pre-Treatment Position  -- WC in hallway  -LL      Post Treatment Position  wheelchair  -LL      In Wheelchair  sitting;notified nsg;legs elevated in hallway in front of nurses station  -LL      Recorded by [LL] Bita Greene PTA      Row Name 10/15/19 1600             Daily Summary of Progress (PT)    Impairments Continuing to Limit Function: Physical Therapy  strength decreased;impaired balance;impaired cognition;coordination impaired decr cognition  -LL      Recorded by [LL] Bita Greene PTA        User Key  (r) = Recorded By, (t) = Taken By, (c) = Cosigned By    Initials Name Effective Dates    LL Bita Greene PTA 05/02/16 -     JW Jacob, Conrado GOODWIN, OTR 03/07/18 -         Wound 09/29/19 Right lateral clavicle Puncture (Active)   Dressing Appearance open to air 10/15/2019 11:07 AM       Wound 10/01/19 1702 Bilateral other (see comments) coccyx MASD ([place] associated skin damage) (Active)   Dressing Appearance open to air 10/15/2019 11:07 AM   Closure None 10/14/2019  7:10 PM   Base red 10/15/2019 11:07 AM   Periwound excoriated;redness 10/15/2019 11:07 AM   Periwound Temperature warm 10/15/2019 11:07 AM   Periwound Skin Turgor soft 10/15/2019 11:07 AM   Edges irregular 10/15/2019 11:07 AM   Drainage Amount none 10/14/2019  7:10 PM   Care, Wound barrier applied 10/15/2019 11:07 AM   Dressing Care, Wound open to air 10/15/2019 11:07 AM     Physical Therapy Education     Title: PT OT SLP Therapies (In Progress)     Topic: Physical Therapy (Done)     Point: Mobility training (Done)     Learning Progress Summary           Patient Acceptance, E,D, VU,NR by EVE at 10/15/2019  4:06 PM    Acceptance, E,D, VU,NR by LL at 10/14/2019  4:34 PM    Acceptance, E,TB, VU by MONICA at 10/12/2019  9:42 PM    Acceptance, E,TB, VU by DG at 10/12/2019 12:08 AM    Acceptance, E,D, VU,NR by EVE at 10/11/2019  4:30 PM    Acceptance, E, VU,NR by  at 10/11/2019  8:42 AM    Acceptance, RUFINO DAVIS, VU by MONICA at  10/11/2019 12:42 AM    Acceptance, E,D, VU,NR by AG at 10/10/2019  4:33 PM    Acceptance, E,TB, VU by RF at 10/9/2019  4:49 PM    Acceptance, E,D, NL by AG at 10/8/2019  4:10 PM    Acceptance, E,D, VU,NR by LL at 10/7/2019  3:34 PM    Acceptance, E,D, VU,NR by KADE at 10/5/2019  1:43 PM    Acceptance, E,D, NR by LL at 10/4/2019  3:45 PM    Acceptance, E,D, NR by AG at 10/3/2019  4:10 PM    Acceptance, E,D, NR by AG at 10/2/2019  4:26 PM                   Point: Home exercise program (Done)     Learning Progress Summary           Patient Acceptance, E,D, VU,NR by LL at 10/15/2019  4:06 PM    Acceptance, E,D, VU,NR by LL at 10/14/2019  4:34 PM    Acceptance, E,TB, VU by DG at 10/12/2019  9:42 PM    Acceptance, E,TB, VU by DG at 10/12/2019 12:08 AM    Acceptance, E,D, VU,NR by LL at 10/11/2019  4:30 PM    Acceptance, E, VU,NR by MC at 10/11/2019  8:42 AM    Acceptance, E,TB, VU by DG at 10/11/2019 12:42 AM    Acceptance, E,D, VU,NR by AG at 10/10/2019  4:33 PM    Acceptance, E,TB, VU by RF at 10/9/2019  4:49 PM    Acceptance, E,D, NL by AG at 10/8/2019  4:10 PM    Acceptance, E,D, VU,NR by LL at 10/7/2019  3:34 PM    Acceptance, E,D, VU,NR by KADE at 10/5/2019  1:43 PM    Acceptance, E,D, NR by LL at 10/4/2019  3:45 PM    Acceptance, E,D, NR by AG at 10/3/2019  4:10 PM    Acceptance, E,D, NR by AG at 10/2/2019  4:26 PM                   Point: Body mechanics (Done)     Learning Progress Summary           Patient Acceptance, E,D, VU,NR by LL at 10/15/2019  4:06 PM    Acceptance, E,D, VU,NR by LL at 10/14/2019  4:34 PM    Acceptance, E,TB, VU by DG at 10/12/2019  9:42 PM    Acceptance, E,TB, VU by DG at 10/12/2019 12:08 AM    Acceptance, E,D, VU,NR by LL at 10/11/2019  4:30 PM    Acceptance, E, VU,NR by  at 10/11/2019  8:42 AM    Acceptance, E,TB, VU by DG at 10/11/2019 12:42 AM    Acceptance, E,D, VU,NR by AG at 10/10/2019  4:33 PM    Acceptance, E,TB, VU by  at 10/9/2019  4:49 PM    Acceptance, E,D, NL by AG at  10/8/2019  4:10 PM    Acceptance, E,D, VU,NR by LL at 10/7/2019  3:34 PM    Acceptance, E,D, VU,NR by KADE at 10/5/2019  1:43 PM    Acceptance, E,D, NR by LL at 10/4/2019  3:45 PM    Acceptance, E,D, NR by AG at 10/3/2019  4:10 PM    Acceptance, E,D, NR by AG at 10/2/2019  4:26 PM                   Point: Precautions (Done)     Learning Progress Summary           Patient Acceptance, E,D, VU,NR by LL at 10/15/2019  4:06 PM    Acceptance, E,D, VU,NR by LL at 10/14/2019  4:34 PM    Acceptance, E,TB, VU by DG at 10/12/2019  9:42 PM    Acceptance, E,TB, VU by DG at 10/12/2019 12:08 AM    Acceptance, E,D, VU,NR by LL at 10/11/2019  4:30 PM    Acceptance, E, VU,NR by  at 10/11/2019  8:42 AM    Acceptance, E,TB, VU by DG at 10/11/2019 12:42 AM    Acceptance, E,D, VU,NR by AG at 10/10/2019  4:33 PM    Acceptance, E,TB, VU by RF at 10/9/2019  4:49 PM    Acceptance, E,D, NL by AG at 10/8/2019  4:10 PM    Acceptance, E,D, VU,NR by LL at 10/7/2019  3:34 PM    Acceptance, E,D, VU,NR by KADE at 10/5/2019  1:43 PM    Acceptance, E,D, NR by LL at 10/4/2019  3:45 PM    Acceptance, E,D, NR by AG at 10/3/2019  4:10 PM    Acceptance, E,D, NR by AG at 10/2/2019  4:26 PM                               User Key     Initials Effective Dates Name Provider Type Discipline     06/16/16 -  Cecelia Hare, RN Registered Nurse Nurse     06/16/16 -  Rut Perrin RN Registered Nurse Nurse    AG 04/03/18 -  Karyn Delgado, PT Physical Therapist PT    LL 05/02/16 -  Bita Greene PTA Physical Therapy Assistant PT    KADE 05/02/16 -  Doris Calixto, PTA Physical Therapy Assistant PT    RF 03/07/18 -  Kristen Carrera PTA Physical Therapy Assistant PT                  PT Recommendation and Plan     Plan of Care Reviewed With: patient  Daily Summary of Progress (PT)  Impairments Continuing to Limit Function: Physical Therapy: strength decreased, impaired balance, impaired cognition, coordination impaired(decr cognition)  IRF Plan of  Care Review: progress ongoing, continue  Progress, Functional Goals: demonstrating adequate progress  Outcome Summary: Patient tolerated PT session well with adequate rest breaks.  Continue w/ current POC.               Time Calculation:     PT Charges     Row Name 10/15/19 1607             Time Calculation    Start Time  1330  -LL      Stop Time  1500  -LL      Time Calculation (min)  90 min  -LL      PT Received On  10/15/19  -LL         Time Calculation- PT    Total Timed Code Minutes- PT  90 minute(s)  -LL        User Key  (r) = Recorded By, (t) = Taken By, (c) = Cosigned By    Initials Name Provider Type    LL Bita Greene PTA Physical Therapy Assistant          Therapy Charges for Today     Code Description Service Date Service Provider Modifiers Qty    97884830945 HC GAIT TRAINING EA 15 MIN 10/14/2019 Bita Greene, PTA GP 1    81798389352 HC PT THERAPEUTIC ACT EA 15 MIN 10/14/2019 Bita Greene, MARY ANN GP 2    65148561171 HC PT THER PROC EA 15 MIN 10/14/2019 Bita Greene, MARY ANN GP 4    24518998218 HC GAIT TRAINING EA 15 MIN 10/15/2019 Bita Greene, MARY ANN GP 1    85694007777 HC PT THERAPEUTIC ACT EA 15 MIN 10/15/2019 Bita Greene, PTA GP 1    69830105377 HC PT THER PROC EA 15 MIN 10/15/2019 Bita Greene, MARY ANN GP 4                   Bita. MARY ANN Greene  10/15/2019

## 2019-10-15 NOTE — THERAPY TREATMENT NOTE
Inpatient Rehabilitation - Occupational Therapy Treatment Note     [place]     Patient Name: Ashley Geronimo  : 1945  MRN: [id number]    Today's Date: 10/15/2019                 Admit Date: 10/1/2019      Visit Dx:  No diagnosis found.    Patient Active Problem List   Diagnosis   • Pneumonia   • Septic shock (CMS/Formerly [facility])   • Pseudomonas pneumonia (CMS/Formerly [facility])   • NSTEMI (non-ST elevated myocardial infarction) (CMS/Formerly [facility])   • Bradycardia   • Paroxysmal atrial fibrillation (CMS/Formerly [facility])   • Chronic respiratory failure with hypoxia and hypercapnia (CMS/Formerly [facility])   • Acute on chronic respiratory failure with hypoxia (CMS/Formerly [facility]), requiring intubation/mechanical ventilation 19   • Hematemesis   • RAMESH (acute kidney injury) (CMS/Formerly [facility])   • DKA (diabetic ketoacidoses) (CMS/Formerly [facility])   • Septic shock (CMS/Formerly [facility])   • Encephalopathy   • UTI (urinary tract infection)   • Tracheobronchitis, PSA    • Metabolic encephalopathy         Therapy Treatment    IRF Treatment Summary     Row Name 10/15/19 0916             Evaluation/Treatment Time and Intent    Subjective Information  no complaints  -JW      Existing Precautions/Restrictions  fall  -JW      Document Type  therapy note (daily note)  -JW      Mode of Treatment  occupational therapy;individual therapy  -JW      Patient/Family Observations  Pt. agreeable to OT treatment  -JW      Recorded by [JW] Conrado James OTR      Row Name 10/15/19 0916             Safety Awareness/Health Promotion    Safety Awareness  problem solves related to home safety plans and new situations/issues  -JW      Recorded by [JW] Conrado James OTR      Row Name 10/15/19 0916             Cognition/Psychosocial- PT/OT    Affect/Mental Status (Cognitive)  confused  -JW      Orientation Status (Cognition)  oriented x 3  -JW      Follows Commands (Cognition)  follows three step commands  -JW      Personal Safety Interventions  fall prevention program maintained  -JW      Cognitive Function (Cognitive)  attention deficit   -JW      Attention Deficit (Cognitive)  requires cues/redirection to task  -JW      Safety Deficit (Cognitive)  judgment  -JW      Recorded by [JW] Conrado James, OTR      Row Name 10/15/19 0916             Bed Mobility Assessment/Treatment    Bed Mobility Assessment/Treatment  bed mobility (all) activities;supine-sit  -JW      [place] Level (Bed Mobility)  verbal cues  -JW      Scooting/Bridging [place] (Bed Mobility)  contact guard  -JW      Bed Mobility, Safety Issues  cognitive deficits limit understanding  -JW      Recorded by [JW] Conrado James, OTR      Row Name 10/15/19 0916             Functional Mobility    Functional Mobility- Safety Issues  balance decreased during turns;step length decreased  -JW      Recorded by [JW] Conrado James, OTR      Row Name 10/15/19 0916             Transfer Assessment/Treatment    Transfer Assessment/Treatment  bed-chair transfer  -JW      Comment (Transfers)  SBA - CGA  -JW      Recorded by [JW] Conrado James, OTR      Row Name 10/15/19 0916             Bed-Chair Transfer    Bed-Chair [place] (Transfers)  verbal cues  -JW      Recorded by [JW] Conrado James, OTR      Row Name 10/15/19 0916             Upper Body Dressing Assessment/Treatment    Upper Body Dressing Task  don;set up assistance;verbal cues  -JW      Recorded by [JW] Conrado James, OTR      Row Name 10/15/19 0916             Grooming Assessment/Treatment    Grooming [place] Level  grooming skills;set up;hair care, combing/brushing;wash face, hands  -JW      Recorded by [JW] Conrado James, OTR      Row Name 10/15/19 0916             Self-Feeding Assessment/Treatment    [place] Level (Self-Feeding)  set up;verbal cues  -JW      Recorded by [JW] Conrado James, OTR      Row Name 10/15/19 0916             BADL Safety/Performance    Impairments, BADL Safety/Performance  cognition;endurance/activity tolerance;strength  -JW      Recorded by [JW] Conrado James, OTR      Row Name 10/15/19 0916              Therapeutic Exercise    Therapeutic Exercise  seated, upper extremities  -JW      Recorded by [JW] Conrado James OTR      Row Name 10/15/19 0916             Upper Extremity Seated Therapeutic Exercise    Device, Seated Upper Extremity (Therapeutic Exercise)  restorator/arm bike  -JW      Exercise Type, Seated Upper Extremity (Therapeutic Exercise)  AROM (active range of motion);resistive exercise;active stretching  -JW      Expected Outcomes, Seated Upper Extremity (Therapeutic Exercise)  improve functional tolerance, self-care activity;improve manipulation of objects, self care activity;improve performance, fine motor coordination skills  -JW      Recorded by [JW] Conrado James OTR        User Key  (r) = Recorded By, (t) = Taken By, (c) = Cosigned By    Initials Name Effective Dates    JW Conrado James OTR 03/07/18 -           Wound 09/29/19 Right lateral clavicle Puncture (Active)   Dressing Appearance open to air 10/14/2019  7:10 PM       Wound 10/01/19 1702 Bilateral other (see comments) coccyx MASD ([place] associated skin damage) (Active)   Dressing Appearance open to air 10/14/2019  7:10 PM   Closure None 10/14/2019  7:10 PM   Base red 10/14/2019  7:10 PM   Periwound excoriated;redness 10/14/2019  7:10 PM   Drainage Amount none 10/14/2019  7:10 PM   Care, Wound barrier applied 10/14/2019  7:10 PM   Dressing Care, Wound open to air 10/14/2019  7:10 PM         OT Recommendation and Plan         Plan of Care Review  Plan of Care Reviewed With: patient  Plan of Care Reviewed With: patient  IRF Plan of Care Review: progress ongoing, continue  Progress, Functional Goals: demonstrating adequate progress    OT IRF GOALS     Row Name 10/09/19 1231 10/09/19 1230 10/02/19 1521       Bathing Goal 1 (OT-IRF)    [place] Level (Bathing Goal 1, OT-IRF)  --  --  minimum assist (75% or more patient effort)  -    Time Frame (Bathing Goal 1, OT-IRF)  --  --  long term goal (LTG);by discharge  -       LB Dressing Goal 1  (OT-IRF)    [place] (LB Dressing Goal 1, OT-IRF)  contact guard assist  -CJ  --  moderate assist (50-74% patient effort)  -CJ    Time Frame (LB Dressing Goal 1, OT-IRF)  short term goal (STG)  -CJ  --  short term goal (STG)  -CJ    Progress/Outcomes (LB Dressing Goal 1, OT-IRF)  --  goal met  -CJ  --       LB Dressing Goal 2 (OT-IRF)    [place] (LB Dressing Goal 2, OT-IRF)  --  --  minimum assist (75% or more patient effort)  -CJ    Time Frame (LB Dressing Goal 2, OT-IRF)  --  --  long term goal (LTG);by discharge  -       Toileting Goal 1 (OT-IRF)    [place] Level (Toileting Goal 1, OT-IRF)  contact guard assist  -CJ  --  maximum assist (25-49% patient effort)  -CJ    Time Frame (Toileting Goal 1, OT-IRF)  short term goal (STG)  -CJ  --  short term goal (STG)  -CJ    Progress/Outcomes (Toileting Goal 1, OT-IRF)  --  goal met  -CJ  --       Toileting Goal 2 (OT-IRF)    [place] Level (Toileting Goal 2, OT-IRF)  --  --  moderate assist (50-74% patient effort)  -CJ    Time Frame (Toileting Goal 2, OT-IRF)  --  --  long term goal (LTG);by discharge  -      User Key  (r) = Recorded By, (t) = Taken By, (c) = Cosigned By    Initials Name Provider Type    Laya Yusuf OT Occupational Therapist          Occupational Therapy Education     Title: PT OT SLP Therapies (In Progress)     Topic: Occupational Therapy (In Progress)     Point: ADL training (Done)     Description: Instruct learner(s) on proper safety adaptation and remediation techniques during self care or transfers.   Instruct in proper use of assistive devices.    Learning Progress Summary           Patient SUSAN Lowry D, DU,JENNIFER,NR by JONATHAN at 10/15/2019  9:36 AM    SUSAN Lowry NR by JONATHAN at 10/14/2019 12:22 PM    AcceptanceSUSAN TB, JENNIFER by MONICA at 10/12/2019  9:42 PM    SUSAN Wilkinson TB, JENNIFER by MONICA at 10/12/2019 12:08 AM    SUSAN Wilkinson D, NR by SHARON at 10/11/2019 12:27 PM    AcceptanceSUSAN VU, NR by CRISTY at 10/11/2019  8:42 AM    AcceptanceSUSAN TB, VU by  MONICA at 10/11/2019 12:42 AM    Acceptance, E,D, NR by  at 10/10/2019  2:35 PM    Acceptance, E,D, NR by AB at 10/9/2019  3:29 PM    Acceptance, E,D, NR by  at 10/9/2019 12:30 PM    Acceptance, E,D, NR by  at 10/8/2019 10:10 AM    Acceptance, E,D, NR by  at 10/7/2019 12:22 PM    Acceptance, E,D, NR by  at 10/4/2019  3:24 PM    Acceptance, E,D, NR by  at 10/3/2019  3:08 PM    Acceptance, E,D, NR by  at 10/2/2019  3:17 PM                   Point: Home exercise program (In Progress)     Description: Instruct learner(s) on appropriate technique for monitoring, assisting and/or progressing therapeutic exercises/activities.    Learning Progress Summary           Patient Eager, E, NR by  at 10/14/2019 12:22 PM    Acceptance, E,TB, VU by  at 10/12/2019  9:42 PM    Acceptance, E,TB, VU by  at 10/12/2019 12:08 AM    Acceptance, E, VU,NR by  at 10/11/2019  8:42 AM    Acceptance, E,TB, VU by  at 10/11/2019 12:42 AM                               User Key     Initials Effective Dates Name Provider Type Discipline    AB 04/03/18 -  Jennifer Smith, OT Occupational Therapist OT     06/16/16 -  Cecelia Hare, RN Registered Nurse Nurse     06/16/16 -  Rut Perrin, RN Registered Nurse Nurse     04/03/18 -  Laya Harrison, OT Occupational Therapist OT     03/07/18 -  Conrado James OTR Occupational Therapist OT                       Time Calculation:     Time Calculation- OT     Row Name 10/15/19 0937             Time Calculation-     OT Start Time  0745  -      OT Stop Time  0915  -      OT Time Calculation (min)  90 min  -      Total Timed Code Minutes- OT  90 minute(s)  -      OT Non-Billable Time (min)  20 min  -        User Key  (r) = Recorded By, (t) = Taken By, (c) = Cosigned By    Initials Name Provider Type     Way, Conrado R, OTR Occupational Therapist          Therapy Charges for Today     Code Description Service Date Service Provider Modifiers Qty    64390776093   OT SELF CARE/MGMT/TRAIN EA 15 MIN 10/14/2019 Conrado James OTR GO 2    01619859320 HC OT THERAPEUTIC ACT EA 15 MIN 10/14/2019 Conrado James OTR GO 4    89677741708 HC OT CARE PLAN EA 15 MIN 10/15/2019 Conrado James OTR GO 1    31677167955 HC OT SELF CARE/MGMT/TRAIN EA 15 MIN 10/15/2019 Conrado James OTR GO 2    93832127234 HC OT THERAPEUTIC ACT EA 15 MIN 10/15/2019 Conrado James OTR GO 4                   AXEL Rowan  10/15/2019

## 2019-10-15 NOTE — NURSING NOTE
Spoke with son [place] concerning pt being moved to [address].pt and son to inform of transfer of rooms

## 2019-10-15 NOTE — PROGRESS NOTES
Inpatient Rehabilitation Functional Measures Assessment    Functional Measures  PUJA Eating:  PUJA Grooming: Patient requires minimal assistance for washing, rinsing and  drying the face. Patient requires minimal assistance for washing, rinsing and  drying the hands. Brushing teeth was not observed for this patient. Patient  requires minimal assistance for brushing/combing hair. Shaving or applying  makeup was not observed for this patient. Patient performs 0 -  24% of grooming  tasks.  Grooming Score = 1, Total Assistance. No assistive devices were  required.  PUJA Bathing:  Patient bathed in bed. Patient requires minimal assistance for  washing, rinsing, or drying the right arm. Patient requires minimal assistance  for washing, rinsing, or drying the left arm. Patient requires moderate  assistance for washing, rinsing, or drying the chest. Patient requires moderate  assistance for washing, rinsing, or drying the abdomen. Patient requires  moderate assistance for washing, rinsing, or drying the perineal area. Patient  requires total assistance for washing, rinsing, or drying the buttocks. Patient  requires total assistance for washing, rinsing, or drying the right upper leg.  Patient requires total assistance for washing, rinsing, or drying the left upper  leg. Patient requires total assistance for washing, rinsing, or drying the right  lower leg, including the foot. Patient requires total assistance for washing,  rinsing, or drying the left lower leg, including the foot. Patient performs 0 -  24% of bathing tasks.  Bathing Score = 1, Total Assistance. No assistive devices  were required.  PUJA Upper Body Dressing:  Patient requires total assistance for gathering  clothes. Wearing a bra or undershirt was not observed for this patient. Patient  requires minimal/incidental assistance for threading the right arm through the  garment (shirt/sweater). Patient requires minimal/incidental assistance for  threading the left  arm through the garment (shirt/sweater). Patient requires  minimal/incidental physical assistance for pulling an over-head-garment over  head or pulling front-fastening-garment around back. Patient requires  moderate/considerable physical assistance for pulling an over-head-garment down  the trunk or adjusting/fastening together a front-fastening-garment. Patient  performs 75 % of upper body dressing tasks. Upper Body Dressing Score = 4,  Minimal Assistance. No assistive devices were required.  PUJA Lower Body Dressing:  Patient requires total assistance for gathering  clothes. Wearing underwear or an undergarment was not observed for this patient.  Patient requires moderate/considerable physical assistance for threading the  right leg through the pants/skirt. Patient requires moderate/considerable  physical assistance for threading the left leg through the pants/skirt. Patient  requires moderate/considerable physical assistance for pulling pants/skirt over  hips and adjusting fasteners. Patient requires total assistance for holding  clothing and/or donning and/or doffing right sock. Patient requires total  assistance for holding clothing and/or donning and/or doffing left sock. Donning  and/or doffing right shoe was not observed for this patient. Donning and/or  doffing left shoe was not observed for this patient. Patient performs 25 % of  lower body dressing tasks. Lower Body Dressing Score = 2, Maximal Assistance. No  assistive devices were required.  PUJA Toileting:  Patient requires moderate assistance for adjusting clothing  before using a toilet, commode, bedpan, or urinal. Patient requires moderate  assistance for hygiene. Patient requires moderate assistance for adjusting  clothing after using a toilet, commode, bedpan, or urinal. Patient performs 0 -  24% of toileting tasks.  Toileting Score = 1, Total Assistance. Patient requires  the following assistive device(s): Grab bar. Arm rest of a specialized seat.  pt  is inc most of time    PUJA Bladder Management  Level of Assistance:  Bladder Score = 5.  Patient is supervision/set-up for  bladder management, requiring: Emptying equipment. Setting out equipment. Stand  by assistance. Verbal cuing, prompting, or instructing. Patient requires the  following assistive device(s):  Adult brief.  Frequency/Number of Accidents this Shift:  Bladder accidents this shift:  3 .    [facility] Bowel Management  Level of Assistance: Bowel Score = 7.  Patient is completely independent for  bowel management.  Patient did not have bowel movement.  No  medication/intervention was provided.  Frequency/Number of Accidents this Shift: Bowel accidents this shift: 0 .  Patient has not had an accident this shift.    PUJA Bed/Chair/Wheelchair Transfer:  Activity was not observed.  PUJA Toilet Transfer:  Activity was not observed. pt has been inc  PUJA Tub/Shower Transfer:    Previously Documented Mode of Locomotion at Discharge:  [facility] Expected Mode of Locomotion at Discharge:  [facility] Walk/Wheelchair:  [facility] Stairs:    PUJA Comprehension:  [facility] Expression:  [facility] Social Interaction:  [facility] Problem Solving:  [facility] Memory:    Therapy Mode Minutes  Occupational Therapy:  Physical Therapy:  Speech Language Pathology:    Discharge Functional Goals:    Signed by: JAI Guerrero

## 2019-10-15 NOTE — PROGRESS NOTES
Inpatient Rehabilitation Functional Measures Assessment    Functional Measures  PUJA Eating:  Eating Score = 5. Patient is supervision/set-up for eating,  requiring: No assistive devices were required.  PUJA Grooming: Grooming Score = 5. Patient is supervision/set-up for grooming,  requiring: Verbal cuing, prompting, or instructing. No assistive devices were  required.  PUJA Bathing:  PUJA Upper Body Dressing:  Upper Body Dressing Score = 5. Patient is supervision  for upper body dressing, requiring: No assistive devices were required.  PUJA Lower Body Dressing:  PUJA Toileting:    PUJA Bladder Management  Level of Assistance:  Frequency/Number of Accidents this Shift:    PUJA Bowel Management  Level of Assistance:  Frequency/Number of Accidents this Shift:    PUJA Bed/Chair/Wheelchair Transfer:  Bed/chair/wheelchair Transfer Score = 4.  Patient performs 75% or more of effort and minimal assistance (little/incidental  help/lifting of one limb/steadying) for transferring to and from the  bed/chair/wheelchair, requiring: Contact guard. No assistive devices were  required.  PUJA Toilet Transfer:  PUJA Tub/Shower Transfer:    Previously Documented Mode of Locomotion at Discharge:  [facility] Expected Mode of Locomotion at Discharge:  PUJA Walk/Wheelchair:  PUJA Stairs:    [facility] Comprehension:  PUJA Expression:  PUJA Social Interaction:  PUJA Problem Solving:  PUJA Memory:    Therapy Mode Minutes  Occupational Therapy: Individual: 90 minutes.  Physical Therapy:  Speech Language Pathology:    Discharge Functional Goals:    Signed by: Conrado James, Therapy Coordinator

## 2019-10-15 NOTE — SIGNIFICANT NOTE
10/14/19 140   Attendance   Activity [employer]  (bible study)   Participation Active participation   Time Spent With Patient   Minutes spent with patient 60

## 2019-10-15 NOTE — PROGRESS NOTES
"     LOS: 14 days     Chief Complaint:  Debility    Subjective     Interval History:     She feels pretty well this morning.  She is looking forward to going home tomorrow.  Systolic blood pressure running 150s to 170s.  No nausea.  No vomiting.  Blood sugars running around 300.    Objective     Vital Signs  /60   Pulse 62   Temp 97.9 °F (36.6 °C) (Oral)   Resp 16   Ht 152.4 cm (60\")   Wt 67 kg (147 lb 11.3 oz)   SpO2 93%   BMI 28.85 kg/m²    Intake & Output (last day)       10/14 0701 - 10/15 0700 10/15 0701 - 10/16 0700    P.O. 1080 120    Total Intake(mL/kg) 1080 (16.1) 120 (1.8)    Net +1080 +120          Urine Unmeasured Occurrence 12 x             Physical Exam:     General Appearance:   Anxious, cooperative, in no acute distress. Thin, frail and interactive.   Head:    Normocephalic, without obvious abnormality, atraumatic   Eyes:            Lids and lashes normal, conjunctivae and sclerae normal, no   icterus, no pallor, corneas clear, PERRLA   Ears:    Ears appear intact with no abnormalities noted       Neck:   No adenopathy, supple, trachea midline, no thyromegaly, no   carotid bruit, no JVD   Lungs:     Clear to auscultation,respirations regular, even and                  unlabored    Heart:    Irreg irreg rhythm and normal rate, normal S1 and S2, no            murmur, no gallop, no rub, no click   Chest Wall:    No abnormalities observed   Abdomen:     Normal bowel sounds, no masses, no organomegaly, soft        non-tender, non-distended, no guarding, no rebound                tenderness   Extremities:   Moves all extremities well, no edema, no cyanosis, no             Redness                        Results Review:    Lab Results   Component Value Date    WBC 6.92 10/15/2019    HGB 11.4 (L) 10/15/2019    HCT 36.5 10/15/2019    MCV 89.5 10/15/2019     10/15/2019       Lab Results   Component Value Date    GLUCOSE 282 (H) 10/15/2019    BUN 49 (H) 10/15/2019    CREATININE 1.43 (H) " 10/15/2019    EGFRIFNONA 36 (L) 10/15/2019    BCR 34.3 (H) 10/15/2019     10/15/2019    K 4.7 10/15/2019    CL 97 (L) 10/15/2019    CO2 24.1 10/15/2019    CALCIUM 9.1 10/15/2019    PROTENTOTREF 5.2 (L) 02/16/2019    ALBUMIN 2.84 (L) 10/02/2019    LABIL2 1.1 02/16/2019    AST 13 10/02/2019    ALT 7 10/02/2019     Lab Results   Component Value Date    INR 0.95 09/22/2019    INR 0.98 02/20/2019    INR 0.95 02/19/2019       Glucose   Date Value Ref Range Status   10/15/2019 300 (H) 70 - 130 mg/dL Final   10/14/2019 264 (H) 70 - 130 mg/dL Final   10/14/2019 241 (H) 70 - 130 mg/dL Final   10/14/2019 195 (H) 70 - 130 mg/dL Final   10/13/2019 308 (H) 70 - 130 mg/dL Final          Medication Review:     Current Facility-Administered Medications:   •  amLODIPine (NORVASC) tablet 10 mg, 10 mg, Oral, Q24H, Kreis, Samuel Duane, MD, 10 mg at 10/15/19 0833  •  apixaban (ELIQUIS) tablet 5 mg, 5 mg, Oral, Q12H, Kreis, Samuel Duane, MD, 5 mg at 10/15/19 0833  •  aspirin EC tablet 81 mg, 81 mg, Oral, Daily, Kreis, Samuel Duane, MD, 81 mg at 10/15/19 0834  •  bisacodyl (DULCOLAX) suppository 10 mg, 10 mg, Rectal, Daily PRN, Kreis, Samuel Duane, MD  •  dextrose (D50W) 25 g/ 50mL Intravenous Solution 25 g, 25 g, Intravenous, Q15 Min PRN, Kreis, Samuel Duane, MD  •  dextrose (GLUTOSE) oral gel 15 g, 15 g, Oral, Q15 Min PRN, Kreis, Samuel Duane, MD  •  diphenhydrAMINE (BENADRYL) capsule 25 mg, 25 mg, Oral, Q6H PRN, Kreis, Samuel Duane, MD, 25 mg at 10/14/19 2104  •  divalproex (DEPAKOTE) 24 hr tablet 500 mg, 500 mg, Oral, Nightly, Kreis, Samuel Duane, MD, 500 mg at 10/14/19 2105  •  glipizide (GLUCOTROL) tablet 5 mg, 5 mg, Oral, BID AC, Kreis, Samuel Duane, MD, 5 mg at 10/15/19 0833  •  glucagon (human recombinant) (GLUCAGEN DIAGNOSTIC) injection 1 mg, 1 mg, Subcutaneous, Q15 Min PRN, Kreis, Samuel Duane, MD  •  hydrALAZINE (APRESOLINE) tablet 50 mg, 50 mg, Oral, Q12H, Kreis, Samuel Duane, MD, 50 mg at 10/15/19 0833  •  insulin  aspart (novoLOG) injection 0-7 Units, 0-7 Units, Subcutaneous, TID With Meals, Kreis, Samuel Duane, MD, 5 Units at 10/15/19 0834  •  linagliptin (TRADJENTA) tablet 5 mg, 5 mg, Oral, Daily, Kreis, Samuel Duane, MD, 5 mg at 10/15/19 0834  •  magnesium hydroxide (MILK OF MAGNESIA) suspension 2400 mg/10mL 10 mL, 10 mL, Oral, Daily PRN, Kreis, Samuel Duane, MD, 10 mL at 10/14/19 2104  •  metoprolol tartrate (LOPRESSOR) tablet 100 mg, 100 mg, Oral, Q12H, Kreis, Samuel Duane, MD, 100 mg at 10/15/19 0834  •  montelukast (SINGULAIR) tablet 10 mg, 10 mg, Oral, Nightly, Kreis, Samuel Duane, MD, 10 mg at 10/14/19 2105  •  ondansetron (ZOFRAN) tablet 4 mg, 4 mg, Oral, Q6H PRN **OR** ondansetron (ZOFRAN) injection 4 mg, 4 mg, Intravenous, Q6H PRN, Kreis, Samuel Duane, MD  •  pantoprazole (PROTONIX) EC tablet 40 mg, 40 mg, Oral, Q AM, Kreis, Samuel Duane, MD, 40 mg at 10/15/19 0527  •  thiamine (VITAMIN B-1) tablet 100 mg, 100 mg, Oral, Daily, Kreis, Samuel Duane, MD, 100 mg at 10/15/19 0834      Assessment/Plan     Debility secondary to what seems most consistent with hypoxic encephalopathy  Type 2 diabetes mellitus with recent history of DKA with hyperglycemia  Known coronary artery disease with history of non-ST elevation acute myocardial infarction  Chronic hypoxic respiratory failure  Urinary tract infection status post treatment  Chronic atrial fibrillation  Hypertension  Stage III chronic kidney disease         PT and OT     Eliquis twice daily due to full dose anticoagulation due to A. fib     Continue Tradjenta.  Discontinue glipizide.  Start Levemir 8 units subcu daily     Continue amlodipine 10 mg daily and continue metoprolol 100 mg twice daily.  Increase hydralazine to 50 mg twice daily     Protonix once daily    Continue Depakote 500 mg nightly    Refer to team conference note    Samuel Duane Kreis, MD  10/15/19  9:52 AM

## 2019-10-15 NOTE — PLAN OF CARE
Problem: Patient Care Overview  Goal: Plan of Care Review  Outcome: Ongoing (interventions implemented as appropriate)    Goal: Individualization and Mutuality  Outcome: Ongoing (interventions implemented as appropriate)      Problem: Fall Risk (Adult)  Goal: Absence of Fall  Outcome: Ongoing (interventions implemented as appropriate)      Problem: Wound (Includes Pressure Injury) (Adult)  Goal: Signs and Symptoms of Listed Potential Problems Will be Absent, Minimized or Managed (Wound)  Outcome: Ongoing (interventions implemented as appropriate)      Problem: Functional Mobility Impairment (IRF) (Adult)  Goal: Optimal/Safe Level of [place] with Mobility  Outcome: Ongoing (interventions implemented as appropriate)      Problem: Diabetes, Type 2 (Adult)  Goal: Signs and Symptoms of Listed Potential Problems Will be Absent, Minimized or Managed (Diabetes, Type 2)  Outcome: Ongoing (interventions implemented as appropriate)      Problem: Safety Awareness Impairment (IRF) (Adult)  Goal: Optimal Level of Safety Awareness, All Environments  Outcome: Ongoing (interventions implemented as appropriate)

## 2019-10-15 NOTE — PROGRESS NOTES
Inpatient Rehabilitation Functional Measures Assessment    Functional Measures  PUJA Eating:  Eating Score = 5. Patient is supervision/set-up for eating,  requiring: Opening containers. [place] bread. Cutting meat. No assistive  devices were required.  PUJA Grooming:  PUJA Bathing:  PUJA Upper Body Dressing:  PUJA Lower Body Dressing:  PUJA Toileting:  Patient requires moderate assistance for adjusting clothing  before using a toilet, commode, bedpan, or urinal. Patient requires moderate  assistance for hygiene. Patient requires moderate assistance for adjusting  clothing after using a toilet, commode, bedpan, or urinal. Patient performs 0 -  24% of toileting tasks.  Toileting Score = 1, Total Assistance. Patient requires  the following assistive device(s): Grab bar.    PUJA Bladder Management  Level of Assistance:  Bladder Score = 2. Patient performs 25-49% of tasks and  requires maximal assistance for bladder management. [place] provides most of the  assist to apply AND remove brief.  Frequency/Number of Accidents this Shift:  Bladder accidents this shift:  1 .    PUJA Bowel Management  Level of Assistance: Bowel Score = 7.  Patient is completely independent for  bowel management.  Patient did not have bowel movement.  No  medication/intervention was provided.  Frequency/Number of Accidents this Shift: Bowel accidents this shift: 0 .  Patient has not had an accident, but used a device/medication this shift  requiring: brief .    PUJA Bed/Chair/Wheelchair Transfer:  Bed/chair/wheelchair Transfer Score = 3.  Patient performs 50-74% of effort and requires moderate assistance (some  lifting) for transferring to and from the bed/chair/wheelchair. No assistive  devices were required.  PUJA Toilet Transfer:  Activity was not observed.  PUJA Tub/Shower Transfer:  Activity was not observed.    Previously Documented Mode of Locomotion at Discharge:  [facility] Expected Mode of Locomotion at Discharge:  [facility] Walk/Wheelchair:  [facility]  Stairs:    PUJA Comprehension:  PUJA Expression:  PUJA Social Interaction:  PUJA Problem Solving:  PUJA Memory:    Therapy Mode Minutes  Occupational Therapy:  Physical Therapy:  Speech Language Pathology:    Discharge Functional Goals:    Signed by: JAI Jefferson

## 2019-10-15 NOTE — PROGRESS NOTES
Inpatient Rehabilitation Functional Measures Assessment    Functional Measures  PUJA Eating:  PUJA Grooming:  PUJA Bathing:  PUJA Upper Body Dressing:  PUJA Lower Body Dressing:  PUJA Toileting:    PUJA Bladder Management  Level of Assistance:  Frequency/Number of Accidents this Shift:    PUJA Bowel Management  Level of Assistance:  Frequency/Number of Accidents this Shift:    PUJA Bed/Chair/Wheelchair Transfer:  PUJA Toilet Transfer:  PUJA Tub/Shower Transfer:    Previously Documented Mode of Locomotion at Discharge:  PUJA Expected Mode of Locomotion at Discharge:  PUJA Walk/Wheelchair:  PUJA Stairs:    PUJA Comprehension:  Both ( auditory and visual) modes of comprehension are used  equally. Patient does not comprehend complex/abstract information in their  primary language without assistance from a helper. Comprehension Score = 4,  Minimal Prompting. Patient comprehends basic daily needs or ideas 75-90% of the  time. Patient requires minimal/occasional prompting. Patient requires the  following assistive device(s): Glasses.  PUJA Expression:  Vocal expression is the usual mode. Patient does not express  complex/abstract information in their primary language without a helper.  Expression Score = 4, Minimal Prompting. Patient expresses basic daily needs or  ideas 75-90% of the time.  Patient requires minimal/occasional prompting. No  assistive devices were required.  PUJA Social Interaction:  Social Interaction Score = 7, Independent. Patient is  completely independent for social interaction.  There are no activity  limitations.  PUJA Problem Solving:  Patient does not make appropriate decisions in order to  solve complex problems without assistance from a helper. Problem Solving Score =  4, Minimal Direction. Patient makes appropriate decisions in order to solve  routine problems 75-90% of the time. Patient requires minimal/occasional  direction for the following behavior(s): Difficulty weighing alternatives/making  choices.  Difficulty with self-correction. Difficulty with self-monitoring.  Exhibits poor planning. Impulsivity. Poor judgment.  PUJA Memory:  Memory Score = 4, Minimal Prompting. Patient recognizes and  remembers 75-90% of the time. Patient requires minimal/occasional prompting for  memory for the following: Difficulty recognizing hospital staff as persons  previously met. Difficulty remembering verbal tasks. Difficulty remembering  visual tasks. Disoriented to person, place, time or situation.    Therapy Mode Minutes  Occupational Therapy:  Physical Therapy:  Speech Language Pathology:    Discharge Functional Goals:    Signed by: Nurse Adalgisa

## 2019-10-15 NOTE — SIGNIFICANT NOTE
10/09/19 1346   Prior Routine   Prior Activity Arts/Crafts;Discussion/reminisce;MT;Party/social;Quaker;Baking club;Beauty shop;1:1;Television   Typical Evening Retire Time 2100   Preferred Interaction Enjoys being in groups   Physical/Cognitive Deficits   Vision Assessment Adequate   Hearing Assessment No difficulty in normal conversation, interaction, listening to TV   Communication barriers to participation Difficulty communicating some words or finishing thoughts   Attendance   Activity [employer];1:1   Participation Passive participation   Time Spent With Patient   Minutes spent with patient 60

## 2019-10-15 NOTE — SIGNIFICANT NOTE
10/15/19 6169   Plan   Plan Spoke to juan Zepeda [phone] about plans for discharge on 10-16-19, home health for PT, OT, aide, nursing, and how pt is doing in therapy.  Granddaughter states she will come to rehab around 10:00 am for pt's discharge and transportation home; she confirmed pt will receive 24 hour assistance from family at home.  Granddaughter Katelyn, grandson Addison Geronimo and son Juan will be pt's caregivers.     Patient/Family in Agreement with Plan yes

## 2019-10-15 NOTE — PROGRESS NOTES
Inpatient Rehabilitation Functional Measures Assessment    Functional Measures  PUJA Eating:  PUJA Grooming:  PUJA Bathing:  PUJA Upper Body Dressing:  PUJA Lower Body Dressing:  PUJA Toileting:    PUJA Bladder Management  Level of Assistance:  Frequency/Number of Accidents this Shift:    PUJA Bowel Management  Level of Assistance:  Frequency/Number of Accidents this Shift:    PUJA Bed/Chair/Wheelchair Transfer:  PUJA Toilet Transfer:  PUJA Tub/Shower Transfer:    Previously Documented Mode of Locomotion at Discharge:  [facility] Expected Mode of Locomotion at Discharge:  PUJA Walk/Wheelchair:  PUJA Stairs:    PUJA Comprehension:  Both ( auditory and visual) modes of comprehension are used  equally. Patient does not comprehend complex/abstract information in their  primary language without assistance from a helper. Comprehension Score = 3,  Moderate Prompting. Patient comprehends basic daily needs or ideas 50-74% of the  time. Patient requires moderate/some prompting. No assistive devices were  required. confused at times  PUJA Expression:  Both ( vocal and non-vocal) modes of expression are used  equally. Patient does not express complex/abstract information in their primary  language without a helper. Expression Score = 3, Moderate Prompting. Patient  expresses basic daily needs or ideas 50-74% of the time. Patient requires  moderate/some prompting. No assistive devices were required.  PUJA Social Interaction:  Social Interaction Score = 3, Moderate Direction.  Patient interacts appropriately 50-74% of the time.  Patient requires  moderate/some direction for the following behavior(s):  PUJA Problem Solving:  Patient does not make appropriate decisions in order to  solve complex problems without assistance from a helper. Problem Solving Score =  3, Moderate Direction. Patient makes appropriate decisions in order to solve  routine problems 50-74% of the time. Patient requires moderate/some direction  for the following behavior(s):  Decreased awareness of performance.  PUJA Memory:  Memory Score = 3, Moderate Prompting. Patient recognizes and  remembers 50-74% of the time. Patient requires moderate/some prompting  for  memory for the following:    Therapy Mode Minutes  Occupational Therapy:  Physical Therapy:  Speech Language Pathology:    Discharge Functional Goals:    Signed by: Radha Pritchett Nurse

## 2019-10-15 NOTE — SIGNIFICANT NOTE
10/15/19 0925   Plan   Plan Team conference held this am.  Spoke to pt about plans for discharge on 10-16-19, home health referral to [facility] for PT, OT, aide, nursing, adult grandchildren, and son to provide 24 hour assistance at home.  Informed pt granddaughter Katelyn plans to come to rehab for discharge and transportation home.  Pt agreeable to plans.   Patient/Family in Agreement with Plan yes

## 2019-10-15 NOTE — PLAN OF CARE
Problem: Patient Care Overview  Goal: Plan of Care Review  Outcome: Ongoing (interventions implemented as appropriate)   10/15/19 0937   Patient Care Overview   IRF Plan of Care Review progress ongoing, continue   Progress, Functional Goals demonstrating adequate progress   Coping/Psychosocial   Plan of Care Reviewed With patient

## 2019-10-15 NOTE — PLAN OF CARE
Problem: Patient Care Overview  Goal: Plan of Care Review  Outcome: Ongoing (interventions implemented as appropriate)   10/15/19 1125   Patient Care Overview   IRF Plan of Care Review progress ongoing, continue   Progress, Functional Goals demonstrating adequate progress   Coping/Psychosocial   Plan of Care Reviewed With patient       Problem: Safety Awareness Impairment (IRF) (Adult)  Goal: Optimal Level of Safety Awareness, All Environments  Outcome: Ongoing (interventions implemented as appropriate)

## 2019-10-15 NOTE — PROGRESS NOTES
Inpatient Rehabilitation Functional Measures Assessment    Functional Measures  PUJA Eating:  PUJA Grooming:  PUJA Bathing:  PUJA Upper Body Dressing:  PUJA Lower Body Dressing:  PUJA Toileting:    PUJA Bladder Management  Level of Assistance:  Frequency/Number of Accidents this Shift:    PUJA Bowel Management  Level of Assistance:  Frequency/Number of Accidents this Shift:    PUJA Bed/Chair/Wheelchair Transfer:  Bed/chair/wheelchair Transfer Score = 4.  Patient performs 75% or more of effort and minimal assistance (little/incidental  help/lifting of one limb/steadying) for transferring to and from the  bed/chair/wheelchair, requiring: Contact guard. Steadying. Patient requires the  following assistive device(s): Arm rest. Bed rails.  PUJA Toilet Transfer:  Toilet Transfer Score = 5.  Patient is supervision/set-up  for transferring to and from the toilet/commode, requiring: Stand by assistance.  Patient requires the following assistive device(s): Safety frame/over the  toilet. Grab bars.  PUJA Tub/Shower Transfer:    Previously Documented Mode of Locomotion at Discharge:  PUJA Expected Mode of Locomotion at Discharge:  PUJA Walk/Wheelchair:  WHEELCHAIR OBSERVATION   Wheelchair did not occur.    WALK OBSERVATION   Walk Distance Scale = 3.  Distance walked is greater than 150 feet. Walk Score  = 4.  Patient performs 75% or more of effort and requires minimal assistance.  Incidental help/contact guard/steadying was provided. Patient walked a distance  of  300 feet. Patient requires the following assistive device(s): Rolling  walker.  PUJA Stairs:  Stairs did not occur.    PUJA Comprehension:  PUJA Expression:  PUJA Social Interaction:  PUJA Problem Solving:  PUJA Memory:    Therapy Mode Minutes  Occupational Therapy:  Physical Therapy: Individual: 90 minutes.  Speech Language Pathology:    Discharge Functional Goals:    Signed by: Bita Greene PTA

## 2019-10-16 VITALS
WEIGHT: 147.71 LBS | BODY MASS INDEX: 29 KG/M2 | HEIGHT: 60 IN | DIASTOLIC BLOOD PRESSURE: 52 MMHG | TEMPERATURE: 98.2 F | OXYGEN SATURATION: 94 % | RESPIRATION RATE: 18 BRPM | HEART RATE: 62 BPM | SYSTOLIC BLOOD PRESSURE: 158 MMHG

## 2019-10-16 LAB — GLUCOSE BLDC GLUCOMTR-MCNC: 150 MG/DL (ref 70–130)

## 2019-10-16 PROCEDURE — 82962 GLUCOSE BLOOD TEST: CPT

## 2019-10-16 PROCEDURE — 63710000001 INSULIN DETEMIR PER 5 UNITS: Performed by: FAMILY MEDICINE

## 2019-10-16 PROCEDURE — 97116 GAIT TRAINING THERAPY: CPT

## 2019-10-16 PROCEDURE — 97530 THERAPEUTIC ACTIVITIES: CPT

## 2019-10-16 PROCEDURE — 97535 SELF CARE MNGMENT TRAINING: CPT

## 2019-10-16 PROCEDURE — 63710000001 INSULIN ASPART PER 5 UNITS: Performed by: FAMILY MEDICINE

## 2019-10-16 RX ORDER — METOPROLOL TARTRATE 100 MG/1
100 TABLET ORAL EVERY 12 HOURS SCHEDULED
Qty: 60 TABLET | Refills: 0 | Status: SHIPPED | OUTPATIENT
Start: 2019-10-16

## 2019-10-16 RX ORDER — DIVALPROEX SODIUM 500 MG/1
500 TABLET, EXTENDED RELEASE ORAL NIGHTLY
Qty: 30 TABLET | Refills: 0 | Status: SHIPPED | OUTPATIENT
Start: 2019-10-16

## 2019-10-16 RX ORDER — MONTELUKAST SODIUM 10 MG/1
10 TABLET ORAL NIGHTLY
Qty: 30 TABLET | Refills: 0 | Status: SHIPPED | OUTPATIENT
Start: 2019-10-16

## 2019-10-16 RX ORDER — ATORVASTATIN CALCIUM 40 MG/1
40 TABLET, FILM COATED ORAL NIGHTLY
Qty: 30 TABLET | Refills: 0 | Status: SHIPPED | OUTPATIENT
Start: 2019-10-16

## 2019-10-16 RX ORDER — APIXABAN 5 MG/1
5 TABLET, FILM COATED ORAL 2 TIMES DAILY
Qty: 60 TABLET | Refills: 0 | Status: SHIPPED | OUTPATIENT
Start: 2019-10-16 | End: 2019-12-31 | Stop reason: HOSPADM

## 2019-10-16 RX ORDER — LANOLIN ALCOHOL/MO/W.PET/CERES
100 CREAM (GRAM) TOPICAL DAILY
Qty: 30 TABLET | Refills: 0 | Status: SHIPPED | OUTPATIENT
Start: 2019-10-16

## 2019-10-16 RX ORDER — INSULIN GLARGINE 100 [IU]/ML
8 INJECTION, SOLUTION SUBCUTANEOUS DAILY
Qty: 10 ML | Refills: 0 | Status: SHIPPED | OUTPATIENT
Start: 2019-10-16 | End: 2019-12-31 | Stop reason: HOSPADM

## 2019-10-16 RX ORDER — PANTOPRAZOLE SODIUM 40 MG/1
40 TABLET, DELAYED RELEASE ORAL DAILY
Qty: 30 TABLET | Refills: 0 | Status: SHIPPED | OUTPATIENT
Start: 2019-10-16

## 2019-10-16 RX ORDER — AMLODIPINE BESYLATE 10 MG/1
10 TABLET ORAL
Qty: 30 TABLET | Refills: 0 | Status: SHIPPED | OUTPATIENT
Start: 2019-10-16

## 2019-10-16 RX ORDER — ASPIRIN 81 MG/1
81 TABLET ORAL DAILY
Qty: 30 TABLET | Refills: 0 | Status: SHIPPED | OUTPATIENT
Start: 2019-10-16

## 2019-10-16 RX ORDER — HYDRALAZINE HYDROCHLORIDE 50 MG/1
50 TABLET, FILM COATED ORAL EVERY 8 HOURS SCHEDULED
Qty: 90 TABLET | Refills: 0 | Status: SHIPPED | OUTPATIENT
Start: 2019-10-16

## 2019-10-16 RX ADMIN — AMLODIPINE BESYLATE 10 MG: 10 TABLET ORAL at 09:05

## 2019-10-16 RX ADMIN — INSULIN DETEMIR 8 UNITS: 100 INJECTION, SOLUTION SUBCUTANEOUS at 09:07

## 2019-10-16 RX ADMIN — LINAGLIPTIN 5 MG: 5 TABLET, FILM COATED ORAL at 09:05

## 2019-10-16 RX ADMIN — HYDRALAZINE HYDROCHLORIDE 50 MG: 50 TABLET ORAL at 06:16

## 2019-10-16 RX ADMIN — PANTOPRAZOLE SODIUM 40 MG: 40 TABLET, DELAYED RELEASE ORAL at 06:15

## 2019-10-16 RX ADMIN — METOPROLOL TARTRATE 100 MG: 100 TABLET, FILM COATED ORAL at 09:05

## 2019-10-16 RX ADMIN — APIXABAN 5 MG: 5 TABLET, FILM COATED ORAL at 09:05

## 2019-10-16 RX ADMIN — INSULIN ASPART 2 UNITS: 100 INJECTION, SOLUTION INTRAVENOUS; SUBCUTANEOUS at 09:05

## 2019-10-16 RX ADMIN — Medication 100 MG: at 09:05

## 2019-10-16 RX ADMIN — ASPIRIN 81 MG: 81 TABLET, COATED ORAL at 09:05

## 2019-10-16 NOTE — PROGRESS NOTES
Inpatient Rehabilitation Functional Measures Assessment    Functional Measures  PUJA Eating:  PUJA Grooming:  PUJA Bathing:  PUJA Upper Body Dressing:  PUJA Lower Body Dressing:  PUJA Toileting:    PUJA Bladder Management  Level of Assistance:  Bladder Score = 1. Patient performs less than 25% of tasks  and requires total assistance for bladder management. [place] provides total  assist to completely apply and remove brief.  Frequency/Number of Accidents this Shift:    PUJA Bowel Management  Level of Assistance:  Frequency/Number of Accidents this Shift:    PUJA Bed/Chair/Wheelchair Transfer:  [facility] Toilet Transfer:  [facility] Tub/Shower Transfer:    Previously Documented Mode of Locomotion at Discharge:  [facility] Expected Mode of Locomotion at Discharge:  [facility] Walk/Wheelchair:  [facility] Stairs:    [facility] Comprehension:  [facility] Expression:  [facility] Social Interaction:  [facility] Problem Solving:  [facility] Memory:    Therapy Mode Minutes  Occupational Therapy:  Physical Therapy:  Speech Language Pathology:    Discharge Functional Goals:    Signed by: JAI Mckeon

## 2019-10-16 NOTE — PROGRESS NOTES
Inpatient Rehabilitation Functional Measures Assessment    Functional Measures  PUJA Eating:  PUJA Grooming:  PUJA Bathing:  PUJA Upper Body Dressing:  PUJA Lower Body Dressing:  PUJA Toileting:    PUJA Bladder Management  Level of Assistance:  Frequency/Number of Accidents this Shift:    PUJA Bowel Management  Level of Assistance:  Frequency/Number of Accidents this Shift:    PUJA Bed/Chair/Wheelchair Transfer:  PUJA Toilet Transfer:  PUJA Tub/Shower Transfer:    Previously Documented Mode of Locomotion at Discharge:  PUJA Expected Mode of Locomotion at Discharge:  PUJA Walk/Wheelchair:  PUJA Stairs:    PUJA Comprehension:  Both ( auditory and visual) modes of comprehension are used  equally. Patient does not comprehend complex/abstract information in their  primary language without assistance from a helper. Comprehension Score = 4,  Minimal Prompting. Patient comprehends basic daily needs or ideas 75-90% of the  time. Patient requires minimal/occasional prompting. No assistive devices were  required.  PUJA Expression:  Both ( vocal and non-vocal) modes of expression are used  equally. Patient does not express complex/abstract information in their primary  language without a helper. Expression Score = 4, Minimal Prompting. Patient  expresses basic daily needs or ideas 75-90% of the time.  Patient requires  minimal/occasional prompting. No assistive devices were required.  PUJA Social Interaction:  Social Interaction Score = 6, Modified Independent.  Patient is modified independent for social interaction, requiring: Requires  additional time.  PUJA Problem Solving:  Patient does not make appropriate decisions in order to  solve complex problems without assistance from a helper. Problem Solving Score =  4, Minimal Direction. Patient makes appropriate decisions in order to solve  routine problems 75-90% of the time. Patient requires minimal/occasional  direction for the following behavior(s): Decreased awareness of  performance.  Exhibits poor planning. Impulsivity. Poor judgment.  PUJA Memory:  Memory Score = 4, Minimal Prompting. Patient recognizes and  remembers 75-90% of the time. Patient requires minimal/occasional prompting for  memory for the following: Disoriented to person, place, time or situation.    Therapy Mode Minutes  Occupational Therapy:  Physical Therapy:  Speech Language Pathology:    Discharge Functional Goals:    Signed by: Rosa M Singh Nurse

## 2019-10-16 NOTE — PROGRESS NOTES
Patient Assessment Instrument  Quality Indicators - Discharge    Section GG. Self-Care Performance      Section GG. Mobility Performance     Roll Left and Right: [place] provides verbal cues and/or touching/steadying  and/or contact guard assistance as patient completes activity. Assistance may be  provided throughout the activity or intermittently.   Sit to Lying: [place] provides verbal cues and/or touching/steadying and/or  contact guard assistance as patient completes activity. Assistance may be  provided throughout the activity or intermittently.   Lying to Sitting on Side of Bed: [place] provides verbal cues and/or  touching/steadying and/or contact guard assistance as patient completes  activity. Assistance may be provided throughout the activity or intermittently.   Sit to Stand: [place] provides verbal cues and/or touching/steadying and/or  contact guard assistance as patient completes activity. Assistance may be  provided throughout the activity or intermittently.   Chair/Bed to Chair Transfer: [place] provides verbal cues and/or  touching/steadying and/or contact guard assistance as patient completes  activity. Assistance may be provided throughout the activity or intermittently.   Toilet Transfer [place] provides verbal cues and/or touching/steadying and/or  contact guard assistance as patient completes activity. Assistance may be  provided throughout the activity or intermittently.   Car Transfer: [place] provides verbal cues and/or touching/steadying and/or  contact guard assistance as patient completes activity. Assistance may be  provided throughout the activity or intermittently.   Walk 10 Feet:   [place] provides verbal cues and/or touching/steadying and/or  contact guard assistance as patient completes activity. Assistance may be  provided throughout the activity or intermittently.  Walk 50 Feet with 2 Turns:   [place] provides verbal cues and/or  touching/steadying and/or contact guard assistance as  patient completes  activity. Assistance may be provided throughout the activity or intermittently.  Walk 150 Feet:   [place] provides verbal cues and/or touching/steadying and/or  contact guard assistance as patient completes activity. Assistance may be  provided throughout the activity or intermittently.  Walking 10 Feet on Uneven Surfaces:   Not attempted due to medical or safety  concerns.  1 Step Over Curb or Up/Down Stair:   [place] provides verbal cues and/or  touching/steadying and/or contact guard assistance as patient completes  activity. Assistance may be provided throughout the activity or intermittently.  4 Steps Up and Down, With/Without Rail:   [place] does less than half the effort.  [place] lifts, holds or supports trunk or limbs but provides less than half the  effort.  12 Steps Up and Down, With/Without Rail:   Not attempted due to medical or  safety concerns.  Picking up an Object:   [place] provides verbal cues and/or touching/steadying  and/or contact guard assistance as patient completes activity. Assistance may be  provided throughout the activity or intermittently. Uses Wheelchair and/or  Scooter: No    Section J. Health Conditions Discharge      Section M. Skin Conditions Discharge      . Current Number of Unhealed Pressure Ulcers      Section N. Medication    Signed by: Bita Greene PTA

## 2019-10-16 NOTE — THERAPY DISCHARGE NOTE
Inpatient Rehabilitation - Occupational Therapy  /Discharge   [place]     Patient Name: Ashley Geronimo  : 1945  MRN: [id number]  Today's Date: 10/16/2019               Admit Date: 10/1/2019    Visit Dx:     ICD-10-CM ICD-9-CM   1. Metabolic encephalopathy G93.41 348.31     Patient Active Problem List   Diagnosis   • Pneumonia   • Septic shock (CMS/[facility])   • Pseudomonas pneumonia (CMS/[facility])   • NSTEMI (non-ST elevated myocardial infarction) (CMS/[facility])   • Bradycardia   • Paroxysmal atrial fibrillation (CMS/[facility])   • Chronic respiratory failure with hypoxia and hypercapnia (CMS/[facility])   • Acute on chronic respiratory failure with hypoxia (CMS/[facility]), requiring intubation/mechanical ventilation 19   • Hematemesis   • RAMESH (acute kidney injury) (CMS/[facility])   • DKA (diabetic ketoacidoses) (CMS/[facility])   • Septic shock (CMS/[facility])   • Encephalopathy   • UTI (urinary tract infection)   • Tracheobronchitis, PSA    • Metabolic encephalopathy       Therapy Treatment  IRF Treatment Summary     Row Name 10/16/19 1233 10/16/19 1144          Evaluation/Treatment Time and Intent    Subjective Information  --  no complaints  -LL     Existing Precautions/Restrictions  fall decreased skin integrity  -  fall confusion  -LL     Document Type  discharge evaluation/summary  -  discharge treatment  -LL     Mode of Treatment  occupational therapy  -CJ  physical therapy;individual therapy  -LL     Patient/Family Observations  Education completed with pt and family re:  ADL status, safety, DME, home program, 24 hr supervision, precautions, confusion and D/C concerns.  Verbalized understanding.  -CJ  Patient discharged from physical rehab this date to home with assistance of family & with home health PT.  Education completed with patient & granddaughter regarding 24 hour supervision and assistance. home safety, proper use of gait belt, HEP, patient & caregiver safety & adjustment of RW.  Written materials issued &  no questions/concerns  voiced.  -LL     Recorded by [CJ] Laya Harrison OT [LL] Bita Greene PTA     Row Name 10/16/19 1233 10/16/19 1144          Cognition/Psychosocial- PT/OT    Affect/Mental Status (Cognitive)  confused  -CJ  confused  -LL     Orientation Status (Cognition)  oriented to;person  -CJ  oriented to;person  -LL     Follows Commands (Cognition)  verbal cues/prompting required;repetition of directions required;physical/tactile prompts required;initiation impaired;increased processing time needed  -CJ  follows two step commands;initiation impaired;repetition of directions required  -LL     Personal Safety Interventions  --  fall prevention program maintained;nonskid shoes/slippers when out of bed;supervised activity  -LL     Cognitive Function (Cognitive)  --  attention deficit;memory deficit;safety deficit  -LL     Attention Deficit (Cognitive)  requires cues/redirection to task  -CJ  requires cues/redirection to task  -LL     Recorded by [CJ] Laya Harrison OT [LL] Biat Greene PTA     Row Name 10/16/19 1144             Transfer Assessment/Treatment    Transfer Assessment/Treatment  bed-chair transfer;chair-bed transfer;sit-stand transfer;stand-sit transfer;stand pivot/stand step transfer  -LL      Recorded by [LL] Bita Greene PTA      Row Name 10/16/19 1144             Sit-Stand Transfer    Sit-Stand [place] (Transfers)  verbal cues;nonverbal cues (demo/gesture);stand by assist;contact guard  -LL      Assistive Device (Sit-Stand Transfers)  walker, front-wheeled  -LL      Recorded by [LL] Bita Greene PTA      Row Name 10/16/19 1144             Stand-Sit Transfer    Stand-Sit [place] (Transfers)  verbal cues;nonverbal cues (demo/gesture);stand by assist;contact guard  -LL      Assistive Device (Stand-Sit Transfers)  walker, front-wheeled  -LL      Recorded by [LL] Bita Greene PTA      Row Name 10/16/19 1233             Toilet Transfer    [place] Level (Toilet Transfer)  contact  guard;verbal cues  -CJ      Assistive Device (Toilet Transfer)  grab bars/safety frame  -CJ      Recorded by [CJ] Laya Harrison, OT      Row Name 10/16/19 1144             Gait/Stairs Assessment/Training    Gait/Stairs Assessment/Training  gait/ambulation independence;gait/ambulation assistive device;distance ambulated;gait pattern;gait deviations  -LL      [place] Level (Gait)  verbal cues;nonverbal cues (demo/gesture);contact guard  -LL      Assistive Device (Gait)  other (see comments) HHA  -LL      Distance in Feet (Gait)  100  -LL      Pattern (Gait)  step-through  -LL      Deviations/Abnormal Patterns (Gait)  base of support, narrow;[place] decreased;stride length decreased  -LL      Bilateral Gait Deviations  forward flexed posture  -LL      Recorded by [LL] Bita Greene PTA      Row Name 10/16/19 1144             Safety Issues, Functional Mobility    Impairments Affecting Function (Mobility)  balance;endurance/activity tolerance;strength  -LL      Recorded by [LL] Bita Greene PTA      Row Name 10/16/19 1233             Bathing Assessment/Treatment    Comment (Bathing)  CGA  -CJ      Recorded by [CJ] Laya Harrison, OT      Row Name 10/16/19 1233             Upper Body Dressing Assessment/Treatment    Comment (Upper Body Dressing)  Set up / supervision  -CJ      Recorded by [CJ] Laya Harrison, OT      Row Name 10/16/19 1233             Lower Body Dressing Assessment/Treatment    Comment (Lower Body Dressing)  CGA  -CJ      Recorded by [CJ] Laya Harrison, OT      Row Name 10/16/19 1233             Grooming Assessment/Treatment    Comment (Grooming)  Set up/ supervision  -CJ      Recorded by [CJ] Laya Harrison, OT      Row Name 10/16/19 1233             Toileting Assessment/Treatment    Assistive Device Use (Toileting)  grab bar/safety frame  -CJ      Comment (Toileting)  CGA  -CJ      Recorded by [CJ] Laya Harrison, OT      Row Name 10/16/19 1233             Self-Feeding  Assessment/Treatment    [place] Level (Self-Feeding)  set up  -CJ      Recorded by [CJ] Laya Harrison OT      Row Name 10/16/19 1233             General ROM    GENERAL ROM COMMENTS  BUE AROM- WFL  -CJ      Recorded by [CJ] Laya Harrison OT      Row Name 10/16/19 1233             MMT (Manual Muscle Testing)    General MMT Comments  BUE - 4- to 4/5  -CJ      Recorded by [CJ] Laya Harrison OT      Row Name 10/16/19 1144             Pain Scale: FACES Pre/Post-Treatment    Pain: FACES Scale, Pretreatment  0-->no hurt  -LL      Pain: FACES Scale, Post-Treatment  0-->no hurt  -LL      Recorded by [LL] Bita Greene PTA      Row Name 10/16/19 1144             Balance    Balance  static sitting balance;static standing balance;dynamic sitting balance;dynamic standing balance;sitting balance activity;standing balance activity;dynamic balance activity  -LL      Recorded by [LL] Bita Greene PTA      Row Name 10/16/19 1144             Lower Extremity Seated Therapeutic Exercise    Comment, Seated Lower Extremity (Therapeutic Exercise)  HEP reviewed & written materials issued; green theraband issued  -LL      Recorded by [LL] Bita Greene PTA      Row Name 10/16/19 1233 10/16/19 1144          Positioning and Restraints    Pre-Treatment Position  --  -- WC in room  -LL     Post Treatment Position  wheelchair  -CJ  wheelchair  -LL     In Wheelchair  sitting;with family/caregiver;encouraged to call for assist  -CJ  sitting;with family/caregiver  -LL     Recorded by [CJ] Laya Harrison OT [LL] Bita Greene PTA     Row Name 10/16/19 1144             Daily Summary of Progress (PT)    Impairments Continuing to Limit Function: Physical Therapy  strength decreased;impaired balance;impaired cognition;coordination impaired decr cognition  -LL      Recorded by [LL] Bita Greene PTA        User Key  (r) = Recorded By, (t) = Taken By, (c) = Cosigned By    Initials Name Effective Dates    Laya Yusuf  L, OT 04/03/18 -     LL Bita Greene, PTA 05/02/16 -           Wound 09/29/19 Right lateral clavicle Puncture (Active)   Dressing Appearance open to air 10/16/2019  8:00 AM       Wound 10/01/19 1702 Bilateral other (see comments) coccyx MASD ([place] associated skin damage) (Active)   Dressing Appearance open to air 10/16/2019  8:00 AM   Closure None 10/16/2019  8:00 AM   Base red;blanchable 10/16/2019  8:00 AM   Periwound excoriated;redness 10/15/2019  7:25 PM   Drainage Amount none 10/15/2019  7:25 PM   Care, Wound barrier applied 10/16/2019  8:00 AM   Dressing Care, Wound open to air 10/16/2019  8:00 AM         OT IRF GOALS     Row Name 10/16/19 1200 10/09/19 1231 10/09/19 1230       Bathing Goal 1 (OT-IRF)    Progress/Outcomes (Bathing Goal 1, OT-IRF)  goal met  -CJ  --  --       LB Dressing Goal 1 (OT-IRF)    [place] (LB Dressing Goal 1, OT-IRF)  --  contact guard assist  -CJ  --    Time Frame (LB Dressing Goal 1, OT-IRF)  --  short term goal (STG)  -CJ  --    Progress/Outcomes (LB Dressing Goal 1, OT-IRF)  goal met  -CJ  --  goal met  -CJ       LB Dressing Goal 2 (OT-IRF)    Progress/Outcomes (LB Dressing Goal 2, OT-IRF)  goal met  -CJ  --  --       Toileting Goal 1 (OT-IRF)    [place] Level (Toileting Goal 1, OT-IRF)  --  contact guard assist  -CJ  --    Time Frame (Toileting Goal 1, OT-IRF)  --  short term goal (STG)  -CJ  --    Progress/Outcomes (Toileting Goal 1, OT-IRF)  goal met  -CJ  --  goal met  -CJ       Toileting Goal 2 (OT-IRF)    Progress/Outcomes (Toileting Goal 2, OT-IRF)  goal met  -CJ  --  --      User Key  (r) = Recorded By, (t) = Taken By, (c) = Cosigned By    Initials Name Provider Type    Laya Yusuf OT Occupational Therapist          Occupational Therapy Education     Title: PT OT SLP Therapies (Resolved)     Topic: Occupational Therapy (Resolved)     Point: ADL training (Resolved)     Description: Instruct learner(s) on proper safety adaptation and remediation  techniques during self care or transfers.   Instruct in proper use of assistive devices.    Learning Progress Summary           Patient Eager, E,D,H, VU by SHARON at 10/16/2019 12:26 PM    Eager, E,D, DU,VU,NR by JONATHAN at 10/15/2019  9:36 AM    Eager, E, NR by JONATHAN at 10/14/2019 12:22 PM    Acceptance, E,TB, VU by MONICA at 10/12/2019  9:42 PM    Acceptance, E,TB, VU by MONICA at 10/12/2019 12:08 AM    Acceptance, E,D, NR by SHARON at 10/11/2019 12:27 PM    Acceptance, E, VU,NR by  at 10/11/2019  8:42 AM    Acceptance, E,TB, VU by MONICA at 10/11/2019 12:42 AM    Acceptance, E,D, NR by  at 10/10/2019  2:35 PM    Acceptance, E,D, NR by AB at 10/9/2019  3:29 PM    Acceptance, E,D, NR by  at 10/9/2019 12:30 PM    Acceptance, E,D, NR by JONATHAN at 10/8/2019 10:10 AM    Acceptance, E,D, NR by  at 10/7/2019 12:22 PM    Acceptance, E,D, NR by  at 10/4/2019  3:24 PM    Acceptance, E,D, NR by  at 10/3/2019  3:08 PM    Acceptance, E,D, NR by  at 10/2/2019  3:17 PM   Family Eager, E,D,H, VU by  at 10/16/2019 12:26 PM                   Point: Home exercise program (Resolved)     Description: Instruct learner(s) on appropriate technique for monitoring, assisting and/or progressing therapeutic exercises/activities.    Learning Progress Summary           Patient Eager, E,D,H, VU by SHARON at 10/16/2019 12:26 PM    Eager, E, NR by JONATHAN at 10/14/2019 12:22 PM    Acceptance, E,TB, VU by MONICA at 10/12/2019  9:42 PM    Acceptance, E,TB, VU by MONICA at 10/12/2019 12:08 AM    Acceptance, E, VU,NR by  at 10/11/2019  8:42 AM    Acceptance, E,TB, VU by MONICA at 10/11/2019 12:42 AM   Family Eager, SUSAN,VICKI,H, VU by  at 10/16/2019 12:26 PM                               User Key     Initials Effective Dates Name Provider Type Discipline    AB 04/03/18 -  Jennifer Smith OT Occupational Therapist OT     06/16/16 -  Cecelia Hare, RN Registered Nurse Nurse     06/16/16 -  Rut Perrin RN Registered Nurse Nurse     04/03/18 -  Laya Harrison OT  Occupational Therapist OT    JONATHAN 03/07/18 -  Conrado James OTR Occupational Therapist OT                  OT Recommendation and Plan  Anticipated Equipment Needs At Discharge (OT Eval): (TBD)  Anticipated Discharge Disposition (OT): home with 24/7 care, home with home health  Planned Therapy Interventions (OT Eval): activity tolerance training, BADL retraining, occupation/activity based interventions, ROM/therapeutic exercise, strengthening exercise, patient/caregiver education/training, transfer/mobility retraining             Time Calculation:    Time Calculation- OT     Row Name 10/16/19 1228             Time Calculation- OT    Total Timed Code Minutes- OT  10 minute(s)  -      OT Non-Billable Time (min)  25 min  -        User Key  (r) = Recorded By, (t) = Taken By, (c) = Cosigned By    Initials Name Provider Type     Laya Harrison OT Occupational Therapist          Therapy Charges for Today     Code Description Service Date Service Provider Modifiers Qty    [id number] HC OT SELF CARE/MGMT/TRAIN EA 15 MIN 10/16/2019 Laya Harrison OT GO 1               OT Discharge Summary  Anticipated Discharge Disposition (OT): home with 24/7 care, home with home health  Reason for Discharge: Discharge from facility  Discharge Destination: Home with assist, Home with home health    Laya Harrison OT  10/16/2019

## 2019-10-16 NOTE — PROGRESS NOTES
Inpatient Rehabilitation Functional Measures Assessment    Functional Measures  PUJA Eating:  PUJA Grooming:  PUJA Bathing:  PUJA Upper Body Dressing:  PUJA Lower Body Dressing:  PUJA Toileting:    PUJA Bladder Management  Level of Assistance:  Frequency/Number of Accidents this Shift:    PUJA Bowel Management  Level of Assistance:  Frequency/Number of Accidents this Shift:    PUJA Bed/Chair/Wheelchair Transfer:  PUJA Toilet Transfer:  PUJA Tub/Shower Transfer:    Previously Documented Mode of Locomotion at Discharge:  PUJA Expected Mode of Locomotion at Discharge:  PUJA Walk/Wheelchair:  PUJA Stairs:    PUJA Comprehension:  Both ( auditory and visual) modes of comprehension are used  equally. Patient does not comprehend complex/abstract information in their  primary language without assistance from a helper. Comprehension Score = 3,  Moderate Prompting. Patient comprehends basic daily needs or ideas 50-74% of the  time. Patient requires moderate/some prompting. No assistive devices were  required.  PUJA Expression:  Both ( vocal and non-vocal) modes of expression are used  equally. Patient does not express complex/abstract information in their primary  language without a helper. Expression Score = 4, Minimal Prompting. Patient  expresses basic daily needs or ideas 75-90% of the time.  Patient requires  minimal/occasional prompting. No assistive devices were required.  PUJA Social Interaction:  Social Interaction Score = 6, Modified Independent.  Patient is modified independent for social interaction, requiring: Requires  additional time.  PUJA Problem Solving:  Patient does not make appropriate decisions in order to  solve complex problems without assistance from a helper. Problem Solving Score =  3, Moderate Direction. Patient makes appropriate decisions in order to solve  routine problems 50-74% of the time. Patient requires moderate/some direction  for the following behavior(s): Decreased awareness of performance. Exhibits  poor  planning. Impulsivity. Poor judgment.  PUJA Memory:  Memory Score = 3, Moderate Prompting. Patient recognizes and  remembers 50-74% of the time. Patient requires moderate/some prompting  for  memory for the following: Difficulty recognizing hospital staff as persons  previously met. Disoriented to person, place, time or situation. Limited recall  of daily routine.    Therapy Mode Minutes  Occupational Therapy:  Physical Therapy:  Speech Language Pathology:    Discharge Functional Goals:    Signed by: Hien Daniel Nurse

## 2019-10-16 NOTE — SIGNIFICANT NOTE
10/16/19 0793   Plan   Plan Spoke to Taylor with [facility] [phone] who states talking to Josie Massey, Nurse Practitioner, about signing home health orders; Josie spoke to Dr. Parker who is not willing to sign any HH orders until pt is seen in their office.  Pt has an appointment with Josie on 10-22-19 at 1:30 pm.  Taylor with [facility] says she will need to cancel HH referral and wait until pt is seen by Josie to determine if she wants to send referral.  MD is aware of this and says he is willing to see pt in his office if pt wants to receive HH services.  Spoke to pt and granddaughter Katelyn about discharge and reviewed this information with them.  Pt prefers to see Josie Massey.  Roblero Clermacho contacted Josie Massey's office and she can see pt tomorrow at 1:15 pm.   Contacted Taylor with [facility]  [phone] about new appointment with Josie Massey.  HH will not be able to see pt until she is seen by Josie and will have to get a referral from her.  SS explained this to pt and granddaughter.  Granddaughter is providing transportation home.     Patient/Family in Agreement with Plan yes

## 2019-10-16 NOTE — PROGRESS NOTES
Patient Assessment Instrument  Quality Indicators - Discharge    Section GG. Self-Care Performance     Eating: [place] sets up or cleans up; patient completes activity. [place] assists  only prior to or following the activity.   Oral Hygiene: [place] sets up or cleans up; patient completes activity. [place]  assists only prior to or following the activity.   Toileting Hygiene: : [place] provides verbal cues and/or touching/steadying  and/or contact guard assistance as patient completes activity.   Shower/Bathe Self: [place] provides verbal cues and/or touching/steadying and/or  contact guard assistance as patient completes activity.   Upper Body Dressing: [place] provides verbal cues and/or touching/steadying  and/or contact guard assistance as patient completes activity.   Lower Body Dressing: [place] provides verbal cues and/or touching/steadying  and/or contact guard assistance as patient completes activity.   Putting On/Taking Off Footwear: [place] provides verbal cues and/or  touching/steadying and/or contact guard assistance as patient completes  activity.    Section GG. Mobility Performance      Section J. Health Conditions Discharge      Section M. Skin Conditions Discharge      . Current Number of Unhealed Pressure Ulcers      Section N. Medication    Signed by: Laya Harrison, Occupational Therapist

## 2019-10-16 NOTE — PROGRESS NOTES
Patient Assessment Instrument  Quality Indicators - Discharge    Section GG. Self-Care Performance      Section GG. Mobility Performance      Section J. Health Conditions Discharge  Fall(s) Since Admission:  No    Section M. Skin Conditions Discharge  Unhealed Pressure Ulcer(s)/Injurie(s) at Stage 1 or Higher:  No    . Current Number of Unhealed Pressure Ulcers      Section N. Medication    Signed by: Hien Daniel Nurse

## 2019-10-16 NOTE — SIGNIFICANT NOTE
10/16/19 0935   Plan   Plan Contacted [facility] [phone] per Shay who states  Pharmacist provided pt with an Eliquis 30 day free trial card for Eliquis 5 mg BID.  Contacted [facility] [phone] per Veronica about discharge.   will start care on 10-17-19.  Faxed discharge summary with face to face to [phone].  Pt is going home today with family providing 24 hour assistance.   Final Discharge Disposition Code 06 - home with home health care

## 2019-10-16 NOTE — PROGRESS NOTES
Inpatient Rehabilitation Functional Measures Assessment    Functional Measures  PUJA Eating:  PUJA Grooming:  PUJA Bathing:  PUJA Upper Body Dressing:  PUJA Lower Body Dressing:  PUJA Toileting:    PUJA Bladder Management  Level of Assistance:  Frequency/Number of Accidents this Shift:    PUJA Bowel Management  Level of Assistance:  Frequency/Number of Accidents this Shift:    PUJA Bed/Chair/Wheelchair Transfer:  Bed/chair/wheelchair Transfer did not occur.  .  PUJA Toilet Transfer:  PUJA Tub/Shower Transfer:    Previously Documented Mode of Locomotion at Discharge:  PUJA Expected Mode of Locomotion at Discharge:  PUJA Walk/Wheelchair:  WHEELCHAIR OBSERVATION   Wheelchair did not occur.    WALK OBSERVATION   Walk Distance Scale = 2.  Distance walked is 50 -149 feet. Walk Score = 2.  Patient performs 75% or more of effort and requires minimal assistance.  Incidental assistance, contact guard or steadying was provided. Patient walked a  distance of 100 feet. Patient requires the following assistive device(s):  handeld .  PUJA Stairs:  Stairs did not occur.    PUJA Comprehension:  PUJA Expression:  PUJA Social Interaction:  PUJA Problem Solving:  PUJA Memory:    Therapy Mode Minutes  Occupational Therapy:  Physical Therapy: Individual: 25 minutes.  Speech Language Pathology:    Discharge Functional Goals:    Signed by: Bita Greene PTA

## 2019-10-16 NOTE — DISCHARGE SUMMARY
Date of Admission: 10/1/2019  Date of Discharge:  10/16/2019    Discharge Diagnosis:   Debility secondary to what seems most consistent with hypoxic encephalopathy  Type 2 diabetes mellitus with recent history of DKA with hyperglycemia  Known coronary artery disease with history of non-ST elevation acute myocardial infarction  Chronic hypoxic respiratory failure  Urinary tract infection status post treatment  Chronic atrial fibrillation  Hypertension  Stage III chronic kidney disease      Brief  Pre-rehab history:    Patient is a 74 y.o. female presented to [facility] on September 22 she was found at home unresponsive by her son with emesis noted in her bed.  Per Documentation, upon initial evaluation was noted to be hyerglycemic and son administered insulin without improvement and EMS was activated with transfer to [facility] emergency room for further evaluation.  At this point patient was intubated for airway protection and found to have a temperature of 103.9, elevated troponin, uncontrolled hypertension, acute on chronic renal failure and hyperglycemic.  Prior to transfer she had an one episode of hematemesis and  roving eye movements concerning for seizures.  She was started on Keppra, broad-spectrum antibiotics, and Protonix.  She was transferred to [facility] for higher level of care.  Upon evaluation was transferred to the CCU and Neurology was consulted.  Initial chest x-ray was not suggestive of pneumonia and EEG demonstrated no epileptiform or periodic discharges.  MRI of the brain was unremarkable for any acute findings.   Sputum cultures were consistent with Pseudomonas, urine cultures consistent with E. Coli and empiric antibiotics were discontinued and transitioned to Levaquin, which she has completed.  Blood pressure continued to improve.  She was treated for sepsis with shock, DKA, acute on chronic respiratory failure, and metabolic encephalopathy and has  improved ( BG this AM noted at 119).  She was also evaluated by GI for episode of hematemesis, yet no further episodes.  She was able to be successfully extubated on September 24.  Speech therapy did evaluate her with FEES on September 26 with noted mild, moderate pharyngeal dysfunction with recommendations for mechanical soft diet with mixed consistencies, nectar thick liquids, and cognitive therapy.  Renal function remains at baseline.  She has had no further episodes of emesis, no abdominal pain.  She has been participating with physical and occupational therapy and recommended for inpatient rehabilitation.      Hospital Course    This patient was admitted to acute inpatient rehab with diagnoses as listed above.  During the hospitalization she participated with physical therapy and Occupational Therapy.  She also had speech therapy evaluation.  During her hospital course she did exhibit symptoms consistent with hypoxic encephalopathy.  She had some agitation.  She has been started on Depakote.  Her blood sugar medications have been adjusted as her sugars are somewhat erratic.  I tried to wean her off of insulin and place her on glipizide but her sugars jumped greater than 300 and she is now back on insulin with blood sugar this morning 150.  Sugars continue to be labile and will require ongoing adjustment.  By the time of discharge the patient requires contact-guard assist with bed mobility and standby assist contact-guard assist with transfers.  Ambulating 300 feet with use of front wheeled walker.    At discharge the patient continues to be homebound because of limited mobility, need for assistive device assistance management as well as depending on other persons for transportation.  She is going to require home health services at discharge.  She will have 24-hour caregiver availability at discharge.  Patient will require physical therapy 2-3 times weekly for 8 weeks for strengthening, endurance, gait training,  transfer training, balance, therapeutic exercise, bed mobility, home safety, coordination and management of steps and stairs.  Occupational Therapy 2-3 times weekly for 8 weeks for ADL retraining, home safety, functional mobility, strengthening, coordination, 8 evaluation for bathing and dressing and nursing evaluation for medication and disease education and management.    The patient will require rolling walker at discharge but she already has that at home     Pertinent Test Results:   Lab Results   Component Value Date    WBC 6.92 10/15/2019    HGB 11.4 (L) 10/15/2019    HCT 36.5 10/15/2019    MCV 89.5 10/15/2019     10/15/2019     Lab Results   Component Value Date    GLUCOSE 282 (H) 10/15/2019    CALCIUM 9.1 10/15/2019     10/15/2019    K 4.7 10/15/2019    CO2 24.1 10/15/2019    CL 97 (L) 10/15/2019    BUN 49 (H) 10/15/2019    CREATININE 1.43 (H) 10/15/2019    EGFRIFNONA 36 (L) 10/15/2019    BCR 34.3 (H) 10/15/2019    ANIONGAP 15.9 (H) 10/15/2019     Lab Results   Component Value Date    ALT 7 10/02/2019    AST 13 10/02/2019                                            Physical Exam:     General Appearance:    Alert, cooperative, in no acute distress.  Thin and frail.   Head:    Normocephalic, without obvious abnormality, atraumatic   Eyes:            Lids and lashes normal, conjunctivae and sclerae normal, no   icterus, no pallor, corneas clear, PERRLA   Ears:    Ears appear intact with no abnormalities noted   Throat:   No oral lesions, no thrush, oral mucosa moist   Neck:   No adenopathy, supple, trachea midline, no thyromegaly, no   carotid bruit, no JVD   Back:     No kyphosis present, no scoliosis present, no skin lesions,      erythema or scars, no tenderness to percussion or                   palpation,   range of motion normal   Lungs:     Clear to auscultation,respirations regular, even and                  unlabored    Heart:    Regular rhythm and normal rate, normal S1 and S2, no             murmur, no gallop, no rub, no click   Chest Wall:    No abnormalities observed   Abdomen:     Normal bowel sounds, no masses, no organomegaly, soft        non-tender, non-distended, no guarding, no rebound                tenderness   Rectal:   Deferred   Extremities:   Moves all extremities well, no edema, no cyanosis, no             redness   Pulses:   Pulses palpable and equal bilaterally   Skin:   No bleeding, bruising or rash   Lymph nodes:   No palpable adenopathy   Neurologic:   Cranial nerves 2 - 12 grossly intact, sensation intact, DTR       present and equal bilaterally       Discharge Disposition  Home or Self Care    Discharge Medications     Discharge Medications      New Medications      Instructions Start Date   amLODIPine 10 MG tablet  Commonly known as:  NORVASC   10 mg, Oral, Every 24 Hours Scheduled      divalproex 500 MG 24 hr tablet  Commonly known as:  DEPAKOTE   500 mg, Oral, Nightly      linagliptin 5 MG tablet tablet  Commonly known as:  TRADJENTA   5 mg, Oral, Daily      pantoprazole 40 MG EC tablet  Commonly known as:  PROTONIX   40 mg, Oral, Daily      thiamine 100 MG tablet  Commonly known as:  VITAMIN B1   100 mg, Oral, Daily         Changes to Medications      Instructions Start Date   hydrALAZINE 50 MG tablet  Commonly known as:  APRESOLINE  What changed:    · medication strength  · how much to take  · when to take this   50 mg, Oral, Every 8 Hours Scheduled      insulin glargine 100 UNIT/ML injection  Commonly known as:  LANTUS  What changed:  how much to take   8 Units, Subcutaneous, Daily         Continue These Medications      Instructions Start Date   aspirin 81 MG EC tablet   81 mg, Oral, Daily      atorvastatin 40 MG tablet  Commonly known as:  LIPITOR   40 mg, Oral, Nightly      ELIQUIS 5 MG tablet tablet  Generic drug:  apixaban   5 mg, Oral, 2 Times Daily      metoprolol tartrate 100 MG tablet  Commonly known as:  LOPRESSOR   100 mg, Oral, Every 12 Hours Scheduled       montelukast 10 MG tablet  Commonly known as:  SINGULAIR   10 mg, Oral, Nightly         Stop These Medications    bumetanide 1 MG tablet  Commonly known as:  BUMEX     insulin lispro 100 UNIT/ML injection  Commonly known as:  humaLOG              Discharge Diet:    Diet Orders (active) (From admission, onward)    Start     Ordered    10/07/19 1800  Dietary Nutrition Supplements Boost Plus (Ensure Enlive, Ensure Plus)  Daily With Dinner      10/07/19 1457    10/01/19 1731  Diet Regular; Consistent Carbohydrate  Diet Effective Now      10/01/19 1730          Follow-up Appointments  Your Scheduled Appointments     MS. JHAVERI HAS AN APPOINTMENT TO SEE ISABEL ODONNELL ON: 10- 22 @ 1:30  [phone]               Additional Instructions for the Follow-ups that You Need to Schedule     Ambulatory Referral to Home Health   As directed      Face to Face Visit Date:  10/16/2019    Follow-up provider for Plan of Care?:  I treated the patient in an acute care facility and will not continue treatment after discharge.    Follow-up provider:  ISABEL ODONNELL [[id number]]    Reason/Clinical Findings:  Encephalopathy; Diabetes    Describe mobility limitations that make leaving home difficult:  Impaired balance, impaired safety awareness, unable to drive    Nursing/Therapeutic Services Requested:  Skilled Nursing Physical Therapy Occupational Therapy    Skilled nursing orders:  Medication education    Frequency:  1 Week 1             Total time spent on date of discharge has been 45 minutes     Samuel Duane Kreis, MD  10/16/19  9:23 AM

## 2019-10-16 NOTE — PROGRESS NOTES
Case Management  Inpatient Rehabilitation Team Conference    Conference Date/Time: 10/15/2019 7:29:55 AM    Team Conference Attendees:  MD Lennie Clancy, ARLYN Saleh, RN,   ALDO Boateng, PT   Conrado James OT    Demographics            Age: 74Y            Gender: Female    Admission Date: 10/1/2019 4:18:00 PM  Rehabilitation Diagnosis:  s/p debility  Comorbidities: E11.10 Type 2 diabetes mellitus with ketoacidosis without coma  J96.21 Acute and chronic respiratory failure with hypoxia  N39.0 Urinary tract infection, site not specified  I48.91 Unspecified atrial fibrillation  I25.10 Atherosclerotic heart disease of native coronary artery without angina  pectoris  I12.9 Hypertensive chronic kidney disease with stage 1 through stage 4 chronic  kidney disease, or unspecified chronic kidney disease  E11.22 Type 2 diabetes mellitus with diabetic chronic kidney disease  N18.9 Chronic kidney disease, unspecified      Plan of Care  Anticipated Discharge Date/Estimated Length of Stay: 10-16-19  Anticipated Discharge Destination: Community discharge with assistance  Discharge Plan : Discharge plans to be determined based on how pt progresses in  therapy.  Son wants pt to be able to return home at discharge if possible.  Medical Necessity Expected Level Rationale: good  Intensity and Duration: an average of 3 hours/5 days per week  Medical Supervision and 24 Hour Rehab Nursing: x  Physical Therapy: x  PT Intensity/Duration: PT 1-1.5 hours per day/5 days per week  Occupational Therapy: x  OT Intensity/Duration: OT 1-1.5 hours per day/5 days per week  Speech and Language Therapy: x  SLP Intensity/Duration: SLP 30 mins-90 mins per day/5 days per week  Social Work: x  Therapeutic Recreation: x  Respiratory Therapy: x  Updated (if changes indicated)  No changes to plan.      Discharge Plan of Care: Home Health Services Physical Therapy strengthening,  endurance, gait  training, transfer training, balance, therapeutic exercise, bed  mobility, home safety, coordination, steps/stairs 2 times per week for 4 weeks  Occupational Therapy ADL re-training, home safety, functional mobility,  strengthening, coordination 2 times per week for 4 weeks, Nurses Aide for  bathing/personal care 2-3 times per week, Nursing evaluation for medication  education.    Based on the patient's medical and functional status, their prognosis and  expected level of functional improvement is: fair-good      Interdisciplinary Problem/Goals/Status  Body Function Structure    [RN] Skin Integrity(Active)  Current Status(10/01/2019): MASD to buttocks  Weekly Goal(10/31/2019): healing of skin  Discharge Goal: healed skin        Mobility    [PT] Bed/Chair/Wheelchair(Active)  Current Status(10/14/2019): CGA/SBA  Weekly Goal(10/21/2019): SBA with AAD  Discharge Goal: mod indep w/ AAD    [PT] Walk(Active)  Current Status(10/14/2019): 300' RW CGA/SBA  Weekly Goal(10/21/2019): 300' SBA AAD  Discharge Goal: 300' Mod independent AAD    [PT] Stairs(Active)  Current Status(10/14/2019): 10 steps  2 HR min A  Weekly Goal(10/21/2019): 10 stairs, SBA, 2 handrails  Discharge Goal: 15 stairs, 2 handrails, SBA        Safety    [RN] Potential for Injury(Active)  Current Status(10/01/2019): falls risk  Weekly Goal(10/31/2019): no falls  Discharge Goal: no falls this admission        Self Care    [OT] Dressing (Lower)(Active)  Current Status(10/14/2019): Juyl  Weekly Goal(10/22/2019): CGA  Discharge Goal: CGA    [OT] Toileting(Active)  Current Status(10/14/2019): July/CGA  Weekly Goal(10/22/2019): CGA  Discharge Goal: CGA    Comments: Pt plans to return home at discharge with the assistance of adult  grandchildren.    Signed by: Coral Saleh, Supervisor    Physician CoSigned By: Edward Austin 10/16/2019 11:12:45

## 2019-10-16 NOTE — PROGRESS NOTES
Inpatient Rehabilitation Functional Measures Assessment    Functional Measures  PUJA Eating:  PUJA Grooming: Patient requires moderate assistance for washing, rinsing and  drying the face. Patient requires moderate assistance for washing, rinsing and  drying the hands. Patient requires moderate assistance for brushing teeth.  Patient requires moderate assistance for brushing/combing hair. Shaving or  applying makeup was not observed for this patient. Patient performs 0 -  24% of  grooming tasks.  Grooming Score = 1, Total Assistance. No assistive devices were  required.  PUJA Bathing:  Patient bathed in bed. Patient requires moderate assistance for  washing, rinsing, or drying the right arm. Patient requires moderate assistance  for washing, rinsing, or drying the left arm. Patient requires moderate  assistance for washing, rinsing, or drying the chest. Patient requires moderate  assistance for washing, rinsing, or drying the abdomen. Patient requires  moderate assistance for washing, rinsing, or drying the perineal area. Patient  requires total assistance for washing, rinsing, or drying the buttocks. Patient  requires moderate assistance for washing, rinsing, or drying the right upper  leg. Patient requires moderate assistance for washing, rinsing, or drying the  left upper leg. Patient requires total assistance for washing, rinsing, or  drying the right lower leg, including the foot. Patient requires total  assistance for washing, rinsing, or drying the left lower leg, including the  foot. Patient performs 0 -  24% of bathing tasks.  Bathing Score = 1, Total  Assistance. No assistive devices were required.  PUJA Upper Body Dressing:  Upper Body Dressing was not observed this shift  because patient dressed/undressed in pajamas/gown only. .  PUJA Lower Body Dressing:  Patient requires total assistance for gathering  clothes. Wearing underwear or an undergarment was not observed for this patient.  Patient requires total  assistance for holding clothing and/or threading the  right leg through the pants/skirt. Patient requires total assistance for holding  clothing and/or threading the left leg through the pants/skirt. Patient requires  total assistance for holding clothing and/or pulling pants/skirt over hips and  adjusting fasteners. Patient requires total assistance for holding clothing  and/or donning and/or doffing right sock. Patient requires total assistance for  holding clothing and/or donning and/or doffing left sock. Donning and/or doffing  right shoe was not observed for this patient. Donning and/or doffing left shoe  was not observed for this patient. Patient performs 0 -  24% of lower body  dressing tasks. Lower Body Dressing  Score = 1, Total Assistance. No assistive  devices were required.  PUJA Toileting:  Toileting Score = 4.  Patient requires minimal assistance for  toileting, such as steadying for balance while cleansing or adjusting clothes.  Patient requires the following assistive device(s): Grab bar.    PUJA Bladder Management  Level of Assistance:  Bladder Score = 5.  Patient is supervision/set-up for  bladder management, requiring: Stand by assistance. Patient requires the  following assistive device(s):  Adult brief.  Frequency/Number of Accidents this Shift:    PUJA Bowel Management  Level of Assistance:  Frequency/Number of Accidents this Shift:    PUJA Bed/Chair/Wheelchair Transfer:  Bed/chair/wheelchair Transfer Score = 3.  Patient performs 50-74% of effort and requires moderate assistance (some  lifting)  for transferring to and from the bed/chair/wheelchair, including  assist lifting both legs. Patient requires the following assistive device(s):  Bed rails. Elevated head of bed. Seating system of wheelchair.  PUJA Toilet Transfer:  Toilet Transfer Score = 3.  Patient performs 50-74% of  effort and requires moderate assistance (some lifting) for transferring to and  from the toilet/commode. Patient requires  the following assistive device(s):  Grab bars. Safety frame/over the toilet.  PUJA Tub/Shower Transfer:    Previously Documented Mode of Locomotion at Discharge:  PUJA Expected Mode of Locomotion at Discharge:  [facility] Walk/Wheelchair:  PUJA Stairs:    [facility] Comprehension:  PUJA Expression:  PUJA Social Interaction:  [facility] Problem Solving:  PUJA Memory:    Therapy Mode Minutes  Occupational Therapy:  Physical Therapy:  Speech Language Pathology:    Discharge Functional Goals:    Signed by: JAI Mckeon

## 2019-10-16 NOTE — PLAN OF CARE
Problem: Patient Care Overview  Goal: Plan of Care Review  Outcome: Ongoing (interventions implemented as appropriate)      Problem: Fall Risk (Adult)  Goal: Absence of Fall  Outcome: Ongoing (interventions implemented as appropriate)      Problem: Skin Injury Risk (Adult)  Goal: Skin Health and Integrity  Outcome: Ongoing (interventions implemented as appropriate)      Problem: Wound (Includes Pressure Injury) (Adult)  Goal: Signs and Symptoms of Listed Potential Problems Will be Absent, Minimized or Managed (Wound)  Outcome: Ongoing (interventions implemented as appropriate)      Problem: Functional Mobility Impairment (IRF) (Adult)  Goal: Optimal/Safe Level of [place] with Mobility  Outcome: Ongoing (interventions implemented as appropriate)      Problem: Diabetes, Type 2 (Adult)  Goal: Signs and Symptoms of Listed Potential Problems Will be Absent, Minimized or Managed (Diabetes, Type 2)  Outcome: Ongoing (interventions implemented as appropriate)      Problem: Safety Awareness Impairment (IRF) (Adult)  Goal: Optimal Level of Safety Awareness, All Environments  Outcome: Ongoing (interventions implemented as appropriate)

## 2019-10-16 NOTE — PROGRESS NOTES
Patient Assessment Instrument  Quality Indicators - Discharge    Section GG. Self-Care Performance      Section GG. Mobility Performance      Section J. Health Conditions Discharge      Section M. Skin Conditions Discharge      . Current Number of Unhealed Pressure Ulcers      Section N. Medication    Medication Intervention: N/A - There were no potential clinically significant  medication issues identified since admission or patient is not taking any  medications.    Signed by: Coral Saleh, Supervisor

## 2019-10-16 NOTE — PLAN OF CARE
Problem: Patient Care Overview  Goal: Plan of Care Review  Outcome: Outcome(s) achieved Date Met: 10/16/19    Goal: Individualization and Mutuality  Outcome: Outcome(s) achieved Date Met: 10/16/19    Goal: Discharge Needs Assessment  Outcome: Outcome(s) achieved Date Met: 10/16/19    Goal: Home Safety Plan  Outcome: Outcome(s) achieved Date Met: 10/16/19    Goal: Coping Plan  Outcome: Outcome(s) achieved Date Met: 10/16/19    Goal: Community Reintegration Plan  Outcome: Outcome(s) achieved Date Met: 10/16/19      Problem: Fall Risk (Adult)  Goal: Absence of Fall  Outcome: Outcome(s) achieved Date Met: 10/16/19      Problem: Skin Injury Risk (Adult)  Goal: Skin Health and Integrity  Outcome: Outcome(s) achieved Date Met: 10/16/19      Problem: Wound (Includes Pressure Injury) (Adult)  Goal: Signs and Symptoms of Listed Potential Problems Will be Absent, Minimized or Managed (Wound)  Outcome: Outcome(s) achieved Date Met: 10/16/19      Problem: Functional Mobility Impairment (IRF) (Adult)  Goal: Optimal/Safe Level of [place] with Mobility  Outcome: Outcome(s) achieved Date Met: 10/16/19      Problem: Diabetes, Type 2 (Adult)  Goal: Signs and Symptoms of Listed Potential Problems Will be Absent, Minimized or Managed (Diabetes, Type 2)  Outcome: Outcome(s) achieved Date Met: 10/16/19      Problem: Safety Awareness Impairment (IRF) (Adult)  Goal: Optimal Level of Safety Awareness, All Environments  Outcome: Outcome(s) achieved Date Met: 10/16/19

## 2019-10-16 NOTE — PLAN OF CARE
Problem: Patient Care Overview  Goal: Plan of Care Review  Outcome: Ongoing (interventions implemented as appropriate)      Problem: Fall Risk (Adult)  Goal: Absence of Fall  Outcome: Ongoing (interventions implemented as appropriate)      Problem: Skin Injury Risk (Adult)  Goal: Skin Health and Integrity  Outcome: Ongoing (interventions implemented as appropriate)      Problem: Functional Mobility Impairment (IRF) (Adult)  Goal: Optimal/Safe Level of [place] with Mobility  Outcome: Ongoing (interventions implemented as appropriate)

## 2019-10-16 NOTE — SIGNIFICANT NOTE
10/16/19 1148   PT Discharge Summary   Reason for Discharge Discharge from facility   Outcomes Achieved Patient able to partially acheive established goals   Discharge Destination Home with assist;Home with home health

## 2019-10-16 NOTE — PROGRESS NOTES
Inpatient Rehabilitation Functional Measures Assessment    Functional Measures  PUJA Eating:  Eating Score = 5. Patient is supervision/set-up for eating,  requiring: Opening containers. Buttering bread. Cutting meat. No assistive  devices were required.  PUJA Grooming: Grooming Score = 5. Patient is supervision/set-up for grooming,  requiring: No assistive devices were required.  PUJA Bathing:  Patient bathed in bed. Patient requires moderate assistance for  washing, rinsing, or drying the right arm. Patient requires moderate assistance  for washing, rinsing, or drying the left arm. Patient requires moderate  assistance for washing, rinsing, or drying the chest. Patient requires moderate  assistance for washing, rinsing, or drying the abdomen. Patient requires maximal  assistance for washing, rinsing, or drying the perineal area. Patient requires  maximal assistance for washing, rinsing, or drying the buttocks. Patient  requires moderate assistance for washing, rinsing, or drying the right upper  leg. Patient requires moderate assistance for washing, rinsing, or drying the  left upper leg. Patient requires maximal assistance for washing, rinsing, or  drying the right lower leg, including the foot. Patient requires maximal  assistance for washing, rinsing, or drying the left lower leg, including the  foot. Patient performs 0 -  24% of bathing tasks.  Bathing Score = 1, Total  Assistance. No assistive devices were required.  PUJA Upper Body Dressing:  Patient requires total assistance for gathering  clothes. Wearing a bra or undershirt was not applicable for this patient.  Patient requires moderate/considerable physical assistance for threading the  right arm through the garment (shirt/sweater). Patient requires  moderate/considerable physical assistance for threading the left arm through the  garment (shirt/sweater). Patient requires moderate/considerable physical  assistance for pulling an over-head-garment over head or  pulling  front-fastening-garment around back. Patient requires moderate/considerable  physical assistance for pulling an over-head-garment down the trunk or  adjusting/fastening together a front-fastening-garment. Patient performs 60 % of  upper body dressing tasks. Upper Body Dressing Score = 3, Moderate Assistance.  No assistive devices were required.  PUJA Lower Body Dressing:  Patient requires total assistance for gathering  clothes. Wearing underwear or an undergarment is not applicable for this  patient. Patient requires moderate/considerable physical assistance for  threading the right leg through the pants/skirt. Patient requires  moderate/considerable physical assistance for threading the left leg through the  pants/skirt. Patient requires moderate/considerable physical assistance for  pulling pants/skirt over hips and adjusting fasteners. Patient requires  moderate/considerable physical assistance for donning and/or doffing right sock.  Patient requires moderate/considerable physical assistance for donning and/or  doffing left sock. Patient requires maximal/significant physical assistance for  holding clothing and/or donning and/or doffing right shoe. Patient requires  maximal/significant physical assistance for holding clothing and/or donning  and/or doffing left shoe. Patient performs 37.5 % of lower body dressing tasks.  Lower Body Dressing Score = 2, Maximal Assistance. No assistive devices were  required.  PUJA Toileting:  Patient requires moderate assistance for adjusting clothing  before using a toilet, commode, bedpan, or urinal. Patient requires moderate  assistance for hygiene. Patient requires moderate assistance for adjusting  clothing after using a toilet, commode, bedpan, or urinal. Patient performs 0 -  24% of toileting tasks.  Toileting Score = 1, Total Assistance. No assistive  devices were required.    PUJA Bladder Management  Level of Assistance:  Bladder Score = 5.  Patient is  supervision/set-up for  bladder management, requiring: Patient requires the following assistive  device(s):  Adult brief.  Frequency/Number of Accidents this Shift:  Bladder accidents this shift:  1 .    PUJA Bowel Management  Level of Assistance: Bowel Score = 7.  Patient is completely independent for  bowel management.  Patient did not have bowel movement.  No  medication/intervention was provided.  Frequency/Number of Accidents this Shift: Bowel accidents this shift: 0 .  Patient has not had an accident, but used a device/medication this shift  requiring: brief .    PUJA Bed/Chair/Wheelchair Transfer:  Bed/chair/wheelchair Transfer Score = 3.  Patient performs 50-74% of effort and requires moderate assistance (some  lifting) for transferring to and from the bed/chair/wheelchair. No assistive  devices were required.  PUJA Toilet Transfer:  Toilet Transfer Score = 3.  Patient performs 50-74% of  effort and requires moderate assistance (some lifting) for transferring to and  from the toilet/commode. Patient requires the following assistive device(s):  Grab bars.  PUJA Tub/Shower Transfer:  Activity was not observed.    Previously Documented Mode of Locomotion at Discharge:  PUJA Expected Mode of Locomotion at Discharge:  PUJA Walk/Wheelchair:  PUJA Stairs:    PUJA Comprehension:  PUJA Expression:  PUJA Social Interaction:  PUJA Problem Solving:  PUJA Memory:    Therapy Mode Minutes  Occupational Therapy:  Physical Therapy:  Speech Language Pathology:    Discharge Functional Goals:    Signed by: JAI Jefferson

## 2019-10-16 NOTE — PROGRESS NOTES
Rehabilitation Nursing  Inpatient Rehabilitation Plan of Care Note    Plan of Care  Copy from POC    Body Function Structure    Skin Integrity (Active)  Current Status (10/1/2019 5:00:00 PM): MASD to buttocks  Weekly Goal: healing of skin  Discharge Goal: healed skin    Safety    Potential for Injury (Active)  Current Status (10/1/2019 5:00:00 PM): falls risk  Weekly Goal: no falls  Discharge Goal: no falls this admission    RN Interventions    Safety -  RN: falls prevention protocol and education  [RN]  Team: call light within reach, nonskid socks, bedside items with in reach,  [RN]  RN: BED ALARM [RN]    Body Function Structure -  RN: skin inspection every shift and prn  [RN]  RN: zguard to buttocks every shift and wound care consult  [RN]    Signed by: Nurse Millicent

## 2019-10-16 NOTE — PROGRESS NOTES
Inpatient Rehabilitation Functional Measures Assessment    Functional Measures  PUJA Eating:  Eating Score = 5. Patient is supervision/set-up for eating,  requiring: Opening containers. No assistive devices were required.  PUJA Grooming: Grooming Score = 5. Patient is supervision/set-up for grooming,  requiring: Initial preparation. Verbal cuing, prompting, or instructing. Stand  by assistance. No assistive devices were required.  PUJA Bathing:  Patient took sponge bath. Bathing Score = 4.  Patient requires  minimal assistance for bathing, requiring steadying for balance only. Patient  requires the following assistive device(s): Grab bar/arm rest to maintain  balance.  PUJA Upper Body Dressing:  Upper Body Dressing Score = 5. Patient is supervision  for upper body dressing, requiring: Gathering/setting out clothes. Stand by  assistance. Verbal cuing, prompting, or instructing. No assistive devices were  required.  PUJA Lower Body Dressing:  Lower Body Dressing Score = 4. Patient requires  minimal assistance for lower body dressing, requiring incidental help only (such  as task initiation or assist with buttons/zips/snaps). No assistive devices were  required.  PUJA Toileting:  Toileting Score = 4.  Patient requires minimal assistance for  toileting, such as steadying for balance while cleansing or adjusting clothes.  Patient requires the following assistive device(s): Arm rest of a specialized  seat. Grab bar.    PUJA Bladder Management  Level of Assistance:  Frequency/Number of Accidents this Shift:    PUJA Bowel Management  Level of Assistance:  Frequency/Number of Accidents this Shift:    PUJA Bed/Chair/Wheelchair Transfer:  PUJA Toilet Transfer:  Toilet Transfer Score = 4.  Patient performs 75% or more  of effort and minimal assistance (little/incidental help/steadying) for  transferring to and from the toilet/commode, requiring: Contact guard. Patient  requires the following assistive device(s): Safety frame/over the  toilet. Grab  bars.  PUJA Tub/Shower Transfer:  Shower Transfer Score = 4. Patient performs 75% or  more of effort and minimal assistance (little/incidental help/lifting of one  limb/steadying) for transferring to and from the shower, requiring: Contact  guard. Patient requires the following assistive device(s): Shower chair. Grab  bars.    Previously Documented Mode of Locomotion at Discharge:  [facility] Expected Mode of Locomotion at Discharge:  [facility] Walk/Wheelchair:  [facility] Stairs:    [facility] Comprehension:  [facility] Expression:  [facility] Social Interaction:  [facility] Problem Solving:  PUJA Memory:    Therapy Mode Minutes  Occupational Therapy: Individual: 10 minutes.  Physical Therapy:  Speech Language Pathology:    Discharge Functional Goals:    Signed by: Laya Harrison Occupational Therapist

## 2019-10-16 NOTE — THERAPY DISCHARGE NOTE
Inpatient Rehabilitation - Physical Therapy Treatment Note/Discharge  KOKO Hart     Patient Name: Ashley Geronimo  : 1945  MRN: [id number]  Today's Date: 10/16/2019             Admit Date: 10/1/2019    Visit Dx:    ICD-10-CM ICD-9-CM   1. Metabolic encephalopathy G93.41 348.31     Patient Active Problem List   Diagnosis   • Pneumonia   • Septic shock (CMS/[facility])   • Pseudomonas pneumonia (CMS/[facility])   • NSTEMI (non-ST elevated myocardial infarction) (CMS/[facility])   • Bradycardia   • Paroxysmal atrial fibrillation (CMS/[facility])   • Chronic respiratory failure with hypoxia and hypercapnia (CMS/[facility])   • Acute on chronic respiratory failure with hypoxia (CMS/[facility]), requiring intubation/mechanical ventilation 19   • Hematemesis   • RAMESH (acute kidney injury) (CMS/[facility])   • DKA (diabetic ketoacidoses) (CMS/[facility])   • Septic shock (CMS/[facility])   • Encephalopathy   • UTI (urinary tract infection)   • Tracheobronchitis, PSA    • Metabolic encephalopathy       Physical Therapy Education     Title: PT OT SLP Therapies (Resolved)     Topic: Physical Therapy (Resolved)     Point: Mobility training (Resolved)     Learning Progress Summary           Patient Acceptance, E,D, VU,NR by LL at 10/15/2019  4:06 PM    Acceptance, E,D, VU,NR by LL at 10/14/2019  4:34 PM    Acceptance, E,TB, VU by DG at 10/12/2019  9:42 PM    Acceptance, E,TB, VU by DG at 10/12/2019 12:08 AM    Acceptance, E,D, VU,NR by LL at 10/11/2019  4:30 PM    Acceptance, E, VU,NR by MC at 10/11/2019  8:42 AM    Acceptance, E,TB, VU by DG at 10/11/2019 12:42 AM    Acceptance, E,D, VU,NR by AG at 10/10/2019  4:33 PM    Acceptance, E,TB, VU by RF at 10/9/2019  4:49 PM    Acceptance, E,D, NL by AG at 10/8/2019  4:10 PM    Acceptance, E,D, VU,NR by LL at 10/7/2019  3:34 PM    Acceptance, E,D, VU,NR by KADE at 10/5/2019  1:43 PM    Acceptance, E,D, NR by LL at 10/4/2019  3:45 PM    Acceptance, VICKI DAVIS, NR by FRANCK at 10/3/2019  4:10 PM    Acceptance, VICKI DAVIS, NR by FRANCK at 10/2/2019  4:26 PM                    Point: Home exercise program (Resolved)     Learning Progress Summary           Patient Acceptance, E,D, VU,NR by LL at 10/15/2019  4:06 PM    Acceptance, E,D, VU,NR by LL at 10/14/2019  4:34 PM    Acceptance, E,TB, VU by DG at 10/12/2019  9:42 PM    Acceptance, E,TB, VU by DG at 10/12/2019 12:08 AM    Acceptance, E,D, VU,NR by LL at 10/11/2019  4:30 PM    Acceptance, E, VU,NR by MC at 10/11/2019  8:42 AM    Acceptance, E,TB, VU by DG at 10/11/2019 12:42 AM    Acceptance, E,D, VU,NR by AG at 10/10/2019  4:33 PM    Acceptance, E,TB, VU by RF at 10/9/2019  4:49 PM    Acceptance, E,D, NL by AG at 10/8/2019  4:10 PM    Acceptance, E,D, VU,NR by LL at 10/7/2019  3:34 PM    Acceptance, E,D, VU,NR by KADE at 10/5/2019  1:43 PM    Acceptance, E,D, NR by LL at 10/4/2019  3:45 PM    Acceptance, E,D, NR by AG at 10/3/2019  4:10 PM    Acceptance, E,D, NR by AG at 10/2/2019  4:26 PM                   Point: Body mechanics (Resolved)     Learning Progress Summary           Patient Acceptance, E,D, VU,NR by LL at 10/15/2019  4:06 PM    Acceptance, E,D, VU,NR by LL at 10/14/2019  4:34 PM    Acceptance, E,TB, VU by DG at 10/12/2019  9:42 PM    Acceptance, E,TB, VU by DG at 10/12/2019 12:08 AM    Acceptance, E,D, VU,NR by LL at 10/11/2019  4:30 PM    Acceptance, E, VU,NR by MC at 10/11/2019  8:42 AM    Acceptance, E,TB, VU by DG at 10/11/2019 12:42 AM    Acceptance, E,D, VU,NR by AG at 10/10/2019  4:33 PM    Acceptance, E,TB, VU by RF at 10/9/2019  4:49 PM    Acceptance, E,D, NL by AG at 10/8/2019  4:10 PM    Acceptance, E,D, VU,NR by LL at 10/7/2019  3:34 PM    Acceptance, E,D, VU,NR by KADE at 10/5/2019  1:43 PM    Acceptance, E,D, NR by LL at 10/4/2019  3:45 PM    Acceptance, E,D, NR by AG at 10/3/2019  4:10 PM    Acceptance, E,D, NR by AG at 10/2/2019  4:26 PM                   Point: Precautions (Resolved)     Learning Progress Summary           Patient Acceptance, E,D, VU,NR by LL at 10/15/2019  4:06 PM     Acceptance, E,D, VU,NR by LL at 10/14/2019  4:34 PM    Acceptance, E,TB, VU by DG at 10/12/2019  9:42 PM    Acceptance, E,TB, VU by DG at 10/12/2019 12:08 AM    Acceptance, E,D, VU,NR by LL at 10/11/2019  4:30 PM    Acceptance, E, VU,NR by  at 10/11/2019  8:42 AM    Acceptance, E,TB, VU by DG at 10/11/2019 12:42 AM    Acceptance, E,D, VU,NR by AG at 10/10/2019  4:33 PM    Acceptance, E,TB, VU by RF at 10/9/2019  4:49 PM    Acceptance, E,D, NL by AG at 10/8/2019  4:10 PM    Acceptance, E,D, VU,NR by LL at 10/7/2019  3:34 PM    Acceptance, E,D, VU,NR by KADE at 10/5/2019  1:43 PM    Acceptance, E,D, NR by LL at 10/4/2019  3:45 PM    Acceptance, E,D, NR by AG at 10/3/2019  4:10 PM    Acceptance, E,D, NR by AG at 10/2/2019  4:26 PM                               User Key     Initials Effective Dates Name Provider Type Discipline     06/16/16 -  Cecelia Hare, RN Registered Nurse Nurse     06/16/16 -  Rut Perrin RN Registered Nurse Nurse     04/03/18 -  Karyn Delgado, PT Physical Therapist PT    LL 05/02/16 -  Bita Greene, PTA Physical Therapy Assistant PT    KADE 05/02/16 -  Doris Calixto, PTA Physical Therapy Assistant PT    RF 03/07/18 -  Kristen Carrera, PTA Physical Therapy Assistant PT              PT IRF GOALS     Row Name 10/16/19 1100             Bed Mobility Goal 2 (PT-IRF)    Progress/Outcomes (Bed Mobility Goal 2, PT-IRF)  goal met  -LL         Transfer Goal 1 (PT-IRF)    Progress/Outcomes (Transfer Goal 1, PT-IRF)  goal partially met  -LL         Transfer Goal 2 (PT-IRF)    Progress/Outcomes (Transfer Goal 2, PT-IRF)  goal not met  -LL         Gait/Walking Locomotion Goal 1 (PT-IRF)    Progress/Outcomes (Gait/Walking Locomotion Goal 1, PT-IRF)  goal met  -LL         Gait/Walking Locomotion Goal 2 (PT-IRF)    Progress/Outcomes (Gait/Walking Locomotion Goal 2, PT-IRF)  goal partially met  -LL         Stairs Goal 1 (PT-IRF)    Progress/Outcomes (Stairs Goal 1, PT-IRF)  goal  partially met  -LL         Stairs Goal 2 (PT-IRF)    Progress/Outcomes (Stairs Goal 2, PT-IRF)  goal not met  -LL        User Key  (r) = Recorded By, (t) = Taken By, (c) = Cosigned By    Initials Name Provider Type    LL Bita Greene PTA Physical Therapy Assistant          Therapy Treatment    IRF Treatment Summary     Row Name 10/16/19 1144             Evaluation/Treatment Time and Intent    Subjective Information  no complaints  -LL      Existing Precautions/Restrictions  fall confusion  -LL      Document Type  discharge treatment  -LL      Mode of Treatment  physical therapy;individual therapy  -LL      Patient/Family Observations  Patient discharged from physical rehab this date to home with assistance of family & with home health PT.  Education completed with patient & granddaughter regarding 24 hour supervision and assistance. home safety, proper use of gait belt, HEP, patient & caregiver safety & adjustment of RW.  Written materials issued &  no questions/concerns voiced.  -LL      Recorded by [LL] Bita Greene PTA      Row Name 10/16/19 1144             Cognition/Psychosocial- PT/OT    Affect/Mental Status (Cognitive)  confused  -LL      Orientation Status (Cognition)  oriented to;person  -LL      Follows Commands (Cognition)  follows two step commands;initiation impaired;repetition of directions required  -LL      Personal Safety Interventions  fall prevention program maintained;nonskid shoes/slippers when out of bed;supervised activity  -LL      Cognitive Function (Cognitive)  attention deficit;memory deficit;safety deficit  -LL      Attention Deficit (Cognitive)  requires cues/redirection to task  -LL      Recorded by [LL] Bita Greene PTA      Row Name 10/16/19 1144             Transfer Assessment/Treatment    Transfer Assessment/Treatment  bed-chair transfer;chair-bed transfer;sit-stand transfer;stand-sit transfer;stand pivot/stand step transfer  -LL      Recorded by [LL] Bita Greene  MARY ANN      Row Name 10/16/19 1144             Sit-Stand Transfer    Sit-Stand [place] (Transfers)  verbal cues;nonverbal cues (demo/gesture);stand by assist;contact guard  -LL      Assistive Device (Sit-Stand Transfers)  walker, front-wheeled  -LL      Recorded by [LL] Bita Greene PTA      Row Name 10/16/19 1144             Stand-Sit Transfer    Stand-Sit [place] (Transfers)  verbal cues;nonverbal cues (demo/gesture);stand by assist;contact guard  -LL      Assistive Device (Stand-Sit Transfers)  walker, front-wheeled  -LL      Recorded by [LL] Bita Greene PTA      Row Name 10/16/19 1144             Gait/Stairs Assessment/Training    Gait/Stairs Assessment/Training  gait/ambulation independence;gait/ambulation assistive device;distance ambulated;gait pattern;gait deviations  -LL      [place] Level (Gait)  verbal cues;nonverbal cues (demo/gesture);contact guard  -LL      Assistive Device (Gait)  other (see comments) HHA  -LL      Distance in Feet (Gait)  100  -LL      Pattern (Gait)  step-through  -LL      Deviations/Abnormal Patterns (Gait)  base of support, narrow;eldon decreased;stride length decreased  -LL      Bilateral Gait Deviations  forward flexed posture  -LL      Recorded by [LL] Bita Greene PTA      [facility] 10/16/19 1144             Safety Issues, Functional Mobility    Impairments Affecting Function (Mobility)  balance;endurance/activity tolerance;strength  -LL      Recorded by [LL] Bita Greene PTA      [facility] 10/16/19 1144             Pain Scale: FACES Pre/Post-Treatment    Pain: FACES Scale, Pretreatment  0-->no hurt  -LL      Pain: FACES Scale, Post-Treatment  0-->no hurt  -LL      Recorded by [LL] Bita Greene PTA      [facility] Name 10/16/19 1144             Balance    Balance  static sitting balance;static standing balance;dynamic sitting balance;dynamic standing balance;sitting balance activity;standing balance activity;dynamic balance activity  -LL      Recorded  by [LL] Bita Greene PTA      Row Name 10/16/19 1144             Lower Extremity Seated Therapeutic Exercise    Comment, Seated Lower Extremity (Therapeutic Exercise)  HEP reviewed & written materials issued; green therabanbenjamin issued  -LL      Recorded by [LL] Bita Greene PTA      Row Name 10/16/19 1144             Positioning and Restraints    Pre-Treatment Position  -- WC in room  -LL      Post Treatment Position  wheelchair  -LL      In Wheelchair  sitting;with family/caregiver  -LL      Recorded by [LL] Bita Greene PTA      Row Name 10/16/19 1144             Daily Summary of Progress (PT)    Impairments Continuing to Limit Function: Physical Therapy  strength decreased;impaired balance;impaired cognition;coordination impaired decr cognition  -LL      Recorded by [LL] Bita Greene PTA        User Key  (r) = Recorded By, (t) = Taken By, (c) = Cosigned By    Initials Name Effective Dates    LL Bita Greene PTA 05/02/16 -           PT Recommendation and Plan  Planned Therapy Interventions (PT Eval): balance training, bed mobility training, gait training, home exercise program, motor coordination training, neuromuscular re-education, patient/family education, postural re-education, stair training, strengthening, transfer training  Plan of Care Reviewed With: patient         Time Calculation:   PT Charges     Row Name 10/16/19 1148             Time Calculation    PT Received On  10/16/19  -LL         Time Calculation- PT    Total Timed Code Minutes- PT  25 minute(s)  -LL        User Key  (r) = Recorded By, (t) = Taken By, (c) = Cosigned By    Initials Name Provider Type     Bita Greene PTA Physical Therapy Assistant          Therapy Charges for Today     Code Description Service Date Service Provider Modifiers Qty    59871627172 HC GAIT TRAINING EA 15 MIN 10/15/2019 Bita Greene PTA GP 1    08202319759 HC PT THERAPEUTIC ACT EA 15 MIN 10/15/2019 Bita Greene PTA GP 1    26882535505 HC  PT THER PROC EA 15 MIN 10/15/2019 Bita Greene PTA GP 4    53180931316 HC GAIT TRAINING EA 15 MIN 10/16/2019 Bita Greene PTA GP 1    56089527492 HC PT THERAPEUTIC ACT EA 15 MIN 10/16/2019 Bita Greene PTA GP 1               PT Discharge Summary  Reason for Discharge: Discharge from facility  Outcomes Achieved: Patient able to partially acheive established goals  Discharge Destination: Home with assist, Home with home health    Asya Greene PTA  10/16/2019

## 2019-10-23 NOTE — PROGRESS NOTES
P[facility] CM[facility] Coordinator  Inpatient Rehabilitation Discharge    Mode of Locomotion: Walking.    Discharge Against Medical Advice:  No.  Discharge Information  Patient Discharged Alive:  Yes  Discharge Destination/Living Setting: Home.  At discharge, the patient was discharged to live (with) (02)  Family / Relatives    Diagnosis for Interruption/Death:    Impairment Group: 16 Debility (non-cardiac, non-pulmonary)    Comorbidities: Rank Code      Description      1    E11.10    Type 2 diabetes mellitus with ketoacidosis                 without coma  2    J96.21    Acute and chronic respiratory failure with                 hypoxia  3    N39.0     Urinary tract infection, site not specified  4    I48.91    Unspecified atrial fibrillation  5    I25.10    Atherosclerotic heart disease of native                 coronary artery without angina pectoris  6    I12.9     Hypertensive chronic kidney disease with stage                 1 through stage 4 chronic kidney disease, or                 unspecified chronic kidney disease  7    E11.22    Type 2 diabetes mellitus with diabetic chronic                 kidney disease  8    N18.9     Chronic kidney disease, unspecified    Complications:      PUJA Bladder Accidents: 14  - Accidents.  Patient used medications/device this  shift.  10/13/2019 10:58:00 AM  Bladder Score = 1.  Five (5) or more  bladder accidents.  PUJA Bowel Accident: 2  - Accidents.  Patient used medications/device this shift.   10/16/2019 10:25:00 AM  Bowel Score = 4.  Two (2) bowel accidents.    Signed by: Deena Urban, Supervisor

## 2019-11-10 ENCOUNTER — HOSPITAL ENCOUNTER (EMERGENCY)
Facility: HOSPITAL | Age: 74
Discharge: HOME | End: 2019-11-10
Attending: EMERGENCY MEDICINE | Admitting: EMERGENCY MEDICINE

## 2019-11-10 VITALS
BODY MASS INDEX: 20.62 KG/M2 | HEIGHT: 60 IN | TEMPERATURE: 97.4 F | DIASTOLIC BLOOD PRESSURE: 68 MMHG | OXYGEN SATURATION: 98 % | RESPIRATION RATE: 18 BRPM | WEIGHT: 105 LBS | HEART RATE: 85 BPM | SYSTOLIC BLOOD PRESSURE: 134 MMHG

## 2019-11-10 DIAGNOSIS — E86.0 DEHYDRATION: Primary | ICD-10-CM

## 2019-11-10 DIAGNOSIS — R73.9 HYPERGLYCEMIA: ICD-10-CM

## 2019-11-10 LAB
ALBUMIN SERPL-MCNC: 3.82 G/DL (ref 3.5–5.2)
ALBUMIN/GLOB SERPL: 1 G/DL
ALP SERPL-CCNC: 115 U/L (ref 39–117)
ALT SERPL W P-5'-P-CCNC: 11 U/L (ref 1–33)
ANION GAP SERPL CALCULATED.3IONS-SCNC: 14.6 MMOL/L (ref 5–15)
AST SERPL-CCNC: 28 U/L (ref 1–32)
BASOPHILS # BLD AUTO: 0.05 10*3/MM3 (ref 0–0.2)
BASOPHILS NFR BLD AUTO: 0.4 % (ref 0–1.5)
BILIRUB SERPL-MCNC: 0.4 MG/DL (ref 0.2–1.2)
BUN BLD-MCNC: 26 MG/DL (ref 8–23)
BUN/CREAT SERPL: 15.9 (ref 7–25)
CALCIUM SPEC-SCNC: 8.6 MG/DL (ref 8.6–10.5)
CHLORIDE SERPL-SCNC: 99 MMOL/L (ref 98–107)
CO2 SERPL-SCNC: 22.4 MMOL/L (ref 22–29)
CREAT BLD-MCNC: 1.64 MG/DL (ref 0.57–1)
DEPRECATED RDW RBC AUTO: 43.3 FL (ref 37–54)
EOSINOPHIL # BLD AUTO: 0.4 10*3/MM3 (ref 0–0.4)
EOSINOPHIL NFR BLD AUTO: 3.4 % (ref 0.3–6.2)
ERYTHROCYTE [DISTWIDTH] IN BLOOD BY AUTOMATED COUNT: 13.2 % (ref 12.3–15.4)
GFR SERPL CREATININE-BSD FRML MDRD: 31 ML/MIN/1.73
GLOBULIN UR ELPH-MCNC: 3.8 GM/DL
GLUCOSE BLD-MCNC: 311 MG/DL (ref 65–99)
GLUCOSE BLDC GLUCOMTR-MCNC: 279 MG/DL (ref 70–130)
HCT VFR BLD AUTO: 36.1 % (ref 34–46.6)
HGB BLD-MCNC: 11.7 G/DL (ref 12–15.9)
IMM GRANULOCYTES # BLD AUTO: 0.05 10*3/MM3 (ref 0–0.05)
IMM GRANULOCYTES NFR BLD AUTO: 0.4 % (ref 0–0.5)
LYMPHOCYTES # BLD AUTO: 0.64 10*3/MM3 (ref 0.7–3.1)
LYMPHOCYTES NFR BLD AUTO: 5.4 % (ref 19.6–45.3)
MCH RBC QN AUTO: 28.8 PG (ref 26.6–33)
MCHC RBC AUTO-ENTMCNC: 32.4 G/DL (ref 31.5–35.7)
MCV RBC AUTO: 88.9 FL (ref 79–97)
MONOCYTES # BLD AUTO: 0.82 10*3/MM3 (ref 0.1–0.9)
MONOCYTES NFR BLD AUTO: 6.9 % (ref 5–12)
NEUTROPHILS # BLD AUTO: 9.87 10*3/MM3 (ref 1.7–7)
NEUTROPHILS NFR BLD AUTO: 83.5 % (ref 42.7–76)
NRBC BLD AUTO-RTO: 0 /100 WBC (ref 0–0.2)
PLATELET # BLD AUTO: 255 10*3/MM3 (ref 140–450)
PMV BLD AUTO: 11.5 FL (ref 6–12)
POTASSIUM BLD-SCNC: 5.5 MMOL/L (ref 3.5–5.2)
PROT SERPL-MCNC: 7.6 G/DL (ref 6–8.5)
RBC # BLD AUTO: 4.06 10*6/MM3 (ref 3.77–5.28)
SODIUM BLD-SCNC: 136 MMOL/L (ref 136–145)
VALPROATE SERPL-MCNC: 33.8 MCG/ML (ref 50–125)
WBC NRBC COR # BLD: 11.83 10*3/MM3 (ref 3.4–10.8)

## 2019-11-10 PROCEDURE — 80053 COMPREHEN METABOLIC PANEL: CPT | Performed by: EMERGENCY MEDICINE

## 2019-11-10 PROCEDURE — 63710000001 INSULIN REGULAR HUMAN PER 5 UNITS: Performed by: EMERGENCY MEDICINE

## 2019-11-10 PROCEDURE — 85025 COMPLETE CBC W/AUTO DIFF WBC: CPT | Performed by: EMERGENCY MEDICINE

## 2019-11-10 PROCEDURE — 82962 GLUCOSE BLOOD TEST: CPT

## 2019-11-10 PROCEDURE — 96374 THER/PROPH/DIAG INJ IV PUSH: CPT

## 2019-11-10 PROCEDURE — 25010000002 DIPHENHYDRAMINE PER 50 MG: Performed by: EMERGENCY MEDICINE

## 2019-11-10 PROCEDURE — 80164 ASSAY DIPROPYLACETIC ACD TOT: CPT | Performed by: EMERGENCY MEDICINE

## 2019-11-10 PROCEDURE — 99285 EMERGENCY DEPT VISIT HI MDM: CPT

## 2019-11-10 RX ORDER — SODIUM CHLORIDE 0.9 % (FLUSH) 0.9 %
10 SYRINGE (ML) INJECTION AS NEEDED
Status: DISCONTINUED | OUTPATIENT
Start: 2019-11-10 | End: 2019-11-10 | Stop reason: HOSPADM

## 2019-11-10 RX ORDER — DIPHENHYDRAMINE HYDROCHLORIDE 50 MG/ML
12.5 INJECTION INTRAMUSCULAR; INTRAVENOUS ONCE
Status: COMPLETED | OUTPATIENT
Start: 2019-11-10 | End: 2019-11-10

## 2019-11-10 RX ADMIN — HUMAN INSULIN 5 UNITS: 100 INJECTION, SOLUTION SUBCUTANEOUS at 20:30

## 2019-11-10 RX ADMIN — DIPHENHYDRAMINE HYDROCHLORIDE 12.5 MG: 50 INJECTION INTRAMUSCULAR; INTRAVENOUS at 20:02

## 2019-11-10 RX ADMIN — SODIUM CHLORIDE 1000 ML: 9 INJECTION, SOLUTION INTRAVENOUS at 20:30

## 2019-11-11 NOTE — ED PROVIDER NOTES
Subjective   Patient presents to ER for altered mental status. She also is complaining of a rash with itching        Altered Mental Status   Presenting symptoms: behavior changes    Severity:  Mild  Most recent episode:  Today  Episode history:  Single  Timing:  Constant  Chronicity:  New  Context: not taking medications as prescribed and recent illness    Associated symptoms: rash        Review of Systems   Constitutional: Negative.    HENT: Negative.    Eyes: Negative.    Respiratory: Negative.    Cardiovascular: Negative.    Gastrointestinal: Negative.    Endocrine: Negative.    Genitourinary: Positive for decreased urine volume.   Musculoskeletal: Negative.    Skin: Positive for rash.   Allergic/Immunologic: Negative.    Neurological: Negative.    Hematological: Negative.        Past Medical History:   Diagnosis Date   • RAMESH (acute kidney injury) (CMS/Formerly [facility])    • Atrial fibrillation (CMS/Formerly [facility])    • Bradycardia    • Chronic respiratory failure with hypoxia and hypercapnia (CMS/Formerly [facility])    • Diabetes mellitus (CMS/Formerly [facility])    • Hypertension    • NSTEMI (non-ST elevated myocardial infarction) (CMS/Formerly [facility]) 01/2019   • Paroxysmal atrial fibrillation (CMS/Formerly [facility])    • Pneumonia    • Severe sepsis with septic shock (CMS/Formerly [facility])        Allergies   Allergen Reactions   • Sulfa Antibiotics GI Intolerance       No past surgical history on file.    No family history on file.    Social History     Socioeconomic History   • Marital status:      Spouse name: Not on file   • Number of children: Not on file   • Years of education: Not on file   • Highest education level: Not on file   Tobacco Use   • Smoking status: Current Every Day Smoker     Packs/day: 0.25   • Smokeless tobacco: Never Used   Substance and Sexual Activity   • Alcohol use: No     Frequency: Never   • Drug use: No   • Sexual activity: Defer           Objective   Physical Exam   Constitutional: She appears well-developed and well-nourished.   HENT:   Head: Normocephalic and  atraumatic.   Mouth/Throat: Oropharynx is clear and moist.   Eyes: Pupils are equal, round, and reactive to light.   Neck: Normal range of motion.   Cardiovascular: Normal rate.   Pulmonary/Chest: Effort normal.   Abdominal: Soft.   Musculoskeletal: Normal range of motion.   Neurological: She is alert. She has normal strength.   Skin: Capillary refill takes less than 2 seconds. Rash noted.   Psychiatric: She has a normal mood and affect.   Nursing note and vitals reviewed.      Procedures           ED Course                  MDM    Final diagnoses:   Dehydration   Hyperglycemia              Bandar Godoy MD  11/10/19 2011       Bandar Godoy MD  11/10/19 2020       Bandar Godoy MD  11/10/19 2020       Bandar Godoy MD  11/10/19 2111

## 2019-11-24 ENCOUNTER — HOSPITAL ENCOUNTER (EMERGENCY)
Facility: HOSPITAL | Age: 74
Discharge: HOME | End: 2019-11-24
Attending: EMERGENCY MEDICINE | Admitting: EMERGENCY MEDICINE

## 2019-11-24 ENCOUNTER — APPOINTMENT (OUTPATIENT)
Dept: CT IMAGING | Facility: HOSPITAL | Age: 74
End: 2019-11-24

## 2019-11-24 ENCOUNTER — APPOINTMENT (OUTPATIENT)
Dept: GENERAL RADIOLOGY | Facility: HOSPITAL | Age: 74
End: 2019-11-24

## 2019-11-24 VITALS
HEART RATE: 91 BPM | RESPIRATION RATE: 20 BRPM | TEMPERATURE: 99.4 F | BODY MASS INDEX: 20.62 KG/M2 | WEIGHT: 105 LBS | OXYGEN SATURATION: 91 % | SYSTOLIC BLOOD PRESSURE: 180 MMHG | DIASTOLIC BLOOD PRESSURE: 72 MMHG | HEIGHT: 60 IN

## 2019-11-24 DIAGNOSIS — R56.9 SEIZURE (HCC): Primary | ICD-10-CM

## 2019-11-24 LAB
027 TOXIN: NORMAL
6-ACETYL MORPHINE: NEGATIVE
A-A DO2: 131.5 MMHG (ref 0–300)
ACETONE BLD QL: NEGATIVE
ADV 40+41 DNA STL QL NAA+NON-PROBE: NOT DETECTED
ALBUMIN SERPL-MCNC: 3.67 G/DL (ref 3.5–5.2)
ALBUMIN/GLOB SERPL: 1 G/DL
ALP SERPL-CCNC: 97 U/L (ref 39–117)
ALT SERPL W P-5'-P-CCNC: 10 U/L (ref 1–33)
AMPHET+METHAMPHET UR QL: NEGATIVE
ANION GAP SERPL CALCULATED.3IONS-SCNC: 16.5 MMOL/L (ref 5–15)
APTT PPP: 24 SECONDS (ref 23.8–36.1)
ARTERIAL PATENCY WRIST A: ABNORMAL
AST SERPL-CCNC: 22 U/L (ref 1–32)
ASTRO TYP 1-8 RNA STL QL NAA+NON-PROBE: NOT DETECTED
ATMOSPHERIC PRESS: 724 MMHG
BACTERIA UR QL AUTO: ABNORMAL /HPF
BARBITURATES UR QL SCN: NEGATIVE
BASE EXCESS BLDA CALC-SCNC: -2.7 MMOL/L (ref 0–2)
BASOPHILS # BLD AUTO: 0.02 10*3/MM3 (ref 0–0.2)
BASOPHILS NFR BLD AUTO: 0.2 % (ref 0–1.5)
BDY SITE: ABNORMAL
BENZODIAZ UR QL SCN: NEGATIVE
BILIRUB SERPL-MCNC: 0.3 MG/DL (ref 0.2–1.2)
BILIRUB UR QL STRIP: NEGATIVE
BODY TEMPERATURE: 0 C
BUN BLD-MCNC: 23 MG/DL (ref 8–23)
BUN/CREAT SERPL: 19.7 (ref 7–25)
BUPRENORPHINE SERPL-MCNC: NEGATIVE NG/ML
C CAYETANENSIS DNA STL QL NAA+NON-PROBE: NOT DETECTED
C DIFF TOX GENS STL QL NAA+PROBE: NEGATIVE
CALCIUM SPEC-SCNC: 8.8 MG/DL (ref 8.6–10.5)
CAMPY SP DNA.DIARRHEA STL QL NAA+PROBE: NOT DETECTED
CANNABINOIDS SERPL QL: NEGATIVE
CHLORIDE SERPL-SCNC: 99 MMOL/L (ref 98–107)
CLARITY UR: CLEAR
CO2 BLDA-SCNC: 23.5 MMOL/L (ref 22–33)
CO2 SERPL-SCNC: 21.5 MMOL/L (ref 22–29)
COCAINE UR QL: NEGATIVE
COHGB MFR BLD: 0.7 % (ref 0–5)
COLOR UR: YELLOW
CREAT BLD-MCNC: 1.17 MG/DL (ref 0.57–1)
CRYPTOSP STL CULT: NOT DETECTED
D-LACTATE SERPL-SCNC: 3.1 MMOL/L (ref 0.5–2)
DEPRECATED RDW RBC AUTO: 43.1 FL (ref 37–54)
E COLI DNA SPEC QL NAA+PROBE: NOT DETECTED
E HISTOLYT AG STL-ACNC: NOT DETECTED
EAEC PAA PLAS AGGR+AATA ST NAA+NON-PRB: NOT DETECTED
EC STX1 + STX2 GENES STL NAA+PROBE: NOT DETECTED
EOSINOPHIL # BLD AUTO: 0.58 10*3/MM3 (ref 0–0.4)
EOSINOPHIL NFR BLD AUTO: 5.5 % (ref 0.3–6.2)
EPEC EAE GENE STL QL NAA+NON-PROBE: NOT DETECTED
ERYTHROCYTE [DISTWIDTH] IN BLOOD BY AUTOMATED COUNT: 13 % (ref 12.3–15.4)
ETEC LTA+ST1A+ST1B TOX ST NAA+NON-PROBE: NOT DETECTED
G LAMBLIA DNA SPEC QL NAA+PROBE: NOT DETECTED
GFR SERPL CREATININE-BSD FRML MDRD: 45 ML/MIN/1.73
GLOBULIN UR ELPH-MCNC: 3.6 GM/DL
GLUCOSE BLD-MCNC: 265 MG/DL (ref 65–99)
GLUCOSE UR STRIP-MCNC: ABNORMAL MG/DL
HCO3 BLDA-SCNC: 22.4 MMOL/L (ref 20–26)
HCT VFR BLD AUTO: 38.9 % (ref 34–46.6)
HCT VFR BLD CALC: 38.9 % (ref 38–51)
HGB BLD-MCNC: 12.4 G/DL (ref 12–15.9)
HGB BLDA-MCNC: 12.7 G/DL (ref 13.5–17.5)
HGB UR QL STRIP.AUTO: NEGATIVE
HOLD SPECIMEN: NORMAL
HOROWITZ INDEX BLD+IHG-RTO: 36 %
HYALINE CASTS UR QL AUTO: ABNORMAL /LPF
IMM GRANULOCYTES # BLD AUTO: 0.05 10*3/MM3 (ref 0–0.05)
IMM GRANULOCYTES NFR BLD AUTO: 0.5 % (ref 0–0.5)
INR PPP: 0.96 (ref 0.9–1.1)
KETONES UR QL STRIP: NEGATIVE
LEUKOCYTE ESTERASE UR QL STRIP.AUTO: NEGATIVE
LYMPHOCYTES # BLD AUTO: 0.92 10*3/MM3 (ref 0.7–3.1)
LYMPHOCYTES NFR BLD AUTO: 8.8 % (ref 19.6–45.3)
Lab: ABNORMAL
MAGNESIUM SERPL-MCNC: 1.7 MG/DL (ref 1.6–2.4)
MCH RBC QN AUTO: 28.8 PG (ref 26.6–33)
MCHC RBC AUTO-ENTMCNC: 31.9 G/DL (ref 31.5–35.7)
MCV RBC AUTO: 90.3 FL (ref 79–97)
METHADONE UR QL SCN: NEGATIVE
METHGB BLD QL: 0.6 % (ref 0–3)
MODALITY: ABNORMAL
MONOCYTES # BLD AUTO: 0.51 10*3/MM3 (ref 0.1–0.9)
MONOCYTES NFR BLD AUTO: 4.9 % (ref 5–12)
NEUTROPHILS # BLD AUTO: 8.4 10*3/MM3 (ref 1.7–7)
NEUTROPHILS NFR BLD AUTO: 80.1 % (ref 42.7–76)
NITRITE UR QL STRIP: NEGATIVE
NOROVIRUS GI+II RNA STL QL NAA+NON-PROBE: NOT DETECTED
NOTE: ABNORMAL
NRBC BLD AUTO-RTO: 0 /100 WBC (ref 0–0.2)
OPIATES UR QL: NEGATIVE
OXYCODONE UR QL SCN: NEGATIVE
OXYHGB MFR BLDV: 92 % (ref 94–99)
P SHIGELLOIDES DNA STL QL NAA+PROBE: NOT DETECTED
PCO2 BLDA: 38.7 MM HG (ref 35–45)
PCO2 TEMP ADJ BLD: ABNORMAL MM[HG]
PCP UR QL SCN: NEGATIVE
PH BLDA: 7.37 PH UNITS (ref 7.35–7.45)
PH UR STRIP.AUTO: 7 [PH] (ref 5–8)
PH, TEMP CORRECTED: ABNORMAL
PLATELET # BLD AUTO: 259 10*3/MM3 (ref 140–450)
PMV BLD AUTO: 11.2 FL (ref 6–12)
PO2 BLDA: 69.4 MM HG (ref 83–108)
PO2 TEMP ADJ BLD: ABNORMAL MM[HG]
POTASSIUM BLD-SCNC: 3.9 MMOL/L (ref 3.5–5.2)
PROT SERPL-MCNC: 7.3 G/DL (ref 6–8.5)
PROT UR QL STRIP: ABNORMAL
PROTHROMBIN TIME: 13.3 SECONDS (ref 11–15.4)
RBC # BLD AUTO: 4.31 10*6/MM3 (ref 3.77–5.28)
RBC # UR: ABNORMAL /HPF
REF LAB TEST METHOD: ABNORMAL
RV RNA STL NAA+PROBE: NOT DETECTED
S PYO AG THROAT QL: NEGATIVE
SALMONELLA DNA SPEC QL NAA+PROBE: NOT DETECTED
SAO2 % BLDCOA: 93.2 % (ref 94–99)
SAPO I+II+IV+V RNA STL QL NAA+NON-PROBE: NOT DETECTED
SHIGELLA SP+EIEC IPAH STL QL NAA+PROBE: NOT DETECTED
SODIUM BLD-SCNC: 137 MMOL/L (ref 136–145)
SP GR UR STRIP: 1.01 (ref 1–1.03)
SQUAMOUS #/AREA URNS HPF: ABNORMAL /HPF
TROPONIN T SERPL-MCNC: <0.01 NG/ML (ref 0–0.03)
UROBILINOGEN UR QL STRIP: ABNORMAL
V CHOLERAE DNA SPEC QL NAA+PROBE: NOT DETECTED
VALPROATE SERPL-MCNC: 5.7 MCG/ML (ref 50–125)
VENTILATOR MODE: ABNORMAL
VIBRIO DNA SPEC NAA+PROBE: NOT DETECTED
WBC NRBC COR # BLD: 10.48 10*3/MM3 (ref 3.4–10.8)
WBC UR QL AUTO: ABNORMAL /HPF
YERSINIA STL CULT: NOT DETECTED

## 2019-11-24 PROCEDURE — 80053 COMPREHEN METABOLIC PANEL: CPT | Performed by: EMERGENCY MEDICINE

## 2019-11-24 PROCEDURE — 85730 THROMBOPLASTIN TIME PARTIAL: CPT | Performed by: EMERGENCY MEDICINE

## 2019-11-24 PROCEDURE — 80307 DRUG TEST PRSMV CHEM ANLYZR: CPT | Performed by: EMERGENCY MEDICINE

## 2019-11-24 PROCEDURE — 87081 CULTURE SCREEN ONLY: CPT | Performed by: EMERGENCY MEDICINE

## 2019-11-24 PROCEDURE — 87880 STREP A ASSAY W/OPTIC: CPT | Performed by: EMERGENCY MEDICINE

## 2019-11-24 PROCEDURE — 25010000002 HYDRALAZINE PER 20 MG: Performed by: EMERGENCY MEDICINE

## 2019-11-24 PROCEDURE — 96375 TX/PRO/DX INJ NEW DRUG ADDON: CPT

## 2019-11-24 PROCEDURE — 82962 GLUCOSE BLOOD TEST: CPT

## 2019-11-24 PROCEDURE — 84484 ASSAY OF TROPONIN QUANT: CPT | Performed by: EMERGENCY MEDICINE

## 2019-11-24 PROCEDURE — 94640 AIRWAY INHALATION TREATMENT: CPT

## 2019-11-24 PROCEDURE — 70450 CT HEAD/BRAIN W/O DYE: CPT

## 2019-11-24 PROCEDURE — 81001 URINALYSIS AUTO W/SCOPE: CPT | Performed by: EMERGENCY MEDICINE

## 2019-11-24 PROCEDURE — 82805 BLOOD GASES W/O2 SATURATION: CPT

## 2019-11-24 PROCEDURE — 99285 EMERGENCY DEPT VISIT HI MDM: CPT

## 2019-11-24 PROCEDURE — 85025 COMPLETE CBC W/AUTO DIFF WBC: CPT | Performed by: EMERGENCY MEDICINE

## 2019-11-24 PROCEDURE — 71045 X-RAY EXAM CHEST 1 VIEW: CPT

## 2019-11-24 PROCEDURE — 87493 C DIFF AMPLIFIED PROBE: CPT | Performed by: PHYSICIAN ASSISTANT

## 2019-11-24 PROCEDURE — 36600 WITHDRAWAL OF ARTERIAL BLOOD: CPT

## 2019-11-24 PROCEDURE — 83605 ASSAY OF LACTIC ACID: CPT | Performed by: EMERGENCY MEDICINE

## 2019-11-24 PROCEDURE — 87040 BLOOD CULTURE FOR BACTERIA: CPT | Performed by: EMERGENCY MEDICINE

## 2019-11-24 PROCEDURE — 0097U HC BIOFIRE FILMARRAY GI PANEL: CPT | Performed by: PHYSICIAN ASSISTANT

## 2019-11-24 PROCEDURE — 93010 ELECTROCARDIOGRAM REPORT: CPT | Performed by: INTERNAL MEDICINE

## 2019-11-24 PROCEDURE — 83050 HGB METHEMOGLOBIN QUAN: CPT

## 2019-11-24 PROCEDURE — 94799 UNLISTED PULMONARY SVC/PX: CPT

## 2019-11-24 PROCEDURE — 80164 ASSAY DIPROPYLACETIC ACD TOT: CPT | Performed by: EMERGENCY MEDICINE

## 2019-11-24 PROCEDURE — 83735 ASSAY OF MAGNESIUM: CPT | Performed by: EMERGENCY MEDICINE

## 2019-11-24 PROCEDURE — 25010000002 ONDANSETRON PER 1 MG

## 2019-11-24 PROCEDURE — 82375 ASSAY CARBOXYHB QUANT: CPT

## 2019-11-24 PROCEDURE — 85610 PROTHROMBIN TIME: CPT | Performed by: EMERGENCY MEDICINE

## 2019-11-24 PROCEDURE — 96374 THER/PROPH/DIAG INJ IV PUSH: CPT

## 2019-11-24 PROCEDURE — 82009 KETONE BODYS QUAL: CPT | Performed by: EMERGENCY MEDICINE

## 2019-11-24 PROCEDURE — P9612 CATHETERIZE FOR URINE SPEC: HCPCS

## 2019-11-24 PROCEDURE — 93005 ELECTROCARDIOGRAM TRACING: CPT | Performed by: EMERGENCY MEDICINE

## 2019-11-24 RX ORDER — ALBUTEROL SULFATE 2.5 MG/3ML
2.5 SOLUTION RESPIRATORY (INHALATION) ONCE
Status: COMPLETED | OUTPATIENT
Start: 2019-11-24 | End: 2019-11-24

## 2019-11-24 RX ORDER — HYDRALAZINE HYDROCHLORIDE 20 MG/ML
10 INJECTION INTRAMUSCULAR; INTRAVENOUS ONCE
Status: COMPLETED | OUTPATIENT
Start: 2019-11-24 | End: 2019-11-24

## 2019-11-24 RX ORDER — ONDANSETRON 2 MG/ML
4 INJECTION INTRAMUSCULAR; INTRAVENOUS ONCE
Status: COMPLETED | OUTPATIENT
Start: 2019-11-24 | End: 2019-11-24

## 2019-11-24 RX ORDER — DIVALPROEX SODIUM 250 MG/1
750 TABLET, DELAYED RELEASE ORAL ONCE
Status: COMPLETED | OUTPATIENT
Start: 2019-11-24 | End: 2019-11-24

## 2019-11-24 RX ORDER — METOPROLOL TARTRATE 5 MG/5ML
5 INJECTION INTRAVENOUS ONCE
Status: COMPLETED | OUTPATIENT
Start: 2019-11-24 | End: 2019-11-24

## 2019-11-24 RX ORDER — ONDANSETRON 2 MG/ML
INJECTION INTRAMUSCULAR; INTRAVENOUS
Status: COMPLETED
Start: 2019-11-24 | End: 2019-11-24

## 2019-11-24 RX ADMIN — HYDRALAZINE HYDROCHLORIDE 10 MG: 20 INJECTION INTRAMUSCULAR; INTRAVENOUS at 18:27

## 2019-11-24 RX ADMIN — ONDANSETRON 4 MG: 2 INJECTION INTRAMUSCULAR; INTRAVENOUS at 17:25

## 2019-11-24 RX ADMIN — METOPROLOL TARTRATE 5 MG: 5 INJECTION INTRAVENOUS at 18:24

## 2019-11-24 RX ADMIN — DIVALPROEX SODIUM 750 MG: 250 TABLET, DELAYED RELEASE ORAL at 19:40

## 2019-11-24 RX ADMIN — ALBUTEROL SULFATE 2.5 MG: 2.5 SOLUTION RESPIRATORY (INHALATION) at 20:14

## 2019-11-25 LAB — GLUCOSE BLDC GLUCOMTR-MCNC: 230 MG/DL (ref 70–130)

## 2019-11-26 LAB — BACTERIA SPEC AEROBE CULT: NORMAL

## 2019-11-29 LAB
BACTERIA SPEC AEROBE CULT: NORMAL
BACTERIA SPEC AEROBE CULT: NORMAL

## 2019-12-21 ENCOUNTER — APPOINTMENT (OUTPATIENT)
Dept: CT IMAGING | Facility: HOSPITAL | Age: 74
End: 2019-12-21

## 2019-12-21 ENCOUNTER — APPOINTMENT (OUTPATIENT)
Dept: GENERAL RADIOLOGY | Facility: HOSPITAL | Age: 74
End: 2019-12-21

## 2019-12-21 ENCOUNTER — HOSPITAL ENCOUNTER (INPATIENT)
Facility: HOSPITAL | Age: 74
LOS: 10 days | Discharge: NURSING, REHAB OR LONG-TERM CARE | End: 2019-12-31
Attending: FAMILY MEDICINE | Admitting: INTERNAL MEDICINE

## 2019-12-21 DIAGNOSIS — R09.02 HYPOXIA: ICD-10-CM

## 2019-12-21 DIAGNOSIS — J18.9 PNEUMONIA OF BOTH LUNGS DUE TO INFECTIOUS ORGANISM, UNSPECIFIED PART OF LUNG: Primary | ICD-10-CM

## 2019-12-21 DIAGNOSIS — A41.9 SEPSIS, DUE TO UNSPECIFIED ORGANISM, UNSPECIFIED WHETHER ACUTE ORGAN DYSFUNCTION PRESENT (HCC): ICD-10-CM

## 2019-12-21 PROBLEM — J96.12 CHRONIC RESPIRATORY FAILURE WITH HYPOXIA AND HYPERCAPNIA (HCC): Chronic | Status: ACTIVE | Noted: 2019-03-15

## 2019-12-21 PROBLEM — G93.1 HYPOXIC ENCEPHALOPATHY (HCC): Chronic | Status: ACTIVE | Noted: 2019-12-21

## 2019-12-21 PROBLEM — J96.11 CHRONIC RESPIRATORY FAILURE WITH HYPOXIA AND HYPERCAPNIA (HCC): Chronic | Status: ACTIVE | Noted: 2019-03-15

## 2019-12-21 PROBLEM — R79.89 ELEVATED BRAIN NATRIURETIC PEPTIDE (BNP) LEVEL: Status: ACTIVE | Noted: 2019-12-21

## 2019-12-21 PROBLEM — K92.0 HEMATEMESIS: Status: RESOLVED | Noted: 2019-09-22 | Resolved: 2019-12-21

## 2019-12-21 PROBLEM — I25.10 CORONARY ARTERY DISEASE: Chronic | Status: ACTIVE | Noted: 2019-12-21

## 2019-12-21 PROBLEM — N18.30 CHRONIC KIDNEY DISEASE (CKD), STAGE III (MODERATE) (HCC): Chronic | Status: ACTIVE | Noted: 2019-12-21

## 2019-12-21 PROBLEM — G93.41 METABOLIC ENCEPHALOPATHY: Status: RESOLVED | Noted: 2019-10-01 | Resolved: 2019-12-21

## 2019-12-21 PROBLEM — E11.10 DKA (DIABETIC KETOACIDOSES): Status: RESOLVED | Noted: 2019-09-22 | Resolved: 2019-12-21

## 2019-12-21 PROBLEM — R00.1 BRADYCARDIA: Status: RESOLVED | Noted: 2019-03-06 | Resolved: 2019-12-21

## 2019-12-21 PROBLEM — J96.22 ACUTE ON CHRONIC RESPIRATORY FAILURE WITH HYPOXIA AND HYPERCAPNIA (HCC): Status: ACTIVE | Noted: 2019-09-22

## 2019-12-21 PROBLEM — D64.9 NORMOCYTIC ANEMIA: Status: ACTIVE | Noted: 2019-12-21

## 2019-12-21 PROBLEM — E88.09 HYPOALBUMINEMIA: Status: ACTIVE | Noted: 2019-12-21

## 2019-12-21 PROBLEM — J15.1 PSEUDOMONAS PNEUMONIA (HCC): Status: RESOLVED | Noted: 2019-01-21 | Resolved: 2019-12-21

## 2019-12-21 PROBLEM — J96.21 ACUTE ON CHRONIC RESPIRATORY FAILURE WITH HYPOXIA (HCC): Status: RESOLVED | Noted: 2019-09-22 | Resolved: 2019-12-21

## 2019-12-21 PROBLEM — R65.21 SEPTIC SHOCK (HCC): Status: RESOLVED | Noted: 2019-01-21 | Resolved: 2019-12-21

## 2019-12-21 PROBLEM — K21.9 GERD (GASTROESOPHAGEAL REFLUX DISEASE): Chronic | Status: ACTIVE | Noted: 2019-12-21

## 2019-12-21 PROBLEM — N17.9 AKI (ACUTE KIDNEY INJURY) (HCC): Status: RESOLVED | Noted: 2019-09-22 | Resolved: 2019-12-21

## 2019-12-21 PROBLEM — I21.4 NSTEMI (NON-ST ELEVATED MYOCARDIAL INFARCTION) (HCC): Status: RESOLVED | Noted: 2019-03-06 | Resolved: 2019-12-21

## 2019-12-21 PROBLEM — R65.21 SEPTIC SHOCK (HCC): Status: RESOLVED | Noted: 2019-09-22 | Resolved: 2019-12-21

## 2019-12-21 PROBLEM — E78.5 HYPERLIPIDEMIA: Chronic | Status: ACTIVE | Noted: 2019-12-21

## 2019-12-21 PROBLEM — I48.0 PAROXYSMAL ATRIAL FIBRILLATION (HCC): Chronic | Status: ACTIVE | Noted: 2019-03-06

## 2019-12-21 PROBLEM — E11.9 TYPE II DIABETES MELLITUS (HCC): Chronic | Status: ACTIVE | Noted: 2019-12-21

## 2019-12-21 PROBLEM — J40 TRACHEOBRONCHITIS: Status: RESOLVED | Noted: 2019-09-25 | Resolved: 2019-12-21

## 2019-12-21 PROBLEM — N39.0 UTI (URINARY TRACT INFECTION): Status: RESOLVED | Noted: 2019-09-25 | Resolved: 2019-12-21

## 2019-12-21 PROBLEM — E87.1 HYPONATREMIA: Status: ACTIVE | Noted: 2019-12-21

## 2019-12-21 PROBLEM — G93.40 ENCEPHALOPATHY: Status: RESOLVED | Noted: 2019-09-22 | Resolved: 2019-12-21

## 2019-12-21 PROBLEM — R70.0 ELEVATED SED RATE: Status: ACTIVE | Noted: 2019-12-21

## 2019-12-21 PROBLEM — E87.8 HYPOCHLOREMIA: Status: ACTIVE | Noted: 2019-12-21

## 2019-12-21 PROBLEM — I10 ESSENTIAL HYPERTENSION: Chronic | Status: ACTIVE | Noted: 2019-12-21

## 2019-12-21 LAB
A-A DO2: 49.6 MMHG (ref 0–300)
ALBUMIN SERPL-MCNC: 3.47 G/DL (ref 3.5–5.2)
ALBUMIN/GLOB SERPL: 0.8 G/DL
ALP SERPL-CCNC: 114 U/L (ref 39–117)
ALT SERPL W P-5'-P-CCNC: 11 U/L (ref 1–33)
ANION GAP SERPL CALCULATED.3IONS-SCNC: 15 MMOL/L (ref 5–15)
ARTERIAL PATENCY WRIST A: ABNORMAL
AST SERPL-CCNC: 21 U/L (ref 1–32)
ATMOSPHERIC PRESS: 736 MMHG
BASE EXCESS BLDA CALC-SCNC: 2.6 MMOL/L (ref 0–2)
BASOPHILS # BLD AUTO: 0.04 10*3/MM3 (ref 0–0.2)
BASOPHILS NFR BLD AUTO: 0.4 % (ref 0–1.5)
BDY SITE: ABNORMAL
BILIRUB SERPL-MCNC: 0.6 MG/DL (ref 0.2–1.2)
BODY TEMPERATURE: 0 C
BUN BLD-MCNC: 21 MG/DL (ref 8–23)
BUN/CREAT SERPL: 15 (ref 7–25)
CALCIUM SPEC-SCNC: 8.1 MG/DL (ref 8.6–10.5)
CHLORIDE SERPL-SCNC: 90 MMOL/L (ref 98–107)
CO2 BLDA-SCNC: 27.7 MMOL/L (ref 22–33)
CO2 SERPL-SCNC: 24 MMOL/L (ref 22–29)
COHGB MFR BLD: 1.3 % (ref 0–5)
CREAT BLD-MCNC: 1.4 MG/DL (ref 0.57–1)
CRP SERPL-MCNC: 20.78 MG/DL (ref 0–0.5)
D-LACTATE SERPL-SCNC: 1.3 MMOL/L (ref 0.5–2)
DEPRECATED RDW RBC AUTO: 42.3 FL (ref 37–54)
EOSINOPHIL # BLD AUTO: 0.1 10*3/MM3 (ref 0–0.4)
EOSINOPHIL NFR BLD AUTO: 0.9 % (ref 0.3–6.2)
ERYTHROCYTE [DISTWIDTH] IN BLOOD BY AUTOMATED COUNT: 13.2 % (ref 12.3–15.4)
ERYTHROCYTE [SEDIMENTATION RATE] IN BLOOD: 57 MM/HR (ref 0–30)
GFR SERPL CREATININE-BSD FRML MDRD: 37 ML/MIN/1.73
GLOBULIN UR ELPH-MCNC: 4.2 GM/DL
GLUCOSE BLD-MCNC: 498 MG/DL (ref 65–99)
GLUCOSE BLDC GLUCOMTR-MCNC: 201 MG/DL (ref 70–130)
HBA1C MFR BLD: 10.3 % (ref 4.8–5.6)
HCO3 BLDA-SCNC: 26.5 MMOL/L (ref 20–26)
HCT VFR BLD AUTO: 34 % (ref 34–46.6)
HCT VFR BLD CALC: 31.7 % (ref 38–51)
HGB BLD-MCNC: 10.9 G/DL (ref 12–15.9)
HGB BLDA-MCNC: 10.3 G/DL (ref 13.5–17.5)
HOROWITZ INDEX BLD+IHG-RTO: 21 %
IMM GRANULOCYTES # BLD AUTO: 0.07 10*3/MM3 (ref 0–0.05)
IMM GRANULOCYTES NFR BLD AUTO: 0.7 % (ref 0–0.5)
LYMPHOCYTES # BLD AUTO: 1.08 10*3/MM3 (ref 0.7–3.1)
LYMPHOCYTES NFR BLD AUTO: 10.1 % (ref 19.6–45.3)
Lab: ABNORMAL
Lab: ABNORMAL
MAGNESIUM SERPL-MCNC: 1.6 MG/DL (ref 1.6–2.4)
MCH RBC QN AUTO: 28.2 PG (ref 26.6–33)
MCHC RBC AUTO-ENTMCNC: 32.1 G/DL (ref 31.5–35.7)
MCV RBC AUTO: 88.1 FL (ref 79–97)
METHGB BLD QL: 0.6 % (ref 0–3)
MODALITY: ABNORMAL
MONOCYTES # BLD AUTO: 0.99 10*3/MM3 (ref 0.1–0.9)
MONOCYTES NFR BLD AUTO: 9.3 % (ref 5–12)
NEUTROPHILS # BLD AUTO: 8.42 10*3/MM3 (ref 1.7–7)
NEUTROPHILS NFR BLD AUTO: 78.6 % (ref 42.7–76)
NOTE: ABNORMAL
NOTIFIED BY: ABNORMAL
NOTIFIED WHO: ABNORMAL
NRBC BLD AUTO-RTO: 0 /100 WBC (ref 0–0.2)
NT-PROBNP SERPL-MCNC: 4757 PG/ML (ref 5–1800)
OXYHGB MFR BLDV: 87.3 % (ref 94–99)
PCO2 BLDA: 37.3 MM HG (ref 35–45)
PCO2 TEMP ADJ BLD: ABNORMAL MM[HG]
PH BLDA: 7.46 PH UNITS (ref 7.35–7.45)
PH, TEMP CORRECTED: ABNORMAL
PLATELET # BLD AUTO: 218 10*3/MM3 (ref 140–450)
PMV BLD AUTO: 11.8 FL (ref 6–12)
PO2 BLDA: 53 MM HG (ref 83–108)
PO2 TEMP ADJ BLD: ABNORMAL MM[HG]
POTASSIUM BLD-SCNC: 4 MMOL/L (ref 3.5–5.2)
PROT SERPL-MCNC: 7.7 G/DL (ref 6–8.5)
RBC # BLD AUTO: 3.86 10*6/MM3 (ref 3.77–5.28)
SAO2 % BLDCOA: 89 % (ref 94–99)
SODIUM BLD-SCNC: 129 MMOL/L (ref 136–145)
TROPONIN T SERPL-MCNC: 0.02 NG/ML (ref 0–0.03)
TSH SERPL DL<=0.05 MIU/L-ACNC: 2.93 UIU/ML (ref 0.27–4.2)
VENTILATOR MODE: ABNORMAL
WBC NRBC COR # BLD: 10.7 10*3/MM3 (ref 3.4–10.8)

## 2019-12-21 PROCEDURE — 94640 AIRWAY INHALATION TREATMENT: CPT

## 2019-12-21 PROCEDURE — 71045 X-RAY EXAM CHEST 1 VIEW: CPT | Performed by: RADIOLOGY

## 2019-12-21 PROCEDURE — 84443 ASSAY THYROID STIM HORMONE: CPT | Performed by: FAMILY MEDICINE

## 2019-12-21 PROCEDURE — 82962 GLUCOSE BLOOD TEST: CPT

## 2019-12-21 PROCEDURE — 63710000001 INSULIN ASPART PER 5 UNITS: Performed by: PHYSICIAN ASSISTANT

## 2019-12-21 PROCEDURE — 36600 WITHDRAWAL OF ARTERIAL BLOOD: CPT

## 2019-12-21 PROCEDURE — 71250 CT THORAX DX C-: CPT

## 2019-12-21 PROCEDURE — 94799 UNLISTED PULMONARY SVC/PX: CPT

## 2019-12-21 PROCEDURE — 83605 ASSAY OF LACTIC ACID: CPT | Performed by: FAMILY MEDICINE

## 2019-12-21 PROCEDURE — 71045 X-RAY EXAM CHEST 1 VIEW: CPT

## 2019-12-21 PROCEDURE — 25010000002 MAGNESIUM SULFATE IN D5W 1G/100ML (PREMIX) 1-5 GM/100ML-% SOLUTION: Performed by: INTERNAL MEDICINE

## 2019-12-21 PROCEDURE — 25010000002 PIPERACILLIN-TAZOBACTAM: Performed by: FAMILY MEDICINE

## 2019-12-21 PROCEDURE — 93010 ELECTROCARDIOGRAM REPORT: CPT | Performed by: INTERNAL MEDICINE

## 2019-12-21 PROCEDURE — 99223 1ST HOSP IP/OBS HIGH 75: CPT | Performed by: INTERNAL MEDICINE

## 2019-12-21 PROCEDURE — 83735 ASSAY OF MAGNESIUM: CPT | Performed by: FAMILY MEDICINE

## 2019-12-21 PROCEDURE — 25010000002 VANCOMYCIN 5 G RECONSTITUTED SOLUTION 5,000 MG VIAL: Performed by: FAMILY MEDICINE

## 2019-12-21 PROCEDURE — 99284 EMERGENCY DEPT VISIT MOD MDM: CPT

## 2019-12-21 PROCEDURE — 80053 COMPREHEN METABOLIC PANEL: CPT | Performed by: FAMILY MEDICINE

## 2019-12-21 PROCEDURE — 86140 C-REACTIVE PROTEIN: CPT | Performed by: FAMILY MEDICINE

## 2019-12-21 PROCEDURE — 82375 ASSAY CARBOXYHB QUANT: CPT

## 2019-12-21 PROCEDURE — 85652 RBC SED RATE AUTOMATED: CPT | Performed by: FAMILY MEDICINE

## 2019-12-21 PROCEDURE — 84484 ASSAY OF TROPONIN QUANT: CPT | Performed by: FAMILY MEDICINE

## 2019-12-21 PROCEDURE — 63710000001 INSULIN REGULAR HUMAN PER 5 UNITS: Performed by: FAMILY MEDICINE

## 2019-12-21 PROCEDURE — 93005 ELECTROCARDIOGRAM TRACING: CPT | Performed by: FAMILY MEDICINE

## 2019-12-21 PROCEDURE — 83880 ASSAY OF NATRIURETIC PEPTIDE: CPT | Performed by: FAMILY MEDICINE

## 2019-12-21 PROCEDURE — 87040 BLOOD CULTURE FOR BACTERIA: CPT | Performed by: FAMILY MEDICINE

## 2019-12-21 PROCEDURE — 25010000002 METHYLPREDNISOLONE PER 125 MG: Performed by: FAMILY MEDICINE

## 2019-12-21 PROCEDURE — 83050 HGB METHEMOGLOBIN QUAN: CPT

## 2019-12-21 PROCEDURE — 85025 COMPLETE CBC W/AUTO DIFF WBC: CPT | Performed by: FAMILY MEDICINE

## 2019-12-21 PROCEDURE — 82805 BLOOD GASES W/O2 SATURATION: CPT

## 2019-12-21 PROCEDURE — 83036 HEMOGLOBIN GLYCOSYLATED A1C: CPT | Performed by: PHYSICIAN ASSISTANT

## 2019-12-21 RX ORDER — METHYLPREDNISOLONE SODIUM SUCCINATE 125 MG/2ML
125 INJECTION, POWDER, LYOPHILIZED, FOR SOLUTION INTRAMUSCULAR; INTRAVENOUS ONCE
Status: COMPLETED | OUTPATIENT
Start: 2019-12-21 | End: 2019-12-21

## 2019-12-21 RX ORDER — SODIUM CHLORIDE 0.9 % (FLUSH) 0.9 %
10 SYRINGE (ML) INJECTION AS NEEDED
Status: DISCONTINUED | OUTPATIENT
Start: 2019-12-21 | End: 2019-12-31 | Stop reason: HOSPADM

## 2019-12-21 RX ORDER — IPRATROPIUM BROMIDE AND ALBUTEROL SULFATE 2.5; .5 MG/3ML; MG/3ML
3 SOLUTION RESPIRATORY (INHALATION) ONCE
Status: COMPLETED | OUTPATIENT
Start: 2019-12-21 | End: 2019-12-21

## 2019-12-21 RX ORDER — DEXTROSE MONOHYDRATE 25 G/50ML
25 INJECTION, SOLUTION INTRAVENOUS
Status: DISCONTINUED | OUTPATIENT
Start: 2019-12-21 | End: 2019-12-31 | Stop reason: HOSPADM

## 2019-12-21 RX ORDER — DIVALPROEX SODIUM 500 MG/1
500 TABLET, EXTENDED RELEASE ORAL NIGHTLY
Status: DISCONTINUED | OUTPATIENT
Start: 2019-12-22 | End: 2019-12-31 | Stop reason: HOSPADM

## 2019-12-21 RX ORDER — L.ACID,PARA/B.BIFIDUM/S.THERM 8B CELL
1 CAPSULE ORAL DAILY
Status: DISCONTINUED | OUTPATIENT
Start: 2019-12-21 | End: 2019-12-31 | Stop reason: HOSPADM

## 2019-12-21 RX ORDER — METOPROLOL TARTRATE 100 MG/1
100 TABLET ORAL EVERY 12 HOURS SCHEDULED
Status: DISCONTINUED | OUTPATIENT
Start: 2019-12-22 | End: 2019-12-31 | Stop reason: HOSPADM

## 2019-12-21 RX ORDER — NITROGLYCERIN 0.4 MG/1
0.4 TABLET SUBLINGUAL
Status: DISCONTINUED | OUTPATIENT
Start: 2019-12-21 | End: 2019-12-31 | Stop reason: HOSPADM

## 2019-12-21 RX ORDER — MONTELUKAST SODIUM 10 MG/1
10 TABLET ORAL NIGHTLY
Status: DISCONTINUED | OUTPATIENT
Start: 2019-12-22 | End: 2019-12-31 | Stop reason: HOSPADM

## 2019-12-21 RX ORDER — SODIUM CHLORIDE 0.9 % (FLUSH) 0.9 %
10 SYRINGE (ML) INJECTION EVERY 12 HOURS SCHEDULED
Status: DISCONTINUED | OUTPATIENT
Start: 2019-12-21 | End: 2019-12-31 | Stop reason: HOSPADM

## 2019-12-21 RX ORDER — MAGNESIUM SULFATE 1 G/100ML
1 INJECTION INTRAVENOUS ONCE
Status: COMPLETED | OUTPATIENT
Start: 2019-12-21 | End: 2019-12-21

## 2019-12-21 RX ORDER — AMLODIPINE BESYLATE 10 MG/1
10 TABLET ORAL
Status: DISCONTINUED | OUTPATIENT
Start: 2019-12-22 | End: 2019-12-31 | Stop reason: HOSPADM

## 2019-12-21 RX ORDER — ASPIRIN 81 MG/1
81 TABLET ORAL DAILY
Status: DISCONTINUED | OUTPATIENT
Start: 2019-12-22 | End: 2019-12-31 | Stop reason: HOSPADM

## 2019-12-21 RX ORDER — ATORVASTATIN CALCIUM 40 MG/1
40 TABLET, FILM COATED ORAL NIGHTLY
Status: DISCONTINUED | OUTPATIENT
Start: 2019-12-21 | End: 2019-12-31 | Stop reason: HOSPADM

## 2019-12-21 RX ORDER — MAGNESIUM SULFATE 1 G/100ML
1 INJECTION INTRAVENOUS AS NEEDED
Status: DISCONTINUED | OUTPATIENT
Start: 2019-12-21 | End: 2019-12-31 | Stop reason: HOSPADM

## 2019-12-21 RX ORDER — PANTOPRAZOLE SODIUM 40 MG/1
40 TABLET, DELAYED RELEASE ORAL DAILY
Status: DISCONTINUED | OUTPATIENT
Start: 2019-12-22 | End: 2019-12-31 | Stop reason: HOSPADM

## 2019-12-21 RX ORDER — MULTIVITAMIN
1 TABLET ORAL DAILY
Status: DISCONTINUED | OUTPATIENT
Start: 2019-12-22 | End: 2019-12-31 | Stop reason: HOSPADM

## 2019-12-21 RX ORDER — IPRATROPIUM BROMIDE AND ALBUTEROL SULFATE 2.5; .5 MG/3ML; MG/3ML
3 SOLUTION RESPIRATORY (INHALATION)
Status: DISCONTINUED | OUTPATIENT
Start: 2019-12-21 | End: 2019-12-31 | Stop reason: HOSPADM

## 2019-12-21 RX ORDER — HYDRALAZINE HYDROCHLORIDE 50 MG/1
50 TABLET, FILM COATED ORAL EVERY 8 HOURS SCHEDULED
Status: DISCONTINUED | OUTPATIENT
Start: 2019-12-22 | End: 2019-12-31 | Stop reason: HOSPADM

## 2019-12-21 RX ORDER — NICOTINE POLACRILEX 4 MG
15 LOZENGE BUCCAL
Status: DISCONTINUED | OUTPATIENT
Start: 2019-12-21 | End: 2019-12-31 | Stop reason: HOSPADM

## 2019-12-21 RX ORDER — MAGNESIUM SULFATE HEPTAHYDRATE 40 MG/ML
2 INJECTION, SOLUTION INTRAVENOUS AS NEEDED
Status: DISCONTINUED | OUTPATIENT
Start: 2019-12-21 | End: 2019-12-31 | Stop reason: HOSPADM

## 2019-12-21 RX ORDER — THIAMINE MONONITRATE (VIT B1) 100 MG
100 TABLET ORAL DAILY
Status: CANCELLED | OUTPATIENT
Start: 2019-12-22

## 2019-12-21 RX ADMIN — APIXABAN 5 MG: 5 TABLET, FILM COATED ORAL at 23:27

## 2019-12-21 RX ADMIN — IPRATROPIUM BROMIDE AND ALBUTEROL SULFATE 3 ML: .5; 3 SOLUTION RESPIRATORY (INHALATION) at 16:15

## 2019-12-21 RX ADMIN — Medication 1 CAPSULE: at 21:32

## 2019-12-21 RX ADMIN — DOXYCYCLINE 100 MG: 100 INJECTION, POWDER, LYOPHILIZED, FOR SOLUTION INTRAVENOUS at 16:29

## 2019-12-21 RX ADMIN — HUMAN INSULIN 5 UNITS: 100 INJECTION, SOLUTION SUBCUTANEOUS at 16:32

## 2019-12-21 RX ADMIN — SODIUM CHLORIDE 1000 ML: 9 INJECTION, SOLUTION INTRAVENOUS at 16:28

## 2019-12-21 RX ADMIN — METOPROLOL TARTRATE 100 MG: 100 TABLET, FILM COATED ORAL at 23:27

## 2019-12-21 RX ADMIN — IPRATROPIUM BROMIDE AND ALBUTEROL SULFATE 3 ML: .5; 3 SOLUTION RESPIRATORY (INHALATION) at 19:52

## 2019-12-21 RX ADMIN — METHYLPREDNISOLONE SODIUM SUCCINATE 125 MG: 125 INJECTION, POWDER, FOR SOLUTION INTRAMUSCULAR; INTRAVENOUS at 16:29

## 2019-12-21 RX ADMIN — INSULIN ASPART 3 UNITS: 100 INJECTION, SOLUTION INTRAVENOUS; SUBCUTANEOUS at 21:32

## 2019-12-21 RX ADMIN — HYDRALAZINE HYDROCHLORIDE 50 MG: 50 TABLET ORAL at 23:27

## 2019-12-21 RX ADMIN — SODIUM CHLORIDE, PRESERVATIVE FREE 10 ML: 5 INJECTION INTRAVENOUS at 21:33

## 2019-12-21 RX ADMIN — VANCOMYCIN HYDROCHLORIDE 1000 MG: 5 INJECTION, POWDER, LYOPHILIZED, FOR SOLUTION INTRAVENOUS at 18:32

## 2019-12-21 RX ADMIN — PIPERACILLIN SODIUM,TAZOBACTAM SODIUM 3.38 G: 3; .375 INJECTION, POWDER, FOR SOLUTION INTRAVENOUS at 17:51

## 2019-12-21 RX ADMIN — HUMAN INSULIN 5 UNITS: 100 INJECTION, SOLUTION SUBCUTANEOUS at 17:37

## 2019-12-21 RX ADMIN — ATORVASTATIN CALCIUM 40 MG: 40 TABLET, FILM COATED ORAL at 21:32

## 2019-12-21 RX ADMIN — DOXYCYCLINE 100 MG: 100 INJECTION, POWDER, LYOPHILIZED, FOR SOLUTION INTRAVENOUS at 21:32

## 2019-12-21 RX ADMIN — MONTELUKAST SODIUM 10 MG: 10 TABLET, COATED ORAL at 23:27

## 2019-12-21 RX ADMIN — DIVALPROEX SODIUM 500 MG: 500 TABLET, EXTENDED RELEASE ORAL at 23:27

## 2019-12-21 RX ADMIN — MAGNESIUM SULFATE IN DEXTROSE 1 G: 10 INJECTION, SOLUTION INTRAVENOUS at 21:32

## 2019-12-22 LAB
ANION GAP SERPL CALCULATED.3IONS-SCNC: 13.6 MMOL/L (ref 5–15)
BUN BLD-MCNC: 34 MG/DL (ref 8–23)
BUN/CREAT SERPL: 25.2 (ref 7–25)
BURR CELLS BLD QL SMEAR: ABNORMAL
CALCIUM SPEC-SCNC: 8.1 MG/DL (ref 8.6–10.5)
CHLORIDE SERPL-SCNC: 100 MMOL/L (ref 98–107)
CO2 SERPL-SCNC: 20.4 MMOL/L (ref 22–29)
CREAT BLD-MCNC: 1.35 MG/DL (ref 0.57–1)
CRP SERPL-MCNC: 15.2 MG/DL (ref 0–0.5)
DEPRECATED RDW RBC AUTO: 42.5 FL (ref 37–54)
ERYTHROCYTE [DISTWIDTH] IN BLOOD BY AUTOMATED COUNT: 12.9 % (ref 12.3–15.4)
GFR SERPL CREATININE-BSD FRML MDRD: 38 ML/MIN/1.73
GLUCOSE BLD-MCNC: 356 MG/DL (ref 65–99)
GLUCOSE BLDC GLUCOMTR-MCNC: 296 MG/DL (ref 70–130)
GLUCOSE BLDC GLUCOMTR-MCNC: 341 MG/DL (ref 70–130)
GLUCOSE BLDC GLUCOMTR-MCNC: 389 MG/DL (ref 70–130)
GLUCOSE BLDC GLUCOMTR-MCNC: 396 MG/DL (ref 70–130)
HCT VFR BLD AUTO: 30.9 % (ref 34–46.6)
HGB BLD-MCNC: 9.6 G/DL (ref 12–15.9)
HYPOCHROMIA BLD QL: ABNORMAL
LYMPHOCYTES # BLD MANUAL: 0.89 10*3/MM3 (ref 0.7–3.1)
LYMPHOCYTES NFR BLD MANUAL: 1 % (ref 5–12)
LYMPHOCYTES NFR BLD MANUAL: 15 % (ref 19.6–45.3)
MAGNESIUM SERPL-MCNC: 2 MG/DL (ref 1.6–2.4)
MCH RBC QN AUTO: 28.2 PG (ref 26.6–33)
MCHC RBC AUTO-ENTMCNC: 31.1 G/DL (ref 31.5–35.7)
MCV RBC AUTO: 90.9 FL (ref 79–97)
MONOCYTES # BLD AUTO: 0.06 10*3/MM3 (ref 0.1–0.9)
NEUTROPHILS # BLD AUTO: 4.97 10*3/MM3 (ref 1.7–7)
NEUTROPHILS NFR BLD MANUAL: 84 % (ref 42.7–76)
PHOSPHATE SERPL-MCNC: 4.3 MG/DL (ref 2.5–4.5)
PLAT MORPH BLD: NORMAL
PLATELET # BLD AUTO: 193 10*3/MM3 (ref 140–450)
PMV BLD AUTO: 12.1 FL (ref 6–12)
POTASSIUM BLD-SCNC: 4.4 MMOL/L (ref 3.5–5.2)
RBC # BLD AUTO: 3.4 10*6/MM3 (ref 3.77–5.28)
SCAN SLIDE: NORMAL
SODIUM BLD-SCNC: 134 MMOL/L (ref 136–145)
WBC NRBC COR # BLD: 5.92 10*3/MM3 (ref 3.4–10.8)

## 2019-12-22 PROCEDURE — 99232 SBSQ HOSP IP/OBS MODERATE 35: CPT | Performed by: INTERNAL MEDICINE

## 2019-12-22 PROCEDURE — 25010000002 PIPERACILLIN-TAZOBACTAM: Performed by: INTERNAL MEDICINE

## 2019-12-22 PROCEDURE — 94799 UNLISTED PULMONARY SVC/PX: CPT

## 2019-12-22 PROCEDURE — 84100 ASSAY OF PHOSPHORUS: CPT | Performed by: PHYSICIAN ASSISTANT

## 2019-12-22 PROCEDURE — 85007 BL SMEAR W/DIFF WBC COUNT: CPT | Performed by: INTERNAL MEDICINE

## 2019-12-22 PROCEDURE — 83735 ASSAY OF MAGNESIUM: CPT | Performed by: PHYSICIAN ASSISTANT

## 2019-12-22 PROCEDURE — 63710000001 INSULIN DETEMIR PER 5 UNITS: Performed by: PHYSICIAN ASSISTANT

## 2019-12-22 PROCEDURE — 25010000002 VANCOMYCIN 5 G RECONSTITUTED SOLUTION 5,000 MG VIAL: Performed by: INTERNAL MEDICINE

## 2019-12-22 PROCEDURE — 63710000001 INSULIN ASPART PER 5 UNITS: Performed by: PHYSICIAN ASSISTANT

## 2019-12-22 PROCEDURE — 85025 COMPLETE CBC W/AUTO DIFF WBC: CPT | Performed by: INTERNAL MEDICINE

## 2019-12-22 PROCEDURE — 80048 BASIC METABOLIC PNL TOTAL CA: CPT | Performed by: INTERNAL MEDICINE

## 2019-12-22 PROCEDURE — 86140 C-REACTIVE PROTEIN: CPT | Performed by: PHYSICIAN ASSISTANT

## 2019-12-22 PROCEDURE — 82962 GLUCOSE BLOOD TEST: CPT

## 2019-12-22 RX ADMIN — IPRATROPIUM BROMIDE AND ALBUTEROL SULFATE 3 ML: .5; 3 SOLUTION RESPIRATORY (INHALATION) at 13:27

## 2019-12-22 RX ADMIN — ASPIRIN 81 MG: 81 TABLET, COATED ORAL at 08:30

## 2019-12-22 RX ADMIN — SODIUM CHLORIDE, PRESERVATIVE FREE 10 ML: 5 INJECTION INTRAVENOUS at 08:30

## 2019-12-22 RX ADMIN — INSULIN ASPART 5 UNITS: 100 INJECTION, SOLUTION INTRAVENOUS; SUBCUTANEOUS at 20:48

## 2019-12-22 RX ADMIN — IPRATROPIUM BROMIDE AND ALBUTEROL SULFATE 3 ML: .5; 3 SOLUTION RESPIRATORY (INHALATION) at 00:49

## 2019-12-22 RX ADMIN — INSULIN DETEMIR 8 UNITS: 100 INJECTION, SOLUTION SUBCUTANEOUS at 08:35

## 2019-12-22 RX ADMIN — PIPERACILLIN SODIUM,TAZOBACTAM SODIUM 3.38 G: 3; .375 INJECTION, POWDER, FOR SOLUTION INTRAVENOUS at 00:54

## 2019-12-22 RX ADMIN — PIPERACILLIN SODIUM,TAZOBACTAM SODIUM 3.38 G: 3; .375 INJECTION, POWDER, FOR SOLUTION INTRAVENOUS at 08:29

## 2019-12-22 RX ADMIN — VANCOMYCIN HYDROCHLORIDE 500 MG: 5 INJECTION, POWDER, LYOPHILIZED, FOR SOLUTION INTRAVENOUS at 17:11

## 2019-12-22 RX ADMIN — PIPERACILLIN SODIUM,TAZOBACTAM SODIUM 3.38 G: 3; .375 INJECTION, POWDER, FOR SOLUTION INTRAVENOUS at 17:11

## 2019-12-22 RX ADMIN — DOXYCYCLINE 100 MG: 100 INJECTION, POWDER, LYOPHILIZED, FOR SOLUTION INTRAVENOUS at 20:28

## 2019-12-22 RX ADMIN — MONTELUKAST SODIUM 10 MG: 10 TABLET, COATED ORAL at 20:28

## 2019-12-22 RX ADMIN — ATORVASTATIN CALCIUM 40 MG: 40 TABLET, FILM COATED ORAL at 20:28

## 2019-12-22 RX ADMIN — SODIUM CHLORIDE, PRESERVATIVE FREE 10 ML: 5 INJECTION INTRAVENOUS at 20:28

## 2019-12-22 RX ADMIN — APIXABAN 5 MG: 5 TABLET, FILM COATED ORAL at 20:28

## 2019-12-22 RX ADMIN — METOPROLOL TARTRATE 100 MG: 100 TABLET, FILM COATED ORAL at 08:30

## 2019-12-22 RX ADMIN — Medication 1 TABLET: at 08:30

## 2019-12-22 RX ADMIN — DOXYCYCLINE 100 MG: 100 INJECTION, POWDER, LYOPHILIZED, FOR SOLUTION INTRAVENOUS at 08:29

## 2019-12-22 RX ADMIN — APIXABAN 5 MG: 5 TABLET, FILM COATED ORAL at 08:30

## 2019-12-22 RX ADMIN — DIVALPROEX SODIUM 500 MG: 500 TABLET, EXTENDED RELEASE ORAL at 20:28

## 2019-12-22 RX ADMIN — AMLODIPINE BESYLATE 10 MG: 10 TABLET ORAL at 08:30

## 2019-12-22 RX ADMIN — INSULIN ASPART 4 UNITS: 100 INJECTION, SOLUTION INTRAVENOUS; SUBCUTANEOUS at 08:30

## 2019-12-22 RX ADMIN — INSULIN ASPART 6 UNITS: 100 INJECTION, SOLUTION INTRAVENOUS; SUBCUTANEOUS at 17:11

## 2019-12-22 RX ADMIN — Medication 1 CAPSULE: at 08:30

## 2019-12-22 RX ADMIN — IPRATROPIUM BROMIDE AND ALBUTEROL SULFATE 3 ML: .5; 3 SOLUTION RESPIRATORY (INHALATION) at 19:40

## 2019-12-22 RX ADMIN — INSULIN ASPART 6 UNITS: 100 INJECTION, SOLUTION INTRAVENOUS; SUBCUTANEOUS at 11:41

## 2019-12-22 RX ADMIN — PANTOPRAZOLE SODIUM 40 MG: 40 TABLET, DELAYED RELEASE ORAL at 08:30

## 2019-12-22 RX ADMIN — IPRATROPIUM BROMIDE AND ALBUTEROL SULFATE 3 ML: .5; 3 SOLUTION RESPIRATORY (INHALATION) at 07:02

## 2019-12-23 ENCOUNTER — APPOINTMENT (OUTPATIENT)
Dept: GENERAL RADIOLOGY | Facility: HOSPITAL | Age: 74
End: 2019-12-23

## 2019-12-23 LAB
ANION GAP SERPL CALCULATED.3IONS-SCNC: 14.9 MMOL/L (ref 5–15)
BASOPHILS # BLD AUTO: 0.03 10*3/MM3 (ref 0–0.2)
BASOPHILS NFR BLD AUTO: 0.3 % (ref 0–1.5)
BUN BLD-MCNC: 47 MG/DL (ref 8–23)
BUN/CREAT SERPL: 26.7 (ref 7–25)
CALCIUM SPEC-SCNC: 8 MG/DL (ref 8.6–10.5)
CHLORIDE SERPL-SCNC: 103 MMOL/L (ref 98–107)
CO2 SERPL-SCNC: 19.1 MMOL/L (ref 22–29)
CREAT BLD-MCNC: 1.76 MG/DL (ref 0.57–1)
DEPRECATED RDW RBC AUTO: 43.8 FL (ref 37–54)
EOSINOPHIL # BLD AUTO: 0.13 10*3/MM3 (ref 0–0.4)
EOSINOPHIL NFR BLD AUTO: 1.5 % (ref 0.3–6.2)
ERYTHROCYTE [DISTWIDTH] IN BLOOD BY AUTOMATED COUNT: 13.2 % (ref 12.3–15.4)
GFR SERPL CREATININE-BSD FRML MDRD: 28 ML/MIN/1.73
GLUCOSE BLD-MCNC: 199 MG/DL (ref 65–99)
GLUCOSE BLDC GLUCOMTR-MCNC: 109 MG/DL (ref 70–130)
GLUCOSE BLDC GLUCOMTR-MCNC: 159 MG/DL (ref 70–130)
GLUCOSE BLDC GLUCOMTR-MCNC: 221 MG/DL (ref 70–130)
GLUCOSE BLDC GLUCOMTR-MCNC: 291 MG/DL (ref 70–130)
HCT VFR BLD AUTO: 30.6 % (ref 34–46.6)
HGB BLD-MCNC: 9.3 G/DL (ref 12–15.9)
IMM GRANULOCYTES # BLD AUTO: 0.05 10*3/MM3 (ref 0–0.05)
IMM GRANULOCYTES NFR BLD AUTO: 0.6 % (ref 0–0.5)
LYMPHOCYTES # BLD AUTO: 1.42 10*3/MM3 (ref 0.7–3.1)
LYMPHOCYTES NFR BLD AUTO: 16.5 % (ref 19.6–45.3)
MCH RBC QN AUTO: 27.9 PG (ref 26.6–33)
MCHC RBC AUTO-ENTMCNC: 30.4 G/DL (ref 31.5–35.7)
MCV RBC AUTO: 91.9 FL (ref 79–97)
MONOCYTES # BLD AUTO: 1 10*3/MM3 (ref 0.1–0.9)
MONOCYTES NFR BLD AUTO: 11.6 % (ref 5–12)
NEUTROPHILS # BLD AUTO: 5.98 10*3/MM3 (ref 1.7–7)
NEUTROPHILS NFR BLD AUTO: 69.5 % (ref 42.7–76)
NRBC BLD AUTO-RTO: 0 /100 WBC (ref 0–0.2)
PLATELET # BLD AUTO: 252 10*3/MM3 (ref 140–450)
PMV BLD AUTO: 11.8 FL (ref 6–12)
POTASSIUM BLD-SCNC: 4.1 MMOL/L (ref 3.5–5.2)
RBC # BLD AUTO: 3.33 10*6/MM3 (ref 3.77–5.28)
SODIUM BLD-SCNC: 137 MMOL/L (ref 136–145)
VANCOMYCIN TROUGH SERPL-MCNC: 10.4 MCG/ML (ref 5–20)
WBC NRBC COR # BLD: 8.61 10*3/MM3 (ref 3.4–10.8)

## 2019-12-23 PROCEDURE — 80202 ASSAY OF VANCOMYCIN: CPT | Performed by: PHYSICIAN ASSISTANT

## 2019-12-23 PROCEDURE — 74230 X-RAY XM SWLNG FUNCJ C+: CPT | Performed by: RADIOLOGY

## 2019-12-23 PROCEDURE — 92611 MOTION FLUOROSCOPY/SWALLOW: CPT

## 2019-12-23 PROCEDURE — 74230 X-RAY XM SWLNG FUNCJ C+: CPT

## 2019-12-23 PROCEDURE — 63710000001 INSULIN DETEMIR PER 5 UNITS: Performed by: INTERNAL MEDICINE

## 2019-12-23 PROCEDURE — 94799 UNLISTED PULMONARY SVC/PX: CPT

## 2019-12-23 PROCEDURE — 82962 GLUCOSE BLOOD TEST: CPT

## 2019-12-23 PROCEDURE — 99232 SBSQ HOSP IP/OBS MODERATE 35: CPT | Performed by: INTERNAL MEDICINE

## 2019-12-23 PROCEDURE — 85025 COMPLETE CBC W/AUTO DIFF WBC: CPT | Performed by: INTERNAL MEDICINE

## 2019-12-23 PROCEDURE — 80048 BASIC METABOLIC PNL TOTAL CA: CPT | Performed by: INTERNAL MEDICINE

## 2019-12-23 PROCEDURE — 63710000001 INSULIN ASPART PER 5 UNITS: Performed by: PHYSICIAN ASSISTANT

## 2019-12-23 PROCEDURE — 25010000002 PIPERACILLIN-TAZOBACTAM: Performed by: INTERNAL MEDICINE

## 2019-12-23 RX ADMIN — IPRATROPIUM BROMIDE AND ALBUTEROL SULFATE 3 ML: .5; 3 SOLUTION RESPIRATORY (INHALATION) at 18:53

## 2019-12-23 RX ADMIN — IPRATROPIUM BROMIDE AND ALBUTEROL SULFATE 3 ML: .5; 3 SOLUTION RESPIRATORY (INHALATION) at 07:05

## 2019-12-23 RX ADMIN — APIXABAN 2.5 MG: 2.5 TABLET, FILM COATED ORAL at 21:18

## 2019-12-23 RX ADMIN — PIPERACILLIN SODIUM,TAZOBACTAM SODIUM 3.38 G: 3; .375 INJECTION, POWDER, FOR SOLUTION INTRAVENOUS at 09:19

## 2019-12-23 RX ADMIN — INSULIN ASPART 2 UNITS: 100 INJECTION, SOLUTION INTRAVENOUS; SUBCUTANEOUS at 21:19

## 2019-12-23 RX ADMIN — METOPROLOL TARTRATE 100 MG: 100 TABLET, FILM COATED ORAL at 21:18

## 2019-12-23 RX ADMIN — ASPIRIN 81 MG: 81 TABLET, COATED ORAL at 08:15

## 2019-12-23 RX ADMIN — DOXYCYCLINE 100 MG: 100 INJECTION, POWDER, LYOPHILIZED, FOR SOLUTION INTRAVENOUS at 08:14

## 2019-12-23 RX ADMIN — Medication 1 TABLET: at 08:15

## 2019-12-23 RX ADMIN — DOXYCYCLINE 100 MG: 100 INJECTION, POWDER, LYOPHILIZED, FOR SOLUTION INTRAVENOUS at 21:18

## 2019-12-23 RX ADMIN — INSULIN DETEMIR 10 UNITS: 100 INJECTION, SOLUTION SUBCUTANEOUS at 21:24

## 2019-12-23 RX ADMIN — ATORVASTATIN CALCIUM 40 MG: 40 TABLET, FILM COATED ORAL at 21:19

## 2019-12-23 RX ADMIN — PIPERACILLIN SODIUM,TAZOBACTAM SODIUM 3.38 G: 3; .375 INJECTION, POWDER, FOR SOLUTION INTRAVENOUS at 02:11

## 2019-12-23 RX ADMIN — AMLODIPINE BESYLATE 10 MG: 10 TABLET ORAL at 08:15

## 2019-12-23 RX ADMIN — IPRATROPIUM BROMIDE AND ALBUTEROL SULFATE 3 ML: .5; 3 SOLUTION RESPIRATORY (INHALATION) at 13:19

## 2019-12-23 RX ADMIN — PIPERACILLIN SODIUM,TAZOBACTAM SODIUM 3.38 G: 3; .375 INJECTION, POWDER, FOR SOLUTION INTRAVENOUS at 16:56

## 2019-12-23 RX ADMIN — MONTELUKAST SODIUM 10 MG: 10 TABLET, COATED ORAL at 21:19

## 2019-12-23 RX ADMIN — DIVALPROEX SODIUM 500 MG: 500 TABLET, EXTENDED RELEASE ORAL at 21:18

## 2019-12-23 RX ADMIN — SODIUM CHLORIDE, PRESERVATIVE FREE 10 ML: 5 INJECTION INTRAVENOUS at 08:16

## 2019-12-23 RX ADMIN — PANTOPRAZOLE SODIUM 40 MG: 40 TABLET, DELAYED RELEASE ORAL at 08:15

## 2019-12-23 RX ADMIN — INSULIN ASPART 4 UNITS: 100 INJECTION, SOLUTION INTRAVENOUS; SUBCUTANEOUS at 11:45

## 2019-12-23 RX ADMIN — INSULIN ASPART 3 UNITS: 100 INJECTION, SOLUTION INTRAVENOUS; SUBCUTANEOUS at 08:16

## 2019-12-23 RX ADMIN — SODIUM CHLORIDE, PRESERVATIVE FREE 10 ML: 5 INJECTION INTRAVENOUS at 21:19

## 2019-12-23 RX ADMIN — Medication 1 CAPSULE: at 08:15

## 2019-12-23 RX ADMIN — INSULIN DETEMIR 10 UNITS: 100 INJECTION, SOLUTION SUBCUTANEOUS at 08:27

## 2019-12-23 RX ADMIN — APIXABAN 5 MG: 5 TABLET, FILM COATED ORAL at 08:15

## 2019-12-23 RX ADMIN — HYDRALAZINE HYDROCHLORIDE 50 MG: 50 TABLET ORAL at 05:16

## 2019-12-23 RX ADMIN — METOPROLOL TARTRATE 100 MG: 100 TABLET, FILM COATED ORAL at 08:15

## 2019-12-24 LAB
ANION GAP SERPL CALCULATED.3IONS-SCNC: 13.5 MMOL/L (ref 5–15)
B PERT DNA SPEC QL NAA+PROBE: NOT DETECTED
BACTERIA UR QL AUTO: ABNORMAL /HPF
BASOPHILS # BLD AUTO: 0.06 10*3/MM3 (ref 0–0.2)
BASOPHILS NFR BLD AUTO: 0.5 % (ref 0–1.5)
BILIRUB UR QL STRIP: NEGATIVE
BUN BLD-MCNC: 42 MG/DL (ref 8–23)
BUN/CREAT SERPL: 26.8 (ref 7–25)
C PNEUM DNA NPH QL NAA+NON-PROBE: NOT DETECTED
CALCIUM SPEC-SCNC: 8.3 MG/DL (ref 8.6–10.5)
CHLORIDE SERPL-SCNC: 107 MMOL/L (ref 98–107)
CLARITY UR: CLEAR
CO2 SERPL-SCNC: 20.5 MMOL/L (ref 22–29)
COLOR UR: YELLOW
CREAT BLD-MCNC: 1.57 MG/DL (ref 0.57–1)
DEPRECATED RDW RBC AUTO: 42.5 FL (ref 37–54)
EOSINOPHIL # BLD AUTO: 0.27 10*3/MM3 (ref 0–0.4)
EOSINOPHIL NFR BLD AUTO: 2.3 % (ref 0.3–6.2)
ERYTHROCYTE [DISTWIDTH] IN BLOOD BY AUTOMATED COUNT: 13.2 % (ref 12.3–15.4)
FLUAV H1 2009 PAND RNA NPH QL NAA+PROBE: NOT DETECTED
FLUAV H1 HA GENE NPH QL NAA+PROBE: NOT DETECTED
FLUAV H3 RNA NPH QL NAA+PROBE: NOT DETECTED
FLUAV SUBTYP SPEC NAA+PROBE: NOT DETECTED
FLUBV RNA ISLT QL NAA+PROBE: NOT DETECTED
GFR SERPL CREATININE-BSD FRML MDRD: 32 ML/MIN/1.73
GLUCOSE BLD-MCNC: 27 MG/DL (ref 65–99)
GLUCOSE BLDC GLUCOMTR-MCNC: 133 MG/DL (ref 70–130)
GLUCOSE BLDC GLUCOMTR-MCNC: 140 MG/DL (ref 70–130)
GLUCOSE BLDC GLUCOMTR-MCNC: 154 MG/DL (ref 70–130)
GLUCOSE BLDC GLUCOMTR-MCNC: 166 MG/DL (ref 70–130)
GLUCOSE BLDC GLUCOMTR-MCNC: 195 MG/DL (ref 70–130)
GLUCOSE BLDC GLUCOMTR-MCNC: 216 MG/DL (ref 70–130)
GLUCOSE BLDC GLUCOMTR-MCNC: 246 MG/DL (ref 70–130)
GLUCOSE BLDC GLUCOMTR-MCNC: 334 MG/DL (ref 70–130)
GLUCOSE BLDC GLUCOMTR-MCNC: 37 MG/DL (ref 70–130)
GLUCOSE BLDC GLUCOMTR-MCNC: 408 MG/DL (ref 70–130)
GLUCOSE UR STRIP-MCNC: ABNORMAL MG/DL
HADV DNA SPEC NAA+PROBE: NOT DETECTED
HCOV 229E RNA SPEC QL NAA+PROBE: NOT DETECTED
HCOV HKU1 RNA SPEC QL NAA+PROBE: NOT DETECTED
HCOV NL63 RNA SPEC QL NAA+PROBE: NOT DETECTED
HCOV OC43 RNA SPEC QL NAA+PROBE: NOT DETECTED
HCT VFR BLD AUTO: 34.6 % (ref 34–46.6)
HGB BLD-MCNC: 11 G/DL (ref 12–15.9)
HGB UR QL STRIP.AUTO: NEGATIVE
HMPV RNA NPH QL NAA+NON-PROBE: NOT DETECTED
HPIV1 RNA SPEC QL NAA+PROBE: NOT DETECTED
HPIV2 RNA SPEC QL NAA+PROBE: NOT DETECTED
HPIV3 RNA NPH QL NAA+PROBE: NOT DETECTED
HPIV4 P GENE NPH QL NAA+PROBE: NOT DETECTED
HYALINE CASTS UR QL AUTO: ABNORMAL /LPF
IMM GRANULOCYTES # BLD AUTO: 0.08 10*3/MM3 (ref 0–0.05)
IMM GRANULOCYTES NFR BLD AUTO: 0.7 % (ref 0–0.5)
KETONES UR QL STRIP: NEGATIVE
L PNEUMO1 AG UR QL IA: NEGATIVE
LEUKOCYTE ESTERASE UR QL STRIP.AUTO: ABNORMAL
LYMPHOCYTES # BLD AUTO: 2.06 10*3/MM3 (ref 0.7–3.1)
LYMPHOCYTES NFR BLD AUTO: 17.4 % (ref 19.6–45.3)
M PNEUMO IGG SER IA-ACNC: NOT DETECTED
MCH RBC QN AUTO: 28 PG (ref 26.6–33)
MCHC RBC AUTO-ENTMCNC: 31.8 G/DL (ref 31.5–35.7)
MCV RBC AUTO: 88 FL (ref 79–97)
MONOCYTES # BLD AUTO: 1.17 10*3/MM3 (ref 0.1–0.9)
MONOCYTES NFR BLD AUTO: 9.9 % (ref 5–12)
NEUTROPHILS # BLD AUTO: 8.22 10*3/MM3 (ref 1.7–7)
NEUTROPHILS NFR BLD AUTO: 69.2 % (ref 42.7–76)
NITRITE UR QL STRIP: NEGATIVE
NRBC BLD AUTO-RTO: 0 /100 WBC (ref 0–0.2)
PH UR STRIP.AUTO: <=5 [PH] (ref 5–8)
PLATELET # BLD AUTO: 328 10*3/MM3 (ref 140–450)
PMV BLD AUTO: 11.6 FL (ref 6–12)
POTASSIUM BLD-SCNC: 3.7 MMOL/L (ref 3.5–5.2)
PROT UR QL STRIP: ABNORMAL
RBC # BLD AUTO: 3.93 10*6/MM3 (ref 3.77–5.28)
RBC # UR: ABNORMAL /HPF
REF LAB TEST METHOD: ABNORMAL
RHINOVIRUS RNA SPEC NAA+PROBE: NOT DETECTED
RSV RNA NPH QL NAA+NON-PROBE: NOT DETECTED
SODIUM BLD-SCNC: 141 MMOL/L (ref 136–145)
SP GR UR STRIP: 1.03 (ref 1–1.03)
SQUAMOUS #/AREA URNS HPF: ABNORMAL /HPF
UROBILINOGEN UR QL STRIP: ABNORMAL
WBC NRBC COR # BLD: 11.86 10*3/MM3 (ref 3.4–10.8)
WBC UR QL AUTO: ABNORMAL /HPF
YEAST URNS QL MICRO: ABNORMAL /HPF

## 2019-12-24 PROCEDURE — 85025 COMPLETE CBC W/AUTO DIFF WBC: CPT | Performed by: INTERNAL MEDICINE

## 2019-12-24 PROCEDURE — 99232 SBSQ HOSP IP/OBS MODERATE 35: CPT | Performed by: INTERNAL MEDICINE

## 2019-12-24 PROCEDURE — 63710000001 INSULIN ASPART PER 5 UNITS: Performed by: PHYSICIAN ASSISTANT

## 2019-12-24 PROCEDURE — 25010000002 VANCOMYCIN 5 G RECONSTITUTED SOLUTION 5,000 MG VIAL: Performed by: INTERNAL MEDICINE

## 2019-12-24 PROCEDURE — 87040 BLOOD CULTURE FOR BACTERIA: CPT | Performed by: INTERNAL MEDICINE

## 2019-12-24 PROCEDURE — 0099U HC BIOFIRE FILMARRAY RESP PANEL 1: CPT | Performed by: INTERNAL MEDICINE

## 2019-12-24 PROCEDURE — 87899 AGENT NOS ASSAY W/OPTIC: CPT | Performed by: PHYSICIAN ASSISTANT

## 2019-12-24 PROCEDURE — 80048 BASIC METABOLIC PNL TOTAL CA: CPT | Performed by: INTERNAL MEDICINE

## 2019-12-24 PROCEDURE — 63710000001 INSULIN DETEMIR PER 5 UNITS: Performed by: INTERNAL MEDICINE

## 2019-12-24 PROCEDURE — 81001 URINALYSIS AUTO W/SCOPE: CPT | Performed by: PHYSICIAN ASSISTANT

## 2019-12-24 PROCEDURE — 94799 UNLISTED PULMONARY SVC/PX: CPT

## 2019-12-24 PROCEDURE — 25010000002 PIPERACILLIN-TAZOBACTAM: Performed by: INTERNAL MEDICINE

## 2019-12-24 PROCEDURE — 82962 GLUCOSE BLOOD TEST: CPT

## 2019-12-24 PROCEDURE — 93010 ELECTROCARDIOGRAM REPORT: CPT | Performed by: INTERNAL MEDICINE

## 2019-12-24 PROCEDURE — 87086 URINE CULTURE/COLONY COUNT: CPT | Performed by: INTERNAL MEDICINE

## 2019-12-24 PROCEDURE — 93005 ELECTROCARDIOGRAM TRACING: CPT | Performed by: NURSE PRACTITIONER

## 2019-12-24 RX ORDER — DOXYCYCLINE 100 MG/1
100 CAPSULE ORAL 2 TIMES DAILY WITH MEALS
Status: COMPLETED | OUTPATIENT
Start: 2019-12-24 | End: 2019-12-28

## 2019-12-24 RX ADMIN — SODIUM CHLORIDE, PRESERVATIVE FREE 10 ML: 5 INJECTION INTRAVENOUS at 08:21

## 2019-12-24 RX ADMIN — MONTELUKAST SODIUM 10 MG: 10 TABLET, COATED ORAL at 23:03

## 2019-12-24 RX ADMIN — INSULIN DETEMIR 10 UNITS: 100 INJECTION, SOLUTION SUBCUTANEOUS at 09:57

## 2019-12-24 RX ADMIN — DIVALPROEX SODIUM 500 MG: 500 TABLET, EXTENDED RELEASE ORAL at 23:03

## 2019-12-24 RX ADMIN — HYDRALAZINE HYDROCHLORIDE 50 MG: 50 TABLET ORAL at 06:16

## 2019-12-24 RX ADMIN — IPRATROPIUM BROMIDE AND ALBUTEROL SULFATE 3 ML: .5; 3 SOLUTION RESPIRATORY (INHALATION) at 18:38

## 2019-12-24 RX ADMIN — Medication 1 TABLET: at 08:18

## 2019-12-24 RX ADMIN — IPRATROPIUM BROMIDE AND ALBUTEROL SULFATE 3 ML: .5; 3 SOLUTION RESPIRATORY (INHALATION) at 07:24

## 2019-12-24 RX ADMIN — PIPERACILLIN SODIUM,TAZOBACTAM SODIUM 3.38 G: 3; .375 INJECTION, POWDER, FOR SOLUTION INTRAVENOUS at 09:58

## 2019-12-24 RX ADMIN — HYDRALAZINE HYDROCHLORIDE 50 MG: 50 TABLET ORAL at 23:33

## 2019-12-24 RX ADMIN — DOXYCYCLINE 100 MG: 100 CAPSULE ORAL at 16:54

## 2019-12-24 RX ADMIN — PIPERACILLIN SODIUM,TAZOBACTAM SODIUM 3.38 G: 3; .375 INJECTION, POWDER, FOR SOLUTION INTRAVENOUS at 16:55

## 2019-12-24 RX ADMIN — PIPERACILLIN SODIUM,TAZOBACTAM SODIUM 3.38 G: 3; .375 INJECTION, POWDER, FOR SOLUTION INTRAVENOUS at 02:48

## 2019-12-24 RX ADMIN — INSULIN ASPART 3 UNITS: 100 INJECTION, SOLUTION INTRAVENOUS; SUBCUTANEOUS at 12:53

## 2019-12-24 RX ADMIN — INSULIN ASPART 7 UNITS: 100 INJECTION, SOLUTION INTRAVENOUS; SUBCUTANEOUS at 15:32

## 2019-12-24 RX ADMIN — Medication 1 CAPSULE: at 08:18

## 2019-12-24 RX ADMIN — VANCOMYCIN HYDROCHLORIDE 500 MG: 5 INJECTION, POWDER, LYOPHILIZED, FOR SOLUTION INTRAVENOUS at 12:54

## 2019-12-24 RX ADMIN — APIXABAN 2.5 MG: 2.5 TABLET, FILM COATED ORAL at 23:04

## 2019-12-24 RX ADMIN — ATORVASTATIN CALCIUM 40 MG: 40 TABLET, FILM COATED ORAL at 23:03

## 2019-12-24 RX ADMIN — INSULIN ASPART 2 UNITS: 100 INJECTION, SOLUTION INTRAVENOUS; SUBCUTANEOUS at 23:04

## 2019-12-24 RX ADMIN — SODIUM CHLORIDE, PRESERVATIVE FREE 10 ML: 5 INJECTION INTRAVENOUS at 23:04

## 2019-12-24 RX ADMIN — APIXABAN 2.5 MG: 2.5 TABLET, FILM COATED ORAL at 08:18

## 2019-12-24 RX ADMIN — METOPROLOL TARTRATE 100 MG: 100 TABLET, FILM COATED ORAL at 23:03

## 2019-12-24 RX ADMIN — IPRATROPIUM BROMIDE AND ALBUTEROL SULFATE 3 ML: .5; 3 SOLUTION RESPIRATORY (INHALATION) at 13:14

## 2019-12-24 RX ADMIN — DEXTROSE MONOHYDRATE 25 G: 25 INJECTION, SOLUTION INTRAVENOUS at 04:10

## 2019-12-24 RX ADMIN — INSULIN ASPART 5 UNITS: 100 INJECTION, SOLUTION INTRAVENOUS; SUBCUTANEOUS at 17:19

## 2019-12-24 RX ADMIN — ASPIRIN 81 MG: 81 TABLET, COATED ORAL at 08:18

## 2019-12-24 RX ADMIN — PANTOPRAZOLE SODIUM 40 MG: 40 TABLET, DELAYED RELEASE ORAL at 08:18

## 2019-12-24 RX ADMIN — HYDRALAZINE HYDROCHLORIDE 50 MG: 50 TABLET ORAL at 15:00

## 2019-12-24 RX ADMIN — DOXYCYCLINE 100 MG: 100 INJECTION, POWDER, LYOPHILIZED, FOR SOLUTION INTRAVENOUS at 08:21

## 2019-12-25 LAB
ANION GAP SERPL CALCULATED.3IONS-SCNC: 13.3 MMOL/L (ref 5–15)
BACTERIA SPEC AEROBE CULT: ABNORMAL
BASOPHILS # BLD AUTO: 0.03 10*3/MM3 (ref 0–0.2)
BASOPHILS NFR BLD AUTO: 0.6 % (ref 0–1.5)
BUN BLD-MCNC: 49 MG/DL (ref 8–23)
BUN/CREAT SERPL: 28.3 (ref 7–25)
CALCIUM SPEC-SCNC: 7.5 MG/DL (ref 8.6–10.5)
CHLORIDE SERPL-SCNC: 105 MMOL/L (ref 98–107)
CO2 SERPL-SCNC: 18.7 MMOL/L (ref 22–29)
CREAT BLD-MCNC: 1.73 MG/DL (ref 0.57–1)
DEPRECATED RDW RBC AUTO: 43.9 FL (ref 37–54)
EOSINOPHIL # BLD AUTO: 0.19 10*3/MM3 (ref 0–0.4)
EOSINOPHIL NFR BLD AUTO: 3.5 % (ref 0.3–6.2)
ERYTHROCYTE [DISTWIDTH] IN BLOOD BY AUTOMATED COUNT: 13.4 % (ref 12.3–15.4)
GFR SERPL CREATININE-BSD FRML MDRD: 29 ML/MIN/1.73
GLUCOSE BLD-MCNC: 271 MG/DL (ref 65–99)
GLUCOSE BLDC GLUCOMTR-MCNC: 191 MG/DL (ref 70–130)
GLUCOSE BLDC GLUCOMTR-MCNC: 195 MG/DL (ref 70–130)
GLUCOSE BLDC GLUCOMTR-MCNC: 202 MG/DL (ref 70–130)
GLUCOSE BLDC GLUCOMTR-MCNC: 321 MG/DL (ref 70–130)
HCT VFR BLD AUTO: 29.7 % (ref 34–46.6)
HGB BLD-MCNC: 9.4 G/DL (ref 12–15.9)
IMM GRANULOCYTES # BLD AUTO: 0.07 10*3/MM3 (ref 0–0.05)
IMM GRANULOCYTES NFR BLD AUTO: 1.3 % (ref 0–0.5)
LYMPHOCYTES # BLD AUTO: 1.49 10*3/MM3 (ref 0.7–3.1)
LYMPHOCYTES NFR BLD AUTO: 27.7 % (ref 19.6–45.3)
MCH RBC QN AUTO: 28.1 PG (ref 26.6–33)
MCHC RBC AUTO-ENTMCNC: 31.6 G/DL (ref 31.5–35.7)
MCV RBC AUTO: 88.7 FL (ref 79–97)
MONOCYTES # BLD AUTO: 0.67 10*3/MM3 (ref 0.1–0.9)
MONOCYTES NFR BLD AUTO: 12.5 % (ref 5–12)
NEUTROPHILS # BLD AUTO: 2.92 10*3/MM3 (ref 1.7–7)
NEUTROPHILS NFR BLD AUTO: 54.4 % (ref 42.7–76)
NRBC BLD AUTO-RTO: 0 /100 WBC (ref 0–0.2)
PLATELET # BLD AUTO: 247 10*3/MM3 (ref 140–450)
PMV BLD AUTO: 11.9 FL (ref 6–12)
POTASSIUM BLD-SCNC: 4.8 MMOL/L (ref 3.5–5.2)
RBC # BLD AUTO: 3.35 10*6/MM3 (ref 3.77–5.28)
SODIUM BLD-SCNC: 137 MMOL/L (ref 136–145)
WBC NRBC COR # BLD: 5.37 10*3/MM3 (ref 3.4–10.8)

## 2019-12-25 PROCEDURE — 94799 UNLISTED PULMONARY SVC/PX: CPT

## 2019-12-25 PROCEDURE — 63710000001 INSULIN ASPART PER 5 UNITS: Performed by: INTERNAL MEDICINE

## 2019-12-25 PROCEDURE — 25010000002 PIPERACILLIN-TAZOBACTAM: Performed by: INTERNAL MEDICINE

## 2019-12-25 PROCEDURE — 80048 BASIC METABOLIC PNL TOTAL CA: CPT | Performed by: INTERNAL MEDICINE

## 2019-12-25 PROCEDURE — 99232 SBSQ HOSP IP/OBS MODERATE 35: CPT | Performed by: INTERNAL MEDICINE

## 2019-12-25 PROCEDURE — 85025 COMPLETE CBC W/AUTO DIFF WBC: CPT | Performed by: INTERNAL MEDICINE

## 2019-12-25 PROCEDURE — 63710000001 INSULIN DETEMIR PER 5 UNITS: Performed by: INTERNAL MEDICINE

## 2019-12-25 PROCEDURE — 82962 GLUCOSE BLOOD TEST: CPT

## 2019-12-25 PROCEDURE — 63710000001 INSULIN ASPART PER 5 UNITS: Performed by: PHYSICIAN ASSISTANT

## 2019-12-25 RX ORDER — SODIUM CHLORIDE, SODIUM LACTATE, POTASSIUM CHLORIDE, CALCIUM CHLORIDE 600; 310; 30; 20 MG/100ML; MG/100ML; MG/100ML; MG/100ML
100 INJECTION, SOLUTION INTRAVENOUS CONTINUOUS
Status: DISCONTINUED | OUTPATIENT
Start: 2019-12-25 | End: 2019-12-25

## 2019-12-25 RX ORDER — LINEZOLID 600 MG/1
600 TABLET, FILM COATED ORAL EVERY 12 HOURS SCHEDULED
Status: COMPLETED | OUTPATIENT
Start: 2019-12-25 | End: 2019-12-30

## 2019-12-25 RX ORDER — AMOXICILLIN AND CLAVULANATE POTASSIUM 500; 125 MG/1; MG/1
1 TABLET, FILM COATED ORAL EVERY 12 HOURS SCHEDULED
Status: COMPLETED | OUTPATIENT
Start: 2019-12-25 | End: 2019-12-27

## 2019-12-25 RX ADMIN — APIXABAN 2.5 MG: 2.5 TABLET, FILM COATED ORAL at 20:29

## 2019-12-25 RX ADMIN — INSULIN ASPART 5 UNITS: 100 INJECTION, SOLUTION INTRAVENOUS; SUBCUTANEOUS at 08:27

## 2019-12-25 RX ADMIN — IPRATROPIUM BROMIDE AND ALBUTEROL SULFATE 3 ML: .5; 3 SOLUTION RESPIRATORY (INHALATION) at 00:35

## 2019-12-25 RX ADMIN — IPRATROPIUM BROMIDE AND ALBUTEROL SULFATE 3 ML: .5; 3 SOLUTION RESPIRATORY (INHALATION) at 13:39

## 2019-12-25 RX ADMIN — AMOXICILLIN AND CLAVULANATE POTASSIUM 500 MG: 500; 125 TABLET, FILM COATED ORAL at 16:40

## 2019-12-25 RX ADMIN — METOPROLOL TARTRATE 100 MG: 100 TABLET, FILM COATED ORAL at 20:29

## 2019-12-25 RX ADMIN — ATORVASTATIN CALCIUM 40 MG: 40 TABLET, FILM COATED ORAL at 20:29

## 2019-12-25 RX ADMIN — DIVALPROEX SODIUM 500 MG: 500 TABLET, EXTENDED RELEASE ORAL at 20:29

## 2019-12-25 RX ADMIN — LINEZOLID 600 MG: 600 TABLET, FILM COATED ORAL at 16:40

## 2019-12-25 RX ADMIN — MONTELUKAST SODIUM 10 MG: 10 TABLET, COATED ORAL at 20:29

## 2019-12-25 RX ADMIN — IPRATROPIUM BROMIDE AND ALBUTEROL SULFATE 3 ML: .5; 3 SOLUTION RESPIRATORY (INHALATION) at 19:33

## 2019-12-25 RX ADMIN — Medication 1 CAPSULE: at 08:24

## 2019-12-25 RX ADMIN — INSULIN DETEMIR 10 UNITS: 100 INJECTION, SOLUTION SUBCUTANEOUS at 08:27

## 2019-12-25 RX ADMIN — ASPIRIN 81 MG: 81 TABLET, COATED ORAL at 08:24

## 2019-12-25 RX ADMIN — INSULIN ASPART 2 UNITS: 100 INJECTION, SOLUTION INTRAVENOUS; SUBCUTANEOUS at 20:29

## 2019-12-25 RX ADMIN — PANTOPRAZOLE SODIUM 40 MG: 40 TABLET, DELAYED RELEASE ORAL at 08:24

## 2019-12-25 RX ADMIN — SODIUM CHLORIDE, PRESERVATIVE FREE 10 ML: 5 INJECTION INTRAVENOUS at 20:30

## 2019-12-25 RX ADMIN — INSULIN ASPART 2 UNITS: 100 INJECTION, SOLUTION INTRAVENOUS; SUBCUTANEOUS at 10:36

## 2019-12-25 RX ADMIN — INSULIN ASPART 5 UNITS: 100 INJECTION, SOLUTION INTRAVENOUS; SUBCUTANEOUS at 17:39

## 2019-12-25 RX ADMIN — AMLODIPINE BESYLATE 10 MG: 10 TABLET ORAL at 08:25

## 2019-12-25 RX ADMIN — DOXYCYCLINE 100 MG: 100 CAPSULE ORAL at 18:20

## 2019-12-25 RX ADMIN — IPRATROPIUM BROMIDE AND ALBUTEROL SULFATE 3 ML: .5; 3 SOLUTION RESPIRATORY (INHALATION) at 07:18

## 2019-12-25 RX ADMIN — SODIUM CHLORIDE, PRESERVATIVE FREE 10 ML: 5 INJECTION INTRAVENOUS at 08:25

## 2019-12-25 RX ADMIN — METOPROLOL TARTRATE 100 MG: 100 TABLET, FILM COATED ORAL at 08:24

## 2019-12-25 RX ADMIN — APIXABAN 2.5 MG: 2.5 TABLET, FILM COATED ORAL at 08:24

## 2019-12-25 RX ADMIN — Medication 1 TABLET: at 08:24

## 2019-12-25 RX ADMIN — DOXYCYCLINE 100 MG: 100 CAPSULE ORAL at 08:25

## 2019-12-25 RX ADMIN — HYDRALAZINE HYDROCHLORIDE 50 MG: 50 TABLET ORAL at 20:31

## 2019-12-25 RX ADMIN — INSULIN ASPART 3 UNITS: 100 INJECTION, SOLUTION INTRAVENOUS; SUBCUTANEOUS at 17:39

## 2019-12-25 RX ADMIN — HYDRALAZINE HYDROCHLORIDE 50 MG: 50 TABLET ORAL at 07:16

## 2019-12-25 RX ADMIN — PIPERACILLIN SODIUM,TAZOBACTAM SODIUM 3.38 G: 3; .375 INJECTION, POWDER, FOR SOLUTION INTRAVENOUS at 03:02

## 2019-12-25 RX ADMIN — HYDRALAZINE HYDROCHLORIDE 50 MG: 50 TABLET ORAL at 14:33

## 2019-12-26 LAB
ANION GAP SERPL CALCULATED.3IONS-SCNC: 12.3 MMOL/L (ref 5–15)
BACTERIA SPEC AEROBE CULT: NORMAL
BACTERIA SPEC AEROBE CULT: NORMAL
BASOPHILS # BLD AUTO: 0.03 10*3/MM3 (ref 0–0.2)
BASOPHILS NFR BLD AUTO: 0.6 % (ref 0–1.5)
BUN BLD-MCNC: 48 MG/DL (ref 8–23)
BUN/CREAT SERPL: 32.2 (ref 7–25)
CALCIUM SPEC-SCNC: 7.6 MG/DL (ref 8.6–10.5)
CHLORIDE SERPL-SCNC: 107 MMOL/L (ref 98–107)
CO2 SERPL-SCNC: 19.7 MMOL/L (ref 22–29)
CREAT BLD-MCNC: 1.49 MG/DL (ref 0.57–1)
DEPRECATED RDW RBC AUTO: 44.4 FL (ref 37–54)
EOSINOPHIL # BLD AUTO: 0.39 10*3/MM3 (ref 0–0.4)
EOSINOPHIL NFR BLD AUTO: 7.9 % (ref 0.3–6.2)
ERYTHROCYTE [DISTWIDTH] IN BLOOD BY AUTOMATED COUNT: 13.5 % (ref 12.3–15.4)
GFR SERPL CREATININE-BSD FRML MDRD: 34 ML/MIN/1.73
GLUCOSE BLD-MCNC: 181 MG/DL (ref 65–99)
GLUCOSE BLDC GLUCOMTR-MCNC: 104 MG/DL (ref 70–130)
GLUCOSE BLDC GLUCOMTR-MCNC: 161 MG/DL (ref 70–130)
GLUCOSE BLDC GLUCOMTR-MCNC: 171 MG/DL (ref 70–130)
GLUCOSE BLDC GLUCOMTR-MCNC: 174 MG/DL (ref 70–130)
GLUCOSE BLDC GLUCOMTR-MCNC: 223 MG/DL (ref 70–130)
HCT VFR BLD AUTO: 30.4 % (ref 34–46.6)
HGB BLD-MCNC: 9.4 G/DL (ref 12–15.9)
IMM GRANULOCYTES # BLD AUTO: 0.11 10*3/MM3 (ref 0–0.05)
IMM GRANULOCYTES NFR BLD AUTO: 2.2 % (ref 0–0.5)
LYMPHOCYTES # BLD AUTO: 1.65 10*3/MM3 (ref 0.7–3.1)
LYMPHOCYTES NFR BLD AUTO: 33.4 % (ref 19.6–45.3)
MCH RBC QN AUTO: 27.6 PG (ref 26.6–33)
MCHC RBC AUTO-ENTMCNC: 30.9 G/DL (ref 31.5–35.7)
MCV RBC AUTO: 89.4 FL (ref 79–97)
MONOCYTES # BLD AUTO: 0.56 10*3/MM3 (ref 0.1–0.9)
MONOCYTES NFR BLD AUTO: 11.3 % (ref 5–12)
NEUTROPHILS # BLD AUTO: 2.2 10*3/MM3 (ref 1.7–7)
NEUTROPHILS NFR BLD AUTO: 44.6 % (ref 42.7–76)
NRBC BLD AUTO-RTO: 0 /100 WBC (ref 0–0.2)
PLATELET # BLD AUTO: 263 10*3/MM3 (ref 140–450)
PMV BLD AUTO: 11.3 FL (ref 6–12)
POTASSIUM BLD-SCNC: 4.7 MMOL/L (ref 3.5–5.2)
RBC # BLD AUTO: 3.4 10*6/MM3 (ref 3.77–5.28)
SODIUM BLD-SCNC: 139 MMOL/L (ref 136–145)
WBC NRBC COR # BLD: 4.94 10*3/MM3 (ref 3.4–10.8)

## 2019-12-26 PROCEDURE — 63710000001 INSULIN DETEMIR PER 5 UNITS: Performed by: INTERNAL MEDICINE

## 2019-12-26 PROCEDURE — 94799 UNLISTED PULMONARY SVC/PX: CPT

## 2019-12-26 PROCEDURE — 82962 GLUCOSE BLOOD TEST: CPT

## 2019-12-26 PROCEDURE — 97162 PT EVAL MOD COMPLEX 30 MIN: CPT

## 2019-12-26 PROCEDURE — 99232 SBSQ HOSP IP/OBS MODERATE 35: CPT | Performed by: INTERNAL MEDICINE

## 2019-12-26 PROCEDURE — 63710000001 INSULIN ASPART PER 5 UNITS: Performed by: INTERNAL MEDICINE

## 2019-12-26 PROCEDURE — 80048 BASIC METABOLIC PNL TOTAL CA: CPT | Performed by: INTERNAL MEDICINE

## 2019-12-26 PROCEDURE — 63710000001 INSULIN ASPART PER 5 UNITS: Performed by: PHYSICIAN ASSISTANT

## 2019-12-26 PROCEDURE — 97165 OT EVAL LOW COMPLEX 30 MIN: CPT

## 2019-12-26 PROCEDURE — 85025 COMPLETE CBC W/AUTO DIFF WBC: CPT | Performed by: INTERNAL MEDICINE

## 2019-12-26 RX ADMIN — Medication 1 CAPSULE: at 08:12

## 2019-12-26 RX ADMIN — DOXYCYCLINE 100 MG: 100 CAPSULE ORAL at 08:12

## 2019-12-26 RX ADMIN — INSULIN ASPART 2 UNITS: 100 INJECTION, SOLUTION INTRAVENOUS; SUBCUTANEOUS at 20:09

## 2019-12-26 RX ADMIN — INSULIN ASPART 2 UNITS: 100 INJECTION, SOLUTION INTRAVENOUS; SUBCUTANEOUS at 12:30

## 2019-12-26 RX ADMIN — INSULIN DETEMIR 15 UNITS: 100 INJECTION, SOLUTION SUBCUTANEOUS at 08:30

## 2019-12-26 RX ADMIN — IPRATROPIUM BROMIDE AND ALBUTEROL SULFATE 3 ML: .5; 3 SOLUTION RESPIRATORY (INHALATION) at 19:08

## 2019-12-26 RX ADMIN — DOXYCYCLINE 100 MG: 100 CAPSULE ORAL at 17:31

## 2019-12-26 RX ADMIN — IPRATROPIUM BROMIDE AND ALBUTEROL SULFATE 3 ML: .5; 3 SOLUTION RESPIRATORY (INHALATION) at 13:30

## 2019-12-26 RX ADMIN — APIXABAN 2.5 MG: 2.5 TABLET, FILM COATED ORAL at 20:09

## 2019-12-26 RX ADMIN — APIXABAN 2.5 MG: 2.5 TABLET, FILM COATED ORAL at 08:12

## 2019-12-26 RX ADMIN — MONTELUKAST SODIUM 10 MG: 10 TABLET, COATED ORAL at 20:09

## 2019-12-26 RX ADMIN — INSULIN ASPART 5 UNITS: 100 INJECTION, SOLUTION INTRAVENOUS; SUBCUTANEOUS at 08:15

## 2019-12-26 RX ADMIN — HYDRALAZINE HYDROCHLORIDE 50 MG: 50 TABLET ORAL at 23:00

## 2019-12-26 RX ADMIN — LINEZOLID 600 MG: 600 TABLET, FILM COATED ORAL at 08:12

## 2019-12-26 RX ADMIN — HYDRALAZINE HYDROCHLORIDE 50 MG: 50 TABLET ORAL at 05:23

## 2019-12-26 RX ADMIN — AMOXICILLIN AND CLAVULANATE POTASSIUM 500 MG: 500; 125 TABLET, FILM COATED ORAL at 20:09

## 2019-12-26 RX ADMIN — PANTOPRAZOLE SODIUM 40 MG: 40 TABLET, DELAYED RELEASE ORAL at 08:12

## 2019-12-26 RX ADMIN — METOPROLOL TARTRATE 100 MG: 100 TABLET, FILM COATED ORAL at 09:50

## 2019-12-26 RX ADMIN — INSULIN ASPART 3 UNITS: 100 INJECTION, SOLUTION INTRAVENOUS; SUBCUTANEOUS at 08:15

## 2019-12-26 RX ADMIN — ASPIRIN 81 MG: 81 TABLET, COATED ORAL at 08:12

## 2019-12-26 RX ADMIN — AMOXICILLIN AND CLAVULANATE POTASSIUM 500 MG: 500; 125 TABLET, FILM COATED ORAL at 08:12

## 2019-12-26 RX ADMIN — SODIUM CHLORIDE, PRESERVATIVE FREE 10 ML: 5 INJECTION INTRAVENOUS at 20:10

## 2019-12-26 RX ADMIN — Medication 1 TABLET: at 08:12

## 2019-12-26 RX ADMIN — IPRATROPIUM BROMIDE AND ALBUTEROL SULFATE 3 ML: .5; 3 SOLUTION RESPIRATORY (INHALATION) at 01:09

## 2019-12-26 RX ADMIN — DIVALPROEX SODIUM 500 MG: 500 TABLET, EXTENDED RELEASE ORAL at 20:09

## 2019-12-26 RX ADMIN — SODIUM CHLORIDE, PRESERVATIVE FREE 10 ML: 5 INJECTION INTRAVENOUS at 08:13

## 2019-12-26 RX ADMIN — LINEZOLID 600 MG: 600 TABLET, FILM COATED ORAL at 20:42

## 2019-12-26 RX ADMIN — ATORVASTATIN CALCIUM 40 MG: 40 TABLET, FILM COATED ORAL at 20:09

## 2019-12-26 RX ADMIN — METOPROLOL TARTRATE 100 MG: 100 TABLET, FILM COATED ORAL at 20:09

## 2019-12-26 RX ADMIN — AMLODIPINE BESYLATE 10 MG: 10 TABLET ORAL at 09:50

## 2019-12-27 LAB
GLUCOSE BLDC GLUCOMTR-MCNC: 131 MG/DL (ref 70–130)
GLUCOSE BLDC GLUCOMTR-MCNC: 221 MG/DL (ref 70–130)
GLUCOSE BLDC GLUCOMTR-MCNC: 360 MG/DL (ref 70–130)
GLUCOSE BLDC GLUCOMTR-MCNC: 76 MG/DL (ref 70–130)

## 2019-12-27 PROCEDURE — 63710000001 INSULIN ASPART PER 5 UNITS: Performed by: INTERNAL MEDICINE

## 2019-12-27 PROCEDURE — 99231 SBSQ HOSP IP/OBS SF/LOW 25: CPT | Performed by: INTERNAL MEDICINE

## 2019-12-27 PROCEDURE — 82962 GLUCOSE BLOOD TEST: CPT

## 2019-12-27 PROCEDURE — 94799 UNLISTED PULMONARY SVC/PX: CPT

## 2019-12-27 PROCEDURE — 63710000001 INSULIN ASPART PER 5 UNITS: Performed by: PHYSICIAN ASSISTANT

## 2019-12-27 PROCEDURE — 63710000001 INSULIN DETEMIR PER 5 UNITS: Performed by: INTERNAL MEDICINE

## 2019-12-27 RX ADMIN — AMLODIPINE BESYLATE 10 MG: 10 TABLET ORAL at 10:39

## 2019-12-27 RX ADMIN — INSULIN ASPART 6 UNITS: 100 INJECTION, SOLUTION INTRAVENOUS; SUBCUTANEOUS at 12:45

## 2019-12-27 RX ADMIN — Medication 1 CAPSULE: at 09:19

## 2019-12-27 RX ADMIN — DIVALPROEX SODIUM 500 MG: 500 TABLET, EXTENDED RELEASE ORAL at 20:27

## 2019-12-27 RX ADMIN — INSULIN DETEMIR 15 UNITS: 100 INJECTION, SOLUTION SUBCUTANEOUS at 09:20

## 2019-12-27 RX ADMIN — IPRATROPIUM BROMIDE AND ALBUTEROL SULFATE 3 ML: .5; 3 SOLUTION RESPIRATORY (INHALATION) at 13:27

## 2019-12-27 RX ADMIN — METOPROLOL TARTRATE 100 MG: 100 TABLET, FILM COATED ORAL at 22:43

## 2019-12-27 RX ADMIN — IPRATROPIUM BROMIDE AND ALBUTEROL SULFATE 3 ML: .5; 3 SOLUTION RESPIRATORY (INHALATION) at 01:08

## 2019-12-27 RX ADMIN — MONTELUKAST SODIUM 10 MG: 10 TABLET, COATED ORAL at 21:20

## 2019-12-27 RX ADMIN — IPRATROPIUM BROMIDE AND ALBUTEROL SULFATE 3 ML: .5; 3 SOLUTION RESPIRATORY (INHALATION) at 08:34

## 2019-12-27 RX ADMIN — LINEZOLID 600 MG: 600 TABLET, FILM COATED ORAL at 20:27

## 2019-12-27 RX ADMIN — ASPIRIN 81 MG: 81 TABLET, COATED ORAL at 09:18

## 2019-12-27 RX ADMIN — LINEZOLID 600 MG: 600 TABLET, FILM COATED ORAL at 09:18

## 2019-12-27 RX ADMIN — HYDRALAZINE HYDROCHLORIDE 50 MG: 50 TABLET ORAL at 21:21

## 2019-12-27 RX ADMIN — ATORVASTATIN CALCIUM 40 MG: 40 TABLET, FILM COATED ORAL at 20:27

## 2019-12-27 RX ADMIN — PANTOPRAZOLE SODIUM 40 MG: 40 TABLET, DELAYED RELEASE ORAL at 09:18

## 2019-12-27 RX ADMIN — SODIUM CHLORIDE, PRESERVATIVE FREE 10 ML: 5 INJECTION INTRAVENOUS at 09:20

## 2019-12-27 RX ADMIN — DOXYCYCLINE 100 MG: 100 CAPSULE ORAL at 09:18

## 2019-12-27 RX ADMIN — INSULIN ASPART 2 UNITS: 100 INJECTION, SOLUTION INTRAVENOUS; SUBCUTANEOUS at 20:34

## 2019-12-27 RX ADMIN — METOPROLOL TARTRATE 100 MG: 100 TABLET, FILM COATED ORAL at 09:18

## 2019-12-27 RX ADMIN — APIXABAN 2.5 MG: 2.5 TABLET, FILM COATED ORAL at 20:28

## 2019-12-27 RX ADMIN — Medication 1 TABLET: at 09:18

## 2019-12-27 RX ADMIN — AMOXICILLIN AND CLAVULANATE POTASSIUM 500 MG: 500; 125 TABLET, FILM COATED ORAL at 09:18

## 2019-12-27 RX ADMIN — INSULIN ASPART 5 UNITS: 100 INJECTION, SOLUTION INTRAVENOUS; SUBCUTANEOUS at 12:45

## 2019-12-27 RX ADMIN — INSULIN ASPART 5 UNITS: 100 INJECTION, SOLUTION INTRAVENOUS; SUBCUTANEOUS at 09:21

## 2019-12-27 RX ADMIN — HYDRALAZINE HYDROCHLORIDE 50 MG: 50 TABLET ORAL at 05:09

## 2019-12-27 RX ADMIN — IPRATROPIUM BROMIDE AND ALBUTEROL SULFATE 3 ML: .5; 3 SOLUTION RESPIRATORY (INHALATION) at 19:21

## 2019-12-27 RX ADMIN — APIXABAN 2.5 MG: 2.5 TABLET, FILM COATED ORAL at 09:18

## 2019-12-27 RX ADMIN — DOXYCYCLINE 100 MG: 100 CAPSULE ORAL at 17:47

## 2019-12-28 LAB
GLUCOSE BLDC GLUCOMTR-MCNC: 139 MG/DL (ref 70–130)
GLUCOSE BLDC GLUCOMTR-MCNC: 164 MG/DL (ref 70–130)
GLUCOSE BLDC GLUCOMTR-MCNC: 368 MG/DL (ref 70–130)
GLUCOSE BLDC GLUCOMTR-MCNC: 90 MG/DL (ref 70–130)

## 2019-12-28 PROCEDURE — 82962 GLUCOSE BLOOD TEST: CPT

## 2019-12-28 PROCEDURE — 63710000001 INSULIN ASPART PER 5 UNITS: Performed by: INTERNAL MEDICINE

## 2019-12-28 PROCEDURE — 99231 SBSQ HOSP IP/OBS SF/LOW 25: CPT | Performed by: INTERNAL MEDICINE

## 2019-12-28 PROCEDURE — 94799 UNLISTED PULMONARY SVC/PX: CPT

## 2019-12-28 PROCEDURE — 63710000001 INSULIN ASPART PER 5 UNITS: Performed by: PHYSICIAN ASSISTANT

## 2019-12-28 RX ADMIN — ATORVASTATIN CALCIUM 40 MG: 40 TABLET, FILM COATED ORAL at 20:52

## 2019-12-28 RX ADMIN — SODIUM CHLORIDE, PRESERVATIVE FREE 10 ML: 5 INJECTION INTRAVENOUS at 08:54

## 2019-12-28 RX ADMIN — ASPIRIN 81 MG: 81 TABLET, COATED ORAL at 08:54

## 2019-12-28 RX ADMIN — IPRATROPIUM BROMIDE AND ALBUTEROL SULFATE 3 ML: .5; 3 SOLUTION RESPIRATORY (INHALATION) at 13:50

## 2019-12-28 RX ADMIN — SODIUM CHLORIDE, PRESERVATIVE FREE 10 ML: 5 INJECTION INTRAVENOUS at 20:52

## 2019-12-28 RX ADMIN — IPRATROPIUM BROMIDE AND ALBUTEROL SULFATE 3 ML: .5; 3 SOLUTION RESPIRATORY (INHALATION) at 00:25

## 2019-12-28 RX ADMIN — DIVALPROEX SODIUM 500 MG: 500 TABLET, EXTENDED RELEASE ORAL at 20:52

## 2019-12-28 RX ADMIN — INSULIN ASPART 5 UNITS: 100 INJECTION, SOLUTION INTRAVENOUS; SUBCUTANEOUS at 11:42

## 2019-12-28 RX ADMIN — HYDRALAZINE HYDROCHLORIDE 50 MG: 50 TABLET ORAL at 20:51

## 2019-12-28 RX ADMIN — MONTELUKAST SODIUM 10 MG: 10 TABLET, COATED ORAL at 20:52

## 2019-12-28 RX ADMIN — HYDRALAZINE HYDROCHLORIDE 50 MG: 50 TABLET ORAL at 05:29

## 2019-12-28 RX ADMIN — Medication 1 TABLET: at 08:54

## 2019-12-28 RX ADMIN — APIXABAN 2.5 MG: 2.5 TABLET, FILM COATED ORAL at 20:51

## 2019-12-28 RX ADMIN — METOPROLOL TARTRATE 100 MG: 100 TABLET, FILM COATED ORAL at 20:51

## 2019-12-28 RX ADMIN — INSULIN ASPART 6 UNITS: 100 INJECTION, SOLUTION INTRAVENOUS; SUBCUTANEOUS at 11:42

## 2019-12-28 RX ADMIN — INSULIN ASPART 2 UNITS: 100 INJECTION, SOLUTION INTRAVENOUS; SUBCUTANEOUS at 20:51

## 2019-12-28 RX ADMIN — PANTOPRAZOLE SODIUM 40 MG: 40 TABLET, DELAYED RELEASE ORAL at 08:54

## 2019-12-28 RX ADMIN — LINEZOLID 600 MG: 600 TABLET, FILM COATED ORAL at 08:54

## 2019-12-28 RX ADMIN — Medication 1 CAPSULE: at 08:54

## 2019-12-28 RX ADMIN — APIXABAN 2.5 MG: 2.5 TABLET, FILM COATED ORAL at 08:54

## 2019-12-28 RX ADMIN — DOXYCYCLINE 100 MG: 100 CAPSULE ORAL at 08:54

## 2019-12-28 RX ADMIN — IPRATROPIUM BROMIDE AND ALBUTEROL SULFATE 3 ML: .5; 3 SOLUTION RESPIRATORY (INHALATION) at 19:12

## 2019-12-28 RX ADMIN — AMLODIPINE BESYLATE 10 MG: 10 TABLET ORAL at 08:54

## 2019-12-28 RX ADMIN — LINEZOLID 600 MG: 600 TABLET, FILM COATED ORAL at 20:51

## 2019-12-28 RX ADMIN — METOPROLOL TARTRATE 100 MG: 100 TABLET, FILM COATED ORAL at 08:54

## 2019-12-28 RX ADMIN — IPRATROPIUM BROMIDE AND ALBUTEROL SULFATE 3 ML: .5; 3 SOLUTION RESPIRATORY (INHALATION) at 06:57

## 2019-12-29 LAB
BACTERIA SPEC AEROBE CULT: NORMAL
BACTERIA SPEC AEROBE CULT: NORMAL
GLUCOSE BLDC GLUCOMTR-MCNC: 160 MG/DL (ref 70–130)
GLUCOSE BLDC GLUCOMTR-MCNC: 395 MG/DL (ref 70–130)
GLUCOSE BLDC GLUCOMTR-MCNC: 73 MG/DL (ref 70–130)
GLUCOSE BLDC GLUCOMTR-MCNC: 85 MG/DL (ref 70–130)

## 2019-12-29 PROCEDURE — 99231 SBSQ HOSP IP/OBS SF/LOW 25: CPT | Performed by: INTERNAL MEDICINE

## 2019-12-29 PROCEDURE — 63710000001 INSULIN ASPART PER 5 UNITS: Performed by: INTERNAL MEDICINE

## 2019-12-29 PROCEDURE — 94799 UNLISTED PULMONARY SVC/PX: CPT

## 2019-12-29 PROCEDURE — 63710000001 INSULIN ASPART PER 5 UNITS: Performed by: PHYSICIAN ASSISTANT

## 2019-12-29 PROCEDURE — 82962 GLUCOSE BLOOD TEST: CPT

## 2019-12-29 RX ADMIN — HYDRALAZINE HYDROCHLORIDE 50 MG: 50 TABLET ORAL at 22:52

## 2019-12-29 RX ADMIN — INSULIN ASPART 2 UNITS: 100 INJECTION, SOLUTION INTRAVENOUS; SUBCUTANEOUS at 17:03

## 2019-12-29 RX ADMIN — SODIUM CHLORIDE, PRESERVATIVE FREE 10 ML: 5 INJECTION INTRAVENOUS at 20:14

## 2019-12-29 RX ADMIN — IPRATROPIUM BROMIDE AND ALBUTEROL SULFATE 3 ML: .5; 3 SOLUTION RESPIRATORY (INHALATION) at 13:38

## 2019-12-29 RX ADMIN — METOPROLOL TARTRATE 100 MG: 100 TABLET, FILM COATED ORAL at 08:19

## 2019-12-29 RX ADMIN — INSULIN ASPART 5 UNITS: 100 INJECTION, SOLUTION INTRAVENOUS; SUBCUTANEOUS at 11:41

## 2019-12-29 RX ADMIN — ATORVASTATIN CALCIUM 40 MG: 40 TABLET, FILM COATED ORAL at 20:13

## 2019-12-29 RX ADMIN — MONTELUKAST SODIUM 10 MG: 10 TABLET, COATED ORAL at 20:13

## 2019-12-29 RX ADMIN — Medication 1 TABLET: at 08:19

## 2019-12-29 RX ADMIN — IPRATROPIUM BROMIDE AND ALBUTEROL SULFATE 3 ML: .5; 3 SOLUTION RESPIRATORY (INHALATION) at 08:27

## 2019-12-29 RX ADMIN — HYDRALAZINE HYDROCHLORIDE 50 MG: 50 TABLET ORAL at 05:43

## 2019-12-29 RX ADMIN — PANTOPRAZOLE SODIUM 40 MG: 40 TABLET, DELAYED RELEASE ORAL at 08:19

## 2019-12-29 RX ADMIN — SODIUM CHLORIDE, PRESERVATIVE FREE 10 ML: 5 INJECTION INTRAVENOUS at 08:20

## 2019-12-29 RX ADMIN — IPRATROPIUM BROMIDE AND ALBUTEROL SULFATE 3 ML: .5; 3 SOLUTION RESPIRATORY (INHALATION) at 19:43

## 2019-12-29 RX ADMIN — DIVALPROEX SODIUM 500 MG: 500 TABLET, EXTENDED RELEASE ORAL at 20:13

## 2019-12-29 RX ADMIN — LINEZOLID 600 MG: 600 TABLET, FILM COATED ORAL at 20:13

## 2019-12-29 RX ADMIN — APIXABAN 2.5 MG: 2.5 TABLET, FILM COATED ORAL at 20:13

## 2019-12-29 RX ADMIN — Medication 1 CAPSULE: at 08:19

## 2019-12-29 RX ADMIN — INSULIN ASPART 6 UNITS: 100 INJECTION, SOLUTION INTRAVENOUS; SUBCUTANEOUS at 11:41

## 2019-12-29 RX ADMIN — METOPROLOL TARTRATE 100 MG: 100 TABLET, FILM COATED ORAL at 20:13

## 2019-12-29 RX ADMIN — LINEZOLID 600 MG: 600 TABLET, FILM COATED ORAL at 08:20

## 2019-12-29 RX ADMIN — HYDRALAZINE HYDROCHLORIDE 50 MG: 50 TABLET ORAL at 13:29

## 2019-12-29 RX ADMIN — ASPIRIN 81 MG: 81 TABLET, COATED ORAL at 08:19

## 2019-12-29 RX ADMIN — APIXABAN 2.5 MG: 2.5 TABLET, FILM COATED ORAL at 08:19

## 2019-12-29 RX ADMIN — INSULIN ASPART 5 UNITS: 100 INJECTION, SOLUTION INTRAVENOUS; SUBCUTANEOUS at 17:03

## 2019-12-30 LAB
GLUCOSE BLDC GLUCOMTR-MCNC: 113 MG/DL (ref 70–130)
GLUCOSE BLDC GLUCOMTR-MCNC: 151 MG/DL (ref 70–130)
GLUCOSE BLDC GLUCOMTR-MCNC: 156 MG/DL (ref 70–130)
GLUCOSE BLDC GLUCOMTR-MCNC: 195 MG/DL (ref 70–130)
GLUCOSE BLDC GLUCOMTR-MCNC: 21 MG/DL (ref 70–130)
GLUCOSE BLDC GLUCOMTR-MCNC: 210 MG/DL (ref 70–130)
GLUCOSE BLDC GLUCOMTR-MCNC: 244 MG/DL (ref 70–130)
GLUCOSE BLDC GLUCOMTR-MCNC: 84 MG/DL (ref 70–130)
GLUCOSE BLDC GLUCOMTR-MCNC: 85 MG/DL (ref 70–130)
GLUCOSE BLDC GLUCOMTR-MCNC: 88 MG/DL (ref 70–130)

## 2019-12-30 PROCEDURE — 82962 GLUCOSE BLOOD TEST: CPT

## 2019-12-30 PROCEDURE — 94799 UNLISTED PULMONARY SVC/PX: CPT

## 2019-12-30 PROCEDURE — 63710000001 INSULIN ASPART PER 5 UNITS: Performed by: PHYSICIAN ASSISTANT

## 2019-12-30 PROCEDURE — 63710000001 INSULIN DETEMIR PER 5 UNITS: Performed by: INTERNAL MEDICINE

## 2019-12-30 PROCEDURE — 99233 SBSQ HOSP IP/OBS HIGH 50: CPT | Performed by: INTERNAL MEDICINE

## 2019-12-30 PROCEDURE — 63710000001 INSULIN ASPART PER 5 UNITS: Performed by: INTERNAL MEDICINE

## 2019-12-30 PROCEDURE — 25010000002 GLUCAGON (HUMAN RECOMBINANT) 1 MG RECONSTITUTED SOLUTION: Performed by: PHYSICIAN ASSISTANT

## 2019-12-30 RX ORDER — DEXTROSE AND SODIUM CHLORIDE 5; .45 G/100ML; G/100ML
75 INJECTION, SOLUTION INTRAVENOUS CONTINUOUS
Status: DISCONTINUED | OUTPATIENT
Start: 2019-12-30 | End: 2019-12-31

## 2019-12-30 RX ADMIN — SODIUM CHLORIDE, PRESERVATIVE FREE 10 ML: 5 INJECTION INTRAVENOUS at 08:48

## 2019-12-30 RX ADMIN — MONTELUKAST SODIUM 10 MG: 10 TABLET, COATED ORAL at 21:52

## 2019-12-30 RX ADMIN — IPRATROPIUM BROMIDE AND ALBUTEROL SULFATE 3 ML: .5; 3 SOLUTION RESPIRATORY (INHALATION) at 19:21

## 2019-12-30 RX ADMIN — IPRATROPIUM BROMIDE AND ALBUTEROL SULFATE 3 ML: .5; 3 SOLUTION RESPIRATORY (INHALATION) at 06:43

## 2019-12-30 RX ADMIN — AMLODIPINE BESYLATE 10 MG: 10 TABLET ORAL at 08:47

## 2019-12-30 RX ADMIN — HYDRALAZINE HYDROCHLORIDE 50 MG: 50 TABLET ORAL at 06:07

## 2019-12-30 RX ADMIN — METOPROLOL TARTRATE 100 MG: 100 TABLET, FILM COATED ORAL at 08:47

## 2019-12-30 RX ADMIN — SODIUM CHLORIDE, PRESERVATIVE FREE 10 ML: 5 INJECTION INTRAVENOUS at 21:52

## 2019-12-30 RX ADMIN — INSULIN ASPART 2 UNITS: 100 INJECTION, SOLUTION INTRAVENOUS; SUBCUTANEOUS at 12:09

## 2019-12-30 RX ADMIN — LINEZOLID 600 MG: 600 TABLET, FILM COATED ORAL at 08:48

## 2019-12-30 RX ADMIN — DEXTROSE 15 G: 15 GEL ORAL at 15:51

## 2019-12-30 RX ADMIN — DEXTROSE AND SODIUM CHLORIDE 75 ML/HR: 5; 450 INJECTION, SOLUTION INTRAVENOUS at 16:51

## 2019-12-30 RX ADMIN — METOPROLOL TARTRATE 100 MG: 100 TABLET, FILM COATED ORAL at 21:52

## 2019-12-30 RX ADMIN — GLUCAGON HYDROCHLORIDE 1 MG: KIT at 15:51

## 2019-12-30 RX ADMIN — INSULIN ASPART 5 UNITS: 100 INJECTION, SOLUTION INTRAVENOUS; SUBCUTANEOUS at 08:49

## 2019-12-30 RX ADMIN — PANTOPRAZOLE SODIUM 40 MG: 40 TABLET, DELAYED RELEASE ORAL at 08:48

## 2019-12-30 RX ADMIN — IPRATROPIUM BROMIDE AND ALBUTEROL SULFATE 3 ML: .5; 3 SOLUTION RESPIRATORY (INHALATION) at 13:30

## 2019-12-30 RX ADMIN — Medication 1 TABLET: at 08:48

## 2019-12-30 RX ADMIN — APIXABAN 2.5 MG: 2.5 TABLET, FILM COATED ORAL at 08:47

## 2019-12-30 RX ADMIN — IPRATROPIUM BROMIDE AND ALBUTEROL SULFATE 3 ML: .5; 3 SOLUTION RESPIRATORY (INHALATION) at 01:03

## 2019-12-30 RX ADMIN — ATORVASTATIN CALCIUM 40 MG: 40 TABLET, FILM COATED ORAL at 21:52

## 2019-12-30 RX ADMIN — DEXTROSE MONOHYDRATE 25 G: 25 INJECTION, SOLUTION INTRAVENOUS at 15:51

## 2019-12-30 RX ADMIN — INSULIN ASPART 3 UNITS: 100 INJECTION, SOLUTION INTRAVENOUS; SUBCUTANEOUS at 08:47

## 2019-12-30 RX ADMIN — Medication 1 CAPSULE: at 08:48

## 2019-12-30 RX ADMIN — INSULIN DETEMIR 15 UNITS: 100 INJECTION, SOLUTION SUBCUTANEOUS at 08:49

## 2019-12-30 RX ADMIN — ASPIRIN 81 MG: 81 TABLET, COATED ORAL at 08:48

## 2019-12-30 RX ADMIN — DIVALPROEX SODIUM 500 MG: 500 TABLET, EXTENDED RELEASE ORAL at 21:52

## 2019-12-30 RX ADMIN — APIXABAN 2.5 MG: 2.5 TABLET, FILM COATED ORAL at 21:52

## 2019-12-30 RX ADMIN — INSULIN ASPART 5 UNITS: 100 INJECTION, SOLUTION INTRAVENOUS; SUBCUTANEOUS at 12:10

## 2019-12-31 VITALS
WEIGHT: 104.94 LBS | RESPIRATION RATE: 15 BRPM | BODY MASS INDEX: 19.31 KG/M2 | HEIGHT: 62 IN | OXYGEN SATURATION: 97 % | DIASTOLIC BLOOD PRESSURE: 87 MMHG | HEART RATE: 59 BPM | TEMPERATURE: 98.4 F | SYSTOLIC BLOOD PRESSURE: 146 MMHG

## 2019-12-31 LAB
ALBUMIN SERPL-MCNC: 2.44 G/DL (ref 3.5–5.2)
ALBUMIN/GLOB SERPL: 0.7 G/DL
ALP SERPL-CCNC: 80 U/L (ref 39–117)
ALT SERPL W P-5'-P-CCNC: 7 U/L (ref 1–33)
ANION GAP SERPL CALCULATED.3IONS-SCNC: 11.1 MMOL/L (ref 5–15)
AST SERPL-CCNC: 19 U/L (ref 1–32)
BASOPHILS # BLD AUTO: 0.03 10*3/MM3 (ref 0–0.2)
BASOPHILS NFR BLD AUTO: 0.5 % (ref 0–1.5)
BILIRUB SERPL-MCNC: 0.3 MG/DL (ref 0.2–1.2)
BUN BLD-MCNC: 47 MG/DL (ref 8–23)
BUN/CREAT SERPL: 32.6 (ref 7–25)
CALCIUM SPEC-SCNC: 7.9 MG/DL (ref 8.6–10.5)
CHLORIDE SERPL-SCNC: 101 MMOL/L (ref 98–107)
CO2 SERPL-SCNC: 21.9 MMOL/L (ref 22–29)
CREAT BLD-MCNC: 1.44 MG/DL (ref 0.57–1)
DEPRECATED RDW RBC AUTO: 44.2 FL (ref 37–54)
EOSINOPHIL # BLD AUTO: 0.24 10*3/MM3 (ref 0–0.4)
EOSINOPHIL NFR BLD AUTO: 3.8 % (ref 0.3–6.2)
ERYTHROCYTE [DISTWIDTH] IN BLOOD BY AUTOMATED COUNT: 13.6 % (ref 12.3–15.4)
GFR SERPL CREATININE-BSD FRML MDRD: 35 ML/MIN/1.73
GLOBULIN UR ELPH-MCNC: 3.5 GM/DL
GLUCOSE BLD-MCNC: 211 MG/DL (ref 65–99)
GLUCOSE BLDC GLUCOMTR-MCNC: 161 MG/DL (ref 70–130)
GLUCOSE BLDC GLUCOMTR-MCNC: 166 MG/DL (ref 70–130)
GLUCOSE BLDC GLUCOMTR-MCNC: 169 MG/DL (ref 70–130)
GLUCOSE BLDC GLUCOMTR-MCNC: 178 MG/DL (ref 70–130)
GLUCOSE BLDC GLUCOMTR-MCNC: 180 MG/DL (ref 70–130)
GLUCOSE BLDC GLUCOMTR-MCNC: 186 MG/DL (ref 70–130)
GLUCOSE BLDC GLUCOMTR-MCNC: 188 MG/DL (ref 70–130)
GLUCOSE BLDC GLUCOMTR-MCNC: 194 MG/DL (ref 70–130)
GLUCOSE BLDC GLUCOMTR-MCNC: 317 MG/DL (ref 70–130)
HCT VFR BLD AUTO: 28.8 % (ref 34–46.6)
HGB BLD-MCNC: 9.1 G/DL (ref 12–15.9)
IMM GRANULOCYTES # BLD AUTO: 0.03 10*3/MM3 (ref 0–0.05)
IMM GRANULOCYTES NFR BLD AUTO: 0.5 % (ref 0–0.5)
LYMPHOCYTES # BLD AUTO: 1.12 10*3/MM3 (ref 0.7–3.1)
LYMPHOCYTES NFR BLD AUTO: 17.8 % (ref 19.6–45.3)
MCH RBC QN AUTO: 28.3 PG (ref 26.6–33)
MCHC RBC AUTO-ENTMCNC: 31.6 G/DL (ref 31.5–35.7)
MCV RBC AUTO: 89.4 FL (ref 79–97)
MONOCYTES # BLD AUTO: 0.56 10*3/MM3 (ref 0.1–0.9)
MONOCYTES NFR BLD AUTO: 8.9 % (ref 5–12)
NEUTROPHILS # BLD AUTO: 4.32 10*3/MM3 (ref 1.7–7)
NEUTROPHILS NFR BLD AUTO: 68.5 % (ref 42.7–76)
NRBC BLD AUTO-RTO: 0 /100 WBC (ref 0–0.2)
PLATELET # BLD AUTO: 244 10*3/MM3 (ref 140–450)
PMV BLD AUTO: 11.2 FL (ref 6–12)
POTASSIUM BLD-SCNC: 4.6 MMOL/L (ref 3.5–5.2)
PROT SERPL-MCNC: 5.9 G/DL (ref 6–8.5)
RBC # BLD AUTO: 3.22 10*6/MM3 (ref 3.77–5.28)
SODIUM BLD-SCNC: 134 MMOL/L (ref 136–145)
WBC NRBC COR # BLD: 6.3 10*3/MM3 (ref 3.4–10.8)

## 2019-12-31 PROCEDURE — 97116 GAIT TRAINING THERAPY: CPT

## 2019-12-31 PROCEDURE — 80053 COMPREHEN METABOLIC PANEL: CPT | Performed by: INTERNAL MEDICINE

## 2019-12-31 PROCEDURE — 97530 THERAPEUTIC ACTIVITIES: CPT

## 2019-12-31 PROCEDURE — 97110 THERAPEUTIC EXERCISES: CPT

## 2019-12-31 PROCEDURE — 82962 GLUCOSE BLOOD TEST: CPT

## 2019-12-31 PROCEDURE — 99239 HOSP IP/OBS DSCHRG MGMT >30: CPT | Performed by: INTERNAL MEDICINE

## 2019-12-31 PROCEDURE — 94799 UNLISTED PULMONARY SVC/PX: CPT

## 2019-12-31 PROCEDURE — 85025 COMPLETE CBC W/AUTO DIFF WBC: CPT | Performed by: INTERNAL MEDICINE

## 2019-12-31 PROCEDURE — 63710000001 INSULIN ASPART PER 5 UNITS: Performed by: PHYSICIAN ASSISTANT

## 2019-12-31 RX ORDER — IPRATROPIUM BROMIDE AND ALBUTEROL SULFATE 2.5; .5 MG/3ML; MG/3ML
3 SOLUTION RESPIRATORY (INHALATION)
Qty: 360 ML | Refills: 0 | Status: SHIPPED | OUTPATIENT
Start: 2019-12-31

## 2019-12-31 RX ADMIN — Medication 1 CAPSULE: at 08:35

## 2019-12-31 RX ADMIN — IPRATROPIUM BROMIDE AND ALBUTEROL SULFATE 3 ML: .5; 3 SOLUTION RESPIRATORY (INHALATION) at 07:23

## 2019-12-31 RX ADMIN — DEXTROSE AND SODIUM CHLORIDE 75 ML/HR: 5; 450 INJECTION, SOLUTION INTRAVENOUS at 06:15

## 2019-12-31 RX ADMIN — SODIUM CHLORIDE, PRESERVATIVE FREE 10 ML: 5 INJECTION INTRAVENOUS at 08:34

## 2019-12-31 RX ADMIN — IPRATROPIUM BROMIDE AND ALBUTEROL SULFATE 3 ML: .5; 3 SOLUTION RESPIRATORY (INHALATION) at 00:17

## 2019-12-31 RX ADMIN — IPRATROPIUM BROMIDE AND ALBUTEROL SULFATE 3 ML: .5; 3 SOLUTION RESPIRATORY (INHALATION) at 13:59

## 2019-12-31 RX ADMIN — INSULIN ASPART 5 UNITS: 100 INJECTION, SOLUTION INTRAVENOUS; SUBCUTANEOUS at 16:24

## 2019-12-31 RX ADMIN — METOPROLOL TARTRATE 100 MG: 100 TABLET, FILM COATED ORAL at 08:35

## 2019-12-31 RX ADMIN — APIXABAN 2.5 MG: 2.5 TABLET, FILM COATED ORAL at 08:34

## 2019-12-31 RX ADMIN — ASPIRIN 81 MG: 81 TABLET, COATED ORAL at 08:35

## 2019-12-31 RX ADMIN — Medication 1 TABLET: at 08:35

## 2019-12-31 RX ADMIN — PANTOPRAZOLE SODIUM 40 MG: 40 TABLET, DELAYED RELEASE ORAL at 08:34

## 2019-12-31 RX ADMIN — INSULIN ASPART 2 UNITS: 100 INJECTION, SOLUTION INTRAVENOUS; SUBCUTANEOUS at 08:35

## 2019-12-31 RX ADMIN — HYDRALAZINE HYDROCHLORIDE 50 MG: 50 TABLET ORAL at 14:28

## 2021-01-25 NOTE — PROGRESS NOTES
Rehabilitation Nursing  Inpatient Rehabilitation Functional Measures Assessment and Plan of Care    Plan of Care/Interventions  Copy from POC    Functional Measures  PUJA Eating:  PUJA Grooming:  PUJA Bathing:  PUJA Upper Body Dressing:  PUJA Lower Body Dressing:  PUJA Toileting:    PUJA Bladder Management  Level of Assistance:  Frequency/Number of Accidents this Shift:    PUJA Bowel Management  Level of Assistance:  Frequency/Number of Accidents this Shift:    PUJA Bed/Chair/Wheelchair Transfer:  PUJA Toilet Transfer:  PUJA Tub/Shower Transfer:    Previously Documented Mode of Locomotion at Discharge:  PUJA Expected Mode of Locomotion at Discharge:  PUJA Walk/Wheelchair:  PUJA Stairs:    PUJA Comprehension:  Auditory comprehension is the usual mode. Comprehension  Score = 6, Modified [place].  Patient comprehends complex/abstract  information in their primary language, requiring:  PUJA Expression:  Vocal expression is the usual mode. Expression Score = 6,  Modified Independent.  Patient expresses complex/abstract information in their  primary language, requiring: Additional time. Additional time. Additional time.  PUJA Social Interaction:  Social Interaction Score = 6, Modified Independent.  Patient is modified independent for social interaction, requiring: Requires  additional time.  PUJA Problem Solving:  Problem Solving Score = 6, Modified [place].  Patient  makes appropriate decisions in order to solve complex problems, but requires  extra time.  PUJA Memory:  Memory Score = 6, Modified [place].  Patient is modified  independent for memory, requiring:    Signed by: Nurse Yady     17